# Patient Record
Sex: MALE | Race: BLACK OR AFRICAN AMERICAN | NOT HISPANIC OR LATINO | Employment: UNEMPLOYED | URBAN - METROPOLITAN AREA
[De-identification: names, ages, dates, MRNs, and addresses within clinical notes are randomized per-mention and may not be internally consistent; named-entity substitution may affect disease eponyms.]

---

## 2017-03-26 ENCOUNTER — HOSPITAL ENCOUNTER (INPATIENT)
Facility: HOSPITAL | Age: 37
LOS: 1 days | Discharge: HOME | DRG: 439 | End: 2017-03-27
Attending: EMERGENCY MEDICINE | Admitting: FAMILY MEDICINE
Payer: MEDICAID

## 2017-03-26 DIAGNOSIS — K85.90 ACUTE ON CHRONIC PANCREATITIS: Primary | ICD-10-CM

## 2017-03-26 DIAGNOSIS — K86.1 ACUTE ON CHRONIC PANCREATITIS: Primary | ICD-10-CM

## 2017-03-26 PROBLEM — K86.89 PANCREATIC MASS: Status: ACTIVE | Noted: 2017-03-26

## 2017-03-26 PROBLEM — R79.89 ELEVATED LACTIC ACID LEVEL: Status: ACTIVE | Noted: 2017-03-26

## 2017-03-26 PROBLEM — D50.9 MICROCYTIC ANEMIA: Status: ACTIVE | Noted: 2017-03-26

## 2017-03-26 PROBLEM — E87.6 HYPOKALEMIA: Status: ACTIVE | Noted: 2017-03-26

## 2017-03-26 PROBLEM — I10 BENIGN ESSENTIAL HTN: Status: ACTIVE | Noted: 2017-03-26

## 2017-03-26 LAB
ALBUMIN SERPL BCP-MCNC: 4.2 G/DL
ALP SERPL-CCNC: 78 U/L
ALT SERPL W/O P-5'-P-CCNC: 13 U/L
AMPHET+METHAMPHET UR QL: NEGATIVE
ANION GAP SERPL CALC-SCNC: 12 MMOL/L
AST SERPL-CCNC: 20 U/L
BARBITURATES UR QL SCN>200 NG/ML: NEGATIVE
BASOPHILS # BLD AUTO: 0 K/UL
BASOPHILS NFR BLD: 0 %
BENZODIAZ UR QL SCN>200 NG/ML: NORMAL
BILIRUB SERPL-MCNC: 0.9 MG/DL
BILIRUB UR QL STRIP: NEGATIVE
BUN SERPL-MCNC: 14 MG/DL
BZE UR QL SCN: NEGATIVE
CALCIUM SERPL-MCNC: 9.2 MG/DL
CANNABINOIDS UR QL SCN: NORMAL
CHLORIDE SERPL-SCNC: 100 MMOL/L
CLARITY UR: CLEAR
CO2 SERPL-SCNC: 25 MMOL/L
COLOR UR: YELLOW
CREAT SERPL-MCNC: 1.1 MG/DL
CREAT UR-MCNC: 57.4 MG/DL
DIFFERENTIAL METHOD: ABNORMAL
EOSINOPHIL # BLD AUTO: 0.2 K/UL
EOSINOPHIL NFR BLD: 2 %
ERYTHROCYTE [DISTWIDTH] IN BLOOD BY AUTOMATED COUNT: 16.1 %
EST. GFR  (AFRICAN AMERICAN): >60 ML/MIN/1.73 M^2
EST. GFR  (NON AFRICAN AMERICAN): >60 ML/MIN/1.73 M^2
GLUCOSE SERPL-MCNC: 119 MG/DL
GLUCOSE UR QL STRIP: NEGATIVE
HCT VFR BLD AUTO: 39.6 %
HGB BLD-MCNC: 12.8 G/DL
HGB UR QL STRIP: NEGATIVE
KETONES UR QL STRIP: NEGATIVE
LACTATE SERPL-SCNC: 1.3 MMOL/L
LACTATE SERPL-SCNC: 3 MMOL/L
LEUKOCYTE ESTERASE UR QL STRIP: NEGATIVE
LIPASE SERPL-CCNC: 149 U/L
LYMPHOCYTES # BLD AUTO: 0.9 K/UL
LYMPHOCYTES NFR BLD: 10.9 %
MCH RBC QN AUTO: 26.5 PG
MCHC RBC AUTO-ENTMCNC: 32.4 %
MCV RBC AUTO: 82 FL
METHADONE UR QL SCN>300 NG/ML: NEGATIVE
MONOCYTES # BLD AUTO: 0.4 K/UL
MONOCYTES NFR BLD: 5.4 %
NEUTROPHILS # BLD AUTO: 6.5 K/UL
NEUTROPHILS NFR BLD: 81.7 %
NITRITE UR QL STRIP: NEGATIVE
OPIATES UR QL SCN: NORMAL
PCP UR QL SCN>25 NG/ML: NEGATIVE
PH UR STRIP: 6 [PH] (ref 5–8)
PLATELET # BLD AUTO: 162 K/UL
PMV BLD AUTO: 8.6 FL
POTASSIUM SERPL-SCNC: 3.2 MMOL/L
PROT SERPL-MCNC: 7.5 G/DL
PROT UR QL STRIP: NEGATIVE
RBC # BLD AUTO: 4.84 M/UL
SODIUM SERPL-SCNC: 137 MMOL/L
SP GR UR STRIP: 1.01 (ref 1–1.03)
TOXICOLOGY INFORMATION: NORMAL
URN SPEC COLLECT METH UR: NORMAL
UROBILINOGEN UR STRIP-ACNC: NEGATIVE EU/DL
WBC # BLD AUTO: 7.9 K/UL

## 2017-03-26 PROCEDURE — 96374 THER/PROPH/DIAG INJ IV PUSH: CPT

## 2017-03-26 PROCEDURE — 36415 COLL VENOUS BLD VENIPUNCTURE: CPT

## 2017-03-26 PROCEDURE — 83605 ASSAY OF LACTIC ACID: CPT | Mod: 91

## 2017-03-26 PROCEDURE — 80053 COMPREHEN METABOLIC PANEL: CPT

## 2017-03-26 PROCEDURE — 63600175 PHARM REV CODE 636 W HCPCS: Performed by: FAMILY MEDICINE

## 2017-03-26 PROCEDURE — 81003 URINALYSIS AUTO W/O SCOPE: CPT

## 2017-03-26 PROCEDURE — 25000003 PHARM REV CODE 250: Performed by: NURSE PRACTITIONER

## 2017-03-26 PROCEDURE — 82570 ASSAY OF URINE CREATININE: CPT

## 2017-03-26 PROCEDURE — 83690 ASSAY OF LIPASE: CPT

## 2017-03-26 PROCEDURE — 99284 EMERGENCY DEPT VISIT MOD MDM: CPT | Mod: 25

## 2017-03-26 PROCEDURE — 83605 ASSAY OF LACTIC ACID: CPT

## 2017-03-26 PROCEDURE — 12000002 HC ACUTE/MED SURGE SEMI-PRIVATE ROOM

## 2017-03-26 PROCEDURE — 63600175 PHARM REV CODE 636 W HCPCS: Performed by: EMERGENCY MEDICINE

## 2017-03-26 PROCEDURE — 25000003 PHARM REV CODE 250: Performed by: FAMILY MEDICINE

## 2017-03-26 PROCEDURE — 25000003 PHARM REV CODE 250: Performed by: EMERGENCY MEDICINE

## 2017-03-26 PROCEDURE — 87040 BLOOD CULTURE FOR BACTERIA: CPT

## 2017-03-26 PROCEDURE — 85025 COMPLETE CBC W/AUTO DIFF WBC: CPT

## 2017-03-26 PROCEDURE — 63600175 PHARM REV CODE 636 W HCPCS: Performed by: NURSE PRACTITIONER

## 2017-03-26 PROCEDURE — 99284 EMERGENCY DEPT VISIT MOD MDM: CPT

## 2017-03-26 RX ORDER — METOCLOPRAMIDE HYDROCHLORIDE 5 MG/ML
5 INJECTION INTRAMUSCULAR; INTRAVENOUS EVERY 6 HOURS PRN
Status: DISCONTINUED | OUTPATIENT
Start: 2017-03-26 | End: 2017-03-26

## 2017-03-26 RX ORDER — MORPHINE SULFATE 4 MG/ML
4 INJECTION, SOLUTION INTRAMUSCULAR; INTRAVENOUS EVERY 4 HOURS PRN
Status: DISCONTINUED | OUTPATIENT
Start: 2017-03-26 | End: 2017-03-27 | Stop reason: HOSPADM

## 2017-03-26 RX ORDER — ACETAMINOPHEN 325 MG/1
650 TABLET ORAL EVERY 6 HOURS PRN
Status: DISCONTINUED | OUTPATIENT
Start: 2017-03-26 | End: 2017-03-27 | Stop reason: HOSPADM

## 2017-03-26 RX ORDER — MORPHINE SULFATE 4 MG/ML
8 INJECTION, SOLUTION INTRAMUSCULAR; INTRAVENOUS
Status: ACTIVE | OUTPATIENT
Start: 2017-03-26 | End: 2017-03-26

## 2017-03-26 RX ORDER — BISACODYL 10 MG
10 SUPPOSITORY, RECTAL RECTAL ONCE
Status: DISCONTINUED | OUTPATIENT
Start: 2017-03-26 | End: 2017-03-27 | Stop reason: HOSPADM

## 2017-03-26 RX ORDER — OXYCODONE AND ACETAMINOPHEN 5; 325 MG/1; MG/1
1 TABLET ORAL EVERY 4 HOURS PRN
Status: DISCONTINUED | OUTPATIENT
Start: 2017-03-26 | End: 2017-03-27 | Stop reason: HOSPADM

## 2017-03-26 RX ORDER — BISACODYL 5 MG
10 TABLET, DELAYED RELEASE (ENTERIC COATED) ORAL ONCE
Status: COMPLETED | OUTPATIENT
Start: 2017-03-26 | End: 2017-03-26

## 2017-03-26 RX ORDER — SODIUM CHLORIDE 9 MG/ML
INJECTION, SOLUTION INTRAVENOUS CONTINUOUS
Status: DISCONTINUED | OUTPATIENT
Start: 2017-03-26 | End: 2017-03-27 | Stop reason: HOSPADM

## 2017-03-26 RX ORDER — AMLODIPINE BESYLATE 5 MG/1
10 TABLET ORAL DAILY
Status: DISCONTINUED | OUTPATIENT
Start: 2017-03-26 | End: 2017-03-27 | Stop reason: HOSPADM

## 2017-03-26 RX ORDER — DOCUSATE SODIUM 100 MG/1
100 CAPSULE, LIQUID FILLED ORAL 2 TIMES DAILY
Status: DISCONTINUED | OUTPATIENT
Start: 2017-03-27 | End: 2017-03-27 | Stop reason: HOSPADM

## 2017-03-26 RX ORDER — PANTOPRAZOLE SODIUM 40 MG/1
40 TABLET, DELAYED RELEASE ORAL
Status: COMPLETED | OUTPATIENT
Start: 2017-03-26 | End: 2017-03-26

## 2017-03-26 RX ORDER — MORPHINE SULFATE 4 MG/ML
4 INJECTION, SOLUTION INTRAMUSCULAR; INTRAVENOUS EVERY 6 HOURS PRN
Status: DISCONTINUED | OUTPATIENT
Start: 2017-03-26 | End: 2017-03-26

## 2017-03-26 RX ORDER — PANTOPRAZOLE SODIUM 40 MG/1
40 TABLET, DELAYED RELEASE ORAL DAILY
Status: DISCONTINUED | OUTPATIENT
Start: 2017-03-27 | End: 2017-03-27 | Stop reason: HOSPADM

## 2017-03-26 RX ORDER — LEVOTHYROXINE SODIUM 50 UG/1
50 TABLET ORAL DAILY
Status: DISCONTINUED | OUTPATIENT
Start: 2017-03-26 | End: 2017-03-27 | Stop reason: HOSPADM

## 2017-03-26 RX ORDER — ONDANSETRON 2 MG/ML
4 INJECTION INTRAMUSCULAR; INTRAVENOUS EVERY 6 HOURS PRN
Status: DISCONTINUED | OUTPATIENT
Start: 2017-03-26 | End: 2017-03-27 | Stop reason: HOSPADM

## 2017-03-26 RX ORDER — CLONIDINE HYDROCHLORIDE 0.1 MG/1
0.1 TABLET ORAL 2 TIMES DAILY
Status: DISCONTINUED | OUTPATIENT
Start: 2017-03-26 | End: 2017-03-27 | Stop reason: HOSPADM

## 2017-03-26 RX ORDER — QUETIAPINE FUMARATE 100 MG/1
100 TABLET, FILM COATED ORAL NIGHTLY
Status: DISCONTINUED | OUTPATIENT
Start: 2017-03-26 | End: 2017-03-27 | Stop reason: HOSPADM

## 2017-03-26 RX ORDER — SUCRALFATE 1 G/10ML
1 SUSPENSION ORAL
Status: DISCONTINUED | OUTPATIENT
Start: 2017-03-26 | End: 2017-03-27 | Stop reason: HOSPADM

## 2017-03-26 RX ORDER — METOCLOPRAMIDE HYDROCHLORIDE 5 MG/ML
10 INJECTION INTRAMUSCULAR; INTRAVENOUS
Status: COMPLETED | OUTPATIENT
Start: 2017-03-26 | End: 2017-03-26

## 2017-03-26 RX ORDER — OXYCODONE HYDROCHLORIDE 5 MG/1
10 TABLET ORAL
Status: COMPLETED | OUTPATIENT
Start: 2017-03-26 | End: 2017-03-26

## 2017-03-26 RX ORDER — KETOROLAC TROMETHAMINE 30 MG/ML
15 INJECTION, SOLUTION INTRAMUSCULAR; INTRAVENOUS EVERY 6 HOURS PRN
Status: DISCONTINUED | OUTPATIENT
Start: 2017-03-26 | End: 2017-03-27 | Stop reason: HOSPADM

## 2017-03-26 RX ORDER — ASCORBIC ACID 500 MG
500 TABLET ORAL NIGHTLY
Status: DISCONTINUED | OUTPATIENT
Start: 2017-03-26 | End: 2017-03-27 | Stop reason: HOSPADM

## 2017-03-26 RX ADMIN — BISACODYL 10 MG: 5 TABLET, COATED ORAL at 09:03

## 2017-03-26 RX ADMIN — OXYCODONE HYDROCHLORIDE AND ACETAMINOPHEN 1 TABLET: 5; 325 TABLET ORAL at 10:03

## 2017-03-26 RX ADMIN — MORPHINE SULFATE 4 MG: 4 INJECTION INTRAVENOUS at 09:03

## 2017-03-26 RX ADMIN — SUCRALFATE 1 G: 1 SUSPENSION ORAL at 08:03

## 2017-03-26 RX ADMIN — SODIUM CHLORIDE: 0.9 INJECTION, SOLUTION INTRAVENOUS at 09:03

## 2017-03-26 RX ADMIN — AMLODIPINE BESYLATE 10 MG: 5 TABLET ORAL at 09:03

## 2017-03-26 RX ADMIN — QUETIAPINE FUMARATE 100 MG: 100 TABLET, FILM COATED ORAL at 10:03

## 2017-03-26 RX ADMIN — CLONIDINE HYDROCHLORIDE 0.1 MG: 0.1 TABLET ORAL at 09:03

## 2017-03-26 RX ADMIN — MORPHINE SULFATE 4 MG: 4 INJECTION INTRAVENOUS at 08:03

## 2017-03-26 RX ADMIN — THERA TABS 1 TABLET: TAB at 09:03

## 2017-03-26 RX ADMIN — OXYCODONE HYDROCHLORIDE 10 MG: 5 TABLET ORAL at 07:03

## 2017-03-26 RX ADMIN — OXYCODONE HYDROCHLORIDE AND ACETAMINOPHEN 1 TABLET: 5; 325 TABLET ORAL at 11:03

## 2017-03-26 RX ADMIN — PANTOPRAZOLE SODIUM 40 MG: 40 TABLET, DELAYED RELEASE ORAL at 08:03

## 2017-03-26 RX ADMIN — METOCLOPRAMIDE 10 MG: 5 INJECTION, SOLUTION INTRAMUSCULAR; INTRAVENOUS at 07:03

## 2017-03-26 RX ADMIN — OXYCODONE HYDROCHLORIDE AND ACETAMINOPHEN 1 TABLET: 5; 325 TABLET ORAL at 05:03

## 2017-03-26 RX ADMIN — MORPHINE SULFATE 4 MG: 4 INJECTION INTRAVENOUS at 04:03

## 2017-03-26 RX ADMIN — Medication 500 MG: at 08:03

## 2017-03-26 RX ADMIN — LEVOTHYROXINE SODIUM 50 MCG: 50 TABLET ORAL at 09:03

## 2017-03-26 RX ADMIN — LIDOCAINE HYDROCHLORIDE: 20 SOLUTION ORAL; TOPICAL at 08:03

## 2017-03-26 RX ADMIN — SODIUM CHLORIDE: 0.9 INJECTION, SOLUTION INTRAVENOUS at 08:03

## 2017-03-26 RX ADMIN — CLONIDINE HYDROCHLORIDE 0.1 MG: 0.1 TABLET ORAL at 08:03

## 2017-03-26 NOTE — IP AVS SNAPSHOT
[address] [place] [address]  Phone: [phone]           Patient Discharge Instructions     Our goal is to set you up for success. This packet includes information on your condition, medications, and your home care. It will help you to care for yourself so you don't get sicker and need to go back to the hospital.     Please ask your nurse if you have any questions.        There are many details to remember when preparing to leave the hospital. Here is what you will need to do:    1. Take your medicine. If you are prescribed medications, review your Medication List in the following pages. You may have new medications to  at the pharmacy and others that you'll need to stop taking. Review the instructions for how and when to take your medications. Talk with your doctor or nurses if you are unsure of what to do.     2. Go to your follow-up appointments. Specific follow-up information is listed in the following pages. Your may be contacted by a transition nurse or clinical provider about future appointments. Be sure we have all of the phone numbers to reach you, if needed. Please contact your provider's office if you are unable to make an appointment.     3. Watch for warning signs. Your doctor or nurse will give you detailed warning signs to watch for and when to call for assistance. These instructions may also include educational information about your condition. If you experience any of warning signs to your health, call your doctor.               [facility]  Unless otherwise directed by your provider, please contact [facility], our nurse care line that is available for 24/7 assistance.     [phone] (toll-free)    Registered nurses in the [facility] provide clinical advisement, health education, appointment booking, and other advisory services.                    ** Verify the list of medication(s) below is accurate and up to date.  Carry this with you in case of emergency. If your medications have changed, please notify your healthcare provider.             Medication List      START taking these medications        Additional Info                      acetaminophen 325 MG tablet   Commonly known as:  TYLENOL   Refills:  0   Dose:  650 mg    Instructions:  Take 2 tablets (650 mg total) by mouth every 6 (six) hours as needed for Pain or Temperature greater than (mild to moderate pain).     Begin Date    AM    Noon    PM    Bedtime       docusate sodium 100 MG capsule   Commonly known as:  COLACE   Refills:  0   Dose:  100 mg    Last time this was given:  100 mg on 3/27/2017  8:36 AM   Instructions:  Take 1 capsule (100 mg total) by mouth 2 (two) times daily.     Begin Date    AM    Noon    PM    Bedtime       pantoprazole 40 MG tablet   Commonly known as:  PROTONIX   Quantity:  14 tablet   Refills:  0   Dose:  40 mg    Last time this was given:  40 mg on 3/27/2017  8:36 AM   Instructions:  Take 1 tablet (40 mg total) by mouth once daily.     Begin Date    AM    Noon    PM    Bedtime       sucralfate 100 mg/mL suspension   Commonly known as:  CARAFATE   Quantity:  400 mL   Refills:  0   Dose:  1 g    Last time this was given:  1 g on 3/27/2017 10:30 AM   Instructions:  Take 10 mLs (1 g total) by mouth 4 (four) times daily before meals and nightly.     Begin Date    AM    Noon    PM    Bedtime         CHANGE how you take these medications        Additional Info                      oxycodone-acetaminophen 5-325 mg per tablet   Commonly known as:  PERCOCET   Refills:  0   Dose:  1 tablet   What changed:  reasons to take this    Last time this was given:  1 tablet on 3/27/2017 10:32 AM   Instructions:  Take 1 tablet by mouth every 4 (four) hours as needed.     Begin Date    AM    Noon    PM    Bedtime       quetiapine 100 MG Tab   Commonly known as:  SEROQUEL   Quantity:  30 tablet   Refills:  0   Dose:  100 mg   What changed:    - medication  strength  - how much to take    Last time this was given:  100 mg on 3/26/2017 10:15 PM   Instructions:  Take 1 tablet (100 mg total) by mouth every evening.     Begin Date    AM    Noon    PM    Bedtime         CONTINUE taking these medications        Additional Info                      amlodipine 10 MG tablet   Commonly known as:  NORVASC   Refills:  0   Dose:  10 mg    Last time this was given:  10 mg on 3/27/2017  8:36 AM   Instructions:  Take 10 mg by mouth once daily.     Begin Date    AM    Noon    PM    Bedtime       ascorbic acid (vitamin C) 500 MG tablet   Commonly known as:  VITAMIN C   Refills:  0   Dose:  500 mg    Last time this was given:  500 mg on 3/26/2017  8:20 PM   Instructions:  Take 1 tablet (500 mg total) by mouth every evening.     Begin Date    AM    Noon    PM    Bedtime       cloNIDine 0.1 MG tablet   Commonly known as:  CATAPRES   Refills:  0   Dose:  0.1 mg    Last time this was given:  0.1 mg on 3/27/2017  8:36 AM   Instructions:  Take 0.1 mg by mouth 2 (two) times daily.     Begin Date    AM    Noon    PM    Bedtime       ferrous sulfate 325 (65 FE) MG EC tablet   Refills:  0   Dose:  325 mg    Instructions:  Take 1 tablet (325 mg total) by mouth 2 (two) times daily with meals.     Begin Date    AM    Noon    PM    Bedtime       levothyroxine 50 MCG tablet   Commonly known as:  SYNTHROID   Quantity:  30 tablet   Refills:  0   Dose:  50 mcg    Last time this was given:  50 mcg on 3/27/2017  8:36 AM   Instructions:  Take 1 tablet (50 mcg total) by mouth once daily.     Begin Date    AM    Noon    PM    Bedtime       multivitamin tablet   Commonly known as:  THERAGRAN   Refills:  0   Dose:  1 tablet    Last time this was given:  1 tablet on 3/27/2017  8:36 AM   Instructions:  Take 1 tablet by mouth once daily.     Begin Date    AM    Noon    PM    Bedtime       ondansetron 4 MG tablet   Commonly known as:  ZOFRAN   Quantity:  20 tablet   Refills:  0   Dose:  4 mg    Instructions:  Take 1  tablet (4 mg total) by mouth every 8 (eight) hours as needed for Nausea.     Begin Date    AM    Noon    PM    Bedtime       promethazine 25 MG suppository   Commonly known as:  PHENERGAN   Quantity:  10 suppository   Refills:  0   Dose:  25 mg    Instructions:  Place 1 suppository (25 mg total) rectally every 6 (six) hours as needed for Nausea.     Begin Date    AM    Noon    PM    Bedtime       tramadol 50 mg tablet   Commonly known as:  ULTRAM   Quantity:  12 tablet   Refills:  0   Dose:  50 mg    Instructions:  Take 1 tablet (50 mg total) by mouth every 6 (six) hours as needed for Pain.     Begin Date    AM    Noon    PM    Bedtime            Where to Get Your Medications      You can get these medications from any pharmacy     Bring a paper prescription for each of these medications     levothyroxine 50 MCG tablet    ondansetron 4 MG tablet    pantoprazole 40 MG tablet    quetiapine 100 MG Tab    sucralfate 100 mg/mL suspension       You don't need a prescription for these medications     acetaminophen 325 MG tablet    docusate sodium 100 MG capsule    ferrous sulfate 325 (65 FE) MG EC tablet         Information about where to get these medications is not yet available     ! Ask your nurse or doctor about these medications     oxycodone-acetaminophen 5-325 mg per tablet                  Please bring to all follow up appointments:    1. A copy of your discharge instructions.  2. All medicines you are currently taking in their original bottles.  3. Identification and insurance card.    Please arrive 15 minutes ahead of scheduled appointment time.    Please call 24 hours in advance if you must reschedule your appointment and/or time.        Follow-up Information     Follow up with Kuldip Hodge MD In 2 weeks.    Specialty:  Gastroenterology    Why:  Follow up for acute on chronic pancreatitis - MD's nurse to call pt to schedule    Contact information:    [address]  [facility] [address]  [phone]           Follow up with Day Lieberman DO In 2 weeks.    Specialty:  Family Medicine    Why:  hospital follow scheduled on 5-2-17 @ 3:30 a.m. and put on waiting list for earlier appointment.  Bring meds, discharge paperwork to the appointment.     Contact information:    [place] [address]  [phone]          Discharge Instructions     Future Orders    Call MD for:  persistent nausea and vomiting or diarrhea     Call MD for:  severe uncontrolled pain     Call MD for:  temperature >100.4     Call MD for:     Comments:    Any decline in condition    Diet general     Comments:    Soft bland Gi diet    Questions:    Total calories:      Fat restriction, if any:      Protein restriction, if any:      Na restriction, if any:      Fluid restriction:      Additional restrictions:          Discharge Instructions       Thank you for choosing [facility] for your medical care. The primary doctor who is taking care of you at the time of your discharge is Arlene Nair MD.     You were admitted to the hospital with Acute on chronic pancreatitis.     Please note your discharge instructions, including diet/activity restrictions, follow-up appointments, and medication changes.  If you have any questions about your medical issues, prescriptions, or any other questions, please feel free to contact the [facility] Medicine Dept at [phone] and we will help.    Please direct all long term medication refills and follow up to your primary care provider, Day Lieberman DO. Thank you again for letting us take care of your health care needs.    Please note the following discharge instructions per your discharging physician-  Kimmie Worthy Np      Discharge References/Attachments     PANCREATITIS, CHRONIC, DISCHARGE INSTRUCTIONS FOR (ENGLISH)    PANCREATITIS, UNDERSTANDING (ENGLISH)    ALCOHOL ABUSE (ENGLISH)    ALCOHOL ADDICTION (ALCOHOLISM), SIGNS OF (ENGLISH)        Primary  "Diagnosis     Your primary diagnosis was:  Acute On Chronic Pancreatitis      Admission Information     Date & Time Provider Department CSN    3/26/2017  6:28 AM Arlene Nair MD [facility] [id number]      Care Providers     Provider Role Specialty Primary office phone    Arlene Nair MD Attending Provider Family Medicine [phone]      Your Vitals Were     BP Pulse Temp Resp Height Weight    135/87 65 98.3 °F (36.8 °C) (Oral) 18 5' 7" (1.702 m) 72.6 kg (160 lb)    SpO2 BMI             98% 25.06 kg/m2         Recent Lab Values        4/5/2016                          10:22 PM           A1C 5.4                       Pending Labs     Order Current Status    Blood culture Preliminary result    Blood culture Preliminary result      Allergies as of 3/27/2017     No Known Allergies      Advance Directives     An advance directive is a document which, in the event you are no longer able to make decisions for yourself, tells your healthcare team what kind of treatment you do or do not want to receive, or who you would like to make those decisions for you.  If you do not currently have an advance directive, [facility] encourages you to create one.  For more information call:  [phone]), [phone] ([phone]),  or log on to [email or web address].        Smoking Cessation     If you would like to quit smoking:   You may be eligible for free services if you are a [place] resident and started smoking cigarettes before September 1, 1988.  Call the Smoking Cessation Trust ([facility]) toll free at (169) 917-1886 or (951) 746-8335.   Call 1-800-QUIT-NOW if you do not meet the above criteria.            Language Assistance Services     ATTENTION: Language assistance services are available, free of charge. Please call [phone].      ATENCIÓN: Si habla español, tiene a villela disposición servicios gratuitos de asistencia lingüística. Llame al [phone].     CHÚ Ý: N?u b?n nói " Ti?ng Vi?t, có các d?ch v? h? tr? ngôn ng? mi?n phí dành cho b?n. G?i s? [phone].        [facility] Sign-Up     Activating your [facility] account is as easy as 1-2-3!     1) Visit [email or web address], select Sign Up Now, enter this activation code and your date of birth, then select Next.  [email or web address]  Expires: 5/9/2017  1:39 PM      2) Create a username and password to use when you visit [facility] in the future and select a security question in case you lose your password and select Next.    3) Enter your e-mail address and click Sign Up!    Additional Information  If you have questions, please e-mail [email or web address] or call [phone] to talk to our [facility] staff. Remember, [facility] is NOT to be used for urgent needs. For medical emergencies, dial 911.          [facility] complies with applicable Federal civil rights laws and does not discriminate on the basis of race, color, national origin, age, disability, or sex.

## 2017-03-26 NOTE — PLAN OF CARE
Problem: Patient Care Overview  Goal: Plan of Care Review  Outcome: Ongoing (interventions implemented as appropriate)  Plan of care reviewed with patient, patient verbalized understanding. Safety maintained throughout shift.   Pt remained free of fall/trauma. No nausea or vomiting.  Iv fluids infusing per orders. Still awaiting fresh urine sample from patient. Pain controlled with prn medications. Pt ambulates without assistance. Bed low, call light in reach, will continue to monitor.

## 2017-03-26 NOTE — ASSESSMENT & PLAN NOTE
Pancreatic Mass on Head/ Hx Pseudocyst/ Abdominal Pain-  NPO  IVF for hydration  Prn pain and antiemetic medication  Add PPI and carafate for possible gastritis component  Pt will need follow up with Dr. Hodge as out patient

## 2017-03-26 NOTE — H&P
[facility] Medicine  History & Physical    Patient Name: Bradley Collado  MRN: [id number]  Admission Date: 3/26/2017  Attending Physician: Arlene Nair MD   Primary Care Provider: Day Lieberman DO         Patient information was obtained from patient and ER records.     Subjective:     Principal Problem:Acute on chronic pancreatitis    Chief Complaint:   Chief Complaint   Patient presents with    Abdominal Pain     seen here yesterday and can't get perscriptions filled        HPI: This is a 37 year old male with PMHx of  depression, HTN, chronic pancreatitis with prior Dx of pancreatic pseudocyst seen by Dr. Hodge who  presents to the ED with a one week history of nonbloody vomiting with two days of epigastric and left upper abdominal pain.   He feels that this pain today is similar to prior pancreatitis pain. He has had no fever, diarrhea. He states he has had some left sided chest pain today that radiates from the LUQ of the abdomen and that he has also had increased heartburn recently. He denies SOB, diaphoresis. He has taken Zofran ODT without relief of symptoms. Pt admitted for further evaluation and treatment.    Past Medical History:   Diagnosis Date    Depression     Hypertension     Pancreatitis     Thyroid disease        History reviewed. No pertinent surgical history.    Review of patient's allergies indicates:  No Known Allergies    Current Facility-Administered Medications on File Prior to Encounter   Medication    [COMPLETED] morphine injection 6 mg    [COMPLETED] sodium chloride 0.9% bolus 1,000 mL     Current Outpatient Prescriptions on File Prior to Encounter   Medication Sig    amlodipine (NORVASC) 10 MG tablet Take 10 mg by mouth once daily.    ascorbic acid, vitamin C, (VITAMIN C) 500 MG tablet Take 1 tablet (500 mg total) by mouth every evening.    cloNIDine (CATAPRES) 0.1 MG tablet Take 0.1 mg by mouth 2 (two) times daily.     levothyroxine  (SYNTHROID) 50 MCG tablet Take 1 tablet (50 mcg total) by mouth once daily.    multivitamin (THERAGRAN) tablet Take 1 tablet by mouth once daily.    ondansetron (ZOFRAN) 4 MG tablet Take 1 tablet (4 mg total) by mouth every 8 (eight) hours as needed for Nausea.    [DISCONTINUED] quetiapine (SEROQUEL) 50 MG tablet Take 50 mg by mouth every evening.    promethazine (PHENERGAN) 25 MG suppository Place 1 suppository (25 mg total) rectally every 6 (six) hours as needed for Nausea.    tramadol (ULTRAM) 50 mg tablet Take 1 tablet (50 mg total) by mouth every 6 (six) hours as needed for Pain.    [DISCONTINUED] ferrous sulfate 325 (65 FE) MG EC tablet Take 1 tablet (325 mg total) by mouth 2 (two) times daily with meals.    [DISCONTINUED] oxycodone-acetaminophen (PERCOCET) 5-325 mg per tablet Take 1 tablet by mouth every 4 (four) hours as needed for Pain.     Family History     Problem Relation (Age of Onset)    Cancer Paternal Grandfather    Diabetes Maternal Grandmother    Hypertension Maternal Grandfather        Social History Main Topics    Smoking status: Current Some Day Smoker     Packs/day: 0.25     Years: 7.00    Smokeless tobacco: Not on file    Alcohol use Yes      Comment: occasionally    Drug use: 7.00 per week     Special: Marijuana    Sexual activity: Yes     Partners: Female     Review of Systems   Constitutional: Positive for appetite change and chills. Negative for activity change, diaphoresis, fatigue and fever.   HENT: Negative for congestion, ear pain and facial swelling.    Eyes: Negative for pain and redness.   Respiratory: Negative for apnea, cough, choking, chest tightness, shortness of breath and wheezing.    Cardiovascular: Positive for chest pain. Negative for palpitations and leg swelling.        Epigastric/upper abdominal pain radiating to left chest and back   Gastrointestinal: Positive for abdominal pain, diarrhea and nausea. Negative for abdominal distention, blood in stool,  constipation and vomiting.        Pt reports generalized upper abdominal pain radiates to back  Pt reports symptoms same as when had pancreatic flare up in past   Endocrine: Negative for polydipsia and polyphagia.   Genitourinary: Negative for dysuria, flank pain and hematuria.   Musculoskeletal: Positive for back pain. Negative for gait problem, joint swelling, neck pain and neck stiffness.   Skin: Negative for color change.   Allergic/Immunologic: Negative for food allergies.   Neurological: Negative for seizures, syncope, speech difficulty, weakness and headaches.   Hematological: Does not bruise/bleed easily.   Psychiatric/Behavioral: Negative for agitation, confusion, hallucinations and suicidal ideas.     Objective:     Vital Signs (Most Recent):  Temp: 98.2 °F (36.8 °C) (03/26/17 0900)  Pulse: 60 (03/26/17 0900)  Resp: 18 (03/26/17 0900)  BP: 134/74 (03/26/17 0900)  SpO2: (!) 94 % (03/26/17 0900) Vital Signs (24h Range):  Temp:  [97.6 °F (36.4 °C)-98.2 °F (36.8 °C)] 98.2 °F (36.8 °C)  Pulse:  [54-74] 60  Resp:  [18] 18  SpO2:  [94 %-100 %] 94 %  BP: (132-171)/(74-99) 134/74     Weight: 72.6 kg (160 lb)  Body mass index is 25.06 kg/(m^2).    Physical Exam   Constitutional: He is oriented to person, place, and time. He appears well-developed and well-nourished. No distress.   HENT:   Head: Normocephalic and atraumatic.   Eyes: Conjunctivae and EOM are normal. Pupils are equal, round, and reactive to light. Right eye exhibits no discharge. Left eye exhibits no discharge.   Neck: Normal range of motion. Neck supple. No JVD present.   Cardiovascular: Normal rate, regular rhythm, normal heart sounds and intact distal pulses.    No murmur heard.  Pulmonary/Chest: Effort normal and breath sounds normal. No stridor. No respiratory distress. He has no wheezes. He has no rales. He exhibits no tenderness.   Abdominal: Soft. Bowel sounds are normal. He exhibits no distension. There is tenderness. There is no rebound and  no guarding.   Genitourinary:   Genitourinary Comments: Not examined   Musculoskeletal: Normal range of motion. He exhibits no edema, tenderness or deformity.   Neurological: He is alert and oriented to person, place, and time. No cranial nerve deficit.   Skin: Skin is warm and dry. He is not diaphoretic.   Psychiatric: He has a normal mood and affect. His behavior is normal. Judgment and thought content normal.        Significant Labs: Reviewed    Significant Imaging: Reviewed    Assessment/Plan:     * Acute on chronic pancreatitis  Pancreatic Mass on Head/ Hx Pseudocyst/ Abdominal Pain-  NPO  IVF for hydration  Prn pain and antiemetic medication  Add PPI and carafate for possible gastritis component  Pt will need follow up with Dr. Hodge as out patient       Hypothyroidism  Continue home levothyroxine      ETOH abuse  Cessation education  Monitor for withdrawals       Marijuana use  Nicotine abuse-  Smoking cessation education  Offer nicotine patch      Anemia- microcytic  Continue MVI and vitamin C      Elevated lipase  Hx ETOH      Constipation  Add dulcolax 10mg po x 1 dose  Add bid colace tomorrow      Benign essential HTN  Continue home clonidine and norvasc      Hypokalemia  Monitor  Supplement as needed      Elevated lactic acid level  Add BC x 2, UA , and repeat level  Add NS bolus x 1 liter         VTE Risk Mitigation         Ordered     Medium Risk of VTE  Once      03/26/17 0837        AMBROCIO Perry  Department of Hospital Medicine   [facility]    Time spent seeing patient( greater than 1/2 spent in direct contact) : 71 minutes

## 2017-03-26 NOTE — ED PROVIDER NOTES
Encounter Date: 3/26/2017       History     Chief Complaint   Patient presents with    Can't get perscriptions filled     seen here yesterday and can't get perscriptions filled     Review of patient's allergies indicates:  No Known Allergies  HPI Comments: Chief complaint: Worsening abdominal pain    History of present illness:Bradley Molina Cousin is a 37 y.o. male who presents with  a 2 day history of worsening epigastric pain.  He has a history of recurrent pancreatitis with a pseudocyst.  He admits to alcohol consumption 2 days ago and was seen here for same yesterday.  Lipase was 81 which was felt to represent a mild pancreatitis.  He was discharged with oral Ultram and Zofran; however, he reports that he is unable to fill the prescription.  He states that the pain is worse.  He has no history of peptic ulcer disease and denies any melena, hematochezia or hematemesis.  He uses Goody's powders only occasionally and denies other NSAID usage.    The history is provided by the patient.     Past Medical History:   Diagnosis Date    Depression     Hypertension     Pancreatitis     Thyroid disease      History reviewed. No pertinent surgical history.  Family History   Problem Relation Age of Onset    Diabetes Maternal Grandmother     Hypertension Maternal Grandfather     Cancer Paternal Grandfather      Social History   Substance Use Topics    Smoking status: Current Some Day Smoker     Packs/day: 0.25     Years: 7.00    Smokeless tobacco: None    Alcohol use Yes      Comment: occasionally     Review of Systems   Constitutional: Negative for activity change, appetite change and fever.   HENT: Negative for voice change.    Eyes: Negative for visual disturbance.   Respiratory: Negative for apnea and shortness of breath.    Cardiovascular: Negative for chest pain.   Gastrointestinal: Positive for abdominal pain, nausea and vomiting. Negative for constipation and diarrhea.   Genitourinary: Negative for decreased  urine volume.   Musculoskeletal: Negative for back pain and neck pain.   Skin: Negative for color change.   Neurological: Negative for weakness and headaches.   Hematological: Does not bruise/bleed easily.   Psychiatric/Behavioral: Negative for confusion.       Physical Exam   Initial Vitals   BP Pulse Resp Temp SpO2   03/26/17 0623 03/26/17 0623 03/26/17 0623 03/26/17 0623 03/26/17 0623   171/99 54 18 97.6 °F (36.4 °C) 100 %     Physical Exam    Nursing note and vitals reviewed.  Constitutional: Vital signs are normal. He appears well-developed and well-nourished.   HENT:   Head: Normocephalic and atraumatic.   Eyes: Conjunctivae are normal.   Neck: Trachea normal and phonation normal.   Cardiovascular: Normal rate and regular rhythm.   Pulmonary/Chest: Breath sounds normal. No respiratory distress. He has no wheezes.   Abdominal: Soft. Normal appearance. He exhibits no distension. There is tenderness ( epigastric tenderness). There is no rebound and no guarding.   Neurological: He is alert.   Skin: Skin is warm and dry.   Psychiatric: He has a normal mood and affect. His speech is normal.         ED Course   Procedures  Labs Reviewed   LACTIC ACID, PLASMA - Abnormal; Notable for the following:        Result Value    Lactate (Lactic Acid) 3.0 (*)     All other components within normal limits   CBC W/ AUTO DIFFERENTIAL - Abnormal; Notable for the following:     Hemoglobin 12.8 (*)     Hematocrit 39.6 (*)     MCH 26.5 (*)     RDW 16.1 (*)     MPV 8.6 (*)     Lymph # 0.9 (*)     Gran% 81.7 (*)     Lymph% 10.9 (*)     All other components within normal limits   LIPASE   COMPREHENSIVE METABOLIC PANEL                               ED Course     Clinical Impression:   The encounter diagnosis was Acute on chronic pancreatitis.          Ezequiel Higgins III, MD  03/26/17 0819

## 2017-03-26 NOTE — SUBJECTIVE & OBJECTIVE
Past Medical History:   Diagnosis Date    Depression     Hypertension     Pancreatitis     Thyroid disease        History reviewed. No pertinent surgical history.    Review of patient's allergies indicates:  No Known Allergies    Current Facility-Administered Medications on File Prior to Encounter   Medication    [COMPLETED] morphine injection 6 mg    [COMPLETED] sodium chloride 0.9% bolus 1,000 mL     Current Outpatient Prescriptions on File Prior to Encounter   Medication Sig    amlodipine (NORVASC) 10 MG tablet Take 10 mg by mouth once daily.    ascorbic acid, vitamin C, (VITAMIN C) 500 MG tablet Take 1 tablet (500 mg total) by mouth every evening.    cloNIDine (CATAPRES) 0.1 MG tablet Take 0.1 mg by mouth 2 (two) times daily.     levothyroxine (SYNTHROID) 50 MCG tablet Take 1 tablet (50 mcg total) by mouth once daily.    multivitamin (THERAGRAN) tablet Take 1 tablet by mouth once daily.    ondansetron (ZOFRAN) 4 MG tablet Take 1 tablet (4 mg total) by mouth every 8 (eight) hours as needed for Nausea.    [DISCONTINUED] quetiapine (SEROQUEL) 50 MG tablet Take 50 mg by mouth every evening.    promethazine (PHENERGAN) 25 MG suppository Place 1 suppository (25 mg total) rectally every 6 (six) hours as needed for Nausea.    tramadol (ULTRAM) 50 mg tablet Take 1 tablet (50 mg total) by mouth every 6 (six) hours as needed for Pain.    [DISCONTINUED] ferrous sulfate 325 (65 FE) MG EC tablet Take 1 tablet (325 mg total) by mouth 2 (two) times daily with meals.    [DISCONTINUED] oxycodone-acetaminophen (PERCOCET) 5-325 mg per tablet Take 1 tablet by mouth every 4 (four) hours as needed for Pain.     Family History     Problem Relation (Age of Onset)    Cancer Paternal Grandfather    Diabetes Maternal Grandmother    Hypertension Maternal Grandfather        Social History Main Topics    Smoking status: Current Some Day Smoker     Packs/day: 0.25     Years: 7.00    Smokeless tobacco: Not on file    Alcohol  use Yes      Comment: occasionally    Drug use: 7.00 per week     Special: Marijuana    Sexual activity: Yes     Partners: Female     Review of Systems   Constitutional: Positive for appetite change and chills. Negative for activity change, diaphoresis, fatigue and fever.   HENT: Negative for congestion, ear pain and facial swelling.    Eyes: Negative for pain and redness.   Respiratory: Negative for apnea, cough, choking, chest tightness, shortness of breath and wheezing.    Cardiovascular: Positive for chest pain. Negative for palpitations and leg swelling.        Epigastric/upper abdominal pain radiating to left chest and back   Gastrointestinal: Positive for abdominal pain, diarrhea and nausea. Negative for abdominal distention, blood in stool, constipation and vomiting.        Pt reports generalized upper abdominal pain radiates to back  Pt reports symptoms same as when had pancreatic flare up in past   Endocrine: Negative for polydipsia and polyphagia.   Genitourinary: Negative for dysuria, flank pain and hematuria.   Musculoskeletal: Positive for back pain. Negative for gait problem, joint swelling, neck pain and neck stiffness.   Skin: Negative for color change.   Allergic/Immunologic: Negative for food allergies.   Neurological: Negative for seizures, syncope, speech difficulty, weakness and headaches.   Hematological: Does not bruise/bleed easily.   Psychiatric/Behavioral: Negative for agitation, confusion, hallucinations and suicidal ideas.     Objective:     Vital Signs (Most Recent):  Temp: 98.2 °F (36.8 °C) (03/26/17 0900)  Pulse: 60 (03/26/17 0900)  Resp: 18 (03/26/17 0900)  BP: 134/74 (03/26/17 0900)  SpO2: (!) 94 % (03/26/17 0900) Vital Signs (24h Range):  Temp:  [97.6 °F (36.4 °C)-98.2 °F (36.8 °C)] 98.2 °F (36.8 °C)  Pulse:  [54-74] 60  Resp:  [18] 18  SpO2:  [94 %-100 %] 94 %  BP: (132-171)/(74-99) 134/74     Weight: 72.6 kg (160 lb)  Body mass index is 25.06 kg/(m^2).    Physical Exam    Constitutional: He is oriented to person, place, and time. He appears well-developed and well-nourished. No distress.   HENT:   Head: Normocephalic and atraumatic.   Eyes: Conjunctivae and EOM are normal. Pupils are equal, round, and reactive to light. Right eye exhibits no discharge. Left eye exhibits no discharge.   Neck: Normal range of motion. Neck supple. No JVD present.   Cardiovascular: Normal rate, regular rhythm, normal heart sounds and intact distal pulses.    No murmur heard.  Pulmonary/Chest: Effort normal and breath sounds normal. No stridor. No respiratory distress. He has no wheezes. He has no rales. He exhibits no tenderness.   Abdominal: Soft. Bowel sounds are normal. He exhibits no distension. There is tenderness. There is no rebound and no guarding.   Genitourinary:   Genitourinary Comments: Not examined   Musculoskeletal: Normal range of motion. He exhibits no edema, tenderness or deformity.   Neurological: He is alert and oriented to person, place, and time. No cranial nerve deficit.   Skin: Skin is warm and dry. He is not diaphoretic.   Psychiatric: He has a normal mood and affect. His behavior is normal. Judgment and thought content normal.        Significant Labs: Reviewed    Significant Imaging: Reviewed

## 2017-03-27 VITALS
TEMPERATURE: 98 F | WEIGHT: 160 LBS | BODY MASS INDEX: 25.11 KG/M2 | OXYGEN SATURATION: 98 % | HEIGHT: 67 IN | RESPIRATION RATE: 20 BRPM | HEART RATE: 66 BPM | DIASTOLIC BLOOD PRESSURE: 89 MMHG | SYSTOLIC BLOOD PRESSURE: 141 MMHG

## 2017-03-27 PROBLEM — E87.6 HYPOKALEMIA: Status: RESOLVED | Noted: 2017-03-26 | Resolved: 2017-03-27

## 2017-03-27 PROBLEM — R79.89 ELEVATED LACTIC ACID LEVEL: Status: RESOLVED | Noted: 2017-03-26 | Resolved: 2017-03-27

## 2017-03-27 LAB
ALBUMIN SERPL BCP-MCNC: 3.3 G/DL
ALP SERPL-CCNC: 54 U/L
ALT SERPL W/O P-5'-P-CCNC: 9 U/L
ANION GAP SERPL CALC-SCNC: 6 MMOL/L
AST SERPL-CCNC: 14 U/L
BASOPHILS # BLD AUTO: 0 K/UL
BASOPHILS NFR BLD: 0.7 %
BILIRUB SERPL-MCNC: 0.3 MG/DL
BUN SERPL-MCNC: 6 MG/DL
CALCIUM SERPL-MCNC: 8.5 MG/DL
CHLORIDE SERPL-SCNC: 106 MMOL/L
CO2 SERPL-SCNC: 27 MMOL/L
CREAT SERPL-MCNC: 1 MG/DL
DIFFERENTIAL METHOD: ABNORMAL
EOSINOPHIL # BLD AUTO: 0.1 K/UL
EOSINOPHIL NFR BLD: 3.2 %
ERYTHROCYTE [DISTWIDTH] IN BLOOD BY AUTOMATED COUNT: 16.1 %
EST. GFR  (AFRICAN AMERICAN): >60 ML/MIN/1.73 M^2
EST. GFR  (NON AFRICAN AMERICAN): >60 ML/MIN/1.73 M^2
GLUCOSE SERPL-MCNC: 89 MG/DL
HCT VFR BLD AUTO: 31.8 %
HGB BLD-MCNC: 10.5 G/DL
LYMPHOCYTES # BLD AUTO: 0.9 K/UL
LYMPHOCYTES NFR BLD: 26.5 %
MAGNESIUM SERPL-MCNC: 2 MG/DL
MCH RBC QN AUTO: 26.6 PG
MCHC RBC AUTO-ENTMCNC: 32.9 %
MCV RBC AUTO: 81 FL
MONOCYTES # BLD AUTO: 0.3 K/UL
MONOCYTES NFR BLD: 8.4 %
NEUTROPHILS # BLD AUTO: 2.2 K/UL
NEUTROPHILS NFR BLD: 61.2 %
PLATELET # BLD AUTO: 111 K/UL
PMV BLD AUTO: 9.3 FL
POTASSIUM SERPL-SCNC: 3.5 MMOL/L
PROT SERPL-MCNC: 5.8 G/DL
RBC # BLD AUTO: 3.94 M/UL
SODIUM SERPL-SCNC: 139 MMOL/L
WBC # BLD AUTO: 3.5 K/UL

## 2017-03-27 PROCEDURE — 83735 ASSAY OF MAGNESIUM: CPT

## 2017-03-27 PROCEDURE — 25000003 PHARM REV CODE 250: Performed by: EMERGENCY MEDICINE

## 2017-03-27 PROCEDURE — 63600175 PHARM REV CODE 636 W HCPCS: Performed by: NURSE PRACTITIONER

## 2017-03-27 PROCEDURE — 25000003 PHARM REV CODE 250: Performed by: NURSE PRACTITIONER

## 2017-03-27 PROCEDURE — 80053 COMPREHEN METABOLIC PANEL: CPT

## 2017-03-27 PROCEDURE — 36415 COLL VENOUS BLD VENIPUNCTURE: CPT

## 2017-03-27 PROCEDURE — 25000003 PHARM REV CODE 250: Performed by: FAMILY MEDICINE

## 2017-03-27 PROCEDURE — 85025 COMPLETE CBC W/AUTO DIFF WBC: CPT

## 2017-03-27 RX ORDER — OXYCODONE AND ACETAMINOPHEN 5; 325 MG/1; MG/1
1 TABLET ORAL EVERY 4 HOURS PRN
Refills: 0
Start: 2017-03-27 | End: 2018-05-14

## 2017-03-27 RX ORDER — LEVOTHYROXINE SODIUM 50 UG/1
50 TABLET ORAL DAILY
Qty: 30 TABLET | Refills: 0 | Status: ON HOLD | OUTPATIENT
Start: 2017-03-27 | End: 2020-02-20 | Stop reason: HOSPADM

## 2017-03-27 RX ORDER — DOCUSATE SODIUM 100 MG/1
100 CAPSULE, LIQUID FILLED ORAL 2 TIMES DAILY
Refills: 0 | COMMUNITY
Start: 2017-03-27 | End: 2018-05-14

## 2017-03-27 RX ORDER — ACETAMINOPHEN 325 MG/1
650 TABLET ORAL EVERY 6 HOURS PRN
Refills: 0 | COMMUNITY
Start: 2017-03-27 | End: 2018-05-14

## 2017-03-27 RX ORDER — ONDANSETRON 4 MG/1
4 TABLET, FILM COATED ORAL EVERY 8 HOURS PRN
Qty: 20 TABLET | Refills: 0 | Status: SHIPPED | OUTPATIENT
Start: 2017-03-27 | End: 2018-05-14

## 2017-03-27 RX ORDER — QUETIAPINE FUMARATE 100 MG/1
100 TABLET, FILM COATED ORAL NIGHTLY
Qty: 30 TABLET | Refills: 0 | Status: SHIPPED | OUTPATIENT
Start: 2017-03-27 | End: 2018-05-14

## 2017-03-27 RX ORDER — FERROUS SULFATE 325(65) MG
325 TABLET, DELAYED RELEASE (ENTERIC COATED) ORAL 2 TIMES DAILY WITH MEALS
Refills: 0 | COMMUNITY
Start: 2017-03-27 | End: 2017-08-05

## 2017-03-27 RX ORDER — SUCRALFATE 1 G/10ML
1 SUSPENSION ORAL
Qty: 400 ML | Refills: 0 | Status: SHIPPED | OUTPATIENT
Start: 2017-03-27 | End: 2017-04-06

## 2017-03-27 RX ORDER — QUETIAPINE FUMARATE 100 MG/1
100 TABLET, FILM COATED ORAL NIGHTLY
Status: DISCONTINUED | OUTPATIENT
Start: 2017-03-27 | End: 2017-03-27

## 2017-03-27 RX ORDER — PANTOPRAZOLE SODIUM 40 MG/1
40 TABLET, DELAYED RELEASE ORAL DAILY
Qty: 14 TABLET | Refills: 0 | Status: SHIPPED | OUTPATIENT
Start: 2017-03-27 | End: 2017-08-05

## 2017-03-27 RX ORDER — OXYCODONE AND ACETAMINOPHEN 5; 325 MG/1; MG/1
1 TABLET ORAL EVERY 12 HOURS PRN
Qty: 15 TABLET | Refills: 0 | Status: SHIPPED | OUTPATIENT
Start: 2017-03-27 | End: 2017-03-27 | Stop reason: SDUPTHER

## 2017-03-27 RX ORDER — OXYCODONE AND ACETAMINOPHEN 5; 325 MG/1; MG/1
1 TABLET ORAL EVERY 12 HOURS PRN
Qty: 15 TABLET | Refills: 0 | Status: SHIPPED | OUTPATIENT
Start: 2017-03-27 | End: 2017-08-05

## 2017-03-27 RX ADMIN — SUCRALFATE 1 G: 1 SUSPENSION ORAL at 05:03

## 2017-03-27 RX ADMIN — THERA TABS 1 TABLET: TAB at 08:03

## 2017-03-27 RX ADMIN — MORPHINE SULFATE 4 MG: 4 INJECTION INTRAVENOUS at 08:03

## 2017-03-27 RX ADMIN — AMLODIPINE BESYLATE 10 MG: 5 TABLET ORAL at 08:03

## 2017-03-27 RX ADMIN — PANTOPRAZOLE SODIUM 40 MG: 40 TABLET, DELAYED RELEASE ORAL at 08:03

## 2017-03-27 RX ADMIN — CLONIDINE HYDROCHLORIDE 0.1 MG: 0.1 TABLET ORAL at 08:03

## 2017-03-27 RX ADMIN — OXYCODONE HYDROCHLORIDE AND ACETAMINOPHEN 1 TABLET: 5; 325 TABLET ORAL at 10:03

## 2017-03-27 RX ADMIN — MORPHINE SULFATE 4 MG: 4 INJECTION INTRAVENOUS at 12:03

## 2017-03-27 RX ADMIN — SUCRALFATE 1 G: 1 SUSPENSION ORAL at 10:03

## 2017-03-27 RX ADMIN — MORPHINE SULFATE 4 MG: 4 INJECTION INTRAVENOUS at 04:03

## 2017-03-27 RX ADMIN — SODIUM CHLORIDE: 0.9 INJECTION, SOLUTION INTRAVENOUS at 03:03

## 2017-03-27 RX ADMIN — OXYCODONE HYDROCHLORIDE AND ACETAMINOPHEN 1 TABLET: 5; 325 TABLET ORAL at 03:03

## 2017-03-27 RX ADMIN — LEVOTHYROXINE SODIUM 50 MCG: 50 TABLET ORAL at 08:03

## 2017-03-27 RX ADMIN — DOCUSATE SODIUM 100 MG: 100 CAPSULE, LIQUID FILLED ORAL at 08:03

## 2017-03-27 NOTE — PLAN OF CARE
03/27/17 1109   Final Note   Assessment Type Final Discharge Note   Discharge Disposition Home   Discharge planning education complete? Yes

## 2017-03-27 NOTE — DISCHARGE SUMMARY
[facility] Hospital Medicine  Discharge Summary      Patient Name: Bradley Collado  MRN: [id number]  Admission Date: 3/26/2017  Hospital Length of Stay: 1 days  Discharge Date and Time:  03/27/2017 11:06 AM  Attending Physician: Arlene Nair MD   Discharging Provider: AMBROCIO Perry  Primary Care Provider: Day Lieberman DO      HPI:   This is a 37 year old male with PMHx of  depression, HTN, chronic pancreatitis with prior Dx of pancreatic pseudocyst seen by Dr. Hodge who  presents to the ED with a one week history of nonbloody vomiting with two days of epigastric and left upper abdominal pain.   He feels that this pain today is similar to prior pancreatitis pain. He has had no fever, diarrhea. He states he has had some left sided chest pain today that radiates from the LUQ of the abdomen and that he has also had increased heartburn recently. He denies SOB, diaphoresis. He has taken Zofran ODT without relief of symptoms. The pt was felt to have acute on chronic pancreatitis and admitted for further evaluation and treatment.    * No surgery found *      Indwelling Lines/Drains at time of discharge:   Lines/Drains/Airways          No matching active lines, drains, or airways        Hospital Course:   The pt was monitored closely during his stay. He was initially placed NPO and given IVF for hydration. He was also given PPI and carafate for possible gastritis component. Pt was noted to have improvement in his condition during his stay and was placed back on po intake which he tolerated well and was discharged to home. During the pt's stay he continued to leave his room multiple times to go out and smoke. Pt was educated on importance of smoking cessation. Pt was to follow up with Dr. Hodge as out patient.      Consults:   Consults         Status Ordering Provider     Inpatient consult to Social Work/Case Management  Once     Provider:  (Not yet assigned)    Acknowledged  CLARKE BURK          Significant Diagnostic Studies: Labs:   CMP   Recent Labs  Lab 03/26/17  0742 03/27/17  0548    139   K 3.2* 3.5    106   CO2 25 27   * 89   BUN 14 6   CREATININE 1.1 1.0   CALCIUM 9.2 8.5*   PROT 7.5 5.8*   ALBUMIN 4.2 3.3*   BILITOT 0.9 0.3   ALKPHOS 78 54*   AST 20 14   ALT 13 9*   ANIONGAP 12 6*   ESTGFRAFRICA >60 >60   EGFRNONAA >60 >60    and CBC   Recent Labs  Lab 03/26/17  0742 03/27/17  0548   WBC 7.90 3.50*   HGB 12.8* 10.5*   HCT 39.6* 31.8*    111*       Pending Diagnostic Studies:     None        Final Active Diagnoses:    Diagnosis Date Noted POA    PRINCIPAL PROBLEM:  Acute on chronic pancreatitis [K85.90, K86.1] 10/24/2016 Yes    Benign essential HTN [I10] 03/26/2017 Yes    Pancreatic mass [K86.9] 03/26/2017 Yes    Pseudocyst, pancreas [K86.3] 11/01/2016 Yes    Nicotine abuse [Z72.0] 10/24/2016 Yes    Anemia- microcytic [D64.9] 09/19/2016 Yes    Elevated lipase [R74.8] 09/19/2016 Yes    Abdominal pain [R10.9] 09/19/2016 Yes    ETOH abuse [F10.10] 04/05/2016 Yes    Hypothyroidism [E03.9] 04/05/2016 Yes    Marijuana use [F12.10] 04/05/2016 Yes      Problems Resolved During this Admission:    Diagnosis Date Noted Date Resolved POA    Hypokalemia [E87.6] 03/26/2017 03/27/2017 Yes    Elevated lactic acid level [E87.2] 03/26/2017 03/27/2017 Yes    Constipation [K59.00] 10/24/2016 03/27/2017 Yes      No new Assessment & Plan notes have been filed under this hospital service since the last note was generated.  Service: Hospital Medicine      Discharged Condition: stable    Disposition: Home or Self Care    Follow Up:  Follow-up Information     Follow up with Kuldip Hodge MD In 2 weeks.    Specialty:  Gastroenterology    Why:  Follow up for acute on chronic pancreatitis    Contact information:    [address]  [place] [address]  [phone]          Follow up with Day Lieberman DO In 2 weeks.    Specialty:  Family Medicine     Contact information:    [place] [address]  [phone]          Patient Instructions:     Diet general   Order Comments: Soft bland Gi diet     Call MD for:  temperature >100.4     Call MD for:  persistent nausea and vomiting or diarrhea     Call MD for:  severe uncontrolled pain     Call MD for:   Order Comments: Any decline in condition       Medications:  Reconciled Home Medications:   Current Discharge Medication List      START taking these medications    Details   acetaminophen (TYLENOL) 325 MG tablet Take 2 tablets (650 mg total) by mouth every 6 (six) hours as needed for Pain or Temperature greater than (mild to moderate pain).  Refills: 0      docusate sodium (COLACE) 100 MG capsule Take 1 capsule (100 mg total) by mouth 2 (two) times daily.  Refills: 0      pantoprazole (PROTONIX) 40 MG tablet Take 1 tablet (40 mg total) by mouth once daily.  Qty: 14 tablet, Refills: 0      sucralfate (CARAFATE) 100 mg/mL suspension Take 10 mLs (1 g total) by mouth 4 (four) times daily before meals and nightly.  Qty: 400 mL, Refills: 0         CONTINUE these medications which have CHANGED    Details   ferrous sulfate 325 (65 FE) MG EC tablet Take 1 tablet (325 mg total) by mouth 2 (two) times daily with meals.  Refills: 0      levothyroxine (SYNTHROID) 50 MCG tablet Take 1 tablet (50 mcg total) by mouth once daily.  Qty: 30 tablet, Refills: 0      ondansetron (ZOFRAN) 4 MG tablet Take 1 tablet (4 mg total) by mouth every 8 (eight) hours as needed for Nausea.  Qty: 20 tablet, Refills: 0      oxycodone-acetaminophen (PERCOCET) 5-325 mg per tablet Take 1 tablet by mouth every 4 (four) hours as needed.  Refills: 0      quetiapine (SEROQUEL) 100 MG Tab Take 1 tablet (100 mg total) by mouth every evening.  Qty: 30 tablet, Refills: 0         CONTINUE these medications which have NOT CHANGED    Details   amlodipine (NORVASC) 10 MG tablet Take 10 mg by mouth once daily.      ascorbic acid, vitamin C, (VITAMIN C)  500 MG tablet Take 1 tablet (500 mg total) by mouth every evening.      cloNIDine (CATAPRES) 0.1 MG tablet Take 0.1 mg by mouth 2 (two) times daily.       multivitamin (THERAGRAN) tablet Take 1 tablet by mouth once daily.      promethazine (PHENERGAN) 25 MG suppository Place 1 suppository (25 mg total) rectally every 6 (six) hours as needed for Nausea.  Qty: 10 suppository, Refills: 0      tramadol (ULTRAM) 50 mg tablet Take 1 tablet (50 mg total) by mouth every 6 (six) hours as needed for Pain.  Qty: 12 tablet, Refills: 0           Time spent on the discharge of patient: 48 minutes    AMBROCIO Perry  Department of Hospital Medicine  [facility]

## 2017-03-27 NOTE — PROGRESS NOTES
Attempted to meet with pt with complete his assessment.  Pt's nurse stated pt was outside.  Will follow up with him when he returns to the room.

## 2017-03-27 NOTE — PROGRESS NOTES
Per [facility], [facility] locked [facility] in [facility]. Prescription for pain medication was sent to a different [facility]. Spoke to pharmacy employee Arlet at the incorrect [facility], prescription has now been deleted. Dr. Nair to resend prescription to correct pharmacy.

## 2017-03-27 NOTE — DISCHARGE INSTRUCTIONS
Thank you for choosing [facility] for your medical care. The primary doctor who is taking care of you at the time of your discharge is Arlene Nair MD.     You were admitted to the hospital with Acute on chronic pancreatitis.     Please note your discharge instructions, including diet/activity restrictions, follow-up appointments, and medication changes.  If you have any questions about your medical issues, prescriptions, or any other questions, please feel free to contact the [facility] Medicine Dept at [phone] and we will help.    Please direct all long term medication refills and follow up to your primary care provider, Day Lieberman DO. Thank you again for letting us take care of your health care needs.    Please note the following discharge instructions per your discharging physician-  Kimmie Worthy Np

## 2017-03-27 NOTE — PLAN OF CARE
Problem: Patient Care Overview  Goal: Plan of Care Review  Outcome: Ongoing (interventions implemented as appropriate)  Pain controlled with prn medications.  Denies needs/discomforts this morning. Playing on cell phone at present.  Remains NPO per order but pt states he is hoping his diet will advance today.  Call light within reach.

## 2017-03-27 NOTE — PLAN OF CARE
Met with pt to complete his assessment.  Educated pt on the blue discharge folder and left it in pt's room.  Pt verified his PCP as Dr. Lieberman and insurance as Medicaid.  Pt's discharge plan is home with no anticipated needs.  Pt verified that his family would provide transportation home.       03/27/17 1019   Discharge Assessment   Assessment Type Discharge Planning Assessment   Confirmed/corrected address and phone number on facesheet? (correct address is [address] [place] [address] - admissions was updated)   Assessment information obtained from? Patient   Communicated expected length of stay with patient/caregiver no   Type of Healthcare Directive Received (Denies.  Pt is single with 4 dependent children, aged 10, 7, 5, 4.  )   If Healthcare Directive is received, is it scanned into Epic? no (comment)   Prior to hospitilization cognitive status: Alert/Oriented   Prior to hospitalization functional status: Independent   Current cognitive status: Alert/Oriented   Current Functional Status: Independent   Arrived From home or self-care   Lives With grandparent(s);child(live), dependent  (grandmother and pt's four children)   Able to Return to Prior Arrangements yes   Is patient able to care for self after discharge? Yes   How many people do you have in your home that can help with your care after discharge? 1   Who are your caregiver(s) and their phone number(s)? (grandmother Jyaleen, [phone])   Patient's perception of discharge disposition home or selfcare   Readmission Within The Last 30 Days no previous admission in last 30 days   Patient currently being followed by outpatient case management? No   Patient currently receives home health services? No   Does the patient currently use HME? Yes   Equipment Currently Used at Home (BP cuff)   Do you have any problems affording any of your prescribed medications? No  (pharmacy is [facility])   Is the patient taking medications as  prescribed? yes   Do you have any financial concerns preventing you from receiving the healthcare you need? No   Does the patient have transportation to healthcare appointments? Yes   Transportation Available family or friend will provide   On Dialysis? No   Does the patient receive services at the Coumadin Clinic? No   Discharge Plan A Home with family   Patient/Family In Agreement With Plan yes

## 2017-03-27 NOTE — PROGRESS NOTES
Attempted to schedule follow up with Dr. Hodge.  Left message for MD's nurse to call pt to schedule the appointment.  Dr. Lieberman's first appointment is on  5-2-17 @ 3:30 p.m. Pt was put on the waiting list for an earlier appointment. Updated the AVS and pt's nurse,

## 2017-03-30 NOTE — PHYSICIAN QUERY
PT Name: Bradley Collado  MR #: [id number]    Physician Query Form - Cause and Effect Relationship Clarification      CDS/: Alvin Riley               Contact information: [email or web address]    This form is a permanent document in the medical record.     Query Date: March 30, 2017    By submitting this query, we are merely seeking further clarification of documentation. Please utilize your independent clinical judgment when addressing the question(s) below.    The Medical record contains the following:  Supporting Clinical Findings   Location in record    Acute on Chronic Pancreatitis/Alcohol Abuse    He admits to alcohol consumption 2 days ago and was seen here for same yesterday                                                                                                                                                                                   H & P and D/C Summary      ED Notes                                                                                                                                                                                                           Provider, please clarify if there is any correlation between Acute on Chronic Pancreatitis and Alcohol Abuse.           Are the conditions:     [ X ] Due to or associated with each other     [  ] Unrelated to each other     [  ] Other (Please Specify): _________________________     [  ] Clinically Undetermined

## 2017-03-31 ENCOUNTER — TELEPHONE (OUTPATIENT)
Dept: GASTROENTEROLOGY | Facility: CLINIC | Age: 37
End: 2017-03-31

## 2017-03-31 LAB
BACTERIA BLD CULT: NORMAL
BACTERIA BLD CULT: NORMAL

## 2017-08-05 ENCOUNTER — HOSPITAL ENCOUNTER (EMERGENCY)
Facility: HOSPITAL | Age: 37
Discharge: HOME | End: 2017-08-06
Attending: EMERGENCY MEDICINE
Payer: MEDICAID

## 2017-08-05 VITALS
DIASTOLIC BLOOD PRESSURE: 87 MMHG | RESPIRATION RATE: 16 BRPM | BODY MASS INDEX: 25.11 KG/M2 | WEIGHT: 160 LBS | OXYGEN SATURATION: 99 % | HEART RATE: 82 BPM | SYSTOLIC BLOOD PRESSURE: 130 MMHG | TEMPERATURE: 99 F | HEIGHT: 67 IN

## 2017-08-05 DIAGNOSIS — R10.13 EPIGASTRIC ABDOMINAL PAIN: Primary | ICD-10-CM

## 2017-08-05 LAB
ALBUMIN SERPL BCP-MCNC: 4.2 G/DL
ALP SERPL-CCNC: 78 U/L
ALT SERPL W/O P-5'-P-CCNC: 16 U/L
ANION GAP SERPL CALC-SCNC: 13 MMOL/L
AST SERPL-CCNC: 27 U/L
BASOPHILS # BLD AUTO: 0 K/UL
BASOPHILS NFR BLD: 0.4 %
BILIRUB SERPL-MCNC: 0.6 MG/DL
BUN SERPL-MCNC: 16 MG/DL
CALCIUM SERPL-MCNC: 9.7 MG/DL
CHLORIDE SERPL-SCNC: 102 MMOL/L
CO2 SERPL-SCNC: 24 MMOL/L
CREAT SERPL-MCNC: 1.2 MG/DL
DIFFERENTIAL METHOD: ABNORMAL
EOSINOPHIL # BLD AUTO: 0 K/UL
EOSINOPHIL NFR BLD: 0.4 %
ERYTHROCYTE [DISTWIDTH] IN BLOOD BY AUTOMATED COUNT: 16.3 %
EST. GFR  (AFRICAN AMERICAN): >60 ML/MIN/1.73 M^2
EST. GFR  (NON AFRICAN AMERICAN): >60 ML/MIN/1.73 M^2
GLUCOSE SERPL-MCNC: 111 MG/DL
HCT VFR BLD AUTO: 40.7 %
HGB BLD-MCNC: 13.5 G/DL
LIPASE SERPL-CCNC: 33 U/L
LYMPHOCYTES # BLD AUTO: 1.4 K/UL
LYMPHOCYTES NFR BLD: 18.2 %
MCH RBC QN AUTO: 26.8 PG
MCHC RBC AUTO-ENTMCNC: 33.1 G/DL
MCV RBC AUTO: 81 FL
MONOCYTES # BLD AUTO: 0.4 K/UL
MONOCYTES NFR BLD: 5.3 %
NEUTROPHILS # BLD AUTO: 5.8 K/UL
NEUTROPHILS NFR BLD: 75.7 %
PLATELET # BLD AUTO: 242 K/UL
PMV BLD AUTO: 8.5 FL
POTASSIUM SERPL-SCNC: 3.5 MMOL/L
PROT SERPL-MCNC: 7.6 G/DL
RBC # BLD AUTO: 5.02 M/UL
SODIUM SERPL-SCNC: 139 MMOL/L
WBC # BLD AUTO: 7.7 K/UL

## 2017-08-05 PROCEDURE — 80053 COMPREHEN METABOLIC PANEL: CPT

## 2017-08-05 PROCEDURE — 96361 HYDRATE IV INFUSION ADD-ON: CPT

## 2017-08-05 PROCEDURE — 96374 THER/PROPH/DIAG INJ IV PUSH: CPT

## 2017-08-05 PROCEDURE — 83690 ASSAY OF LIPASE: CPT

## 2017-08-05 PROCEDURE — 85025 COMPLETE CBC W/AUTO DIFF WBC: CPT

## 2017-08-05 PROCEDURE — 99284 EMERGENCY DEPT VISIT MOD MDM: CPT | Mod: 25

## 2017-08-05 PROCEDURE — 36415 COLL VENOUS BLD VENIPUNCTURE: CPT

## 2017-08-05 PROCEDURE — 25000003 PHARM REV CODE 250: Performed by: EMERGENCY MEDICINE

## 2017-08-05 PROCEDURE — 96375 TX/PRO/DX INJ NEW DRUG ADDON: CPT

## 2017-08-05 PROCEDURE — 96376 TX/PRO/DX INJ SAME DRUG ADON: CPT

## 2017-08-05 PROCEDURE — 63600175 PHARM REV CODE 636 W HCPCS: Performed by: EMERGENCY MEDICINE

## 2017-08-05 RX ORDER — PANTOPRAZOLE SODIUM 40 MG/1
40 TABLET, DELAYED RELEASE ORAL DAILY
Qty: 30 TABLET | Refills: 0 | Status: SHIPPED | OUTPATIENT
Start: 2017-08-05 | End: 2018-05-14

## 2017-08-05 RX ORDER — SUCRALFATE 1 G/1
1 TABLET ORAL 4 TIMES DAILY
Qty: 60 TABLET | Refills: 0 | Status: SHIPPED | OUTPATIENT
Start: 2017-08-05 | End: 2018-05-14

## 2017-08-05 RX ORDER — ONDANSETRON 2 MG/ML
4 INJECTION INTRAMUSCULAR; INTRAVENOUS
Status: COMPLETED | OUTPATIENT
Start: 2017-08-05 | End: 2017-08-05

## 2017-08-05 RX ORDER — ALLOPURINOL 100 MG/1
100 TABLET ORAL DAILY
COMMUNITY
End: 2018-05-14

## 2017-08-05 RX ORDER — MORPHINE SULFATE 2 MG/ML
6 INJECTION, SOLUTION INTRAMUSCULAR; INTRAVENOUS
Status: COMPLETED | OUTPATIENT
Start: 2017-08-05 | End: 2017-08-05

## 2017-08-05 RX ORDER — METOCLOPRAMIDE HYDROCHLORIDE 5 MG/ML
10 INJECTION INTRAMUSCULAR; INTRAVENOUS
Status: COMPLETED | OUTPATIENT
Start: 2017-08-05 | End: 2017-08-05

## 2017-08-05 RX ORDER — DICYCLOMINE HYDROCHLORIDE 20 MG/1
20 TABLET ORAL 2 TIMES DAILY
Qty: 30 TABLET | Refills: 0 | Status: SHIPPED | OUTPATIENT
Start: 2017-08-05 | End: 2017-09-04

## 2017-08-05 RX ADMIN — MORPHINE SULFATE 6 MG: 2 INJECTION, SOLUTION INTRAMUSCULAR; INTRAVENOUS at 07:08

## 2017-08-05 RX ADMIN — METOCLOPRAMIDE 10 MG: 5 INJECTION, SOLUTION INTRAMUSCULAR; INTRAVENOUS at 10:08

## 2017-08-05 RX ADMIN — SODIUM CHLORIDE 1000 ML: 0.9 INJECTION, SOLUTION INTRAVENOUS at 07:08

## 2017-08-05 RX ADMIN — ONDANSETRON 4 MG: 2 INJECTION INTRAMUSCULAR; INTRAVENOUS at 07:08

## 2017-08-05 RX ADMIN — MORPHINE SULFATE 6 MG: 2 INJECTION, SOLUTION INTRAMUSCULAR; INTRAVENOUS at 08:08

## 2017-08-05 RX ADMIN — LIDOCAINE HYDROCHLORIDE: 20 SOLUTION ORAL; TOPICAL at 10:08

## 2017-08-06 NOTE — ED PROVIDER NOTES
Encounter Date: 8/5/2017    SCRIBE #1 NOTE: I, Lauren Carroll, am scribing for, and in the presence of, Dr. Arnold.       History     Chief Complaint   Patient presents with    Abdominal Pain     Hx of pancreatitis.  Similar to typical flare ups        08/05/2017 7:22 PM     Chief Complaint: Abdominal pain      Bradley Cousin is a 37 y.o. male with HTN and pancreatitis who presents to the ED with an onset of constant, worsening epigastric abdominal pain over the last several days with associated nausea, vomiting, and diarrhea. He reports prior similar symptoms to his pancreatitis flare-ups. The patient states that he had made an appointment with the wrong doctor this month and had an upcoming appointment in the middle of this month with the correct doctor. He had an endoscopic US in November of 2016. The patient denies any other symptoms at this time. No SHx noted.       The history is provided by the patient.     Review of patient's allergies indicates:  No Known Allergies  Past Medical History:   Diagnosis Date    Depression     Hypertension     Pancreatitis     Thyroid disease      History reviewed. No pertinent surgical history.  Family History   Problem Relation Age of Onset    Diabetes Maternal Grandmother     Hypertension Maternal Grandfather     Cancer Paternal Grandfather      Social History   Substance Use Topics    Smoking status: Current Some Day Smoker     Packs/day: 0.25     Years: 7.00    Smokeless tobacco: Never Used    Alcohol use Yes      Comment: occasionally     Review of Systems   Constitutional: Negative for chills and fever.   HENT: Negative for nosebleeds.    Eyes: Negative for visual disturbance.   Respiratory: Negative for cough and shortness of breath.    Cardiovascular: Negative for chest pain and palpitations.   Gastrointestinal: Positive for abdominal pain (epigastric), diarrhea, nausea and vomiting.   Genitourinary: Negative for dysuria and hematuria.   Musculoskeletal: Negative  for back pain and neck pain.   Skin: Negative for rash.   Neurological: Negative for seizures, syncope and headaches.     Physical Exam     Initial Vitals [08/05/17 1853]   BP Pulse Resp Temp SpO2   (!) 141/106 73 16 98.6 °F (37 °C) 99 %      MAP       117.67         Physical Exam    Nursing note and vitals reviewed.  Constitutional: He appears well-developed and well-nourished. He is not diaphoretic. No distress.   HENT:   Head: Normocephalic and atraumatic.   Mouth/Throat: Oropharynx is clear and moist.   Eyes: Conjunctivae are normal. Pupils are equal, round, and reactive to light.   Neck: Normal range of motion. Neck supple.   Cardiovascular: Normal rate, regular rhythm, normal heart sounds and intact distal pulses.   Pulmonary/Chest: Breath sounds normal. No respiratory distress. He has no wheezes. He has no rhonchi. He has no rales.   Abdominal: Soft. Bowel sounds are normal. He exhibits no distension. There is tenderness in the epigastric area and left upper quadrant.   Musculoskeletal: Normal range of motion. He exhibits no edema or tenderness.   Lymphadenopathy:     He has no cervical adenopathy.   Neurological: He is alert and oriented to person, place, and time. He has normal strength.   Skin: Skin is warm and dry.   Psychiatric: He has a normal mood and affect. Thought content normal.       ED Course   Procedures  Labs Reviewed   CBC W/ AUTO DIFFERENTIAL - Abnormal; Notable for the following:        Result Value    Hemoglobin 13.5 (*)     MCV 81 (*)     MCH 26.8 (*)     RDW 16.3 (*)     MPV 8.5 (*)     Gran% 75.7 (*)     All other components within normal limits   COMPREHENSIVE METABOLIC PANEL - Abnormal; Notable for the following:     Glucose 111 (*)     All other components within normal limits   LIPASE           Medical Decision Making:   History:   Old Medical Records: I decided to obtain old medical records.  Clinical Tests:   Lab Tests: Ordered and Reviewed            Scribe Attestation:   Naveen  #1: I performed the above scribed service and the documentation accurately describes the services I performed. I attest to the accuracy of the note.    Attending Attestation:           Physician Attestation for Scribe:  Physician Attestation Statement for Scribe #1: I, Dr. Arnlod, reviewed documentation, as scribed by Lauren Carroll in my presence, and it is both accurate and complete.         Attending ED Notes:   This is an emergent evaluation of a 37 y.o.male patient with presentation of similar symptoms of recurrent pancreatitis.   Initial differentials include but are not limited to: Pancreatitis/ulcer, opoid dependence, and intraabdominal infection such as colitis considered less likely.   Plan: Basic labs, lipase, IV fluids, and nausea, vomiting, & pain control.     Labs reviewed.  Workup unremarkable.  No evidence of pancreatitis.  Patient given Reglan 10 mg IV and GI cocktail with subsequent improvement.  Patient has epigastric discomfort which may be due to gastritis versus ulcer.  Recommend follow-up with PCP and referral to GI for further evaluation, including possible endoscopy.  Rx for Protonix, Carafate, Bentyl.  Return if significant pain or any other concerns.          ED Course     Clinical Impression:     1. Epigastric abdominal pain          Disposition:   Disposition: Discharged  Condition: Stable                        Wood Arnold MD  08/05/17 5647

## 2017-08-06 NOTE — ED NOTES
Pt presents to ER with c/o abdominal pain that started about 4 days ago and has progressively gotten worse. Pt has hx of pancreatitis and states this pain feels like a flare up. Pt also c/o nausea, vomiting. Pt reports hx of alcohol abuse but does not drink much now. Last drink was wine two days ago.  Pt in bed, lowest position, side rails up, call light within reach. Will continue to monitor.

## 2018-02-28 NOTE — ED TRIAGE NOTES
Health Maintenance Summary     Topic Due On Due Status Completed On    IMMUNIZATION - IPV  Completed Sep 2, 2010    IMMUNIZATION - MMR  Completed Sep 2, 2010    IMMUNIZATION - VARICELLA  Completed Sep 2, 2010    IMMUNIZATION - HEPATITIS B  Completed Jan 12, 2007    IMMUNIZATION - HEPATITIS A  Completed Jul 29, 2009    IMMUNIZATION - MENINGITIS Jul 7, 2022 Not Due Oct 18, 2017    IMMUNIZATION - HPV  Apr 18, 2018 Not Due Oct 18, 2017    IMMUNIZATION - DTaP/Tdap/Td Oct 18, 2027 Not Due Oct 18, 2017    Immunization-Influenza  Completed Oct 18, 2017          Patient is up to date, no discussion needed .             Seen here yesterday and went home but can't get prescriptions filled; has to be certain pharmacy and won't be open till monday

## 2018-05-14 ENCOUNTER — HOSPITAL ENCOUNTER (OUTPATIENT)
Facility: HOSPITAL | Age: 38
Discharge: HOME | End: 2018-05-15
Attending: EMERGENCY MEDICINE | Admitting: INTERNAL MEDICINE
Payer: MEDICAID

## 2018-05-14 DIAGNOSIS — R11.2 INTRACTABLE VOMITING WITH NAUSEA, UNSPECIFIED VOMITING TYPE: ICD-10-CM

## 2018-05-14 DIAGNOSIS — N17.9 ACUTE KIDNEY INJURY: Primary | ICD-10-CM

## 2018-05-14 LAB
ALBUMIN SERPL BCP-MCNC: 5 G/DL
ALP SERPL-CCNC: 80 U/L
ALT SERPL W/O P-5'-P-CCNC: 26 U/L
ANION GAP SERPL CALC-SCNC: 18 MMOL/L
AST SERPL-CCNC: 33 U/L
BASOPHILS # BLD AUTO: 0 K/UL
BASOPHILS NFR BLD: 0 %
BILIRUB SERPL-MCNC: 0.8 MG/DL
BUN SERPL-MCNC: 20 MG/DL
CALCIUM SERPL-MCNC: 10.6 MG/DL
CHLORIDE SERPL-SCNC: 96 MMOL/L
CO2 SERPL-SCNC: 27 MMOL/L
CREAT SERPL-MCNC: 1.5 MG/DL
DIFFERENTIAL METHOD: ABNORMAL
EOSINOPHIL # BLD AUTO: 0.1 K/UL
EOSINOPHIL NFR BLD: 0.6 %
ERYTHROCYTE [DISTWIDTH] IN BLOOD BY AUTOMATED COUNT: 15.4 %
EST. GFR  (AFRICAN AMERICAN): >60 ML/MIN/1.73 M^2
EST. GFR  (NON AFRICAN AMERICAN): 59 ML/MIN/1.73 M^2
GLUCOSE SERPL-MCNC: 137 MG/DL
HCT VFR BLD AUTO: 48.9 %
HGB BLD-MCNC: 16.1 G/DL
LIPASE SERPL-CCNC: 31 U/L
LYMPHOCYTES # BLD AUTO: 2.2 K/UL
LYMPHOCYTES NFR BLD: 19.3 %
MCH RBC QN AUTO: 26.5 PG
MCHC RBC AUTO-ENTMCNC: 32.9 G/DL
MCV RBC AUTO: 81 FL
MONOCYTES # BLD AUTO: 0.7 K/UL
MONOCYTES NFR BLD: 6.3 %
NEUTROPHILS # BLD AUTO: 8.2 K/UL
NEUTROPHILS NFR BLD: 73.8 %
PLATELET # BLD AUTO: 291 K/UL
PMV BLD AUTO: 9.1 FL
POTASSIUM SERPL-SCNC: 4 MMOL/L
PROT SERPL-MCNC: 9.1 G/DL
RBC # BLD AUTO: 6.08 M/UL
SODIUM SERPL-SCNC: 141 MMOL/L
WBC # BLD AUTO: 11.1 K/UL

## 2018-05-14 PROCEDURE — 80053 COMPREHEN METABOLIC PANEL: CPT

## 2018-05-14 PROCEDURE — 36415 COLL VENOUS BLD VENIPUNCTURE: CPT

## 2018-05-14 PROCEDURE — 85025 COMPLETE CBC W/AUTO DIFF WBC: CPT

## 2018-05-14 PROCEDURE — 96365 THER/PROPH/DIAG IV INF INIT: CPT | Mod: 59

## 2018-05-14 PROCEDURE — 25000003 PHARM REV CODE 250: Performed by: NURSE PRACTITIONER

## 2018-05-14 PROCEDURE — 96375 TX/PRO/DX INJ NEW DRUG ADDON: CPT

## 2018-05-14 PROCEDURE — 83690 ASSAY OF LIPASE: CPT

## 2018-05-14 PROCEDURE — G0378 HOSPITAL OBSERVATION PER HR: HCPCS

## 2018-05-14 PROCEDURE — 63600175 PHARM REV CODE 636 W HCPCS: Performed by: EMERGENCY MEDICINE

## 2018-05-14 PROCEDURE — 25000003 PHARM REV CODE 250: Performed by: EMERGENCY MEDICINE

## 2018-05-14 PROCEDURE — 96361 HYDRATE IV INFUSION ADD-ON: CPT

## 2018-05-14 PROCEDURE — 99285 EMERGENCY DEPT VISIT HI MDM: CPT | Mod: 25

## 2018-05-14 RX ORDER — ACETAMINOPHEN 325 MG/1
650 TABLET ORAL EVERY 4 HOURS PRN
Status: DISCONTINUED | OUTPATIENT
Start: 2018-05-14 | End: 2018-05-15 | Stop reason: HOSPADM

## 2018-05-14 RX ORDER — RAMELTEON 8 MG/1
8 TABLET ORAL NIGHTLY PRN
Status: DISCONTINUED | OUTPATIENT
Start: 2018-05-14 | End: 2018-05-15 | Stop reason: HOSPADM

## 2018-05-14 RX ORDER — INDOMETHACIN 50 MG/1
50 CAPSULE ORAL 3 TIMES DAILY
COMMUNITY
Start: 2018-05-07 | End: 2018-05-21

## 2018-05-14 RX ORDER — FLUTICASONE PROPIONATE 50 MCG
1 SPRAY, SUSPENSION (ML) NASAL DAILY PRN
COMMUNITY
End: 2018-09-12

## 2018-05-14 RX ORDER — DIPHENHYDRAMINE HYDROCHLORIDE 50 MG/ML
12.5 INJECTION INTRAMUSCULAR; INTRAVENOUS
Status: COMPLETED | OUTPATIENT
Start: 2018-05-14 | End: 2018-05-14

## 2018-05-14 RX ORDER — ONDANSETRON 4 MG/1
8 TABLET, ORALLY DISINTEGRATING ORAL EVERY 8 HOURS PRN
Status: DISCONTINUED | OUTPATIENT
Start: 2018-05-14 | End: 2018-05-15 | Stop reason: HOSPADM

## 2018-05-14 RX ORDER — ONDANSETRON 2 MG/ML
8 INJECTION INTRAMUSCULAR; INTRAVENOUS
Status: COMPLETED | OUTPATIENT
Start: 2018-05-14 | End: 2018-05-14

## 2018-05-14 RX ORDER — QUETIAPINE FUMARATE 100 MG/1
100 TABLET, FILM COATED ORAL NIGHTLY
Status: ON HOLD | COMMUNITY
End: 2018-09-18 | Stop reason: HOSPADM

## 2018-05-14 RX ORDER — CETIRIZINE HYDROCHLORIDE 10 MG/1
10 TABLET ORAL DAILY
Status: DISCONTINUED | OUTPATIENT
Start: 2018-05-15 | End: 2018-05-15 | Stop reason: HOSPADM

## 2018-05-14 RX ORDER — CETIRIZINE HYDROCHLORIDE 10 MG/1
10 TABLET ORAL DAILY
COMMUNITY
End: 2018-09-12

## 2018-05-14 RX ORDER — MAG HYDROX/ALUMINUM HYD/SIMETH 200-200-20
15 SUSPENSION, ORAL (FINAL DOSE FORM) ORAL EVERY 6 HOURS PRN
Status: DISCONTINUED | OUTPATIENT
Start: 2018-05-14 | End: 2018-05-15 | Stop reason: HOSPADM

## 2018-05-14 RX ORDER — AMLODIPINE BESYLATE 10 MG/1
10 TABLET ORAL DAILY
Status: ON HOLD | COMMUNITY
End: 2020-02-20 | Stop reason: HOSPADM

## 2018-05-14 RX ORDER — KETOROLAC TROMETHAMINE 30 MG/ML
15 INJECTION, SOLUTION INTRAMUSCULAR; INTRAVENOUS
Status: COMPLETED | OUTPATIENT
Start: 2018-05-14 | End: 2018-05-14

## 2018-05-14 RX ORDER — BUTALBITAL, ACETAMINOPHEN AND CAFFEINE 50; 325; 40 MG/1; MG/1; MG/1
1 TABLET ORAL EVERY 8 HOURS PRN
Status: DISCONTINUED | OUTPATIENT
Start: 2018-05-15 | End: 2018-05-15 | Stop reason: HOSPADM

## 2018-05-14 RX ORDER — SODIUM CHLORIDE 9 MG/ML
INJECTION, SOLUTION INTRAVENOUS CONTINUOUS
Status: DISCONTINUED | OUTPATIENT
Start: 2018-05-14 | End: 2018-05-15 | Stop reason: HOSPADM

## 2018-05-14 RX ORDER — HYDROMORPHONE HYDROCHLORIDE 1 MG/ML
0.5 INJECTION, SOLUTION INTRAMUSCULAR; INTRAVENOUS; SUBCUTANEOUS
Status: COMPLETED | OUTPATIENT
Start: 2018-05-14 | End: 2018-05-14

## 2018-05-14 RX ORDER — MELOXICAM 7.5 MG/1
7.5 TABLET ORAL DAILY
COMMUNITY
Start: 2018-05-21 | End: 2018-09-12 | Stop reason: ALTCHOICE

## 2018-05-14 RX ORDER — HYDROCHLOROTHIAZIDE 25 MG/1
25 TABLET ORAL DAILY
Status: ON HOLD | COMMUNITY
End: 2018-05-15 | Stop reason: HOSPADM

## 2018-05-14 RX ORDER — METOCLOPRAMIDE 5 MG/1
10 TABLET ORAL EVERY 6 HOURS PRN
Status: DISCONTINUED | OUTPATIENT
Start: 2018-05-14 | End: 2018-05-14

## 2018-05-14 RX ORDER — LEVOTHYROXINE SODIUM 50 UG/1
50 TABLET ORAL
Status: DISCONTINUED | OUTPATIENT
Start: 2018-05-15 | End: 2018-05-15 | Stop reason: HOSPADM

## 2018-05-14 RX ORDER — QUETIAPINE FUMARATE 100 MG/1
100 TABLET, FILM COATED ORAL NIGHTLY
Status: DISCONTINUED | OUTPATIENT
Start: 2018-05-14 | End: 2018-05-15 | Stop reason: HOSPADM

## 2018-05-14 RX ORDER — HYDROMORPHONE HYDROCHLORIDE 2 MG/ML
0.5 INJECTION, SOLUTION INTRAMUSCULAR; INTRAVENOUS; SUBCUTANEOUS
Status: DISCONTINUED | OUTPATIENT
Start: 2018-05-14 | End: 2018-05-14 | Stop reason: SDUPTHER

## 2018-05-14 RX ORDER — AMLODIPINE BESYLATE 5 MG/1
10 TABLET ORAL DAILY
Status: DISCONTINUED | OUTPATIENT
Start: 2018-05-15 | End: 2018-05-15 | Stop reason: HOSPADM

## 2018-05-14 RX ORDER — FAMOTIDINE 20 MG/1
20 TABLET, FILM COATED ORAL 2 TIMES DAILY
Status: DISCONTINUED | OUTPATIENT
Start: 2018-05-14 | End: 2018-05-15 | Stop reason: HOSPADM

## 2018-05-14 RX ORDER — FLUTICASONE PROPIONATE 50 MCG
1 SPRAY, SUSPENSION (ML) NASAL DAILY PRN
Status: DISCONTINUED | OUTPATIENT
Start: 2018-05-14 | End: 2018-05-15 | Stop reason: HOSPADM

## 2018-05-14 RX ORDER — ASCORBIC ACID 500 MG
500 TABLET ORAL NIGHTLY
Status: DISCONTINUED | OUTPATIENT
Start: 2018-05-14 | End: 2018-05-15 | Stop reason: HOSPADM

## 2018-05-14 RX ADMIN — FAMOTIDINE 20 MG: 20 TABLET, FILM COATED ORAL at 11:05

## 2018-05-14 RX ADMIN — HYDROMORPHONE HYDROCHLORIDE 0.5 MG: 1 INJECTION, SOLUTION INTRAMUSCULAR; INTRAVENOUS; SUBCUTANEOUS at 09:05

## 2018-05-14 RX ADMIN — OXYCODONE HYDROCHLORIDE AND ACETAMINOPHEN 500 MG: 500 TABLET ORAL at 11:05

## 2018-05-14 RX ADMIN — QUETIAPINE FUMARATE 100 MG: 100 TABLET ORAL at 11:05

## 2018-05-14 RX ADMIN — ONDANSETRON 8 MG: 2 INJECTION INTRAMUSCULAR; INTRAVENOUS at 09:05

## 2018-05-14 RX ADMIN — ONDANSETRON 8 MG: 4 TABLET, ORALLY DISINTEGRATING ORAL at 11:05

## 2018-05-14 RX ADMIN — DIPHENHYDRAMINE HYDROCHLORIDE 12.5 MG: 50 INJECTION, SOLUTION INTRAMUSCULAR; INTRAVENOUS at 07:05

## 2018-05-14 RX ADMIN — BUTALBITAL, ACETAMINOPHEN, AND CAFFEINE 1 TABLET: 50; 325; 40 TABLET ORAL at 11:05

## 2018-05-14 RX ADMIN — SODIUM CHLORIDE 1000 ML: 0.9 INJECTION, SOLUTION INTRAVENOUS at 07:05

## 2018-05-14 RX ADMIN — SODIUM CHLORIDE: 0.9 INJECTION, SOLUTION INTRAVENOUS at 11:05

## 2018-05-14 RX ADMIN — KETOROLAC TROMETHAMINE 15 MG: 30 INJECTION, SOLUTION INTRAMUSCULAR at 10:05

## 2018-05-14 RX ADMIN — PROMETHAZINE HYDROCHLORIDE 12.5 MG: 25 INJECTION INTRAMUSCULAR; INTRAVENOUS at 07:05

## 2018-05-15 VITALS
BODY MASS INDEX: 25.11 KG/M2 | SYSTOLIC BLOOD PRESSURE: 147 MMHG | OXYGEN SATURATION: 100 % | HEIGHT: 67 IN | TEMPERATURE: 96 F | HEART RATE: 70 BPM | WEIGHT: 160 LBS | DIASTOLIC BLOOD PRESSURE: 96 MMHG | RESPIRATION RATE: 20 BRPM

## 2018-05-15 PROBLEM — G43.919 INTRACTABLE MIGRAINE: Status: ACTIVE | Noted: 2018-05-15

## 2018-05-15 LAB
ALBUMIN SERPL BCP-MCNC: 3.8 G/DL
ALP SERPL-CCNC: 61 U/L
ALT SERPL W/O P-5'-P-CCNC: 19 U/L
ANION GAP SERPL CALC-SCNC: 11 MMOL/L
AST SERPL-CCNC: 24 U/L
BACTERIA #/AREA URNS HPF: NORMAL /HPF
BASOPHILS # BLD AUTO: 0 K/UL
BASOPHILS NFR BLD: 0.4 %
BILIRUB SERPL-MCNC: 0.8 MG/DL
BILIRUB UR QL STRIP: NEGATIVE
BUN SERPL-MCNC: 18 MG/DL
CALCIUM SERPL-MCNC: 8.7 MG/DL
CHLORIDE SERPL-SCNC: 100 MMOL/L
CLARITY UR: CLEAR
CO2 SERPL-SCNC: 25 MMOL/L
COLOR UR: YELLOW
CREAT SERPL-MCNC: 1.1 MG/DL
DIFFERENTIAL METHOD: ABNORMAL
EOSINOPHIL # BLD AUTO: 0.1 K/UL
EOSINOPHIL NFR BLD: 1.3 %
ERYTHROCYTE [DISTWIDTH] IN BLOOD BY AUTOMATED COUNT: 15.1 %
EST. GFR  (AFRICAN AMERICAN): >60 ML/MIN/1.73 M^2
EST. GFR  (NON AFRICAN AMERICAN): >60 ML/MIN/1.73 M^2
GLUCOSE SERPL-MCNC: 78 MG/DL
GLUCOSE UR QL STRIP: NEGATIVE
HCT VFR BLD AUTO: 38.6 %
HGB BLD-MCNC: 12.7 G/DL
HGB UR QL STRIP: NEGATIVE
HYALINE CASTS #/AREA URNS LPF: 0 /LPF
KETONES UR QL STRIP: NEGATIVE
LEUKOCYTE ESTERASE UR QL STRIP: NEGATIVE
LIPASE SERPL-CCNC: 19 U/L
LYMPHOCYTES # BLD AUTO: 2 K/UL
LYMPHOCYTES NFR BLD: 29.6 %
MCH RBC QN AUTO: 26.6 PG
MCHC RBC AUTO-ENTMCNC: 32.8 G/DL
MCV RBC AUTO: 81 FL
MICROSCOPIC COMMENT: NORMAL
MONOCYTES # BLD AUTO: 0.5 K/UL
MONOCYTES NFR BLD: 8.2 %
NEUTROPHILS # BLD AUTO: 4 K/UL
NEUTROPHILS NFR BLD: 60.5 %
NITRITE UR QL STRIP: NEGATIVE
PH UR STRIP: 6 [PH] (ref 5–8)
PLATELET # BLD AUTO: 204 K/UL
PMV BLD AUTO: 9 FL
POTASSIUM SERPL-SCNC: 3.4 MMOL/L
PROT SERPL-MCNC: 6.5 G/DL
PROT UR QL STRIP: ABNORMAL
RBC # BLD AUTO: 4.78 M/UL
RBC #/AREA URNS HPF: 0 /HPF (ref 0–4)
SODIUM SERPL-SCNC: 136 MMOL/L
SP GR UR STRIP: 1.02 (ref 1–1.03)
URN SPEC COLLECT METH UR: ABNORMAL
UROBILINOGEN UR STRIP-ACNC: NEGATIVE EU/DL
WBC # BLD AUTO: 6.6 K/UL
WBC #/AREA URNS HPF: 1 /HPF (ref 0–5)

## 2018-05-15 PROCEDURE — 96368 THER/DIAG CONCURRENT INF: CPT

## 2018-05-15 PROCEDURE — 81000 URINALYSIS NONAUTO W/SCOPE: CPT

## 2018-05-15 PROCEDURE — 63600175 PHARM REV CODE 636 W HCPCS: Performed by: NURSE PRACTITIONER

## 2018-05-15 PROCEDURE — 25000003 PHARM REV CODE 250: Performed by: NURSE PRACTITIONER

## 2018-05-15 PROCEDURE — 85025 COMPLETE CBC W/AUTO DIFF WBC: CPT

## 2018-05-15 PROCEDURE — 83690 ASSAY OF LIPASE: CPT

## 2018-05-15 PROCEDURE — 25500020 PHARM REV CODE 255

## 2018-05-15 PROCEDURE — G0378 HOSPITAL OBSERVATION PER HR: HCPCS

## 2018-05-15 PROCEDURE — 80053 COMPREHEN METABOLIC PANEL: CPT

## 2018-05-15 PROCEDURE — 94761 N-INVAS EAR/PLS OXIMETRY MLT: CPT

## 2018-05-15 PROCEDURE — 96365 THER/PROPH/DIAG IV INF INIT: CPT

## 2018-05-15 PROCEDURE — 36415 COLL VENOUS BLD VENIPUNCTURE: CPT

## 2018-05-15 RX ORDER — SUCRALFATE 1 G/10ML
1 SUSPENSION ORAL
Qty: 400 ML | Refills: 0 | Status: SHIPPED | OUTPATIENT
Start: 2018-05-15 | End: 2018-05-15

## 2018-05-15 RX ORDER — ONDANSETRON 8 MG/1
8 TABLET, ORALLY DISINTEGRATING ORAL EVERY 8 HOURS PRN
Qty: 15 TABLET | Refills: 0 | Status: SHIPPED | OUTPATIENT
Start: 2018-05-15 | End: 2018-05-15

## 2018-05-15 RX ORDER — SUCRALFATE 1 G/10ML
1 SUSPENSION ORAL
Qty: 400 ML | Refills: 0 | Status: SHIPPED | OUTPATIENT
Start: 2018-05-15 | End: 2018-05-25

## 2018-05-15 RX ORDER — IBUPROFEN 200 MG
1 TABLET ORAL DAILY
Status: DISCONTINUED | OUTPATIENT
Start: 2018-05-15 | End: 2018-05-15 | Stop reason: HOSPADM

## 2018-05-15 RX ORDER — ONDANSETRON 8 MG/1
8 TABLET, ORALLY DISINTEGRATING ORAL EVERY 8 HOURS PRN
Qty: 15 TABLET | Refills: 0 | Status: SHIPPED | OUTPATIENT
Start: 2018-05-15 | End: 2019-11-14 | Stop reason: CLARIF

## 2018-05-15 RX ORDER — SUCRALFATE 1 G/10ML
1 SUSPENSION ORAL EVERY 6 HOURS
Status: DISCONTINUED | OUTPATIENT
Start: 2018-05-15 | End: 2018-05-15 | Stop reason: HOSPADM

## 2018-05-15 RX ORDER — POTASSIUM CHLORIDE 20 MEQ/1
40 TABLET, EXTENDED RELEASE ORAL ONCE
Status: COMPLETED | OUTPATIENT
Start: 2018-05-15 | End: 2018-05-15

## 2018-05-15 RX ORDER — PANTOPRAZOLE SODIUM 40 MG/1
40 TABLET, DELAYED RELEASE ORAL DAILY
Qty: 30 TABLET | Refills: 0 | Status: SHIPPED | OUTPATIENT
Start: 2018-05-15 | End: 2018-09-12

## 2018-05-15 RX ORDER — PANTOPRAZOLE SODIUM 40 MG/1
40 TABLET, DELAYED RELEASE ORAL DAILY
Qty: 30 TABLET | Refills: 0 | Status: SHIPPED | OUTPATIENT
Start: 2018-05-15 | End: 2018-05-15

## 2018-05-15 RX ORDER — SUCRALFATE 1 G/10ML
1 SUSPENSION ORAL
Qty: 1 BOTTLE | Refills: 0 | Status: SHIPPED | OUTPATIENT
Start: 2018-05-15 | End: 2018-05-15

## 2018-05-15 RX ORDER — SODIUM CHLORIDE 9 MG/ML
INJECTION, SOLUTION INTRAVENOUS
Status: DISCONTINUED
Start: 2018-05-15 | End: 2018-05-15 | Stop reason: HOSPADM

## 2018-05-15 RX ADMIN — LEVOTHYROXINE SODIUM 50 MCG: 50 TABLET ORAL at 05:05

## 2018-05-15 RX ADMIN — POTASSIUM CHLORIDE 40 MEQ: 20 TABLET, EXTENDED RELEASE ORAL at 10:05

## 2018-05-15 RX ADMIN — ONDANSETRON 8 MG: 4 TABLET, ORALLY DISINTEGRATING ORAL at 07:05

## 2018-05-15 RX ADMIN — PROMETHAZINE HYDROCHLORIDE 12.5 MG: 25 INJECTION INTRAMUSCULAR; INTRAVENOUS at 10:05

## 2018-05-15 RX ADMIN — LIDOCAINE HYDROCHLORIDE: 20 SOLUTION ORAL; TOPICAL at 12:05

## 2018-05-15 RX ADMIN — AMLODIPINE BESYLATE 10 MG: 5 TABLET ORAL at 08:05

## 2018-05-15 RX ADMIN — IOHEXOL 75 ML: 350 INJECTION, SOLUTION INTRAVENOUS at 11:05

## 2018-05-15 RX ADMIN — SUCRALFATE 1 G: 1 SUSPENSION ORAL at 12:05

## 2018-05-15 RX ADMIN — FAMOTIDINE 20 MG: 20 TABLET, FILM COATED ORAL at 08:05

## 2018-05-15 RX ADMIN — IOHEXOL 30 ML: 350 INJECTION, SOLUTION INTRAVENOUS at 11:05

## 2018-05-15 RX ADMIN — RAMELTEON 8 MG: 8 TABLET, FILM COATED ORAL at 02:05

## 2018-05-15 NOTE — ED PROVIDER NOTES
Encounter Date: 5/14/2018    SCRIBE #1 NOTE: I, Adeel Correa, am scribing for, and in the presence of, Dr. Campbell.       History     Chief Complaint   Patient presents with    Fatigue     For last 2 weeks drank about 3 drinks a night and thinks may be related to that. Stopped 3 days ago but drank again today    Emesis     for past 2 or 3 days and says 10 times today       05/14/2018 7:20 PM     Chief complaint: Fatigue, vomiting      Bradley Molina Cousin is a 38 y.o. male with a PMHx of HTN, pancreatitis, alcohol abuse and thyroid disease who presents to the ED c/o fatigue and vomiting. The patient states his symptoms began 3 days ago. He states he has been sleeping for 15-20 hours a day. He reports feeling nauseous, vomiting, and having a headache. He has not taken any medication for his symptoms. He describes his headache as throbbing. He says his nuasea and vomiting does not feel like pancreatitis. He states his urine is dark colored. He says his doctor recently took him off his hydrochlorothiazide after placing him on pain medicine for his toe. He denies having any numbness or any other neurological symptoms. PSHx includes pancreatic surgery. He has NKDA.          Review of patient's allergies indicates:  No Known Allergies  Past Medical History:   Diagnosis Date    Depression     Hypertension     Pancreatitis     Thyroid disease      History reviewed. No pertinent surgical history.  Family History   Problem Relation Age of Onset    Diabetes Maternal Grandmother     Hypertension Maternal Grandfather     Cancer Paternal Grandfather      Social History   Substance Use Topics    Smoking status: Current Some Day Smoker     Packs/day: 0.25     Years: 7.00    Smokeless tobacco: Never Used    Alcohol use Yes      Comment: occasionally     Review of Systems   Constitutional: Positive for fatigue. Negative for fever.   HENT: Negative for congestion.    Eyes: Negative for visual disturbance.   Respiratory:  Negative for wheezing.    Cardiovascular: Negative for chest pain.   Gastrointestinal: Positive for nausea and vomiting. Negative for abdominal pain.   Genitourinary: Negative for dysuria.        Dark yellow urine.    Musculoskeletal: Negative for joint swelling.   Skin: Negative for rash.   Neurological: Positive for headaches. Negative for syncope.   Hematological: Does not bruise/bleed easily.   Psychiatric/Behavioral: Negative for confusion.       Physical Exam     Initial Vitals [05/14/18 1858]   BP Pulse Resp Temp SpO2   (!) 154/107 (!) 112 20 98.7 °F (37.1 °C) 98 %      MAP       122.67         Physical Exam    Constitutional: He appears well-nourished.   HENT:   Head: Normocephalic and atraumatic.   Eyes: Conjunctivae and EOM are normal.   Neck: Normal range of motion. Neck supple. No thyroid mass present.   Cardiovascular: Normal rate, regular rhythm and normal heart sounds. Exam reveals no gallop and no friction rub.    No murmur heard.  Pulmonary/Chest: Breath sounds normal. He has no wheezes. He has no rhonchi. He has no rales.   Abdominal: Soft. Normal appearance and bowel sounds are normal. There is tenderness in the periumbilical area and suprapubic area.   Neurological: He is alert and oriented to person, place, and time. He has normal strength. No cranial nerve deficit or sensory deficit.   Skin: Skin is warm and dry. No rash noted. No erythema.   Psychiatric: He has a normal mood and affect. His speech is normal. Cognition and memory are normal.         ED Course   Procedures  Labs Reviewed   CBC W/ AUTO DIFFERENTIAL - Abnormal; Notable for the following:        Result Value    MCV 81 (*)     MCH 26.5 (*)     RDW 15.4 (*)     MPV 9.1 (*)     Gran # (ANC) 8.2 (*)     Gran% 73.8 (*)     All other components within normal limits   COMPREHENSIVE METABOLIC PANEL - Abnormal; Notable for the following:     Glucose 137 (*)     Creatinine 1.5 (*)     Calcium 10.6 (*)     Total Protein 9.1 (*)     Anion Gap  18 (*)     eGFR if non  59 (*)     All other components within normal limits   LIPASE             Medical Decision Making:   History:   Old Medical Records: I decided to obtain old medical records.  Clinical Tests:   Lab Tests: Reviewed and Ordered  Radiological Study: Ordered and Reviewed  ED Management:  This patient was interviewed and examined emergently.  Initial vital signs are stable.  The patient is reporting severe refractory nausea and vomiting with malaise.  He does endorse some increasing alcohol use over the last few weeks.  Labs are unremarkable for evidence of end-organ injury, hepatitis, lipase elevation however, the patient required multiple doses of IV antiemetic and bolus hydration.  There is mild acute kidney injury noted which I associated with decreased p.o. intake.  I do think the patient warrants observation until stabilization of his renal function and symptoms are achieved.  He is asked to abstain from alcohol abuse.  Case was discussed with Mrs. Gold who agreed to admit the patient to observation.  Will be transferred to an obs bed in guarded condition.            Scribe Attestation:   Scribe #1: I performed the above scribed service and the documentation accurately describes the services I performed. I attest to the accuracy of the note.    I, Dr. Fritz Campbell, personally performed the services described in this documentation. All medical record entries made by the scribe were at my direction and in my presence.  I have reviewed the chart and agree that the record reflects my personal performance and is accurate and complete. Fritz Campbell MD.  6:16 AM 05/15/2018             Clinical Impression:   The primary encounter diagnosis was Intractable vomiting with nausea, unspecified vomiting type. A diagnosis of Acute kidney injury was also pertinent to this visit.    Disposition:   Disposition: Placed in Observation  Condition: Fair                        Fritz Campbell  MD  05/15/18 0617

## 2018-05-15 NOTE — H&P
[facility] Hospital Medicine  History & Physical    Patient Name: Bradley Collado  MRN: [id number]  Admission Date: 5/14/2018  Attending Physician: Dwayne Leggett MD   Primary Care Provider: Day Lieberman DO         Patient information was obtained from patient and ER records.     Subjective:     Principal Problem:Acute kidney injury    Chief Complaint:   Chief Complaint   Patient presents with    Fatigue     For last 2 weeks drank about 3 drinks a night and thinks may be related to that. Stopped 3 days ago but drank again today    Emesis     for past 2 or 3 days and says 10 times today        HPI: Mr. Collado is a 39yo M with a PMH of HTN, Hypothyroidism, ETOH Abuse, Pancreatitis, and Current Smoker. He presents to the ED with c/o intractable HA. It's associated with N&V and fatigue. His symptoms started about 3 days ago. He started drinking again about 2 weeks ago and stopped 3 days ago, prior to symptom onset. He thought he was having withdrawal and did drink to day, but his it did not help. His headache is frontal and throbbing. He has been unable to eat or drink due to the N&V and his urine is dark colored. While in the ED, he had a head CT. He was found to have CHAPO and bolused with 1L NS. He reports still having a HA despite being given Toradol and dialudid in the ED. He is also still having Nausea.          Past Medical History:   Diagnosis Date    Depression     Hypertension     Pancreatitis     Thyroid disease        History reviewed. No pertinent surgical history.    Review of patient's allergies indicates:  No Known Allergies    No current facility-administered medications on file prior to encounter.      Current Outpatient Prescriptions on File Prior to Encounter   Medication Sig    ascorbic acid, vitamin C, (VITAMIN C) 500 MG tablet Take 1 tablet (500 mg total) by mouth every evening.    levothyroxine (SYNTHROID) 50 MCG tablet Take 1 tablet (50 mcg total) by mouth once  daily.    [DISCONTINUED] acetaminophen (TYLENOL) 325 MG tablet Take 2 tablets (650 mg total) by mouth every 6 (six) hours as needed for Pain or Temperature greater than (mild to moderate pain).    [DISCONTINUED] allopurinol (ZYLOPRIM) 100 MG tablet Take 100 mg by mouth once daily.    [DISCONTINUED] amlodipine (NORVASC) 10 MG tablet Take 10 mg by mouth once daily.    [DISCONTINUED] cloNIDine (CATAPRES) 0.1 MG tablet Take 0.1 mg by mouth 2 (two) times daily.     [DISCONTINUED] docusate sodium (COLACE) 100 MG capsule Take 1 capsule (100 mg total) by mouth 2 (two) times daily.    [DISCONTINUED] multivitamin (THERAGRAN) tablet Take 1 tablet by mouth once daily.    [DISCONTINUED] ondansetron (ZOFRAN) 4 MG tablet Take 1 tablet (4 mg total) by mouth every 8 (eight) hours as needed for Nausea.    [DISCONTINUED] oxycodone-acetaminophen (PERCOCET) 5-325 mg per tablet Take 1 tablet by mouth every 4 (four) hours as needed.    [DISCONTINUED] pantoprazole (PROTONIX) 40 MG tablet Take 1 tablet (40 mg total) by mouth once daily.    [DISCONTINUED] quetiapine (SEROQUEL) 100 MG Tab Take 1 tablet (100 mg total) by mouth every evening.    [DISCONTINUED] sucralfate (CARAFATE) 1 gram tablet Take 1 tablet (1 g total) by mouth 4 (four) times daily.     Family History     Problem Relation (Age of Onset)    Cancer Paternal Grandfather    Diabetes Maternal Grandmother    Hypertension Maternal Grandfather        Social History Main Topics    Smoking status: Current Some Day Smoker     Packs/day: 0.25     Years: 7.00    Smokeless tobacco: Never Used    Alcohol use Yes      Comment: occasionally    Drug use: Yes     Frequency: 7.0 times per week     Types: Marijuana    Sexual activity: Yes     Partners: Female     Review of Systems   Constitutional: Positive for fatigue. Negative for chills and fever.   HENT: Negative for congestion.    Eyes: Negative for visual disturbance.   Respiratory: Negative for shortness of breath.     Cardiovascular: Negative for chest pain and palpitations.   Gastrointestinal: Positive for nausea and vomiting. Negative for abdominal pain, constipation and diarrhea.   Genitourinary: Negative for dysuria.        Dark urine   Musculoskeletal: Negative for arthralgias.   Skin: Negative for wound.   Neurological: Positive for headaches. Negative for dizziness and syncope.   Hematological: Does not bruise/bleed easily.   Psychiatric/Behavioral: Negative for confusion and hallucinations.     Objective:     Vital Signs (Most Recent):  Temp: 98.3 °F (36.8 °C) (05/14/18 2236)  Pulse: 70 (05/14/18 2236)  Resp: 18 (05/14/18 2236)  BP: (!) 148/104 (05/14/18 2236)  SpO2: 99 % (05/14/18 2236) Vital Signs (24h Range):  Temp:  [98.3 °F (36.8 °C)-98.7 °F (37.1 °C)] 98.3 °F (36.8 °C)  Pulse:  [] 70  Resp:  [18-20] 18  SpO2:  [98 %-99 %] 99 %  BP: (148-154)/() 148/104     Weight: 72.6 kg (160 lb)  Body mass index is 25.06 kg/m².    Physical Exam   Constitutional: He is oriented to person, place, and time. No distress.   HENT:   Head: Normocephalic.   Eyes: Pupils are equal, round, and reactive to light.   Neck: Normal range of motion. Neck supple. No JVD present. No tracheal deviation present.   Cardiovascular: Normal rate, regular rhythm, normal heart sounds and intact distal pulses.    No murmur heard.  Pulmonary/Chest: Effort normal and breath sounds normal. No respiratory distress.   Abdominal: Soft. Bowel sounds are normal. He exhibits no distension. There is no tenderness.   Musculoskeletal: Normal range of motion. He exhibits no edema.   Neurological: He is alert and oriented to person, place, and time. No cranial nerve deficit.   Skin: Skin is warm, dry and intact. Capillary refill takes less than 2 seconds.   Psychiatric: He has a normal mood and affect. His behavior is normal. Judgment and thought content normal.         CRANIAL NERVES     CN III, IV, VI   Pupils are equal, round, and reactive to light.        Significant Labs:   CBC:   Recent Labs  Lab 05/14/18 1945   WBC 11.10   HGB 16.1   HCT 48.9        CMP:   Recent Labs  Lab 05/14/18 1945      K 4.0   CL 96   CO2 27   *   BUN 20   CREATININE 1.5*   CALCIUM 10.6*   PROT 9.1*   ALBUMIN 5.0   BILITOT 0.8   ALKPHOS 80   AST 33   ALT 26   ANIONGAP 18*   EGFRNONAA 59*     Lipase:   Recent Labs  Lab 05/14/18 1945   LIPASE 31       Significant Imaging:     CT Head w/o Contrast: Radiologist's report not downloaded    Assessment/Plan:     * Acute kidney injury    Mild/Due to dehydration  IVF Hydration  No NSAIDs/Nephrotoxic agents  Hold HCTZ, Indocin, and Mobic          Intractable migraine    CT Head report pending  Trial of Fioricet  No NSAIDs due to CHAPO  IV antiemetics          Alcohol-induced chronic pancreatitis    Do not suspect Acute/ Lipase 31  Alcohol cessation encouraged  Monitor labs          Essential hypertension    Chronic/controlled. Continue chronic medications, adjusting as needed.          Tobacco use    Smoking cessation encouraged  Nicotine patch          ETOH abuse    Relapse  Alcohol cessation encouraged  Monitor for withdrawals          Hypothyroidism    Chronic. Continue home levothyroxine dose            VTE Risk Mitigation         Ordered     IP VTE LOW RISK PATIENT  Once      05/14/18 2234             Chhaya Gold NP  Department of Hospital Medicine   [facility]

## 2018-05-15 NOTE — ED NOTES
Upon transfer to room 202, patient is AAOx4, no cardiac or respiratory complications at this time. Patient does not have any needs or questions before transport.

## 2018-05-15 NOTE — PLAN OF CARE
05/15/18 1501   Final Note   Assessment Type Final Discharge Note   Discharge Disposition Home

## 2018-05-15 NOTE — ED NOTES
"Presents to the ER with c/o emesis that has been persistent for the past 3 days. Patient reports that he is vomiting 10 times a day. Associated complaints are fatigue for the past 2 weeks. Patient reports that he was consuming ETOH everyday, he stopped for 3 days and drank a half pint of ETOH today. Mucous membranes are pink and moist. Skin is warm, dry and intact. Lungs are clear bilaterally, respirations are regular and unlabored. Denies cough, congestion, rhinorrhea or SOB. BS active x4, no tenderness with palpation, abd is soft and not distended. Patient has suprapubic tenderness with palpation. He reports that his "Urine has been dark."  Denies any appetite or activity change. S1S2, capillary refill is < 2 seconds. Denies dysuria, difficulty urinating, frequency, numbness, tingling or weakness. JOE VSS    "

## 2018-05-15 NOTE — SUBJECTIVE & OBJECTIVE
Past Medical History:   Diagnosis Date    Depression     Hypertension     Pancreatitis     Thyroid disease        History reviewed. No pertinent surgical history.    Review of patient's allergies indicates:  No Known Allergies    No current facility-administered medications on file prior to encounter.      Current Outpatient Prescriptions on File Prior to Encounter   Medication Sig    ascorbic acid, vitamin C, (VITAMIN C) 500 MG tablet Take 1 tablet (500 mg total) by mouth every evening.    levothyroxine (SYNTHROID) 50 MCG tablet Take 1 tablet (50 mcg total) by mouth once daily.    [DISCONTINUED] acetaminophen (TYLENOL) 325 MG tablet Take 2 tablets (650 mg total) by mouth every 6 (six) hours as needed for Pain or Temperature greater than (mild to moderate pain).    [DISCONTINUED] allopurinol (ZYLOPRIM) 100 MG tablet Take 100 mg by mouth once daily.    [DISCONTINUED] amlodipine (NORVASC) 10 MG tablet Take 10 mg by mouth once daily.    [DISCONTINUED] cloNIDine (CATAPRES) 0.1 MG tablet Take 0.1 mg by mouth 2 (two) times daily.     [DISCONTINUED] docusate sodium (COLACE) 100 MG capsule Take 1 capsule (100 mg total) by mouth 2 (two) times daily.    [DISCONTINUED] multivitamin (THERAGRAN) tablet Take 1 tablet by mouth once daily.    [DISCONTINUED] ondansetron (ZOFRAN) 4 MG tablet Take 1 tablet (4 mg total) by mouth every 8 (eight) hours as needed for Nausea.    [DISCONTINUED] oxycodone-acetaminophen (PERCOCET) 5-325 mg per tablet Take 1 tablet by mouth every 4 (four) hours as needed.    [DISCONTINUED] pantoprazole (PROTONIX) 40 MG tablet Take 1 tablet (40 mg total) by mouth once daily.    [DISCONTINUED] quetiapine (SEROQUEL) 100 MG Tab Take 1 tablet (100 mg total) by mouth every evening.    [DISCONTINUED] sucralfate (CARAFATE) 1 gram tablet Take 1 tablet (1 g total) by mouth 4 (four) times daily.     Family History     Problem Relation (Age of Onset)    Cancer Paternal Grandfather    Diabetes Maternal  Grandmother    Hypertension Maternal Grandfather        Social History Main Topics    Smoking status: Current Some Day Smoker     Packs/day: 0.25     Years: 7.00    Smokeless tobacco: Never Used    Alcohol use Yes      Comment: occasionally    Drug use: Yes     Frequency: 7.0 times per week     Types: Marijuana    Sexual activity: Yes     Partners: Female     Review of Systems   Constitutional: Positive for fatigue. Negative for chills and fever.   HENT: Negative for congestion.    Eyes: Negative for visual disturbance.   Respiratory: Negative for shortness of breath.    Cardiovascular: Negative for chest pain and palpitations.   Gastrointestinal: Positive for nausea and vomiting. Negative for abdominal pain, constipation and diarrhea.   Genitourinary: Negative for dysuria.        Dark urine   Musculoskeletal: Negative for arthralgias.   Skin: Negative for wound.   Neurological: Positive for headaches. Negative for dizziness and syncope.   Hematological: Does not bruise/bleed easily.   Psychiatric/Behavioral: Negative for confusion and hallucinations.     Objective:     Vital Signs (Most Recent):  Temp: 98.3 °F (36.8 °C) (05/14/18 2236)  Pulse: 70 (05/14/18 2236)  Resp: 18 (05/14/18 2236)  BP: (!) 148/104 (05/14/18 2236)  SpO2: 99 % (05/14/18 2236) Vital Signs (24h Range):  Temp:  [98.3 °F (36.8 °C)-98.7 °F (37.1 °C)] 98.3 °F (36.8 °C)  Pulse:  [] 70  Resp:  [18-20] 18  SpO2:  [98 %-99 %] 99 %  BP: (148-154)/() 148/104     Weight: 72.6 kg (160 lb)  Body mass index is 25.06 kg/m².    Physical Exam   Constitutional: He is oriented to person, place, and time. No distress.   HENT:   Head: Normocephalic.   Eyes: Pupils are equal, round, and reactive to light.   Neck: Normal range of motion. Neck supple. No JVD present. No tracheal deviation present.   Cardiovascular: Normal rate, regular rhythm, normal heart sounds and intact distal pulses.    No murmur heard.  Pulmonary/Chest: Effort normal and breath  sounds normal. No respiratory distress.   Abdominal: Soft. Bowel sounds are normal. He exhibits no distension. There is no tenderness.   Musculoskeletal: Normal range of motion. He exhibits no edema.   Neurological: He is alert and oriented to person, place, and time. No cranial nerve deficit.   Skin: Skin is warm, dry and intact. Capillary refill takes less than 2 seconds.   Psychiatric: He has a normal mood and affect. His behavior is normal. Judgment and thought content normal.         CRANIAL NERVES     CN III, IV, VI   Pupils are equal, round, and reactive to light.       Significant Labs:   CBC:   Recent Labs  Lab 05/14/18 1945   WBC 11.10   HGB 16.1   HCT 48.9        CMP:   Recent Labs  Lab 05/14/18 1945      K 4.0   CL 96   CO2 27   *   BUN 20   CREATININE 1.5*   CALCIUM 10.6*   PROT 9.1*   ALBUMIN 5.0   BILITOT 0.8   ALKPHOS 80   AST 33   ALT 26   ANIONGAP 18*   EGFRNONAA 59*     Lipase:   Recent Labs  Lab 05/14/18 1945   LIPASE 31       Significant Imaging:     CT Head w/o Contrast: Radiologist's report not downloaded

## 2018-05-15 NOTE — ED NOTES
Patient is unable to give a urine specimen at this time. A urinal has been placed at the bedside.

## 2018-05-15 NOTE — HPI
Mr. Cousin is a 37yo M with a PMH of HTN, Hypothyroidism, ETOH Abuse, Pancreatitis, and Current Smoker. He presents to the ED with c/o intractable HA. It's associated with N&V and fatigue. His symptoms started about 3 days ago. He started drinking again about 2 weeks ago and stopped 3 days ago, prior to symptom onset. He thought he was having withdrawal and did drink to day, but his it did not help. His headache is frontal and throbbing. He has been unable to eat or drink due to the N&V and his urine is dark colored. While in the ED, he had a head CT. He was found to have CHAPO and bolused with 1L NS. He reports still having a HA despite being given Toradol and dialudid in the ED. He is also still having Nausea.

## 2018-05-15 NOTE — ED NOTES
Brian from CT to come back and get patient for CT after phenergan is finished. Toradol held per MD request.

## 2018-05-15 NOTE — PLAN OF CARE
"PCP is "[facility] in [place]; will search in epic.  Verified insurance as [employer].  Pharmacy is [facility] in [facility].  Denies HH/DME.  Discharge plan is home; no needs.    Added Janneth Hall NP as PCP.       05/15/18 8878   Discharge Assessment   Assessment Type Discharge Planning Assessment   Confirmed/corrected address and phone number on facesheet? Yes   Assessment information obtained from? Patient   Prior to hospitilization cognitive status: Alert/Oriented   Prior to hospitalization functional status: Independent   Current cognitive status: Alert/Oriented   Current Functional Status: Independent   Lives With grandparent(s);child(live), dependent   Able to Return to Prior Arrangements yes   Is patient able to care for self after discharge? Yes   Patient's perception of discharge disposition home or selfcare   Readmission Within The Last 30 Days no previous admission in last 30 days   Patient currently being followed by outpatient case management? No   Patient currently receives any other outside agency services? No   Equipment Currently Used at Home none   Do you have any problems affording any of your prescribed medications? No   Is the patient taking medications as prescribed? yes   Does the patient have transportation home? Yes   Transportation Available family or friend will provide   Does the patient receive services at the [facility]mad[facility] Clinic? No   Discharge Plan A Home   Patient/Family In Agreement With Plan yes     "

## 2018-05-15 NOTE — ASSESSMENT & PLAN NOTE
Mild/Due to dehydration  IVF Hydration  No NSAIDs/Nephrotoxic agents  Hold HCTZ, Indocin, and Mobic

## 2018-05-16 ENCOUNTER — TELEPHONE (OUTPATIENT)
Dept: MEDSURG UNIT | Facility: HOSPITAL | Age: 38
End: 2018-05-16

## 2018-05-16 NOTE — HOSPITAL COURSE
Patient monitored during observation stay. He received IV normal saline and antiemetics. He underwent CT abdomen with contrast to evaluate previous note of cystic mass at pancreatic head which is not present on CT today. It was felt he likely had gastritis and received GI cocktail with significant improvement of symptoms. He received PPI and Carafate. He is tolerating diet and epigastric pain improved.     PE: chest CTA abd: soft NT

## 2018-09-11 ENCOUNTER — HOSPITAL ENCOUNTER (EMERGENCY)
Facility: HOSPITAL | Age: 38
Discharge: PSYCHIATRIC OR SUBSTANCE USE FACILITY | End: 2018-09-12
Attending: EMERGENCY MEDICINE
Payer: MEDICAID

## 2018-09-11 DIAGNOSIS — F10.929 ALCOHOLIC INTOXICATION WITH COMPLICATION: ICD-10-CM

## 2018-09-11 DIAGNOSIS — F32.A DEPRESSION, UNSPECIFIED DEPRESSION TYPE: Primary | ICD-10-CM

## 2018-09-11 LAB
AMPHET+METHAMPHET UR QL: NEGATIVE
BARBITURATES UR QL SCN>200 NG/ML: NEGATIVE
BASOPHILS # BLD AUTO: 0 K/UL
BASOPHILS NFR BLD: 0.5 %
BENZODIAZ UR QL SCN>200 NG/ML: NEGATIVE
BILIRUB UR QL STRIP: NEGATIVE
BZE UR QL SCN: NEGATIVE
CANNABINOIDS UR QL SCN: NORMAL
CLARITY UR: CLEAR
COLOR UR: YELLOW
CREAT UR-MCNC: 27.5 MG/DL
DIFFERENTIAL METHOD: ABNORMAL
EOSINOPHIL # BLD AUTO: 0.1 K/UL
EOSINOPHIL NFR BLD: 2.6 %
ERYTHROCYTE [DISTWIDTH] IN BLOOD BY AUTOMATED COUNT: 15.4 %
GLUCOSE UR QL STRIP: NEGATIVE
HCT VFR BLD AUTO: 33.9 %
HGB BLD-MCNC: 11 G/DL
HGB UR QL STRIP: NEGATIVE
KETONES UR QL STRIP: NEGATIVE
LEUKOCYTE ESTERASE UR QL STRIP: NEGATIVE
LYMPHOCYTES # BLD AUTO: 1.5 K/UL
LYMPHOCYTES NFR BLD: 28.1 %
MCH RBC QN AUTO: 26.6 PG
MCHC RBC AUTO-ENTMCNC: 32.6 G/DL
MCV RBC AUTO: 82 FL
METHADONE UR QL SCN>300 NG/ML: NEGATIVE
MONOCYTES # BLD AUTO: 0.5 K/UL
MONOCYTES NFR BLD: 9.5 %
NEUTROPHILS # BLD AUTO: 3.1 K/UL
NEUTROPHILS NFR BLD: 59.3 %
NITRITE UR QL STRIP: NEGATIVE
OPIATES UR QL SCN: NEGATIVE
PCP UR QL SCN>25 NG/ML: NEGATIVE
PH UR STRIP: 7 [PH] (ref 5–8)
PLATELET # BLD AUTO: 182 K/UL
PMV BLD AUTO: 8.7 FL
PROT UR QL STRIP: NEGATIVE
RBC # BLD AUTO: 4.16 M/UL
SP GR UR STRIP: <=1.005 (ref 1–1.03)
TOXICOLOGY INFORMATION: NORMAL
URN SPEC COLLECT METH UR: ABNORMAL
UROBILINOGEN UR STRIP-ACNC: NEGATIVE EU/DL
WBC # BLD AUTO: 5.3 K/UL

## 2018-09-11 PROCEDURE — 99285 EMERGENCY DEPT VISIT HI MDM: CPT

## 2018-09-11 PROCEDURE — 80053 COMPREHEN METABOLIC PANEL: CPT

## 2018-09-11 PROCEDURE — 84439 ASSAY OF FREE THYROXINE: CPT

## 2018-09-11 PROCEDURE — 36415 COLL VENOUS BLD VENIPUNCTURE: CPT

## 2018-09-11 PROCEDURE — 80320 DRUG SCREEN QUANTALCOHOLS: CPT

## 2018-09-11 PROCEDURE — 80329 ANALGESICS NON-OPIOID 1 OR 2: CPT

## 2018-09-11 PROCEDURE — 81003 URINALYSIS AUTO W/O SCOPE: CPT | Mod: 59

## 2018-09-11 PROCEDURE — 80307 DRUG TEST PRSMV CHEM ANLYZR: CPT

## 2018-09-11 PROCEDURE — 85025 COMPLETE CBC W/AUTO DIFF WBC: CPT

## 2018-09-11 PROCEDURE — 84443 ASSAY THYROID STIM HORMONE: CPT

## 2018-09-12 VITALS
BODY MASS INDEX: 23.54 KG/M2 | SYSTOLIC BLOOD PRESSURE: 106 MMHG | OXYGEN SATURATION: 99 % | RESPIRATION RATE: 16 BRPM | HEART RATE: 78 BPM | WEIGHT: 150 LBS | DIASTOLIC BLOOD PRESSURE: 70 MMHG | HEIGHT: 67 IN | TEMPERATURE: 99 F

## 2018-09-12 PROBLEM — R45.851 SUICIDAL IDEATION: Status: ACTIVE | Noted: 2018-09-12

## 2018-09-12 LAB
ALBUMIN SERPL BCP-MCNC: 3.5 G/DL
ALP SERPL-CCNC: 68 U/L
ALT SERPL W/O P-5'-P-CCNC: 19 U/L
ANION GAP SERPL CALC-SCNC: 10 MMOL/L
APAP SERPL-MCNC: <3 UG/ML
AST SERPL-CCNC: 19 U/L
BILIRUB SERPL-MCNC: 0.1 MG/DL
BUN SERPL-MCNC: 10 MG/DL
CALCIUM SERPL-MCNC: 9 MG/DL
CHLORIDE SERPL-SCNC: 104 MMOL/L
CO2 SERPL-SCNC: 28 MMOL/L
CREAT SERPL-MCNC: 1.2 MG/DL
EST. GFR  (AFRICAN AMERICAN): >60 ML/MIN/1.73 M^2
EST. GFR  (NON AFRICAN AMERICAN): >60 ML/MIN/1.73 M^2
ETHANOL SERPL-MCNC: 112 MG/DL
ETHANOL SERPL-MCNC: 171 MG/DL
ETHANOL SERPL-MCNC: 269 MG/DL
GLUCOSE SERPL-MCNC: 105 MG/DL
POTASSIUM SERPL-SCNC: 2.9 MMOL/L
PROT SERPL-MCNC: 6.1 G/DL
SODIUM SERPL-SCNC: 142 MMOL/L
T4 FREE SERPL-MCNC: 1.04 NG/DL
TSH SERPL DL<=0.005 MIU/L-ACNC: 0.34 UIU/ML

## 2018-09-12 PROCEDURE — 25000003 PHARM REV CODE 250: Performed by: EMERGENCY MEDICINE

## 2018-09-12 PROCEDURE — 80320 DRUG SCREEN QUANTALCOHOLS: CPT

## 2018-09-12 PROCEDURE — 63600175 PHARM REV CODE 636 W HCPCS: Performed by: EMERGENCY MEDICINE

## 2018-09-12 PROCEDURE — 80320 DRUG SCREEN QUANTALCOHOLS: CPT | Mod: 91

## 2018-09-12 PROCEDURE — 36415 COLL VENOUS BLD VENIPUNCTURE: CPT

## 2018-09-12 RX ORDER — POTASSIUM CHLORIDE 20 MEQ/1
40 TABLET, EXTENDED RELEASE ORAL
Status: DISCONTINUED | OUTPATIENT
Start: 2018-09-12 | End: 2018-09-12

## 2018-09-12 RX ORDER — HYDROCHLOROTHIAZIDE 25 MG/1
25 TABLET ORAL DAILY
COMMUNITY
End: 2019-11-14 | Stop reason: CLARIF

## 2018-09-12 RX ORDER — POTASSIUM CHLORIDE 7.45 MG/ML
10 INJECTION INTRAVENOUS
Status: DISCONTINUED | OUTPATIENT
Start: 2018-09-12 | End: 2018-09-12

## 2018-09-12 RX ORDER — LORAZEPAM 1 MG/1
1 TABLET ORAL
Status: COMPLETED | OUTPATIENT
Start: 2018-09-12 | End: 2018-09-12

## 2018-09-12 RX ORDER — POTASSIUM CHLORIDE 29.8 MG/ML
40 INJECTION INTRAVENOUS
Status: DISCONTINUED | OUTPATIENT
Start: 2018-09-12 | End: 2018-09-12

## 2018-09-12 RX ORDER — POTASSIUM CHLORIDE 20 MEQ/1
40 TABLET, EXTENDED RELEASE ORAL
Status: ACTIVE | OUTPATIENT
Start: 2018-09-12 | End: 2018-09-12

## 2018-09-12 RX ADMIN — LORAZEPAM 1 MG: 1 TABLET ORAL at 02:09

## 2018-09-12 RX ADMIN — POTASSIUM CHLORIDE 40 MEQ: 20 TABLET, EXTENDED RELEASE ORAL at 12:09

## 2018-09-12 NOTE — ED NOTES
Patient accepted by Fritz at [facility] ([address], [place], [place]) for the service of Stephanie Brahser NP.  Report to be called to [phone].
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
## 2018-09-12 NOTE — ED NOTES
PEC received to [facility]/ CPT with proceed with placement when labs completed and medically cleared.

## 2018-09-12 NOTE — ED NOTES
"Patient is sitting on the edge of bed stating he is leaving. Patient is aware that he is placed under a PEC. Patient states, "Who is going to stop me?" Security is at the bedside of patient.   "

## 2018-09-12 NOTE — ED NOTES
Presents to the ER via EMS for a psych evaluation. EMS reports being called for increased depression and ETOH intoxication. Patient reports that his friend is in the hospital and has been stressed because he is worried about him. Mucous membranes are pink and moist. Skin is warm, dry and intact. Lungs are clear bilaterally, respirations are regular and unlabored. Denies cough, congestion, rhinorrhea or SOB. BS active x4, no tenderness with palpation, abd is soft and not distended. Denies any appetite or activity change. S1S2, capillary refill is < 2 seconds. Denies dysuria, difficulty urinating, frequency, numbness, tingling or weakness. NAND VSS    Patient has slurred speech and smells heavily of ETOH.

## 2018-09-12 NOTE — ED NOTES
Admit packet faxed to [facility], [facility], [facility], [facility], [facility], [facility], [facility], [place],   Waiting for response.

## 2018-09-12 NOTE — ED NOTES
Admit packet faxed to [facility], [facility], [facility], and [facility].
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
## 2018-09-13 PROBLEM — K21.9 GASTROESOPHAGEAL REFLUX DISEASE WITHOUT ESOPHAGITIS: Status: ACTIVE | Noted: 2018-09-13

## 2018-10-24 ENCOUNTER — HOSPITAL ENCOUNTER (EMERGENCY)
Facility: HOSPITAL | Age: 38
Discharge: HOME | End: 2018-10-25
Attending: EMERGENCY MEDICINE
Payer: MEDICAID

## 2018-10-24 VITALS
WEIGHT: 145 LBS | OXYGEN SATURATION: 99 % | BODY MASS INDEX: 23.3 KG/M2 | SYSTOLIC BLOOD PRESSURE: 139 MMHG | HEIGHT: 66 IN | RESPIRATION RATE: 18 BRPM | DIASTOLIC BLOOD PRESSURE: 90 MMHG | HEART RATE: 100 BPM

## 2018-10-24 DIAGNOSIS — F41.9 ANXIETY: Primary | ICD-10-CM

## 2018-10-24 PROCEDURE — 99283 EMERGENCY DEPT VISIT LOW MDM: CPT

## 2018-10-24 RX ORDER — FLUOXETINE 10 MG/1
20 CAPSULE ORAL DAILY
Status: DISCONTINUED | OUTPATIENT
Start: 2018-10-25 | End: 2018-10-25 | Stop reason: HOSPADM

## 2018-10-25 PROCEDURE — 25000003 PHARM REV CODE 250: Performed by: EMERGENCY MEDICINE

## 2018-10-25 RX ORDER — FLUOXETINE 10 MG/1
CAPSULE ORAL
Status: DISCONTINUED
Start: 2018-10-25 | End: 2018-10-25 | Stop reason: HOSPADM

## 2018-10-25 RX ADMIN — FLUOXETINE 20 MG: 10 CAPSULE ORAL at 12:10

## 2018-10-25 NOTE — ED PROVIDER NOTES
Encounter Date: 10/24/2018    SCRIBE #1 NOTE: I, Charlottekishor Hsieh, am scribing for, and in the presence of, Fritz Campbell MD.       History     Chief Complaint   Patient presents with    Anxiety       Time seen by provider: 11:43 PM on 10/24/2018    Bradley Molina Cousin is a 38 y.o. male with pmhx of Depression, HTN, Pancreatitis who presents to the ED via EMS for evaluation of anxiety. The patient reports that he was at home when he suddenly began feeling anxious and SOB. Upon arrival to the ED, the pt reports that his anxiety is completely relieved and he just wants to sleep. He reports that he has been out of his Prozac for 3-4 days.  The patient denies any other symptoms at this time. No SHx noted. Current some day smoker (0.25 ppd). NKDA.      The history is provided by the patient.     Review of patient's allergies indicates:  No Known Allergies  Past Medical History:   Diagnosis Date    Depression     Hypertension     Pancreatitis     Thyroid disease      Past Surgical History:   Procedure Laterality Date    ULTRASOUND-ENDOSCOPIC-UPPER N/A 11/1/2016    [facility]     Family History   Problem Relation Age of Onset    Diabetes Maternal Grandmother     Hypertension Maternal Grandfather     Cancer Paternal Grandfather      Social History     Tobacco Use    Smoking status: Current Some Day Smoker     Packs/day: 0.25     Years: 7.00     Pack years: 1.75    Smokeless tobacco: Never Used   Substance Use Topics    Alcohol use: Yes     Comment: occasionally    Drug use: Yes     Frequency: 7.0 times per week     Types: Marijuana     Review of Systems   Constitutional: Negative for fever.   HENT: Negative for congestion.    Eyes: Negative for visual disturbance.   Respiratory: Positive for shortness of breath (now relieved). Negative for wheezing.    Cardiovascular: Negative for chest pain.   Gastrointestinal: Negative for abdominal pain.   Genitourinary:  Negative for dysuria.   Musculoskeletal: Negative for joint swelling.   Skin: Negative for rash.   Neurological: Negative for syncope.   Hematological: Does not bruise/bleed easily.   Psychiatric/Behavioral: Negative for confusion. The patient is nervous/anxious (now relieved).        Physical Exam     Initial Vitals [10/24/18 2321]   BP Pulse Resp Temp SpO2   (!) 139/90 100 18 -- 99 %      MAP       --         Physical Exam    Nursing note and vitals reviewed.  Constitutional: He appears well-nourished.   Seen sleeping. Endorses anxiety that has been relieved.    HENT:   Head: Normocephalic and atraumatic.   Eyes: Conjunctivae and EOM are normal.   Neck: Normal range of motion. Neck supple. No thyroid mass present.   Cardiovascular: Normal rate, regular rhythm and normal heart sounds. Exam reveals no gallop and no friction rub.    No murmur heard.  Pulmonary/Chest: Breath sounds normal. He has no wheezes. He has no rhonchi. He has no rales.   Abdominal: Soft. Normal appearance and bowel sounds are normal. There is no tenderness.   Neurological: He is alert and oriented to person, place, and time. He has normal strength. No cranial nerve deficit or sensory deficit.   Skin: Skin is warm and dry. No rash noted. No erythema.   Psychiatric: He has a normal mood and affect. His speech is normal. Cognition and memory are normal.         ED Course   Procedures  Labs Reviewed - No data to display       Imaging Results    None          Medical Decision Making:   History:   Old Medical Records: I decided to obtain old medical records.  ED Management:  Patient was interviewed and examined emergently.  He was seen sleeping.  Currently he has no complaints.  He was provided his dose of Prozac and I do think withdrawal for 3 days may have exacerbated some worsening anxiety tonight.  He reports he has a full prescription but has not filled yet.  He is strongly encouraged to do this as soon as possible and resume therapy.  He is  asked to return to the ER for any new, concerning, or worsening symptoms. Patient was agreeable with this plan for follow-up and was discharged in stable condition with a friend as a .            Scribe Attestation:   Scribe #1: I performed the above scribed service and the documentation accurately describes the services I performed. I attest to the accuracy of the note.    I, Dr. Fritz Campbell, personally performed the services described in this documentation. All medical record entries made by the scribe were at my direction and in my presence.  I have reviewed the chart and agree that the record reflects my personal performance and is accurate and complete. Fritz Campbell MD.  6:44 AM 10/25/2018           Clinical Impression:     1. Anxiety        Disposition:   Disposition: Discharged  Condition: Stable                        Fritz Campbell MD  10/25/18 0645

## 2019-03-19 ENCOUNTER — HOSPITAL ENCOUNTER (EMERGENCY)
Facility: HOSPITAL | Age: 39
Discharge: PSYCHIATRIC OR SUBSTANCE USE FACILITY | End: 2019-03-20
Attending: EMERGENCY MEDICINE
Payer: MEDICAID

## 2019-03-19 DIAGNOSIS — G47.00 INSOMNIA, UNSPECIFIED TYPE: ICD-10-CM

## 2019-03-19 DIAGNOSIS — F41.9 ANXIETY AND DEPRESSION: Primary | ICD-10-CM

## 2019-03-19 DIAGNOSIS — F32.A ANXIETY AND DEPRESSION: Primary | ICD-10-CM

## 2019-03-19 DIAGNOSIS — R45.851 SUICIDAL IDEATION: ICD-10-CM

## 2019-03-19 LAB
ALBUMIN SERPL BCP-MCNC: 4.5 G/DL
ALP SERPL-CCNC: 97 U/L
ALT SERPL W/O P-5'-P-CCNC: 23 U/L
AMPHET+METHAMPHET UR QL: NORMAL
ANION GAP SERPL CALC-SCNC: 15 MMOL/L
APAP SERPL-MCNC: 15 UG/ML
AST SERPL-CCNC: 28 U/L
BARBITURATES UR QL SCN>200 NG/ML: NEGATIVE
BASOPHILS # BLD AUTO: 0 K/UL
BASOPHILS NFR BLD: 0.2 %
BENZODIAZ UR QL SCN>200 NG/ML: NORMAL
BILIRUB SERPL-MCNC: 0.3 MG/DL
BILIRUB UR QL STRIP: NEGATIVE
BUN SERPL-MCNC: 13 MG/DL
BZE UR QL SCN: NEGATIVE
CALCIUM SERPL-MCNC: 10.3 MG/DL
CANNABINOIDS UR QL SCN: NORMAL
CHLORIDE SERPL-SCNC: 104 MMOL/L
CLARITY UR: CLEAR
CO2 SERPL-SCNC: 26 MMOL/L
COLOR UR: YELLOW
CREAT SERPL-MCNC: 1.5 MG/DL
CREAT UR-MCNC: 129.1 MG/DL
DIFFERENTIAL METHOD: ABNORMAL
EOSINOPHIL # BLD AUTO: 0.2 K/UL
EOSINOPHIL NFR BLD: 2.5 %
ERYTHROCYTE [DISTWIDTH] IN BLOOD BY AUTOMATED COUNT: 18.3 %
EST. GFR  (AFRICAN AMERICAN): >60 ML/MIN/1.73 M^2
EST. GFR  (NON AFRICAN AMERICAN): 59 ML/MIN/1.73 M^2
ETHANOL SERPL-MCNC: 154 MG/DL
GLUCOSE SERPL-MCNC: 85 MG/DL
GLUCOSE UR QL STRIP: NEGATIVE
HCT VFR BLD AUTO: 41.4 %
HGB BLD-MCNC: 13 G/DL
HGB UR QL STRIP: NEGATIVE
KETONES UR QL STRIP: NEGATIVE
LEUKOCYTE ESTERASE UR QL STRIP: NEGATIVE
LYMPHOCYTES # BLD AUTO: 1.9 K/UL
LYMPHOCYTES NFR BLD: 30.5 %
MCH RBC QN AUTO: 25 PG
MCHC RBC AUTO-ENTMCNC: 31.5 G/DL
MCV RBC AUTO: 80 FL
METHADONE UR QL SCN>300 NG/ML: NEGATIVE
MONOCYTES # BLD AUTO: 0.7 K/UL
MONOCYTES NFR BLD: 11.7 %
NEUTROPHILS # BLD AUTO: 3.5 K/UL
NEUTROPHILS NFR BLD: 55.1 %
NITRITE UR QL STRIP: NEGATIVE
OPIATES UR QL SCN: NEGATIVE
PCP UR QL SCN>25 NG/ML: NEGATIVE
PH UR STRIP: 6 [PH] (ref 5–8)
PLATELET # BLD AUTO: 242 K/UL
PMV BLD AUTO: 8.8 FL
POTASSIUM SERPL-SCNC: 4.1 MMOL/L
PROT SERPL-MCNC: 7.9 G/DL
PROT UR QL STRIP: NEGATIVE
RBC # BLD AUTO: 5.21 M/UL
SODIUM SERPL-SCNC: 145 MMOL/L
SP GR UR STRIP: 1.01 (ref 1–1.03)
TOXICOLOGY INFORMATION: NORMAL
TSH SERPL DL<=0.005 MIU/L-ACNC: 1.61 UIU/ML
URN SPEC COLLECT METH UR: NORMAL
UROBILINOGEN UR STRIP-ACNC: NEGATIVE EU/DL
WBC # BLD AUTO: 6.3 K/UL

## 2019-03-19 PROCEDURE — 85025 COMPLETE CBC W/AUTO DIFF WBC: CPT

## 2019-03-19 PROCEDURE — 80329 ANALGESICS NON-OPIOID 1 OR 2: CPT

## 2019-03-19 PROCEDURE — 80053 COMPREHEN METABOLIC PANEL: CPT

## 2019-03-19 PROCEDURE — 63600175 PHARM REV CODE 636 W HCPCS: Performed by: EMERGENCY MEDICINE

## 2019-03-19 PROCEDURE — 99285 EMERGENCY DEPT VISIT HI MDM: CPT | Mod: 25

## 2019-03-19 PROCEDURE — 36415 COLL VENOUS BLD VENIPUNCTURE: CPT

## 2019-03-19 PROCEDURE — 84443 ASSAY THYROID STIM HORMONE: CPT

## 2019-03-19 PROCEDURE — 99284 EMERGENCY DEPT VISIT MOD MDM: CPT | Mod: 25

## 2019-03-19 PROCEDURE — 80320 DRUG SCREEN QUANTALCOHOLS: CPT

## 2019-03-19 PROCEDURE — 81003 URINALYSIS AUTO W/O SCOPE: CPT | Mod: 59

## 2019-03-19 PROCEDURE — 96372 THER/PROPH/DIAG INJ SC/IM: CPT

## 2019-03-19 PROCEDURE — 25000003 PHARM REV CODE 250: Performed by: EMERGENCY MEDICINE

## 2019-03-19 PROCEDURE — 80307 DRUG TEST PRSMV CHEM ANLYZR: CPT

## 2019-03-19 RX ORDER — DIAZEPAM 5 MG/1
10 TABLET ORAL
Status: COMPLETED | OUTPATIENT
Start: 2019-03-19 | End: 2019-03-19

## 2019-03-19 RX ORDER — ZOLPIDEM TARTRATE 5 MG/1
TABLET ORAL
Status: DISCONTINUED
Start: 2019-03-19 | End: 2019-03-20 | Stop reason: HOSPADM

## 2019-03-19 RX ORDER — HYDROMORPHONE HYDROCHLORIDE 2 MG/ML
1 INJECTION, SOLUTION INTRAMUSCULAR; INTRAVENOUS; SUBCUTANEOUS
Status: DISCONTINUED | OUTPATIENT
Start: 2019-03-19 | End: 2019-03-19

## 2019-03-19 RX ORDER — ZOLPIDEM TARTRATE 5 MG/1
10 TABLET ORAL
Status: COMPLETED | OUTPATIENT
Start: 2019-03-19 | End: 2019-03-19

## 2019-03-19 RX ORDER — DIPHENHYDRAMINE HYDROCHLORIDE 50 MG/ML
50 INJECTION INTRAMUSCULAR; INTRAVENOUS
Status: COMPLETED | OUTPATIENT
Start: 2019-03-19 | End: 2019-03-19

## 2019-03-19 RX ADMIN — DIAZEPAM 10 MG: 5 TABLET ORAL at 10:03

## 2019-03-19 RX ADMIN — ZOLPIDEM TARTRATE 10 MG: 5 TABLET ORAL at 11:03

## 2019-03-19 RX ADMIN — DIPHENHYDRAMINE HYDROCHLORIDE 50 MG: 50 INJECTION, SOLUTION INTRAMUSCULAR; INTRAVENOUS at 10:03

## 2019-03-20 VITALS
HEIGHT: 67 IN | BODY MASS INDEX: 25.11 KG/M2 | SYSTOLIC BLOOD PRESSURE: 122 MMHG | DIASTOLIC BLOOD PRESSURE: 82 MMHG | TEMPERATURE: 98 F | OXYGEN SATURATION: 98 % | WEIGHT: 160 LBS | RESPIRATION RATE: 16 BRPM | HEART RATE: 61 BPM

## 2019-03-20 LAB — ETHANOL SERPL-MCNC: 103 MG/DL

## 2019-03-20 PROCEDURE — 36415 COLL VENOUS BLD VENIPUNCTURE: CPT

## 2019-03-20 PROCEDURE — 80320 DRUG SCREEN QUANTALCOHOLS: CPT

## 2019-03-20 NOTE — ED NOTES
Emailed packet to [facility], [facility], [place], [place].

## 2019-03-20 NOTE — ED NOTES
Faxed packet to [facility], [facility], [facility], [facility].

## 2019-03-20 NOTE — ED NOTES
Patient accepted by Teddy at [facility] ([address], [place]) for the service of Dr. Cutler.  Report to be called to [phone], ext. [phone]

## 2019-03-20 NOTE — ED PROVIDER NOTES
Encounter Date: 3/19/2019    SCRIBE #1 NOTE: I, Joey Polo, am scribing for, and in the presence of, Dr. Campbell.       History     Chief Complaint   Patient presents with    Depression     with anxiety; denies suicidal ideation     03/19/2019 9:27 PM    The pt is a 39 y.o. male with a past medical history of depression, HTN, pancreatitis, and thyroid disease presenting to the ED with a sudden onset of acute anxiety beginning 1 hr ago. Associated symptom of sleep disturbances. The pt reported current symptoms are similar to previous episodes of depression. He denied suicidal ideation. The pt denied auditory and visual hallucinations. He stated no improvement of symptoms with Seroquel. The pt noted drinking a half a pint of EtOH and smoked marijuana. He is a current cigarette smoker. The pt has no past surgical history on file.      The history is provided by the patient.     Review of patient's allergies indicates:  No Known Allergies  Past Medical History:   Diagnosis Date    Depression     Hypertension     Pancreatitis     Thyroid disease      Past Surgical History:   Procedure Laterality Date    ULTRASOUND-ENDOSCOPIC-UPPER N/A 11/1/2016    Performed by Kuldip Hodge MD at [facility] ([facility])     Family History   Problem Relation Age of Onset    Diabetes Maternal Grandmother     Hypertension Maternal Grandfather     Cancer Paternal Grandfather      Social History     Tobacco Use    Smoking status: Current Some Day Smoker     Packs/day: 0.25     Years: 7.00     Pack years: 1.75    Smokeless tobacco: Never Used   Substance Use Topics    Alcohol use: Yes     Comment: occasionally    Drug use: Yes     Frequency: 7.0 times per week     Types: Marijuana     Review of Systems   Constitutional: Negative for fever.   HENT: Negative for congestion.    Eyes: Negative for visual disturbance.   Respiratory: Negative for wheezing.    Cardiovascular: Negative for chest pain.   Gastrointestinal: Negative for  abdominal pain.   Genitourinary: Negative for dysuria.   Musculoskeletal: Negative for joint swelling.   Skin: Negative for rash.   Neurological: Negative for syncope.   Hematological: Does not bruise/bleed easily.   Psychiatric/Behavioral: Positive for sleep disturbance. Negative for confusion, hallucinations (auditory and visual) and suicidal ideas. The patient is nervous/anxious.        Physical Exam     Initial Vitals [03/19/19 2115]   BP Pulse Resp Temp SpO2   (!) 144/97 84 16 98.1 °F (36.7 °C) 97 %      MAP       --         Physical Exam    Nursing note and vitals reviewed.  Constitutional: He appears well-nourished.   HENT:   Head: Normocephalic and atraumatic.   Eyes: Conjunctivae and EOM are normal.   Neck: Normal range of motion. Neck supple. No thyroid mass present.   Cardiovascular: Normal rate, regular rhythm and normal heart sounds. Exam reveals no gallop and no friction rub.    No murmur heard.  Pulmonary/Chest: Breath sounds normal. He has no wheezes. He has no rhonchi. He has no rales.   Abdominal: Soft. Normal appearance and bowel sounds are normal. There is no tenderness.   Neurological: He is alert and oriented to person, place, and time. He has normal strength. No cranial nerve deficit or sensory deficit.   Skin: Skin is warm and dry. No rash noted. No erythema.   Psychiatric: His speech is normal. Cognition and memory are normal. He exhibits a depressed mood. He expresses no suicidal plans and no homicidal plans.   Insomnia         ED Course   Procedures  Labs Reviewed - No data to display       Imaging Results    None          Medical Decision Making:   History:   Old Medical Records: I decided to obtain old medical records.  ED Management:  This is an emergent evaluation for psychiatric evaluation.  The patient presents for evaluation of worsening depression, suicidal ideation plan, increasing anxiety and insomnia for 3 days.    I feel the patient is potential danger to himself and pt was  placed under PEC with direct observation.  Medical workup was initiated to evaluate for organic etiologies.  Patient's ETOH level was initially noted to be greater than 150.  Later redraw indicates it to be 103.  I do not believe the patient has metabolic encephalopathy, CVA, severe electrolyte derangement, drug induced temporary psychosis.    Patient is medically cleared for placement and transport to an inpatient psychiatric facility.              Scribe Attestation:   Scribe #1: I performed the above scribed service and the documentation accurately describes the services I performed. I attest to the accuracy of the note.    I, Dr. Fritz Campbell, personally performed the services described in this documentation. All medical record entries made by the scribe were at my direction and in my presence.  I have reviewed the chart and agree that the record reflects my personal performance and is accurate and complete. Fritz Campbell MD.  1:47 AM 03/20/2019             Clinical Impression:       ICD-10-CM ICD-9-CM   1. Anxiety and depression F41.9 300.00    F32.9 311   2. Suicidal ideation R45.851 V62.84   3. Insomnia, unspecified type G47.00 780.52         Disposition:   Disposition: Transferred  Condition: Stable                        Fritz Campbell MD  03/20/19 0556

## 2019-03-20 NOTE — ED NOTES
Samantha here to transfer pt. Pt aroused from sleep for transfer. Pt not wanting to go, wants his clothes from home that he had packed to go. Advised that ambulance can't sit and wait on that. Pt became aggressive and refusing to go. Security called to bedside. Ambulance agreed to wait the 10 to 15 minutes for his mom to get here with his clothes and pt became calm but also advised that it they couldn't wait then he would have no choice but to go.

## 2019-03-20 NOTE — ED NOTES
"Pt presents to ED looking for answers. Pt says he keeps " flashing out ". Not being aware of what he is doing, not remembering what he did. Not sleeping for 2 to 3 days at a time. Pt wanting to go to psychiatric facility and get help to possibly adjust meds or fix what is going on. Pt denies being suicidal or homicidal. Pt denies visual or auditory hallucinations. Past psych history with suicidal ideation.   "

## 2019-03-20 NOTE — ED NOTES
Pt awake now and complains of not being able to sleep again. Advised I could not give him anything else for sleep at this time. Updated on plan of care. No change in condition noted.

## 2019-04-22 ENCOUNTER — HOSPITAL ENCOUNTER (EMERGENCY)
Facility: HOSPITAL | Age: 39
Discharge: HOME | End: 2019-04-23
Attending: EMERGENCY MEDICINE
Payer: MEDICAID

## 2019-04-22 VITALS
DIASTOLIC BLOOD PRESSURE: 78 MMHG | TEMPERATURE: 98 F | RESPIRATION RATE: 16 BRPM | OXYGEN SATURATION: 98 % | HEIGHT: 67 IN | HEART RATE: 80 BPM | WEIGHT: 178 LBS | SYSTOLIC BLOOD PRESSURE: 122 MMHG | BODY MASS INDEX: 27.94 KG/M2

## 2019-04-22 DIAGNOSIS — R07.89 LEFT-SIDED CHEST WALL PAIN: ICD-10-CM

## 2019-04-22 PROCEDURE — 99283 EMERGENCY DEPT VISIT LOW MDM: CPT

## 2019-04-22 PROCEDURE — 25000003 PHARM REV CODE 250: Performed by: EMERGENCY MEDICINE

## 2019-04-22 RX ORDER — IBUPROFEN 400 MG/1
400 TABLET ORAL
Status: COMPLETED | OUTPATIENT
Start: 2019-04-22 | End: 2019-04-22

## 2019-04-22 RX ADMIN — IBUPROFEN 400 MG: 400 TABLET, FILM COATED ORAL at 10:04

## 2019-04-23 NOTE — ED NOTES
"Patient stated to ED tech that he "Needs something for fucking pain." Patient is aware that he received pain ibuprofen. Dr. Thompson is aware.   "

## 2019-04-23 NOTE — ED PROVIDER NOTES
Encounter Date: 4/22/2019    SCRIBE #1 NOTE: I, Joey Polo, am scribing for, and in the presence of, Dr. Thompson.       History     Chief Complaint   Patient presents with    Fall     fell Saturday - seen @ [facility] - given rx of percocet  - pt feels ribs are broke, pt admits to drinking 0.5 of ciroc vodka      04/22/2019 10:20 PM    The pt is a 39 y.o. male arriving via EMS with a past medical history of depression, HTN, pancreatitis, and thyroid disease presenting to the ED with a sudden onset of acute L sided rib pain beginning 3 days ago. The pt reported he fell off a ladder 3 days ago. He stated he was initially seen at [facility] for treatment and was given percocet. The pt noted rib pain is exacerbated with taking deep breaths. The pt endorsed EtOH use PTA (Libby ciroc vodka) He has a history of cigarette smoking and EtOH use. The pt has no past surgical history on file.    The history is provided by the patient.     Review of patient's allergies indicates:  No Known Allergies  Past Medical History:   Diagnosis Date    Depression     Hypertension     Pancreatitis     Thyroid disease      Past Surgical History:   Procedure Laterality Date    ULTRASOUND-ENDOSCOPIC-UPPER N/A 11/1/2016    Performed by Kuldip Hodge MD at [facility] ([address] [place] [address])     Family History   Problem Relation Age of Onset    Diabetes Maternal Grandmother     Hypertension Maternal Grandfather     Cancer Paternal Grandfather      Social History     Tobacco Use    Smoking status: Current Some Day Smoker     Packs/day: 0.25     Years: 7.00     Pack years: 1.75    Smokeless tobacco: Never Used   Substance Use Topics    Alcohol use: Yes     Comment: occasionally    Drug use: Yes     Frequency: 7.0 times per week     Types: Marijuana     Review of Systems   Constitutional: Negative for activity change, appetite change, chills, fatigue and fever.   Eyes: Negative for visual disturbance.   Respiratory: Negative for apnea and shortness of  breath.    Cardiovascular: Negative for palpitations.   Gastrointestinal: Negative for abdominal distention and abdominal pain.   Genitourinary: Negative for difficulty urinating.   Musculoskeletal: Negative for neck pain.        + L sided rib pain   Skin: Negative for pallor and rash.   Neurological: Negative for headaches.   Hematological: Does not bruise/bleed easily.   Psychiatric/Behavioral: Negative for agitation.       Physical Exam     Initial Vitals   BP Pulse Resp Temp SpO2   04/22/19 2203 04/22/19 2203 04/22/19 2203 04/22/19 2204 04/22/19 2203   128/83 82 16 98.3 °F (36.8 °C) 97 %      MAP       --                Physical Exam    Nursing note and vitals reviewed.  Constitutional: He appears well-developed and well-nourished. He is not diaphoretic. No distress.   Smells of EtOH   HENT:   Head: Normocephalic and atraumatic.   Mouth/Throat: Oropharynx is clear and moist.   Eyes: Conjunctivae are normal.   Neck: Neck supple.   Cardiovascular: Normal rate, regular rhythm, normal heart sounds and intact distal pulses. Exam reveals no gallop and no friction rub.    No murmur heard.  Pulmonary/Chest: Breath sounds normal. He has no wheezes. He has no rhonchi. He has no rales.   Diffuse L sided lateral chest wall pain   Abdominal: Soft. He exhibits no distension. There is no tenderness.   Musculoskeletal: Normal range of motion.   L foot immobilizer boot  No midline back tenderness   Neurological: He is alert and oriented to person, place, and time.   CN 3-12 intact  5/5 strengths and sensations   Skin: No rash noted. No erythema.         ED Course   Procedures  Labs Reviewed - No data to display       Imaging Results          X-Ray Chest 1 View (In process)                X-Ray Ribs 2 View Left (In process)                  Medical Decision Making:   History:   Old Medical Records: I decided to obtain old medical records.  Clinical Tests:   Radiological Study: Ordered and Reviewed            Scribe Attestation:    Scribe #1: I performed the above scribed service and the documentation accurately describes the services I performed. I attest to the accuracy of the note.    I, Dr. Manuel Thompson, personally performed the services described in this documentation. All medical record entries made by the scribe were at my direction and in my presence.  I have reviewed the chart and agree that the record reflects my personal performance and is accurate and complete. Manuel Thompson MD.  2:08 AM 04/23/2019    Bradley Collado is a 39 y.o. male presenting with left-sided chest wall pain status post fall.  Patient request x-rays of his ribs.  X-rays performed showing no clear fracture.  He does have chest wall tenderness.  There is no sign of other major intrathoracic injury such as hemothorax, pneumothorax, pulmonary contusion.  I do not think further CT is indicated.  I doubt other emergent injury such as aortic dissection.  He has no increased work of breathing.  He is appropriate for outpatient follow-up with NSAIDs or acetaminophen as necessary for pain. Patient notably denies receiving prescription for opioid analgesia even know EMS report he was recently given a prescription.  I do not think additional opioids are in patient's best interest at this point.  Return precautions reviewed.  Follow up with PCP.          ED Course as of Apr 23 0208   Mon Apr 22, 2019   2249 CXR:  NAD, no ptx. (my read)    [MR]   2323 XR ribs left:  No visible acute fx. (rad read)    [MR]      ED Course User Index  [MR] Manuel Thompson MD     Clinical Impression:       ICD-10-CM ICD-9-CM   1. Left-sided chest wall pain R07.89 786.52         Disposition:   Disposition: Discharged  Condition: Stable                        Manuel Thompson MD  04/23/19 0210

## 2019-04-23 NOTE — ED NOTES
"Patient is aware that he is being discharged and can wait for his ride in the waiting room. Patient states, "I am not moving." Security is aware.   "

## 2019-04-23 NOTE — ED NOTES
Presents to the ER with c/o left rib pain that is secondary to a fall that occurred 3 days ago after a fall from a ladder. Patient reports that his pain is exacerbated with taking a deep breath and movement. Patient admits to drinking a half bottle of vodka tonight. Mucous membranes are pink and moist. Skin is warm, dry and intact. Lungs are clear bilaterally, respirations are regular and unlabored. Denies cough, congestion, rhinorrhea or SOB. BS active x4, no tenderness with palpation, abd is soft and not distended. Denies any appetite or activity change. S1S2, capillary refill is < 2 seconds. Denies dysuria, difficulty urinating, frequency, numbness, tingling or weakness. JOE BARAJAS

## 2019-04-23 NOTE — PROVIDER PROGRESS NOTES - EMERGENCY DEPT.
Encounter Date: 4/22/2019    ED Physician Progress Notes         called and left message for patient regarding rib fracture    2:27 PM  05/12/2019  A certified letter was sent with radiology results.

## 2019-04-23 NOTE — ED NOTES
Patient was escorted to lobby with security. Upon discharge, patient is AAOx4, no cardiac or respiratory complications. No needs or questions at this time.

## 2019-11-14 ENCOUNTER — HOSPITAL ENCOUNTER (EMERGENCY)
Facility: HOSPITAL | Age: 39
Discharge: HOME | End: 2019-11-14
Attending: EMERGENCY MEDICINE
Payer: MEDICAID

## 2019-11-14 VITALS
RESPIRATION RATE: 14 BRPM | DIASTOLIC BLOOD PRESSURE: 78 MMHG | HEIGHT: 67 IN | BODY MASS INDEX: 27.94 KG/M2 | SYSTOLIC BLOOD PRESSURE: 138 MMHG | WEIGHT: 178 LBS | TEMPERATURE: 99 F | OXYGEN SATURATION: 100 % | HEART RATE: 86 BPM

## 2019-11-14 DIAGNOSIS — F32.A DEPRESSION, UNSPECIFIED DEPRESSION TYPE: Primary | ICD-10-CM

## 2019-11-14 LAB
ALBUMIN SERPL BCP-MCNC: 4.4 G/DL (ref 3.5–5.2)
ALP SERPL-CCNC: 78 U/L (ref 55–135)
ALT SERPL W/O P-5'-P-CCNC: 14 U/L (ref 10–44)
AMPHET+METHAMPHET UR QL: NEGATIVE
ANION GAP SERPL CALC-SCNC: 9 MMOL/L (ref 8–16)
APAP SERPL-MCNC: <3 UG/ML (ref 10–20)
AST SERPL-CCNC: 12 U/L (ref 10–40)
BARBITURATES UR QL SCN>200 NG/ML: NEGATIVE
BASOPHILS # BLD AUTO: 0.05 K/UL (ref 0–0.2)
BASOPHILS NFR BLD: 0.8 % (ref 0–1.9)
BENZODIAZ UR QL SCN>200 NG/ML: NEGATIVE
BILIRUB SERPL-MCNC: 0.1 MG/DL (ref 0.1–1)
BILIRUB UR QL STRIP: NEGATIVE
BUN SERPL-MCNC: 5 MG/DL (ref 6–20)
BZE UR QL SCN: NEGATIVE
CALCIUM SERPL-MCNC: 9.7 MG/DL (ref 8.7–10.5)
CANNABINOIDS UR QL SCN: ABNORMAL
CHLORIDE SERPL-SCNC: 106 MMOL/L (ref 95–110)
CLARITY UR: CLEAR
CO2 SERPL-SCNC: 29 MMOL/L (ref 23–29)
COLOR UR: YELLOW
CREAT SERPL-MCNC: 1.3 MG/DL (ref 0.5–1.4)
CREAT UR-MCNC: 10.6 MG/DL (ref 23–375)
DIFFERENTIAL METHOD: ABNORMAL
EOSINOPHIL # BLD AUTO: 0.1 K/UL (ref 0–0.5)
EOSINOPHIL NFR BLD: 1.3 % (ref 0–8)
ERYTHROCYTE [DISTWIDTH] IN BLOOD BY AUTOMATED COUNT: 17.4 % (ref 11.5–14.5)
EST. GFR  (AFRICAN AMERICAN): >60 ML/MIN/1.73 M^2
EST. GFR  (NON AFRICAN AMERICAN): >60 ML/MIN/1.73 M^2
ETHANOL SERPL-MCNC: 234 MG/DL
GLUCOSE SERPL-MCNC: 119 MG/DL (ref 70–110)
GLUCOSE UR QL STRIP: NEGATIVE
HCT VFR BLD AUTO: 42.5 % (ref 40–54)
HGB BLD-MCNC: 13 G/DL (ref 14–18)
HGB UR QL STRIP: NEGATIVE
IMM GRANULOCYTES # BLD AUTO: 0.03 K/UL (ref 0–0.04)
KETONES UR QL STRIP: NEGATIVE
LEUKOCYTE ESTERASE UR QL STRIP: NEGATIVE
LYMPHOCYTES # BLD AUTO: 1.4 K/UL (ref 1–4.8)
LYMPHOCYTES NFR BLD: 23 % (ref 18–48)
MCH RBC QN AUTO: 25.6 PG (ref 27–31)
MCHC RBC AUTO-ENTMCNC: 30.6 G/DL (ref 32–36)
MCV RBC AUTO: 84 FL (ref 82–98)
METHADONE UR QL SCN>300 NG/ML: NEGATIVE
MONOCYTES # BLD AUTO: 0.5 K/UL (ref 0.3–1)
MONOCYTES NFR BLD: 7.2 % (ref 4–15)
NEUTROPHILS # BLD AUTO: 4.2 K/UL (ref 1.8–7.7)
NEUTROPHILS NFR BLD: 67.2 % (ref 38–73)
NITRITE UR QL STRIP: NEGATIVE
NRBC BLD-RTO: 0 /100 WBC
OPIATES UR QL SCN: NEGATIVE
PCP UR QL SCN>25 NG/ML: NEGATIVE
PH UR STRIP: 7 [PH] (ref 5–8)
PLATELET # BLD AUTO: 273 K/UL (ref 150–350)
PMV BLD AUTO: 9.8 FL (ref 9.2–12.9)
POTASSIUM SERPL-SCNC: 3.8 MMOL/L (ref 3.5–5.1)
PROT SERPL-MCNC: 7.5 G/DL (ref 6–8.4)
PROT UR QL STRIP: NEGATIVE
RBC # BLD AUTO: 5.07 M/UL (ref 4.6–6.2)
SALICYLATES SERPL-MCNC: <5 MG/DL (ref 15–30)
SODIUM SERPL-SCNC: 144 MMOL/L (ref 136–145)
SP GR UR STRIP: <=1.005 (ref 1–1.03)
TOXICOLOGY INFORMATION: ABNORMAL
TSH SERPL DL<=0.005 MIU/L-ACNC: 1.25 UIU/ML (ref 0.4–4)
URN SPEC COLLECT METH UR: ABNORMAL
UROBILINOGEN UR STRIP-ACNC: NEGATIVE EU/DL
WBC # BLD AUTO: 6.21 K/UL (ref 3.9–12.7)

## 2019-11-14 PROCEDURE — 85025 COMPLETE CBC W/AUTO DIFF WBC: CPT

## 2019-11-14 PROCEDURE — 80329 ANALGESICS NON-OPIOID 1 OR 2: CPT

## 2019-11-14 PROCEDURE — 96372 THER/PROPH/DIAG INJ SC/IM: CPT

## 2019-11-14 PROCEDURE — 63600175 PHARM REV CODE 636 W HCPCS: Performed by: EMERGENCY MEDICINE

## 2019-11-14 PROCEDURE — 25000003 PHARM REV CODE 250: Performed by: EMERGENCY MEDICINE

## 2019-11-14 PROCEDURE — 99283 EMERGENCY DEPT VISIT LOW MDM: CPT | Mod: 95,,, | Performed by: NURSE PRACTITIONER

## 2019-11-14 PROCEDURE — 81003 URINALYSIS AUTO W/O SCOPE: CPT | Mod: 59

## 2019-11-14 PROCEDURE — 80053 COMPREHEN METABOLIC PANEL: CPT

## 2019-11-14 PROCEDURE — 80320 DRUG SCREEN QUANTALCOHOLS: CPT

## 2019-11-14 PROCEDURE — 99284 EMERGENCY DEPT VISIT MOD MDM: CPT | Mod: 25

## 2019-11-14 PROCEDURE — 84443 ASSAY THYROID STIM HORMONE: CPT

## 2019-11-14 PROCEDURE — 36415 COLL VENOUS BLD VENIPUNCTURE: CPT

## 2019-11-14 PROCEDURE — 99283 PR EMERGENCY DEPT VISIT,LEVEL III: ICD-10-PCS | Mod: 95,,, | Performed by: NURSE PRACTITIONER

## 2019-11-14 PROCEDURE — 80307 DRUG TEST PRSMV CHEM ANLYZR: CPT

## 2019-11-14 RX ORDER — LORAZEPAM 2 MG/ML
2 INJECTION INTRAMUSCULAR
Status: COMPLETED | OUTPATIENT
Start: 2019-11-14 | End: 2019-11-14

## 2019-11-14 RX ORDER — HYDROXYZINE PAMOATE 25 MG/1
25 CAPSULE ORAL
Status: COMPLETED | OUTPATIENT
Start: 2019-11-14 | End: 2019-11-14

## 2019-11-14 RX ORDER — HYDROXYZINE PAMOATE 25 MG/1
25 CAPSULE ORAL EVERY 8 HOURS PRN
Qty: 12 CAPSULE | Refills: 0 | Status: ON HOLD | OUTPATIENT
Start: 2019-11-14 | End: 2020-02-20 | Stop reason: HOSPADM

## 2019-11-14 RX ORDER — BUSPIRONE HYDROCHLORIDE 10 MG/1
10 TABLET ORAL 3 TIMES DAILY
COMMUNITY
End: 2019-11-14 | Stop reason: CLARIF

## 2019-11-14 RX ORDER — LORAZEPAM 2 MG/ML
1 INJECTION INTRAMUSCULAR
Status: DISCONTINUED | OUTPATIENT
Start: 2019-11-14 | End: 2019-11-14

## 2019-11-14 RX ORDER — LISINOPRIL 5 MG/1
5 TABLET ORAL DAILY
Status: ON HOLD | COMMUNITY
End: 2020-02-20 | Stop reason: HOSPADM

## 2019-11-14 RX ORDER — PANTOPRAZOLE SODIUM 40 MG/1
40 TABLET, DELAYED RELEASE ORAL DAILY
Status: ON HOLD | COMMUNITY
End: 2020-10-12 | Stop reason: SDUPTHER

## 2019-11-14 RX ORDER — QUETIAPINE FUMARATE 300 MG/1
300 TABLET, FILM COATED ORAL NIGHTLY
Qty: 30 TABLET | Refills: 0 | Status: ON HOLD | OUTPATIENT
Start: 2019-11-14 | End: 2020-02-20 | Stop reason: HOSPADM

## 2019-11-14 RX ADMIN — HYDROXYZINE PAMOATE 25 MG: 25 CAPSULE ORAL at 03:11

## 2019-11-14 RX ADMIN — LORAZEPAM 2 MG: 2 INJECTION, SOLUTION INTRAMUSCULAR; INTRAVENOUS at 02:11

## 2019-11-14 NOTE — ED NOTES
"Pt upset threw duffle bag on floor yelling at MD and not happy with DC medications. States none of the medications will work and he "knows what he needs"   "

## 2019-11-14 NOTE — ED NOTES
"Brought to rm 12 per EMS, states that he just "doesn't feel like himself" after not taking his psych medications for a month. States that he is taking his other medications and denies SI or HI. "I just need help I lied to the Dr at the place where I was and told them the meds were helping just so I could get out of that facility, even though I knew they were not helping. I haven't been able to sleep more than three hours at a time and I have been having some diarrhea" PTA pt was at dr office had labs drawn and tried to get appt for psych, states next appt is Jan 27th but he can not wait that long "I know better" alert calm cooperative. MD at bedside. Pt aware to notify nurse of needs or concerns.     "

## 2019-11-14 NOTE — ED PROVIDER NOTES
"Encounter Date: 11/14/2019    SCRIBE #1 NOTE: I, Lauren Carroll, am scribing for, and in the presence of, Pola Lebron MD.       History     Chief Complaint   Patient presents with    Psychiatric Evaluation     patient has not taken psych meds for the past month. "They weren't working." Denies HI or SI       Time seen by provider: 1:55 PM on 11/14/2019    Bradley Cousin is a 39 y.o. male with HTN, thyroid disease, and depression who presents to the ED via EMS for s psychiatric evaluation with an onset of worsening depression. He states he told the facility members at the [facility] his psychiatric medications (Prozac, Buspar, and Seroquel) were working but discontinued the medication on his own for the past month once he was discharged. The patient also complaining of difficulty sleeping and can sleep more than 3 hours at night. He is unable to get an appointment with his psychiatrist before 1/27. The patient denies nausea, vomiting, abdominal pain, HI, SI, or any other symptoms at this time. No PSHx.    The history is provided by the patient.     Review of patient's allergies indicates:  No Known Allergies  Past Medical History:   Diagnosis Date    Depression     Hypertension     Pancreatitis     Thyroid disease      History reviewed. No pertinent surgical history.  Family History   Problem Relation Age of Onset    Diabetes Maternal Grandmother     Hypertension Maternal Grandfather     Cancer Paternal Grandfather      Social History     Tobacco Use    Smoking status: Current Some Day Smoker     Packs/day: 0.25     Years: 7.00     Pack years: 1.75    Smokeless tobacco: Never Used   Substance Use Topics    Alcohol use: Yes     Comment: occasionally    Drug use: Yes     Frequency: 7.0 times per week     Types: Marijuana     Review of Systems   Constitutional: Negative for chills and fever.   HENT: Negative for nosebleeds.    Eyes: Negative for visual disturbance.   Respiratory: Negative for cough and " shortness of breath.    Cardiovascular: Negative for chest pain and palpitations.   Gastrointestinal: Negative for abdominal pain, diarrhea, nausea and vomiting.   Genitourinary: Negative for dysuria and hematuria.   Musculoskeletal: Negative for back pain and neck pain.   Skin: Negative for rash.   Neurological: Negative for seizures, syncope and headaches.   Psychiatric/Behavioral: Positive for dysphoric mood and sleep disturbance. Negative for suicidal ideas.        Negative for homicidal ideas.      Physical Exam     Initial Vitals   BP Pulse Resp Temp SpO2   11/14/19 1358 11/14/19 1356 11/14/19 1356 11/14/19 1358 11/14/19 1356   138/78 86 14 99 °F (37.2 °C) 100 %      MAP       --                Physical Exam    Nursing note and vitals reviewed.  Constitutional: He appears well-developed and well-nourished. He is not diaphoretic. No distress.   HENT:   Head: Normocephalic and atraumatic.   Eyes: EOM are normal. Pupils are equal, round, and reactive to light.   Neck: Normal range of motion. Neck supple.   Cardiovascular: Normal rate, regular rhythm, normal heart sounds and intact distal pulses. Exam reveals no gallop and no friction rub.    No murmur heard.  Pulmonary/Chest: Breath sounds normal. No respiratory distress. He has no wheezes. He has no rhonchi. He has no rales.   Abdominal: Soft. Bowel sounds are normal. There is no tenderness. There is no rebound and no guarding.   Musculoskeletal: Normal range of motion.   Neurological: He is alert and oriented to person, place, and time.   Skin: Skin is warm.   Psychiatric: He has a normal mood and affect. His behavior is normal. Judgment and thought content normal. He expresses no homicidal and no suicidal ideation.       ED Course   Procedures  Labs Reviewed   CBC W/ AUTO DIFFERENTIAL - Abnormal; Notable for the following components:       Result Value    Hemoglobin 13.0 (*)     Mean Corpuscular Hemoglobin 25.6 (*)     Mean Corpuscular Hemoglobin Conc 30.6 (*)      RDW 17.4 (*)     All other components within normal limits   URINALYSIS, REFLEX TO URINE CULTURE - Abnormal; Notable for the following components:    Specific Gravity, UA <=1.005 (*)     All other components within normal limits    Narrative:     Preferred Collection Type->Urine, Clean Catch   COMPREHENSIVE METABOLIC PANEL   TSH   DRUG SCREEN PANEL, URINE EMERGENCY   ALCOHOL,MEDICAL (ETHANOL)   ACETAMINOPHEN LEVEL   SALICYLATE LEVEL        Imaging Results    None          Medical Decision Making:   History:   Old Medical Records: I decided to obtain old medical records.  Initial Assessment:   39-year-old male presented for psychiatric evaluation.  Differential Diagnosis:   My differential diagnosis includes depression, intoxication, and medication noncompliance.   Clinical Tests:   Lab Tests: Ordered and Reviewed  ED Management:  The patient was emergently evaluated in the emergency department, his evaluation was significant for a young male with a normal neurologic exam.  The patient does not appear clinically intoxicated as well.  The patient was evaluated by telemedicine Psychiatry, who felt the patient does not need a physician's emergency certificate at this time, and can be treated with medication adjustment as an outpatient.  The patient's diagnosis is depression.  I have written the patient prescriptions for p.o. Seroquel and p.o. Vistaril per the recommendations of psychiatry.  The patient was treated with parental Ativan and p.o. visceral here in the emergency department.  The patient is not suicidal, homicidal, nor is he gravely disabled, and is stable for discharge to home.  The patient will be discharged home to follow up with his PCP for further care.  After discharge the patient did become upset with the medication adjustments, saying that he did not know if these medications would work.  I told him I was also unsure if they would work for him, as he has not even tried the medications, but I did  encourage him to try the medications and to follow up with his PCP for further care.            Scribe Attestation:   Scribe #1: I performed the above scribed service and the documentation accurately describes the services I performed. I attest to the accuracy of the note.    Attending Attestation:             Attending ED Notes:   3:13 PM  Spoke with Psychiatry, who requested to restart the patient's Seroquel and start Vistaril PRN for anxiety. The patient is stable for discharge and follow-up with OP Psychiatry.          I, Dr. Pola Lebron, personally performed the services described in this documentation. All medical record entries made by the scribe were at my direction and in my presence.  I have reviewed the chart and agree that the record reflects my personal performance and is accurate and complete. Pola Lebron MD.  4:47 PM 11/14/2019          Clinical Impression:       ICD-10-CM ICD-9-CM   1. Depression, unspecified depression type F32.9 311         Disposition:   Disposition: Discharged  Condition: Stable                     Pola Lebron MD  11/14/19 2400

## 2019-11-14 NOTE — CONSULTS
"[facility]  Psychiatry  Telepsychiatry Consult Note    Please see previous notes:    Patient agreeable to consultation via telepsychiatry.    Tele-Consultation from Psychiatry started: 11/14/2019 at 1430  The chief complaint leading to psychiatric consultation is: depression  This consultation was requested by Dr. Lebron, the Emergency Department attending physician.  The location of the consulting psychiatrist is [address] [place] [address].  The patient location is  [facility] EMERGENCY DEPARTMENT   The patient arrived at the ED at:   Also present with the patient at the time of the consultation:     Patient Identification:   Bradley Collado is a 39 y.o. male.    Patient information was obtained from patient.  Patient presented voluntarily to the Emergency Department by private vehicle.    Consults  Subjective:     History of Present Illness:    Bradley Collado is a 39 y.o. male with HTN, thyroid disease, and depression who presents to the ED via EMS for s psychiatric evaluation with an onset of worsening depression. He states he told the facility members at the [facility] his psychiatric medications (Prozac, Buspar, and Seroquel) were working but discontinued the medication on his own for the past month once he was discharged. The patient also complaining of difficulty sleeping and can sleep more than 3 hours at night. He is unable to get an appointment with his psychiatrist before 1/27. The patient denies nausea, vomiting, abdominal pain, HI, SI, or any other symptoms at this time. No PSHx.    Psychiatric Interview:  Pt stated, "I stopped my medication a month ago because it wasn't working".  Pt appears irritable. Endorses symptoms of anxiety, trouble sleeping, and mood irritability.  Denies suicidal ideations or homicidal ideations.  Thought processes are goal-directed.  Pt arthur elicit drug use but urine tox is positive for (benzos, amphetamine, and THC).  Pt has hx of alcohol abuse.  Pt " "denies SI/HI/AVH.  Pt is a poor historian and unable to recall names of his present and past medication trials. Pt stated that he does recall Seroquel being effective for insomnia.  Encouraged pt to restart most recent medication until his next outpatient appointment but pt is unreceptive to recommendations.  Pt became agitated during interview and pushed telecart out of room and notified ED he was done with consult.     Psychiatric History:   Previous Psychiatric Hospitalizations: Yes , reports   Previous Medication Trials: Yes Prozac, Seroquel, Naltrexone, buspar  Previous Suicide Attempts: no     Substance Abuse History:  Tobacco:No  Alcohol: yes, hx of alcohol abuse and [facility] detox but refused rehab  Illicit Substances:No  Detox/Rehab: No    Legal History: Past charges/incarcerations: No     Family Psychiatric History: denies    Social History:  Employment Status/Finances:Unemployed   Relationship Status/Sexual Orientation: not   Children: 4  Housing Status: lives with grandmother    history:  NO  Access to gun: NO    Psychiatric Mental Status Exam:  Arousal: alert  Sensorium/Orientation: oriented to grossly intact  Behavior/Cooperation: normal, cooperative   Speech: normal tone, normal rate, normal pitch, normal volume  Language: grossly intact  Mood: " not good "   Affect: irritable  Thought Process: normal and logical  Thought Content:   Auditory hallucinations: NO  Visual hallucinations: NO  Paranoia: NO  Delusions:  NO  Suicidal ideation: NO  Homicidal ideation: NO  Attention/Concentration:  intact  Memory:    Recent:  Intact   Remote: Intact   3/3 immediate, 3/3 at 5 min  Fund of Knowledge: Intact   Abstract reasoning: proverbs were abstract  Insight: intact  Judgment: behavior is adequate to circumstances      Past Medical History:   Past Medical History:   Diagnosis Date    Depression     Hypertension     Pancreatitis     Thyroid disease       Laboratory Data:   Labs Reviewed   CBC " W/ AUTO DIFFERENTIAL - Abnormal; Notable for the following components:       Result Value    Hemoglobin 13.0 (*)     Mean Corpuscular Hemoglobin 25.6 (*)     Mean Corpuscular Hemoglobin Conc 30.6 (*)     RDW 17.4 (*)     All other components within normal limits   COMPREHENSIVE METABOLIC PANEL - Abnormal; Notable for the following components:    Glucose 119 (*)     BUN, Bld 5 (*)     All other components within normal limits   URINALYSIS, REFLEX TO URINE CULTURE - Abnormal; Notable for the following components:    Specific Gravity, UA <=1.005 (*)     All other components within normal limits    Narrative:     Preferred Collection Type->Urine, Clean Catch   DRUG SCREEN PANEL, URINE EMERGENCY - Abnormal; Notable for the following components:    Creatinine, Random Ur 10.6 (*)     All other components within normal limits    Narrative:     Preferred Collection Type->Urine, Clean Catch   ALCOHOL,MEDICAL (ETHANOL) - Abnormal; Notable for the following components:    Alcohol, Medical, Serum 234 (*)     All other components within normal limits   ACETAMINOPHEN LEVEL - Abnormal; Notable for the following components:    Acetaminophen (Tylenol), Serum <3.0 (*)     All other components within normal limits   SALICYLATE LEVEL - Abnormal; Notable for the following components:    Salicylate Lvl <5.0 (*)     All other components within normal limits   TSH       Neurological History:  Seizures: No  Head trauma: No    Allergies:   Review of patient's allergies indicates:  No Known Allergies    Medications in ER:   Medications   lorazepam injection 2 mg (2 mg Intramuscular Given 11/14/19 1431)   hydrOXYzine pamoate capsule 25 mg (25 mg Oral Given 11/14/19 1519)       Medications at home: non compliant     Seroquel 300 mg po q hs  Buspar - per pt  Prozac 20  Mg po daily    No new subjective & objective note has been filed under this hospital service since the last note was generated.      Assessment - Diagnosis - Goals:      Diagnosis/Impression:   Unspecified depression  Hx of alcohol abuse      Rec: Recommend pt follow-up with outpatient provider for medication and treatment management.  Pt currently denies SI/HI/AVH and does not meet criteria for PEC.      Med Recs: Recommend restart Seroquel 300 mg po q hs 30 days supply, and Vistaril 50 mg po QID PRN anxiety    Time with patient: 30 minutes    More than 50% of the time was spent counseling/coordinating care    Consulting clinician was informed of the encounter and consult note.    Consultation ended: 11/14/2019 at 1610    Jonas Balderas III NP   Psychiatry  [facility]

## 2019-11-14 NOTE — ED NOTES
"Continues to scream obscenities and verbally abuse staff. Threw DC paperwork including prescriptions. Charge RN in to talk to pt. Escorted to exit with 2 security guards. Aware that there are resources for him available in community he can follow up with attempted to give paperwork pt refused to take it. Stating "I want a shot to help calm me down I know what is best for me ya'll can't just give me drugs and hope they work I ain't no experiment, you don't know what you are doing"    Walked out of door with security  "

## 2019-11-14 NOTE — ED NOTES
"Pt home medications explained in detail and questions answered. Asking for shot to decrease anxiety for DC aware that PO med given will be the one he takes at home. Aware to keep FU appt and get medications "fixed"   "

## 2020-02-13 ENCOUNTER — HOSPITAL ENCOUNTER (EMERGENCY)
Facility: HOSPITAL | Age: 40
Discharge: PSYCHIATRIC OR SUBSTANCE USE FACILITY | End: 2020-02-14
Attending: EMERGENCY MEDICINE
Payer: MEDICAID

## 2020-02-13 DIAGNOSIS — F10.920 ALCOHOLIC INTOXICATION WITHOUT COMPLICATION: ICD-10-CM

## 2020-02-13 DIAGNOSIS — R46.89 AGGRESSIVE BEHAVIOR: ICD-10-CM

## 2020-02-13 DIAGNOSIS — R45.850 HOMICIDAL IDEATION: Primary | ICD-10-CM

## 2020-02-13 DIAGNOSIS — E87.6 HYPOKALEMIA: ICD-10-CM

## 2020-02-13 LAB
ALBUMIN SERPL BCP-MCNC: 4.1 G/DL (ref 3.5–5.2)
ALP SERPL-CCNC: 90 U/L (ref 55–135)
ALT SERPL W/O P-5'-P-CCNC: 29 U/L (ref 10–44)
ANION GAP SERPL CALC-SCNC: 11 MMOL/L (ref 8–16)
APAP SERPL-MCNC: <10 UG/ML (ref 10–20)
AST SERPL-CCNC: 34 U/L (ref 10–40)
BASOPHILS # BLD AUTO: 0.09 K/UL (ref 0–0.2)
BASOPHILS NFR BLD: 0.8 % (ref 0–1.9)
BILIRUB SERPL-MCNC: 0.6 MG/DL (ref 0.1–1)
BUN SERPL-MCNC: 8 MG/DL (ref 6–20)
CALCIUM SERPL-MCNC: 8.9 MG/DL (ref 8.7–10.5)
CHLORIDE SERPL-SCNC: 106 MMOL/L (ref 95–110)
CO2 SERPL-SCNC: 27 MMOL/L (ref 23–29)
CREAT SERPL-MCNC: 1 MG/DL (ref 0.5–1.4)
DIFFERENTIAL METHOD: ABNORMAL
EOSINOPHIL # BLD AUTO: 0.2 K/UL (ref 0–0.5)
EOSINOPHIL NFR BLD: 1.7 % (ref 0–8)
ERYTHROCYTE [DISTWIDTH] IN BLOOD BY AUTOMATED COUNT: 18.4 % (ref 11.5–14.5)
EST. GFR  (AFRICAN AMERICAN): >60 ML/MIN/1.73 M^2
EST. GFR  (NON AFRICAN AMERICAN): >60 ML/MIN/1.73 M^2
ETHANOL SERPL-MCNC: 364 MG/DL
GLUCOSE SERPL-MCNC: 118 MG/DL (ref 70–110)
HCT VFR BLD AUTO: 45.7 % (ref 40–54)
HGB BLD-MCNC: 14 G/DL (ref 14–18)
IMM GRANULOCYTES # BLD AUTO: 0.03 K/UL (ref 0–0.04)
IMM GRANULOCYTES NFR BLD AUTO: 0.3 % (ref 0–0.5)
LYMPHOCYTES # BLD AUTO: 2.7 K/UL (ref 1–4.8)
LYMPHOCYTES NFR BLD: 24.2 % (ref 18–48)
MCH RBC QN AUTO: 25.4 PG (ref 27–31)
MCHC RBC AUTO-ENTMCNC: 30.6 G/DL (ref 32–36)
MCV RBC AUTO: 83 FL (ref 82–98)
MONOCYTES # BLD AUTO: 0.6 K/UL (ref 0.3–1)
MONOCYTES NFR BLD: 5.3 % (ref 4–15)
NEUTROPHILS # BLD AUTO: 7.4 K/UL (ref 1.8–7.7)
NEUTROPHILS NFR BLD: 67.7 % (ref 38–73)
NRBC BLD-RTO: 0 /100 WBC
PLATELET # BLD AUTO: 453 K/UL (ref 150–350)
PMV BLD AUTO: 9.7 FL (ref 9.2–12.9)
POTASSIUM SERPL-SCNC: 3.1 MMOL/L (ref 3.5–5.1)
PROT SERPL-MCNC: 7.7 G/DL (ref 6–8.4)
RBC # BLD AUTO: 5.52 M/UL (ref 4.6–6.2)
SALICYLATES SERPL-MCNC: <4 MG/DL (ref 15–30)
SODIUM SERPL-SCNC: 144 MMOL/L (ref 136–145)
TSH SERPL DL<=0.005 MIU/L-ACNC: 3.67 UIU/ML (ref 0.34–5.6)
WBC # BLD AUTO: 10.94 K/UL (ref 3.9–12.7)

## 2020-02-13 PROCEDURE — 84443 ASSAY THYROID STIM HORMONE: CPT

## 2020-02-13 PROCEDURE — 80320 DRUG SCREEN QUANTALCOHOLS: CPT

## 2020-02-13 PROCEDURE — 80307 DRUG TEST PRSMV CHEM ANLYZR: CPT

## 2020-02-13 PROCEDURE — 85025 COMPLETE CBC W/AUTO DIFF WBC: CPT

## 2020-02-13 PROCEDURE — 63600175 PHARM REV CODE 636 W HCPCS

## 2020-02-13 PROCEDURE — 96372 THER/PROPH/DIAG INJ SC/IM: CPT | Mod: 59

## 2020-02-13 PROCEDURE — 80053 COMPREHEN METABOLIC PANEL: CPT

## 2020-02-13 PROCEDURE — 63600175 PHARM REV CODE 636 W HCPCS: Performed by: EMERGENCY MEDICINE

## 2020-02-13 PROCEDURE — 36415 COLL VENOUS BLD VENIPUNCTURE: CPT

## 2020-02-13 PROCEDURE — 99285 EMERGENCY DEPT VISIT HI MDM: CPT | Mod: 25

## 2020-02-13 RX ORDER — DIPHENHYDRAMINE HYDROCHLORIDE 50 MG/ML
25 INJECTION INTRAMUSCULAR; INTRAVENOUS
Status: COMPLETED | OUTPATIENT
Start: 2020-02-13 | End: 2020-02-13

## 2020-02-13 RX ORDER — LORAZEPAM 2 MG/ML
1 INJECTION INTRAMUSCULAR
Status: COMPLETED | OUTPATIENT
Start: 2020-02-13 | End: 2020-02-13

## 2020-02-13 RX ORDER — HALOPERIDOL 5 MG/ML
5 INJECTION INTRAMUSCULAR
Status: COMPLETED | OUTPATIENT
Start: 2020-02-13 | End: 2020-02-13

## 2020-02-13 RX ADMIN — LORAZEPAM 1 MG: 2 INJECTION INTRAMUSCULAR; INTRAVENOUS at 10:02

## 2020-02-13 RX ADMIN — HALOPERIDOL 5 MG: 5 INJECTION INTRAMUSCULAR at 10:02

## 2020-02-13 RX ADMIN — DIPHENHYDRAMINE HYDROCHLORIDE 25 MG: 50 INJECTION, SOLUTION INTRAMUSCULAR; INTRAVENOUS at 10:02

## 2020-02-14 VITALS
HEIGHT: 67 IN | SYSTOLIC BLOOD PRESSURE: 148 MMHG | WEIGHT: 170 LBS | TEMPERATURE: 99 F | RESPIRATION RATE: 20 BRPM | HEART RATE: 84 BPM | BODY MASS INDEX: 26.68 KG/M2 | DIASTOLIC BLOOD PRESSURE: 78 MMHG | OXYGEN SATURATION: 99 %

## 2020-02-14 PROBLEM — R45.850 HOMICIDAL BEHAVIOR: Status: ACTIVE | Noted: 2020-02-14

## 2020-02-14 LAB
AMPHET+METHAMPHET UR QL: NEGATIVE
BACTERIA #/AREA URNS HPF: NEGATIVE /HPF
BARBITURATES UR QL SCN>200 NG/ML: NORMAL
BENZODIAZ UR QL SCN>200 NG/ML: NORMAL
BILIRUB UR QL STRIP: NEGATIVE
BZE UR QL SCN: NEGATIVE
CANNABINOIDS UR QL SCN: NORMAL
CLARITY UR: CLEAR
COLOR UR: YELLOW
CREAT UR-MCNC: 214 MG/DL (ref 23–375)
ETHANOL SERPL-MCNC: 150 MG/DL
ETHANOL SERPL-MCNC: 271 MG/DL
GLUCOSE UR QL STRIP: NEGATIVE
HGB UR QL STRIP: NEGATIVE
HYALINE CASTS #/AREA URNS LPF: 2 /LPF
KETONES UR QL STRIP: NEGATIVE
LEUKOCYTE ESTERASE UR QL STRIP: NEGATIVE
MICROSCOPIC COMMENT: ABNORMAL
NITRITE UR QL STRIP: NEGATIVE
OPIATES UR QL SCN: NEGATIVE
PCP UR QL SCN>25 NG/ML: NEGATIVE
PH UR STRIP: 6 [PH] (ref 5–8)
PROT UR QL STRIP: ABNORMAL
RBC #/AREA URNS HPF: 1 /HPF (ref 0–4)
SP GR UR STRIP: 1.02 (ref 1–1.03)
SQUAMOUS #/AREA URNS HPF: 0 /HPF
TOXICOLOGY INFORMATION: NORMAL
URN SPEC COLLECT METH UR: ABNORMAL
UROBILINOGEN UR STRIP-ACNC: NEGATIVE EU/DL
WBC #/AREA URNS HPF: 0 /HPF (ref 0–5)

## 2020-02-14 PROCEDURE — 80307 DRUG TEST PRSMV CHEM ANLYZR: CPT

## 2020-02-14 PROCEDURE — 81001 URINALYSIS AUTO W/SCOPE: CPT | Mod: 59

## 2020-02-14 PROCEDURE — 25000003 PHARM REV CODE 250: Performed by: EMERGENCY MEDICINE

## 2020-02-14 PROCEDURE — 80320 DRUG SCREEN QUANTALCOHOLS: CPT

## 2020-02-14 PROCEDURE — 80320 DRUG SCREEN QUANTALCOHOLS: CPT | Mod: 91

## 2020-02-14 PROCEDURE — 36415 COLL VENOUS BLD VENIPUNCTURE: CPT

## 2020-02-14 RX ORDER — QUETIAPINE FUMARATE 100 MG/1
300 TABLET, FILM COATED ORAL NIGHTLY
Status: DISCONTINUED | OUTPATIENT
Start: 2020-02-14 | End: 2020-02-14

## 2020-02-14 RX ORDER — QUETIAPINE FUMARATE 100 MG/1
300 TABLET, FILM COATED ORAL DAILY
Status: DISCONTINUED | OUTPATIENT
Start: 2020-02-14 | End: 2020-02-14

## 2020-02-14 RX ORDER — LORAZEPAM 1 MG/1
2 TABLET ORAL
Status: COMPLETED | OUTPATIENT
Start: 2020-02-14 | End: 2020-02-14

## 2020-02-14 RX ORDER — QUETIAPINE FUMARATE 100 MG/1
300 TABLET, FILM COATED ORAL ONCE
Status: COMPLETED | OUTPATIENT
Start: 2020-02-14 | End: 2020-02-14

## 2020-02-14 RX ORDER — POTASSIUM CHLORIDE 20 MEQ/15ML
40 SOLUTION ORAL
Status: COMPLETED | OUTPATIENT
Start: 2020-02-14 | End: 2020-02-14

## 2020-02-14 RX ORDER — QUETIAPINE FUMARATE 100 MG/1
300 TABLET, FILM COATED ORAL NIGHTLY
Status: DISCONTINUED | OUTPATIENT
Start: 2020-02-15 | End: 2020-02-14 | Stop reason: HOSPADM

## 2020-02-14 RX ADMIN — LORAZEPAM 2 MG: 1 TABLET ORAL at 10:02

## 2020-02-14 RX ADMIN — QUETIAPINE 300 MG: 100 TABLET ORAL at 03:02

## 2020-02-14 RX ADMIN — POTASSIUM CHLORIDE 40 MEQ: 20 SOLUTION ORAL at 12:02

## 2020-02-14 NOTE — ED PROVIDER NOTES
Encounter Date: 2/13/2020       History     Chief Complaint   Patient presents with    Mental Health Problem    Panic Attack     HPI  Review of patient's allergies indicates:  No Known Allergies  Past Medical History:   Diagnosis Date    Anxiety     Bipolar affective disorder     Depression     Hypertension     Pancreatitis     Schizophrenia     Thyroid disease      No past surgical history on file.  Family History   Problem Relation Age of Onset    Diabetes Maternal Grandmother     Hypertension Maternal Grandfather     Cancer Paternal Grandfather      Social History     Tobacco Use    Smoking status: Current Some Day Smoker     Packs/day: 0.25     Years: 7.00     Pack years: 1.75    Smokeless tobacco: Never Used   Substance Use Topics    Alcohol use: Yes     Comment: occasionally    Drug use: Yes     Frequency: 7.0 times per week     Types: Marijuana     Review of Systems    Physical Exam     Initial Vitals [02/13/20 2257]   BP Pulse Resp Temp SpO2   (!) 152/103 84 17 98.3 °F (36.8 °C) 96 %      MAP       --         Physical Exam    ED Course   Procedures  Labs Reviewed   CBC W/ AUTO DIFFERENTIAL - Abnormal; Notable for the following components:       Result Value    Mean Corpuscular Hemoglobin 25.4 (*)     Mean Corpuscular Hemoglobin Conc 30.6 (*)     RDW 18.4 (*)     Platelets 453 (*)     All other components within normal limits   COMPREHENSIVE METABOLIC PANEL - Abnormal; Notable for the following components:    Potassium 3.1 (*)     Glucose 118 (*)     All other components within normal limits   URINALYSIS, REFLEX TO URINE CULTURE - Abnormal; Notable for the following components:    Protein, UA 1+ (*)     All other components within normal limits    Narrative:     Preferred Collection Type->Urine, Clean Catch  Specimen Source->Urine   ALCOHOL,MEDICAL (ETHANOL) - Abnormal; Notable for the following components:    Alcohol, Medical, Serum 364 (*)     All other components within normal limits     Narrative:     alc critical result(s) repeated. Called and verbal readback obtained   from myke nolan rn er  by PHILIP 02/13/2020 23:19   SALICYLATE LEVEL - Abnormal; Notable for the following components:    Salicylate Lvl <4.0 (*)     All other components within normal limits   ALCOHOL,MEDICAL (ETHANOL) - Abnormal; Notable for the following components:    Alcohol, Medical, Serum 271 (*)     All other components within normal limits   ALCOHOL,MEDICAL (ETHANOL) - Abnormal; Notable for the following components:    Alcohol, Medical, Serum 150 (*)     All other components within normal limits   URINALYSIS MICROSCOPIC - Abnormal; Notable for the following components:    Hyaline Casts, UA 2 (*)     All other components within normal limits    Narrative:     Preferred Collection Type->Urine, Clean Catch  Specimen Source->Urine   TSH   DRUG SCREEN PANEL, URINE EMERGENCY    Narrative:     Preferred Collection Type->Urine, Clean Catch  Specimen Source->Urine   ACETAMINOPHEN LEVEL          Imaging Results    None                       Attending Attestation:             Attending ED Notes:   This 39-year-old male with a history of schizophrenia who came in acutely intoxicated and having homicidal ideation and who was described as being aggressive and noncooperative, has been monitor the emergency room for several hours during which time his alcohol level decreased.  Because of his agitation the patient was initially given AB 52 and later 2 mg of Ativan orally and finally his usual dose of Seroquel 300 mg which she had been noncompliant with with adequate control.  A consult was placed after he was medically cleared to the referral service the patient will be transferred to [facility].  The accepting physician is Ez.                        Clinical Impression:       ICD-10-CM ICD-9-CM   1. Homicidal ideation R45.850 V62.85   2. Aggressive behavior R46.89 V40.39   3. Alcoholic intoxication without  complication F10.920 305.00   4. Hypokalemia E87.6 276.8                             Eder Hurtado Jr., MD  02/14/20 5920

## 2020-02-14 NOTE — ED NOTES
Pt is currently awake and states he is feeling irritated. I listened to the pt and attempted to de-escalate his thinking. We discussed if he has any suicidal ideations and he stated no but that he is homicidal.

## 2020-02-14 NOTE — ED NOTES
Pt states feeling agitated again. I listened to him and attempted to re-direct his thought process. Durga is at the bedside with direct visual observation.

## 2020-02-14 NOTE — ED PROVIDER NOTES
Encounter Date: 2/13/2020       History     Chief Complaint   Patient presents with    Mental Health Problem    Panic Attack     39-year-old male with a past medical history of hypertension, anxiety, schizophrenia, bipolar brought in by EMS with anxiety and anger.  The patient states that he told grandma to call 911 because he was scared he was about to hurt some body.  He states that he feels extremely angry and anxious.  He has been drinking in an attempt to calm his anger down.  He does feel as though he is going to hurt someone.  Of note, EMS states that he refused to get on the stretcher.  He walked to the ambulance.  O the way here they state that he became increasingly agitated.  Upon arrival the patient became very angry and was yelling at both EMS and hospital staff.  No other information is available as the patient refuses to participate in my interview.        Review of patient's allergies indicates:  No Known Allergies  Past Medical History:   Diagnosis Date    Anxiety     Bipolar affective disorder     Depression     Hypertension     Pancreatitis     Schizophrenia     Thyroid disease      No past surgical history on file.  Family History   Problem Relation Age of Onset    Diabetes Maternal Grandmother     Hypertension Maternal Grandfather     Cancer Paternal Grandfather      Social History     Tobacco Use    Smoking status: Current Some Day Smoker     Packs/day: 0.25     Years: 7.00     Pack years: 1.75    Smokeless tobacco: Never Used   Substance Use Topics    Alcohol use: Yes     Comment: occasionally    Drug use: Yes     Frequency: 7.0 times per week     Types: Marijuana     Review of Systems   Unable to perform ROS: Psychiatric disorder   Psychiatric/Behavioral: Positive for agitation and behavioral problems. The patient is nervous/anxious.        Physical Exam     Initial Vitals [02/13/20 2257]   BP Pulse Resp Temp SpO2   (!) 152/103 84 17 98.3 °F (36.8 °C) 96 %      MAP       --          Physical Exam    Nursing note and vitals reviewed.  Constitutional: He appears well-developed and well-nourished. He appears distressed.   HENT:   Head: Normocephalic and atraumatic.   Eyes: EOM are normal. Pupils are equal, round, and reactive to light.   Injected conjunctiva bilaterally   Neck: Normal range of motion. Neck supple.   Cardiovascular: Normal rate, regular rhythm, normal heart sounds and intact distal pulses.   No murmur heard.  Pulmonary/Chest: Breath sounds normal. No respiratory distress. He has no wheezes. He has no rhonchi. He has no rales.   Abdominal: Soft. Bowel sounds are normal. He exhibits no distension. There is no tenderness.   Musculoskeletal: Normal range of motion.   Neurological: He is alert and oriented to person, place, and time. He has normal strength. No cranial nerve deficit or sensory deficit. GCS score is 15. GCS eye subscore is 4. GCS verbal subscore is 5. GCS motor subscore is 6.   Skin: Skin is warm and dry. Capillary refill takes less than 2 seconds.   Psychiatric: His mood appears anxious. His affect is labile and inappropriate. His speech is slurred. He is agitated, aggressive and combative. He expresses homicidal ideation. He is noncommunicative. He is inattentive.         ED Course   Procedures  Labs Reviewed   CBC W/ AUTO DIFFERENTIAL - Abnormal; Notable for the following components:       Result Value    Mean Corpuscular Hemoglobin 25.4 (*)     Mean Corpuscular Hemoglobin Conc 30.6 (*)     RDW 18.4 (*)     Platelets 453 (*)     All other components within normal limits   COMPREHENSIVE METABOLIC PANEL - Abnormal; Notable for the following components:    Potassium 3.1 (*)     Glucose 118 (*)     All other components within normal limits   ALCOHOL,MEDICAL (ETHANOL) - Abnormal; Notable for the following components:    Alcohol, Medical, Serum 364 (*)     All other components within normal limits    Narrative:     alc critical result(s) repeated. Called and verbal  readback obtained   from myke nolan rn er  by PHILIP 02/13/2020 23:19   SALICYLATE LEVEL - Abnormal; Notable for the following components:    Salicylate Lvl <4.0 (*)     All other components within normal limits   TSH   ACETAMINOPHEN LEVEL   URINALYSIS, REFLEX TO URINE CULTURE   DRUG SCREEN PANEL, URINE EMERGENCY   ALCOHOL,MEDICAL (ETHANOL)          Imaging Results    None          Medical Decision Making:   ED Management:  39-year-old male presents the emergency department agitated and with reported HI.  The patient became verbally aggressive with staff.  He was obviously escalating.  He was given Benadryl, Haldol and Ativan for anxiolysis.  The patient has been resting comfortably.  His potassium was replaced.  He will require repeat alcohol level.  Once that has reached an appropriate level he is medically clear for psych.    Opal Sabillon MD  Emergency Medicine  02/14/2020 5:41 AM                                   Clinical Impression:       ICD-10-CM ICD-9-CM   1. Homicidal ideation R45.850 V62.85   2. Aggressive behavior R46.89 V40.39   3. Alcoholic intoxication without complication F10.920 305.00                             Opal Sabillon MD  02/14/20 0542

## 2020-02-14 NOTE — ED NOTES
Gave patient water to promote urination. Pt still awaiting lower ethanol to meet clearance standards. Will continue to monitor.

## 2020-02-14 NOTE — ED TRIAGE NOTES
"EMS called by pt's grandmother.  Pt reports anxiety "too high" and he needed help "before I lost everything."  Pt reports drinking about a pint of alcohol and smoking approximately 2gms of marijuana tonight.   "

## 2020-02-14 NOTE — ED NOTES
Gave pt more water to promote urination. Spoke with Dr. Sabillon regarding urine. She spoke with the patient. Concluded giving water is best. PT cooperative, but adamant he can not urinate.

## 2020-02-14 NOTE — ED NOTES
Pt urinated in the urinal then flushed it down the toilet. I again instructed the pt the need for urine for urinalysis.    33yo P0 with menorrhagia and fibroid uterus s/p abdominal myomectomy, EBL 600cc, with acute anemia likely secondary to perioperative blood loss. Patient was asymptomatic throught out her hospital course.    Patient was counseled on hemoglobin levels discussion was had with the patient on multiple occasions that if worsening CBCs, she was reoffered blood transfusion vs IR intervention  vs hysterectomy.                                    6.9      8.61  )-----------( 251      ( 19 Sep 2019 16:30 )               22.0                             6.3      8.41  )-----------( 209      ( 19 Sep 2019 06:11 )               19.9                             7.2      8.93  )-----------( 234      ( 18 Sep 2019 21:00 )               22.8     Complete Blood Count + Automated Diff in AM (09.18.19 @ 06:44)      WBC Count: 8.54 K/uL      RBC Count: 2.62 M/uL      Hemoglobin: 7.0      Platelet Count - Automated: 219 K/uL        Complete Blood Count + Automated Diff (09.18.19 @ 14:17)      WBC Count: 8.09 K/uL      RBC Count: 2.89 M/uL      Hemoglobin: 7.8 g/dL      Hematocrit: 24.3 %      Platelet Count - Automated: 236 K/uL        Complete Blood Count + Automated Diff (09.17.19 @ 23:30)      WBC Count: 11.51 K/uL      RBC Count: 2.76 M/uL      Hemoglobin: 7.4 g/dL      Hematocrit: 23.0 %      Platelet Count - Automated: 231 K/uL        Complete Blood Count + Automated Diff (09.17.19 @ 20:05)      WBC Count: 11.79 K/uL      RBC Count: 2.91 M/uL      Hemoglobin: 7.7 g/dL      Hematocrit: 24.3 %      Platelet Count - Automated: 237 K/uL            Complete Blood Count + Automated Diff (09.06.19 @ 11:38)      WBC Count: 4.22 K/uL      RBC Count: 4.85 M/uL      Hemoglobin: 12.8 g/dL      Hematocrit: 40.3 %      Platelet Count - Automated: 284 K/uL 33yo P0 with menorrhagia and fibroid uterus s/p abdominal myomectomy, EBL 600cc, with acute anemia likely secondary to perioperative blood loss. Patient was asymptomatic throught out her hospital course.    Patient was counseled on hemoglobin levels discussion was had with the patient on multiple occasions that if worsening CBCs, she was reoffered blood transfusion vs IR intervention  vs hysterectomy. She declined all options, she was seen by [facility] and was given IV iron, procrit, and vit B12. She was clinically well and was discharged with close followup.                                    6.9      8.61  )-----------( 251      ( 19 Sep 2019 16:30 )               22.0                             6.3      8.41  )-----------( 209      ( 19 Sep 2019 06:11 )               19.9                             7.2      8.93  )-----------( 234      ( 18 Sep 2019 21:00 )               22.8     Complete Blood Count + Automated Diff in AM (09.18.19 @ 06:44)      WBC Count: 8.54 K/uL      RBC Count: 2.62 M/uL      Hemoglobin: 7.0      Platelet Count - Automated: 219 K/uL        Complete Blood Count + Automated Diff (09.18.19 @ 14:17)      WBC Count: 8.09 K/uL      RBC Count: 2.89 M/uL      Hemoglobin: 7.8 g/dL      Hematocrit: 24.3 %      Platelet Count - Automated: 236 K/uL        Complete Blood Count + Automated Diff (09.17.19 @ 23:30)      WBC Count: 11.51 K/uL      RBC Count: 2.76 M/uL      Hemoglobin: 7.4 g/dL      Hematocrit: 23.0 %      Platelet Count - Automated: 231 K/uL        Complete Blood Count + Automated Diff (09.17.19 @ 20:05)      WBC Count: 11.79 K/uL      RBC Count: 2.91 M/uL      Hemoglobin: 7.7 g/dL      Hematocrit: 24.3 %      Platelet Count - Automated: 237 K/uL            Complete Blood Count + Automated Diff (09.06.19 @ 11:38)      WBC Count: 4.22 K/uL      RBC Count: 4.85 M/uL      Hemoglobin: 12.8 g/dL      Hematocrit: 40.3 %      Platelet Count - Automated: 284 K/uL

## 2020-02-20 PROBLEM — R45.850 HOMICIDAL BEHAVIOR: Status: RESOLVED | Noted: 2020-02-14 | Resolved: 2020-02-20

## 2020-02-22 ENCOUNTER — HOSPITAL ENCOUNTER (OUTPATIENT)
Facility: HOSPITAL | Age: 40
Discharge: HOME | End: 2020-02-24
Attending: EMERGENCY MEDICINE | Admitting: INTERNAL MEDICINE
Payer: MEDICAID

## 2020-02-22 DIAGNOSIS — K85.20 ALCOHOL-INDUCED ACUTE PANCREATITIS, UNSPECIFIED COMPLICATION STATUS: ICD-10-CM

## 2020-02-22 DIAGNOSIS — K85.90 PANCREATITIS: ICD-10-CM

## 2020-02-22 DIAGNOSIS — R07.9 CHEST PAIN: ICD-10-CM

## 2020-02-22 DIAGNOSIS — R10.10 PAIN OF UPPER ABDOMEN: Primary | ICD-10-CM

## 2020-02-22 LAB
ANION GAP SERPL CALC-SCNC: 11 MMOL/L (ref 8–16)
BASOPHILS # BLD AUTO: 0.04 K/UL (ref 0–0.2)
BASOPHILS NFR BLD: 0.5 % (ref 0–1.9)
BUN SERPL-MCNC: 10 MG/DL (ref 6–20)
CALCIUM SERPL-MCNC: 9.1 MG/DL (ref 8.7–10.5)
CHLORIDE SERPL-SCNC: 103 MMOL/L (ref 95–110)
CO2 SERPL-SCNC: 25 MMOL/L (ref 23–29)
CREAT SERPL-MCNC: 1.2 MG/DL (ref 0.5–1.4)
DIFFERENTIAL METHOD: ABNORMAL
EOSINOPHIL # BLD AUTO: 0.2 K/UL (ref 0–0.5)
EOSINOPHIL NFR BLD: 2.2 % (ref 0–8)
ERYTHROCYTE [DISTWIDTH] IN BLOOD BY AUTOMATED COUNT: 18.2 % (ref 11.5–14.5)
EST. GFR  (AFRICAN AMERICAN): >60 ML/MIN/1.73 M^2
EST. GFR  (NON AFRICAN AMERICAN): >60 ML/MIN/1.73 M^2
GLUCOSE SERPL-MCNC: 138 MG/DL (ref 70–110)
HCT VFR BLD AUTO: 38.3 % (ref 40–54)
HGB BLD-MCNC: 11.8 G/DL (ref 14–18)
IMM GRANULOCYTES # BLD AUTO: 0.04 K/UL (ref 0–0.04)
IMM GRANULOCYTES NFR BLD AUTO: 0.5 % (ref 0–0.5)
LIPASE SERPL-CCNC: 409 U/L (ref 4–60)
LYMPHOCYTES # BLD AUTO: 1.1 K/UL (ref 1–4.8)
LYMPHOCYTES NFR BLD: 13 % (ref 18–48)
MCH RBC QN AUTO: 25.9 PG (ref 27–31)
MCHC RBC AUTO-ENTMCNC: 30.8 G/DL (ref 32–36)
MCV RBC AUTO: 84 FL (ref 82–98)
MONOCYTES # BLD AUTO: 0.8 K/UL (ref 0.3–1)
MONOCYTES NFR BLD: 9.6 % (ref 4–15)
NEUTROPHILS # BLD AUTO: 6 K/UL (ref 1.8–7.7)
NEUTROPHILS NFR BLD: 74.2 % (ref 38–73)
NRBC BLD-RTO: 0 /100 WBC
PLATELET # BLD AUTO: 247 K/UL (ref 150–350)
PMV BLD AUTO: 11.7 FL (ref 9.2–12.9)
POTASSIUM SERPL-SCNC: 4 MMOL/L (ref 3.5–5.1)
RBC # BLD AUTO: 4.55 M/UL (ref 4.6–6.2)
SODIUM SERPL-SCNC: 139 MMOL/L (ref 136–145)
WBC # BLD AUTO: 8.14 K/UL (ref 3.9–12.7)

## 2020-02-22 PROCEDURE — 96376 TX/PRO/DX INJ SAME DRUG ADON: CPT

## 2020-02-22 PROCEDURE — 83690 ASSAY OF LIPASE: CPT

## 2020-02-22 PROCEDURE — 96361 HYDRATE IV INFUSION ADD-ON: CPT

## 2020-02-22 PROCEDURE — G0378 HOSPITAL OBSERVATION PER HR: HCPCS

## 2020-02-22 PROCEDURE — 63600175 PHARM REV CODE 636 W HCPCS: Performed by: EMERGENCY MEDICINE

## 2020-02-22 PROCEDURE — 63600175 PHARM REV CODE 636 W HCPCS: Performed by: INTERNAL MEDICINE

## 2020-02-22 PROCEDURE — 96375 TX/PRO/DX INJ NEW DRUG ADDON: CPT

## 2020-02-22 PROCEDURE — 25500020 PHARM REV CODE 255: Performed by: EMERGENCY MEDICINE

## 2020-02-22 PROCEDURE — 80048 BASIC METABOLIC PNL TOTAL CA: CPT

## 2020-02-22 PROCEDURE — 96374 THER/PROPH/DIAG INJ IV PUSH: CPT | Mod: 59

## 2020-02-22 PROCEDURE — 99285 EMERGENCY DEPT VISIT HI MDM: CPT | Mod: 25

## 2020-02-22 PROCEDURE — 85025 COMPLETE CBC W/AUTO DIFF WBC: CPT

## 2020-02-22 PROCEDURE — 25000003 PHARM REV CODE 250: Performed by: INTERNAL MEDICINE

## 2020-02-22 RX ORDER — SERTRALINE HYDROCHLORIDE 50 MG/1
50 TABLET, FILM COATED ORAL DAILY
Status: DISCONTINUED | OUTPATIENT
Start: 2020-02-23 | End: 2020-02-24 | Stop reason: HOSPADM

## 2020-02-22 RX ORDER — GABAPENTIN 300 MG/1
300 CAPSULE ORAL 3 TIMES DAILY
Status: DISCONTINUED | OUTPATIENT
Start: 2020-02-23 | End: 2020-02-24 | Stop reason: HOSPADM

## 2020-02-22 RX ORDER — GLUCAGON 1 MG
1 KIT INJECTION
Status: DISCONTINUED | OUTPATIENT
Start: 2020-02-22 | End: 2020-02-24 | Stop reason: HOSPADM

## 2020-02-22 RX ORDER — ACETAMINOPHEN 325 MG/1
650 TABLET ORAL EVERY 4 HOURS PRN
Status: DISCONTINUED | OUTPATIENT
Start: 2020-02-22 | End: 2020-02-24 | Stop reason: HOSPADM

## 2020-02-22 RX ORDER — HYDROMORPHONE HYDROCHLORIDE 1 MG/ML
0.5 INJECTION, SOLUTION INTRAMUSCULAR; INTRAVENOUS; SUBCUTANEOUS
Status: COMPLETED | OUTPATIENT
Start: 2020-02-22 | End: 2020-02-22

## 2020-02-22 RX ORDER — IBUPROFEN 200 MG
16 TABLET ORAL
Status: DISCONTINUED | OUTPATIENT
Start: 2020-02-22 | End: 2020-02-24 | Stop reason: HOSPADM

## 2020-02-22 RX ORDER — ONDANSETRON 2 MG/ML
4 INJECTION INTRAMUSCULAR; INTRAVENOUS
Status: COMPLETED | OUTPATIENT
Start: 2020-02-22 | End: 2020-02-22

## 2020-02-22 RX ORDER — THIAMINE HCL 100 MG
100 TABLET ORAL DAILY
Status: DISCONTINUED | OUTPATIENT
Start: 2020-02-23 | End: 2020-02-24 | Stop reason: HOSPADM

## 2020-02-22 RX ORDER — ONDANSETRON 4 MG/1
8 TABLET, ORALLY DISINTEGRATING ORAL EVERY 8 HOURS PRN
Status: DISCONTINUED | OUTPATIENT
Start: 2020-02-22 | End: 2020-02-24 | Stop reason: HOSPADM

## 2020-02-22 RX ORDER — LISINOPRIL 20 MG/1
40 TABLET ORAL DAILY
Status: DISCONTINUED | OUTPATIENT
Start: 2020-02-23 | End: 2020-02-24 | Stop reason: HOSPADM

## 2020-02-22 RX ORDER — HYDROCODONE BITARTRATE AND ACETAMINOPHEN 5; 325 MG/1; MG/1
1 TABLET ORAL EVERY 6 HOURS PRN
Status: DISCONTINUED | OUTPATIENT
Start: 2020-02-22 | End: 2020-02-23

## 2020-02-22 RX ORDER — SODIUM CHLORIDE, SODIUM LACTATE, POTASSIUM CHLORIDE, CALCIUM CHLORIDE 600; 310; 30; 20 MG/100ML; MG/100ML; MG/100ML; MG/100ML
INJECTION, SOLUTION INTRAVENOUS CONTINUOUS
Status: DISCONTINUED | OUTPATIENT
Start: 2020-02-22 | End: 2020-02-24 | Stop reason: HOSPADM

## 2020-02-22 RX ORDER — DEXTROSE, SODIUM CHLORIDE, SODIUM LACTATE, POTASSIUM CHLORIDE, AND CALCIUM CHLORIDE 5; .6; .31; .03; .02 G/100ML; G/100ML; G/100ML; G/100ML; G/100ML
1000 INJECTION, SOLUTION INTRAVENOUS
Status: COMPLETED | OUTPATIENT
Start: 2020-02-22 | End: 2020-02-22

## 2020-02-22 RX ORDER — SODIUM CHLORIDE 0.9 % (FLUSH) 0.9 %
3 SYRINGE (ML) INJECTION EVERY 6 HOURS PRN
Status: DISCONTINUED | OUTPATIENT
Start: 2020-02-22 | End: 2020-02-24 | Stop reason: HOSPADM

## 2020-02-22 RX ORDER — TALC
8 POWDER (GRAM) TOPICAL NIGHTLY PRN
Status: DISCONTINUED | OUTPATIENT
Start: 2020-02-22 | End: 2020-02-24 | Stop reason: HOSPADM

## 2020-02-22 RX ORDER — MORPHINE SULFATE 4 MG/ML
4 INJECTION, SOLUTION INTRAMUSCULAR; INTRAVENOUS
Status: COMPLETED | OUTPATIENT
Start: 2020-02-22 | End: 2020-02-22

## 2020-02-22 RX ORDER — PANTOPRAZOLE SODIUM 40 MG/1
40 TABLET, DELAYED RELEASE ORAL DAILY
Status: DISCONTINUED | OUTPATIENT
Start: 2020-02-23 | End: 2020-02-24 | Stop reason: HOSPADM

## 2020-02-22 RX ORDER — LEVOTHYROXINE SODIUM 25 UG/1
50 TABLET ORAL
Status: DISCONTINUED | OUTPATIENT
Start: 2020-02-23 | End: 2020-02-24 | Stop reason: HOSPADM

## 2020-02-22 RX ORDER — IBUPROFEN 200 MG
24 TABLET ORAL
Status: DISCONTINUED | OUTPATIENT
Start: 2020-02-22 | End: 2020-02-24 | Stop reason: HOSPADM

## 2020-02-22 RX ORDER — QUETIAPINE FUMARATE 100 MG/1
200 TABLET, FILM COATED ORAL NIGHTLY PRN
Status: DISCONTINUED | OUTPATIENT
Start: 2020-02-22 | End: 2020-02-24 | Stop reason: HOSPADM

## 2020-02-22 RX ORDER — MIRTAZAPINE 15 MG/1
30 TABLET, FILM COATED ORAL NIGHTLY
Status: DISCONTINUED | OUTPATIENT
Start: 2020-02-22 | End: 2020-02-24 | Stop reason: HOSPADM

## 2020-02-22 RX ORDER — HYDROMORPHONE HYDROCHLORIDE 1 MG/ML
0.5 INJECTION, SOLUTION INTRAMUSCULAR; INTRAVENOUS; SUBCUTANEOUS EVERY 6 HOURS PRN
Status: DISCONTINUED | OUTPATIENT
Start: 2020-02-22 | End: 2020-02-23

## 2020-02-22 RX ADMIN — HYDROMORPHONE HYDROCHLORIDE 0.5 MG: 1 INJECTION, SOLUTION INTRAMUSCULAR; INTRAVENOUS; SUBCUTANEOUS at 10:02

## 2020-02-22 RX ADMIN — HYDROMORPHONE HYDROCHLORIDE 0.5 MG: 1 INJECTION, SOLUTION INTRAMUSCULAR; INTRAVENOUS; SUBCUTANEOUS at 07:02

## 2020-02-22 RX ADMIN — METOPROLOL SUCCINATE 12.5 MG: 25 TABLET, EXTENDED RELEASE ORAL at 11:02

## 2020-02-22 RX ADMIN — ONDANSETRON HYDROCHLORIDE 4 MG: 2 SOLUTION INTRAMUSCULAR; INTRAVENOUS at 05:02

## 2020-02-22 RX ADMIN — QUETIAPINE 200 MG: 100 TABLET ORAL at 11:02

## 2020-02-22 RX ADMIN — MIRTAZAPINE 30 MG: 15 TABLET, FILM COATED ORAL at 11:02

## 2020-02-22 RX ADMIN — DEXTROSE, SODIUM CHLORIDE, SODIUM LACTATE, POTASSIUM CHLORIDE, AND CALCIUM CHLORIDE 1000 ML: 5; .6; .31; .03; .02 INJECTION, SOLUTION INTRAVENOUS at 07:02

## 2020-02-22 RX ADMIN — IOHEXOL 100 ML: 350 INJECTION, SOLUTION INTRAVENOUS at 06:02

## 2020-02-22 RX ADMIN — MORPHINE SULFATE 4 MG: 4 INJECTION INTRAVENOUS at 05:02

## 2020-02-22 RX ADMIN — SODIUM CHLORIDE, POTASSIUM CHLORIDE, SODIUM LACTATE AND CALCIUM CHLORIDE: 600; 310; 30; 20 INJECTION, SOLUTION INTRAVENOUS at 11:02

## 2020-02-22 NOTE — ED PROVIDER NOTES
"Encounter Date: 2/22/2020       History     Chief Complaint   Patient presents with    Abdominal Pain     SORE AROUND BELLY BUTTON X 5 DAYS AGO, "ITS POKING OUT"     39-year-old male who has a prior history of hypertension, depression, pancreatitis, schizophrenia, thyroid disease and bipolar, presents emergency room with complaints of periumbilical abdominal pain for couple days.  Patient does complain of some abdominal distension.  No nausea vomiting.  Bowel habits have been normal. Trouble urinating.  No other intra-abdominal surgeries.        Review of patient's allergies indicates:  No Known Allergies  Past Medical History:   Diagnosis Date    Anxiety     Anxiety     Bipolar affective disorder     Depression     Hypertension     Pancreatitis     Schizophrenia     Thyroid disease      No past surgical history on file.  Family History   Problem Relation Age of Onset    Diabetes Maternal Grandmother     Hypertension Maternal Grandfather     Cancer Paternal Grandfather      Social History     Tobacco Use    Smoking status: Current Some Day Smoker     Packs/day: 0.25     Years: 7.00     Pack years: 1.75    Smokeless tobacco: Never Used   Substance Use Topics    Alcohol use: Yes     Comment: occasionally    Drug use: Yes     Frequency: 7.0 times per week     Types: Marijuana     Review of Systems   Constitutional: Negative for activity change, appetite change, chills, diaphoresis and fever.   HENT: Negative for congestion.    Respiratory: Negative for cough and shortness of breath.    Cardiovascular: Negative for chest pain.   Gastrointestinal: Positive for abdominal pain. Negative for constipation, diarrhea, nausea and vomiting.   Genitourinary: Negative for flank pain, frequency and hematuria.   Skin: Negative for pallor and rash.   Neurological: Negative for headaches.   All other systems reviewed and are negative.      Physical Exam     Initial Vitals [02/22/20 1618]   BP Pulse Resp Temp SpO2   (!) " 168/97 101 18 99.6 °F (37.6 °C) 100 %      MAP       --         Physical Exam    Vitals reviewed.  Constitutional: He appears well-developed and well-nourished. He is not diaphoretic. No distress.   HENT:   Head: Normocephalic and atraumatic.   Right Ear: External ear normal.   Left Ear: External ear normal.   Nose: Nose normal.   Mouth/Throat: Oropharynx is clear and moist.   Eyes: Conjunctivae and EOM are normal. Pupils are equal, round, and reactive to light. Right eye exhibits no discharge. Left eye exhibits no discharge.   Neck: Normal range of motion. Neck supple. No tracheal deviation present. No JVD present.   Cardiovascular: Normal rate, regular rhythm, normal heart sounds and intact distal pulses. Exam reveals no gallop and no friction rub.    No murmur heard.  Pulmonary/Chest: Breath sounds normal. No respiratory distress. He has no wheezes. He has no rhonchi. He has no rales. He exhibits no tenderness.   Abdominal: Soft. Bowel sounds are normal. He exhibits no distension and no mass. There is tenderness in the periumbilical area. There is guarding. There is no rebound.   Musculoskeletal: Normal range of motion. He exhibits no edema or tenderness.   Lymphadenopathy:     He has no cervical adenopathy.   Neurological: He is alert and oriented to person, place, and time. He has normal strength and normal reflexes. No cranial nerve deficit or sensory deficit. GCS score is 15. GCS eye subscore is 4. GCS verbal subscore is 5. GCS motor subscore is 6.   Skin: Skin is warm and dry. Capillary refill takes less than 2 seconds. No rash noted. No erythema. No pallor.   Psychiatric: He has a normal mood and affect. His behavior is normal. Judgment and thought content normal.         ED Course   Procedures  Labs Reviewed   CBC W/ AUTO DIFFERENTIAL   BASIC METABOLIC PANEL          Imaging Results    None                       Attending Attestation:             Attending ED Notes:   This 39-year-old male who has a prior  psychiatric history as well as a history of pancreatitis, presented with periumbilical abdominal pain. His evaluation revealed some generalized tenderness but more in the periumbilical region.  There was no changes in skin color in that region.  The workup shows an elevated blood sugar of 138 but his lipase is elevated at 4 9.  A CT scan obtained showed findings consistent with acute pancreatitis.  Of note was further discussed with the patient as to whether not he drink alcohol and does admit that he has been drinking more heavily in the past but within the last several days the a mild that he has consumed was much less.  When he was drinking more heavily was of 5th a day.  He admits to yesterday and only had 2 alcoholic beverages.  Hospital Medicine is consulted for admission.  During ED course the patient was hydrated and provided analgesics.                        Clinical Impression:       ICD-10-CM ICD-9-CM   1. Pain of upper abdomen R10.10 789.09   2. Alcohol-induced acute pancreatitis, unspecified complication status K85.20 577.0                             Eder Hurtado Jr., MD  02/22/20 1921

## 2020-02-23 LAB
ALBUMIN SERPL BCP-MCNC: 3.4 G/DL (ref 3.5–5.2)
ALP SERPL-CCNC: 68 U/L (ref 55–135)
ALT SERPL W/O P-5'-P-CCNC: 49 U/L (ref 10–44)
ANION GAP SERPL CALC-SCNC: 11 MMOL/L (ref 8–16)
AST SERPL-CCNC: 26 U/L (ref 10–40)
BASOPHILS # BLD AUTO: 0.03 K/UL (ref 0–0.2)
BASOPHILS NFR BLD: 0.6 % (ref 0–1.9)
BILIRUB SERPL-MCNC: 0.5 MG/DL (ref 0.1–1)
BUN SERPL-MCNC: 9 MG/DL (ref 6–20)
CALCIUM SERPL-MCNC: 8.7 MG/DL (ref 8.7–10.5)
CHLORIDE SERPL-SCNC: 99 MMOL/L (ref 95–110)
CHOLEST SERPL-MCNC: 90 MG/DL (ref 120–199)
CHOLEST/HDLC SERPL: 2.6 {RATIO} (ref 2–5)
CO2 SERPL-SCNC: 29 MMOL/L (ref 23–29)
CREAT SERPL-MCNC: 1.1 MG/DL (ref 0.5–1.4)
DIFFERENTIAL METHOD: ABNORMAL
EOSINOPHIL # BLD AUTO: 0.2 K/UL (ref 0–0.5)
EOSINOPHIL NFR BLD: 3.6 % (ref 0–8)
ERYTHROCYTE [DISTWIDTH] IN BLOOD BY AUTOMATED COUNT: 17.8 % (ref 11.5–14.5)
EST. GFR  (AFRICAN AMERICAN): >60 ML/MIN/1.73 M^2
EST. GFR  (NON AFRICAN AMERICAN): >60 ML/MIN/1.73 M^2
ESTIMATED AVG GLUCOSE: 126 MG/DL (ref 68–131)
GLUCOSE SERPL-MCNC: 115 MG/DL (ref 70–110)
HBA1C MFR BLD HPLC: 6 % (ref 4.5–6.2)
HCT VFR BLD AUTO: 34.5 % (ref 40–54)
HDLC SERPL-MCNC: 34 MG/DL (ref 40–75)
HDLC SERPL: 37.8 % (ref 20–50)
HGB BLD-MCNC: 10.4 G/DL (ref 14–18)
IMM GRANULOCYTES # BLD AUTO: 0.02 K/UL (ref 0–0.04)
IMM GRANULOCYTES NFR BLD AUTO: 0.4 % (ref 0–0.5)
LDLC SERPL CALC-MCNC: 48 MG/DL (ref 63–159)
LYMPHOCYTES # BLD AUTO: 1.5 K/UL (ref 1–4.8)
LYMPHOCYTES NFR BLD: 28.4 % (ref 18–48)
MAGNESIUM SERPL-MCNC: 1.7 MG/DL (ref 1.6–2.6)
MCH RBC QN AUTO: 25.8 PG (ref 27–31)
MCHC RBC AUTO-ENTMCNC: 30.1 G/DL (ref 32–36)
MCV RBC AUTO: 86 FL (ref 82–98)
MONOCYTES # BLD AUTO: 0.6 K/UL (ref 0.3–1)
MONOCYTES NFR BLD: 11.3 % (ref 4–15)
NEUTROPHILS # BLD AUTO: 3 K/UL (ref 1.8–7.7)
NEUTROPHILS NFR BLD: 55.7 % (ref 38–73)
NONHDLC SERPL-MCNC: 56 MG/DL
NRBC BLD-RTO: 0 /100 WBC
PLATELET # BLD AUTO: 206 K/UL (ref 150–350)
PMV BLD AUTO: 11.4 FL (ref 9.2–12.9)
POTASSIUM SERPL-SCNC: 3.7 MMOL/L (ref 3.5–5.1)
PROT SERPL-MCNC: 6.1 G/DL (ref 6–8.4)
RBC # BLD AUTO: 4.03 M/UL (ref 4.6–6.2)
SODIUM SERPL-SCNC: 139 MMOL/L (ref 136–145)
TRIGL SERPL-MCNC: 40 MG/DL (ref 30–150)
WBC # BLD AUTO: 5.29 K/UL (ref 3.9–12.7)

## 2020-02-23 PROCEDURE — 63600175 PHARM REV CODE 636 W HCPCS: Performed by: INTERNAL MEDICINE

## 2020-02-23 PROCEDURE — 80053 COMPREHEN METABOLIC PANEL: CPT

## 2020-02-23 PROCEDURE — 25000003 PHARM REV CODE 250: Performed by: INTERNAL MEDICINE

## 2020-02-23 PROCEDURE — 83735 ASSAY OF MAGNESIUM: CPT

## 2020-02-23 PROCEDURE — 80061 LIPID PANEL: CPT

## 2020-02-23 PROCEDURE — 96372 THER/PROPH/DIAG INJ SC/IM: CPT | Mod: 59

## 2020-02-23 PROCEDURE — 85025 COMPLETE CBC W/AUTO DIFF WBC: CPT

## 2020-02-23 PROCEDURE — 96361 HYDRATE IV INFUSION ADD-ON: CPT

## 2020-02-23 PROCEDURE — 96376 TX/PRO/DX INJ SAME DRUG ADON: CPT

## 2020-02-23 PROCEDURE — 36415 COLL VENOUS BLD VENIPUNCTURE: CPT

## 2020-02-23 PROCEDURE — 83036 HEMOGLOBIN GLYCOSYLATED A1C: CPT

## 2020-02-23 PROCEDURE — G0378 HOSPITAL OBSERVATION PER HR: HCPCS

## 2020-02-23 PROCEDURE — 96375 TX/PRO/DX INJ NEW DRUG ADDON: CPT

## 2020-02-23 RX ORDER — CLONIDINE 0.1 MG/24H
1 PATCH, EXTENDED RELEASE TRANSDERMAL
Status: DISCONTINUED | OUTPATIENT
Start: 2020-02-23 | End: 2020-02-24 | Stop reason: HOSPADM

## 2020-02-23 RX ORDER — ENOXAPARIN SODIUM 100 MG/ML
40 INJECTION SUBCUTANEOUS EVERY 24 HOURS
Status: DISCONTINUED | OUTPATIENT
Start: 2020-02-23 | End: 2020-02-24 | Stop reason: HOSPADM

## 2020-02-23 RX ORDER — HYDRALAZINE HYDROCHLORIDE 20 MG/ML
10 INJECTION INTRAMUSCULAR; INTRAVENOUS EVERY 4 HOURS PRN
Status: DISCONTINUED | OUTPATIENT
Start: 2020-02-23 | End: 2020-02-24 | Stop reason: HOSPADM

## 2020-02-23 RX ORDER — HYDROMORPHONE HYDROCHLORIDE 1 MG/ML
1 INJECTION, SOLUTION INTRAMUSCULAR; INTRAVENOUS; SUBCUTANEOUS EVERY 6 HOURS PRN
Status: DISCONTINUED | OUTPATIENT
Start: 2020-02-23 | End: 2020-02-23

## 2020-02-23 RX ORDER — ONDANSETRON 2 MG/ML
4 INJECTION INTRAMUSCULAR; INTRAVENOUS EVERY 4 HOURS PRN
Status: DISCONTINUED | OUTPATIENT
Start: 2020-02-23 | End: 2020-02-24 | Stop reason: HOSPADM

## 2020-02-23 RX ORDER — HYDROCODONE BITARTRATE AND ACETAMINOPHEN 10; 325 MG/1; MG/1
1 TABLET ORAL EVERY 4 HOURS PRN
Status: DISCONTINUED | OUTPATIENT
Start: 2020-02-23 | End: 2020-02-24 | Stop reason: HOSPADM

## 2020-02-23 RX ORDER — LORAZEPAM 2 MG/ML
2 INJECTION INTRAMUSCULAR
Status: DISCONTINUED | OUTPATIENT
Start: 2020-02-23 | End: 2020-02-24 | Stop reason: HOSPADM

## 2020-02-23 RX ORDER — HYDROMORPHONE HYDROCHLORIDE 1 MG/ML
1 INJECTION, SOLUTION INTRAMUSCULAR; INTRAVENOUS; SUBCUTANEOUS EVERY 4 HOURS PRN
Status: DISCONTINUED | OUTPATIENT
Start: 2020-02-23 | End: 2020-02-24 | Stop reason: HOSPADM

## 2020-02-23 RX ORDER — LORAZEPAM 2 MG/ML
1 INJECTION INTRAMUSCULAR EVERY 6 HOURS
Status: DISCONTINUED | OUTPATIENT
Start: 2020-02-23 | End: 2020-02-24 | Stop reason: HOSPADM

## 2020-02-23 RX ADMIN — PANTOPRAZOLE SODIUM 40 MG: 40 TABLET, DELAYED RELEASE ORAL at 08:02

## 2020-02-23 RX ADMIN — CLONIDINE 1 PATCH: 0.1 PATCH TRANSDERMAL at 01:02

## 2020-02-23 RX ADMIN — SODIUM CHLORIDE, POTASSIUM CHLORIDE, SODIUM LACTATE AND CALCIUM CHLORIDE: 600; 310; 30; 20 INJECTION, SOLUTION INTRAVENOUS at 08:02

## 2020-02-23 RX ADMIN — LORAZEPAM 1 MG: 2 INJECTION INTRAMUSCULAR; INTRAVENOUS at 11:02

## 2020-02-23 RX ADMIN — HYDROCODONE BITARTRATE AND ACETAMINOPHEN 1 TABLET: 5; 325 TABLET ORAL at 07:02

## 2020-02-23 RX ADMIN — MIRTAZAPINE 30 MG: 15 TABLET, FILM COATED ORAL at 08:02

## 2020-02-23 RX ADMIN — THIAMINE HCL TAB 100 MG 100 MG: 100 TAB at 08:02

## 2020-02-23 RX ADMIN — HYDROCODONE BITARTRATE AND ACETAMINOPHEN 1 TABLET: 10; 325 TABLET ORAL at 04:02

## 2020-02-23 RX ADMIN — LORAZEPAM 1 MG: 2 INJECTION INTRAMUSCULAR; INTRAVENOUS at 06:02

## 2020-02-23 RX ADMIN — SODIUM CHLORIDE, POTASSIUM CHLORIDE, SODIUM LACTATE AND CALCIUM CHLORIDE: 600; 310; 30; 20 INJECTION, SOLUTION INTRAVENOUS at 06:02

## 2020-02-23 RX ADMIN — LORAZEPAM 1 MG: 2 INJECTION INTRAMUSCULAR; INTRAVENOUS at 01:02

## 2020-02-23 RX ADMIN — HYDRALAZINE HYDROCHLORIDE 10 MG: 20 INJECTION INTRAMUSCULAR; INTRAVENOUS at 11:02

## 2020-02-23 RX ADMIN — HYDROCODONE BITARTRATE AND ACETAMINOPHEN 1 TABLET: 5; 325 TABLET ORAL at 01:02

## 2020-02-23 RX ADMIN — QUETIAPINE 200 MG: 100 TABLET ORAL at 11:02

## 2020-02-23 RX ADMIN — HYDROMORPHONE HYDROCHLORIDE 1 MG: 1 INJECTION, SOLUTION INTRAMUSCULAR; INTRAVENOUS; SUBCUTANEOUS at 08:02

## 2020-02-23 RX ADMIN — SODIUM CHLORIDE, POTASSIUM CHLORIDE, SODIUM LACTATE AND CALCIUM CHLORIDE: 600; 310; 30; 20 INJECTION, SOLUTION INTRAVENOUS at 01:02

## 2020-02-23 RX ADMIN — SODIUM CHLORIDE, POTASSIUM CHLORIDE, SODIUM LACTATE AND CALCIUM CHLORIDE: 600; 310; 30; 20 INJECTION, SOLUTION INTRAVENOUS at 11:02

## 2020-02-23 RX ADMIN — ENOXAPARIN SODIUM 40 MG: 100 INJECTION SUBCUTANEOUS at 04:02

## 2020-02-23 RX ADMIN — LEVOTHYROXINE SODIUM 50 MCG: 25 TABLET ORAL at 07:02

## 2020-02-23 RX ADMIN — GABAPENTIN 300 MG: 300 CAPSULE ORAL at 08:02

## 2020-02-23 RX ADMIN — LISINOPRIL 40 MG: 20 TABLET ORAL at 08:02

## 2020-02-23 RX ADMIN — GABAPENTIN 300 MG: 300 CAPSULE ORAL at 04:02

## 2020-02-23 RX ADMIN — SERTRALINE HYDROCHLORIDE 50 MG: 50 TABLET ORAL at 08:02

## 2020-02-23 RX ADMIN — METOPROLOL SUCCINATE 12.5 MG: 25 TABLET, EXTENDED RELEASE ORAL at 08:02

## 2020-02-23 RX ADMIN — HYDROMORPHONE HYDROCHLORIDE 1 MG: 1 INJECTION, SOLUTION INTRAMUSCULAR; INTRAVENOUS; SUBCUTANEOUS at 03:02

## 2020-02-23 RX ADMIN — HYDROMORPHONE HYDROCHLORIDE 1 MG: 1 INJECTION, SOLUTION INTRAMUSCULAR; INTRAVENOUS; SUBCUTANEOUS at 10:02

## 2020-02-23 NOTE — PLAN OF CARE
Problem: Fall Injury Risk  Goal: Absence of Fall and Fall-Related Injury  2/23/2020 1657 by Brisa Muñoz RN  Outcome: Ongoing, Progressing  2/23/2020 1656 by Brisa Muñoz RN  Outcome: Ongoing, Progressing  2/23/2020 1656 by Brisa Muñoz RN  Outcome: Ongoing, Progressing     Problem: Adult Inpatient Plan of Care  Goal: Plan of Care Review  2/23/2020 1657 by Brisa Muñoz RN  Outcome: Ongoing, Progressing  2/23/2020 1656 by Brisa Muñoz RN  Outcome: Ongoing, Progressing  2/23/2020 1656 by Brisa Muñoz RN  Outcome: Ongoing, Progressing  Goal: Patient-Specific Goal (Individualization)  2/23/2020 1657 by Brisa Muñoz RN  Outcome: Ongoing, Progressing  2/23/2020 1656 by Brisa Muñoz RN  Outcome: Ongoing, Progressing  2/23/2020 1656 by Brisa Muñoz RN  Outcome: Ongoing, Progressing  Goal: Absence of Hospital-Acquired Illness or Injury  2/23/2020 1657 by Brisa Muñoz RN  Outcome: Ongoing, Progressing  2/23/2020 1656 by Brisa Muñoz RN  Outcome: Ongoing, Progressing  2/23/2020 1656 by Brisa Muñoz RN  Outcome: Ongoing, Progressing  Goal: Optimal Comfort and Wellbeing  2/23/2020 1657 by Brisa Muñoz RN  Outcome: Ongoing, Progressing  2/23/2020 1656 by Brisa Muñoz RN  Outcome: Ongoing, Progressing  2/23/2020 1656 by Brisa Muñoz RN  Outcome: Ongoing, Progressing  Goal: Readiness for Transition of Care  2/23/2020 1657 by Brisa Muñoz RN  Outcome: Ongoing, Progressing  2/23/2020 1656 by Brisa Muñoz RN  Outcome: Ongoing, Progressing  2/23/2020 1656 by Brisa Muñoz RN  Outcome: Ongoing, Progressing  Goal: Rounds/Family Conference  2/23/2020 1657 by Brisa Muñoz RN  Outcome: Ongoing, Progressing  2/23/2020 1656 by Brisa Muñoz RN  Outcome: Ongoing, Progressing  2/23/2020 1656 by Brisa Muñoz RN  Outcome: Ongoing, Progressing

## 2020-02-23 NOTE — H&P
"[facility] Medicine History & Physical Examination   Patient Name: Bradley Collado  MRN: [id number]  Patient Class: OP- Observation   Admission Date: 2/22/2020  4:20 PM  Length of Stay: 0  Attending Physician: Mariana Davies MD  Primary Care Provider: Arlen Belcher NP  Face-to-Face encounter date: 02/22/2020  Code Status: Full code  Chief Complaint: Abdominal Pain (SORE AROUND BELLY BUTTON X 5 DAYS AGO, "ITS POKING OUT")        Patient information was obtained from patient, past medical records and ER records.   HISTORY OF PRESENT ILLNESS:   Bradley Collado is a 39 y.o. Black or  male who  has a past medical history of Anxiety, Anxiety, Bipolar affective disorder, Depression, Hypertension, Pancreatitis, Schizophrenia, and Thyroid disease.. The patient presented to [facility] on 2/22/2020 with a primary complaint of abdominal pain,    History was obtained from the patient  and ER physician Sign-out. Patient has history of chronic pancreatitis due to alcoholism and now comes in 5 day history of abdominal pain mostly epigastic that started after he had higher amounts of alcohol than his usual consumption amount week prior to the presenting complaint. He has history of schizophrenia and was out of medications so was drinking heavily. This week he only had two bears. He has no relieving factors. No radiation reported. Associated with nausea and vomiting. But no diarrhea.     In the emergency room, patient CBC was unremarkable. CMP showed increased creatinine. Lipase is 409.    Decision to admit was taken and patient was informed about the plan of care.   REVIEW OF SYSTEMS:   10 Point Review of System was performed and was found to be negative except for that mentioned already in the HPI above.     PAST MEDICAL HISTORY:     Past Medical History:   Diagnosis Date    Anxiety     Anxiety     Bipolar affective disorder     Depression     Hypertension     " Pancreatitis     Schizophrenia     Thyroid disease        PAST SURGICAL HISTORY:   No pertinent surgical history    ALLERGIES:   Patient has no known allergies.    FAMILY HISTORY:     Family History   Problem Relation Age of Onset    Diabetes Maternal Grandmother     Hypertension Maternal Grandfather     Cancer Paternal Grandfather        SOCIAL HISTORY:     Social History     Tobacco Use    Smoking status: Current Some Day Smoker     Packs/day: 0.25     Years: 7.00     Pack years: 1.75    Smokeless tobacco: Never Used   Substance Use Topics    Alcohol use: Yes     Comment: occasionally        Social History     Substance and Sexual Activity   Sexual Activity Yes    Partners: Female        HOME MEDICATIONS:     Prior to Admission medications    Medication Sig Start Date End Date Taking? Authorizing Provider   gabapentin (NEURONTIN) 300 MG capsule Take 1 capsule (300 mg total) by mouth 3 (three) times daily. 2/20/20 4/20/20 Yes JING Foster   levothyroxine (SYNTHROID) 50 MCG tablet Take 1 tablet (50 mcg total) by mouth before breakfast. 2/21/20 4/21/20 Yes JING Foster   lisinopril (PRINIVIL,ZESTRIL) 40 MG tablet Take 1 tablet (40 mg total) by mouth once daily. 2/21/20 8/19/20 Yes JING Foster   metoprolol succinate (TOPROL-XL) 12.5 MG 24 hr split tablet Take 12.5 mg by mouth nightly.   Yes Historical Provider, MD   mirtazapine (REMERON) 30 MG tablet Take 1 tablet (30 mg total) by mouth nightly. 2/20/20 4/20/20 Yes JING Foster   pantoprazole (PROTONIX) 40 MG tablet Take 40 mg by mouth once daily.   Yes Historical Provider, MD   QUEtiapine (SEROQUEL) 200 MG Tab Take 1 tablet (200 mg total) by mouth nightly as needed (insomnia). 2/20/20 2/19/21 Yes JING Foster   sertraline (ZOLOFT) 50 MG tablet Take 1 tablet (50 mg total) by mouth once daily. 2/21/20 4/21/20 Yes JING Foster   thiamine 100 MG tablet Take 1 tablet (100 mg total) by mouth once daily. 2/21/20  Yes JING Foster  "  ARIPiprazole (ABILIFY) 10 MG Tab Take 1 tablet (10 mg total) by mouth once daily. 2/21/20 4/21/20  JING Foster         PHYSICAL EXAM:   BP (!) 147/97   Pulse 73   Temp 99.6 °F (37.6 °C) (Oral)   Resp 18   Ht 5' 7" (1.702 m)   Wt 77.1 kg (170 lb)   SpO2 96%   BMI 26.63 kg/m²   Vitals Reviewed  General appearance: Black or  male in no apparent distress.  Skin: No Rash.   Neuro: Motor and sensory exams grossly intact. Good tone. Power in all 4 extremities 5/5.   HENT: Atraumatic head. Moist mucous membranes of oral cavity.  Eyes: Normal extraocular movements.   Neck: Supple. No evidence of lymphadenopathy. No thyroidomegaly.  Lungs: Clear to auscultation bilaterally. No wheezing present.   Heart: Regular rate and rhythm. S1 and S2 present with no murmurs/gallop/rub. No pedal edema. No JVD present.   Abdomen: Soft, non-distended, tender in epigastric region. No rebound tenderness/guarding. No masses or organomegaly. Bowel sounds are normal. Bladder is not palpable.   Extremities: No cyanosis, clubbing.  Psych/mental status: Alert and oriented. Cooperative. Responds appropriately to questions.   EMERGENCY DEPARTMENT LABS AND IMAGING:     Labs Reviewed   CBC W/ AUTO DIFFERENTIAL - Abnormal; Notable for the following components:       Result Value    RBC 4.55 (*)     Hemoglobin 11.8 (*)     Hematocrit 38.3 (*)     Mean Corpuscular Hemoglobin 25.9 (*)     Mean Corpuscular Hemoglobin Conc 30.8 (*)     RDW 18.2 (*)     Gran% 74.2 (*)     Lymph% 13.0 (*)     All other components within normal limits   BASIC METABOLIC PANEL - Abnormal; Notable for the following components:    Glucose 138 (*)     All other components within normal limits    Narrative:     Recoll. [id number] by ROSHNI at 02/22/2020 17:27, reason: Specimen   hemolyzed  Notified N[facility] ER   LIPASE - Abnormal; Notable for the following components:    Lipase 409 (*)     All other components within normal limits    Narrative:     Recoll. " [id number] by [email or web address] at 02/22/2020 17:27, reason: Specimen   hemolyzed  Notified [facility] ER       CT Abdomen Pelvis With Contrast   Final Result      1. CT findings compatible with acute pancreatitis as discussed.   2. Multiple pancreatic calcifications, increased when compared to 2018, and consistent with underlying chronic calcific pancreatitis.         Electronically signed by: Jesse Veliz MD   Date:    02/22/2020   Time:    18:43          ASSESSMENT & PLAN:   Bradley Molina Cousin is a 39 y.o. male admitted for    1. Acute on chronic pancreatitis: IV fluids. Advance diet as tolerated. Likely due to alcoholism. Mild pancreatic duct dilation. No stones seen. No CBD dilation noted. Counseled on alcoholism. If pain free and tolerating diet, he should be able to go home.      2. Polysubstance abuse: Urine drug screen positive. Needs to be discussed    DVT Prophylaxis: will be placed on SCDs for DVT prophylaxis and will be advised to be as mobile as possible and sit in a chair as tolerated.   ________________________________________________________________________________    Discharge Planning and Disposition: No mobility needs. Ambulating well.   Face-to-Face encounter date: 02/22/2020  Encounter included review of the medical records, interviewing and examining the patient face-to-face, discussion with family and other health care providers including emergency medicine physician, admission orders, interpreting lab/test results and formulating a plan of care.   Medical Decision Making during this encounter was  [_] Low Complexity  [x] Moderate Complexity  [] High Complexity  _________________________________________________________________________________    INPATIENT LIST OF MEDICATIONS     Current Facility-Administered Medications:     HYDROmorphone injection 0.5 mg, 0.5 mg, Intravenous, Q6H PRN, Mariana Davies MD    lactated ringers infusion, , Intravenous, Continuous, Mariana Davies MD    Current  Outpatient Medications:     gabapentin (NEURONTIN) 300 MG capsule, Take 1 capsule (300 mg total) by mouth 3 (three) times daily., Disp: 90 capsule, Rfl: 1    levothyroxine (SYNTHROID) 50 MCG tablet, Take 1 tablet (50 mcg total) by mouth before breakfast., Disp: 30 tablet, Rfl: 1    lisinopril (PRINIVIL,ZESTRIL) 40 MG tablet, Take 1 tablet (40 mg total) by mouth once daily., Disp: 90 tablet, Rfl: 1    metoprolol succinate (TOPROL-XL) 12.5 MG 24 hr split tablet, Take 12.5 mg by mouth nightly., Disp: , Rfl:     mirtazapine (REMERON) 30 MG tablet, Take 1 tablet (30 mg total) by mouth nightly., Disp: 30 tablet, Rfl: 1    pantoprazole (PROTONIX) 40 MG tablet, Take 40 mg by mouth once daily., Disp: , Rfl:     QUEtiapine (SEROQUEL) 200 MG Tab, Take 1 tablet (200 mg total) by mouth nightly as needed (insomnia)., Disp: 30 tablet, Rfl: 1    sertraline (ZOLOFT) 50 MG tablet, Take 1 tablet (50 mg total) by mouth once daily., Disp: 30 tablet, Rfl: 1    thiamine 100 MG tablet, Take 1 tablet (100 mg total) by mouth once daily., Disp: 30 tablet, Rfl: 1    ARIPiprazole (ABILIFY) 10 MG Tab, Take 1 tablet (10 mg total) by mouth once daily., Disp: 30 tablet, Rfl: 1      Scheduled Meds:  Continuous Infusions:   lactated ringers       PRN Meds:.HYDROmorphone      Mariana Davies  [facility] Hospitalist  02/22/2020

## 2020-02-23 NOTE — PROGRESS NOTES
"[facility] Medicine   Progress Note  Patient Name: Bradley Collado  MRN: [id number]  Patient Class: OP- Observation   Admission Date: 2/22/2020  4:20 PM  Length of Stay: 0  Attending Physician: Mariana Davies MD  Primary Care Provider: Arlen Belcher NP  Face-to-Face encounter date: 02/23/2020  Code Status: Full code  Chief Complaint: Abdominal Pain (SORE AROUND BELLY BUTTON X 5 DAYS AGO, "ITS POKING OUT")        Patient information was obtained from patient, past medical records and ER records.   HISTORY OF PRESENT ILLNESS:   Bradley Collado is a 39 y.o. Black or  male who  has a past medical history of Anxiety, Anxiety, Bipolar affective disorder, Depression, Hypertension, Pancreatitis, Schizophrenia, and Thyroid disease.. The patient presented to [facility] on 2/22/2020 with a primary complaint of abdominal pain,  History was obtained from the patient  and ER physician Sign-out. Patient has history of chronic pancreatitis due to alcoholism and now comes in 5 day history of abdominal pain mostly epigastic that started after he had higher amounts of alcohol than his usual consumption amount week prior to the presenting complaint. He has history of schizophrenia and was out of medications so was drinking heavily. This week he only had two bears. He has no relieving factors. No radiation reported. Associated with nausea and vomiting. But no diarrhea.   In the emergency room, patient CBC was unremarkable. CMP showed increased creatinine. Lipase is 409.  Decision to admit was taken and patient was informed about the plan of care.       Interval history:   Today the patient reports some for persistent nausea, occurring intermittently, mild to moderate intensity, slowly improving from admission.  Persistent abdominal pain occurring intermittently, controlled with medications.  He feels like he is slowly improving.  He wants to advance diet.  No fever or " chills.  No chest pain, bleeding, or diarrhea.  He reports last drink was Friday and before that 1 week previous        PHYSICAL EXAM:     Vitals Reviewed  General appearance: Black or  male in no apparent distress.  Skin: No Rash.   Neuro: Motor and sensory exams grossly intact. Good tone. Power in all 4 extremities 5/5.   HENT: Atraumatic head. Moist mucous membranes of oral cavity.  Eyes: Normal extraocular movements.   Neck: Supple. No evidence of lymphadenopathy. No thyroidomegaly.  Lungs: Clear to auscultation bilaterally. No wheezing present.   Heart: Regular rate and rhythm. S1 and S2 present with no murmurs/gallop/rub. No pedal edema. No JVD present.   Abdomen: Soft, non-distended, tender in epigastric region. No rebound tenderness/guarding. No masses or organomegaly. Bowel sounds are normal. Bladder is not palpable.   Extremities: No cyanosis, clubbing.  Psych/mental status: Alert and oriented. Cooperative. Responds appropriately to questions.       LABS AND IMAGING:     Hemoglobin 10  Hematocrit 34    ASSESSMENT & PLAN:   Bradley Molina Cousin is a 39 y.o. male admitted for    1. Acute on chronic pancreatitis: IV fluids. Advance diet as tolerated. Likely due to alcoholism. Mild pancreatic duct dilation. No stones seen. No CBD dilation noted. Counseled on alcoholism. If pain free and tolerating diet, he should be able to go home.      2. Polysubstance abuse: Urine drug screen positive. Needs to be discussed    Additional diagnoses:    Date    Anxiety     Anxiety     Bipolar affective disorder     Depression     Hypertension     Pancreatitis     Schizophrenia     Thyroid disease        DVT Prophylaxis: will be placed on SCDs for DVT prophylaxis and will be advised to be as mobile as possible and sit in a chair as tolerated.     Plan update today:  Continue care  Will increase frequency of p.r.n. pain medication to q.4 hours  Continue IV fluids.  Serial labs  Repeat a.m. lipase  Low-dose  scheduled Ativan to prevent alcohol withdrawal   Start clonidine patch.  IV blood pressure medicine as needed.  Antiemetics and pain control as needed  Advance diet as tolerated  Daily thiamine  VTE prophylaxis patient is ambulatory  Disposition possible discharge next 24 hr  ________________________________________________________________________________    Discharge Planning and Disposition: No mobility needs. Ambulating well.   Face-to-Face encounter date: 02/23/2020  Encounter included review of the medical records, interviewing and examining the patient face-to-face, discussion with family and other health care providers including emergency medicine physician, admission orders, interpreting lab/test results and formulating a plan of care.   Medical Decision Making during this encounter was  [_] Low Complexity  [x] Moderate Complexity  [] High Complexity  _________________________________________________________________________________      Joon BARRAZA Hospitalist  02/23/2020

## 2020-02-23 NOTE — PLAN OF CARE
Problem: Fall Injury Risk  Goal: Absence of Fall and Fall-Related Injury  2/23/2020 1656 by Brisa Muñoz RN  Outcome: Ongoing, Progressing  2/23/2020 1656 by Brisa Muñoz RN  Outcome: Ongoing, Progressing     Problem: Adult Inpatient Plan of Care  Goal: Plan of Care Review  2/23/2020 1656 by Brisa Muñoz RN  Outcome: Ongoing, Progressing  2/23/2020 1656 by Brisa Muñoz RN  Outcome: Ongoing, Progressing  Goal: Patient-Specific Goal (Individualization)  2/23/2020 1656 by Brisa Muñoz RN  Outcome: Ongoing, Progressing  2/23/2020 1656 by Brisa Muñoz RN  Outcome: Ongoing, Progressing  Goal: Absence of Hospital-Acquired Illness or Injury  2/23/2020 1656 by Brisa Muñoz RN  Outcome: Ongoing, Progressing  2/23/2020 1656 by Brisa Muñoz RN  Outcome: Ongoing, Progressing  Goal: Optimal Comfort and Wellbeing  2/23/2020 1656 by Brisa Muñoz RN  Outcome: Ongoing, Progressing  2/23/2020 1656 by Brisa Muñoz RN  Outcome: Ongoing, Progressing  Goal: Readiness for Transition of Care  2/23/2020 1656 by Brisa Muñoz RN  Outcome: Ongoing, Progressing  2/23/2020 1656 by Brisa Muñoz RN  Outcome: Ongoing, Progressing  Goal: Rounds/Family Conference  2/23/2020 1656 by Brisa Muñoz RN  Outcome: Ongoing, Progressing  2/23/2020 1656 by Brisa Muñoz RN  Outcome: Ongoing, Progressing

## 2020-02-24 VITALS
OXYGEN SATURATION: 100 % | BODY MASS INDEX: 27.2 KG/M2 | HEIGHT: 67 IN | TEMPERATURE: 98 F | DIASTOLIC BLOOD PRESSURE: 103 MMHG | HEART RATE: 93 BPM | RESPIRATION RATE: 18 BRPM | SYSTOLIC BLOOD PRESSURE: 156 MMHG | WEIGHT: 173.31 LBS

## 2020-02-24 PROBLEM — K85.90 PANCREATITIS: Status: ACTIVE | Noted: 2020-02-24

## 2020-02-24 LAB
ALBUMIN SERPL BCP-MCNC: 3.8 G/DL (ref 3.5–5.2)
ALP SERPL-CCNC: 76 U/L (ref 55–135)
ALT SERPL W/O P-5'-P-CCNC: 55 U/L (ref 10–44)
ANION GAP SERPL CALC-SCNC: 9 MMOL/L (ref 8–16)
AST SERPL-CCNC: 39 U/L (ref 10–40)
BILIRUB SERPL-MCNC: 0.5 MG/DL (ref 0.1–1)
BUN SERPL-MCNC: <5 MG/DL (ref 6–20)
CALCIUM SERPL-MCNC: 9.4 MG/DL (ref 8.7–10.5)
CHLORIDE SERPL-SCNC: 101 MMOL/L (ref 95–110)
CO2 SERPL-SCNC: 31 MMOL/L (ref 23–29)
CREAT SERPL-MCNC: 1 MG/DL (ref 0.5–1.4)
ERYTHROCYTE [DISTWIDTH] IN BLOOD BY AUTOMATED COUNT: 17.6 % (ref 11.5–14.5)
EST. GFR  (AFRICAN AMERICAN): >60 ML/MIN/1.73 M^2
EST. GFR  (NON AFRICAN AMERICAN): >60 ML/MIN/1.73 M^2
GLUCOSE SERPL-MCNC: 95 MG/DL (ref 70–110)
HCT VFR BLD AUTO: 37.5 % (ref 40–54)
HGB BLD-MCNC: 11.3 G/DL (ref 14–18)
LIPASE SERPL-CCNC: 266 U/L (ref 4–60)
MAGNESIUM SERPL-MCNC: 2 MG/DL (ref 1.6–2.6)
MCH RBC QN AUTO: 25.6 PG (ref 27–31)
MCHC RBC AUTO-ENTMCNC: 30.1 G/DL (ref 32–36)
MCV RBC AUTO: 85 FL (ref 82–98)
PLATELET # BLD AUTO: 229 K/UL (ref 150–350)
PMV BLD AUTO: 11 FL (ref 9.2–12.9)
POTASSIUM SERPL-SCNC: 3.4 MMOL/L (ref 3.5–5.1)
PROT SERPL-MCNC: 6.9 G/DL (ref 6–8.4)
RBC # BLD AUTO: 4.41 M/UL (ref 4.6–6.2)
SODIUM SERPL-SCNC: 141 MMOL/L (ref 136–145)
WBC # BLD AUTO: 4.68 K/UL (ref 3.9–12.7)

## 2020-02-24 PROCEDURE — 96372 THER/PROPH/DIAG INJ SC/IM: CPT | Mod: 59

## 2020-02-24 PROCEDURE — 63600175 PHARM REV CODE 636 W HCPCS: Performed by: INTERNAL MEDICINE

## 2020-02-24 PROCEDURE — 83735 ASSAY OF MAGNESIUM: CPT

## 2020-02-24 PROCEDURE — 85027 COMPLETE CBC AUTOMATED: CPT

## 2020-02-24 PROCEDURE — 36415 COLL VENOUS BLD VENIPUNCTURE: CPT

## 2020-02-24 PROCEDURE — 80053 COMPREHEN METABOLIC PANEL: CPT

## 2020-02-24 PROCEDURE — 12000002 HC ACUTE/MED SURGE SEMI-PRIVATE ROOM

## 2020-02-24 PROCEDURE — 25000003 PHARM REV CODE 250: Performed by: INTERNAL MEDICINE

## 2020-02-24 PROCEDURE — 96376 TX/PRO/DX INJ SAME DRUG ADON: CPT

## 2020-02-24 PROCEDURE — 96361 HYDRATE IV INFUSION ADD-ON: CPT

## 2020-02-24 PROCEDURE — G0378 HOSPITAL OBSERVATION PER HR: HCPCS

## 2020-02-24 PROCEDURE — 83690 ASSAY OF LIPASE: CPT

## 2020-02-24 RX ORDER — ONDANSETRON 4 MG/1
4 TABLET, FILM COATED ORAL EVERY 6 HOURS PRN
Status: ON HOLD
Start: 2020-02-24 | End: 2020-03-10 | Stop reason: HOSPADM

## 2020-02-24 RX ORDER — CLONIDINE 0.1 MG/24H
1 PATCH, EXTENDED RELEASE TRANSDERMAL
Qty: 4 PATCH | Refills: 11 | Status: ON HOLD | OUTPATIENT
Start: 2020-02-24 | End: 2020-05-05

## 2020-02-24 RX ORDER — METOPROLOL SUCCINATE 25 MG/1
25 TABLET, EXTENDED RELEASE ORAL NIGHTLY
Qty: 30 TABLET | Refills: 0 | Status: SHIPPED | OUTPATIENT
Start: 2020-02-24 | End: 2020-03-25

## 2020-02-24 RX ORDER — HYDROCODONE BITARTRATE AND ACETAMINOPHEN 10; 325 MG/1; MG/1
1 TABLET ORAL EVERY 6 HOURS PRN
Refills: 0 | Status: ON HOLD
Start: 2020-02-24 | End: 2020-02-29 | Stop reason: SDUPTHER

## 2020-02-24 RX ADMIN — HYDROCODONE BITARTRATE AND ACETAMINOPHEN 1 TABLET: 10; 325 TABLET ORAL at 01:02

## 2020-02-24 RX ADMIN — ENOXAPARIN SODIUM 40 MG: 100 INJECTION SUBCUTANEOUS at 04:02

## 2020-02-24 RX ADMIN — LORAZEPAM 1 MG: 2 INJECTION INTRAMUSCULAR; INTRAVENOUS at 11:02

## 2020-02-24 RX ADMIN — LORAZEPAM 1 MG: 2 INJECTION INTRAMUSCULAR; INTRAVENOUS at 05:02

## 2020-02-24 RX ADMIN — LEVOTHYROXINE SODIUM 50 MCG: 25 TABLET ORAL at 05:02

## 2020-02-24 RX ADMIN — HYDROCODONE BITARTRATE AND ACETAMINOPHEN 1 TABLET: 10; 325 TABLET ORAL at 11:02

## 2020-02-24 RX ADMIN — PANTOPRAZOLE SODIUM 40 MG: 40 TABLET, DELAYED RELEASE ORAL at 08:02

## 2020-02-24 RX ADMIN — THIAMINE HCL TAB 100 MG 100 MG: 100 TAB at 08:02

## 2020-02-24 RX ADMIN — SODIUM CHLORIDE, POTASSIUM CHLORIDE, SODIUM LACTATE AND CALCIUM CHLORIDE: 600; 310; 30; 20 INJECTION, SOLUTION INTRAVENOUS at 03:02

## 2020-02-24 RX ADMIN — GABAPENTIN 300 MG: 300 CAPSULE ORAL at 03:02

## 2020-02-24 RX ADMIN — SERTRALINE HYDROCHLORIDE 50 MG: 50 TABLET ORAL at 08:02

## 2020-02-24 RX ADMIN — SODIUM CHLORIDE, POTASSIUM CHLORIDE, SODIUM LACTATE AND CALCIUM CHLORIDE: 600; 310; 30; 20 INJECTION, SOLUTION INTRAVENOUS at 10:02

## 2020-02-24 RX ADMIN — GABAPENTIN 300 MG: 300 CAPSULE ORAL at 08:02

## 2020-02-24 RX ADMIN — HYDROMORPHONE HYDROCHLORIDE 1 MG: 1 INJECTION, SOLUTION INTRAMUSCULAR; INTRAVENOUS; SUBCUTANEOUS at 04:02

## 2020-02-24 RX ADMIN — LISINOPRIL 40 MG: 20 TABLET ORAL at 08:02

## 2020-02-24 RX ADMIN — HYDROCODONE BITARTRATE AND ACETAMINOPHEN 1 TABLET: 10; 325 TABLET ORAL at 04:02

## 2020-02-26 ENCOUNTER — HOSPITAL ENCOUNTER (INPATIENT)
Facility: HOSPITAL | Age: 40
LOS: 3 days | Discharge: HOME | DRG: 440 | End: 2020-02-29
Attending: EMERGENCY MEDICINE | Admitting: INTERNAL MEDICINE
Payer: MEDICAID

## 2020-02-26 DIAGNOSIS — R07.9 CHEST PAIN: ICD-10-CM

## 2020-02-26 DIAGNOSIS — R10.9 ABDOMINAL PAIN: ICD-10-CM

## 2020-02-26 DIAGNOSIS — K85.90 ACUTE PANCREATITIS: ICD-10-CM

## 2020-02-26 PROBLEM — K86.1 ACUTE ON CHRONIC PANCREATITIS: Status: ACTIVE | Noted: 2020-02-26

## 2020-02-26 PROBLEM — R50.9 FEVER: Status: ACTIVE | Noted: 2020-02-26

## 2020-02-26 LAB
ALBUMIN SERPL BCP-MCNC: 3.7 G/DL (ref 3.5–5.2)
ALP SERPL-CCNC: 79 U/L (ref 55–135)
ALT SERPL W/O P-5'-P-CCNC: 53 U/L (ref 10–44)
AMPHET+METHAMPHET UR QL: NEGATIVE
ANION GAP SERPL CALC-SCNC: 8 MMOL/L (ref 8–16)
APTT PPP: 33.3 SEC (ref 23.6–33.3)
AST SERPL-CCNC: 36 U/L (ref 10–40)
BACTERIA #/AREA URNS HPF: NEGATIVE /HPF
BACTERIA #/AREA URNS HPF: NEGATIVE /HPF
BARBITURATES UR QL SCN>200 NG/ML: NEGATIVE
BASOPHILS # BLD AUTO: 0.03 K/UL (ref 0–0.2)
BASOPHILS NFR BLD: 0.3 % (ref 0–1.9)
BENZODIAZ UR QL SCN>200 NG/ML: NORMAL
BILIRUB SERPL-MCNC: 0.5 MG/DL (ref 0.1–1)
BILIRUB UR QL STRIP: NEGATIVE
BILIRUB UR QL STRIP: NEGATIVE
BNP SERPL-MCNC: 28 PG/ML (ref 0–99)
BUN SERPL-MCNC: 7 MG/DL (ref 6–20)
BZE UR QL SCN: NEGATIVE
CALCIUM SERPL-MCNC: 9.2 MG/DL (ref 8.7–10.5)
CANNABINOIDS UR QL SCN: NORMAL
CHLORIDE SERPL-SCNC: 102 MMOL/L (ref 95–110)
CLARITY UR: CLEAR
CLARITY UR: CLEAR
CO2 SERPL-SCNC: 26 MMOL/L (ref 23–29)
COLOR UR: YELLOW
COLOR UR: YELLOW
CREAT SERPL-MCNC: 1.2 MG/DL (ref 0.5–1.4)
CREAT UR-MCNC: 47 MG/DL (ref 23–375)
DIFFERENTIAL METHOD: ABNORMAL
EOSINOPHIL # BLD AUTO: 0.2 K/UL (ref 0–0.5)
EOSINOPHIL NFR BLD: 1.5 % (ref 0–8)
ERYTHROCYTE [DISTWIDTH] IN BLOOD BY AUTOMATED COUNT: 17.6 % (ref 11.5–14.5)
EST. GFR  (AFRICAN AMERICAN): >60 ML/MIN/1.73 M^2
EST. GFR  (NON AFRICAN AMERICAN): >60 ML/MIN/1.73 M^2
GLUCOSE SERPL-MCNC: 268 MG/DL (ref 70–110)
GLUCOSE UR QL STRIP: ABNORMAL
GLUCOSE UR QL STRIP: ABNORMAL
HCT VFR BLD AUTO: 36.9 % (ref 40–54)
HGB BLD-MCNC: 11.3 G/DL (ref 14–18)
HGB UR QL STRIP: NEGATIVE
HGB UR QL STRIP: NEGATIVE
HYALINE CASTS #/AREA URNS LPF: 0 /LPF
HYALINE CASTS #/AREA URNS LPF: 0 /LPF
IMM GRANULOCYTES # BLD AUTO: 0.04 K/UL (ref 0–0.04)
IMM GRANULOCYTES NFR BLD AUTO: 0.4 % (ref 0–0.5)
INFLUENZA A, MOLECULAR: NEGATIVE
INFLUENZA B, MOLECULAR: NEGATIVE
INR PPP: 1.2
KETONES UR QL STRIP: NEGATIVE
KETONES UR QL STRIP: NEGATIVE
LACTATE SERPL-SCNC: 1.3 MMOL/L (ref 0.5–1.9)
LEUKOCYTE ESTERASE UR QL STRIP: NEGATIVE
LEUKOCYTE ESTERASE UR QL STRIP: NEGATIVE
LIPASE SERPL-CCNC: 117 U/L (ref 4–60)
LYMPHOCYTES # BLD AUTO: 0.9 K/UL (ref 1–4.8)
LYMPHOCYTES NFR BLD: 8.5 % (ref 18–48)
MAGNESIUM SERPL-MCNC: 2 MG/DL (ref 1.6–2.6)
MCH RBC QN AUTO: 25.7 PG (ref 27–31)
MCHC RBC AUTO-ENTMCNC: 30.6 G/DL (ref 32–36)
MCV RBC AUTO: 84 FL (ref 82–98)
MICROSCOPIC COMMENT: ABNORMAL
MICROSCOPIC COMMENT: ABNORMAL
MONOCYTES # BLD AUTO: 0.7 K/UL (ref 0.3–1)
MONOCYTES NFR BLD: 6.3 % (ref 4–15)
NEUTROPHILS # BLD AUTO: 8.9 K/UL (ref 1.8–7.7)
NEUTROPHILS NFR BLD: 83 % (ref 38–73)
NITRITE UR QL STRIP: NEGATIVE
NITRITE UR QL STRIP: NEGATIVE
NRBC BLD-RTO: 0 /100 WBC
OPIATES UR QL SCN: NORMAL
PCP UR QL SCN>25 NG/ML: NEGATIVE
PH UR STRIP: 8 [PH] (ref 5–8)
PH UR STRIP: >8 [PH] (ref 5–8)
PLATELET # BLD AUTO: 312 K/UL (ref 150–350)
PMV BLD AUTO: 10.9 FL (ref 9.2–12.9)
POTASSIUM SERPL-SCNC: 4.4 MMOL/L (ref 3.5–5.1)
PROT SERPL-MCNC: 6.8 G/DL (ref 6–8.4)
PROT UR QL STRIP: NEGATIVE
PROT UR QL STRIP: NEGATIVE
PROTHROMBIN TIME: 14.3 SEC (ref 10.6–14.8)
RBC # BLD AUTO: 4.39 M/UL (ref 4.6–6.2)
RBC #/AREA URNS HPF: 0 /HPF (ref 0–4)
RBC #/AREA URNS HPF: 0 /HPF (ref 0–4)
SODIUM SERPL-SCNC: 136 MMOL/L (ref 136–145)
SP GR UR STRIP: 1.01 (ref 1–1.03)
SP GR UR STRIP: 1.02 (ref 1–1.03)
SPECIMEN SOURCE: NORMAL
SQUAMOUS #/AREA URNS HPF: 0 /HPF
SQUAMOUS #/AREA URNS HPF: 0 /HPF
TOXICOLOGY INFORMATION: NORMAL
TROPONIN I SERPL DL<=0.01 NG/ML-MCNC: <0.03 NG/ML
TSH SERPL DL<=0.005 MIU/L-ACNC: 0.6 UIU/ML (ref 0.34–5.6)
URN SPEC COLLECT METH UR: ABNORMAL
URN SPEC COLLECT METH UR: ABNORMAL
UROBILINOGEN UR STRIP-ACNC: NEGATIVE EU/DL
UROBILINOGEN UR STRIP-ACNC: NEGATIVE EU/DL
WBC # BLD AUTO: 10.72 K/UL (ref 3.9–12.7)
WBC #/AREA URNS HPF: 0 /HPF (ref 0–5)
WBC #/AREA URNS HPF: 0 /HPF (ref 0–5)
YEAST URNS QL MICRO: ABNORMAL
YEAST URNS QL MICRO: ABNORMAL

## 2020-02-26 PROCEDURE — 85025 COMPLETE CBC W/AUTO DIFF WBC: CPT

## 2020-02-26 PROCEDURE — 80053 COMPREHEN METABOLIC PANEL: CPT

## 2020-02-26 PROCEDURE — 63600175 PHARM REV CODE 636 W HCPCS: Performed by: EMERGENCY MEDICINE

## 2020-02-26 PROCEDURE — 25000003 PHARM REV CODE 250: Performed by: EMERGENCY MEDICINE

## 2020-02-26 PROCEDURE — 81001 URINALYSIS AUTO W/SCOPE: CPT

## 2020-02-26 PROCEDURE — 87040 BLOOD CULTURE FOR BACTERIA: CPT

## 2020-02-26 PROCEDURE — 93005 ELECTROCARDIOGRAM TRACING: CPT

## 2020-02-26 PROCEDURE — 83735 ASSAY OF MAGNESIUM: CPT

## 2020-02-26 PROCEDURE — 36415 COLL VENOUS BLD VENIPUNCTURE: CPT

## 2020-02-26 PROCEDURE — 85730 THROMBOPLASTIN TIME PARTIAL: CPT

## 2020-02-26 PROCEDURE — 84443 ASSAY THYROID STIM HORMONE: CPT

## 2020-02-26 PROCEDURE — 84484 ASSAY OF TROPONIN QUANT: CPT

## 2020-02-26 PROCEDURE — 63600175 PHARM REV CODE 636 W HCPCS: Performed by: PHYSICIAN ASSISTANT

## 2020-02-26 PROCEDURE — 81001 URINALYSIS AUTO W/SCOPE: CPT | Mod: 91

## 2020-02-26 PROCEDURE — 96365 THER/PROPH/DIAG IV INF INIT: CPT

## 2020-02-26 PROCEDURE — 80307 DRUG TEST PRSMV CHEM ANLYZR: CPT

## 2020-02-26 PROCEDURE — 21400001 HC TELEMETRY ROOM

## 2020-02-26 PROCEDURE — 83690 ASSAY OF LIPASE: CPT

## 2020-02-26 PROCEDURE — 96376 TX/PRO/DX INJ SAME DRUG ADON: CPT

## 2020-02-26 PROCEDURE — 99285 EMERGENCY DEPT VISIT HI MDM: CPT | Mod: 25

## 2020-02-26 PROCEDURE — 63600175 PHARM REV CODE 636 W HCPCS: Performed by: INTERNAL MEDICINE

## 2020-02-26 PROCEDURE — 87502 INFLUENZA DNA AMP PROBE: CPT

## 2020-02-26 PROCEDURE — 83880 ASSAY OF NATRIURETIC PEPTIDE: CPT

## 2020-02-26 PROCEDURE — 85610 PROTHROMBIN TIME: CPT

## 2020-02-26 PROCEDURE — 25500020 PHARM REV CODE 255: Performed by: EMERGENCY MEDICINE

## 2020-02-26 PROCEDURE — 96375 TX/PRO/DX INJ NEW DRUG ADDON: CPT

## 2020-02-26 PROCEDURE — 83605 ASSAY OF LACTIC ACID: CPT

## 2020-02-26 PROCEDURE — 96361 HYDRATE IV INFUSION ADD-ON: CPT

## 2020-02-26 RX ORDER — METOPROLOL SUCCINATE 25 MG/1
25 TABLET, EXTENDED RELEASE ORAL NIGHTLY
Status: DISCONTINUED | OUTPATIENT
Start: 2020-02-27 | End: 2020-02-29 | Stop reason: HOSPADM

## 2020-02-26 RX ORDER — MORPHINE SULFATE 4 MG/ML
2 INJECTION, SOLUTION INTRAMUSCULAR; INTRAVENOUS
Status: COMPLETED | OUTPATIENT
Start: 2020-02-26 | End: 2020-02-26

## 2020-02-26 RX ORDER — HYDROCHLOROTHIAZIDE 25 MG/1
25 TABLET ORAL DAILY
Status: ON HOLD | COMMUNITY
End: 2020-02-29 | Stop reason: HOSPADM

## 2020-02-26 RX ORDER — HYDROXYZINE PAMOATE 25 MG/1
25 CAPSULE ORAL 4 TIMES DAILY
Status: ON HOLD | COMMUNITY
End: 2020-05-05

## 2020-02-26 RX ORDER — CLONIDINE 0.1 MG/24H
1 PATCH, EXTENDED RELEASE TRANSDERMAL
Status: DISCONTINUED | OUTPATIENT
Start: 2020-02-27 | End: 2020-02-27

## 2020-02-26 RX ORDER — ONDANSETRON 2 MG/ML
4 INJECTION INTRAMUSCULAR; INTRAVENOUS EVERY 6 HOURS PRN
Status: DISCONTINUED | OUTPATIENT
Start: 2020-02-26 | End: 2020-02-29 | Stop reason: HOSPADM

## 2020-02-26 RX ORDER — HYDRALAZINE HYDROCHLORIDE 20 MG/ML
10 INJECTION INTRAMUSCULAR; INTRAVENOUS
Status: DISCONTINUED | OUTPATIENT
Start: 2020-02-26 | End: 2020-02-29 | Stop reason: HOSPADM

## 2020-02-26 RX ORDER — SODIUM CHLORIDE 9 MG/ML
1000 INJECTION, SOLUTION INTRAVENOUS
Status: COMPLETED | OUTPATIENT
Start: 2020-02-26 | End: 2020-02-26

## 2020-02-26 RX ORDER — MORPHINE SULFATE 4 MG/ML
4 INJECTION, SOLUTION INTRAMUSCULAR; INTRAVENOUS
Status: COMPLETED | OUTPATIENT
Start: 2020-02-26 | End: 2020-02-26

## 2020-02-26 RX ORDER — ONDANSETRON 2 MG/ML
8 INJECTION INTRAMUSCULAR; INTRAVENOUS
Status: COMPLETED | OUTPATIENT
Start: 2020-02-26 | End: 2020-02-26

## 2020-02-26 RX ORDER — ACETAMINOPHEN 500 MG
1000 TABLET ORAL
Status: COMPLETED | OUTPATIENT
Start: 2020-02-26 | End: 2020-02-26

## 2020-02-26 RX ORDER — PANTOPRAZOLE SODIUM 40 MG/1
40 TABLET, DELAYED RELEASE ORAL DAILY
Status: DISCONTINUED | OUTPATIENT
Start: 2020-02-27 | End: 2020-02-29 | Stop reason: HOSPADM

## 2020-02-26 RX ORDER — SODIUM CHLORIDE 9 MG/ML
INJECTION, SOLUTION INTRAVENOUS CONTINUOUS
Status: DISCONTINUED | OUTPATIENT
Start: 2020-02-26 | End: 2020-02-29 | Stop reason: HOSPADM

## 2020-02-26 RX ORDER — HYDROMORPHONE HYDROCHLORIDE 1 MG/ML
0.5 INJECTION, SOLUTION INTRAMUSCULAR; INTRAVENOUS; SUBCUTANEOUS EVERY 6 HOURS PRN
Status: DISCONTINUED | OUTPATIENT
Start: 2020-02-26 | End: 2020-02-27

## 2020-02-26 RX ADMIN — MEROPENEM 500 MG: 500 INJECTION, POWDER, FOR SOLUTION INTRAVENOUS at 11:02

## 2020-02-26 RX ADMIN — ONDANSETRON 8 MG: 2 INJECTION INTRAMUSCULAR; INTRAVENOUS at 09:02

## 2020-02-26 RX ADMIN — SODIUM CHLORIDE 1000 ML: 0.9 INJECTION, SOLUTION INTRAVENOUS at 06:02

## 2020-02-26 RX ADMIN — HYDROMORPHONE HYDROCHLORIDE 0.5 MG: 1 INJECTION, SOLUTION INTRAMUSCULAR; INTRAVENOUS; SUBCUTANEOUS at 10:02

## 2020-02-26 RX ADMIN — IOHEXOL 100 ML: 350 INJECTION, SOLUTION INTRAVENOUS at 09:02

## 2020-02-26 RX ADMIN — PIPERACILLIN AND TAZOBACTAM 4.5 G: 4; .5 INJECTION, POWDER, LYOPHILIZED, FOR SOLUTION INTRAVENOUS; PARENTERAL at 09:02

## 2020-02-26 RX ADMIN — MORPHINE SULFATE 2 MG: 4 INJECTION INTRAVENOUS at 09:02

## 2020-02-26 RX ADMIN — ACETAMINOPHEN 1000 MG: 500 TABLET, FILM COATED ORAL at 09:02

## 2020-02-26 RX ADMIN — MORPHINE SULFATE 4 MG: 4 INJECTION INTRAVENOUS at 09:02

## 2020-02-26 RX ADMIN — SODIUM CHLORIDE 1000 ML: 9 INJECTION, SOLUTION INTRAVENOUS at 09:02

## 2020-02-27 LAB
ABO + RH BLD: NORMAL
ALBUMIN SERPL BCP-MCNC: 3.1 G/DL (ref 3.5–5.2)
ALP SERPL-CCNC: 64 U/L (ref 55–135)
ALT SERPL W/O P-5'-P-CCNC: 39 U/L (ref 10–44)
ANION GAP SERPL CALC-SCNC: 7 MMOL/L (ref 8–16)
AST SERPL-CCNC: 20 U/L (ref 10–40)
BASOPHILS # BLD AUTO: 0.03 K/UL (ref 0–0.2)
BASOPHILS NFR BLD: 0.3 % (ref 0–1.9)
BILIRUB SERPL-MCNC: 0.6 MG/DL (ref 0.1–1)
BLD GP AB SCN CELLS X3 SERPL QL: NORMAL
BUN SERPL-MCNC: 7 MG/DL (ref 6–20)
CALCIUM SERPL-MCNC: 8.6 MG/DL (ref 8.7–10.5)
CHLORIDE SERPL-SCNC: 107 MMOL/L (ref 95–110)
CO2 SERPL-SCNC: 26 MMOL/L (ref 23–29)
CREAT SERPL-MCNC: 1 MG/DL (ref 0.5–1.4)
CRP SERPL-MCNC: 11.32 MG/DL (ref 0–0.75)
DIFFERENTIAL METHOD: ABNORMAL
EOSINOPHIL # BLD AUTO: 0.2 K/UL (ref 0–0.5)
EOSINOPHIL NFR BLD: 2.7 % (ref 0–8)
ERYTHROCYTE [DISTWIDTH] IN BLOOD BY AUTOMATED COUNT: 17.8 % (ref 11.5–14.5)
EST. GFR  (AFRICAN AMERICAN): >60 ML/MIN/1.73 M^2
EST. GFR  (NON AFRICAN AMERICAN): >60 ML/MIN/1.73 M^2
GLUCOSE SERPL-MCNC: 107 MG/DL (ref 70–110)
HCT VFR BLD AUTO: 32.6 % (ref 40–54)
HGB BLD-MCNC: 9.9 G/DL (ref 14–18)
IMM GRANULOCYTES # BLD AUTO: 0.03 K/UL (ref 0–0.04)
IMM GRANULOCYTES NFR BLD AUTO: 0.3 % (ref 0–0.5)
LYMPHOCYTES # BLD AUTO: 1.5 K/UL (ref 1–4.8)
LYMPHOCYTES NFR BLD: 17.6 % (ref 18–48)
MAGNESIUM SERPL-MCNC: 2 MG/DL (ref 1.6–2.6)
MCH RBC QN AUTO: 25.6 PG (ref 27–31)
MCHC RBC AUTO-ENTMCNC: 30.4 G/DL (ref 32–36)
MCV RBC AUTO: 85 FL (ref 82–98)
MONOCYTES # BLD AUTO: 0.8 K/UL (ref 0.3–1)
MONOCYTES NFR BLD: 9 % (ref 4–15)
NEUTROPHILS # BLD AUTO: 6.1 K/UL (ref 1.8–7.7)
NEUTROPHILS NFR BLD: 70.1 % (ref 38–73)
NRBC BLD-RTO: 0 /100 WBC
PLATELET # BLD AUTO: 268 K/UL (ref 150–350)
PMV BLD AUTO: 10.7 FL (ref 9.2–12.9)
POTASSIUM SERPL-SCNC: 4.2 MMOL/L (ref 3.5–5.1)
PROT SERPL-MCNC: 6.1 G/DL (ref 6–8.4)
RBC # BLD AUTO: 3.86 M/UL (ref 4.6–6.2)
SODIUM SERPL-SCNC: 140 MMOL/L (ref 136–145)
WBC # BLD AUTO: 8.66 K/UL (ref 3.9–12.7)

## 2020-02-27 PROCEDURE — 83735 ASSAY OF MAGNESIUM: CPT

## 2020-02-27 PROCEDURE — 25000003 PHARM REV CODE 250: Performed by: INTERNAL MEDICINE

## 2020-02-27 PROCEDURE — 63600175 PHARM REV CODE 636 W HCPCS: Performed by: FAMILY MEDICINE

## 2020-02-27 PROCEDURE — 80053 COMPREHEN METABOLIC PANEL: CPT

## 2020-02-27 PROCEDURE — 63600175 PHARM REV CODE 636 W HCPCS: Performed by: INTERNAL MEDICINE

## 2020-02-27 PROCEDURE — 86901 BLOOD TYPING SEROLOGIC RH(D): CPT

## 2020-02-27 PROCEDURE — 25000003 PHARM REV CODE 250: Performed by: FAMILY MEDICINE

## 2020-02-27 PROCEDURE — 86140 C-REACTIVE PROTEIN: CPT

## 2020-02-27 PROCEDURE — 21400001 HC TELEMETRY ROOM

## 2020-02-27 PROCEDURE — 94761 N-INVAS EAR/PLS OXIMETRY MLT: CPT

## 2020-02-27 PROCEDURE — 85025 COMPLETE CBC W/AUTO DIFF WBC: CPT

## 2020-02-27 PROCEDURE — 36415 COLL VENOUS BLD VENIPUNCTURE: CPT

## 2020-02-27 RX ORDER — MIRTAZAPINE 15 MG/1
30 TABLET, FILM COATED ORAL NIGHTLY
Status: DISCONTINUED | OUTPATIENT
Start: 2020-02-27 | End: 2020-02-29 | Stop reason: HOSPADM

## 2020-02-27 RX ORDER — CLONIDINE 0.1 MG/24H
1 PATCH, EXTENDED RELEASE TRANSDERMAL
Status: DISCONTINUED | OUTPATIENT
Start: 2020-03-02 | End: 2020-02-29 | Stop reason: HOSPADM

## 2020-02-27 RX ORDER — SERTRALINE HYDROCHLORIDE 50 MG/1
50 TABLET, FILM COATED ORAL DAILY
Status: DISCONTINUED | OUTPATIENT
Start: 2020-02-28 | End: 2020-02-29 | Stop reason: HOSPADM

## 2020-02-27 RX ORDER — QUETIAPINE FUMARATE 100 MG/1
200 TABLET, FILM COATED ORAL NIGHTLY PRN
Status: DISCONTINUED | OUTPATIENT
Start: 2020-02-27 | End: 2020-02-29 | Stop reason: HOSPADM

## 2020-02-27 RX ORDER — HYDROXYZINE PAMOATE 25 MG/1
25 CAPSULE ORAL 4 TIMES DAILY
Status: DISCONTINUED | OUTPATIENT
Start: 2020-02-27 | End: 2020-02-29 | Stop reason: HOSPADM

## 2020-02-27 RX ORDER — ARIPIPRAZOLE 10 MG/1
10 TABLET ORAL DAILY
Status: DISCONTINUED | OUTPATIENT
Start: 2020-02-28 | End: 2020-02-29 | Stop reason: HOSPADM

## 2020-02-27 RX ORDER — HYDROMORPHONE HYDROCHLORIDE 1 MG/ML
1 INJECTION, SOLUTION INTRAMUSCULAR; INTRAVENOUS; SUBCUTANEOUS EVERY 6 HOURS PRN
Status: DISCONTINUED | OUTPATIENT
Start: 2020-02-27 | End: 2020-02-28

## 2020-02-27 RX ORDER — LEVOTHYROXINE SODIUM 25 UG/1
50 TABLET ORAL
Status: DISCONTINUED | OUTPATIENT
Start: 2020-02-28 | End: 2020-02-29 | Stop reason: HOSPADM

## 2020-02-27 RX ORDER — MORPHINE SULFATE 2 MG/ML
2 INJECTION, SOLUTION INTRAMUSCULAR; INTRAVENOUS EVERY 4 HOURS PRN
Status: DISCONTINUED | OUTPATIENT
Start: 2020-02-27 | End: 2020-02-29 | Stop reason: HOSPADM

## 2020-02-27 RX ORDER — GABAPENTIN 300 MG/1
300 CAPSULE ORAL 3 TIMES DAILY
Status: DISCONTINUED | OUTPATIENT
Start: 2020-02-27 | End: 2020-02-29 | Stop reason: HOSPADM

## 2020-02-27 RX ADMIN — MIRTAZAPINE 30 MG: 15 TABLET, FILM COATED ORAL at 08:02

## 2020-02-27 RX ADMIN — MEROPENEM 500 MG: 500 INJECTION, POWDER, FOR SOLUTION INTRAVENOUS at 10:02

## 2020-02-27 RX ADMIN — MORPHINE SULFATE 2 MG: 2 INJECTION, SOLUTION INTRAMUSCULAR; INTRAVENOUS at 11:02

## 2020-02-27 RX ADMIN — GABAPENTIN 300 MG: 300 CAPSULE ORAL at 08:02

## 2020-02-27 RX ADMIN — SODIUM CHLORIDE: 0.9 INJECTION, SOLUTION INTRAVENOUS at 02:02

## 2020-02-27 RX ADMIN — HYDROMORPHONE HYDROCHLORIDE 1 MG: 1 INJECTION, SOLUTION INTRAMUSCULAR; INTRAVENOUS; SUBCUTANEOUS at 06:02

## 2020-02-27 RX ADMIN — SODIUM CHLORIDE: 0.9 INJECTION, SOLUTION INTRAVENOUS at 07:02

## 2020-02-27 RX ADMIN — MORPHINE SULFATE 2 MG: 2 INJECTION, SOLUTION INTRAMUSCULAR; INTRAVENOUS at 03:02

## 2020-02-27 RX ADMIN — HYDROXYZINE PAMOATE 25 MG: 25 CAPSULE ORAL at 06:02

## 2020-02-27 RX ADMIN — MORPHINE SULFATE 2 MG: 2 INJECTION, SOLUTION INTRAMUSCULAR; INTRAVENOUS at 08:02

## 2020-02-27 RX ADMIN — MEROPENEM 500 MG: 500 INJECTION, POWDER, FOR SOLUTION INTRAVENOUS at 03:02

## 2020-02-27 RX ADMIN — QUETIAPINE 200 MG: 100 TABLET ORAL at 08:02

## 2020-02-27 RX ADMIN — HYDROXYZINE PAMOATE 25 MG: 25 CAPSULE ORAL at 08:02

## 2020-02-27 RX ADMIN — MEROPENEM 500 MG: 500 INJECTION, POWDER, FOR SOLUTION INTRAVENOUS at 08:02

## 2020-02-27 RX ADMIN — HYDROMORPHONE HYDROCHLORIDE 1 MG: 1 INJECTION, SOLUTION INTRAMUSCULAR; INTRAVENOUS; SUBCUTANEOUS at 05:02

## 2020-02-27 RX ADMIN — METOPROLOL SUCCINATE 25 MG: 25 TABLET, FILM COATED, EXTENDED RELEASE ORAL at 08:02

## 2020-02-27 RX ADMIN — HYDROMORPHONE HYDROCHLORIDE 1 MG: 1 INJECTION, SOLUTION INTRAMUSCULAR; INTRAVENOUS; SUBCUTANEOUS at 12:02

## 2020-02-27 NOTE — PROGRESS NOTES
[facility]  Progress Note    Patient Name: Bradley Collado  MRN: [id number]  Patient Class: IP- Inpatient   Admission Date: 2/26/2020  Length of Stay: 1 days  Attending Physician: Gage Cueva MD  Primary Care Provider: Arlen Belcher NP        Subjective:     Principal Problem:Acute on chronic pancreatitis      Interval History:  Patient reports continued to have epigastric pain.  Will control pain medications.  Reports that he is hungry.  Started on clear liquid diet.  Suspect fever from pancreatitis.  Will deescalate antibiotics if blood cultures unremarkable tomorrow.    Review of Systems   Constitutional: Negative for chills, fatigue, fever and unexpected weight change.   HENT: Negative for sore throat.    Eyes: Negative for visual disturbance.   Respiratory: Negative for cough, chest tightness and shortness of breath.    Cardiovascular: Negative for chest pain.   Gastrointestinal: Negative for abdominal pain, constipation, diarrhea, nausea and vomiting.   Endocrine: Negative for polyuria.   Genitourinary: Negative for dysuria and hematuria.   Neurological: Negative for headaches.     Objective:     Vital Signs (Most Recent):  Temp: 98.5 °F (36.9 °C) (02/27/20 1136)  Pulse: 76 (02/27/20 1136)  Resp: 15 (02/27/20 1136)  BP: 118/82 (02/27/20 1136)  SpO2: 100 % (02/27/20 1136) Vital Signs (24h Range):  Temp:  [98.1 °F (36.7 °C)-101.1 °F (38.4 °C)] 98.5 °F (36.9 °C)  Pulse:  [] 76  Resp:  [15-31] 15  SpO2:  [96 %-100 %] 100 %  BP: (105-153)/(58-93) 118/82     Weight: 78 kg (171 lb 15.3 oz)  Body mass index is 26.93 kg/m².    Intake/Output Summary (Last 24 hours) at 2/27/2020 1603  Last data filed at 2/27/2020 1307  Gross per 24 hour   Intake 3167.5 ml   Output 2 ml   Net 3165.5 ml      Physical Exam   Constitutional: He is oriented to person, place, and time. He appears well-developed and well-nourished. No distress.   HENT:   Head: Normocephalic and atraumatic.    Eyes: EOM are normal.   Neck: Normal range of motion and full passive range of motion without pain.   Cardiovascular: Normal rate and regular rhythm.   Pulmonary/Chest: Effort normal and breath sounds normal. No respiratory distress.   Abdominal: Soft. Bowel sounds are normal.   Epigastric tenderness   Musculoskeletal: Normal range of motion.   Neurological: He is alert and oriented to person, place, and time.   Skin: Skin is warm. He is not diaphoretic.   Psychiatric: He has a normal mood and affect. His behavior is normal. Judgment and thought content normal.   Nursing note and vitals reviewed.      Significant Labs:   CBC:   Recent Labs   Lab 02/26/20  1631 02/27/20  0320   WBC 10.72 8.66   HGB 11.3* 9.9*   HCT 36.9* 32.6*    268     CMP:   Recent Labs   Lab 02/26/20  1631 02/27/20  0320    140   K 4.4 4.2    107   CO2 26 26   * 107   BUN 7 7   CREATININE 1.2 1.0   CALCIUM 9.2 8.6*   PROT 6.8 6.1   ALBUMIN 3.7 3.1*   BILITOT 0.5 0.6   ALKPHOS 79 64   AST 36 20   ALT 53* 39   ANIONGAP 8 7*   EGFRNONAA >60.0 >60.0     Lipase:   Recent Labs   Lab 02/26/20  1631   LIPASE 117*           Assessment/Plan:      Active Hospital Problems    Diagnosis    *Acute on chronic pancreatitis    Fever    Essential hypertension    ETOH abuse    Depression       * Acute on chronic pancreatitis  Patient getting admitted with acute on chronic pancreatitis  He will be started on IV fluid.  IV Dilaudid p.r.n. basis for pain  Clears    Fever  In the emergency room patient was noted to have fever  Blood cultures will be obtained and patient will be started on IV antibiotics  CTA of the chest and CT abdomen pelvis is still pending which was ordered from emergency room  He may either have an abdominal pathology in the background of chronic pancreatitis.  Or else he must be  having acute  illness of the chest  Patient has a history of pancreatic pseudocyst in the past      Essential hypertension  The blood  pressure levels on is high normal range  Will carefully monitor      Depression  Patient suffers from severe depression/bipolar disorder  Admission to inpatient psych facility 2 weeks ago  Patient denies any suicidal or homicidal ideation      ETOH abuse  Per patient his last drink was 2 weeks ago      VTE Risk Mitigation (From admission, onward)         Ordered     IP VTE LOW RISK PATIENT  Once      02/26/20 2201     Place MARIS hose  Until discontinued      02/26/20 2201     Place sequential compression device  Until discontinued      02/26/20 2201                      Gage Cueva MD  Department of Hospital Medicine   [facility]  Date of service: 02/27/2020 4:05 PM

## 2020-02-27 NOTE — ASSESSMENT & PLAN NOTE
In the emergency room patient was noted to have fever  Blood cultures will be obtained and patient will be started on IV antibiotics  CTA of the chest and CT abdomen pelvis is still pending which was ordered from emergency room  He may either have an abdominal pathology in the background of chronic pancreatitis.  Or else he must be  having acute  illness of the chest  Patient has a history of pancreatic pseudocyst in the past

## 2020-02-27 NOTE — ASSESSMENT & PLAN NOTE
Patient suffers from severe depression/bipolar disorder  Admission to inpatient psych facility 2 weeks ago  Patient denies any suicidal or homicidal ideation

## 2020-02-27 NOTE — HPI
40-year-old patient getting admitted with acute on chronic pancreatitis  Patient was in inpatient psych facility for severe depression/bipolar disorder and was discharged from the facility 2 weeks ago  After a few days he got admitted in this hospital and was discharged 2 days ago, for acute on chronic pancreatitis  Patient was doing fine when he went home but later on he started having severe abdominal pain  Pain mostly on the epigastric area with radiation of pain to the back  Describes pain as constant/no aggravating and relieving factors  He denies any nausea vomiting or diarrhea  He was found to have high-grade fever in the emergency room and subsequently he got admitted  CTA of the chest and CT abdomen/pelvis was ordered from the ER and the results are still pending while dictating this note

## 2020-02-27 NOTE — H&P
[facility] Medicine  History & Physical    Patient Name: Bradley Collado  MRN: [id number]  Admission Date: 2/26/2020  Attending Physician: Poppy Jiang MD   Primary Care Provider: Arlen Belcher NP         Patient information was obtained from patient and ER records.     Subjective:     Principal Problem:Acute on chronic pancreatitis    Chief Complaint:   Chief Complaint   Patient presents with    Fever    Flank Pain     left         HPI: 40-year-old patient getting admitted with acute on chronic pancreatitis  Patient was in inpatient psych facility for severe depression/bipolar disorder and was discharged from the facility 2 weeks ago  After a few days he got admitted in this hospital and was discharged 2 days ago, for acute on chronic pancreatitis  Patient was doing fine when he went home but later on he started having severe abdominal pain  Pain mostly on the epigastric area with radiation of pain to the back  Describes pain as constant/no aggravating and relieving factors  He denies any nausea vomiting or diarrhea  He was found to have high-grade fever in the emergency room and subsequently he got admitted  CTA of the chest and CT abdomen/pelvis was ordered from the ER and the results are still pending while dictating this note    Past Medical History:   Diagnosis Date    Anxiety     Anxiety     Bipolar affective disorder     Depression     Hypertension     Pancreatitis     Schizophrenia     Thyroid disease        No past surgical history on file.    Review of patient's allergies indicates:  No Known Allergies    No current facility-administered medications on file prior to encounter.      Current Outpatient Medications on File Prior to Encounter   Medication Sig    ARIPiprazole (ABILIFY) 10 MG Tab Take 1 tablet (10 mg total) by mouth once daily.    gabapentin (NEURONTIN) 300 MG capsule Take 1 capsule (300 mg total) by mouth 3 (three) times daily.    hydroCHLOROthiazide  (HYDRODIURIL) 25 MG tablet Take 25 mg by mouth once daily.    HYDROcodone-acetaminophen (NORCO)  mg per tablet Take 1 tablet by mouth every 6 (six) hours as needed for Pain.    hydrOXYzine pamoate (VISTARIL) 25 MG Cap Take 25 mg by mouth 4 (four) times daily.    levothyroxine (SYNTHROID) 50 MCG tablet Take 1 tablet (50 mcg total) by mouth before breakfast.    lisinopril (PRINIVIL,ZESTRIL) 40 MG tablet Take 1 tablet (40 mg total) by mouth once daily.    metoprolol succinate (TOPROL-XL) 25 MG 24 hr tablet Take 1 tablet (25 mg total) by mouth nightly.    mirtazapine (REMERON) 30 MG tablet Take 1 tablet (30 mg total) by mouth nightly.    pantoprazole (PROTONIX) 40 MG tablet Take 40 mg by mouth once daily.    QUEtiapine (SEROQUEL) 200 MG Tab Take 1 tablet (200 mg total) by mouth nightly as needed (insomnia). (Patient taking differently: Take 300 mg by mouth nightly as needed (insomnia). )    sertraline (ZOLOFT) 50 MG tablet Take 1 tablet (50 mg total) by mouth once daily.    cloNIDine 0.1 mg/24 hr td ptwk (CATAPRES) 0.1 mg/24 hr Place 1 patch onto the skin every 7 days.    ondansetron (ZOFRAN) 4 MG tablet Take 1 tablet (4 mg total) by mouth every 6 (six) hours as needed for Nausea.    [DISCONTINUED] thiamine 100 MG tablet Take 1 tablet (100 mg total) by mouth once daily.     Family History     Problem Relation (Age of Onset)    Cancer Paternal Grandfather    Diabetes Maternal Grandmother    Hypertension Maternal Grandfather        Tobacco Use    Smoking status: Current Some Day Smoker     Packs/day: 0.25     Years: 7.00     Pack years: 1.75    Smokeless tobacco: Never Used   Substance and Sexual Activity    Alcohol use: Yes     Comment: occasionally    Drug use: Yes     Frequency: 7.0 times per week     Types: Marijuana    Sexual activity: Yes     Partners: Female     Review of Systems   Constitutional: Negative for activity change and appetite change.   HENT: Negative for congestion and dental  problem.    Eyes: Negative for discharge and itching.   Respiratory: Negative for shortness of breath.    Cardiovascular: Negative for chest pain.   Gastrointestinal: Positive for abdominal pain. Negative for abdominal distention.   Endocrine: Negative for cold intolerance.   Genitourinary: Negative for difficulty urinating and dysuria.   Musculoskeletal: Negative for arthralgias and back pain.   Skin: Negative for color change.   Neurological: Negative for dizziness and facial asymmetry.   Hematological: Negative for adenopathy.   Psychiatric/Behavioral: Negative for agitation and behavioral problems.     Objective:     Vital Signs (Most Recent):  Temp: 100.1 °F (37.8 °C) (02/26/20 1755)  Pulse: 77 (02/26/20 2130)  Resp: (!) 31 (02/26/20 1730)  BP: 137/85 (02/26/20 2130)  SpO2: 99 % (02/26/20 2131) Vital Signs (24h Range):  Temp:  [100.1 °F (37.8 °C)-101.1 °F (38.4 °C)] 100.1 °F (37.8 °C)  Pulse:  [] 77  Resp:  [20-31] 31  SpO2:  [99 %-100 %] 99 %  BP: (124-153)/(74-86) 137/85     Weight: 77.1 kg (170 lb)  Body mass index is 26.63 kg/m².    Physical Exam   Constitutional: He is oriented to person, place, and time. He appears well-developed.   HENT:   Head: Normocephalic and atraumatic.   Eyes: Pupils are equal, round, and reactive to light.   Neck: Normal range of motion and full passive range of motion without pain.   Cardiovascular: Normal rate and regular rhythm.   Pulmonary/Chest: Effort normal and breath sounds normal.   Abdominal: Soft. Bowel sounds are normal.   Epigastric tenderness   Musculoskeletal: Normal range of motion.   Neurological: He is alert and oriented to person, place, and time.   Skin: Skin is warm.   Nursing note and vitals reviewed.        CRANIAL NERVES     CN III, IV, VI   Pupils are equal, round, and reactive to light.       Significant Labs:   CBC:   Recent Labs   Lab 02/26/20  1631   WBC 10.72   HGB 11.3*   HCT 36.9*        CMP:   Recent Labs   Lab 02/26/20  1631       K 4.4      CO2 26   *   BUN 7   CREATININE 1.2   CALCIUM 9.2   PROT 6.8   ALBUMIN 3.7   BILITOT 0.5   ALKPHOS 79   AST 36   ALT 53*   ANIONGAP 8   EGFRNONAA >60.0       Significant Imaging: I have reviewed all pertinent imaging results/findings within the past 24 hours.    Assessment/Plan:     * Acute on chronic pancreatitis  Patient getting admitted with acute on chronic pancreatitis  He will be started on IV fluid.  NPO  IV Dilaudid p.r.n. basis for pain      Fever  In the emergency room patient was noted to have fever  Blood cultures will be obtained and patient will be started on IV antibiotics  CTA of the chest and CT abdomen pelvis is still pending which was ordered from emergency room  He may either have an abdominal pathology in the background of chronic pancreatitis.  Or else he must be  having acute  illness of the chest  Patient has a history of pancreatic pseudocyst in the past      Essential hypertension  The blood pressure levels on is high normal range  Will carefully monitor      Depression  Patient suffers from severe depression/bipolar disorder  Admission to inpatient psych facility 2 weeks ago  Patient denies any suicidal or homicidal ideation      ETOH abuse  Per patient his last drink was 2 weeks ago        VTE Risk Mitigation (From admission, onward)         Ordered     IP VTE LOW RISK PATIENT  Once      02/26/20 2201     Place MARIS hose  Until discontinued      02/26/20 2201     Place sequential compression device  Until discontinued      02/26/20 2201                   Miguelangel Reid MD  Department of Hospital Medicine   [facility]

## 2020-02-27 NOTE — SUBJECTIVE & OBJECTIVE
Past Medical History:   Diagnosis Date    Anxiety     Anxiety     Bipolar affective disorder     Depression     Hypertension     Pancreatitis     Schizophrenia     Thyroid disease        No past surgical history on file.    Review of patient's allergies indicates:  No Known Allergies    No current facility-administered medications on file prior to encounter.      Current Outpatient Medications on File Prior to Encounter   Medication Sig    ARIPiprazole (ABILIFY) 10 MG Tab Take 1 tablet (10 mg total) by mouth once daily.    gabapentin (NEURONTIN) 300 MG capsule Take 1 capsule (300 mg total) by mouth 3 (three) times daily.    hydroCHLOROthiazide (HYDRODIURIL) 25 MG tablet Take 25 mg by mouth once daily.    HYDROcodone-acetaminophen (NORCO)  mg per tablet Take 1 tablet by mouth every 6 (six) hours as needed for Pain.    hydrOXYzine pamoate (VISTARIL) 25 MG Cap Take 25 mg by mouth 4 (four) times daily.    levothyroxine (SYNTHROID) 50 MCG tablet Take 1 tablet (50 mcg total) by mouth before breakfast.    lisinopril (PRINIVIL,ZESTRIL) 40 MG tablet Take 1 tablet (40 mg total) by mouth once daily.    metoprolol succinate (TOPROL-XL) 25 MG 24 hr tablet Take 1 tablet (25 mg total) by mouth nightly.    mirtazapine (REMERON) 30 MG tablet Take 1 tablet (30 mg total) by mouth nightly.    pantoprazole (PROTONIX) 40 MG tablet Take 40 mg by mouth once daily.    QUEtiapine (SEROQUEL) 200 MG Tab Take 1 tablet (200 mg total) by mouth nightly as needed (insomnia). (Patient taking differently: Take 300 mg by mouth nightly as needed (insomnia). )    sertraline (ZOLOFT) 50 MG tablet Take 1 tablet (50 mg total) by mouth once daily.    cloNIDine 0.1 mg/24 hr td ptwk (CATAPRES) 0.1 mg/24 hr Place 1 patch onto the skin every 7 days.    ondansetron (ZOFRAN) 4 MG tablet Take 1 tablet (4 mg total) by mouth every 6 (six) hours as needed for Nausea.    [DISCONTINUED] thiamine 100 MG tablet Take 1 tablet (100 mg total) by  mouth once daily.     Family History     Problem Relation (Age of Onset)    Cancer Paternal Grandfather    Diabetes Maternal Grandmother    Hypertension Maternal Grandfather        Tobacco Use    Smoking status: Current Some Day Smoker     Packs/day: 0.25     Years: 7.00     Pack years: 1.75    Smokeless tobacco: Never Used   Substance and Sexual Activity    Alcohol use: Yes     Comment: occasionally    Drug use: Yes     Frequency: 7.0 times per week     Types: Marijuana    Sexual activity: Yes     Partners: Female     Review of Systems   Constitutional: Negative for activity change and appetite change.   HENT: Negative for congestion and dental problem.    Eyes: Negative for discharge and itching.   Respiratory: Negative for shortness of breath.    Cardiovascular: Negative for chest pain.   Gastrointestinal: Positive for abdominal pain. Negative for abdominal distention.   Endocrine: Negative for cold intolerance.   Genitourinary: Negative for difficulty urinating and dysuria.   Musculoskeletal: Negative for arthralgias and back pain.   Skin: Negative for color change.   Neurological: Negative for dizziness and facial asymmetry.   Hematological: Negative for adenopathy.   Psychiatric/Behavioral: Negative for agitation and behavioral problems.     Objective:     Vital Signs (Most Recent):  Temp: 100.1 °F (37.8 °C) (02/26/20 1755)  Pulse: 77 (02/26/20 2130)  Resp: (!) 31 (02/26/20 1730)  BP: 137/85 (02/26/20 2130)  SpO2: 99 % (02/26/20 2131) Vital Signs (24h Range):  Temp:  [100.1 °F (37.8 °C)-101.1 °F (38.4 °C)] 100.1 °F (37.8 °C)  Pulse:  [] 77  Resp:  [20-31] 31  SpO2:  [99 %-100 %] 99 %  BP: (124-153)/(74-86) 137/85     Weight: 77.1 kg (170 lb)  Body mass index is 26.63 kg/m².    Physical Exam   Constitutional: He is oriented to person, place, and time. He appears well-developed.   HENT:   Head: Normocephalic and atraumatic.   Eyes: Pupils are equal, round, and reactive to light.   Neck: Normal range  of motion and full passive range of motion without pain.   Cardiovascular: Normal rate and regular rhythm.   Pulmonary/Chest: Effort normal and breath sounds normal.   Abdominal: Soft. Bowel sounds are normal.   Epigastric tenderness   Musculoskeletal: Normal range of motion.   Neurological: He is alert and oriented to person, place, and time.   Skin: Skin is warm.   Nursing note and vitals reviewed.        CRANIAL NERVES     CN III, IV, VI   Pupils are equal, round, and reactive to light.       Significant Labs:   CBC:   Recent Labs   Lab 02/26/20  1631   WBC 10.72   HGB 11.3*   HCT 36.9*        CMP:   Recent Labs   Lab 02/26/20  1631      K 4.4      CO2 26   *   BUN 7   CREATININE 1.2   CALCIUM 9.2   PROT 6.8   ALBUMIN 3.7   BILITOT 0.5   ALKPHOS 79   AST 36   ALT 53*   ANIONGAP 8   EGFRNONAA >60.0       Significant Imaging: I have reviewed all pertinent imaging results/findings within the past 24 hours.

## 2020-02-27 NOTE — ASSESSMENT & PLAN NOTE
Patient getting admitted with acute on chronic pancreatitis  He will be started on IV fluid.  NPO  IV Dilaudid p.r.n. basis for pain

## 2020-02-27 NOTE — DISCHARGE SUMMARY
Formerly [facility] Medicine  Discharge Summary      Patient Name: Bradley Collado  MRN: [id number]  Admission Date: 2/22/2020  Discharge Date: 2/24/2020  Discharging Provider: Joon Batista MD  Primary Care Provider: Arlen Belcher NP    HPI:  Bradley Collado is a 39 y.o. Black or  male who  has a past medical history of Anxiety, Anxiety, Bipolar affective disorder, Depression, Hypertension, Pancreatitis, Schizophrenia, and Thyroid disease.. The patient presented to [facility] on 2/22/2020 with a primary complaint of abdominal pain, History was obtained from the patient  and ER physician Sign-out. Patient has history of chronic pancreatitis due to alcoholism and now comes in 5 day history of abdominal pain mostly epigastic that started after he had higher amounts of alcohol than his usual consumption amount week prior to the presenting complaint. He has history of schizophrenia and was out of medications so was drinking heavily. This week he only had two bears. He has no relieving factors. No radiation reported. Associated with nausea and vomiting. But no diarrhea. In the emergency room, patient CBC was unremarkable. CMP showed increased creatinine. Lipase is 409.  Decision to admit was taken and patient was informed about the plan of care.      Hospital course:  The patient was admitted to the hospital for workup and treatment including pain control and antiemetics.  The patient had severe pain and nausea requiring parenteral controlled substances.  He was started on IV fluids.  The patient improved and was able to advance diet rather quickly.  He had markedly elevated lipase which improved over 48 hr.  During hospital stay the patient was given benzodiazepines as well as supplemental vitamins for alcoholism and prevention of delirium tremens.  In addition, the patient had titration of medical regimen to help with elevated blood pressure.  He was started on  clonidine patch.  His metoprolol dose was increased.  His blood pressure is significantly improved.  Today the patient is doing much better.  He is tolerating diet.  He is ambulating independently.  His symptoms are much improved.  I did not anticipate going home so quickly, but he has improved rapidly and is now medically stable.  The patient is requesting discharge today.  He will follow up as outpatient with PCP in next 1-2 weeks.  He was advised on the importance of alcohol cessation as well as medical compliance.  The patient is in understanding and agreement discharge plan today.    Discharge physical exam:  Comfortable appearing, no apparent distress, moist mucous membranes  Alert and oriented, fluent speech, pleasant   Comfortable work of breathing, lungs are clear  2+ radial pulses, abdomen soft and nondistended    Discharge diagnoses:  Acute on chronic alcoholic pancreatitis  Uncontrolled hypertension  Polysubstance abuse   Chronic alcoholism with history of DTs  Hypothyroidism  Schizophrenia/atypical bipolar disorder  Additional discharge Diagnoses:    Diagnosis Date Noted POA    PRINCIPAL PROBLEM:  Alcohol-induced acute pancreatitis [K85.20] 04/05/2016 Yes    Pancreatitis [K85.90] 02/24/2020 Yes    Abdominal pain [R10.9] 09/19/2016 Yes      Problems Resolved During this Admission:       Discharged Condition: good    Disposition: Home or Self Care    Follow Up:  Follow-up Information     Arlen Belcher NP In 1 week.    Specialty:  Family Medicine  Contact information:  [address] [place] [address] [id number] [phone]                 Patient Instructions:      Diet Cardiac     Notify your health care provider if you experience any of the following:  severe uncontrolled pain     Notify your health care provider if you experience any of the following:  persistent nausea and vomiting or diarrhea     Notify your health care provider if you experience any of the  following:  temperature >100.4     Activity as tolerated       Pending Diagnostic Studies:     None         Medications:  Reconciled Home Medications:      Medication List      START taking these medications    cloNIDine 0.1 mg/24 hr td ptwk 0.1 mg/24 hr  Commonly known as:  CATAPRES  Place 1 patch onto the skin every 7 days.     HYDROcodone-acetaminophen  mg per tablet  Commonly known as:  NORCO  Take 1 tablet by mouth every 6 (six) hours as needed for Pain.     ondansetron 4 MG tablet  Commonly known as:  ZOFRAN  Take 1 tablet (4 mg total) by mouth every 6 (six) hours as needed for Nausea.        CHANGE how you take these medications    metoprolol succinate 25 MG 24 hr tablet  Commonly known as:  TOPROL-XL  Take 1 tablet (25 mg total) by mouth nightly.  What changed:    · medication strength  · how much to take     QUEtiapine 200 MG Tab  Commonly known as:  SEROQUEL  Take 1 tablet (200 mg total) by mouth nightly as needed (insomnia).  What changed:  how much to take        CONTINUE taking these medications    ARIPiprazole 10 MG Tab  Commonly known as:  ABILIFY  Take 1 tablet (10 mg total) by mouth once daily.     gabapentin 300 MG capsule  Commonly known as:  NEURONTIN  Take 1 capsule (300 mg total) by mouth 3 (three) times daily.     levothyroxine 50 MCG tablet  Commonly known as:  SYNTHROID  Take 1 tablet (50 mcg total) by mouth before breakfast.     lisinopril 40 MG tablet  Commonly known as:  PRINIVIL,ZESTRIL  Take 1 tablet (40 mg total) by mouth once daily.     mirtazapine 30 MG tablet  Commonly known as:  REMERON  Take 1 tablet (30 mg total) by mouth nightly.     pantoprazole 40 MG tablet  Commonly known as:  PROTONIX  Take 40 mg by mouth once daily.     sertraline 50 MG tablet  Commonly known as:  ZOLOFT  Take 1 tablet (50 mg total) by mouth once daily.            Indwelling Lines/Drains at time of discharge:   Lines/Drains/Airways     None                 Time spent on the discharge of patient: 35  minutes  Patient was seen and examined on the date of discharge and determined to be suitable for discharge.         Joon Batista MD  Department of Hospital Medicine  [facility]

## 2020-02-27 NOTE — ASSESSMENT & PLAN NOTE
Patient getting admitted with acute on chronic pancreatitis  He will be started on IV fluid.  IV Dilaudid p.r.n. basis for pain  Clears

## 2020-02-27 NOTE — SUBJECTIVE & OBJECTIVE
Interval History:  Patient reports continued to have epigastric pain.  Will control pain medications.  Reports that he is hungry.  Started on clear liquid diet.  Suspect fever from pancreatitis.  Will deescalate antibiotics if blood cultures unremarkable tomorrow.    Review of Systems   Constitutional: Negative for chills, fatigue, fever and unexpected weight change.   HENT: Negative for sore throat.    Eyes: Negative for visual disturbance.   Respiratory: Negative for cough, chest tightness and shortness of breath.    Cardiovascular: Negative for chest pain.   Gastrointestinal: Negative for abdominal pain, constipation, diarrhea, nausea and vomiting.   Endocrine: Negative for polyuria.   Genitourinary: Negative for dysuria and hematuria.   Neurological: Negative for headaches.     Objective:     Vital Signs (Most Recent):  Temp: 98.5 °F (36.9 °C) (02/27/20 1136)  Pulse: 76 (02/27/20 1136)  Resp: 15 (02/27/20 1136)  BP: 118/82 (02/27/20 1136)  SpO2: 100 % (02/27/20 1136) Vital Signs (24h Range):  Temp:  [98.1 °F (36.7 °C)-101.1 °F (38.4 °C)] 98.5 °F (36.9 °C)  Pulse:  [] 76  Resp:  [15-31] 15  SpO2:  [96 %-100 %] 100 %  BP: (105-153)/(58-93) 118/82     Weight: 78 kg (171 lb 15.3 oz)  Body mass index is 26.93 kg/m².    Intake/Output Summary (Last 24 hours) at 2/27/2020 1603  Last data filed at 2/27/2020 1307  Gross per 24 hour   Intake 3167.5 ml   Output 2 ml   Net 3165.5 ml      Physical Exam   Constitutional: He is oriented to person, place, and time. He appears well-developed and well-nourished. No distress.   HENT:   Head: Normocephalic and atraumatic.   Eyes: EOM are normal.   Neck: Normal range of motion and full passive range of motion without pain.   Cardiovascular: Normal rate and regular rhythm.   Pulmonary/Chest: Effort normal and breath sounds normal. No respiratory distress.   Abdominal: Soft. Bowel sounds are normal.   Epigastric tenderness   Musculoskeletal: Normal range of motion.    Neurological: He is alert and oriented to person, place, and time.   Skin: Skin is warm. He is not diaphoretic.   Psychiatric: He has a normal mood and affect. His behavior is normal. Judgment and thought content normal.   Nursing note and vitals reviewed.      Significant Labs:   CBC:   Recent Labs   Lab 02/26/20  1631 02/27/20  0320   WBC 10.72 8.66   HGB 11.3* 9.9*   HCT 36.9* 32.6*    268     CMP:   Recent Labs   Lab 02/26/20  1631 02/27/20  0320    140   K 4.4 4.2    107   CO2 26 26   * 107   BUN 7 7   CREATININE 1.2 1.0   CALCIUM 9.2 8.6*   PROT 6.8 6.1   ALBUMIN 3.7 3.1*   BILITOT 0.5 0.6   ALKPHOS 79 64   AST 36 20   ALT 53* 39   ANIONGAP 8 7*   EGFRNONAA >60.0 >60.0     Lipase:   Recent Labs   Lab 02/26/20  1631   LIPASE 117*

## 2020-02-27 NOTE — ED PROVIDER NOTES
Encounter Date: 2/26/2020       History     Chief Complaint   Patient presents with    Fever    Flank Pain     left      This is a 40 year old male with a history of chronic recurrent pancreatitis who presents complaining of recurrence of abdominal pain with nausea and fever.  Patient is also felt mildly short of breath and had some pleuritic epigastric discomfort.  He was discharged 2 days ago after a 3 day admission for acute pancreatitis.  He states he was feeling well upon discharge. Symptoms have reoccurred.  There progressively worsen.  Symptoms have been moderate to severe in intensity.  Symptoms are worse if he eats but there are no alleviating factors.  He has had fever up to 101.  He denies any headache neck pain or stiffness.  He denies any gastrointestinal bleeding.  He denies any urinary problems changes or abnormalities.  He reports generalized fatigue and malaise.  He denies any other problems or complaints.        Review of patient's allergies indicates:  No Known Allergies  Past Medical History:   Diagnosis Date    Anxiety     Anxiety     Bipolar affective disorder     Depression     Hypertension     Pancreatitis     Schizophrenia     Thyroid disease      No past surgical history on file.  Family History   Problem Relation Age of Onset    Diabetes Maternal Grandmother     Hypertension Maternal Grandfather     Cancer Paternal Grandfather      Social History     Tobacco Use    Smoking status: Current Some Day Smoker     Packs/day: 0.25     Years: 7.00     Pack years: 1.75    Smokeless tobacco: Never Used   Substance Use Topics    Alcohol use: Yes     Comment: occasionally    Drug use: Yes     Frequency: 7.0 times per week     Types: Marijuana     Review of Systems   Constitutional: Positive for appetite change, chills, fatigue and fever. Negative for activity change, diaphoresis and unexpected weight change.   HENT: Negative.  Negative for congestion, dental problem, ear pain,  nosebleeds, postnasal drip, rhinorrhea, sinus pressure, sinus pain, sore throat, tinnitus, trouble swallowing and voice change.    Eyes: Negative.  Negative for pain and visual disturbance.   Respiratory: Positive for shortness of breath. Negative for cough, chest tightness and wheezing.    Cardiovascular: Negative.  Negative for chest pain, palpitations and leg swelling.   Gastrointestinal: Positive for abdominal pain and nausea. Negative for abdominal distention, anal bleeding, blood in stool, constipation, diarrhea, rectal pain and vomiting.   Endocrine: Negative.    Genitourinary: Negative.  Negative for decreased urine volume, difficulty urinating, discharge, dysuria, flank pain, frequency, genital sores, hematuria, penile pain, penile swelling, scrotal swelling, testicular pain and urgency.   Musculoskeletal: Negative.  Negative for arthralgias, back pain, gait problem, joint swelling, myalgias, neck pain and neck stiffness.   Skin: Negative.  Negative for color change, pallor and rash.   Neurological: Negative.  Negative for dizziness, seizures, syncope, facial asymmetry, speech difficulty, weakness, light-headedness, numbness and headaches.   Hematological: Negative.  Does not bruise/bleed easily.   Psychiatric/Behavioral: Negative.  Negative for confusion.   All other systems reviewed and are negative.      Physical Exam     Initial Vitals [02/26/20 1619]   BP Pulse Resp Temp SpO2   (!) 143/80 (!) 113 20 (!) 101.1 °F (38.4 °C) 99 %      MAP       --         Physical Exam    Nursing note and vitals reviewed.  Constitutional: He appears well-developed and well-nourished. He is active.  Non-toxic appearance. He does not have a sickly appearance. He does not appear ill. No distress.   HENT:   Head: Normocephalic and atraumatic.   Nose: Nose normal.   Mouth/Throat: Oropharynx is clear and moist.   Eyes: Conjunctivae, EOM and lids are normal. Pupils are equal, round, and reactive to light.   Neck: Full passive  range of motion without pain. Neck supple. No thyromegaly present. No spinous process tenderness and no muscular tenderness present. No tracheal deviation, no edema, no erythema and normal range of motion present. No neck rigidity. No JVD present.   Cardiovascular: Normal rate, regular rhythm, normal heart sounds, intact distal pulses and normal pulses. Exam reveals no gallop and no friction rub.    No murmur heard.  Pulmonary/Chest: Effort normal and breath sounds normal. No stridor. No respiratory distress. He has no wheezes. He has no rhonchi. He has no rales.   Abdominal: Soft. Normal appearance and bowel sounds are normal. He exhibits no distension and no mass. There is tenderness in the right upper quadrant, epigastric area and left upper quadrant. There is no rigidity, no rebound, no guarding, no CVA tenderness, no tenderness at McBurney's point and negative Kirby's sign. No hernia.   Musculoskeletal: He exhibits no tenderness.        Cervical back: Normal. He exhibits normal range of motion, no tenderness, no bony tenderness and no swelling.        Thoracic back: He exhibits normal range of motion, no tenderness, no bony tenderness and no swelling.        Lumbar back: Normal. He exhibits normal range of motion, no tenderness, no bony tenderness, no swelling and no edema.   Pulses are 2+ throughout, cap refill is less than 2 sec throughout, no edema noted, extremities are nontender throughout with full range of motion   Neurological: He is alert and oriented to person, place, and time. He has normal strength. No cranial nerve deficit or sensory deficit. Coordination normal.   Skin: Skin is warm and dry. Capillary refill takes less than 2 seconds. No ecchymosis, no petechiae and no rash noted. No cyanosis or erythema. No pallor.   Psychiatric: He has a normal mood and affect. His speech is normal and behavior is normal. Judgment and thought content normal. Cognition and memory are normal.         ED Course    Procedures  Labs Reviewed   CBC W/ AUTO DIFFERENTIAL - Abnormal; Notable for the following components:       Result Value    RBC 4.39 (*)     Hemoglobin 11.3 (*)     Hematocrit 36.9 (*)     Mean Corpuscular Hemoglobin 25.7 (*)     Mean Corpuscular Hemoglobin Conc 30.6 (*)     RDW 17.6 (*)     Gran # (ANC) 8.9 (*)     Lymph # 0.9 (*)     Gran% 83.0 (*)     Lymph% 8.5 (*)     All other components within normal limits   COMPREHENSIVE METABOLIC PANEL - Abnormal; Notable for the following components:    Glucose 268 (*)     ALT 53 (*)     All other components within normal limits   LIPASE - Abnormal; Notable for the following components:    Lipase 117 (*)     All other components within normal limits   URINALYSIS, REFLEX TO URINE CULTURE - Abnormal; Notable for the following components:    Glucose, UA 4+ (*)     All other components within normal limits    Narrative:     Preferred Collection Type->Urine, Clean Catch  Specimen Source->Urine   URINALYSIS MICROSCOPIC - Abnormal; Notable for the following components:    Hyaline Casts, UA 0.00 (*)     All other components within normal limits    Narrative:     Preferred Collection Type->Urine, Clean Catch  Specimen Source->Urine   URINALYSIS - Abnormal; Notable for the following components:    pH, UA >8.0 (*)     Glucose, UA 3+ (*)     All other components within normal limits    Narrative:     Specimen Source->Urine   URINALYSIS MICROSCOPIC - Abnormal; Notable for the following components:    Hyaline Casts, UA 0.00 (*)     All other components within normal limits    Narrative:     Specimen Source->Urine   CULTURE, BLOOD   CULTURE, BLOOD   B-TYPE NATRIURETIC PEPTIDE   DRUG SCREEN PANEL, URINE EMERGENCY    Narrative:     Specimen Source->Urine   MAGNESIUM   TROPONIN I   TSH   INFLUENZA A AND B ANTIGEN    Narrative:     Specimen Source->Nasopharyngeal Swab   LACTIC ACID, PLASMA   MAGNESIUM   B-TYPE NATRIURETIC PEPTIDE   TROPONIN I   TSH   APTT   PROTIME-INR   PROTIME-INR    APTT   TYPE & SCREEN          Imaging Results          CT Abdomen Pelvis With Contrast (In process)                CTA Chest Non-Coronary (PE Study) (Final result)  Result time 02/26/20 21:27:25    Final result by Carrillo Pelletier MD (02/26/20 21:27:25)                 Narrative:    CMS MANDATED QUALITY DATA - CT RADIATION  436    All CT scans at this facility utilize dose modulation, iterative  reconstruction, and/or weight based dosing when appropriate to reduce  radiation dose to as low as reasonably achievable.    Coronal reformatted MIP images were created on an independent  workstation, with images stored in the patient's permanent electronic  medical record.    CTA CHEST NON CORONARY    CLINICAL HISTORY:  40 years Male pleuritic pain    COMPARISON: None    FINDINGS: No central pulmonary embolism. Minor respiratory motion  degrades exam sensitivity regarding peripheral pulmonary embolism  detection. Within these limitations, no convincing segmental or  subsegmental pulmonary arterial filling defect is seen.    Aorta is normal in caliber. Central airways are patent. Esophagus  appears thick-walled. No pathologically enlarged mediastinal lymph  nodes. Chronic loss and linear airspace opacities involving both lower  lobes likely reflect atelectasis. Small left pleural effusion.  Bilateral gynecomastia. Bone window images demonstrate no acute or  aggressive osseous abnormality. Abdominal findings dictated  separately.    IMPRESSION:    Negative for pulmonary embolism.    Small left pleural effusion.    Bibasilar atelectasis, gynecomastia, and other incidental findings as  above.    Electronically Signed by Carrillo LEONE on 2/26/2020 10:38 PM                             X-Ray Chest AP Portable (In process)    Procedure changed from X-Ray Chest 1 View                  Medical Decision Making:   Clinical Tests:   Lab Tests: Reviewed  Radiological Study: Reviewed  Medical Tests: Reviewed  ED  Management:  Patient has been hemodynamically stable.  He has been hydrated.  Pain medication has been titrated.  Lipase is elevated.  CT is consistent with acute pancreatitis.  CTA of the chest was performed to rule out pulmonary embolism                   ED Course as of Feb 26 2249   Wed Feb 26, 2020   1600 39 yo male presenting with complaint of epigastric LUQ abdominal pain and elevated -120 at home.  States was seen here this weekend for pancreatitis and no improvement still in pain and BP uncontrolled and has taken his medication.      [ES]      ED Course User Index  [ES] Opal Dinh PA-C                Clinical Impression:       ICD-10-CM ICD-9-CM   1. Abdominal pain R10.9 789.00   2. Chest pain R07.9 786.50   3. Acute pancreatitis K85.90 577.0             ED Disposition Condition    Admit                           Poppy Jiang MD  02/27/20 9854

## 2020-02-28 PROBLEM — R50.9 FEVER: Status: RESOLVED | Noted: 2020-02-26 | Resolved: 2020-02-28

## 2020-02-28 LAB
ALBUMIN SERPL BCP-MCNC: 3.2 G/DL (ref 3.5–5.2)
ALP SERPL-CCNC: 68 U/L (ref 55–135)
ALT SERPL W/O P-5'-P-CCNC: 34 U/L (ref 10–44)
ANION GAP SERPL CALC-SCNC: 4 MMOL/L (ref 8–16)
AST SERPL-CCNC: 20 U/L (ref 10–40)
BASOPHILS # BLD AUTO: 0.05 K/UL (ref 0–0.2)
BASOPHILS NFR BLD: 0.8 % (ref 0–1.9)
BILIRUB SERPL-MCNC: 0.4 MG/DL (ref 0.1–1)
BUN SERPL-MCNC: <5 MG/DL (ref 6–20)
CALCIUM SERPL-MCNC: 8.8 MG/DL (ref 8.7–10.5)
CHLORIDE SERPL-SCNC: 109 MMOL/L (ref 95–110)
CO2 SERPL-SCNC: 26 MMOL/L (ref 23–29)
CREAT SERPL-MCNC: 1 MG/DL (ref 0.5–1.4)
CRP SERPL-MCNC: 11.99 MG/DL (ref 0–0.75)
DIFFERENTIAL METHOD: ABNORMAL
EOSINOPHIL # BLD AUTO: 0.3 K/UL (ref 0–0.5)
EOSINOPHIL NFR BLD: 5.1 % (ref 0–8)
ERYTHROCYTE [DISTWIDTH] IN BLOOD BY AUTOMATED COUNT: 17.8 % (ref 11.5–14.5)
EST. GFR  (AFRICAN AMERICAN): >60 ML/MIN/1.73 M^2
EST. GFR  (NON AFRICAN AMERICAN): >60 ML/MIN/1.73 M^2
GLUCOSE SERPL-MCNC: 114 MG/DL (ref 70–110)
HCT VFR BLD AUTO: 33.8 % (ref 40–54)
HGB BLD-MCNC: 10.4 G/DL (ref 14–18)
IMM GRANULOCYTES # BLD AUTO: 0.02 K/UL (ref 0–0.04)
IMM GRANULOCYTES NFR BLD AUTO: 0.3 % (ref 0–0.5)
LIPASE SERPL-CCNC: 43 U/L (ref 4–60)
LYMPHOCYTES # BLD AUTO: 1.5 K/UL (ref 1–4.8)
LYMPHOCYTES NFR BLD: 24.6 % (ref 18–48)
MAGNESIUM SERPL-MCNC: 2.2 MG/DL (ref 1.6–2.6)
MCH RBC QN AUTO: 26.1 PG (ref 27–31)
MCHC RBC AUTO-ENTMCNC: 30.8 G/DL (ref 32–36)
MCV RBC AUTO: 85 FL (ref 82–98)
MONOCYTES # BLD AUTO: 0.6 K/UL (ref 0.3–1)
MONOCYTES NFR BLD: 9.6 % (ref 4–15)
NEUTROPHILS # BLD AUTO: 3.5 K/UL (ref 1.8–7.7)
NEUTROPHILS NFR BLD: 59.6 % (ref 38–73)
NRBC BLD-RTO: 0 /100 WBC
PLATELET # BLD AUTO: 274 K/UL (ref 150–350)
PMV BLD AUTO: 10.6 FL (ref 9.2–12.9)
POTASSIUM SERPL-SCNC: 4.3 MMOL/L (ref 3.5–5.1)
PROT SERPL-MCNC: 6.3 G/DL (ref 6–8.4)
RBC # BLD AUTO: 3.99 M/UL (ref 4.6–6.2)
SODIUM SERPL-SCNC: 139 MMOL/L (ref 136–145)
WBC # BLD AUTO: 5.94 K/UL (ref 3.9–12.7)

## 2020-02-28 PROCEDURE — 63600175 PHARM REV CODE 636 W HCPCS: Performed by: INTERNAL MEDICINE

## 2020-02-28 PROCEDURE — 25000003 PHARM REV CODE 250: Performed by: INTERNAL MEDICINE

## 2020-02-28 PROCEDURE — 83735 ASSAY OF MAGNESIUM: CPT

## 2020-02-28 PROCEDURE — 83690 ASSAY OF LIPASE: CPT

## 2020-02-28 PROCEDURE — 25000003 PHARM REV CODE 250: Performed by: FAMILY MEDICINE

## 2020-02-28 PROCEDURE — 85025 COMPLETE CBC W/AUTO DIFF WBC: CPT

## 2020-02-28 PROCEDURE — 36415 COLL VENOUS BLD VENIPUNCTURE: CPT

## 2020-02-28 PROCEDURE — 63600175 PHARM REV CODE 636 W HCPCS: Performed by: FAMILY MEDICINE

## 2020-02-28 PROCEDURE — 21400001 HC TELEMETRY ROOM

## 2020-02-28 PROCEDURE — 80053 COMPREHEN METABOLIC PANEL: CPT

## 2020-02-28 PROCEDURE — 86140 C-REACTIVE PROTEIN: CPT

## 2020-02-28 RX ORDER — HYDROCODONE BITARTRATE AND ACETAMINOPHEN 10; 325 MG/1; MG/1
1 TABLET ORAL EVERY 6 HOURS PRN
Status: DISCONTINUED | OUTPATIENT
Start: 2020-02-28 | End: 2020-02-29 | Stop reason: HOSPADM

## 2020-02-28 RX ADMIN — HYDROXYZINE PAMOATE 25 MG: 25 CAPSULE ORAL at 01:02

## 2020-02-28 RX ADMIN — GABAPENTIN 300 MG: 300 CAPSULE ORAL at 08:02

## 2020-02-28 RX ADMIN — SODIUM CHLORIDE: 0.9 INJECTION, SOLUTION INTRAVENOUS at 06:02

## 2020-02-28 RX ADMIN — SODIUM CHLORIDE: 0.9 INJECTION, SOLUTION INTRAVENOUS at 03:02

## 2020-02-28 RX ADMIN — HYDROCODONE BITARTRATE AND ACETAMINOPHEN 1 TABLET: 10; 325 TABLET ORAL at 01:02

## 2020-02-28 RX ADMIN — METOPROLOL SUCCINATE 25 MG: 25 TABLET, FILM COATED, EXTENDED RELEASE ORAL at 08:02

## 2020-02-28 RX ADMIN — SERTRALINE HYDROCHLORIDE 50 MG: 50 TABLET ORAL at 10:02

## 2020-02-28 RX ADMIN — PANTOPRAZOLE SODIUM 40 MG: 40 TABLET, DELAYED RELEASE ORAL at 06:02

## 2020-02-28 RX ADMIN — QUETIAPINE 200 MG: 100 TABLET ORAL at 08:02

## 2020-02-28 RX ADMIN — MORPHINE SULFATE 2 MG: 2 INJECTION, SOLUTION INTRAMUSCULAR; INTRAVENOUS at 02:02

## 2020-02-28 RX ADMIN — ARIPIPRAZOLE 10 MG: 10 TABLET ORAL at 10:02

## 2020-02-28 RX ADMIN — LEVOTHYROXINE SODIUM 50 MCG: 25 TABLET ORAL at 06:02

## 2020-02-28 RX ADMIN — MIRTAZAPINE 30 MG: 15 TABLET, FILM COATED ORAL at 08:02

## 2020-02-28 RX ADMIN — GABAPENTIN 300 MG: 300 CAPSULE ORAL at 02:02

## 2020-02-28 RX ADMIN — MORPHINE SULFATE 2 MG: 2 INJECTION, SOLUTION INTRAMUSCULAR; INTRAVENOUS at 03:02

## 2020-02-28 RX ADMIN — HYDROMORPHONE HYDROCHLORIDE 1 MG: 1 INJECTION, SOLUTION INTRAMUSCULAR; INTRAVENOUS; SUBCUTANEOUS at 06:02

## 2020-02-28 RX ADMIN — GABAPENTIN 300 MG: 300 CAPSULE ORAL at 10:02

## 2020-02-28 RX ADMIN — HYDROMORPHONE HYDROCHLORIDE 1 MG: 1 INJECTION, SOLUTION INTRAMUSCULAR; INTRAVENOUS; SUBCUTANEOUS at 12:02

## 2020-02-28 RX ADMIN — MORPHINE SULFATE 2 MG: 2 INJECTION, SOLUTION INTRAMUSCULAR; INTRAVENOUS at 08:02

## 2020-02-28 RX ADMIN — HYDROXYZINE PAMOATE 25 MG: 25 CAPSULE ORAL at 08:02

## 2020-02-28 RX ADMIN — MORPHINE SULFATE 2 MG: 2 INJECTION, SOLUTION INTRAMUSCULAR; INTRAVENOUS at 10:02

## 2020-02-28 RX ADMIN — MEROPENEM 500 MG: 500 INJECTION, POWDER, FOR SOLUTION INTRAVENOUS at 06:02

## 2020-02-28 RX ADMIN — HYDROXYZINE PAMOATE 25 MG: 25 CAPSULE ORAL at 10:02

## 2020-02-28 RX ADMIN — SODIUM CHLORIDE: 0.9 INJECTION, SOLUTION INTRAVENOUS at 11:02

## 2020-02-28 RX ADMIN — HYDROXYZINE PAMOATE 25 MG: 25 CAPSULE ORAL at 04:02

## 2020-02-28 NOTE — PLAN OF CARE
Problem: Adult Inpatient Plan of Care  Goal: Plan of Care Review  Outcome: Ongoing, Progressing     Problem: Fall Injury Risk  Goal: Absence of Fall and Fall-Related Injury  Outcome: Ongoing, Progressing  Intervention: Promote Injury-Free Environment  Flowsheets (Taken 2/28/2020 6249)  Safety Promotion/Fall Prevention: assistive device/personal item within reach; bed alarm refused; commode/urinal/bedpan at bedside; Fall Risk reviewed with patient/family; medications reviewed; side rails raised x 2

## 2020-02-28 NOTE — PROGRESS NOTES
[facility] Medicine  Progress Note    Patient Name: Bradley Collado  MRN: [id number]  Patient Class: IP- Inpatient   Admission Date: 2/26/2020  Length of Stay: 2 days  Attending Physician: Gage Cueva MD  Primary Care Provider: Arlen Belcher NP        Subjective:     Principal Problem:Acute on chronic pancreatitis      Interval History:  Patient reports feeling okay.  Abdominal pain was well controlled.  Tolerating clear liquids.  Advance diet to bland diet.  Decrease IV pain regimen.  Started p.o. pain medications.  Blood cultures negative.  Patient afebrile the past 48 hr.  Discontinue antibiotics.  Patient seen ambulating around the halls comfortably.    Review of Systems   Constitutional: Negative for chills, fatigue, fever and unexpected weight change.   HENT: Negative for sore throat.    Eyes: Negative for visual disturbance.   Respiratory: Negative for cough, chest tightness and shortness of breath.    Cardiovascular: Negative for chest pain.   Gastrointestinal: Negative for abdominal pain, constipation, diarrhea, nausea and vomiting.   Endocrine: Negative for polyuria.   Genitourinary: Negative for dysuria and hematuria.   Neurological: Negative for headaches.     Objective:     Vital Signs (Most Recent):  Temp: 99 °F (37.2 °C) (02/28/20 1100)  Pulse: 63 (02/28/20 1100)  Resp: 17 (02/28/20 1100)  BP: 130/85 (02/28/20 1100)  SpO2: 100 % (02/28/20 1100) Vital Signs (24h Range):  Temp:  [98.2 °F (36.8 °C)-99 °F (37.2 °C)] 99 °F (37.2 °C)  Pulse:  [63-77] 63  Resp:  [12-17] 17  SpO2:  [97 %-100 %] 100 %  BP: (112-161)/(75-95) 130/85     Weight: 76.8 kg (169 lb 5 oz)  Body mass index is 26.52 kg/m².    Intake/Output Summary (Last 24 hours) at 2/28/2020 1259  Last data filed at 2/28/2020 0800  Gross per 24 hour   Intake 5860 ml   Output --   Net 5860 ml      Physical Exam   Constitutional: He is oriented to person, place, and time. He appears well-developed and well-nourished. No  distress.   HENT:   Head: Normocephalic and atraumatic.   Eyes: EOM are normal.   Neck: Normal range of motion and full passive range of motion without pain.   Cardiovascular: Normal rate and regular rhythm.   Pulmonary/Chest: Effort normal and breath sounds normal. No respiratory distress.   Abdominal: Soft. Bowel sounds are normal.   Epigastric tenderness   Musculoskeletal: Normal range of motion.   Neurological: He is alert and oriented to person, place, and time.   Skin: Skin is warm. He is not diaphoretic.   Psychiatric: He has a normal mood and affect. His behavior is normal. Judgment and thought content normal.   Nursing note and vitals reviewed.      Significant Labs:   CBC:   Recent Labs   Lab 02/26/20  1631 02/27/20  0320 02/28/20  0449   WBC 10.72 8.66 5.94   HGB 11.3* 9.9* 10.4*   HCT 36.9* 32.6* 33.8*    268 274     CMP:   Recent Labs   Lab 02/26/20  1631 02/27/20  0320 02/28/20  0449    140 139   K 4.4 4.2 4.3    107 109   CO2 26 26 26   * 107 114*   BUN 7 7 <5*   CREATININE 1.2 1.0 1.0   CALCIUM 9.2 8.6* 8.8   PROT 6.8 6.1 6.3   ALBUMIN 3.7 3.1* 3.2*   BILITOT 0.5 0.6 0.4   ALKPHOS 79 64 68   AST 36 20 20   ALT 53* 39 34   ANIONGAP 8 7* 4*   EGFRNONAA >60.0 >60.0 >60.0     Lipase:   Recent Labs   Lab 02/26/20  1631 02/28/20  0449   LIPASE 117* 43           Assessment/Plan:      32-year-old male history of chronic pancreatitis with history of pseudocysts, hypertension, depression/bipolar, schizophrenia, hypothyroidism admitted for acute on chronic pancreatitis.    Active Hospital Problems    Diagnosis    *Acute on chronic pancreatitis    Essential hypertension    ETOH abuse    Depression    Pancreatic pseudocyst    Hypothyroidism    Hypertension       * Acute on chronic pancreatitis  IVF  IV Dilaudid p.r.n. Pain  PO pain meds  [place]  Likely discharge tomorrow    Essential hypertension  The blood pressure levels on is high normal range  Will carefully  monitor      Depression  Patient suffers from severe depression/bipolar disorder  Admission to inpatient psych facility 2 weeks ago  Patient denies any suicidal or homicidal ideation      ETOH abuse  Per patient his last drink was 2 weeks ago      VTE Risk Mitigation (From admission, onward)         Ordered     IP VTE LOW RISK PATIENT  Once      02/26/20 2201     Place MARIS hose  Until discontinued      02/26/20 2201     Place sequential compression device  Until discontinued      02/26/20 2201                      Gage Cueva MD  Department of Hospital Medicine   [facility]  Date of service: 02/28/2020

## 2020-02-29 VITALS
RESPIRATION RATE: 18 BRPM | OXYGEN SATURATION: 99 % | BODY MASS INDEX: 26.3 KG/M2 | SYSTOLIC BLOOD PRESSURE: 169 MMHG | HEIGHT: 67 IN | HEART RATE: 60 BPM | WEIGHT: 167.56 LBS | DIASTOLIC BLOOD PRESSURE: 87 MMHG | TEMPERATURE: 98 F

## 2020-02-29 PROBLEM — K86.1 CHRONIC PANCREATITIS: Chronic | Status: ACTIVE | Noted: 2020-02-26

## 2020-02-29 LAB
ALBUMIN SERPL BCP-MCNC: 3 G/DL (ref 3.5–5.2)
ALP SERPL-CCNC: 63 U/L (ref 55–135)
ALT SERPL W/O P-5'-P-CCNC: 28 U/L (ref 10–44)
ANION GAP SERPL CALC-SCNC: 4 MMOL/L (ref 8–16)
AST SERPL-CCNC: 17 U/L (ref 10–40)
BASOPHILS # BLD AUTO: 0.04 K/UL (ref 0–0.2)
BASOPHILS NFR BLD: 0.7 % (ref 0–1.9)
BILIRUB SERPL-MCNC: 0.3 MG/DL (ref 0.1–1)
BUN SERPL-MCNC: <5 MG/DL (ref 6–20)
CALCIUM SERPL-MCNC: 8.8 MG/DL (ref 8.7–10.5)
CHLORIDE SERPL-SCNC: 111 MMOL/L (ref 95–110)
CO2 SERPL-SCNC: 26 MMOL/L (ref 23–29)
CREAT SERPL-MCNC: 1 MG/DL (ref 0.5–1.4)
CRP SERPL-MCNC: 5.17 MG/DL (ref 0–0.75)
DIFFERENTIAL METHOD: ABNORMAL
EOSINOPHIL # BLD AUTO: 0.3 K/UL (ref 0–0.5)
EOSINOPHIL NFR BLD: 4.6 % (ref 0–8)
ERYTHROCYTE [DISTWIDTH] IN BLOOD BY AUTOMATED COUNT: 17.6 % (ref 11.5–14.5)
EST. GFR  (AFRICAN AMERICAN): >60 ML/MIN/1.73 M^2
EST. GFR  (NON AFRICAN AMERICAN): >60 ML/MIN/1.73 M^2
GLUCOSE SERPL-MCNC: 121 MG/DL (ref 70–110)
HCT VFR BLD AUTO: 34.1 % (ref 40–54)
HGB BLD-MCNC: 10.6 G/DL (ref 14–18)
IMM GRANULOCYTES # BLD AUTO: 0.02 K/UL (ref 0–0.04)
IMM GRANULOCYTES NFR BLD AUTO: 0.4 % (ref 0–0.5)
LIPASE SERPL-CCNC: 37 U/L (ref 4–60)
LYMPHOCYTES # BLD AUTO: 1.4 K/UL (ref 1–4.8)
LYMPHOCYTES NFR BLD: 24.5 % (ref 18–48)
MAGNESIUM SERPL-MCNC: 2.2 MG/DL (ref 1.6–2.6)
MCH RBC QN AUTO: 26.2 PG (ref 27–31)
MCHC RBC AUTO-ENTMCNC: 31.1 G/DL (ref 32–36)
MCV RBC AUTO: 84 FL (ref 82–98)
MONOCYTES # BLD AUTO: 0.5 K/UL (ref 0.3–1)
MONOCYTES NFR BLD: 8.8 % (ref 4–15)
NEUTROPHILS # BLD AUTO: 3.5 K/UL (ref 1.8–7.7)
NEUTROPHILS NFR BLD: 61 % (ref 38–73)
NRBC BLD-RTO: 0 /100 WBC
PLATELET # BLD AUTO: 321 K/UL (ref 150–350)
PMV BLD AUTO: 10.4 FL (ref 9.2–12.9)
POTASSIUM SERPL-SCNC: 4.2 MMOL/L (ref 3.5–5.1)
PROT SERPL-MCNC: 6.1 G/DL (ref 6–8.4)
RBC # BLD AUTO: 4.05 M/UL (ref 4.6–6.2)
SODIUM SERPL-SCNC: 141 MMOL/L (ref 136–145)
WBC # BLD AUTO: 5.67 K/UL (ref 3.9–12.7)

## 2020-02-29 PROCEDURE — 85025 COMPLETE CBC W/AUTO DIFF WBC: CPT

## 2020-02-29 PROCEDURE — 83690 ASSAY OF LIPASE: CPT

## 2020-02-29 PROCEDURE — 25000003 PHARM REV CODE 250: Performed by: INTERNAL MEDICINE

## 2020-02-29 PROCEDURE — 36415 COLL VENOUS BLD VENIPUNCTURE: CPT

## 2020-02-29 PROCEDURE — 83735 ASSAY OF MAGNESIUM: CPT

## 2020-02-29 PROCEDURE — 25000003 PHARM REV CODE 250: Performed by: FAMILY MEDICINE

## 2020-02-29 PROCEDURE — 86140 C-REACTIVE PROTEIN: CPT

## 2020-02-29 PROCEDURE — 80053 COMPREHEN METABOLIC PANEL: CPT

## 2020-02-29 PROCEDURE — 63600175 PHARM REV CODE 636 W HCPCS: Performed by: INTERNAL MEDICINE

## 2020-02-29 RX ORDER — HYDROCODONE BITARTRATE AND ACETAMINOPHEN 10; 325 MG/1; MG/1
1 TABLET ORAL EVERY 6 HOURS PRN
Qty: 30 TABLET | Refills: 0 | Status: ON HOLD | OUTPATIENT
Start: 2020-02-29 | End: 2020-03-10 | Stop reason: HOSPADM

## 2020-02-29 RX ORDER — HYDROCODONE BITARTRATE AND ACETAMINOPHEN 10; 325 MG/1; MG/1
1 TABLET ORAL EVERY 6 HOURS PRN
Qty: 30 TABLET | Refills: 0 | Status: ON HOLD
Start: 2020-02-29 | End: 2020-03-10 | Stop reason: SDUPTHER

## 2020-02-29 RX ADMIN — SODIUM CHLORIDE: 0.9 INJECTION, SOLUTION INTRAVENOUS at 05:02

## 2020-02-29 RX ADMIN — LEVOTHYROXINE SODIUM 50 MCG: 25 TABLET ORAL at 05:02

## 2020-02-29 RX ADMIN — HYDROCODONE BITARTRATE AND ACETAMINOPHEN 1 TABLET: 10; 325 TABLET ORAL at 11:02

## 2020-02-29 RX ADMIN — SERTRALINE HYDROCHLORIDE 50 MG: 50 TABLET ORAL at 08:02

## 2020-02-29 RX ADMIN — HYDROXYZINE PAMOATE 25 MG: 25 CAPSULE ORAL at 08:02

## 2020-02-29 RX ADMIN — PANTOPRAZOLE SODIUM 40 MG: 40 TABLET, DELAYED RELEASE ORAL at 05:02

## 2020-02-29 RX ADMIN — HYDROCODONE BITARTRATE AND ACETAMINOPHEN 1 TABLET: 10; 325 TABLET ORAL at 05:02

## 2020-02-29 RX ADMIN — GABAPENTIN 300 MG: 300 CAPSULE ORAL at 08:02

## 2020-02-29 RX ADMIN — ARIPIPRAZOLE 10 MG: 10 TABLET ORAL at 08:02

## 2020-02-29 NOTE — HOSPITAL COURSE
Patient admitted for acute on chronic pancreatitis as seen on CT abdomen and elevated lipase.  Patient was recently discharged home however developed worsening epigastric pain and came back to the ED.  Patient was placed on NPO, IV fluids, pain control.  Patient's symptoms improved during hospitalization.  Patient tolerated diet and required no IV pain medications.  Patient reports that he was ready to go home at that point.  Lipase normalized.  Patient was stable at discharge to home with instructions to follow up with PCP. Discontinued HCTZ due to risk of pancreatitis.    General: Patient resting comfortably in no acute distress. Appears as stated age. Calm  Eyes: EOM intact. No conjunctivae injection. No scleral icterus.  ENT: Hearing grossly intact. No discharge from ears. No nasal discharge.   CVS: RRR. No LE edema BL.  Lungs: CTA BL, no wheezing or crackles. Good breath sounds. No accessory muscle use. No acute respiratory distress  Neuro: AOx3. GCS 15. Cranial nerves grossly intact. Moves all extremities equally. Follows commands. Responds appropriately

## 2020-02-29 NOTE — DISCHARGE SUMMARY
[facility] Medicine  Discharge Summary      Patient Name: Bradley Collado  MRN: [id number]  Admission Date: 2/26/2020  Hospital Length of Stay: 3 days  Discharge Date and Time:  02/29/2020 4:06 PM  Attending Physician: No att. providers found   Discharging Provider: Gage Cueva MD  Primary Care Provider: Arlen Belcher NP      HPI:   40-year-old patient getting admitted with acute on chronic pancreatitis  Patient was in inpatient psych facility for severe depression/bipolar disorder and was discharged from the facility 2 weeks ago  After a few days he got admitted in this hospital and was discharged 2 days ago, for acute on chronic pancreatitis  Patient was doing fine when he went home but later on he started having severe abdominal pain  Pain mostly on the epigastric area with radiation of pain to the back  Describes pain as constant/no aggravating and relieving factors  He denies any nausea vomiting or diarrhea  He was found to have high-grade fever in the emergency room and subsequently he got admitted  CTA of the chest and CT abdomen/pelvis was ordered from the ER and the results are still pending while dictating this note    * No surgery found *      Hospital Course:   Patient admitted for acute on chronic pancreatitis as seen on CT abdomen and elevated lipase.  Patient was recently discharged home however developed worsening epigastric pain and came back to the ED.  Patient was placed on NPO, IV fluids, pain control.  Patient's symptoms improved during hospitalization.  Patient tolerated diet and required no IV pain medications.  Patient reports that he was ready to go home at that point.  Lipase normalized.  Patient was stable at discharge to home with instructions to follow up with PCP. Discontinued HCTZ due to risk of pancreatitis.    General: Patient resting comfortably in no acute distress. Appears as stated age. Calm  Eyes: EOM intact. No conjunctivae injection. No  scleral icterus.  ENT: Hearing grossly intact. No discharge from ears. No nasal discharge.   CVS: RRR. No LE edema BL.  Lungs: CTA BL, no wheezing or crackles. Good breath sounds. No accessory muscle use. No acute respiratory distress  Neuro: AOx3. GCS 15. Cranial nerves grossly intact. Moves all extremities equally. Follows commands. Responds appropriately      Consults:     * Chronic pancreatitis  IVF  IV Dilaudid p.r.n. Pain  PO pain meds  [place]  Likely discharge tomorrow    Final Active Diagnoses:    Diagnosis Date Noted POA    PRINCIPAL PROBLEM:  Chronic pancreatitis [K86.1] 02/26/2020 Yes     Chronic    Essential hypertension [I10] 04/07/2016 Yes     Chronic    ETOH abuse [F10.10] 04/05/2016 Yes    Depression [F32.9] 04/05/2016 Yes    Pancreatic pseudocyst [K86.3] 04/05/2016 Yes     Chronic    Hypothyroidism [E03.9] 04/05/2016 Yes     Chronic    Hypertension [I10] 04/05/2016 Yes     Chronic      Problems Resolved During this Admission:    Diagnosis Date Noted Date Resolved POA    Fever [R50.9] 02/26/2020 02/28/2020 Yes       Discharged Condition: stable    Disposition: Home or Self Care    Follow Up:  Follow-up Information     Arlen Belcher NP In 1 week.    Specialty:  Family Medicine  Why:  For check up s/p hospital discharge  Contact information:  [address] [place] [address]-[facility] [address]  [phone]                 Patient Instructions:      Diet Cardiac     Notify your health care provider if you experience any of the following:  temperature >100.4     Notify your health care provider if you experience any of the following:  persistent nausea and vomiting or diarrhea     Notify your health care provider if you experience any of the following:  severe uncontrolled pain     Notify your health care provider if you experience any of the following:  redness, tenderness, or signs of infection (pain, swelling, redness, odor or green/yellow discharge around incision  site)     Notify your health care provider if you experience any of the following:  difficulty breathing or increased cough     Notify your health care provider if you experience any of the following:  severe persistent headache     Notify your health care provider if you experience any of the following:  worsening rash     Notify your health care provider if you experience any of the following:  persistent dizziness, light-headedness, or visual disturbances     Notify your health care provider if you experience any of the following:  increased confusion or weakness     Activity as tolerated       Significant Diagnostic Studies: Labs:   CMP   Recent Labs   Lab 02/28/20  0449 02/29/20  0502    141   K 4.3 4.2    111*   CO2 26 26   * 121*   BUN <5* <5*   CREATININE 1.0 1.0   CALCIUM 8.8 8.8   PROT 6.3 6.1   ALBUMIN 3.2* 3.0*   BILITOT 0.4 0.3   ALKPHOS 68 63   AST 20 17   ALT 34 28   ANIONGAP 4* 4*   ESTGFRAFRICA >60.0 >60.0   EGFRNONAA >60.0 >60.0    and CBC   Recent Labs   Lab 02/28/20  0449 02/29/20  0502   WBC 5.94 5.67   HGB 10.4* 10.6*   HCT 33.8* 34.1*    321       Pending Diagnostic Studies:     None         Medications:  Reconciled Home Medications:      Medication List      CHANGE how you take these medications    * HYDROcodone-acetaminophen  mg per tablet  Commonly known as:  NORCO  Take 1 tablet by mouth every 6 (six) hours as needed for Pain.  What changed:  Another medication with the same name was added. Make sure you understand how and when to take each.     * HYDROcodone-acetaminophen  mg per tablet  Commonly known as:  NORCO  Take 1 tablet by mouth every 6 (six) hours as needed.  What changed:  You were already taking a medication with the same name, and this prescription was added. Make sure you understand how and when to take each.     QUEtiapine 200 MG Tab  Commonly known as:  SEROQUEL  Take 1 tablet (200 mg total) by mouth nightly as needed (insomnia).  What  changed:  how much to take         * This list has 2 medication(s) that are the same as other medications prescribed for you. Read the directions carefully, and ask your doctor or other care provider to review them with you.            CONTINUE taking these medications    ARIPiprazole 10 MG Tab  Commonly known as:  ABILIFY  Take 1 tablet (10 mg total) by mouth once daily.     cloNIDine 0.1 mg/24 hr td ptwk 0.1 mg/24 hr  Commonly known as:  CATAPRES  Place 1 patch onto the skin every 7 days.     gabapentin 300 MG capsule  Commonly known as:  NEURONTIN  Take 1 capsule (300 mg total) by mouth 3 (three) times daily.     hydrOXYzine pamoate 25 MG Cap  Commonly known as:  VISTARIL  Take 25 mg by mouth 4 (four) times daily.     levothyroxine 50 MCG tablet  Commonly known as:  SYNTHROID  Take 1 tablet (50 mcg total) by mouth before breakfast.     lisinopril 40 MG tablet  Commonly known as:  PRINIVIL,ZESTRIL  Take 1 tablet (40 mg total) by mouth once daily.     metoprolol succinate 25 MG 24 hr tablet  Commonly known as:  TOPROL-XL  Take 1 tablet (25 mg total) by mouth nightly.     mirtazapine 30 MG tablet  Commonly known as:  REMERON  Take 1 tablet (30 mg total) by mouth nightly.     ondansetron 4 MG tablet  Commonly known as:  ZOFRAN  Take 1 tablet (4 mg total) by mouth every 6 (six) hours as needed for Nausea.     pantoprazole 40 MG tablet  Commonly known as:  PROTONIX  Take 40 mg by mouth once daily.     sertraline 50 MG tablet  Commonly known as:  ZOLOFT  Take 1 tablet (50 mg total) by mouth once daily.        STOP taking these medications    hydroCHLOROthiazide 25 MG tablet  Commonly known as:  HYDRODIURIL            Indwelling Lines/Drains at time of discharge:   Lines/Drains/Airways     None                 Time spent on the discharge of patient: 35 minutes  Patient was seen and examined on the date of discharge and determined to be suitable for discharge.         Gage Cueva MD  Department of Hospital  Medicine  [facility]  Date of service: 02/29/2020

## 2020-02-29 NOTE — NURSING
Received report from REGINA Dennis. When entering room pt lying on left side sleeping. Respirations even and unlabored, no distress noted. NS infusing, call light in reach, brakes locked, side rails up x2. Will continue to monitor.

## 2020-02-29 NOTE — PLAN OF CARE
Problem: Adult Inpatient Plan of Care  Goal: Plan of Care Review  Outcome: Ongoing, Progressing  Goal: Optimal Comfort and Wellbeing  Outcome: Ongoing, Progressing  Intervention: Provide Person-Centered Care  Flowsheets (Taken 2/29/2020 0405)  Trust Relationship/Rapport: care explained; choices provided; questions answered; questions encouraged     Problem: Fall Injury Risk  Goal: Absence of Fall and Fall-Related Injury  Outcome: Ongoing, Progressing  Intervention: Promote Injury-Free Environment  Flowsheets (Taken 2/29/2020 0405)  Safety Promotion/Fall Prevention: assistive device/personal item within reach; bed alarm refused; commode/urinal/bedpan at bedside; Fall Risk reviewed with patient/family; medications reviewed; side rails raised x 2

## 2020-02-29 NOTE — NURSING
Pt d/c to home. Medication and d/c instructions given to pt at bedside. Pain medication escribed to pharmacy [facility]. Pt verbalized understanding. Left unit via ambulation to ride waiting outside hospital.

## 2020-03-02 LAB
BACTERIA BLD CULT: NORMAL
BACTERIA BLD CULT: NORMAL

## 2020-03-07 ENCOUNTER — HOSPITAL ENCOUNTER (INPATIENT)
Facility: HOSPITAL | Age: 40
LOS: 3 days | Discharge: HOME | DRG: 419 | End: 2020-03-10
Attending: EMERGENCY MEDICINE | Admitting: INTERNAL MEDICINE
Payer: MEDICAID

## 2020-03-07 DIAGNOSIS — R10.13 EPIGASTRIC PAIN: ICD-10-CM

## 2020-03-07 DIAGNOSIS — K85.90 ACUTE ON CHRONIC PANCREATITIS: Primary | ICD-10-CM

## 2020-03-07 DIAGNOSIS — K86.1 ACUTE ON CHRONIC PANCREATITIS: Primary | ICD-10-CM

## 2020-03-07 PROBLEM — E87.5 HYPERKALEMIA: Status: ACTIVE | Noted: 2020-03-07

## 2020-03-07 LAB
ALBUMIN SERPL BCP-MCNC: 3.8 G/DL (ref 3.5–5.2)
ALP SERPL-CCNC: 94 U/L (ref 55–135)
ALT SERPL W/O P-5'-P-CCNC: 42 U/L (ref 10–44)
ANION GAP SERPL CALC-SCNC: 11 MMOL/L (ref 8–16)
AST SERPL-CCNC: 44 U/L (ref 10–40)
BASOPHILS # BLD AUTO: 0.05 K/UL (ref 0–0.2)
BASOPHILS NFR BLD: 0.5 % (ref 0–1.9)
BILIRUB SERPL-MCNC: 0.2 MG/DL (ref 0.1–1)
BUN SERPL-MCNC: 9 MG/DL (ref 6–20)
CALCIUM SERPL-MCNC: 8.9 MG/DL (ref 8.7–10.5)
CHLORIDE SERPL-SCNC: 98 MMOL/L (ref 95–110)
CO2 SERPL-SCNC: 24 MMOL/L (ref 23–29)
CREAT SERPL-MCNC: 1.2 MG/DL (ref 0.5–1.4)
DIFFERENTIAL METHOD: ABNORMAL
EOSINOPHIL # BLD AUTO: 0.3 K/UL (ref 0–0.5)
EOSINOPHIL NFR BLD: 2.8 % (ref 0–8)
ERYTHROCYTE [DISTWIDTH] IN BLOOD BY AUTOMATED COUNT: 17.8 % (ref 11.5–14.5)
EST. GFR  (AFRICAN AMERICAN): >60 ML/MIN/1.73 M^2
EST. GFR  (NON AFRICAN AMERICAN): >60 ML/MIN/1.73 M^2
ETHANOL SERPL-MCNC: <10 MG/DL
GLUCOSE SERPL-MCNC: 158 MG/DL (ref 70–110)
HCT VFR BLD AUTO: 35 % (ref 40–54)
HGB BLD-MCNC: 10.4 G/DL (ref 14–18)
IMM GRANULOCYTES # BLD AUTO: 0.07 K/UL (ref 0–0.04)
LIPASE SERPL-CCNC: 364 U/L (ref 4–60)
LYMPHOCYTES # BLD AUTO: 1.3 K/UL (ref 1–4.8)
LYMPHOCYTES NFR BLD: 12.8 % (ref 18–48)
MCH RBC QN AUTO: 25.5 PG (ref 27–31)
MCHC RBC AUTO-ENTMCNC: 29.7 G/DL (ref 32–36)
MCV RBC AUTO: 86 FL (ref 82–98)
MONOCYTES # BLD AUTO: 0.7 K/UL (ref 0.3–1)
MONOCYTES NFR BLD: 6.9 % (ref 4–15)
NEUTROPHILS # BLD AUTO: 8 K/UL (ref 1.8–7.7)
NEUTROPHILS NFR BLD: 76.3 % (ref 38–73)
NRBC BLD-RTO: 0 /100 WBC
PLATELET # BLD AUTO: 352 K/UL (ref 150–350)
PMV BLD AUTO: 10.8 FL (ref 9.2–12.9)
POTASSIUM SERPL-SCNC: 5.6 MMOL/L (ref 3.5–5.1)
PROT SERPL-MCNC: 7.7 G/DL (ref 6–8.4)
RBC # BLD AUTO: 4.08 M/UL (ref 4.6–6.2)
SODIUM SERPL-SCNC: 133 MMOL/L (ref 136–145)
WBC # BLD AUTO: 10.44 K/UL (ref 3.9–12.7)

## 2020-03-07 PROCEDURE — 85025 COMPLETE CBC W/AUTO DIFF WBC: CPT

## 2020-03-07 PROCEDURE — 96375 TX/PRO/DX INJ NEW DRUG ADDON: CPT

## 2020-03-07 PROCEDURE — 12000002 HC ACUTE/MED SURGE SEMI-PRIVATE ROOM

## 2020-03-07 PROCEDURE — 99285 EMERGENCY DEPT VISIT HI MDM: CPT | Mod: 25

## 2020-03-07 PROCEDURE — 83690 ASSAY OF LIPASE: CPT

## 2020-03-07 PROCEDURE — 63600175 PHARM REV CODE 636 W HCPCS: Performed by: INTERNAL MEDICINE

## 2020-03-07 PROCEDURE — 36415 COLL VENOUS BLD VENIPUNCTURE: CPT

## 2020-03-07 PROCEDURE — 63600175 PHARM REV CODE 636 W HCPCS: Performed by: PHYSICIAN ASSISTANT

## 2020-03-07 PROCEDURE — 80320 DRUG SCREEN QUANTALCOHOLS: CPT

## 2020-03-07 PROCEDURE — 96374 THER/PROPH/DIAG INJ IV PUSH: CPT

## 2020-03-07 PROCEDURE — 25000003 PHARM REV CODE 250: Performed by: INTERNAL MEDICINE

## 2020-03-07 PROCEDURE — 80053 COMPREHEN METABOLIC PANEL: CPT

## 2020-03-07 RX ORDER — ARIPIPRAZOLE 5 MG/1
10 TABLET ORAL DAILY
Status: DISCONTINUED | OUTPATIENT
Start: 2020-03-08 | End: 2020-03-10 | Stop reason: HOSPADM

## 2020-03-07 RX ORDER — CLONIDINE 0.1 MG/24H
1 PATCH, EXTENDED RELEASE TRANSDERMAL
Status: DISCONTINUED | OUTPATIENT
Start: 2020-03-08 | End: 2020-03-10 | Stop reason: HOSPADM

## 2020-03-07 RX ORDER — POTASSIUM CHLORIDE 20 MEQ/15ML
40 SOLUTION ORAL
Status: DISCONTINUED | OUTPATIENT
Start: 2020-03-07 | End: 2020-03-10 | Stop reason: HOSPADM

## 2020-03-07 RX ORDER — MORPHINE SULFATE 2 MG/ML
6 INJECTION, SOLUTION INTRAMUSCULAR; INTRAVENOUS EVERY 4 HOURS PRN
Status: DISCONTINUED | OUTPATIENT
Start: 2020-03-07 | End: 2020-03-08

## 2020-03-07 RX ORDER — MORPHINE SULFATE 4 MG/ML
4 INJECTION, SOLUTION INTRAMUSCULAR; INTRAVENOUS EVERY 4 HOURS PRN
Status: DISCONTINUED | OUTPATIENT
Start: 2020-03-07 | End: 2020-03-08

## 2020-03-07 RX ORDER — IBUPROFEN 200 MG
24 TABLET ORAL
Status: DISCONTINUED | OUTPATIENT
Start: 2020-03-07 | End: 2020-03-10 | Stop reason: HOSPADM

## 2020-03-07 RX ORDER — METOPROLOL SUCCINATE 25 MG/1
25 TABLET, EXTENDED RELEASE ORAL NIGHTLY
Status: DISCONTINUED | OUTPATIENT
Start: 2020-03-07 | End: 2020-03-10 | Stop reason: HOSPADM

## 2020-03-07 RX ORDER — HYDROMORPHONE HYDROCHLORIDE 2 MG/ML
0.5 INJECTION, SOLUTION INTRAMUSCULAR; INTRAVENOUS; SUBCUTANEOUS
Status: COMPLETED | OUTPATIENT
Start: 2020-03-07 | End: 2020-03-07

## 2020-03-07 RX ORDER — LANOLIN ALCOHOL/MO/W.PET/CERES
800 CREAM (GRAM) TOPICAL
Status: DISCONTINUED | OUTPATIENT
Start: 2020-03-07 | End: 2020-03-10 | Stop reason: HOSPADM

## 2020-03-07 RX ORDER — GLUCAGON 1 MG
1 KIT INJECTION
Status: DISCONTINUED | OUTPATIENT
Start: 2020-03-07 | End: 2020-03-10 | Stop reason: HOSPADM

## 2020-03-07 RX ORDER — SODIUM CHLORIDE 0.9 % (FLUSH) 0.9 %
10 SYRINGE (ML) INJECTION
Status: DISCONTINUED | OUTPATIENT
Start: 2020-03-07 | End: 2020-03-10 | Stop reason: HOSPADM

## 2020-03-07 RX ORDER — ONDANSETRON 2 MG/ML
4 INJECTION INTRAMUSCULAR; INTRAVENOUS EVERY 8 HOURS PRN
Status: DISCONTINUED | OUTPATIENT
Start: 2020-03-07 | End: 2020-03-10 | Stop reason: HOSPADM

## 2020-03-07 RX ORDER — LEVOTHYROXINE SODIUM 50 UG/1
50 TABLET ORAL
Status: DISCONTINUED | OUTPATIENT
Start: 2020-03-08 | End: 2020-03-10 | Stop reason: HOSPADM

## 2020-03-07 RX ORDER — SODIUM CHLORIDE, SODIUM LACTATE, POTASSIUM CHLORIDE, CALCIUM CHLORIDE 600; 310; 30; 20 MG/100ML; MG/100ML; MG/100ML; MG/100ML
INJECTION, SOLUTION INTRAVENOUS CONTINUOUS
Status: DISCONTINUED | OUTPATIENT
Start: 2020-03-07 | End: 2020-03-10 | Stop reason: HOSPADM

## 2020-03-07 RX ORDER — ACETAMINOPHEN 325 MG/1
650 TABLET ORAL EVERY 8 HOURS PRN
Status: DISCONTINUED | OUTPATIENT
Start: 2020-03-07 | End: 2020-03-10 | Stop reason: HOSPADM

## 2020-03-07 RX ORDER — MORPHINE SULFATE 8 MG/ML
8 INJECTION INTRAMUSCULAR; INTRAVENOUS; SUBCUTANEOUS
Status: COMPLETED | OUTPATIENT
Start: 2020-03-07 | End: 2020-03-07

## 2020-03-07 RX ORDER — GABAPENTIN 300 MG/1
300 CAPSULE ORAL 3 TIMES DAILY
Status: DISCONTINUED | OUTPATIENT
Start: 2020-03-07 | End: 2020-03-10 | Stop reason: HOSPADM

## 2020-03-07 RX ORDER — IBUPROFEN 200 MG
16 TABLET ORAL
Status: DISCONTINUED | OUTPATIENT
Start: 2020-03-07 | End: 2020-03-10 | Stop reason: HOSPADM

## 2020-03-07 RX ORDER — PANTOPRAZOLE SODIUM 40 MG/1
40 TABLET, DELAYED RELEASE ORAL DAILY
Status: DISCONTINUED | OUTPATIENT
Start: 2020-03-08 | End: 2020-03-10 | Stop reason: HOSPADM

## 2020-03-07 RX ORDER — HYDROXYZINE PAMOATE 25 MG/1
25 CAPSULE ORAL 4 TIMES DAILY
Status: DISCONTINUED | OUTPATIENT
Start: 2020-03-07 | End: 2020-03-10 | Stop reason: HOSPADM

## 2020-03-07 RX ORDER — LISINOPRIL 40 MG/1
40 TABLET ORAL DAILY
Status: DISCONTINUED | OUTPATIENT
Start: 2020-03-08 | End: 2020-03-10 | Stop reason: HOSPADM

## 2020-03-07 RX ORDER — SERTRALINE HYDROCHLORIDE 50 MG/1
50 TABLET, FILM COATED ORAL DAILY
Status: DISCONTINUED | OUTPATIENT
Start: 2020-03-08 | End: 2020-03-10 | Stop reason: HOSPADM

## 2020-03-07 RX ORDER — QUETIAPINE FUMARATE 100 MG/1
200 TABLET, FILM COATED ORAL NIGHTLY PRN
Status: DISCONTINUED | OUTPATIENT
Start: 2020-03-07 | End: 2020-03-10 | Stop reason: HOSPADM

## 2020-03-07 RX ORDER — MIRTAZAPINE 7.5 MG/1
30 TABLET, FILM COATED ORAL NIGHTLY
Status: DISCONTINUED | OUTPATIENT
Start: 2020-03-07 | End: 2020-03-10 | Stop reason: HOSPADM

## 2020-03-07 RX ADMIN — SODIUM CHLORIDE, SODIUM LACTATE, POTASSIUM CHLORIDE, AND CALCIUM CHLORIDE: .6; .31; .03; .02 INJECTION, SOLUTION INTRAVENOUS at 06:03

## 2020-03-07 RX ADMIN — HYDROXYZINE PAMOATE 25 MG: 25 CAPSULE ORAL at 08:03

## 2020-03-07 RX ADMIN — SODIUM CHLORIDE, SODIUM LACTATE, POTASSIUM CHLORIDE, AND CALCIUM CHLORIDE: .6; .31; .03; .02 INJECTION, SOLUTION INTRAVENOUS at 11:03

## 2020-03-07 RX ADMIN — METOPROLOL SUCCINATE 25 MG: 25 TABLET, EXTENDED RELEASE ORAL at 08:03

## 2020-03-07 RX ADMIN — GABAPENTIN 300 MG: 300 CAPSULE ORAL at 08:03

## 2020-03-07 RX ADMIN — MIRTAZAPINE 30 MG: 7.5 TABLET ORAL at 08:03

## 2020-03-07 RX ADMIN — MORPHINE SULFATE 8 MG: 8 INJECTION, SOLUTION INTRAMUSCULAR; INTRAVENOUS at 02:03

## 2020-03-07 RX ADMIN — MORPHINE SULFATE 4 MG: 4 INJECTION, SOLUTION INTRAMUSCULAR; INTRAVENOUS at 11:03

## 2020-03-07 RX ADMIN — MORPHINE SULFATE 4 MG: 4 INJECTION, SOLUTION INTRAMUSCULAR; INTRAVENOUS at 06:03

## 2020-03-07 RX ADMIN — HYDROMORPHONE HYDROCHLORIDE 0.5 MG: 2 INJECTION, SOLUTION INTRAMUSCULAR; INTRAVENOUS; SUBCUTANEOUS at 04:03

## 2020-03-07 RX ADMIN — QUETIAPINE 200 MG: 100 TABLET ORAL at 08:03

## 2020-03-07 RX ADMIN — SODIUM CHLORIDE 1000 ML: 0.9 INJECTION, SOLUTION INTRAVENOUS at 02:03

## 2020-03-07 NOTE — ASSESSMENT & PLAN NOTE
Which seems to have failed outpatient management  Will admit to inpatient status  IV fluids  Antiemetics  Analgesics  Clear liquid diet  Normal triglycerides about a week ago  Patient has never had evidence of gallstones however will repeat ultrasound  Patient will need follow-up with GI as an outpatient

## 2020-03-07 NOTE — H&P
[facility] Medicine  History & Physical    Patient Name: Bradley Collado  MRN: [id number]  Admission Date: 3/7/2020  Attending Physician: Fritz Campbell MD   Primary Care Provider: Arlen Belcher NP         Patient information was obtained from patient and ER records.     Subjective:     Principal Problem:Acute on chronic pancreatitis    Chief Complaint:   Chief Complaint   Patient presents with    Abdominal Pain     hx of pancreatitis. States was seen 2x at [facility] last week for same, still in pain. Denies N/V.         HPI: 40-year-old male with past medical history significant for hypertension, depression, thyroid disease, and pancreatitis who presents the emergency department complaining of intermittent abdominal pain.  Pain has been going on for the past few weeks is characterized as waxing/waning pain that begins in the left upper quadrant and radiates to the back.  Patient reportedly has had to recent admissions to [facility] for similar complaints, both of which time being diagnosed with pancreatitis.  He apparently has been tried treated with IV fluids/antiemetics and ultimately discharged with pain medication.  Patient reports pain is never completely controlled however is tolerable on pain medication.  He apparently has been taking medication as prescribed and plays run out, at which time he reports pain is worsened prompting repeat presentation.  He denies any alcohol use.  He currently denies any nausea and apparently has modified his diet to avoid fatty foods in effort to avoid worsening pain.    Past Medical History:   Diagnosis Date    Anxiety     Anxiety     Bipolar affective disorder     Depression     Hypertension     Pancreatitis     Schizophrenia     Thyroid disease        No past surgical history on file.    Review of patient's allergies indicates:  No Known Allergies    No current facility-administered medications on file prior to encounter.       Current Outpatient Medications on File Prior to Encounter   Medication Sig    ARIPiprazole (ABILIFY) 10 MG Tab Take 1 tablet (10 mg total) by mouth once daily.    cloNIDine 0.1 mg/24 hr td ptwk (CATAPRES) 0.1 mg/24 hr Place 1 patch onto the skin every 7 days.    gabapentin (NEURONTIN) 300 MG capsule Take 1 capsule (300 mg total) by mouth 3 (three) times daily.    HYDROcodone-acetaminophen (NORCO)  mg per tablet Take 1 tablet by mouth every 6 (six) hours as needed for Pain.    HYDROcodone-acetaminophen (NORCO)  mg per tablet Take 1 tablet by mouth every 6 (six) hours as needed.    hydrOXYzine pamoate (VISTARIL) 25 MG Cap Take 25 mg by mouth 4 (four) times daily.    levothyroxine (SYNTHROID) 50 MCG tablet Take 1 tablet (50 mcg total) by mouth before breakfast.    lisinopril (PRINIVIL,ZESTRIL) 40 MG tablet Take 1 tablet (40 mg total) by mouth once daily.    metoprolol succinate (TOPROL-XL) 25 MG 24 hr tablet Take 1 tablet (25 mg total) by mouth nightly.    mirtazapine (REMERON) 30 MG tablet Take 1 tablet (30 mg total) by mouth nightly.    ondansetron (ZOFRAN) 4 MG tablet Take 1 tablet (4 mg total) by mouth every 6 (six) hours as needed for Nausea.    pantoprazole (PROTONIX) 40 MG tablet Take 40 mg by mouth once daily.    QUEtiapine (SEROQUEL) 200 MG Tab Take 1 tablet (200 mg total) by mouth nightly as needed (insomnia). (Patient taking differently: Take 300 mg by mouth nightly as needed (insomnia). )    sertraline (ZOLOFT) 50 MG tablet Take 1 tablet (50 mg total) by mouth once daily.     Family History     Problem Relation (Age of Onset)    Cancer Paternal Grandfather    Diabetes Maternal Grandmother    Hypertension Maternal Grandfather        Tobacco Use    Smoking status: Current Some Day Smoker     Packs/day: 0.25     Years: 7.00     Pack years: 1.75    Smokeless tobacco: Never Used   Substance and Sexual Activity    Alcohol use: Yes     Comment: occasionally    Drug use: Yes      Frequency: 7.0 times per week     Types: Marijuana    Sexual activity: Yes     Partners: Female     Review of Systems   Constitutional: Negative for chills, fever and unexpected weight change.   HENT: Negative for congestion and trouble swallowing.    Eyes: Negative for photophobia and visual disturbance.   Respiratory: Negative for cough and shortness of breath.    Cardiovascular: Negative for chest pain and leg swelling.   Gastrointestinal: Positive for abdominal pain. Negative for abdominal distention, blood in stool, constipation, diarrhea, nausea and vomiting.   Genitourinary: Negative for dysuria and hematuria.   Musculoskeletal: Positive for back pain. Negative for arthralgias and myalgias.   Skin: Negative for color change and rash.   Neurological: Negative for dizziness, light-headedness and numbness.   Psychiatric/Behavioral: Negative for agitation and confusion.     Objective:     Vital Signs (Most Recent):  Pulse: 80 (03/07/20 1701)  Resp: 16 (03/07/20 1411)  BP: (!) 141/93 (03/07/20 1701)  SpO2: 100 % (03/07/20 1701) Vital Signs (24h Range):  Pulse:  [] 80  Resp:  [16] 16  SpO2:  [98 %-100 %] 100 %  BP: (141-159)/() 141/93     Weight: 83 kg (183 lb)  Body mass index is 28.66 kg/m².    Physical Exam   Constitutional: He is oriented to person, place, and time. He appears well-developed and well-nourished.   HENT:   Head: Normocephalic and atraumatic.   Eyes: Pupils are equal, round, and reactive to light. EOM are normal. No scleral icterus.   Neck: Normal range of motion. Neck supple.   Cardiovascular: Normal rate, regular rhythm, normal heart sounds and intact distal pulses.   Pulmonary/Chest: Effort normal and breath sounds normal. No respiratory distress.   Abdominal: Soft. Bowel sounds are normal. He exhibits no distension. There is tenderness (Epigastric ). There is guarding (voluntary).   Musculoskeletal: Normal range of motion. He exhibits no edema.   Neurological: He is alert and  oriented to person, place, and time.   No asterixis   Skin: Skin is warm and dry. No rash noted. No erythema.   Psychiatric: He has a normal mood and affect. His behavior is normal.   Nursing note and vitals reviewed.        CRANIAL NERVES     CN III, IV, VI   Pupils are equal, round, and reactive to light.  Extraocular motions are normal.        Significant Labs:   Recent Lab Results       03/07/20  1438        Albumin 3.8     Alcohol, Medical, Serum <10     Alkaline Phosphatase 94     ALT 42     Anion Gap 11     AST 44     Baso # 0.05     Basophil% 0.5     BILIRUBIN TOTAL 0.2  Comment:  For infants and newborns, interpretation of results should be based  on gestational age, weight and in agreement with clinical  observations.  Premature Infant recommended reference ranges:  Up to 24 hours.............<8.0 mg/dL  Up to 48 hours............<12.0 mg/dL  3-5 days..................<15.0 mg/dL  6-29 days.................<15.0 mg/dL       BUN, Bld 9     Calcium 8.9     Chloride 98     CO2 24     Creatinine 1.2     Differential Method Automated     eGFR if  >60     eGFR if non  >60  Comment:  Calculation used to obtain the estimated glomerular filtration  rate (eGFR) is the CKD-EPI equation.        Eos # 0.3     Eosinophil% 2.8     Glucose 158     Gran # (ANC) 8.0     Gran% 76.3     Hematocrit 35.0     Hemoglobin 10.4     Immature Grans (Abs) 0.07  Comment:  Mild elevation in immature granulocytes is non specific and   can be seen in a variety of conditions including stress response,   acute inflammation, trauma and pregnancy. Correlation with other   laboratory and clinical findings is essential.       Lipase 364     Lymph # 1.3     Lymph% 12.8     MCH 25.5     MCHC 29.7     MCV 86     Mono # 0.7     Mono% 6.9     MPV 10.8     nRBC 0     Platelets 352     Potassium 5.6     PROTEIN TOTAL 7.7     RBC 4.08     RDW 17.8     Sodium 133     WBC 10.44           Significant Imaging: I have  reviewed and interpreted all pertinent imaging results/findings within the past 24 hours.    Assessment/Plan:     * Acute on chronic pancreatitis  Which seems to have failed outpatient management  Will admit to inpatient status  IV fluids  Antiemetics  Analgesics  Clear liquid diet  Normal triglycerides about a week ago  Patient has never had evidence of gallstones however will repeat ultrasound  Patient will need follow-up with GI as an outpatient      Hyperkalemia  Etiology uncertain  No EKG changes noted  Place telemetry monitoring  IV fluids  Follow chemistry    Gastroesophageal reflux disease without esophagitis  Chronic issue  Continue home dose PPI      Hypothyroidism  Chronic issue  Continue home med      Normocytic anemia  Chronic issue.  No evidence of active bleeding.  Hemoglobin stable  Will check iron studies      Hypertension  Chronic issue  Continue home med        VTE Risk Mitigation (From admission, onward)    None             Scott Mitchell MD  Department of Hospital Medicine   [facility]

## 2020-03-07 NOTE — HPI
40-year-old male with past medical history significant for hypertension, depression, thyroid disease, and pancreatitis who presents the emergency department complaining of intermittent abdominal pain.  Pain has been going on for the past few weeks is characterized as waxing/waning pain that begins in the left upper quadrant and radiates to the back.  Patient reportedly has had to recent admissions to [facility] for similar complaints, both of which time being diagnosed with pancreatitis.  He apparently has been tried treated with IV fluids/antiemetics and ultimately discharged with pain medication.  Patient reports pain is never completely controlled however is tolerable on pain medication.  He apparently has been taking medication as prescribed and plays run out, at which time he reports pain is worsened prompting repeat presentation.  He denies any alcohol use.  He currently denies any nausea and apparently has modified his diet to avoid fatty foods in effort to avoid worsening pain.

## 2020-03-07 NOTE — SUBJECTIVE & OBJECTIVE
Past Medical History:   Diagnosis Date    Anxiety     Anxiety     Bipolar affective disorder     Depression     Hypertension     Pancreatitis     Schizophrenia     Thyroid disease        No past surgical history on file.    Review of patient's allergies indicates:  No Known Allergies    No current facility-administered medications on file prior to encounter.      Current Outpatient Medications on File Prior to Encounter   Medication Sig    ARIPiprazole (ABILIFY) 10 MG Tab Take 1 tablet (10 mg total) by mouth once daily.    cloNIDine 0.1 mg/24 hr td ptwk (CATAPRES) 0.1 mg/24 hr Place 1 patch onto the skin every 7 days.    gabapentin (NEURONTIN) 300 MG capsule Take 1 capsule (300 mg total) by mouth 3 (three) times daily.    HYDROcodone-acetaminophen (NORCO)  mg per tablet Take 1 tablet by mouth every 6 (six) hours as needed for Pain.    HYDROcodone-acetaminophen (NORCO)  mg per tablet Take 1 tablet by mouth every 6 (six) hours as needed.    hydrOXYzine pamoate (VISTARIL) 25 MG Cap Take 25 mg by mouth 4 (four) times daily.    levothyroxine (SYNTHROID) 50 MCG tablet Take 1 tablet (50 mcg total) by mouth before breakfast.    lisinopril (PRINIVIL,ZESTRIL) 40 MG tablet Take 1 tablet (40 mg total) by mouth once daily.    metoprolol succinate (TOPROL-XL) 25 MG 24 hr tablet Take 1 tablet (25 mg total) by mouth nightly.    mirtazapine (REMERON) 30 MG tablet Take 1 tablet (30 mg total) by mouth nightly.    ondansetron (ZOFRAN) 4 MG tablet Take 1 tablet (4 mg total) by mouth every 6 (six) hours as needed for Nausea.    pantoprazole (PROTONIX) 40 MG tablet Take 40 mg by mouth once daily.    QUEtiapine (SEROQUEL) 200 MG Tab Take 1 tablet (200 mg total) by mouth nightly as needed (insomnia). (Patient taking differently: Take 300 mg by mouth nightly as needed (insomnia). )    sertraline (ZOLOFT) 50 MG tablet Take 1 tablet (50 mg total) by mouth once daily.     Family History     Problem Relation  (Age of Onset)    Cancer Paternal Grandfather    Diabetes Maternal Grandmother    Hypertension Maternal Grandfather        Tobacco Use    Smoking status: Current Some Day Smoker     Packs/day: 0.25     Years: 7.00     Pack years: 1.75    Smokeless tobacco: Never Used   Substance and Sexual Activity    Alcohol use: Yes     Comment: occasionally    Drug use: Yes     Frequency: 7.0 times per week     Types: Marijuana    Sexual activity: Yes     Partners: Female     Review of Systems   Constitutional: Negative for chills, fever and unexpected weight change.   HENT: Negative for congestion and trouble swallowing.    Eyes: Negative for photophobia and visual disturbance.   Respiratory: Negative for cough and shortness of breath.    Cardiovascular: Negative for chest pain and leg swelling.   Gastrointestinal: Positive for abdominal pain. Negative for abdominal distention, blood in stool, constipation, diarrhea, nausea and vomiting.   Genitourinary: Negative for dysuria and hematuria.   Musculoskeletal: Positive for back pain. Negative for arthralgias and myalgias.   Skin: Negative for color change and rash.   Neurological: Negative for dizziness, light-headedness and numbness.   Psychiatric/Behavioral: Negative for agitation and confusion.     Objective:     Vital Signs (Most Recent):  Pulse: 80 (03/07/20 1701)  Resp: 16 (03/07/20 1411)  BP: (!) 141/93 (03/07/20 1701)  SpO2: 100 % (03/07/20 1701) Vital Signs (24h Range):  Pulse:  [] 80  Resp:  [16] 16  SpO2:  [98 %-100 %] 100 %  BP: (141-159)/() 141/93     Weight: 83 kg (183 lb)  Body mass index is 28.66 kg/m².    Physical Exam   Constitutional: He is oriented to person, place, and time. He appears well-developed and well-nourished.   HENT:   Head: Normocephalic and atraumatic.   Eyes: Pupils are equal, round, and reactive to light. EOM are normal. No scleral icterus.   Neck: Normal range of motion. Neck supple.   Cardiovascular: Normal rate, regular  rhythm, normal heart sounds and intact distal pulses.   Pulmonary/Chest: Effort normal and breath sounds normal. No respiratory distress.   Abdominal: Soft. Bowel sounds are normal. He exhibits no distension. There is tenderness (Epigastric ). There is guarding (voluntary).   Musculoskeletal: Normal range of motion. He exhibits no edema.   Neurological: He is alert and oriented to person, place, and time.   No asterixis   Skin: Skin is warm and dry. No rash noted. No erythema.   Psychiatric: He has a normal mood and affect. His behavior is normal.   Nursing note and vitals reviewed.        CRANIAL NERVES     CN III, IV, VI   Pupils are equal, round, and reactive to light.  Extraocular motions are normal.        Significant Labs:   Recent Lab Results       03/07/20  1438        Albumin 3.8     Alcohol, Medical, Serum <10     Alkaline Phosphatase 94     ALT 42     Anion Gap 11     AST 44     Baso # 0.05     Basophil% 0.5     BILIRUBIN TOTAL 0.2  Comment:  For infants and newborns, interpretation of results should be based  on gestational age, weight and in agreement with clinical  observations.  Premature Infant recommended reference ranges:  Up to 24 hours.............<8.0 mg/dL  Up to 48 hours............<12.0 mg/dL  3-5 days..................<15.0 mg/dL  6-29 days.................<15.0 mg/dL       BUN, Bld 9     Calcium 8.9     Chloride 98     CO2 24     Creatinine 1.2     Differential Method Automated     eGFR if  >60     eGFR if non  >60  Comment:  Calculation used to obtain the estimated glomerular filtration  rate (eGFR) is the CKD-EPI equation.        Eos # 0.3     Eosinophil% 2.8     Glucose 158     Gran # (ANC) 8.0     Gran% 76.3     Hematocrit 35.0     Hemoglobin 10.4     Immature Grans (Abs) 0.07  Comment:  Mild elevation in immature granulocytes is non specific and   can be seen in a variety of conditions including stress response,   acute inflammation, trauma and  pregnancy. Correlation with other   laboratory and clinical findings is essential.       Lipase 364     Lymph # 1.3     Lymph% 12.8     MCH 25.5     MCHC 29.7     MCV 86     Mono # 0.7     Mono% 6.9     MPV 10.8     nRBC 0     Platelets 352     Potassium 5.6     PROTEIN TOTAL 7.7     RBC 4.08     RDW 17.8     Sodium 133     WBC 10.44           Significant Imaging: I have reviewed and interpreted all pertinent imaging results/findings within the past 24 hours.

## 2020-03-07 NOTE — ED PROVIDER NOTES
Encounter Date: 3/7/2020    SCRIBE #1 NOTE: I, Nacho Hughes, am scribing for, and in the presence of, Radha Presley PA-C.       History     Chief Complaint   Patient presents with    Abdominal Pain     hx of pancreatitis. States was seen 2x at [facility] last week for same, still in pain. Denies N/V.        Time seen by provider: 2:17 PM on 03/07/2020    Bradley Molina Cousin is a 40 y.o. male with PMHx of HTN, depression, pancreatitis, thyroid disease, anxiety, and schizophrenia who presents to the ED with an onset of waxing and waning constant left-sided abdominal pain worsening over this past x1 week. An associated symptom includes some abdominal swelling. The patient had x2 recent admissions to [facility] for similar symptoms. The patient spoke to his PCP, which recommended for him to come to the ED. The patient notes that his pain lightens up with his pain medications, but it never resolves.  The patient denies nausea, vomiting, diarrhea, blood in stool, or any other symptoms at this time. No PSHx.      The history is provided by the patient.     Review of patient's allergies indicates:  No Known Allergies  Past Medical History:   Diagnosis Date    Anxiety     Anxiety     Bipolar affective disorder     Depression     Hypertension     Pancreatitis     Schizophrenia     Thyroid disease      No past surgical history on file.  Family History   Problem Relation Age of Onset    Diabetes Maternal Grandmother     Hypertension Maternal Grandfather     Cancer Paternal Grandfather      Social History     Tobacco Use    Smoking status: Current Some Day Smoker     Packs/day: 0.25     Years: 7.00     Pack years: 1.75    Smokeless tobacco: Never Used   Substance Use Topics    Alcohol use: Yes     Comment: occasionally    Drug use: Yes     Frequency: 7.0 times per week     Types: Marijuana     Review of Systems   Constitutional: Negative for chills and fever.   HENT: Negative for facial swelling and trouble swallowing.     Eyes: Negative for discharge.   Respiratory: Negative for cough, chest tightness, shortness of breath and wheezing.    Cardiovascular: Negative for chest pain and palpitations.   Gastrointestinal: Positive for abdominal distention and abdominal pain (left). Negative for blood in stool, diarrhea, nausea and vomiting.   Genitourinary: Negative for dysuria and hematuria.   Musculoskeletal: Negative for arthralgias, back pain, joint swelling, myalgias, neck pain and neck stiffness.   Skin: Negative for color change, pallor, rash and wound.   Neurological: Negative for dizziness, syncope, weakness, light-headedness, numbness and headaches.   Hematological: Does not bruise/bleed easily.   Psychiatric/Behavioral: The patient is not nervous/anxious.        Physical Exam     Initial Vitals   BP Pulse Resp Temp SpO2   03/07/20 1411 03/07/20 1411 03/07/20 1411 03/07/20 1835 03/07/20 1411   (!) 159/98 109 16 99.8 °F (37.7 °C) 99 %      MAP       --                Physical Exam    Nursing note and vitals reviewed.  Constitutional: He appears well-developed and well-nourished. He is not diaphoretic. No distress.   HENT:   Head: Normocephalic and atraumatic.   Mouth/Throat: Oropharynx is clear and moist.   Cardiovascular: Normal rate, regular rhythm, normal heart sounds and intact distal pulses. Exam reveals no gallop and no friction rub.    No murmur heard.  Pulmonary/Chest: Breath sounds normal. No respiratory distress. He has no wheezes. He has no rhonchi. He has no rales. He exhibits no tenderness.   Abdominal: Soft. He exhibits no distension and no mass. There is tenderness.   TTP noted to epigastric and LUQ.     Musculoskeletal: Normal range of motion. He exhibits no edema or tenderness.   Neurological: He is alert and oriented to person, place, and time. He has normal strength. No sensory deficit.   Skin: Skin is warm and dry. No rash and no abscess noted. No erythema.   Psychiatric: He has a normal mood and affect.          ED Course   Procedures  Labs Reviewed   CBC W/ AUTO DIFFERENTIAL - Abnormal; Notable for the following components:       Result Value    RBC 4.08 (*)     Hemoglobin 10.4 (*)     Hematocrit 35.0 (*)     Mean Corpuscular Hemoglobin 25.5 (*)     Mean Corpuscular Hemoglobin Conc 29.7 (*)     RDW 17.8 (*)     Platelets 352 (*)     Gran # (ANC) 8.0 (*)     Immature Grans (Abs) 0.07 (*)     Gran% 76.3 (*)     Lymph% 12.8 (*)     All other components within normal limits   COMPREHENSIVE METABOLIC PANEL - Abnormal; Notable for the following components:    Sodium 133 (*)     Potassium 5.6 (*)     Glucose 158 (*)     AST 44 (*)     All other components within normal limits   LIPASE - Abnormal; Notable for the following components:    Lipase 364 (*)     All other components within normal limits   ALCOHOL,MEDICAL (ETHANOL)          Imaging Results          US Abdomen Limited (No Result on File)    Procedure changed from US Abdomen Complete                  Medical Decision Making:   History:   Old Medical Records: I decided to obtain old medical records.  Old Records Summarized: records from clinic visits and records from previous admission(s).  Differential Diagnosis:   Pancreatitis  Gastritis  SBO  Renal Stone   Clinical Tests:   Lab Tests: Ordered and Reviewed       APC / Resident Notes:   Symptoms seem consistent with acute on chronic pancreatitis.  Lipase is elevated today.  He has had 2 different admissions to [facility] within the last couple of weeks.  He states that they think his pancreatitis is caused from alcohol use, but states he has not had any alcohol in the last couple of months.  Ethanol level here in the emergency department is negative.  We feel he would benefit from admission to the hospital.  He is currently getting his 2nd dose of pain medication and IV fluids.  Hospital medicine agrees to admission.  Patient is agreeable to the plan.       Scribe Attestation:   Scribe #1: I  performed the above scribed service and the documentation accurately describes the services I performed. I attest to the accuracy of the note.    I, Radha Presley PA-C, personally performed the services described in this documentation. All medical record entries made by the scribe were at my direction and in my presence.  I have reviewed the chart and agree that the record reflects my personal performance and is accurate and complete. Radha Presley PA-C.  8:37 PM 03/07/2020                        Clinical Impression:       ICD-10-CM ICD-9-CM   1. Acute on chronic pancreatitis K85.90 577.0    K86.1 577.1         Disposition:   Disposition: Admitted     ED Disposition Condition    Admit                           Radha Presley PA-C  03/07/20 2037

## 2020-03-08 LAB
ANION GAP SERPL CALC-SCNC: 8 MMOL/L (ref 8–16)
BUN SERPL-MCNC: 6 MG/DL (ref 6–20)
CALCIUM SERPL-MCNC: 9 MG/DL (ref 8.7–10.5)
CHLORIDE SERPL-SCNC: 103 MMOL/L (ref 95–110)
CO2 SERPL-SCNC: 27 MMOL/L (ref 23–29)
CREAT SERPL-MCNC: 1.1 MG/DL (ref 0.5–1.4)
ERYTHROCYTE [DISTWIDTH] IN BLOOD BY AUTOMATED COUNT: 17.4 % (ref 11.5–14.5)
EST. GFR  (AFRICAN AMERICAN): >60 ML/MIN/1.73 M^2
EST. GFR  (NON AFRICAN AMERICAN): >60 ML/MIN/1.73 M^2
FERRITIN SERPL-MCNC: 75 NG/ML (ref 20–300)
GLUCOSE SERPL-MCNC: 146 MG/DL (ref 70–110)
HCT VFR BLD AUTO: 33.5 % (ref 40–54)
HGB BLD-MCNC: 10.2 G/DL (ref 14–18)
IRON SERPL-MCNC: 13 UG/DL (ref 45–160)
MAGNESIUM SERPL-MCNC: 1.7 MG/DL (ref 1.6–2.6)
MCH RBC QN AUTO: 25.3 PG (ref 27–31)
MCHC RBC AUTO-ENTMCNC: 30.4 G/DL (ref 32–36)
MCV RBC AUTO: 83 FL (ref 82–98)
PLATELET # BLD AUTO: 391 K/UL (ref 150–350)
PMV BLD AUTO: 10.3 FL (ref 9.2–12.9)
POTASSIUM SERPL-SCNC: 3.9 MMOL/L (ref 3.5–5.1)
RBC # BLD AUTO: 4.03 M/UL (ref 4.6–6.2)
SATURATED IRON: 4 % (ref 20–50)
SODIUM SERPL-SCNC: 138 MMOL/L (ref 136–145)
TOTAL IRON BINDING CAPACITY: 327 UG/DL (ref 250–450)
TRANSFERRIN SERPL-MCNC: 221 MG/DL (ref 200–375)
WBC # BLD AUTO: 9.42 K/UL (ref 3.9–12.7)

## 2020-03-08 PROCEDURE — 82728 ASSAY OF FERRITIN: CPT

## 2020-03-08 PROCEDURE — 83735 ASSAY OF MAGNESIUM: CPT

## 2020-03-08 PROCEDURE — 12000002 HC ACUTE/MED SURGE SEMI-PRIVATE ROOM

## 2020-03-08 PROCEDURE — S4991 NICOTINE PATCH NONLEGEND: HCPCS | Performed by: INTERNAL MEDICINE

## 2020-03-08 PROCEDURE — 25000003 PHARM REV CODE 250: Performed by: INTERNAL MEDICINE

## 2020-03-08 PROCEDURE — 83540 ASSAY OF IRON: CPT

## 2020-03-08 PROCEDURE — 99232 PR SUBSEQUENT HOSPITAL CARE,LEVL II: ICD-10-PCS | Mod: 57,,, | Performed by: SURGERY

## 2020-03-08 PROCEDURE — 99232 SBSQ HOSP IP/OBS MODERATE 35: CPT | Mod: 57,,, | Performed by: SURGERY

## 2020-03-08 PROCEDURE — 63600175 PHARM REV CODE 636 W HCPCS: Performed by: INTERNAL MEDICINE

## 2020-03-08 PROCEDURE — 85027 COMPLETE CBC AUTOMATED: CPT

## 2020-03-08 PROCEDURE — 80048 BASIC METABOLIC PNL TOTAL CA: CPT

## 2020-03-08 PROCEDURE — 63600175 PHARM REV CODE 636 W HCPCS: Performed by: NURSE PRACTITIONER

## 2020-03-08 RX ORDER — HYDROMORPHONE HYDROCHLORIDE 2 MG/ML
2 INJECTION, SOLUTION INTRAMUSCULAR; INTRAVENOUS; SUBCUTANEOUS EVERY 6 HOURS PRN
Status: DISCONTINUED | OUTPATIENT
Start: 2020-03-08 | End: 2020-03-09

## 2020-03-08 RX ORDER — HYDROMORPHONE HYDROCHLORIDE 2 MG/ML
1 INJECTION, SOLUTION INTRAMUSCULAR; INTRAVENOUS; SUBCUTANEOUS EVERY 6 HOURS PRN
Status: DISCONTINUED | OUTPATIENT
Start: 2020-03-08 | End: 2020-03-09

## 2020-03-08 RX ORDER — IBUPROFEN 200 MG
1 TABLET ORAL DAILY
Status: DISCONTINUED | OUTPATIENT
Start: 2020-03-08 | End: 2020-03-10 | Stop reason: HOSPADM

## 2020-03-08 RX ADMIN — HYDROMORPHONE HYDROCHLORIDE 1 MG: 2 INJECTION, SOLUTION INTRAMUSCULAR; INTRAVENOUS; SUBCUTANEOUS at 01:03

## 2020-03-08 RX ADMIN — MORPHINE SULFATE 4 MG: 4 INJECTION, SOLUTION INTRAMUSCULAR; INTRAVENOUS at 04:03

## 2020-03-08 RX ADMIN — HYDROXYZINE PAMOATE 25 MG: 25 CAPSULE ORAL at 09:03

## 2020-03-08 RX ADMIN — ACETAMINOPHEN 650 MG: 325 TABLET ORAL at 12:03

## 2020-03-08 RX ADMIN — MORPHINE SULFATE 4 MG: 4 INJECTION, SOLUTION INTRAMUSCULAR; INTRAVENOUS at 10:03

## 2020-03-08 RX ADMIN — SERTRALINE HYDROCHLORIDE 50 MG: 50 TABLET ORAL at 09:03

## 2020-03-08 RX ADMIN — HYDROMORPHONE HYDROCHLORIDE 2 MG: 2 INJECTION, SOLUTION INTRAMUSCULAR; INTRAVENOUS; SUBCUTANEOUS at 07:03

## 2020-03-08 RX ADMIN — HYDROMORPHONE HYDROCHLORIDE 2 MG: 2 INJECTION, SOLUTION INTRAMUSCULAR; INTRAVENOUS; SUBCUTANEOUS at 01:03

## 2020-03-08 RX ADMIN — HYDROXYZINE PAMOATE 25 MG: 25 CAPSULE ORAL at 12:03

## 2020-03-08 RX ADMIN — SODIUM CHLORIDE, SODIUM LACTATE, POTASSIUM CHLORIDE, AND CALCIUM CHLORIDE: .6; .31; .03; .02 INJECTION, SOLUTION INTRAVENOUS at 11:03

## 2020-03-08 RX ADMIN — ACETAMINOPHEN 650 MG: 325 TABLET ORAL at 03:03

## 2020-03-08 RX ADMIN — LISINOPRIL 40 MG: 40 TABLET ORAL at 09:03

## 2020-03-08 RX ADMIN — LEVOTHYROXINE SODIUM 50 MCG: 100 TABLET ORAL at 05:03

## 2020-03-08 RX ADMIN — HYDROMORPHONE HYDROCHLORIDE 1 MG: 2 INJECTION, SOLUTION INTRAMUSCULAR; INTRAVENOUS; SUBCUTANEOUS at 07:03

## 2020-03-08 RX ADMIN — HYDROXYZINE PAMOATE 25 MG: 25 CAPSULE ORAL at 04:03

## 2020-03-08 RX ADMIN — Medication 1 PATCH: at 12:03

## 2020-03-08 RX ADMIN — SODIUM CHLORIDE, SODIUM LACTATE, POTASSIUM CHLORIDE, AND CALCIUM CHLORIDE: .6; .31; .03; .02 INJECTION, SOLUTION INTRAVENOUS at 05:03

## 2020-03-08 RX ADMIN — GABAPENTIN 300 MG: 300 CAPSULE ORAL at 03:03

## 2020-03-08 RX ADMIN — GABAPENTIN 300 MG: 300 CAPSULE ORAL at 09:03

## 2020-03-08 RX ADMIN — PANTOPRAZOLE SODIUM 40 MG: 40 TABLET, DELAYED RELEASE ORAL at 09:03

## 2020-03-08 RX ADMIN — MIRTAZAPINE 30 MG: 7.5 TABLET ORAL at 09:03

## 2020-03-08 RX ADMIN — ARIPIPRAZOLE 10 MG: 5 TABLET ORAL at 09:03

## 2020-03-08 RX ADMIN — METOPROLOL SUCCINATE 25 MG: 25 TABLET, EXTENDED RELEASE ORAL at 09:03

## 2020-03-08 RX ADMIN — CLONIDINE 1 PATCH: 0.1 PATCH TRANSDERMAL at 09:03

## 2020-03-08 RX ADMIN — QUETIAPINE 200 MG: 100 TABLET ORAL at 09:03

## 2020-03-08 NOTE — PROGRESS NOTES
[facility] Hospital Medicine  Progress Note    Patient Name: Bradley Collado  MRN: [id number]  Patient Class: IP- Inpatient   Admission Date: 3/7/2020  Length of Stay: 1 days  Attending Physician: Scott Mitchell,*  Primary Care Provider: Arlen Belcher NP        Subjective:     Principal Problem:Acute on chronic pancreatitis        HPI:  40-year-old male with past medical history significant for hypertension, depression, thyroid disease, and pancreatitis who presents the emergency department complaining of intermittent abdominal pain.  Pain has been going on for the past few weeks is characterized as waxing/waning pain that begins in the left upper quadrant and radiates to the back.  Patient reportedly has had to recent admissions to [facility] for similar complaints, both of which time being diagnosed with pancreatitis.  He apparently has been tried treated with IV fluids/antiemetics and ultimately discharged with pain medication.  Patient reports pain is never completely controlled however is tolerable on pain medication.  He apparently has been taking medication as prescribed and plays run out, at which time he reports pain is worsened prompting repeat presentation.  He denies any alcohol use.  He currently denies any nausea and apparently has modified his diet to avoid fatty foods in effort to avoid worsening pain.    Overview/Hospital Course:  No notes on file    Interval History:  Overnight events noted.  This morning patient reports pain is not well controlled.  He denies significant nausea.  Patient does smoke.  Ultrasound findings noted.    Review of Systems   Constitutional: Negative for chills, fever and unexpected weight change.   HENT: Negative for congestion and trouble swallowing.    Eyes: Negative for photophobia and visual disturbance.   Respiratory: Negative for cough and shortness of breath.    Cardiovascular: Negative for chest pain and leg swelling.    Gastrointestinal: Positive for abdominal pain. Negative for abdominal distention, blood in stool, constipation, diarrhea, nausea and vomiting.   Genitourinary: Negative for dysuria and hematuria.   Musculoskeletal: Positive for back pain. Negative for arthralgias and myalgias.   Skin: Negative for color change and rash.   Neurological: Negative for dizziness, light-headedness and numbness.   Psychiatric/Behavioral: Negative for agitation and confusion.     Objective:     Vital Signs (Most Recent):  Temp: 99 °F (37.2 °C) (03/08/20 1138)  Pulse: 74 (03/08/20 1138)  Resp: 18 (03/08/20 1138)  BP: (!) 142/89 (03/08/20 1138)  SpO2: 100 % (03/08/20 1138) Vital Signs (24h Range):  Temp:  [98.2 °F (36.8 °C)-101 °F (38.3 °C)] 99 °F (37.2 °C)  Pulse:  [] 74  Resp:  [16-20] 18  SpO2:  [95 %-100 %] 100 %  BP: (124-159)/() 142/89     Weight: 83.1 kg (183 lb 3.2 oz)  Body mass index is 28.69 kg/m².    Intake/Output Summary (Last 24 hours) at 3/8/2020 1157  Last data filed at 3/8/2020 0700  Gross per 24 hour   Intake 0 ml   Output --   Net 0 ml      Physical Exam   Constitutional: He is oriented to person, place, and time. He appears well-developed and well-nourished.   HENT:   Head: Normocephalic and atraumatic.   Eyes: Pupils are equal, round, and reactive to light. EOM are normal. No scleral icterus.   Neck: Normal range of motion. Neck supple.   Cardiovascular: Normal rate, regular rhythm, normal heart sounds and intact distal pulses.   Pulmonary/Chest: Effort normal and breath sounds normal. No respiratory distress.   Abdominal: Soft. Bowel sounds are normal. He exhibits no distension. There is tenderness (Epigastric ). There is guarding (voluntary).   Musculoskeletal: Normal range of motion. He exhibits no edema.   Neurological: He is alert and oriented to person, place, and time.   No asterixis   Skin: Skin is warm and dry. No rash noted. No erythema.   Psychiatric: He has a normal mood and affect. His behavior  is normal.   Nursing note and vitals reviewed.      Significant Labs:   Recent Lab Results       03/08/20  0638   03/07/20  1438        Albumin   3.8     Alcohol, Medical, Serum   <10     Alkaline Phosphatase   94     ALT   42     Anion Gap 8 11     AST   44     Baso #   0.05     Basophil%   0.5     BILIRUBIN TOTAL   0.2  Comment:  For infants and newborns, interpretation of results should be based  on gestational age, weight and in agreement with clinical  observations.  Premature Infant recommended reference ranges:  Up to 24 hours.............<8.0 mg/dL  Up to 48 hours............<12.0 mg/dL  3-5 days..................<15.0 mg/dL  6-29 days.................<15.0 mg/dL       BUN, Bld 6 9     Calcium 9.0 8.9     Chloride 103 98     CO2 27 24     Creatinine 1.1 1.2     Differential Method   Automated     eGFR if  >60 >60     eGFR if non  >60  Comment:  Calculation used to obtain the estimated glomerular filtration  rate (eGFR) is the CKD-EPI equation.    >60  Comment:  Calculation used to obtain the estimated glomerular filtration  rate (eGFR) is the CKD-EPI equation.        Eos #   0.3     Eosinophil%   2.8     Ferritin 75       Glucose 146 158     Gran # (ANC)   8.0     Gran%   76.3     Hematocrit 33.5 35.0     Hemoglobin 10.2 10.4     Immature Grans (Abs)   0.07  Comment:  Mild elevation in immature granulocytes is non specific and   can be seen in a variety of conditions including stress response,   acute inflammation, trauma and pregnancy. Correlation with other   laboratory and clinical findings is essential.       Iron 13       Lipase   364     Lymph #   1.3     Lymph%   12.8     Magnesium 1.7       MCH 25.3 25.5     MCHC 30.4 29.7     MCV 83 86     Mono #   0.7     Mono%   6.9     MPV 10.3 10.8     nRBC   0     Platelets 391 352     Potassium 3.9 5.6     PROTEIN TOTAL   7.7     RBC 4.03 4.08     RDW 17.4 17.8     Saturated Iron 4       Sodium 138 133     TIBC 327        Transferrin 221       WBC 9.42 10.44           Significant Imaging: I have reviewed and interpreted all pertinent imaging results/findings within the past 24 hours.      Assessment/Plan:      * Acute on chronic pancreatitis  Ultrasound reviewed.  Patient does have gallbladder sludge  Will consult General surgery  Continue IV fluids, Antiemetics, Analgesics  Clear liquid diet  Normal triglycerides about a week ago    Hyperkalemia  Etiology uncertain  No EKG changes noted  Place telemetry monitoring  IV fluids  Follow chemistry    Gastroesophageal reflux disease without esophagitis  Chronic issue  Continue home dose PPI      Hypothyroidism  Chronic issue  Continue home med      Normocytic anemia  Chronic issue.  No evidence of active bleeding.  Hemoglobin stable  Will check iron studies      Hypertension  Chronic issue  Continue home med        VTE Risk Mitigation (From admission, onward)         Ordered     IP VTE LOW RISK PATIENT  Once      03/07/20 1809     Place sequential compression device  Until discontinued      03/07/20 1809                      Scott Mitchell MD  Department of Hospital Medicine   [facility]

## 2020-03-08 NOTE — PLAN OF CARE
Plan of care reviewed with pt,verbalized understanding.IV CDI. NPO due to Ultrasound. According to Pt pain medication is not effective.Call light kept within reach, bed in locked and lowest position, side rails up x2.

## 2020-03-08 NOTE — PLAN OF CARE
Pt verified address.  Pt lives w/ sig other and dependent children, reports he has family support system in place to assist as needed following discharge.  Pt denies use of DME, home health.  Pt does not anticipate any d/c needs at present time.       03/08/20 8981   Discharge Assessment   Assessment Type Discharge Planning Assessment   Confirmed/corrected address and phone number on facesheet? Yes   Assessment information obtained from? Patient   Prior to hospitilization cognitive status: Alert/Oriented   Prior to hospitalization functional status: Independent   Current cognitive status: Alert/Oriented   Current Functional Status: Independent   Facility Arrived From: home   Lives With child(live), dependent;significant other   Able to Return to Prior Arrangements yes   Is patient able to care for self after discharge? Yes   Who are your caregiver(s) and their phone number(s)? father:  Zander Cousin [phone]   Patient's perception of discharge disposition home or selfcare   Readmission Within the Last 30 Days unable to assess   Patient currently being followed by outpatient case management? No   Patient currently receives any other outside agency services? No   Equipment Currently Used at Home none   Part D Coverage N/A   Do you have any problems affording any of your prescribed medications? No   Does the patient have transportation home? Yes   Transportation Anticipated family or friend will provide  (father)   Dialysis Name and Scheduled days N/A   Does the patient receive services at the [facility]mad[facility]? No   Discharge Plan A Home with family   Discharge Plan B Home with family   DME Needed Upon Discharge  none   Patient/Family in Agreement with Plan yes

## 2020-03-08 NOTE — NURSING
Pt transferred to 3rd floor from Ed. Received report from Kem. Alert and oriented X4. VSS. Oriented to room.Bed in lowest position and locked. Call light within reach.

## 2020-03-08 NOTE — SUBJECTIVE & OBJECTIVE
Interval History:  Overnight events noted.  This morning patient reports pain is not well controlled.  He denies significant nausea.  Patient does smoke.  Ultrasound findings noted.    Review of Systems   Constitutional: Negative for chills, fever and unexpected weight change.   HENT: Negative for congestion and trouble swallowing.    Eyes: Negative for photophobia and visual disturbance.   Respiratory: Negative for cough and shortness of breath.    Cardiovascular: Negative for chest pain and leg swelling.   Gastrointestinal: Positive for abdominal pain. Negative for abdominal distention, blood in stool, constipation, diarrhea, nausea and vomiting.   Genitourinary: Negative for dysuria and hematuria.   Musculoskeletal: Positive for back pain. Negative for arthralgias and myalgias.   Skin: Negative for color change and rash.   Neurological: Negative for dizziness, light-headedness and numbness.   Psychiatric/Behavioral: Negative for agitation and confusion.     Objective:     Vital Signs (Most Recent):  Temp: 99 °F (37.2 °C) (03/08/20 1138)  Pulse: 74 (03/08/20 1138)  Resp: 18 (03/08/20 1138)  BP: (!) 142/89 (03/08/20 1138)  SpO2: 100 % (03/08/20 1138) Vital Signs (24h Range):  Temp:  [98.2 °F (36.8 °C)-101 °F (38.3 °C)] 99 °F (37.2 °C)  Pulse:  [] 74  Resp:  [16-20] 18  SpO2:  [95 %-100 %] 100 %  BP: (124-159)/() 142/89     Weight: 83.1 kg (183 lb 3.2 oz)  Body mass index is 28.69 kg/m².    Intake/Output Summary (Last 24 hours) at 3/8/2020 1157  Last data filed at 3/8/2020 0700  Gross per 24 hour   Intake 0 ml   Output --   Net 0 ml      Physical Exam   Constitutional: He is oriented to person, place, and time. He appears well-developed and well-nourished.   HENT:   Head: Normocephalic and atraumatic.   Eyes: Pupils are equal, round, and reactive to light. EOM are normal. No scleral icterus.   Neck: Normal range of motion. Neck supple.   Cardiovascular: Normal rate, regular rhythm, normal heart sounds  and intact distal pulses.   Pulmonary/Chest: Effort normal and breath sounds normal. No respiratory distress.   Abdominal: Soft. Bowel sounds are normal. He exhibits no distension. There is tenderness (Epigastric ). There is guarding (voluntary).   Musculoskeletal: Normal range of motion. He exhibits no edema.   Neurological: He is alert and oriented to person, place, and time.   No asterixis   Skin: Skin is warm and dry. No rash noted. No erythema.   Psychiatric: He has a normal mood and affect. His behavior is normal.   Nursing note and vitals reviewed.      Significant Labs:   Recent Lab Results       03/08/20  0638   03/07/20  1438        Albumin   3.8     Alcohol, Medical, Serum   <10     Alkaline Phosphatase   94     ALT   42     Anion Gap 8 11     AST   44     Baso #   0.05     Basophil%   0.5     BILIRUBIN TOTAL   0.2  Comment:  For infants and newborns, interpretation of results should be based  on gestational age, weight and in agreement with clinical  observations.  Premature Infant recommended reference ranges:  Up to 24 hours.............<8.0 mg/dL  Up to 48 hours............<12.0 mg/dL  3-5 days..................<15.0 mg/dL  6-29 days.................<15.0 mg/dL       BUN, Bld 6 9     Calcium 9.0 8.9     Chloride 103 98     CO2 27 24     Creatinine 1.1 1.2     Differential Method   Automated     eGFR if  >60 >60     eGFR if non  >60  Comment:  Calculation used to obtain the estimated glomerular filtration  rate (eGFR) is the CKD-EPI equation.    >60  Comment:  Calculation used to obtain the estimated glomerular filtration  rate (eGFR) is the CKD-EPI equation.        Eos #   0.3     Eosinophil%   2.8     Ferritin 75       Glucose 146 158     Gran # (ANC)   8.0     Gran%   76.3     Hematocrit 33.5 35.0     Hemoglobin 10.2 10.4     Immature Grans (Abs)   0.07  Comment:  Mild elevation in immature granulocytes is non specific and   can be seen in a variety of conditions  including stress response,   acute inflammation, trauma and pregnancy. Correlation with other   laboratory and clinical findings is essential.       Iron 13       Lipase   364     Lymph #   1.3     Lymph%   12.8     Magnesium 1.7       MCH 25.3 25.5     MCHC 30.4 29.7     MCV 83 86     Mono #   0.7     Mono%   6.9     MPV 10.3 10.8     nRBC   0     Platelets 391 352     Potassium 3.9 5.6     PROTEIN TOTAL   7.7     RBC 4.03 4.08     RDW 17.4 17.8     Saturated Iron 4       Sodium 138 133     TIBC 327       Transferrin 221       WBC 9.42 10.44           Significant Imaging: I have reviewed and interpreted all pertinent imaging results/findings within the past 24 hours.

## 2020-03-08 NOTE — NURSING
Nurse notified Dr Mitchell that pt has 100.1 B/P165/105. Pt c/o left upper quadrant abdominal and epigastric pain.Pt is requesting and increase in frequency of dilaudid.

## 2020-03-08 NOTE — PLAN OF CARE
Plan of care reviewed with pt,verbalized understanding.IV CDI.Ultrasound done this am. Tolerating clear liquid diet. Pt c/o pain medication ineffective. MD ordered dilaudid.  PIV intact and infusing fluids. Hourly/Q2 rounding completed for safety. Call light kept within reach, bed in locked and lowest position, side rails up x2.

## 2020-03-08 NOTE — CONSULTS
[facility]  General Surgery  Consult Note    Patient Name: Bradley Collado  MRN: [id number]  Code Status: Full Code  Admission Date: 3/7/2020  Hospital Length of Stay: 1 days  Attending Physician: Scott Mitchell,*  Primary Care Provider: Arlen Belcher NP    Patient information was obtained from patient and ER records.     Inpatient consult to General Surgery  Consult performed by: Trenton Deshpande MD  Consult ordered by: Scott Mitchell MD  Assessment/Recommendations: Biliary sludge        Subjective:     Principal Problem: Acute on chronic pancreatitis    History of Present Illness: Pleasant 40-year-old male admitted to the hospital with epigastric pain and pancreatitis.  Patient notes he has history of chronic pancreatitis.  He was diagnosed with pancreatitis approximately 4 years ago.  He notes in the past he was a heavy drinker of alcohol. He states that more recently he has had very limited alcohol intake.  Patient is now admitted for the 3rd time in the last 2 weeks with epigastric abdominal pain and mid abdominal pain. He notes nausea but no emesis.  He has had no fevers or chills.  Patient had an ultrasound today demonstrating biliary sludge and I have been asked to evaluate for possible cholecystectomy    No current facility-administered medications on file prior to encounter.      Current Outpatient Medications on File Prior to Encounter   Medication Sig    ARIPiprazole (ABILIFY) 10 MG Tab Take 1 tablet (10 mg total) by mouth once daily.    cloNIDine 0.1 mg/24 hr td ptwk (CATAPRES) 0.1 mg/24 hr Place 1 patch onto the skin every 7 days.    gabapentin (NEURONTIN) 300 MG capsule Take 1 capsule (300 mg total) by mouth 3 (three) times daily.    HYDROcodone-acetaminophen (NORCO)  mg per tablet Take 1 tablet by mouth every 6 (six) hours as needed for Pain.    HYDROcodone-acetaminophen (NORCO)  mg per tablet Take 1 tablet by mouth every 6 (six) hours as  needed.    hydrOXYzine pamoate (VISTARIL) 25 MG Cap Take 25 mg by mouth 4 (four) times daily.    levothyroxine (SYNTHROID) 50 MCG tablet Take 1 tablet (50 mcg total) by mouth before breakfast.    lisinopril (PRINIVIL,ZESTRIL) 40 MG tablet Take 1 tablet (40 mg total) by mouth once daily.    metoprolol succinate (TOPROL-XL) 25 MG 24 hr tablet Take 1 tablet (25 mg total) by mouth nightly.    mirtazapine (REMERON) 30 MG tablet Take 1 tablet (30 mg total) by mouth nightly.    ondansetron (ZOFRAN) 4 MG tablet Take 1 tablet (4 mg total) by mouth every 6 (six) hours as needed for Nausea.    pantoprazole (PROTONIX) 40 MG tablet Take 40 mg by mouth once daily.    QUEtiapine (SEROQUEL) 200 MG Tab Take 1 tablet (200 mg total) by mouth nightly as needed (insomnia). (Patient taking differently: Take 300 mg by mouth nightly as needed (insomnia). )    sertraline (ZOLOFT) 50 MG tablet Take 1 tablet (50 mg total) by mouth once daily.       Review of patient's allergies indicates:  No Known Allergies    Past Medical History:   Diagnosis Date    Anxiety     Anxiety     Bipolar affective disorder     Depression     Hypertension     Pancreatitis     Schizophrenia     Thyroid disease      No past surgical history on file.  Family History     Problem Relation (Age of Onset)    Cancer Paternal Grandfather    Diabetes Maternal Grandmother    Hypertension Maternal Grandfather        Tobacco Use    Smoking status: Current Some Day Smoker     Packs/day: 0.25     Years: 7.00     Pack years: 1.75    Smokeless tobacco: Never Used   Substance and Sexual Activity    Alcohol use: Yes     Comment: occasionally    Drug use: Yes     Frequency: 7.0 times per week     Types: Marijuana    Sexual activity: Yes     Partners: Female     Review of Systems   Constitutional: Negative for activity change, appetite change, diaphoresis, fever and unexpected weight change.   Respiratory: Negative for cough, chest tightness and wheezing.     Cardiovascular: Negative for chest pain and palpitations.   Gastrointestinal: Positive for abdominal pain. Negative for abdominal distention, anal bleeding, blood in stool, constipation, diarrhea, nausea, rectal pain and vomiting.   Genitourinary: Negative for difficulty urinating, dysuria and hematuria.   Musculoskeletal: Negative for back pain and gait problem.   Skin: Negative for color change and wound.   Neurological: Negative for tremors and seizures.   Psychiatric/Behavioral: Negative for agitation and decreased concentration.     Objective:     Vital Signs (Most Recent):  Temp: 100.1 °F (37.8 °C) (03/08/20 1516)  Pulse: 79 (03/08/20 1516)  Resp: 20 (03/08/20 1516)  BP: (!) 165/105 (03/08/20 1516)  SpO2: 100 % (03/08/20 1516) Vital Signs (24h Range):  Temp:  [98.2 °F (36.8 °C)-101 °F (38.3 °C)] 100.1 °F (37.8 °C)  Pulse:  [69-94] 79  Resp:  [17-20] 20  SpO2:  [95 %-100 %] 100 %  BP: (124-165)/() 165/105     Weight: 83.1 kg (183 lb 3.2 oz)  Body mass index is 28.69 kg/m².    Physical Exam   Constitutional: He is oriented to person, place, and time. He appears well-developed and well-nourished.   HENT:   Head: Normocephalic and atraumatic.   Eyes: Pupils are equal, round, and reactive to light.   Neck: Normal range of motion. No tracheal deviation present. No thyromegaly present.   Cardiovascular: Normal rate, regular rhythm and normal heart sounds. Exam reveals no gallop.   No murmur heard.  Pulmonary/Chest: Effort normal and breath sounds normal. He has no wheezes. He exhibits no tenderness.   Abdominal: He exhibits no distension and no mass. There is tenderness. There is no rebound and no guarding. No hernia.       Musculoskeletal: Normal range of motion.   Neurological: He is alert and oriented to person, place, and time. Coordination normal.   Skin: Skin is warm.   Psychiatric: He has a normal mood and affect.   Vitals reviewed.      Significant Labs:  CBC:   Recent Labs   Lab 03/08/20  0638   WBC  9.42   RBC 4.03*   HGB 10.2*   HCT 33.5*   *   MCV 83   MCH 25.3*   MCHC 30.4*     CMP:   Recent Labs   Lab 03/07/20  1438 03/08/20  0638   * 146*   CALCIUM 8.9 9.0   ALBUMIN 3.8  --    PROT 7.7  --    * 138   K 5.6* 3.9   CO2 24 27   CL 98 103   BUN 9 6   CREATININE 1.2 1.1   ALKPHOS 94  --    ALT 42  --    AST 44*  --    BILITOT 0.2  --        Significant Diagnostics:  US reviewed from today demonstrating biliary sludge    CT from 2/26 demonstrating acute on chronic pancreatitis    Assessment/Plan:     * Acute on chronic pancreatitis  Discussion with the patient. Who discussed with him that he does in fact have biliary sludge.  This could be contributing to his epigastric pain and also aggravating his chronic pancreatitis.  Did stress to him that I cannot guarantee this but that this is a potential source of his pain.  I have offered laparoscopic cholecystectomy as early as tomorrow.  I have reviewed risks and benefits of the surgery with him as well.  Patient desires to proceed with surgical cholecystectomy.  Will proceed with surgery tomorrow      VTE Risk Mitigation (From admission, onward)         Ordered     IP VTE LOW RISK PATIENT  Once      03/07/20 1809     Place sequential compression device  Until discontinued      03/07/20 1809                Thank you for your consult. I will follow-up with patient. Please contact us if you have any additional questions.    Trenton Deshpande MD  General Surgery  [facility]

## 2020-03-08 NOTE — HPI
Pleasant 40-year-old male admitted to the hospital with epigastric pain and pancreatitis.  Patient notes he has history of chronic pancreatitis.  He was diagnosed with pancreatitis approximately 4 years ago.  He notes in the past he was a heavy drinker of alcohol. He states that more recently he has had very limited alcohol intake.  Patient is now admitted for the 3rd time in the last 2 weeks with epigastric abdominal pain and mid abdominal pain. He notes nausea but no emesis.  He has had no fevers or chills.  Patient had an ultrasound today demonstrating biliary sludge and I have been asked to evaluate for possible cholecystectomy

## 2020-03-08 NOTE — NURSING
"Patient found at elevator, when questioned about leaving within the 1 hour period after receiving pain medication, states " I haven't got anything for pain for many hours" when questioned again, states " I have been asking for pain medicine and she hasn't given me anything". Explained to patient that he did not have off the floor privileges ordered. Patient grumbling ambulated back to room and slammed door.Informed patient that he had indeed received pain medication @ 5 and that the communication board reflected that. Patient tore off telemetry box, angry " I can go home and smoke weed and take care of my pain".  Encouraged patient to speak to Dr. Mitchell about his pain medication or he could leave AMA if he chose. Patient continued to sit on the side of bed, shaking his head that he would speak to Dr. Mitchell.   "

## 2020-03-08 NOTE — ASSESSMENT & PLAN NOTE
Discussion with the patient. Who discussed with him that he does in fact have biliary sludge.  This could be contributing to his epigastric pain and also aggravating his chronic pancreatitis.  Did stress to him that I cannot guarantee this but that this is a potential source of his pain.  I have offered laparoscopic cholecystectomy as early as tomorrow.  I have reviewed risks and benefits of the surgery with him as well.  Patient desires to proceed with surgical cholecystectomy.  Will proceed with surgery tomorrow

## 2020-03-08 NOTE — ASSESSMENT & PLAN NOTE
Ultrasound reviewed.  Patient does have gallbladder sludge  Will consult General surgery  Continue IV fluids, Antiemetics, Analgesics  Clear liquid diet  Normal triglycerides about a week ago

## 2020-03-08 NOTE — SUBJECTIVE & OBJECTIVE
No current facility-administered medications on file prior to encounter.      Current Outpatient Medications on File Prior to Encounter   Medication Sig    ARIPiprazole (ABILIFY) 10 MG Tab Take 1 tablet (10 mg total) by mouth once daily.    cloNIDine 0.1 mg/24 hr td ptwk (CATAPRES) 0.1 mg/24 hr Place 1 patch onto the skin every 7 days.    gabapentin (NEURONTIN) 300 MG capsule Take 1 capsule (300 mg total) by mouth 3 (three) times daily.    HYDROcodone-acetaminophen (NORCO)  mg per tablet Take 1 tablet by mouth every 6 (six) hours as needed for Pain.    HYDROcodone-acetaminophen (NORCO)  mg per tablet Take 1 tablet by mouth every 6 (six) hours as needed.    hydrOXYzine pamoate (VISTARIL) 25 MG Cap Take 25 mg by mouth 4 (four) times daily.    levothyroxine (SYNTHROID) 50 MCG tablet Take 1 tablet (50 mcg total) by mouth before breakfast.    lisinopril (PRINIVIL,ZESTRIL) 40 MG tablet Take 1 tablet (40 mg total) by mouth once daily.    metoprolol succinate (TOPROL-XL) 25 MG 24 hr tablet Take 1 tablet (25 mg total) by mouth nightly.    mirtazapine (REMERON) 30 MG tablet Take 1 tablet (30 mg total) by mouth nightly.    ondansetron (ZOFRAN) 4 MG tablet Take 1 tablet (4 mg total) by mouth every 6 (six) hours as needed for Nausea.    pantoprazole (PROTONIX) 40 MG tablet Take 40 mg by mouth once daily.    QUEtiapine (SEROQUEL) 200 MG Tab Take 1 tablet (200 mg total) by mouth nightly as needed (insomnia). (Patient taking differently: Take 300 mg by mouth nightly as needed (insomnia). )    sertraline (ZOLOFT) 50 MG tablet Take 1 tablet (50 mg total) by mouth once daily.       Review of patient's allergies indicates:  No Known Allergies    Past Medical History:   Diagnosis Date    Anxiety     Anxiety     Bipolar affective disorder     Depression     Hypertension     Pancreatitis     Schizophrenia     Thyroid disease      No past surgical history on file.  Family History     Problem Relation (Age  of Onset)    Cancer Paternal Grandfather    Diabetes Maternal Grandmother    Hypertension Maternal Grandfather        Tobacco Use    Smoking status: Current Some Day Smoker     Packs/day: 0.25     Years: 7.00     Pack years: 1.75    Smokeless tobacco: Never Used   Substance and Sexual Activity    Alcohol use: Yes     Comment: occasionally    Drug use: Yes     Frequency: 7.0 times per week     Types: Marijuana    Sexual activity: Yes     Partners: Female     Review of Systems   Constitutional: Negative for activity change, appetite change, diaphoresis, fever and unexpected weight change.   Respiratory: Negative for cough, chest tightness and wheezing.    Cardiovascular: Negative for chest pain and palpitations.   Gastrointestinal: Positive for abdominal pain. Negative for abdominal distention, anal bleeding, blood in stool, constipation, diarrhea, nausea, rectal pain and vomiting.   Genitourinary: Negative for difficulty urinating, dysuria and hematuria.   Musculoskeletal: Negative for back pain and gait problem.   Skin: Negative for color change and wound.   Neurological: Negative for tremors and seizures.   Psychiatric/Behavioral: Negative for agitation and decreased concentration.     Objective:     Vital Signs (Most Recent):  Temp: 100.1 °F (37.8 °C) (03/08/20 1516)  Pulse: 79 (03/08/20 1516)  Resp: 20 (03/08/20 1516)  BP: (!) 165/105 (03/08/20 1516)  SpO2: 100 % (03/08/20 1516) Vital Signs (24h Range):  Temp:  [98.2 °F (36.8 °C)-101 °F (38.3 °C)] 100.1 °F (37.8 °C)  Pulse:  [69-94] 79  Resp:  [17-20] 20  SpO2:  [95 %-100 %] 100 %  BP: (124-165)/() 165/105     Weight: 83.1 kg (183 lb 3.2 oz)  Body mass index is 28.69 kg/m².    Physical Exam   Constitutional: He is oriented to person, place, and time. He appears well-developed and well-nourished.   HENT:   Head: Normocephalic and atraumatic.   Eyes: Pupils are equal, round, and reactive to light.   Neck: Normal range of motion. No tracheal deviation  present. No thyromegaly present.   Cardiovascular: Normal rate, regular rhythm and normal heart sounds. Exam reveals no gallop.   No murmur heard.  Pulmonary/Chest: Effort normal and breath sounds normal. He has no wheezes. He exhibits no tenderness.   Abdominal: He exhibits no distension and no mass. There is tenderness. There is no rebound and no guarding. No hernia.       Musculoskeletal: Normal range of motion.   Neurological: He is alert and oriented to person, place, and time. Coordination normal.   Skin: Skin is warm.   Psychiatric: He has a normal mood and affect.   Vitals reviewed.      Significant Labs:  CBC:   Recent Labs   Lab 03/08/20  0638   WBC 9.42   RBC 4.03*   HGB 10.2*   HCT 33.5*   *   MCV 83   MCH 25.3*   MCHC 30.4*     CMP:   Recent Labs   Lab 03/07/20  1438 03/08/20  0638   * 146*   CALCIUM 8.9 9.0   ALBUMIN 3.8  --    PROT 7.7  --    * 138   K 5.6* 3.9   CO2 24 27   CL 98 103   BUN 9 6   CREATININE 1.2 1.1   ALKPHOS 94  --    ALT 42  --    AST 44*  --    BILITOT 0.2  --        Significant Diagnostics:  US reviewed from today demonstrating biliary sludge    CT from 2/26 demonstrating acute on chronic pancreatitis

## 2020-03-09 ENCOUNTER — ANESTHESIA EVENT (OUTPATIENT)
Dept: SURGERY | Facility: HOSPITAL | Age: 40
DRG: 419 | End: 2020-03-09
Payer: MEDICAID

## 2020-03-09 ENCOUNTER — ANESTHESIA (OUTPATIENT)
Dept: SURGERY | Facility: HOSPITAL | Age: 40
DRG: 419 | End: 2020-03-09
Payer: MEDICAID

## 2020-03-09 LAB
ANION GAP SERPL CALC-SCNC: 8 MMOL/L (ref 8–16)
BUN SERPL-MCNC: 4 MG/DL (ref 6–20)
CALCIUM SERPL-MCNC: 9.6 MG/DL (ref 8.7–10.5)
CHLORIDE SERPL-SCNC: 103 MMOL/L (ref 95–110)
CO2 SERPL-SCNC: 29 MMOL/L (ref 23–29)
CREAT SERPL-MCNC: 0.9 MG/DL (ref 0.5–1.4)
EST. GFR  (AFRICAN AMERICAN): >60 ML/MIN/1.73 M^2
EST. GFR  (NON AFRICAN AMERICAN): >60 ML/MIN/1.73 M^2
GLUCOSE SERPL-MCNC: 106 MG/DL (ref 70–110)
MAGNESIUM SERPL-MCNC: 2.1 MG/DL (ref 1.6–2.6)
POTASSIUM SERPL-SCNC: 4.2 MMOL/L (ref 3.5–5.1)
SODIUM SERPL-SCNC: 140 MMOL/L (ref 136–145)

## 2020-03-09 PROCEDURE — D9220A PRA ANESTHESIA: Mod: CRNA,,, | Performed by: NURSE ANESTHETIST, CERTIFIED REGISTERED

## 2020-03-09 PROCEDURE — 36000708 HC OR TIME LEV III 1ST 15 MIN: Performed by: SURGERY

## 2020-03-09 PROCEDURE — 25000003 PHARM REV CODE 250: Performed by: SURGERY

## 2020-03-09 PROCEDURE — 25000003 PHARM REV CODE 250: Performed by: INTERNAL MEDICINE

## 2020-03-09 PROCEDURE — 88304 TISSUE EXAM BY PATHOLOGIST: CPT | Performed by: PATHOLOGY

## 2020-03-09 PROCEDURE — 88304 TISSUE EXAM BY PATHOLOGIST: CPT | Mod: 26,,, | Performed by: PATHOLOGY

## 2020-03-09 PROCEDURE — 63600175 PHARM REV CODE 636 W HCPCS: Performed by: SURGERY

## 2020-03-09 PROCEDURE — 83735 ASSAY OF MAGNESIUM: CPT

## 2020-03-09 PROCEDURE — D9220A PRA ANESTHESIA: ICD-10-PCS | Mod: ANES,,, | Performed by: ANESTHESIOLOGY

## 2020-03-09 PROCEDURE — 71000039 HC RECOVERY, EACH ADD'L HOUR: Performed by: SURGERY

## 2020-03-09 PROCEDURE — 63600175 PHARM REV CODE 636 W HCPCS: Performed by: INTERNAL MEDICINE

## 2020-03-09 PROCEDURE — 25000003 PHARM REV CODE 250: Performed by: ANESTHESIOLOGY

## 2020-03-09 PROCEDURE — 47562 LAPAROSCOPIC CHOLECYSTECTOMY: CPT | Mod: ,,, | Performed by: SURGERY

## 2020-03-09 PROCEDURE — 80048 BASIC METABOLIC PNL TOTAL CA: CPT

## 2020-03-09 PROCEDURE — 99900103 DSU ONLY-NO CHARGE-INITIAL HR (STAT): Performed by: SURGERY

## 2020-03-09 PROCEDURE — 25000003 PHARM REV CODE 250: Performed by: NURSE ANESTHETIST, CERTIFIED REGISTERED

## 2020-03-09 PROCEDURE — 12000002 HC ACUTE/MED SURGE SEMI-PRIVATE ROOM

## 2020-03-09 PROCEDURE — S0030 INJECTION, METRONIDAZOLE: HCPCS | Performed by: SURGERY

## 2020-03-09 PROCEDURE — 47562 PR LAP,CHOLECYSTECTOMY: ICD-10-PCS | Mod: ,,, | Performed by: SURGERY

## 2020-03-09 PROCEDURE — 36415 COLL VENOUS BLD VENIPUNCTURE: CPT

## 2020-03-09 PROCEDURE — 88304 PR  SURG PATH,LEVEL III: ICD-10-PCS | Mod: 26,,, | Performed by: PATHOLOGY

## 2020-03-09 PROCEDURE — 71000033 HC RECOVERY, INTIAL HOUR: Performed by: SURGERY

## 2020-03-09 PROCEDURE — D9220A PRA ANESTHESIA: ICD-10-PCS | Mod: CRNA,,, | Performed by: NURSE ANESTHETIST, CERTIFIED REGISTERED

## 2020-03-09 PROCEDURE — 63600175 PHARM REV CODE 636 W HCPCS: Performed by: ANESTHESIOLOGY

## 2020-03-09 PROCEDURE — 36000709 HC OR TIME LEV III EA ADD 15 MIN: Performed by: SURGERY

## 2020-03-09 PROCEDURE — 63600175 PHARM REV CODE 636 W HCPCS: Performed by: NURSE ANESTHETIST, CERTIFIED REGISTERED

## 2020-03-09 PROCEDURE — D9220A PRA ANESTHESIA: Mod: ANES,,, | Performed by: ANESTHESIOLOGY

## 2020-03-09 PROCEDURE — 37000009 HC ANESTHESIA EA ADD 15 MINS: Performed by: SURGERY

## 2020-03-09 PROCEDURE — 37000008 HC ANESTHESIA 1ST 15 MINUTES: Performed by: SURGERY

## 2020-03-09 PROCEDURE — 27201423 OPTIME MED/SURG SUP & DEVICES STERILE SUPPLY: Performed by: SURGERY

## 2020-03-09 RX ORDER — MORPHINE SULFATE 15 MG/1
15 TABLET, FILM COATED, EXTENDED RELEASE ORAL EVERY 12 HOURS
Status: DISCONTINUED | OUTPATIENT
Start: 2020-03-09 | End: 2020-03-10 | Stop reason: HOSPADM

## 2020-03-09 RX ORDER — SODIUM CHLORIDE, SODIUM LACTATE, POTASSIUM CHLORIDE, CALCIUM CHLORIDE 600; 310; 30; 20 MG/100ML; MG/100ML; MG/100ML; MG/100ML
INJECTION, SOLUTION INTRAVENOUS CONTINUOUS
Status: DISCONTINUED | OUTPATIENT
Start: 2020-03-09 | End: 2020-03-09

## 2020-03-09 RX ORDER — NEOSTIGMINE METHYLSULFATE 1 MG/ML
INJECTION, SOLUTION INTRAVENOUS
Status: DISCONTINUED | OUTPATIENT
Start: 2020-03-09 | End: 2020-03-09

## 2020-03-09 RX ORDER — GLYCOPYRROLATE 0.2 MG/ML
INJECTION INTRAMUSCULAR; INTRAVENOUS
Status: DISCONTINUED | OUTPATIENT
Start: 2020-03-09 | End: 2020-03-09

## 2020-03-09 RX ORDER — ROCURONIUM BROMIDE 10 MG/ML
INJECTION, SOLUTION INTRAVENOUS
Status: DISCONTINUED | OUTPATIENT
Start: 2020-03-09 | End: 2020-03-09

## 2020-03-09 RX ORDER — MIDAZOLAM HYDROCHLORIDE 1 MG/ML
INJECTION, SOLUTION INTRAMUSCULAR; INTRAVENOUS
Status: DISCONTINUED | OUTPATIENT
Start: 2020-03-09 | End: 2020-03-09

## 2020-03-09 RX ORDER — FENTANYL CITRATE 50 UG/ML
25 INJECTION, SOLUTION INTRAMUSCULAR; INTRAVENOUS EVERY 5 MIN PRN
Status: COMPLETED | OUTPATIENT
Start: 2020-03-09 | End: 2020-03-09

## 2020-03-09 RX ORDER — ONDANSETRON HYDROCHLORIDE 2 MG/ML
INJECTION, SOLUTION INTRAMUSCULAR; INTRAVENOUS
Status: DISCONTINUED | OUTPATIENT
Start: 2020-03-09 | End: 2020-03-09

## 2020-03-09 RX ORDER — FENTANYL CITRATE 50 UG/ML
INJECTION, SOLUTION INTRAMUSCULAR; INTRAVENOUS
Status: DISCONTINUED | OUTPATIENT
Start: 2020-03-09 | End: 2020-03-09

## 2020-03-09 RX ORDER — LIDOCAINE HCL/PF 100 MG/5ML
SYRINGE (ML) INTRAVENOUS
Status: DISCONTINUED | OUTPATIENT
Start: 2020-03-09 | End: 2020-03-09

## 2020-03-09 RX ORDER — METRONIDAZOLE 500 MG/100ML
500 INJECTION, SOLUTION INTRAVENOUS ONCE
Status: COMPLETED | OUTPATIENT
Start: 2020-03-09 | End: 2020-03-09

## 2020-03-09 RX ORDER — PROPOFOL 10 MG/ML
VIAL (ML) INTRAVENOUS
Status: DISCONTINUED | OUTPATIENT
Start: 2020-03-09 | End: 2020-03-09

## 2020-03-09 RX ORDER — KETOROLAC TROMETHAMINE 30 MG/ML
INJECTION, SOLUTION INTRAMUSCULAR; INTRAVENOUS
Status: DISCONTINUED | OUTPATIENT
Start: 2020-03-09 | End: 2020-03-09

## 2020-03-09 RX ORDER — DEXAMETHASONE SODIUM PHOSPHATE 4 MG/ML
INJECTION, SOLUTION INTRA-ARTICULAR; INTRALESIONAL; INTRAMUSCULAR; INTRAVENOUS; SOFT TISSUE
Status: DISCONTINUED | OUTPATIENT
Start: 2020-03-09 | End: 2020-03-09

## 2020-03-09 RX ORDER — KETOROLAC TROMETHAMINE 30 MG/ML
15 INJECTION, SOLUTION INTRAMUSCULAR; INTRAVENOUS EVERY 6 HOURS PRN
Status: DISCONTINUED | OUTPATIENT
Start: 2020-03-09 | End: 2020-03-10 | Stop reason: HOSPADM

## 2020-03-09 RX ORDER — ACETAMINOPHEN 10 MG/ML
INJECTION, SOLUTION INTRAVENOUS
Status: DISCONTINUED | OUTPATIENT
Start: 2020-03-09 | End: 2020-03-09

## 2020-03-09 RX ORDER — SODIUM CHLORIDE 0.9 % (FLUSH) 0.9 %
3 SYRINGE (ML) INJECTION EVERY 8 HOURS
Status: DISCONTINUED | OUTPATIENT
Start: 2020-03-09 | End: 2020-03-09 | Stop reason: HOSPADM

## 2020-03-09 RX ORDER — SODIUM CHLORIDE 0.9 % (FLUSH) 0.9 %
3 SYRINGE (ML) INJECTION
Status: DISCONTINUED | OUTPATIENT
Start: 2020-03-09 | End: 2020-03-09 | Stop reason: HOSPADM

## 2020-03-09 RX ORDER — DIPHENHYDRAMINE HYDROCHLORIDE 50 MG/ML
25 INJECTION INTRAMUSCULAR; INTRAVENOUS EVERY 6 HOURS PRN
Status: DISCONTINUED | OUTPATIENT
Start: 2020-03-09 | End: 2020-03-09 | Stop reason: HOSPADM

## 2020-03-09 RX ORDER — LIDOCAINE HYDROCHLORIDE 10 MG/ML
0.5 INJECTION, SOLUTION EPIDURAL; INFILTRATION; INTRACAUDAL; PERINEURAL ONCE
Status: DISCONTINUED | OUTPATIENT
Start: 2020-03-09 | End: 2020-03-09 | Stop reason: HOSPADM

## 2020-03-09 RX ORDER — BUPIVACAINE HYDROCHLORIDE AND EPINEPHRINE 2.5; 5 MG/ML; UG/ML
INJECTION, SOLUTION EPIDURAL; INFILTRATION; INTRACAUDAL; PERINEURAL
Status: DISCONTINUED | OUTPATIENT
Start: 2020-03-09 | End: 2020-03-09 | Stop reason: HOSPADM

## 2020-03-09 RX ORDER — SUCCINYLCHOLINE CHLORIDE 20 MG/ML
INJECTION INTRAMUSCULAR; INTRAVENOUS
Status: DISCONTINUED | OUTPATIENT
Start: 2020-03-09 | End: 2020-03-09

## 2020-03-09 RX ORDER — OXYCODONE HYDROCHLORIDE 5 MG/1
5 TABLET ORAL
Status: DISCONTINUED | OUTPATIENT
Start: 2020-03-09 | End: 2020-03-09 | Stop reason: HOSPADM

## 2020-03-09 RX ORDER — ONDANSETRON 2 MG/ML
4 INJECTION INTRAMUSCULAR; INTRAVENOUS DAILY PRN
Status: DISCONTINUED | OUTPATIENT
Start: 2020-03-09 | End: 2020-03-09 | Stop reason: HOSPADM

## 2020-03-09 RX ORDER — CIPROFLOXACIN 2 MG/ML
400 INJECTION, SOLUTION INTRAVENOUS ONCE
Status: COMPLETED | OUTPATIENT
Start: 2020-03-09 | End: 2020-03-09

## 2020-03-09 RX ORDER — HYDROMORPHONE HYDROCHLORIDE 2 MG/ML
0.2 INJECTION, SOLUTION INTRAMUSCULAR; INTRAVENOUS; SUBCUTANEOUS EVERY 5 MIN PRN
Status: DISCONTINUED | OUTPATIENT
Start: 2020-03-09 | End: 2020-03-09 | Stop reason: HOSPADM

## 2020-03-09 RX ADMIN — HYDROMORPHONE HYDROCHLORIDE 0.2 MG: 2 INJECTION, SOLUTION INTRAMUSCULAR; INTRAVENOUS; SUBCUTANEOUS at 09:03

## 2020-03-09 RX ADMIN — FENTANYL CITRATE 25 MCG: 50 INJECTION INTRAMUSCULAR; INTRAVENOUS at 09:03

## 2020-03-09 RX ADMIN — SODIUM CHLORIDE, SODIUM LACTATE, POTASSIUM CHLORIDE, AND CALCIUM CHLORIDE: .6; .31; .03; .02 INJECTION, SOLUTION INTRAVENOUS at 11:03

## 2020-03-09 RX ADMIN — NEOSTIGMINE METHYLSULFATE 3 MG: 1 INJECTION INTRAVENOUS at 08:03

## 2020-03-09 RX ADMIN — GABAPENTIN 300 MG: 300 CAPSULE ORAL at 10:03

## 2020-03-09 RX ADMIN — HYDROMORPHONE HYDROCHLORIDE 2 MG: 2 INJECTION, SOLUTION INTRAMUSCULAR; INTRAVENOUS; SUBCUTANEOUS at 11:03

## 2020-03-09 RX ADMIN — ARIPIPRAZOLE 10 MG: 5 TABLET ORAL at 10:03

## 2020-03-09 RX ADMIN — ACETAMINOPHEN 1000 MG: 10 INJECTION, SOLUTION INTRAVENOUS at 08:03

## 2020-03-09 RX ADMIN — SODIUM CHLORIDE, SODIUM GLUCONATE, SODIUM ACETATE, POTASSIUM CHLORIDE, MAGNESIUM CHLORIDE, SODIUM PHOSPHATE, DIBASIC, AND POTASSIUM PHOSPHATE: .53; .5; .37; .037; .03; .012; .00082 INJECTION, SOLUTION INTRAVENOUS at 07:03

## 2020-03-09 RX ADMIN — HYDROMORPHONE HYDROCHLORIDE 0.2 MG: 2 INJECTION, SOLUTION INTRAMUSCULAR; INTRAVENOUS; SUBCUTANEOUS at 10:03

## 2020-03-09 RX ADMIN — OXYCODONE HYDROCHLORIDE 5 MG: 5 TABLET ORAL at 09:03

## 2020-03-09 RX ADMIN — DEXAMETHASONE SODIUM PHOSPHATE 4 MG: 4 INJECTION, SOLUTION INTRAMUSCULAR; INTRAVENOUS at 07:03

## 2020-03-09 RX ADMIN — HYDROMORPHONE HYDROCHLORIDE 2 MG: 2 INJECTION, SOLUTION INTRAMUSCULAR; INTRAVENOUS; SUBCUTANEOUS at 02:03

## 2020-03-09 RX ADMIN — METOPROLOL SUCCINATE 25 MG: 25 TABLET, EXTENDED RELEASE ORAL at 10:03

## 2020-03-09 RX ADMIN — GLYCOPYRROLATE 0.4 MG: 0.2 INJECTION, SOLUTION INTRAMUSCULAR; INTRAVENOUS at 08:03

## 2020-03-09 RX ADMIN — HYDROMORPHONE HYDROCHLORIDE 2 MG: 2 INJECTION, SOLUTION INTRAMUSCULAR; INTRAVENOUS; SUBCUTANEOUS at 05:03

## 2020-03-09 RX ADMIN — PANTOPRAZOLE SODIUM 40 MG: 40 TABLET, DELAYED RELEASE ORAL at 10:03

## 2020-03-09 RX ADMIN — FENTANYL CITRATE 100 MCG: 50 INJECTION, SOLUTION INTRAMUSCULAR; INTRAVENOUS at 07:03

## 2020-03-09 RX ADMIN — SERTRALINE HYDROCHLORIDE 50 MG: 50 TABLET ORAL at 10:03

## 2020-03-09 RX ADMIN — ONDANSETRON 4 MG: 2 INJECTION, SOLUTION INTRAMUSCULAR; INTRAVENOUS at 07:03

## 2020-03-09 RX ADMIN — ROCURONIUM BROMIDE 5 MG: 10 INJECTION, SOLUTION INTRAVENOUS at 07:03

## 2020-03-09 RX ADMIN — PROPOFOL 150 MG: 10 INJECTION, EMULSION INTRAVENOUS at 07:03

## 2020-03-09 RX ADMIN — MIDAZOLAM 2 MG: 1 INJECTION INTRAMUSCULAR; INTRAVENOUS at 07:03

## 2020-03-09 RX ADMIN — CIPROFLOXACIN 400 MG: 2 INJECTION INTRAVENOUS at 08:03

## 2020-03-09 RX ADMIN — KETOROLAC TROMETHAMINE 15 MG: 30 INJECTION, SOLUTION INTRAMUSCULAR at 07:03

## 2020-03-09 RX ADMIN — LEVOTHYROXINE SODIUM 50 MCG: 100 TABLET ORAL at 05:03

## 2020-03-09 RX ADMIN — HYDROXYZINE PAMOATE 25 MG: 25 CAPSULE ORAL at 10:03

## 2020-03-09 RX ADMIN — MIRTAZAPINE 30 MG: 7.5 TABLET ORAL at 10:03

## 2020-03-09 RX ADMIN — METRONIDAZOLE 500 MG: 500 INJECTION, SOLUTION INTRAVENOUS at 08:03

## 2020-03-09 RX ADMIN — LISINOPRIL 40 MG: 40 TABLET ORAL at 10:03

## 2020-03-09 RX ADMIN — FENTANYL CITRATE 50 MCG: 50 INJECTION, SOLUTION INTRAMUSCULAR; INTRAVENOUS at 08:03

## 2020-03-09 RX ADMIN — LIDOCAINE HYDROCHLORIDE 100 MG: 20 INJECTION, SOLUTION INTRAVENOUS at 07:03

## 2020-03-09 RX ADMIN — SODIUM CHLORIDE, SODIUM LACTATE, POTASSIUM CHLORIDE, AND CALCIUM CHLORIDE: .6; .31; .03; .02 INJECTION, SOLUTION INTRAVENOUS at 07:03

## 2020-03-09 RX ADMIN — HYDROXYZINE PAMOATE 25 MG: 25 CAPSULE ORAL at 01:03

## 2020-03-09 RX ADMIN — MORPHINE SULFATE 15 MG: 15 TABLET, FILM COATED, EXTENDED RELEASE ORAL at 06:03

## 2020-03-09 RX ADMIN — QUETIAPINE 200 MG: 100 TABLET ORAL at 10:03

## 2020-03-09 RX ADMIN — GABAPENTIN 300 MG: 300 CAPSULE ORAL at 04:03

## 2020-03-09 RX ADMIN — SUCCINYLCHOLINE CHLORIDE 120 MG: 20 INJECTION, SOLUTION INTRAMUSCULAR; INTRAVENOUS; PARENTERAL at 07:03

## 2020-03-09 RX ADMIN — KETOROLAC TROMETHAMINE 30 MG: 30 INJECTION, SOLUTION INTRAMUSCULAR; INTRAVENOUS at 08:03

## 2020-03-09 NOTE — PLAN OF CARE
0700  Pt has nicotine patch to right upper arm and okay to leave on per Dr Strong    BP patch to left upper arm Clonidine 0.1mg    Pt denies need to urinate at this time    Set his father Mr Cousin on texted messaging and also spoke to him regarding texting and his son's surgery, requested by pt to call him.  Mr Cousin, pt father, is on his way to the hospital.

## 2020-03-09 NOTE — OP NOTE
Surgeon : Dr Joshua Deshpande    Preop Diagnosis:  Biliary colic    POstop Diagnosis:  Same    Procedure: Laparoscopic cholecystectomy    Anesthesia: GETA    Indication for procedure:  39 yo M with history of chronic pancreatitis presented to the hospital with epigastric pain and tenderness.  This was third hospitalization in 2 weeks.  Pt had US demonstrating biliary sludge and d/w pt regarding potential that this may be biliary colic.  PT desires to proceed with cholecystectomy    Description of procedure:  Following signing of informed consent patient stated operating room placed in supine position general endotracheal anesthesia was administered.  The abdomen was prepped and draped in standard fashion and appropriate time-out procedure was then performed I next make a small transverse incision above the umbilicus. Good the dermal tissue down to the fascia.  I entered abdominal cavity with an Optiview trocar.  Pneumoperitoneum to 15 mm of mercury was established.  I evaluate for injury from the insertion. No such injuries appreciated.  Patient is placed in reverse Trendelenburg tilted towards left side.  Placed a 5 mm trocar in the epigastric region and 2 in the right subcostal margin.  Trocars were placed under direct visualization after injection with 0.25% Marcaine with epi.  I evaluate the gallbladder gallbladder does appear distended there is no significant thickening of the gallbladder wall.  I grasped the fundus of the gallbladder holding up over the liver. This exposed the infundibulum which was grasped all towards patient's feet.  Dissect in the triangle of Calot identifying cystic duct and cystic artery.  I placed 2 clips distally and duct were clipped proximally 2 clips were placed on cystic artery.  I cut between clips used the cautery to remove the gallbladder from the hepatic fossa.  I removed the gallbladder from the abdominal cavity with the assistance of an Endo-Catch bag.  Then rebound evaluate the  hepatic fossa ensured adequate hemostasis.  All trocars are removed under direct visualization. I closed the umbilical fascia with 0 Vicryl suture.  Skin incisions closed with 4-0 Monocryl

## 2020-03-09 NOTE — PLAN OF CARE
Received pt from third floor   Report received from Maday kahn  Pt calm, pain level 8/10 really sore and tight feeling per pt description

## 2020-03-09 NOTE — BRIEF OP NOTE
[facility]  Brief Operative Note    SUMMARY     Surgery Date: 3/9/2020     Surgeon(s) and Role:     * Trenton Deshpande MD - Primary    Assisting Surgeon: None    Pre-op Diagnosis:  Epigastric pain [R10.13]    Post-op Diagnosis:  Post-Op Diagnosis Codes:     * Epigastric pain [R10.13]    Procedure(s) (LRB):  CHOLECYSTECTOMY, LAPAROSCOPIC (N/A)    Anesthesia: General    Description of Procedure: Normal biliary anatomy.  Lap maxine performed without event.      Description of the findings of the procedure: see above.     Estimated Blood Loss: 10 mL         Specimens:   Specimen (12h ago, onward)    None

## 2020-03-09 NOTE — ANESTHESIA PREPROCEDURE EVALUATION
03/09/2020  Bradley Collado is a 40 y.o., male.    Anesthesia Evaluation    I have reviewed the Patient Summary Reports.    I have reviewed the Nursing Notes.   I have reviewed the Medications.     Review of Systems  Anesthesia Hx:  No problems with previous Anesthesia    Social:  Smoker    Cardiovascular:   Hypertension, well controlled    Pulmonary:  Pulmonary Normal    Renal/:   Chronic Renal Disease    Hepatic/GI:   GERD    Musculoskeletal:   Arthritis     Neurological:   Headaches    Endocrine:   Hypothyroidism Chronic pancreatitis   Psych:   Psychiatric History (schizophrenia, BiPolar affective) anxiety depression          Physical Exam  General:  Well nourished    Airway/Jaw/Neck:  Airway Findings: Mouth Opening: Normal Tongue: Normal  General Airway Assessment: Adult  Oropharynx Findings:  Mallampati: II  Jaw/Neck Findings:  Neck ROM: Normal ROM     Eyes/Ears/Nose:  Eyes/Ears/Nose Findings:    Dental:  Dental Findings:   Chest/Lungs:  Chest/Lungs Findings: Normal Respiratory Rate     Heart/Vascular:  Heart Findings: Rate: Normal  Rhythm: Regular Rhythm        Mental Status:  Mental Status Findings:  Cooperative, Alert and Oriented         Anesthesia Plan  Type of Anesthesia, risks & benefits discussed:  Anesthesia Type:  general  Patient's Preference:   Intra-op Monitoring Plan: standard ASA monitors  Intra-op Monitoring Plan Comments:   Post Op Pain Control Plan: multimodal analgesia  Post Op Pain Control Plan Comments:   Induction:   IV  Beta Blocker:  Patient is on a Beta-Blocker and has received one dose within the past 24 hours (No further documentation required).       Informed Consent: Patient understands risks and agrees with Anesthesia plan.  Questions answered. Anesthesia consent signed with patient.  ASA Score: 3     Day of Surgery Review of History & Physical:  There are no  significant changes.   H&P completed by Anesthesiologist.       Ready For Surgery From Anesthesia Perspective.

## 2020-03-09 NOTE — NURSING
Tylenol 650 mg given for pain and body aches. VS retaken Temp 98.8 oral  Pulse 76 B/P 152/94. Pt calm c/o pain 5/10 at present time.

## 2020-03-09 NOTE — NURSING
/111 Pt is asymptomatic, NP was notified she ordered to decrease the Ringer Lactate to 100 ml/hr and monitor him .

## 2020-03-09 NOTE — PLAN OF CARE
Plan of care reviewed with pt,verbalized understanding. IV DIC. NPO due to tomorrow's procedure. Pain was managed by PRN pain medication. Call light kept within reach, bed in locked and lowest position, side rails up x2.

## 2020-03-09 NOTE — PLAN OF CARE
Plan of care reviewed with pt,verbalized understanding.IV CDI.Lap /cholecystectomy done this am. 4 Bandaid sites to abdomen.  No drainage noted. Tolerating regular diet.  C/O pain controlled with scheduled pain medication. Ambulates about unit X 2. PIV intact and infusing fluids. Hourly/Q2 rounding completed for safety. Call light kept within reach, bed in locked and lowest position. Will continue to monitor.

## 2020-03-09 NOTE — ANESTHESIA POSTPROCEDURE EVALUATION
Anesthesia Post Evaluation    Patient: Bradley Collado    Procedure(s) Performed: Procedure(s) (LRB):  CHOLECYSTECTOMY, LAPAROSCOPIC (N/A)    Final Anesthesia Type: general    Patient location during evaluation: PACU  Patient participation: Yes- Able to Participate  Level of consciousness: awake and alert and oriented  Post-procedure vital signs: reviewed and stable  Pain management: adequate  Airway patency: patent    PONV status at discharge: No PONV  Anesthetic complications: no      Cardiovascular status: blood pressure returned to baseline and stable  Respiratory status: unassisted and spontaneous ventilation  Hydration status: euvolemic  Follow-up not needed.          Vitals Value Taken Time   /76 3/9/2020  9:48 AM   Temp 36.7 °C (98.1 °F) 3/9/2020  9:05 AM   Pulse 82 3/9/2020  9:48 AM   Resp 34 3/9/2020  9:48 AM   SpO2 93 % 3/9/2020  9:48 AM   Vitals shown include unvalidated device data.      No case tracking events are documented in the log.      Pain/Raz Score: Pain Rating Prior to Med Admin: 8 (3/9/2020  9:45 AM)  Pain Rating Post Med Admin: 6 (3/9/2020  2:30 AM)  Raz Score: 9 (3/9/2020  9:30 AM)

## 2020-03-09 NOTE — ANESTHESIA PROCEDURE NOTES
Intubation  Performed by: Jordan Alexander CRNA  Authorized by: Damon Strong MD     Intubation:     Induction:  Intravenous    Intubated:  Postinduction    Mask Ventilation:  Easy mask    Attempts:  1    Attempted By:  Staff anesthesiologist and student    Method of Intubation:  Video laryngoscopy    Blade:  Betsey 3    Laryngeal View Grade: Grade I - full view of chords      Difficult Airway Encountered?: No      Complications:  None    Airway Device:  Oral endotracheal tube    Airway Device Size:  7.5    Style/Cuff Inflation:  Cuffed (inflated to minimal occlusive pressure)    Tube secured:  23    Secured at:  The teeth    Placement Verified By:  Capnometry    Complicating Factors:  None    Findings Post-Intubation:  BS equal bilateral

## 2020-03-09 NOTE — TRANSFER OF CARE
"Anesthesia Transfer of Care Note    Patient: Bradley Collado    Procedure(s) Performed: Procedure(s) (LRB):  CHOLECYSTECTOMY, LAPAROSCOPIC (N/A)    Patient location: PACU    Anesthesia Type: general    Transport from OR: Transported from OR on 2-3 L/min O2 by NC with adequate spontaneous ventilation    Post pain: adequate analgesia    Post assessment: no apparent anesthetic complications and tolerated procedure well    Post vital signs: stable    Level of consciousness: awake, alert and oriented    Nausea/Vomiting: no nausea/vomiting    Complications: none    Transfer of care protocol was followed      Last vitals:   Visit Vitals  BP (!) 151/69   Pulse 82   Temp 36.7 °C (98.1 °F) (Tympanic)   Resp 16   Ht 5' 7" (1.702 m)   Wt 83 kg (183 lb)   SpO2 96%   BMI 28.66 kg/m²     "

## 2020-03-09 NOTE — NURSING
Upon rounding with Dr Xie, pt requested to be discharged today. Call  Placed to Dr Deshpande. Nurse left message on Dr Deshpande voicemail inquiring if pt is cleared for discharged. Awaiting return call.

## 2020-03-09 NOTE — BRIEF OP NOTE
[facility]  Brief Operative Note    SUMMARY     Surgery Date: 3/9/2020     Surgeon(s) and Role:     * Trenton Deshpande MD - Primary    Assisting Surgeon: None    Pre-op Diagnosis:  Epigastric pain [R10.13]    Post-op Diagnosis:  Post-Op Diagnosis Codes:     * Epigastric pain [R10.13]    Procedure(s) (LRB):  CHOLECYSTECTOMY, LAPAROSCOPIC (N/A)    Anesthesia: General    Description of Procedure: Normal biliary anatomy.  Laparoscopic cholecystectomy performed without event.     Description of the findings of the procedure: see above.     Estimated Blood Loss: 10 mL         Specimens:   Specimen (12h ago, onward)    None

## 2020-03-10 VITALS
HEART RATE: 59 BPM | OXYGEN SATURATION: 100 % | WEIGHT: 183 LBS | RESPIRATION RATE: 17 BRPM | HEIGHT: 67 IN | SYSTOLIC BLOOD PRESSURE: 156 MMHG | DIASTOLIC BLOOD PRESSURE: 99 MMHG | BODY MASS INDEX: 28.72 KG/M2 | TEMPERATURE: 97 F

## 2020-03-10 LAB
ALBUMIN SERPL BCP-MCNC: 3 G/DL (ref 3.5–5.2)
ALP SERPL-CCNC: 92 U/L (ref 55–135)
ALT SERPL W/O P-5'-P-CCNC: 34 U/L (ref 10–44)
ANION GAP SERPL CALC-SCNC: 9 MMOL/L (ref 8–16)
AST SERPL-CCNC: 23 U/L (ref 10–40)
BASOPHILS # BLD AUTO: 0.01 K/UL (ref 0–0.2)
BASOPHILS NFR BLD: 0.1 % (ref 0–1.9)
BILIRUB SERPL-MCNC: 0.4 MG/DL (ref 0.1–1)
BUN SERPL-MCNC: 7 MG/DL (ref 6–20)
CALCIUM SERPL-MCNC: 9.5 MG/DL (ref 8.7–10.5)
CHLORIDE SERPL-SCNC: 103 MMOL/L (ref 95–110)
CO2 SERPL-SCNC: 26 MMOL/L (ref 23–29)
CREAT SERPL-MCNC: 1 MG/DL (ref 0.5–1.4)
DIFFERENTIAL METHOD: ABNORMAL
EOSINOPHIL # BLD AUTO: 0.1 K/UL (ref 0–0.5)
EOSINOPHIL NFR BLD: 0.5 % (ref 0–8)
ERYTHROCYTE [DISTWIDTH] IN BLOOD BY AUTOMATED COUNT: 17.1 % (ref 11.5–14.5)
EST. GFR  (AFRICAN AMERICAN): >60 ML/MIN/1.73 M^2
EST. GFR  (NON AFRICAN AMERICAN): >60 ML/MIN/1.73 M^2
GLUCOSE SERPL-MCNC: 148 MG/DL (ref 70–110)
HCT VFR BLD AUTO: 33.2 % (ref 40–54)
HGB BLD-MCNC: 10.1 G/DL (ref 14–18)
IMM GRANULOCYTES # BLD AUTO: 0.06 K/UL (ref 0–0.04)
LYMPHOCYTES # BLD AUTO: 0.9 K/UL (ref 1–4.8)
LYMPHOCYTES NFR BLD: 8.2 % (ref 18–48)
MAGNESIUM SERPL-MCNC: 2.2 MG/DL (ref 1.6–2.6)
MCH RBC QN AUTO: 24.7 PG (ref 27–31)
MCHC RBC AUTO-ENTMCNC: 30.4 G/DL (ref 32–36)
MCV RBC AUTO: 81 FL (ref 82–98)
MONOCYTES # BLD AUTO: 0.7 K/UL (ref 0.3–1)
MONOCYTES NFR BLD: 6.4 % (ref 4–15)
NEUTROPHILS # BLD AUTO: 9.2 K/UL (ref 1.8–7.7)
NEUTROPHILS NFR BLD: 84.3 % (ref 38–73)
NRBC BLD-RTO: 0 /100 WBC
PLATELET # BLD AUTO: 364 K/UL (ref 150–350)
PMV BLD AUTO: 10.3 FL (ref 9.2–12.9)
POTASSIUM SERPL-SCNC: 4.6 MMOL/L (ref 3.5–5.1)
PROT SERPL-MCNC: 6.5 G/DL (ref 6–8.4)
RBC # BLD AUTO: 4.09 M/UL (ref 4.6–6.2)
SODIUM SERPL-SCNC: 138 MMOL/L (ref 136–145)
WBC # BLD AUTO: 10.91 K/UL (ref 3.9–12.7)

## 2020-03-10 PROCEDURE — 63600175 PHARM REV CODE 636 W HCPCS: Performed by: INTERNAL MEDICINE

## 2020-03-10 PROCEDURE — 80053 COMPREHEN METABOLIC PANEL: CPT

## 2020-03-10 PROCEDURE — S4991 NICOTINE PATCH NONLEGEND: HCPCS | Performed by: SURGERY

## 2020-03-10 PROCEDURE — 25000003 PHARM REV CODE 250: Performed by: INTERNAL MEDICINE

## 2020-03-10 PROCEDURE — 36415 COLL VENOUS BLD VENIPUNCTURE: CPT

## 2020-03-10 PROCEDURE — 83735 ASSAY OF MAGNESIUM: CPT

## 2020-03-10 PROCEDURE — 85025 COMPLETE CBC W/AUTO DIFF WBC: CPT

## 2020-03-10 PROCEDURE — 97802 MEDICAL NUTRITION INDIV IN: CPT

## 2020-03-10 PROCEDURE — 25000003 PHARM REV CODE 250: Performed by: SURGERY

## 2020-03-10 RX ORDER — ONDANSETRON HYDROCHLORIDE 8 MG/1
8 TABLET, FILM COATED ORAL EVERY 8 HOURS PRN
Qty: 12 TABLET | Refills: 0 | Status: ON HOLD | OUTPATIENT
Start: 2020-03-10 | End: 2020-10-12 | Stop reason: HOSPADM

## 2020-03-10 RX ORDER — FERROUS SULFATE 325(65) MG
325 TABLET, DELAYED RELEASE (ENTERIC COATED) ORAL DAILY
Status: DISCONTINUED | OUTPATIENT
Start: 2020-03-10 | End: 2020-03-10 | Stop reason: HOSPADM

## 2020-03-10 RX ORDER — HYDROCODONE BITARTRATE AND ACETAMINOPHEN 10; 325 MG/1; MG/1
1 TABLET ORAL EVERY 6 HOURS PRN
Qty: 20 TABLET | Refills: 0 | Status: ON HOLD | OUTPATIENT
Start: 2020-03-10 | End: 2020-04-06 | Stop reason: HOSPADM

## 2020-03-10 RX ADMIN — LEVOTHYROXINE SODIUM 50 MCG: 100 TABLET ORAL at 06:03

## 2020-03-10 RX ADMIN — HYDROXYZINE PAMOATE 25 MG: 25 CAPSULE ORAL at 09:03

## 2020-03-10 RX ADMIN — PANTOPRAZOLE SODIUM 40 MG: 40 TABLET, DELAYED RELEASE ORAL at 09:03

## 2020-03-10 RX ADMIN — SERTRALINE HYDROCHLORIDE 50 MG: 50 TABLET ORAL at 09:03

## 2020-03-10 RX ADMIN — FERROUS SULFATE TAB EC 325 MG (65 MG FE EQUIVALENT) 325 MG: 325 (65 FE) TABLET DELAYED RESPONSE at 09:03

## 2020-03-10 RX ADMIN — LISINOPRIL 40 MG: 40 TABLET ORAL at 09:03

## 2020-03-10 RX ADMIN — KETOROLAC TROMETHAMINE 15 MG: 30 INJECTION, SOLUTION INTRAMUSCULAR at 10:03

## 2020-03-10 RX ADMIN — KETOROLAC TROMETHAMINE 15 MG: 30 INJECTION, SOLUTION INTRAMUSCULAR at 03:03

## 2020-03-10 RX ADMIN — MORPHINE SULFATE 15 MG: 15 TABLET, FILM COATED, EXTENDED RELEASE ORAL at 07:03

## 2020-03-10 RX ADMIN — ARIPIPRAZOLE 10 MG: 5 TABLET ORAL at 09:03

## 2020-03-10 RX ADMIN — Medication 1 PATCH: at 09:03

## 2020-03-10 RX ADMIN — GABAPENTIN 300 MG: 300 CAPSULE ORAL at 09:03

## 2020-03-10 NOTE — SUBJECTIVE & OBJECTIVE
Interval History: Patient continues to complain of abdominal pain post op. No nausea no vomiting    Review of Systems   Constitutional: Negative for activity change, appetite change, diaphoresis, fever and unexpected weight change.   Respiratory: Negative for cough, chest tightness and wheezing.    Cardiovascular: Negative for chest pain and palpitations.   Gastrointestinal: Positive for abdominal pain. Negative for abdominal distention, anal bleeding, blood in stool, constipation, diarrhea, nausea, rectal pain and vomiting.   Genitourinary: Negative for difficulty urinating, dysuria and hematuria.   Musculoskeletal: Negative for back pain and gait problem.   Skin: Negative for color change and wound.   Neurological: Negative for tremors and seizures.   Psychiatric/Behavioral: Negative for agitation and decreased concentration.     Objective:     Vital Signs (Most Recent):  Temp: 98.2 °F (36.8 °C) (03/10/20 0414)  Pulse: 63 (03/10/20 0414)  Resp: 18 (03/10/20 0414)  BP: (!) 135/90 (03/10/20 0414)  SpO2: 99 % (03/10/20 0414) Vital Signs (24h Range):  Temp:  [97.8 °F (36.6 °C)-98.2 °F (36.8 °C)] 98.2 °F (36.8 °C)  Pulse:  [63-99] 63  Resp:  [10-23] 18  SpO2:  [94 %-100 %] 99 %  BP: (111-176)/() 135/90     Weight: 83 kg (183 lb)  Body mass index is 28.66 kg/m².    Intake/Output Summary (Last 24 hours) at 3/10/2020 0539  Last data filed at 3/9/2020 1800  Gross per 24 hour   Intake 1040 ml   Output 10 ml   Net 1030 ml      Physical Exam   Constitutional: He is oriented to person, place, and time. He appears well-developed and well-nourished.   HENT:   Head: Normocephalic and atraumatic.   Eyes: Pupils are equal, round, and reactive to light.   Neck: Normal range of motion. No tracheal deviation present. No thyromegaly present.   Cardiovascular: Normal rate, regular rhythm and normal heart sounds. Exam reveals no gallop.   No murmur heard.  Pulmonary/Chest: Effort normal and breath sounds normal. He has no wheezes.  He exhibits no tenderness.   Abdominal: He exhibits no distension and no mass. There is tenderness. There is no rebound and no guarding. No hernia.       Musculoskeletal: Normal range of motion.   Neurological: He is alert and oriented to person, place, and time. Coordination normal.   Skin: Skin is warm.   Psychiatric: He has a normal mood and affect.   Vitals reviewed.      Significant Labs:   BMP:   Recent Labs   Lab 03/09/20  0518         K 4.2      CO2 29   BUN 4*   CREATININE 0.9   CALCIUM 9.6   MG 2.1     CBC:   Recent Labs   Lab 03/08/20  0638   WBC 9.42   HGB 10.2*   HCT 33.5*   *       Significant Imaging: I have reviewed all pertinent imaging results/findings within the past 24 hours.   no

## 2020-03-10 NOTE — DISCHARGE INSTRUCTIONS
Thank you for choosing [facility] for your medical care. The primary doctor who is taking care of you at the time of your discharge is Lindsay Colindres MD.     You were admitted to the hospital with Acute on chronic pancreatitis.     Please note your discharge instructions, including diet/activity restrictions, follow-up appointments, and medication changes.  If you have any questions about your medical issues, prescriptions, or any other questions, please feel free to contact the [facility] Medicine Dept at [phone] and we will help.    If you are previously with Home health, outpatient PT/OT or under a therapy program, you are cleared to return to those programs.    Please direct all long term medication refills and follow up to your primary care provider, Arlen Belcher NP. Thank you again for letting us take care of your health care needs.    Please note the following discharge instructions per your discharging physician-  F/u with Dr. Deshpande in 2 weeks  No heavy lifting  Pain and nausea medications have been sent to your pharmacy  OK to resume your regular diet

## 2020-03-10 NOTE — PROGRESS NOTES
POD 1 s/p lap maxine.   Pt resting comfortably.  No pain feels well    AF  Wt Readings from Last 3 Encounters:   03/09/20 83 kg (183 lb)   02/29/20 76 kg (167 lb 8.8 oz)   02/22/20 78.6 kg (173 lb 4.5 oz)     Temp Readings from Last 3 Encounters:   03/10/20 96.8 °F (36 °C) (Oral)   02/29/20 98.1 °F (36.7 °C) (Oral)   02/24/20 98.2 °F (36.8 °C) (Oral)     BP Readings from Last 3 Encounters:   03/10/20 (!) 156/99   02/29/20 (!) 169/87   02/24/20 (!) 156/103     Pulse Readings from Last 3 Encounters:   03/10/20 (!) 59   02/29/20 60   02/24/20 93     AAOx3  Soft/nt/nd    Lab Results   Component Value Date    WBC 10.91 03/10/2020    HGB 10.1 (L) 03/10/2020    HCT 33.2 (L) 03/10/2020    MCV 81 (L) 03/10/2020     (H) 03/10/2020       CMP  Sodium   Date Value Ref Range Status   03/10/2020 138 136 - 145 mmol/L Final     Potassium   Date Value Ref Range Status   03/10/2020 4.6 3.5 - 5.1 mmol/L Final     Chloride   Date Value Ref Range Status   03/10/2020 103 95 - 110 mmol/L Final     CO2   Date Value Ref Range Status   03/10/2020 26 23 - 29 mmol/L Final     Glucose   Date Value Ref Range Status   03/10/2020 148 (H) 70 - 110 mg/dL Final     BUN, Bld   Date Value Ref Range Status   03/10/2020 7 6 - 20 mg/dL Final     Creatinine   Date Value Ref Range Status   03/10/2020 1.0 0.5 - 1.4 mg/dL Final   12/09/2012 1.4 0.5 - 1.4 mg/dL Final     Calcium   Date Value Ref Range Status   03/10/2020 9.5 8.7 - 10.5 mg/dL Final   12/09/2012 9.3 8.7 - 10.5 mg/dL Final     Total Protein   Date Value Ref Range Status   03/10/2020 6.5 6.0 - 8.4 g/dL Final     Albumin   Date Value Ref Range Status   03/10/2020 3.0 (L) 3.5 - 5.2 g/dL Final     Total Bilirubin   Date Value Ref Range Status   03/10/2020 0.4 0.1 - 1.0 mg/dL Final     Comment:     For infants and newborns, interpretation of results should be based  on gestational age, weight and in agreement with clinical  observations.  Premature Infant recommended reference ranges:  Up to 24  hours.............<8.0 mg/dL  Up to 48 hours............<12.0 mg/dL  3-5 days..................<15.0 mg/dL  6-29 days.................<15.0 mg/dL       Alkaline Phosphatase   Date Value Ref Range Status   03/10/2020 92 55 - 135 U/L Final   12/09/2012 59 55 - 135 U/L Final     AST   Date Value Ref Range Status   03/10/2020 23 10 - 40 U/L Final   12/09/2012 29 10 - 40 U/L Final     ALT   Date Value Ref Range Status   03/10/2020 34 10 - 44 U/L Final     Anion Gap   Date Value Ref Range Status   03/10/2020 9 8 - 16 mmol/L Final   12/09/2012 18 (H) 5 - 15 meq/L Final     eGFR if    Date Value Ref Range Status   03/10/2020 >60 >60 mL/min/1.73 m^2 Final     eGFR if non    Date Value Ref Range Status   03/10/2020 >60 >60 mL/min/1.73 m^2 Final     Comment:     Calculation used to obtain the estimated glomerular filtration  rate (eGFR) is the CKD-EPI equation.        A?p: s/p jl Talbert for d/c from surgical perspective

## 2020-03-10 NOTE — ASSESSMENT & PLAN NOTE
Etiology uncertain  resolved  No EKG changes noted  Place telemetry monitoring  IV fluids  Follow chemistry

## 2020-03-10 NOTE — NURSING
Peripheral venous access discontinued at this time all dc instructions given and explained verbalizes understanding of all instructions

## 2020-03-10 NOTE — PLAN OF CARE
POC discussed with patient, verbalized understanding. Patient with uneventful night, slept well between care. VS stable. Pain managed well with IV Toradol prn and scheduled MS Contin. Ambulating to bathroom well, +BS, passing flatus, no bm. Tolerating diet well. Receiving IV fluids. Surgical site dressings CDI. Call light at bedside. No complaints.

## 2020-03-10 NOTE — ASSESSMENT & PLAN NOTE
S/p cholecystectomy  , Antiemetics, Analgesics  Clear liquid diet  Normal triglycerides about a week ago

## 2020-03-10 NOTE — PLAN OF CARE
AAOx3 Vss denies nausea no emesis noted. Lap bandaid dressings clean dry and intact x 5 tolerating regular diet well. Ambulating in hallway and room independantly. Verbalizes understanding POC at this time Dr Deshpande rounds and verbalizes OK from his standpoin for dc to home this am.

## 2020-03-10 NOTE — PROGRESS NOTES
[facility] Medicine  Progress Note    Patient Name: Bradley Collado  MRN: [id number]  Patient Class: IP- Inpatient   Admission Date: 3/7/2020  Length of Stay: 3 days  Attending Physician: Alfredo Xie MD  Primary Care Provider: Arlen Belcher NP        Subjective:     Principal Problem:Acute on chronic pancreatitis        HPI:  40-year-old male with past medical history significant for hypertension, depression, thyroid disease, and pancreatitis who presents the emergency department complaining of intermittent abdominal pain.  Pain has been going on for the past few weeks is characterized as waxing/waning pain that begins in the left upper quadrant and radiates to the back.  Patient reportedly has had to recent admissions to [facility] for similar complaints, both of which time being diagnosed with pancreatitis.  He apparently has been tried treated with IV fluids/antiemetics and ultimately discharged with pain medication.  Patient reports pain is never completely controlled however is tolerable on pain medication.  He apparently has been taking medication as prescribed and plays run out, at which time he reports pain is worsened prompting repeat presentation.  He denies any alcohol use.  He currently denies any nausea and apparently has modified his diet to avoid fatty foods in effort to avoid worsening pain.    Overview/Hospital Course:  No notes on file    Interval History: Patient continues to complain of abdominal pain post op. No nausea no vomiting    Review of Systems   Constitutional: Negative for activity change, appetite change, diaphoresis, fever and unexpected weight change.   Respiratory: Negative for cough, chest tightness and wheezing.    Cardiovascular: Negative for chest pain and palpitations.   Gastrointestinal: Positive for abdominal pain. Negative for abdominal distention, anal bleeding, blood in stool, constipation, diarrhea, nausea, rectal  pain and vomiting.   Genitourinary: Negative for difficulty urinating, dysuria and hematuria.   Musculoskeletal: Negative for back pain and gait problem.   Skin: Negative for color change and wound.   Neurological: Negative for tremors and seizures.   Psychiatric/Behavioral: Negative for agitation and decreased concentration.     Objective:     Vital Signs (Most Recent):  Temp: 98.2 °F (36.8 °C) (03/10/20 0414)  Pulse: 63 (03/10/20 0414)  Resp: 18 (03/10/20 0414)  BP: (!) 135/90 (03/10/20 0414)  SpO2: 99 % (03/10/20 0414) Vital Signs (24h Range):  Temp:  [97.8 °F (36.6 °C)-98.2 °F (36.8 °C)] 98.2 °F (36.8 °C)  Pulse:  [63-99] 63  Resp:  [10-23] 18  SpO2:  [94 %-100 %] 99 %  BP: (111-176)/() 135/90     Weight: 83 kg (183 lb)  Body mass index is 28.66 kg/m².    Intake/Output Summary (Last 24 hours) at 3/10/2020 0539  Last data filed at 3/9/2020 1800  Gross per 24 hour   Intake 1040 ml   Output 10 ml   Net 1030 ml      Physical Exam   Constitutional: He is oriented to person, place, and time. He appears well-developed and well-nourished.   HENT:   Head: Normocephalic and atraumatic.   Eyes: Pupils are equal, round, and reactive to light.   Neck: Normal range of motion. No tracheal deviation present. No thyromegaly present.   Cardiovascular: Normal rate, regular rhythm and normal heart sounds. Exam reveals no gallop.   No murmur heard.  Pulmonary/Chest: Effort normal and breath sounds normal. He has no wheezes. He exhibits no tenderness.   Abdominal: He exhibits no distension and no mass. There is tenderness. There is no rebound and no guarding. No hernia.       Musculoskeletal: Normal range of motion.   Neurological: He is alert and oriented to person, place, and time. Coordination normal.   Skin: Skin is warm.   Psychiatric: He has a normal mood and affect.   Vitals reviewed.      Significant Labs:   BMP:   Recent Labs   Lab 03/09/20  0518         K 4.2      CO2 29   BUN 4*   CREATININE 0.9    CALCIUM 9.6   MG 2.1     CBC:   Recent Labs   Lab 03/08/20  0638   WBC 9.42   HGB 10.2*   HCT 33.5*   *       Significant Imaging: I have reviewed all pertinent imaging results/findings within the past 24 hours.      Assessment/Plan:      * Acute on chronic pancreatitis  S/p cholecystectomy  , Antiemetics, Analgesics  Clear liquid diet  Normal triglycerides about a week ago    Hyperkalemia  Etiology uncertain  resolved  No EKG changes noted  Place telemetry monitoring  IV fluids  Follow chemistry    Gastroesophageal reflux disease without esophagitis  Chronic issue  Continue home dose PPI      Hypothyroidism  Chronic issue  Continue home med      Normocytic anemia  Chronic issue.  No evidence of active bleeding.  Hemoglobin stable  Will check iron studies      Hypertension  Chronic issue  Continue home med        VTE Risk Mitigation (From admission, onward)         Ordered     IP VTE LOW RISK PATIENT  Once      03/07/20 1809     Place sequential compression device  Until discontinued      03/07/20 1809                      Alfredo Xie MD  Department of Hospital Medicine   [facility]

## 2020-03-10 NOTE — PROGRESS NOTES
"[facility]  Adult Nutrition  Progress Note    SUMMARY    Intervention: general healthful diet and nutrition education    Recommendation:   1) Change diet to low fat   2) weigh pt weekly or as needed   3) Nutrition education and handout provided    Goals: 1) PO intakes > 75% of meals at f/u continues  Nutrition Goal Status: new  Communication of RD Recs: (POC, sticky note, second sign)    Reason for Assessment    Reason For Assessment: identified at risk by screening criteria(MST)  Diagnosis: (acute on chronic pancreatitis)  Relevant Medical History: pancreatitis, ETOH abuse, HTN, depression, GERD, thyroid disease  Interdisciplinary Rounds: attended    General Information Comments: 39 y/o male admitted with pancreatitis. Feeling better, tolerating diet. PTA pt following a low fat diet and eating well, wt gain noted. Says appetite increased recently ( pt on appetite stimulant medication). NFPE done 3/10/20 no wasting seen. Reviewed low fat diet with pt briefly, "sometimes cheats," on certain foods he says. Has not had a drink in > 1 month. Gave sample meal plan.    Nutrition Discharge Planning: D/c today- Low fat diet    Nutrition Risk Screen    Nutrition Risk Screen: no indicators present    Nutrition/Diet History    Patient Reported Diet/Restrictions/Preferences: (low fat)  Spiritual, Cultural Beliefs, Bahai Practices, Values that Affect Care: no  Food Allergies: NKFA  Factors Affecting Nutritional Intake: None identified at this time    Anthropometrics    Temp: 96.8 °F (36 °C)  Height Method: Measured(19)  Height: 5' 7"  Height (inches): 67 in  Weight Method: Bed Scale  Weight: 83 kg (182 lb 15.7 oz)  Weight (lb): 182.98 lb  Ideal Body Weight (IBW), Male: 148 lb  % Ideal Body Weight, Male (lb): 123.64 %  BMI (Calculated): 28.7  BMI Grade: 25 - 29.9 - overweight  Weight Loss: unintentional  Usual Body Weight (UBW), k.6 kg(18)  Weight Change Amount: (80.7 kg 19)  % Usual " Body Weight: 114.56  % Weight Change From Usual Weight: 14.32 %       Lab/Procedures/Meds    Pertinent Labs Reviewed: reviewed  BMP  Lab Results   Component Value Date     03/10/2020    K 4.6 03/10/2020     03/10/2020    CO2 26 03/10/2020    BUN 7 03/10/2020    CREATININE 1.0 03/10/2020    CALCIUM 9.5 03/10/2020    ANIONGAP 9 03/10/2020    ESTGFRAFRICA >60 03/10/2020    EGFRNONAA >60 03/10/2020     Pertinent Medications Reviewed: reviewed  Pertinent Medications Comments: zofran, KCl, iron, mirtazapine, lactated ringers @ 100 ml/hr    Estimated/Assessed Needs    Weight Used For Calorie Calculations: 83 kg (182 lb 15.7 oz)  Energy Calorie Requirements (kcal): [place] Jeor ( x 1.2) = 2050 kcal  Energy Need Method: [place]-St Jeor  Protein Requirements: 0.8-1.0 g protein/kg ( 66-83 g protein)  Weight Used For Protein Calculations: 83 kg (182 lb 15.7 oz)  Fluid Requirements (mL): 2050 ml  Estimated Fluid Requirement Method: RDA Method  CHO Requirement: N/A      Nutrition Prescription Ordered    Current Diet Order: regular    Evaluation of Received Nutrient/Fluid Intake    Energy Calories Required: meeting needs  Protein Required: meeting needs  Fluid Required: meeting needs  Tolerance: tolerating  % Intake of Estimated Energy Needs: %  % Meal Intake: 75%    Nutrition Risk    Level of Risk/Frequency of Follow-up: low - moderate 1 x weekly    Assessment and Plan    Inadequate energy intake  R/t pancreatitis  As evidenced by PO intakes < 755 EEN x 2 days  resolved       Monitor and Evaluation    Food and Nutrient Intake: energy intake, food and beverage intake  Food and Nutrient Adminstration: diet order  Anthropometric Measurements: weight  Biochemical Data, Medical Tests and Procedures: electrolyte and renal panel, gastrointestinal profile  Nutrition-Focused Physical Findings: overall appearance     Malnutrition Assessment     Skin (Micronutrient): (Shay = 19)  Teeth (Micronutrient): (missing some)    Micronutrient Evaluation: suspected deficiency  Micronutrient Evaluation Comments: iron, check B12 and folate ( if drinking thiamine, foalte, MVI longterm)               Edema (Fluid Accumulation): 0-->no edema present   Subcutaneous Fat Loss (Final Summary): well nourished  Muscle Loss Evaluation (Final Summary): well nourished         Nutrition Follow-Up    RD Follow-up?: Yes

## 2020-03-10 NOTE — PLAN OF CARE
Intervention: general healthful diet and nutrition education    Recommendation:   1) Change diet to low fat   2) weigh pt weekly or as needed   3) Nutrition education and handout provided    Goals: 1) PO intakes > 75% of meals at f/u continues  Nutrition Goal Status: new  Communication of RD Recs: (POC, sticky note, second sign)

## 2020-03-11 ENCOUNTER — TELEPHONE (OUTPATIENT)
Dept: MEDSURG UNIT | Facility: HOSPITAL | Age: 40
End: 2020-03-11

## 2020-03-11 NOTE — PLAN OF CARE
03/11/20 0811   Final Note   Assessment Type Final Discharge Note   Anticipated Discharge Disposition Home   What phone number can be called within the next 1-3 days to see how you are doing after discharge? [phone]   Hospital Follow Up  Appt(s) scheduled? Yes   Discharge plans and expectations educations in teach back method with documentation complete? Yes   Right Care Referral Info   Post Acute Recommendation No Care

## 2020-03-11 NOTE — HOSPITAL COURSE
Patient admitted with acute on chronic pancreatitis.  Gallbladder ultrasound with sludge.  General surgery was consulted and performed lap cholecystectomy.  Surgery was done by Dr. parmar.  Pain much improved after surgery.  Seen by general surgeon on day of discharge and was cleared for discharge to home.  Short course of p.o. narcotics as well as antiemetics sent to pharmacy at time of discharge.  Patient agreeable to discharge plan and felt ready for discharge.    Discharge exam  Awake, alert, no apparent distress  Regular rate and rhythm no murmur  Lungs clear to auscultation no wheeze  Abdomen soft mildly tender, mild distention (secondary to recent laparoscopic surgery), incision sites clean/dry/intact

## 2020-03-11 NOTE — DISCHARGE SUMMARY
[facility] Hospital Medicine  Discharge Summary      Patient Name: Bradley Collado  MRN: [id number]  Admission Date: 3/7/2020  Hospital Length of Stay: 3 days  Discharge Date and Time: 3/10/2020 11:58 AM  Attending Physician: No att. providers found   Discharging Provider: Lindsay Colindres MD  Primary Care Provider: Arlen Belcher NP      HPI:   40-year-old male with past medical history significant for hypertension, depression, thyroid disease, and pancreatitis who presents the emergency department complaining of intermittent abdominal pain.  Pain has been going on for the past few weeks is characterized as waxing/waning pain that begins in the left upper quadrant and radiates to the back.  Patient reportedly has had to recent admissions to [facility] for similar complaints, both of which time being diagnosed with pancreatitis.  He apparently has been tried treated with IV fluids/antiemetics and ultimately discharged with pain medication.  Patient reports pain is never completely controlled however is tolerable on pain medication.  He apparently has been taking medication as prescribed and plays run out, at which time he reports pain is worsened prompting repeat presentation.  He denies any alcohol use.  He currently denies any nausea and apparently has modified his diet to avoid fatty foods in effort to avoid worsening pain.    Procedure(s) (LRB):  CHOLECYSTECTOMY, LAPAROSCOPIC (N/A)      Hospital Course:   Patient admitted with acute on chronic pancreatitis.  Gallbladder ultrasound with sludge.  General surgery was consulted and performed lap cholecystectomy.  Surgery was done by Dr. parmar.  Pain much improved after surgery.  Seen by general surgeon on day of discharge and was cleared for discharge to home.  Short course of p.o. narcotics as well as antiemetics sent to pharmacy at time of discharge.  Patient agreeable to discharge plan and felt ready for discharge.    Discharge  exam  Awake, alert, no apparent distress  Regular rate and rhythm no murmur  Lungs clear to auscultation no wheeze  Abdomen soft mildly tender, mild distention (secondary to recent laparoscopic surgery), incision sites clean/dry/intact       Consults:   Consults (From admission, onward)        Status Ordering Provider     Inpatient consult to General Surgery  Once     Provider:  Trenton Deshpande MD    Completed JAY DIANA          No new Assessment & Plan notes have been filed under this hospital service since the last note was generated.  Service: Hospital Medicine    Final Active Diagnoses:    Diagnosis Date Noted POA    PRINCIPAL PROBLEM:  Acute on chronic pancreatitis [K85.90, K86.1] 03/07/2020 Yes    Hyperkalemia [E87.5] 03/07/2020 Yes    Gastroesophageal reflux disease without esophagitis [K21.9] 09/13/2018 Yes    Hypertension [I10] 04/05/2016 Yes     Chronic    Normocytic anemia [D64.9] 04/05/2016 Yes    Hypothyroidism [E03.9] 04/05/2016 Yes     Chronic    Alcohol-induced acute pancreatitis [K85.20] 04/05/2016 Yes      Problems Resolved During this Admission:       Discharged Condition: good    Disposition: Home or Self Care    Follow Up:  Follow-up Information     Arlen Belcher NP In 1 week.    Specialty:  Family Medicine  Contact information:  [address] [facility] [id number] [phone]             Trenton Deshpande MD In 2 weeks.    Specialties:  General Surgery, Colon and Rectal Surgery, Surgery  Contact information:  [address]  [address]  [facility] [address] [phone]             Schedule an appointment as soon as possible for a visit to follow up.               Patient Instructions:      Lifting restrictions     Notify your health care provider if you experience any of the following:  temperature >100.4     Notify your health care provider if you experience any of the following:  persistent nausea and vomiting or diarrhea      Notify your health care provider if you experience any of the following:  severe uncontrolled pain     Notify your health care provider if you experience any of the following:  redness, tenderness, or signs of infection (pain, swelling, redness, odor or green/yellow discharge around incision site)     Activity as tolerated       Significant Diagnostic Studies: Labs:   BMP:   Recent Labs   Lab 03/09/20 0518 03/10/20  0537    148*    138   K 4.2 4.6    103   CO2 29 26   BUN 4* 7   CREATININE 0.9 1.0   CALCIUM 9.6 9.5   MG 2.1 2.2   , CMP   Recent Labs   Lab 03/09/20 0518 03/10/20  0537    138   K 4.2 4.6    103   CO2 29 26    148*   BUN 4* 7   CREATININE 0.9 1.0   CALCIUM 9.6 9.5   PROT  --  6.5   ALBUMIN  --  3.0*   BILITOT  --  0.4   ALKPHOS  --  92   AST  --  23   ALT  --  34   ANIONGAP 8 9   ESTGFRAFRICA >60 >60   EGFRNONAA >60 >60    and CBC   Recent Labs   Lab 03/10/20  0537   WBC 10.91   HGB 10.1*   HCT 33.2*   *   Radiology Results (last 7 days)     Procedure Component Value Units Date/Time   US Abdomen Limited [[id number]] Resulted: 03/08/20 0918   Order Status: Completed Updated: 03/08/20 0921   Narrative:     EXAMINATION:  US ABDOMEN LIMITED    CLINICAL HISTORY:  pancreatitis;    TECHNIQUE:  Limited ultrasound of the right upper quadrant of the abdomen (including pancreas, liver, gallbladder, common bile duct, and spleen) was performed.    COMPARISON:  05/02/2016 and 02/26/2020    FINDINGS:  Liver: Normal in size, measuring 15.2 cm. Homogeneous echotexture. No focal hepatic lesions.    Gallbladder: Mild dependent echogenic material without posterior shadowing.  No calculi, wall thickening, or pericholecystic fluid.  No sonographic Kirby's sign.    Biliary system: The common duct is not dilated, measuring 4 mm.  No intrahepatic ductal dilatation.    Spleen: Normal in size and echotexture, measuring 10.8 cm.    Miscellaneous: Pancreas is heterogeneous where  visualized along the neck and proximal body with adjacent simple fluid.   Impression:       1. Sequela of acute pancreatitis with adjacent free fluid, better characterized on prior CT.  2. Gallbladder sludge without other secondary signs of acute cholecystitis.      Electronically signed by: Manuel Olson  Date: 03/08/2020  Time: 09:18         Pending Diagnostic Studies:     Procedure Component Value Units Date/Time    Specimen to Pathology, Surgery General Surgery [[id number]] Collected:  03/09/20 0903    Order Status:  Sent Lab Status:  In process Updated:  03/09/20 0952         Medications:  Reconciled Home Medications:      Medication List      CHANGE how you take these medications    HYDROcodone-acetaminophen  mg per tablet  Commonly known as:  NORCO  Take 1 tablet by mouth every 6 (six) hours as needed for Pain.  What changed:  Another medication with the same name was removed. Continue taking this medication, and follow the directions you see here.     ondansetron 8 MG tablet  Commonly known as:  ZOFRAN  Take 1 tablet (8 mg total) by mouth every 8 (eight) hours as needed for Nausea.  What changed:    · medication strength  · how much to take  · when to take this     QUEtiapine 200 MG Tab  Commonly known as:  SEROQUEL  Take 1 tablet (200 mg total) by mouth nightly as needed (insomnia).  What changed:  how much to take        CONTINUE taking these medications    ARIPiprazole 10 MG Tab  Commonly known as:  ABILIFY  Take 1 tablet (10 mg total) by mouth once daily.     cloNIDine 0.1 mg/24 hr td ptwk 0.1 mg/24 hr  Commonly known as:  CATAPRES  Place 1 patch onto the skin every 7 days.     gabapentin 300 MG capsule  Commonly known as:  NEURONTIN  Take 1 capsule (300 mg total) by mouth 3 (three) times daily.     hydrOXYzine pamoate 25 MG Cap  Commonly known as:  VISTARIL  Take 25 mg by mouth 4 (four) times daily.     levothyroxine 50 MCG tablet  Commonly known as:  SYNTHROID  Take 1 tablet (50 mcg total) by  mouth before breakfast.     lisinopriL 40 MG tablet  Commonly known as:  PRINIVIL,ZESTRIL  Take 1 tablet (40 mg total) by mouth once daily.     metoprolol succinate 25 MG 24 hr tablet  Commonly known as:  TOPROL-XL  Take 1 tablet (25 mg total) by mouth nightly.     mirtazapine 30 MG tablet  Commonly known as:  REMERON  Take 1 tablet (30 mg total) by mouth nightly.     pantoprazole 40 MG tablet  Commonly known as:  PROTONIX  Take 40 mg by mouth once daily.     sertraline 50 MG tablet  Commonly known as:  ZOLOFT  Take 1 tablet (50 mg total) by mouth once daily.            Indwelling Lines/Drains at time of discharge:   Lines/Drains/Airways     None                 Time spent on the discharge of patient: 32 minutes  Patient was seen and examined on the date of discharge and determined to be suitable for discharge.  Patient was cleared for discharge by general surgeon Dr. Deshpande on day of discharge       Lindsay Colindres MD  Department of Hospital Medicine  [facility]

## 2020-03-11 NOTE — PHYSICIAN QUERY
"PT Name: Bradley Collado  MR #: [id number]     Physician Query Form - Etiology of Condition Clarification      CDS Brenda Meeks RN, BSN        Contact Information:    [email or web address]         This form is a permanent document in the medical record.     Query Date: March 11, 2020    By submitting this query, we are merely seeking further clarification of documentation.  Please utilize your independent clinical judgment when addressing the question(s) below.     The medical record contains the following:    Findings Supporting Clinical Information Location in Medical Record   Principal Problem:Acute on chronic pancreatitis hx of pancreatitis. States was seen 2x at [facility] last week for same, still in pain. Denies N/V. 40-year-old male with past medical history significant for hypertension, depression, thyroid disease, and pancreatitis who presents the emergency department complaining of intermittent abdominal pain.He denies any alcohol use       He notes in the past he was a heavy drinker of alcohol. He states that more recently he has had very limited alcohol intake.  Patient had an ultrasound today demonstrating biliary sludge      3/7/2020 14:38   Alcohol, Medical, Serum <10     Preop Diagnosis:  Biliary colic  Procedure: Laparoscopic cholecystectomy    Alcohol-induced acute pancreatitis             3/8 General surgery consult     Labs 3/7         3/9 OP note       3/10 Discharge summary "Final Active Diagnosis      Please document your best medical opinion regarding the etiology of    Acute on chronic pancreatitis    for which treatment is/was directed.       _____   Alcohol induced acute on chronic pancreatitis without necrosis or infection    __x___   Biliary acute on chronic pancreatitis without necrosis or infection     _____   Other (please specify): ____________________________________       [  ] Clinically Undetermined     Please document in your progress notes daily for the duration of treatment, until " resolved, and include in your discharge summary.

## 2020-03-18 LAB
FINAL PATHOLOGIC DIAGNOSIS: NORMAL
GROSS: NORMAL

## 2020-04-04 ENCOUNTER — HOSPITAL ENCOUNTER (INPATIENT)
Facility: HOSPITAL | Age: 40
LOS: 2 days | Discharge: HOME | DRG: 439 | End: 2020-04-06
Attending: EMERGENCY MEDICINE | Admitting: INTERNAL MEDICINE
Payer: MEDICAID

## 2020-04-04 DIAGNOSIS — K86.3 PANCREATIC PSEUDOCYST: Chronic | ICD-10-CM

## 2020-04-04 DIAGNOSIS — K85.20 ALCOHOL-INDUCED ACUTE PANCREATITIS, UNSPECIFIED COMPLICATION STATUS: ICD-10-CM

## 2020-04-04 DIAGNOSIS — K52.9 COLITIS: ICD-10-CM

## 2020-04-04 DIAGNOSIS — Z20.822 SUSPECTED COVID-19 VIRUS INFECTION: ICD-10-CM

## 2020-04-04 DIAGNOSIS — K85.90 ACUTE PANCREATITIS, UNSPECIFIED COMPLICATION STATUS, UNSPECIFIED PANCREATITIS TYPE: Primary | ICD-10-CM

## 2020-04-04 PROBLEM — F20.9 SCHIZOPHRENIA: Status: ACTIVE | Noted: 2020-04-04

## 2020-04-04 PROBLEM — K57.92 DIVERTICULITIS: Status: ACTIVE | Noted: 2020-04-04

## 2020-04-04 PROBLEM — F17.210 DEPENDENCE ON NICOTINE FROM CIGARETTES: Status: ACTIVE | Noted: 2020-04-04

## 2020-04-04 LAB
ALBUMIN SERPL BCP-MCNC: 3.7 G/DL (ref 3.5–5.2)
ALP SERPL-CCNC: 94 U/L (ref 55–135)
ALT SERPL W/O P-5'-P-CCNC: 13 U/L (ref 10–44)
ANION GAP SERPL CALC-SCNC: 10 MMOL/L (ref 8–16)
AST SERPL-CCNC: 14 U/L (ref 10–40)
BASOPHILS # BLD AUTO: 0.02 K/UL (ref 0–0.2)
BASOPHILS NFR BLD: 0.2 % (ref 0–1.9)
BILIRUB SERPL-MCNC: 0.4 MG/DL (ref 0.1–1)
BUN SERPL-MCNC: 11 MG/DL (ref 6–20)
CALCIUM SERPL-MCNC: 9.4 MG/DL (ref 8.7–10.5)
CHLORIDE SERPL-SCNC: 103 MMOL/L (ref 95–110)
CK SERPL-CCNC: 118 U/L (ref 20–200)
CO2 SERPL-SCNC: 22 MMOL/L (ref 23–29)
CREAT SERPL-MCNC: 1 MG/DL (ref 0.5–1.4)
CRP SERPL-MCNC: 179.5 MG/L (ref 0–8.2)
DIFFERENTIAL METHOD: ABNORMAL
EOSINOPHIL # BLD AUTO: 0.3 K/UL (ref 0–0.5)
EOSINOPHIL NFR BLD: 2.1 % (ref 0–8)
ERYTHROCYTE [DISTWIDTH] IN BLOOD BY AUTOMATED COUNT: 17.5 % (ref 11.5–14.5)
EST. GFR  (AFRICAN AMERICAN): >60 ML/MIN/1.73 M^2
EST. GFR  (NON AFRICAN AMERICAN): >60 ML/MIN/1.73 M^2
FERRITIN SERPL-MCNC: 95 NG/ML (ref 20–300)
GLUCOSE SERPL-MCNC: 125 MG/DL (ref 70–110)
HCT VFR BLD AUTO: 34 % (ref 40–54)
HGB BLD-MCNC: 10.7 G/DL (ref 14–18)
IMM GRANULOCYTES # BLD AUTO: 0.04 K/UL (ref 0–0.04)
IMM GRANULOCYTES NFR BLD AUTO: 0.3 % (ref 0–0.5)
LACTATE SERPL-SCNC: 0.8 MMOL/L (ref 0.5–2.2)
LDH SERPL L TO P-CCNC: 112 U/L (ref 110–260)
LIPASE SERPL-CCNC: 500 U/L (ref 4–60)
LYMPHOCYTES # BLD AUTO: 1 K/UL (ref 1–4.8)
LYMPHOCYTES NFR BLD: 8.1 % (ref 18–48)
MCH RBC QN AUTO: 25.3 PG (ref 27–31)
MCHC RBC AUTO-ENTMCNC: 31.5 G/DL (ref 32–36)
MCV RBC AUTO: 80 FL (ref 82–98)
MONOCYTES # BLD AUTO: 0.7 K/UL (ref 0.3–1)
MONOCYTES NFR BLD: 5.9 % (ref 4–15)
NEUTROPHILS # BLD AUTO: 10.1 K/UL (ref 1.8–7.7)
NEUTROPHILS NFR BLD: 83.4 % (ref 38–73)
NRBC BLD-RTO: 0 /100 WBC
PLATELET # BLD AUTO: 305 K/UL (ref 150–350)
PMV BLD AUTO: 11.1 FL (ref 9.2–12.9)
POTASSIUM SERPL-SCNC: 3.9 MMOL/L (ref 3.5–5.1)
PROT SERPL-MCNC: 7.4 G/DL (ref 6–8.4)
RBC # BLD AUTO: 4.23 M/UL (ref 4.6–6.2)
SARS-COV-2 RNA AMPLIFICATION, QUAL: NEGATIVE
SODIUM SERPL-SCNC: 135 MMOL/L (ref 136–145)
TROPONIN I SERPL DL<=0.01 NG/ML-MCNC: 0.01 NG/ML (ref 0–0.03)
WBC # BLD AUTO: 12.13 K/UL (ref 3.9–12.7)

## 2020-04-04 PROCEDURE — 84484 ASSAY OF TROPONIN QUANT: CPT

## 2020-04-04 PROCEDURE — 80053 COMPREHEN METABOLIC PANEL: CPT

## 2020-04-04 PROCEDURE — 99285 EMERGENCY DEPT VISIT HI MDM: CPT | Mod: 25

## 2020-04-04 PROCEDURE — 93005 ELECTROCARDIOGRAM TRACING: CPT

## 2020-04-04 PROCEDURE — 82550 ASSAY OF CK (CPK): CPT

## 2020-04-04 PROCEDURE — 83615 LACTATE (LD) (LDH) ENZYME: CPT

## 2020-04-04 PROCEDURE — 25500020 PHARM REV CODE 255: Performed by: EMERGENCY MEDICINE

## 2020-04-04 PROCEDURE — 87040 BLOOD CULTURE FOR BACTERIA: CPT

## 2020-04-04 PROCEDURE — 25000003 PHARM REV CODE 250: Performed by: NURSE PRACTITIONER

## 2020-04-04 PROCEDURE — 83690 ASSAY OF LIPASE: CPT

## 2020-04-04 PROCEDURE — 83605 ASSAY OF LACTIC ACID: CPT

## 2020-04-04 PROCEDURE — 96374 THER/PROPH/DIAG INJ IV PUSH: CPT

## 2020-04-04 PROCEDURE — 96361 HYDRATE IV INFUSION ADD-ON: CPT

## 2020-04-04 PROCEDURE — 85025 COMPLETE CBC W/AUTO DIFF WBC: CPT

## 2020-04-04 PROCEDURE — C9113 INJ PANTOPRAZOLE SODIUM, VIA: HCPCS | Performed by: NURSE PRACTITIONER

## 2020-04-04 PROCEDURE — 12000002 HC ACUTE/MED SURGE SEMI-PRIVATE ROOM

## 2020-04-04 PROCEDURE — 63600175 PHARM REV CODE 636 W HCPCS: Performed by: EMERGENCY MEDICINE

## 2020-04-04 PROCEDURE — 36415 COLL VENOUS BLD VENIPUNCTURE: CPT

## 2020-04-04 PROCEDURE — 82728 ASSAY OF FERRITIN: CPT

## 2020-04-04 PROCEDURE — 86140 C-REACTIVE PROTEIN: CPT

## 2020-04-04 PROCEDURE — 25000003 PHARM REV CODE 250: Performed by: EMERGENCY MEDICINE

## 2020-04-04 PROCEDURE — 63600175 PHARM REV CODE 636 W HCPCS: Performed by: NURSE PRACTITIONER

## 2020-04-04 PROCEDURE — 96376 TX/PRO/DX INJ SAME DRUG ADON: CPT

## 2020-04-04 PROCEDURE — U0002 COVID-19 LAB TEST NON-CDC: HCPCS

## 2020-04-04 RX ORDER — ARIPIPRAZOLE 5 MG/1
10 TABLET ORAL DAILY
Status: DISCONTINUED | OUTPATIENT
Start: 2020-04-05 | End: 2020-04-06 | Stop reason: HOSPADM

## 2020-04-04 RX ORDER — CLONIDINE 0.1 MG/24H
1 PATCH, EXTENDED RELEASE TRANSDERMAL
Status: DISCONTINUED | OUTPATIENT
Start: 2020-04-05 | End: 2020-04-06

## 2020-04-04 RX ORDER — TALC
9 POWDER (GRAM) TOPICAL NIGHTLY PRN
Status: DISCONTINUED | OUTPATIENT
Start: 2020-04-04 | End: 2020-04-06 | Stop reason: HOSPADM

## 2020-04-04 RX ORDER — MORPHINE SULFATE 8 MG/ML
8 INJECTION INTRAMUSCULAR; INTRAVENOUS; SUBCUTANEOUS
Status: COMPLETED | OUTPATIENT
Start: 2020-04-04 | End: 2020-04-04

## 2020-04-04 RX ORDER — LISINOPRIL 40 MG/1
40 TABLET ORAL DAILY
Status: DISCONTINUED | OUTPATIENT
Start: 2020-04-05 | End: 2020-04-06 | Stop reason: HOSPADM

## 2020-04-04 RX ORDER — HYDROMORPHONE HYDROCHLORIDE 2 MG/ML
1 INJECTION, SOLUTION INTRAMUSCULAR; INTRAVENOUS; SUBCUTANEOUS ONCE
Status: COMPLETED | OUTPATIENT
Start: 2020-04-04 | End: 2020-04-04

## 2020-04-04 RX ORDER — LANOLIN ALCOHOL/MO/W.PET/CERES
800 CREAM (GRAM) TOPICAL
Status: DISCONTINUED | OUTPATIENT
Start: 2020-04-04 | End: 2020-04-06 | Stop reason: HOSPADM

## 2020-04-04 RX ORDER — AMOXICILLIN 250 MG
1 CAPSULE ORAL 2 TIMES DAILY
Status: DISCONTINUED | OUTPATIENT
Start: 2020-04-04 | End: 2020-04-06 | Stop reason: HOSPADM

## 2020-04-04 RX ORDER — HYDROMORPHONE HYDROCHLORIDE 2 MG/ML
1 INJECTION, SOLUTION INTRAMUSCULAR; INTRAVENOUS; SUBCUTANEOUS EVERY 6 HOURS PRN
Status: DISCONTINUED | OUTPATIENT
Start: 2020-04-04 | End: 2020-04-05

## 2020-04-04 RX ORDER — MORPHINE SULFATE 2 MG/ML
6 INJECTION, SOLUTION INTRAMUSCULAR; INTRAVENOUS
Status: COMPLETED | OUTPATIENT
Start: 2020-04-04 | End: 2020-04-04

## 2020-04-04 RX ORDER — ONDANSETRON 2 MG/ML
8 INJECTION INTRAMUSCULAR; INTRAVENOUS EVERY 8 HOURS PRN
Status: DISCONTINUED | OUTPATIENT
Start: 2020-04-04 | End: 2020-04-06 | Stop reason: HOSPADM

## 2020-04-04 RX ORDER — ACETAMINOPHEN 325 MG/1
650 TABLET ORAL EVERY 6 HOURS PRN
Status: DISCONTINUED | OUTPATIENT
Start: 2020-04-04 | End: 2020-04-06 | Stop reason: HOSPADM

## 2020-04-04 RX ORDER — SERTRALINE HYDROCHLORIDE 50 MG/1
50 TABLET, FILM COATED ORAL DAILY
Status: DISCONTINUED | OUTPATIENT
Start: 2020-04-05 | End: 2020-04-06 | Stop reason: HOSPADM

## 2020-04-04 RX ORDER — LEVOTHYROXINE SODIUM 50 UG/1
50 TABLET ORAL
Status: DISCONTINUED | OUTPATIENT
Start: 2020-04-05 | End: 2020-04-06 | Stop reason: HOSPADM

## 2020-04-04 RX ORDER — GABAPENTIN 300 MG/1
300 CAPSULE ORAL 3 TIMES DAILY
Status: DISCONTINUED | OUTPATIENT
Start: 2020-04-04 | End: 2020-04-06 | Stop reason: HOSPADM

## 2020-04-04 RX ORDER — ACETAMINOPHEN 325 MG/1
650 TABLET ORAL
Status: COMPLETED | OUTPATIENT
Start: 2020-04-04 | End: 2020-04-04

## 2020-04-04 RX ORDER — GLUCAGON 1 MG
1 KIT INJECTION
Status: DISCONTINUED | OUTPATIENT
Start: 2020-04-04 | End: 2020-04-06 | Stop reason: HOSPADM

## 2020-04-04 RX ORDER — IBUPROFEN 200 MG
16 TABLET ORAL
Status: DISCONTINUED | OUTPATIENT
Start: 2020-04-04 | End: 2020-04-06 | Stop reason: HOSPADM

## 2020-04-04 RX ORDER — POTASSIUM CHLORIDE 20 MEQ/15ML
40 SOLUTION ORAL
Status: DISCONTINUED | OUTPATIENT
Start: 2020-04-04 | End: 2020-04-06 | Stop reason: HOSPADM

## 2020-04-04 RX ORDER — PANTOPRAZOLE SODIUM 40 MG/10ML
40 INJECTION, POWDER, LYOPHILIZED, FOR SOLUTION INTRAVENOUS DAILY
Status: DISCONTINUED | OUTPATIENT
Start: 2020-04-04 | End: 2020-04-06 | Stop reason: HOSPADM

## 2020-04-04 RX ORDER — SODIUM CHLORIDE 9 MG/ML
INJECTION, SOLUTION INTRAVENOUS CONTINUOUS
Status: DISCONTINUED | OUTPATIENT
Start: 2020-04-04 | End: 2020-04-06

## 2020-04-04 RX ORDER — HYDROMORPHONE HYDROCHLORIDE 2 MG/ML
1 INJECTION, SOLUTION INTRAMUSCULAR; INTRAVENOUS; SUBCUTANEOUS
Status: COMPLETED | OUTPATIENT
Start: 2020-04-04 | End: 2020-04-04

## 2020-04-04 RX ORDER — HYDROXYZINE PAMOATE 25 MG/1
25 CAPSULE ORAL 4 TIMES DAILY
Status: DISCONTINUED | OUTPATIENT
Start: 2020-04-04 | End: 2020-04-06 | Stop reason: HOSPADM

## 2020-04-04 RX ORDER — SODIUM CHLORIDE 0.9 % (FLUSH) 0.9 %
10 SYRINGE (ML) INJECTION
Status: DISCONTINUED | OUTPATIENT
Start: 2020-04-04 | End: 2020-04-06 | Stop reason: HOSPADM

## 2020-04-04 RX ORDER — QUETIAPINE FUMARATE 100 MG/1
200 TABLET, FILM COATED ORAL NIGHTLY PRN
Status: DISCONTINUED | OUTPATIENT
Start: 2020-04-04 | End: 2020-04-06 | Stop reason: HOSPADM

## 2020-04-04 RX ORDER — IBUPROFEN 200 MG
24 TABLET ORAL
Status: DISCONTINUED | OUTPATIENT
Start: 2020-04-04 | End: 2020-04-06 | Stop reason: HOSPADM

## 2020-04-04 RX ORDER — MELATONIN 1 MG/ML
8 LIQUID (ML) ORAL NIGHTLY PRN
Status: DISCONTINUED | OUTPATIENT
Start: 2020-04-04 | End: 2020-04-04

## 2020-04-04 RX ORDER — MIRTAZAPINE 7.5 MG/1
30 TABLET, FILM COATED ORAL NIGHTLY
Status: DISCONTINUED | OUTPATIENT
Start: 2020-04-04 | End: 2020-04-06 | Stop reason: HOSPADM

## 2020-04-04 RX ADMIN — HYDROMORPHONE HYDROCHLORIDE 1 MG: 2 INJECTION, SOLUTION INTRAMUSCULAR; INTRAVENOUS; SUBCUTANEOUS at 08:04

## 2020-04-04 RX ADMIN — IOHEXOL 75 ML: 350 INJECTION, SOLUTION INTRAVENOUS at 05:04

## 2020-04-04 RX ADMIN — MORPHINE SULFATE 6 MG: 2 INJECTION, SOLUTION INTRAMUSCULAR; INTRAVENOUS at 04:04

## 2020-04-04 RX ADMIN — PANTOPRAZOLE SODIUM 40 MG: 40 INJECTION, POWDER, LYOPHILIZED, FOR SOLUTION INTRAVENOUS at 08:04

## 2020-04-04 RX ADMIN — GABAPENTIN 300 MG: 300 CAPSULE ORAL at 08:04

## 2020-04-04 RX ADMIN — SODIUM CHLORIDE: 0.9 INJECTION, SOLUTION INTRAVENOUS at 08:04

## 2020-04-04 RX ADMIN — SENNOSIDES AND DOCUSATE SODIUM 1 TABLET: 8.6; 5 TABLET ORAL at 08:04

## 2020-04-04 RX ADMIN — HYDROXYZINE PAMOATE 25 MG: 25 CAPSULE ORAL at 08:04

## 2020-04-04 RX ADMIN — MORPHINE SULFATE 8 MG: 8 INJECTION, SOLUTION INTRAMUSCULAR; INTRAVENOUS at 05:04

## 2020-04-04 RX ADMIN — ACETAMINOPHEN 650 MG: 325 TABLET ORAL at 04:04

## 2020-04-04 RX ADMIN — SODIUM CHLORIDE 1000 ML: 0.9 INJECTION, SOLUTION INTRAVENOUS at 04:04

## 2020-04-04 RX ADMIN — MIRTAZAPINE 30 MG: 7.5 TABLET ORAL at 08:04

## 2020-04-04 RX ADMIN — HYDROMORPHONE HYDROCHLORIDE 1 MG: 2 INJECTION, SOLUTION INTRAMUSCULAR; INTRAVENOUS; SUBCUTANEOUS at 07:04

## 2020-04-04 RX ADMIN — PIPERACILLIN SODIUM AND TAZOBACTAM SODIUM 4.5 G: 4; .5 INJECTION, POWDER, LYOPHILIZED, FOR SOLUTION INTRAVENOUS at 07:04

## 2020-04-04 NOTE — ED NOTES
Pt presents to the ED with complaints of LUQ abdominal pain, consistent with prior episodes of pancreatitis. He denies nausea, vomiting or diarrhea at this time. Bed rails are up and call light is within patient reach. Will continue to monitor.

## 2020-04-04 NOTE — ED NOTES
Pt sitting in bed playing on iphone, reports he is still experiencing 10/10 pain. Pt in no distress. MD Hugo lauren.

## 2020-04-04 NOTE — ED PROVIDER NOTES
Encounter Date: 4/4/2020    SCRIBE #1 NOTE: I, Sommer De Leon, am scribing for, and in the presence of, Dr. Arias.       History     Chief Complaint   Patient presents with    Abdominal Pain     LUQ x 3 days, no N/V/D, hx pancreatitis       Time seen by provider: 4:10 PM on 04/04/2020    Bradley Molina Cousin is a 40 y.o. male a hx of pancreatitis who presents to the ED with c/o LUQ abdominal pain x3 days. Patient is s/p laparoscopic cholecystectomy 1 month ago. He believes his pain is related to another episode of pancreatitis.He denies any diarrhea, nausea, vomiting, CP or SOB. He reports fever and a cough for a few days. No known sick contact. Patient is not immunocompromised. He has no other complaints. NKDA.           The history is provided by the patient.     Review of patient's allergies indicates:  No Known Allergies  Past Medical History:   Diagnosis Date    Anxiety     Anxiety     Bipolar affective disorder     Depression     Hypertension     Pancreatitis     Schizophrenia     Thyroid disease      Past Surgical History:   Procedure Laterality Date    LAPAROSCOPIC CHOLECYSTECTOMY N/A 3/9/2020    Procedure: CHOLECYSTECTOMY, LAPAROSCOPIC;  Surgeon: Trenton Deshpande MD;  Location: [facility];  Service: General;  Laterality: N/A;     Family History   Problem Relation Age of Onset    Diabetes Maternal Grandmother     Hypertension Maternal Grandfather     Cancer Paternal Grandfather      Social History     Tobacco Use    Smoking status: Current Some Day Smoker     Packs/day: 0.25     Years: 7.00     Pack years: 1.75    Smokeless tobacco: Never Used   Substance Use Topics    Alcohol use: Yes     Comment: occasionally    Drug use: Yes     Frequency: 7.0 times per week     Types: Marijuana     Review of Systems   Constitutional: Positive for fever.   HENT: Negative for congestion.    Eyes: Negative for visual disturbance.   Respiratory: Positive for cough. Negative for wheezing.    Cardiovascular:  Negative for chest pain.   Gastrointestinal: Positive for abdominal pain (LUQ). Negative for diarrhea, nausea and vomiting.   Genitourinary: Negative for dysuria.   Musculoskeletal: Negative for joint swelling.   Skin: Negative for rash.   Neurological: Negative for syncope.   Hematological: Does not bruise/bleed easily.   Psychiatric/Behavioral: Negative for confusion.       Physical Exam     Initial Vitals [04/04/20 1530]   BP Pulse Resp Temp SpO2   139/84 104 20 (!) 100.5 °F (38.1 °C) 99 %      MAP       --         Physical Exam    Nursing note and vitals reviewed.  Constitutional: He appears well-developed and well-nourished.  Non-toxic appearance. No distress.   HENT:   Head: Normocephalic and atraumatic.   Eyes: EOM are normal. Pupils are equal, round, and reactive to light.   Neck: Normal range of motion. Neck supple. No neck rigidity. No JVD present.   Cardiovascular: Normal rate, regular rhythm, normal heart sounds and intact distal pulses. Exam reveals no gallop and no friction rub.    No murmur heard.  Pulmonary/Chest: Breath sounds normal. He has no wheezes. He has no rhonchi. He has no rales.   Abdominal: Soft. Bowel sounds are normal. He exhibits no distension. There is generalized tenderness. There is rebound. There is no guarding.   Percussion tenderness but abdomen is soft.    Musculoskeletal: Normal range of motion.   Neurological: He is alert and oriented to person, place, and time. He has normal strength and normal reflexes. No cranial nerve deficit or sensory deficit. He exhibits normal muscle tone. Coordination normal. GCS eye subscore is 4. GCS verbal subscore is 5. GCS motor subscore is 6.   Skin: Skin is warm and dry. Nails show clubbing.   Psychiatric: He has a normal mood and affect. His speech is normal and behavior is normal. He is not actively hallucinating.         ED Course   Procedures  Labs Reviewed   CBC W/ AUTO DIFFERENTIAL - Abnormal; Notable for the following components:        Result Value    RBC 4.23 (*)     Hemoglobin 10.7 (*)     Hematocrit 34.0 (*)     Mean Corpuscular Volume 80 (*)     Mean Corpuscular Hemoglobin 25.3 (*)     Mean Corpuscular Hemoglobin Conc 31.5 (*)     RDW 17.5 (*)     Gran # (ANC) 10.1 (*)     Gran% 83.4 (*)     Lymph% 8.1 (*)     All other components within normal limits   COMPREHENSIVE METABOLIC PANEL - Abnormal; Notable for the following components:    Sodium 135 (*)     CO2 22 (*)     Glucose 125 (*)     All other components within normal limits   LIPASE - Abnormal; Notable for the following components:    Lipase 500 (*)     All other components within normal limits   C-REACTIVE PROTEIN - Abnormal; Notable for the following components:    .5 (*)     All other components within normal limits   CULTURE, BLOOD   CULTURE, BLOOD   FERRITIN   LACTATE DEHYDROGENASE   CK   LACTIC ACID, PLASMA   TROPONIN I   SARS-COV-2 RNA AMPLIFICATION, QUAL   URINALYSIS, REFLEX TO URINE CULTURE          Imaging Results          CT Abdomen Pelvis With Contrast (In process)                X-Ray Chest AP Portable (Final result)  Result time 04/04/20 16:07:06    Final result by Chadd Sunshine MD (04/04/20 16:07:06)                 Impression:      No acute radiographic abnormality in the chest.      Electronically signed by: Chadd Sunshine  Date:    04/04/2020  Time:    16:07             Narrative:    EXAMINATION:  XR CHEST AP PORTABLE    CLINICAL HISTORY:  Suspected Covid-19 Virus Infection;.    TECHNIQUE:  Single frontal portable view of the chest was performed.    COMPARISON:  02/26/2020    FINDINGS:  Cardiomediastinal silhouette is within normal limits.Lungs are clear.  No evidence of focal consolidation, pleural effusion, or pneumothorax.  Left posterolateral healing rib deformities again noted.  Remaining osseous structures are intact.  Right upper quadrant cholecystectomy clips.                                 Medical Decision Making:   History:   Old Medical Records: I decided  to obtain old medical records.  Initial Assessment:   40-year-old man with a history of pancreatitis presents emergency department for recurrent severe abdominal pain.  Denies any vomiting.  He is found to be febrile with the low-grade temperature of a 100.5° in the ED. Endorses occasional cough.  Lipase is elevated at 500 and CT of the abdomen pelvis concerning for pancreatic pseudocysts with inflammatory changes of the pancreas concerning for acute on chronic pancreatitis.  Patient also with finding suspicious for colitis versus diverticulitis.  No evidence of perforation.  Patient's pain is difficult to controlled with multiple rounds of pain medication in the ED.  He is started on IV fluids.  Discussed with Hospital Medicine who agrees to admit the patient for further treatment.  Independently Interpreted Test(s):   I have ordered and independently interpreted EKG Reading(s) - see prior notes  Clinical Tests:   Lab Tests: Ordered and Reviewed  Radiological Study: Ordered and Reviewed  Medical Tests: Ordered and Reviewed            Scribe Attestation:   Scribe #1: I performed the above scribed service and the documentation accurately describes the services I performed. I attest to the accuracy of the note.            ED Course as of Apr 04 1955   Sat Apr 04, 2020   1703 EKG interpreted by myself:  Normal sinus rhythm, 77 beats per minute, nonspecific T-wave abnormality, normal axis, normal ST segments.    [AS]   1824 CT abdomen and pelvis shows 1.3 cm cyst in the distal pancreas.  Also cystic fluid collection abutting the gastric body measuring 9 x 3.1 cm in the greatest axial dimensions probably representing a pseudocyst.  There is minimal peripancreatic stranding suggesting mild acute on chronic pancreatitis.  Findings also include colonic diverticula with diffuse wall thickening and pericolonic stranding about the colon at the level of the splenic fracture that may indicate diverticulitis and/or colitis.     [AS]   1827 Creatinine: 1.0 [AS]      ED Course User Index  [AS] Dave Arias MD         I, Hugo Munoz, personally performed the services described in this documentation. All medical record entries made by the scribe were at my direction and in my presence.  I have reviewed the chart and agree that the record reflects my personal performance and is accurate and complete. Dave Arias MD.        Clinical Impression:       ICD-10-CM ICD-9-CM   1. Acute pancreatitis, unspecified complication status, unspecified pancreatitis type K85.90 577.0   2. Suspected Covid-19 Virus Infection R68.89    3. Colitis K52.9 558.9         Disposition:   Disposition: Admitted     ED Disposition Condition    Admit                           Dave Arias MD  04/04/20 1955

## 2020-04-05 LAB
ALBUMIN SERPL BCP-MCNC: 3.1 G/DL (ref 3.5–5.2)
ALP SERPL-CCNC: 126 U/L (ref 55–135)
ALT SERPL W/O P-5'-P-CCNC: 83 U/L (ref 10–44)
ANION GAP SERPL CALC-SCNC: 7 MMOL/L (ref 8–16)
AST SERPL-CCNC: 89 U/L (ref 10–40)
BACTERIA #/AREA URNS HPF: NORMAL /HPF
BASOPHILS # BLD AUTO: 0.04 K/UL (ref 0–0.2)
BASOPHILS NFR BLD: 0.4 % (ref 0–1.9)
BILIRUB SERPL-MCNC: 0.9 MG/DL (ref 0.1–1)
BILIRUB UR QL STRIP: NEGATIVE
BUN SERPL-MCNC: 9 MG/DL (ref 6–20)
CALCIUM SERPL-MCNC: 8.4 MG/DL (ref 8.7–10.5)
CHLORIDE SERPL-SCNC: 103 MMOL/L (ref 95–110)
CLARITY UR: CLEAR
CO2 SERPL-SCNC: 24 MMOL/L (ref 23–29)
COLOR UR: YELLOW
CREAT SERPL-MCNC: 1 MG/DL (ref 0.5–1.4)
DIFFERENTIAL METHOD: ABNORMAL
EOSINOPHIL # BLD AUTO: 0.4 K/UL (ref 0–0.5)
EOSINOPHIL NFR BLD: 4.7 % (ref 0–8)
ERYTHROCYTE [DISTWIDTH] IN BLOOD BY AUTOMATED COUNT: 17.4 % (ref 11.5–14.5)
EST. GFR  (AFRICAN AMERICAN): >60 ML/MIN/1.73 M^2
EST. GFR  (NON AFRICAN AMERICAN): >60 ML/MIN/1.73 M^2
GLUCOSE SERPL-MCNC: 111 MG/DL (ref 70–110)
GLUCOSE UR QL STRIP: NEGATIVE
HCT VFR BLD AUTO: 31.8 % (ref 40–54)
HGB BLD-MCNC: 9.6 G/DL (ref 14–18)
HGB UR QL STRIP: NEGATIVE
HYALINE CASTS #/AREA URNS LPF: 0 /LPF
IMM GRANULOCYTES # BLD AUTO: 0.02 K/UL (ref 0–0.04)
IMM GRANULOCYTES NFR BLD AUTO: 0.2 % (ref 0–0.5)
KETONES UR QL STRIP: NEGATIVE
LEUKOCYTE ESTERASE UR QL STRIP: NEGATIVE
LYMPHOCYTES # BLD AUTO: 0.8 K/UL (ref 1–4.8)
LYMPHOCYTES NFR BLD: 8.1 % (ref 18–48)
MAGNESIUM SERPL-MCNC: 1.9 MG/DL (ref 1.6–2.6)
MCH RBC QN AUTO: 24.1 PG (ref 27–31)
MCHC RBC AUTO-ENTMCNC: 30.2 G/DL (ref 32–36)
MCV RBC AUTO: 80 FL (ref 82–98)
MICROSCOPIC COMMENT: NORMAL
MONOCYTES # BLD AUTO: 0.6 K/UL (ref 0.3–1)
MONOCYTES NFR BLD: 6.7 % (ref 4–15)
NEUTROPHILS # BLD AUTO: 7.4 K/UL (ref 1.8–7.7)
NEUTROPHILS NFR BLD: 79.9 % (ref 38–73)
NITRITE UR QL STRIP: NEGATIVE
NRBC BLD-RTO: 0 /100 WBC
PH UR STRIP: 7 [PH] (ref 5–8)
PHOSPHATE SERPL-MCNC: 2.5 MG/DL (ref 2.7–4.5)
PLATELET # BLD AUTO: 252 K/UL (ref 150–350)
PMV BLD AUTO: 10 FL (ref 9.2–12.9)
POCT GLUCOSE: 115 MG/DL (ref 70–110)
POTASSIUM SERPL-SCNC: 3.8 MMOL/L (ref 3.5–5.1)
PROT SERPL-MCNC: 6.4 G/DL (ref 6–8.4)
PROT UR QL STRIP: ABNORMAL
RBC # BLD AUTO: 3.98 M/UL (ref 4.6–6.2)
RBC #/AREA URNS HPF: 0 /HPF (ref 0–4)
SODIUM SERPL-SCNC: 134 MMOL/L (ref 136–145)
SP GR UR STRIP: 1.01 (ref 1–1.03)
SQUAMOUS #/AREA URNS HPF: 1 /HPF
URN SPEC COLLECT METH UR: ABNORMAL
UROBILINOGEN UR STRIP-ACNC: NEGATIVE EU/DL
WBC # BLD AUTO: 9.28 K/UL (ref 3.9–12.7)
WBC #/AREA URNS HPF: 0 /HPF (ref 0–5)

## 2020-04-05 PROCEDURE — 12000002 HC ACUTE/MED SURGE SEMI-PRIVATE ROOM

## 2020-04-05 PROCEDURE — C9113 INJ PANTOPRAZOLE SODIUM, VIA: HCPCS | Performed by: NURSE PRACTITIONER

## 2020-04-05 PROCEDURE — 84100 ASSAY OF PHOSPHORUS: CPT

## 2020-04-05 PROCEDURE — 63600175 PHARM REV CODE 636 W HCPCS: Performed by: INTERNAL MEDICINE

## 2020-04-05 PROCEDURE — 99233 PR SUBSEQUENT HOSPITAL CARE,LEVL III: ICD-10-PCS | Mod: ,,, | Performed by: INTERNAL MEDICINE

## 2020-04-05 PROCEDURE — 99233 SBSQ HOSP IP/OBS HIGH 50: CPT | Mod: ,,, | Performed by: INTERNAL MEDICINE

## 2020-04-05 PROCEDURE — 85025 COMPLETE CBC W/AUTO DIFF WBC: CPT

## 2020-04-05 PROCEDURE — 25000003 PHARM REV CODE 250: Performed by: NURSE PRACTITIONER

## 2020-04-05 PROCEDURE — 63600175 PHARM REV CODE 636 W HCPCS: Performed by: NURSE PRACTITIONER

## 2020-04-05 PROCEDURE — 81000 URINALYSIS NONAUTO W/SCOPE: CPT

## 2020-04-05 PROCEDURE — 36415 COLL VENOUS BLD VENIPUNCTURE: CPT

## 2020-04-05 PROCEDURE — 83735 ASSAY OF MAGNESIUM: CPT

## 2020-04-05 PROCEDURE — 80053 COMPREHEN METABOLIC PANEL: CPT

## 2020-04-05 PROCEDURE — 25000003 PHARM REV CODE 250: Performed by: INTERNAL MEDICINE

## 2020-04-05 RX ORDER — HYDROMORPHONE HYDROCHLORIDE 2 MG/ML
2 INJECTION, SOLUTION INTRAMUSCULAR; INTRAVENOUS; SUBCUTANEOUS EVERY 6 HOURS PRN
Status: DISCONTINUED | OUTPATIENT
Start: 2020-04-05 | End: 2020-04-06

## 2020-04-05 RX ORDER — OXYCODONE AND ACETAMINOPHEN 10; 325 MG/1; MG/1
1 TABLET ORAL EVERY 4 HOURS PRN
Status: DISCONTINUED | OUTPATIENT
Start: 2020-04-05 | End: 2020-04-06 | Stop reason: HOSPADM

## 2020-04-05 RX ADMIN — HYDROXYZINE PAMOATE 25 MG: 25 CAPSULE ORAL at 09:04

## 2020-04-05 RX ADMIN — HYDROMORPHONE HYDROCHLORIDE 2 MG: 2 INJECTION INTRAMUSCULAR; INTRAVENOUS; SUBCUTANEOUS at 09:04

## 2020-04-05 RX ADMIN — QUETIAPINE 200 MG: 100 TABLET ORAL at 09:04

## 2020-04-05 RX ADMIN — HYDROXYZINE PAMOATE 25 MG: 25 CAPSULE ORAL at 02:04

## 2020-04-05 RX ADMIN — SENNOSIDES AND DOCUSATE SODIUM 1 TABLET: 8.6; 5 TABLET ORAL at 09:04

## 2020-04-05 RX ADMIN — HYDROMORPHONE HYDROCHLORIDE 1 MG: 2 INJECTION, SOLUTION INTRAMUSCULAR; INTRAVENOUS; SUBCUTANEOUS at 03:04

## 2020-04-05 RX ADMIN — GABAPENTIN 300 MG: 300 CAPSULE ORAL at 02:04

## 2020-04-05 RX ADMIN — MIRTAZAPINE 30 MG: 7.5 TABLET ORAL at 09:04

## 2020-04-05 RX ADMIN — OXYCODONE HYDROCHLORIDE AND ACETAMINOPHEN 1 TABLET: 10; 325 TABLET ORAL at 10:04

## 2020-04-05 RX ADMIN — HYDROXYZINE PAMOATE 25 MG: 25 CAPSULE ORAL at 05:04

## 2020-04-05 RX ADMIN — LEVOTHYROXINE SODIUM 50 MCG: 50 TABLET ORAL at 05:04

## 2020-04-05 RX ADMIN — PANTOPRAZOLE SODIUM 40 MG: 40 INJECTION, POWDER, LYOPHILIZED, FOR SOLUTION INTRAVENOUS at 09:04

## 2020-04-05 RX ADMIN — GABAPENTIN 300 MG: 300 CAPSULE ORAL at 09:04

## 2020-04-05 RX ADMIN — PIPERACILLIN SODIUM AND TAZOBACTAM SODIUM 4.5 G: 4; .5 INJECTION, POWDER, LYOPHILIZED, FOR SOLUTION INTRAVENOUS at 10:04

## 2020-04-05 RX ADMIN — LISINOPRIL 40 MG: 40 TABLET ORAL at 09:04

## 2020-04-05 RX ADMIN — PIPERACILLIN SODIUM AND TAZOBACTAM SODIUM 4.5 G: 4; .5 INJECTION, POWDER, LYOPHILIZED, FOR SOLUTION INTRAVENOUS at 06:04

## 2020-04-05 RX ADMIN — SERTRALINE HYDROCHLORIDE 50 MG: 50 TABLET ORAL at 09:04

## 2020-04-05 RX ADMIN — HYDROMORPHONE HYDROCHLORIDE 2 MG: 2 INJECTION INTRAMUSCULAR; INTRAVENOUS; SUBCUTANEOUS at 03:04

## 2020-04-05 RX ADMIN — ARIPIPRAZOLE 10 MG: 5 TABLET ORAL at 09:04

## 2020-04-05 RX ADMIN — CLONIDINE 1 PATCH: 0.1 PATCH TRANSDERMAL at 09:04

## 2020-04-05 RX ADMIN — PIPERACILLIN SODIUM AND TAZOBACTAM SODIUM 4.5 G: 4; .5 INJECTION, POWDER, LYOPHILIZED, FOR SOLUTION INTRAVENOUS at 03:04

## 2020-04-05 RX ADMIN — OXYCODONE HYDROCHLORIDE AND ACETAMINOPHEN 1 TABLET: 10; 325 TABLET ORAL at 05:04

## 2020-04-05 NOTE — ASSESSMENT & PLAN NOTE
Acute on chronic problem  NPO  IV fluids  Hydromorphone and Zofran p.r.n.  Zosyn 4.5 g Q 8  Consult GI  Serial abdominal exams  CT scan: There is a 1.3 cm cyst in the distal pancreas. There is a cystic fluid collection abutting the gastric body measuring 9 x 3.1 cm in greatest axial dimensions. Findings probably represent a pseudocysts. There is minimal peripancreatic stranding suggesting mild acute on chronic pancreatitis. 2. Colonic diverticula with diffuse wall thickening and pericolonic stranding about the colon at the level of the splenic flexure. Findings may be due to diverticulitis and/or colitis

## 2020-04-05 NOTE — SUBJECTIVE & OBJECTIVE
Interval History:  Patient reports pain is poorly controlled.  Does report occasional nausea however denies vomiting.  Patient was febrile on presentation however defervesced quickly.  Currently on antibiotics    Review of Systems   Constitutional: Positive for activity change. Negative for chills, diaphoresis and fever.   HENT: Negative for congestion, nosebleeds and tinnitus.    Eyes: Negative for photophobia and visual disturbance.   Respiratory: Negative for cough, chest tightness, shortness of breath and wheezing.    Cardiovascular: Negative for chest pain, palpitations and leg swelling.   Gastrointestinal: Positive for abdominal pain and nausea. Negative for abdominal distention, constipation, diarrhea and vomiting.   Endocrine: Negative for cold intolerance and heat intolerance.   Genitourinary: Negative for difficulty urinating, dysuria, frequency, hematuria and urgency.   Musculoskeletal: Negative for arthralgias, back pain and myalgias.   Skin: Negative for pallor, rash and wound.   Allergic/Immunologic: Negative for immunocompromised state.   Neurological: Negative for dizziness, tremors, facial asymmetry, speech difficulty and weakness.   Hematological: Negative for adenopathy. Does not bruise/bleed easily.   Psychiatric/Behavioral: Negative for confusion and sleep disturbance. The patient is not nervous/anxious.      Objective:     Vital Signs (Most Recent):  Temp: 98.3 °F (36.8 °C) (04/05/20 0800)  Pulse: 70 (04/05/20 0800)  Resp: 14 (04/05/20 0800)  BP: 121/74 (04/05/20 0800)  SpO2: 97 % (04/05/20 0800) Vital Signs (24h Range):  Temp:  [98.1 °F (36.7 °C)-100.5 °F (38.1 °C)] 98.3 °F (36.8 °C)  Pulse:  [] 70  Resp:  [14-20] 14  SpO2:  [95 %-99 %] 97 %  BP: (121-139)/(73-89) 121/74     Weight: 78 kg (171 lb 15.3 oz)  Body mass index is 26.93 kg/m².    Intake/Output Summary (Last 24 hours) at 4/5/2020 1029  Last data filed at 4/5/2020 0400  Gross per 24 hour   Intake 2057.92 ml   Output --   Net  2057.92 ml      Physical Exam   Constitutional: He is oriented to person, place, and time. He appears well-developed and well-nourished. He is cooperative. He appears ill. No distress.   HENT:   Head: Normocephalic.   Right Ear: External ear normal.   Left Ear: External ear normal.   Mouth/Throat: Oropharynx is clear and moist. No oropharyngeal exudate.   Eyes: Pupils are equal, round, and reactive to light. Conjunctivae are normal. No scleral icterus.   Neck: Normal range of motion. Neck supple. No JVD present.   Cardiovascular: Normal rate, regular rhythm, normal heart sounds and intact distal pulses. Exam reveals no gallop and no friction rub.   No murmur heard.  Pulmonary/Chest: Effort normal and breath sounds normal. No respiratory distress. He has no wheezes. He has no rales.   Abdominal: Bowel sounds are normal. He exhibits distension. There is tenderness. There is no rebound and no guarding.   Musculoskeletal: Normal range of motion. He exhibits no edema or tenderness.   Lymphadenopathy:     He has no cervical adenopathy.   Neurological: He is alert and oriented to person, place, and time. He displays normal reflexes. No cranial nerve deficit or sensory deficit. Coordination normal.   Skin: Skin is warm and dry. Capillary refill takes less than 2 seconds. No rash noted. He is not diaphoretic. No erythema. No pallor.   Psychiatric: He has a normal mood and affect. His behavior is normal. Judgment and thought content normal.   Nursing note and vitals reviewed.      Significant Labs:   Recent Lab Results       04/05/20  0547   04/05/20  0545   04/05/20  0524   04/04/20  1821   04/04/20  1630        Albumin     3.1   3.7     Alkaline Phosphatase     126   94     ALT     83   13     Anion Gap     7   10     Appearance, UA Clear             AST     89   14     Bacteria, UA None             Baso #     0.04   0.02     Basophil%     0.4   0.2     Bilirubin (UA) Negative             BILIRUBIN TOTAL      0.9  Comment:  For infants and newborns, interpretation of results should be based  on gestational age, weight and in agreement with clinical  observations.  Premature Infant recommended reference ranges:  Up to 24 hours.............<8.0 mg/dL  Up to 48 hours............<12.0 mg/dL  3-5 days..................<15.0 mg/dL  6-29 days.................<15.0 mg/dL     0.4  Comment:  For infants and newborns, interpretation of results should be based  on gestational age, weight and in agreement with clinical  observations.  Premature Infant recommended reference ranges:  Up to 24 hours.............<8.0 mg/dL  Up to 48 hours............<12.0 mg/dL  3-5 days..................<15.0 mg/dL  6-29 days.................<15.0 mg/dL       Blood Culture, Routine       No Growth to date[P]              No Growth to date[P]       BUN, Bld     9   11     Calcium     8.4   9.4     Chloride     103   103     CO2     24   22     Color, UA Yellow             CPK         118     Creatinine     1.0   1.0     CRP         179.5     Differential Method     Automated   Automated     eGFR if      >60   >60     eGFR if non      >60  Comment:  Calculation used to obtain the estimated glomerular filtration  rate (eGFR) is the CKD-EPI equation.      >60  Comment:  Calculation used to obtain the estimated glomerular filtration  rate (eGFR) is the CKD-EPI equation.        Eos #     0.4   0.3     Eosinophil%     4.7   2.1     Ferritin         95     Glucose     111   125     Glucose, UA Negative             Gran # (ANC)     7.4   10.1     Gran%     79.9   83.4     Hematocrit     31.8   34.0     Hemoglobin     9.6   10.7     Hyaline Casts, UA 0             Immature Grans (Abs)     0.02  Comment:  Mild elevation in immature granulocytes is non specific and   can be seen in a variety of conditions including stress response,   acute inflammation, trauma and pregnancy. Correlation with other   laboratory and clinical findings  is essential.     0.04  Comment:  Mild elevation in immature granulocytes is non specific and   can be seen in a variety of conditions including stress response,   acute inflammation, trauma and pregnancy. Correlation with other   laboratory and clinical findings is essential.       Immature Granulocytes     0.2   0.3     Ketones, UA Negative             Lactate, Carlos         0.8  Comment:  Falsely low lactic acid results can be found in samples   containing >=13.0 mg/dL total bilirubin and/or >=3.5 mg/dL   direct bilirubin.       LD         112  Comment:  Results are increased in hemolyzed samples.     Leukocytes, UA Negative             Lipase         500     Lymph #     0.8   1.0     Lymph%     8.1   8.1     Magnesium     1.9         MCH     24.1   25.3     MCHC     30.2   31.5     MCV     80   80     Microscopic Comment SEE COMMENT  Comment:  Other formed elements not mentioned in the report are not   present in the microscopic examination.                Mono #     0.6   0.7     Mono%     6.7   5.9     MPV     10.0   11.1     NITRITE UA Negative             nRBC     0   0     Occult Blood UA Negative             pH, UA 7.0             Phosphorus     2.5         Platelets     252   305     POCT Glucose   115           Potassium     3.8   3.9     PROTEIN TOTAL     6.4   7.4     Protein, UA 1+  Comment:  Recommend a 24 hour urine protein or a urine   protein/creatinine ratio if globulin induced proteinuria is  clinically suspected.               RBC     3.98   4.23     RBC, UA 0             RDW     17.4   17.5     SARS-CoV-2 RNA, Amplification, Qual               Sodium     134   135     Specific [place], UA 1.010             Specimen UA Urine, Clean Catch             Squam Epithel, UA 1             Troponin I         0.010  Comment:  The reference interval for Troponin I represents the 99th percentile   cutoff   for our facility and is consistent with 3rd generation assay   performance.       UROBILINOGEN UA  Negative             WBC, UA 0             WBC     9.28   12.13                      04/04/20  1552        Albumin       Alkaline Phosphatase       ALT       Anion Gap       Appearance, UA       AST       Bacteria, UA       Baso #       Basophil%       Bilirubin (UA)       BILIRUBIN TOTAL       Blood Culture, Routine       BUN, Bld       Calcium       Chloride       CO2       Color, UA       CPK       Creatinine       CRP       Differential Method       eGFR if        eGFR if non        Eos #       Eosinophil%       Ferritin       Glucose       Glucose, UA       Gran # (ANC)       Gran%       Hematocrit       Hemoglobin       Hyaline Casts, UA       Immature Grans (Abs)       Immature Granulocytes       Ketones, UA       Lactate, Carlos       LD       Leukocytes, UA       Lipase       Lymph #       Lymph%       Magnesium       MCH       MCHC       MCV       Microscopic Comment       Mono #       Mono%       MPV       NITRITE UA       nRBC       Occult Blood UA       pH, UA       Phosphorus       Platelets       POCT Glucose       Potassium       PROTEIN TOTAL       Protein, UA       RBC       RBC, UA       RDW       SARS-CoV-2 RNA, Amplification, Qual Negative  Comment:  This test utilizes isothermal nucleic acid amplification   technology to detect the SARS-CoV-2 RdRp nucleic acid segment.   The analytical sensitivity (limit of detection) is 125 genome   equivalents/mL.   A POSITIVE result implies infection with the SARS-CoV-2 virus;  the patient is presumed to be contagious.    A NEGATIVE result means that SARS-CoV-2 nucleic acids are not  present above the limit of detection. It does not rule out the   possibility of COVID-19 and should not be the sole basis for   treatment decisions. If COVID-19 is strongly suspected based on  clinical and exposure history, re-testing should be considered.   This test is only for use under the Food and Drug   Administration s Emergency Use  Authorization (EUA).   Commercial kits are provided by [employer].   Performance characteristics of the EUA have been independently  verified by [facility] Department of  Pathology and Laboratory Medicine.   _________________________________________________________________  The [employer] NOW COVID-19 Letter of Authorization, along with the   authorized Fact Sheet for Healthcare Providers, the authorized Fact  Sheet for Patients, and authorized labeling are available on the FDA   website:  www.fda.gov/MedicalDevices/Safety/EmergencySituations/fym078207.htm       Sodium       Specific Gravity, UA       Specimen UA       Squam Epithel, UA       Troponin I       UROBILINOGEN UA       WBC, UA       WBC             Significant Imaging: I have reviewed and interpreted all pertinent imaging results/findings within the past 24 hours.

## 2020-04-05 NOTE — H&P
[facility] Medicine  History & Physical    Patient Name: Bradley Collado  MRN: [id number]  Admission Date: 4/4/2020  Attending Physician: Scott Mitchell MD   Primary Care Provider: Arlen Belcher NP         Patient information was obtained from patient, past medical records and ER records.     Subjective:     Principal Problem:Acute on chronic pancreatitis    Chief Complaint:   Chief Complaint   Patient presents with    Abdominal Pain     LUQ x 3 days, no N/V/D, hx pancreatitis        HPI: Bradley Collado is a 40-year-old  male who presents to the emergency room complaining of abdominal pain.  The abdominal pain onset several days ago.  Abdominal pain has progressively worsened over the past 48 hr.  He endorses nausea but no vomiting.  He denies fever or chills.  No recent sick contacts or travel.  He describes the pain at worse at 10/10 and describes as a severe intense pain.  Previous medical history includes hypothyroidism, hypertension, schizophrenia, anxiety, chronic pancreatitis, active smoker, and alcohol abuse.  He reports last alcohol use was approximately 3 months ago.  Previous surgical history includes laparoscopic cholecystectomy on 03/09/2020 by Dr. Deshpande.  Workup in the emergency room:  CBC with mild anemia.  CMP unremarkable.  Urinalysis is pending.  COVID test was negative.  Lipase is 500.  CT of the abdomen and pelvis demonstrates likely pancreatic pseudocyst and peripancreatic stranding consistent with acute pancreatitis.  CT also demonstrated pericolonic stranding consistent with diverticulitis versus colitis.  He was given IV fluids, morphine, hydromorphone, Zofran ER with some degree of improvement.  Patient will be admitted for observation and management.  Will continue IV fluids, NPO, and pain management.  Will also consult GI services.    Past Medical History:   Diagnosis Date    Anxiety     Anxiety     Bipolar affective disorder      Depression     Hypertension     Pancreatitis     Schizophrenia     Thyroid disease        Past Surgical History:   Procedure Laterality Date    LAPAROSCOPIC CHOLECYSTECTOMY N/A 3/9/2020    Procedure: CHOLECYSTECTOMY, LAPAROSCOPIC;  Surgeon: Trenton Deshpande MD;  Location: [facility];  Service: General;  Laterality: N/A;       Review of patient's allergies indicates:  No Known Allergies    No current facility-administered medications on file prior to encounter.      Current Outpatient Medications on File Prior to Encounter   Medication Sig    ARIPiprazole (ABILIFY) 10 MG Tab Take 1 tablet (10 mg total) by mouth once daily.    cloNIDine 0.1 mg/24 hr td ptwk (CATAPRES) 0.1 mg/24 hr Place 1 patch onto the skin every 7 days.    gabapentin (NEURONTIN) 300 MG capsule Take 1 capsule (300 mg total) by mouth 3 (three) times daily.    HYDROcodone-acetaminophen (NORCO)  mg per tablet Take 1 tablet by mouth every 6 (six) hours as needed for Pain.    hydrOXYzine pamoate (VISTARIL) 25 MG Cap Take 25 mg by mouth 4 (four) times daily.    levothyroxine (SYNTHROID) 50 MCG tablet Take 1 tablet (50 mcg total) by mouth before breakfast.    lisinopril (PRINIVIL,ZESTRIL) 40 MG tablet Take 1 tablet (40 mg total) by mouth once daily.    mirtazapine (REMERON) 30 MG tablet Take 1 tablet (30 mg total) by mouth nightly.    ondansetron (ZOFRAN) 8 MG tablet Take 1 tablet (8 mg total) by mouth every 8 (eight) hours as needed for Nausea.    pantoprazole (PROTONIX) 40 MG tablet Take 40 mg by mouth once daily.    QUEtiapine (SEROQUEL) 200 MG Tab Take 1 tablet (200 mg total) by mouth nightly as needed (insomnia). (Patient taking differently: Take 300 mg by mouth nightly as needed (insomnia). )    sertraline (ZOLOFT) 50 MG tablet Take 1 tablet (50 mg total) by mouth once daily.     Family History     Problem Relation (Age of Onset)    Cancer Paternal Grandfather    Diabetes Maternal Grandmother    Hypertension Maternal Grandfather         Tobacco Use    Smoking status: Current Some Day Smoker     Packs/day: 0.25     Years: 7.00     Pack years: 1.75    Smokeless tobacco: Never Used   Substance and Sexual Activity    Alcohol use: Yes     Comment: occasionally    Drug use: Yes     Frequency: 7.0 times per week     Types: Marijuana    Sexual activity: Yes     Partners: Female     Review of Systems   Constitutional: Positive for activity change. Negative for chills, diaphoresis and fever.   HENT: Negative for congestion, nosebleeds and tinnitus.    Eyes: Negative for photophobia and visual disturbance.   Respiratory: Negative for cough, chest tightness, shortness of breath and wheezing.    Cardiovascular: Negative for chest pain, palpitations and leg swelling.   Gastrointestinal: Positive for abdominal pain and nausea. Negative for abdominal distention, constipation, diarrhea and vomiting.   Endocrine: Negative for cold intolerance and heat intolerance.   Genitourinary: Negative for difficulty urinating, dysuria, frequency, hematuria and urgency.   Musculoskeletal: Negative for arthralgias, back pain and myalgias.   Skin: Negative for pallor, rash and wound.   Allergic/Immunologic: Negative for immunocompromised state.   Neurological: Negative for dizziness, tremors, facial asymmetry, speech difficulty and weakness.   Hematological: Negative for adenopathy. Does not bruise/bleed easily.   Psychiatric/Behavioral: Negative for confusion and sleep disturbance. The patient is not nervous/anxious.      Objective:     Vital Signs (Most Recent):  Temp: 98.8 °F (37.1 °C) (04/04/20 1742)  Pulse: 76 (04/04/20 1932)  Resp: 20 (04/04/20 1530)  BP: 121/73 (04/04/20 1932)  SpO2: 97 % (04/04/20 1932) Vital Signs (24h Range):  Temp:  [98.8 °F (37.1 °C)-100.5 °F (38.1 °C)] 98.8 °F (37.1 °C)  Pulse:  [] 76  Resp:  [20] 20  SpO2:  [97 %-99 %] 97 %  BP: (121-139)/(73-89) 121/73        There is no height or weight on file to calculate BMI.    Physical Exam    Constitutional: He is oriented to person, place, and time. He appears well-developed and well-nourished. He is cooperative. He appears ill. No distress.   HENT:   Head: Normocephalic.   Right Ear: External ear normal.   Left Ear: External ear normal.   Mouth/Throat: Oropharynx is clear and moist. No oropharyngeal exudate.   Eyes: Pupils are equal, round, and reactive to light. Conjunctivae are normal. No scleral icterus.   Neck: Normal range of motion. Neck supple. No JVD present.   Cardiovascular: Normal rate, regular rhythm, normal heart sounds and intact distal pulses. Exam reveals no gallop and no friction rub.   No murmur heard.  Pulmonary/Chest: Effort normal and breath sounds normal. No respiratory distress. He has no wheezes. He has no rales.   Abdominal: Bowel sounds are normal. He exhibits distension. There is tenderness. There is no rebound and no guarding.   Musculoskeletal: Normal range of motion. He exhibits no edema or tenderness.   Lymphadenopathy:     He has no cervical adenopathy.   Neurological: He is alert and oriented to person, place, and time. He displays normal reflexes. No cranial nerve deficit or sensory deficit. Coordination normal.   Skin: Skin is warm and dry. Capillary refill takes less than 2 seconds. No rash noted. He is not diaphoretic. No erythema. No pallor.   Psychiatric: He has a normal mood and affect. His behavior is normal. Judgment and thought content normal.   Nursing note and vitals reviewed.        CRANIAL NERVES     CN III, IV, VI   Pupils are equal, round, and reactive to light.       Significant Labs:   CBC:   Recent Labs   Lab 04/04/20  1630   WBC 12.13   HGB 10.7*   HCT 34.0*        CMP:   Recent Labs   Lab 04/04/20  1630   *   K 3.9      CO2 22*   *   BUN 11   CREATININE 1.0   CALCIUM 9.4   PROT 7.4   ALBUMIN 3.7   BILITOT 0.4   ALKPHOS 94   AST 14   ALT 13   ANIONGAP 10   EGFRNONAA >60     Lipase:   Recent Labs   Lab 04/04/20  1630    LIPASE 500*       Significant Imaging: I have reviewed all pertinent imaging results/findings within the past 24 hours.  There is a 1.3 cm cyst in the distal pancreas. There is a cystic fluid collection abutting the gastric body measuring 9 x 3.1 cm in greatest axial dimensions. Findings probably represent a pseudocysts. There is minimal peripancreatic stranding suggesting mild acute on chronic pancreatitis. 2. Colonic diverticula with diffuse wall thickening and pericolonic stranding about the colon at the level of the splenic flexure. Findings may be due to diverticulitis and/or colitis.     Assessment/Plan:     * Acute on chronic pancreatitis  Acute on chronic problem  NPO  IV fluids  Hydromorphone and Zofran p.r.n.  Zosyn 4.5 g Q 8  Consult GI  Serial abdominal exams  CT scan: There is a 1.3 cm cyst in the distal pancreas. There is a cystic fluid collection abutting the gastric body measuring 9 x 3.1 cm in greatest axial dimensions. Findings probably represent a pseudocysts. There is minimal peripancreatic stranding suggesting mild acute on chronic pancreatitis. 2. Colonic diverticula with diffuse wall thickening and pericolonic stranding about the colon at the level of the splenic flexure. Findings may be due to diverticulitis and/or colitis        Diverticulitis  Acute problem  CT scan:There is a 1.3 cm cyst in the distal pancreas. There is a cystic fluid collection abutting the gastric body measuring 9 x 3.1 cm in greatest axial dimensions. Findings probably represent a pseudocysts. There is minimal peripancreatic stranding suggesting mild acute on chronic pancreatitis. 2. Colonic diverticula with diffuse wall thickening and pericolonic stranding about the colon at the level of the splenic flexure. Findings may be due to diverticulitis and/or colitis.   GI consult  IV fluids  Zosyn 4.5 g Q 8  NPO      Schizophrenia  Chronic problem  Stable  Continue medications        Dependence on nicotine from  cigarettes  Chronic problem  Dangers of cigarette smoking were reviewed with patient in detail for 10 minutes and patient was encouraged to quit. Nicotine replacement options were discussed.      Suspected Covid-19 Virus Infection  Acute problem  COVID testing negative      Alcohol-induced chronic pancreatitis  Chronic problem  History of chronic pancreatitis  States he quit drinking alcohol 3 months ago  Does not appear to be intoxicated      Essential hypertension  Chronic problem  Stable  Chronic, controlled.  Latest blood pressure and vitals reviewed-   Temp:  [98.8 °F (37.1 °C)-100.5 °F (38.1 °C)]   Pulse:  []   Resp:  [20]   BP: (121-139)/(73-89)   SpO2:  [97 %-99 %] .   Home meds for hypertension were reviewed and noted below. Hospital anti-hypertensive changes were made as shown below.  Hypertension Medications             cloNIDine 0.1 mg/24 hr td ptwk (CATAPRES) 0.1 mg/24 hr Place 1 patch onto the skin every 7 days.    lisinopril (PRINIVIL,ZESTRIL) 40 MG tablet Take 1 tablet (40 mg total) by mouth once daily.        Will utilize p.r.n. blood pressure medication only if patient's blood pressure greater than  180/110 and he develops symptoms such as worsening chest pain or shortness of breath.      Marijuana use  Chronic problem  Discussed the importance of marijuana cessation with patient      Hypothyroidism  Chronic problem  Stable  Continue levothyroxine        VTE Risk Mitigation (From admission, onward)    SHANI/MARIS Soto NP  Department of Hospital Medicine   [facility]

## 2020-04-05 NOTE — CONSULTS
"CONSULT  Gastroenterology      SUBJECTIVE:       Chief Complaint/Indication for Consult:   Reason for Consult? pancreatitis/colitis       History of Present Illness:  This is my first encounter with this 41 y/o M, who presents several day hx (he says several weeks) of abdominal pain.  This similar to the pain he's had before with his pancreatitis, but this is the worst it's ever been.  It is mainly in the LUQ.  He feels a little nauseated, but hasn't vomited.  Denies melena.  Has felt a low grade fever.  He says he's been to the ER at [facility] a few times, "but they don't do nothing."    And he was recently admitted here, and underwent a cholecystectomy.  See d/c summary of 3/10/2020:  HPI:   40-year-old male with past medical history significant for hypertension, depression, thyroid disease, and pancreatitis who presents the emergency department complaining of intermittent abdominal pain.  Pain has been going on for the past few weeks is characterized as waxing/waning pain that begins in the left upper quadrant and radiates to the back.  Patient reportedly has had to recent admissions to [facility] for similar complaints, both of which time being diagnosed with pancreatitis.  He apparently has been tried treated with IV fluids/antiemetics and ultimately discharged with pain medication.  Patient reports pain is never completely controlled however is tolerable on pain medication.  He apparently has been taking medication as prescribed and plays run out, at which time he reports pain is worsened prompting repeat presentation.  He denies any alcohol use.  He currently denies any nausea and apparently has modified his diet to avoid fatty foods in effort to avoid worsening pain.  Hospital Course:   Patient admitted with acute on chronic pancreatitis.  Gallbladder ultrasound with sludge.  General surgery was consulted and performed lap cholecystectomy.  Surgery was done by Dr. parmar.  Pain much improved after " surgery.  Seen by general surgeon on day of discharge and was cleared for discharge to home.  Short course of p.o. narcotics as well as antiemetics sent to pharmacy at time of discharge.  Patient agreeable to discharge plan and felt ready for discharge.      LABS:  Results for IMELDA MARLOW (MRN [id number]) as of 4/5/2020 14:15   Ref. Range 3/8/2020 06:38 3/10/2020 05:37 4/4/2020 16:30 4/5/2020 05:24   WBC Latest Ref Range: 3.90 - 12.70 K/uL 9.42 10.91 12.13 9.28   RBC Latest Ref Range: 4.60 - 6.20 M/uL 4.03 (L) 4.09 (L) 4.23 (L) 3.98 (L)   Hemoglobin Latest Ref Range: 14.0 - 18.0 g/dL 10.2 (L) 10.1 (L) 10.7 (L) 9.6 (L)   Hematocrit Latest Ref Range: 40.0 - 54.0 % 33.5 (L) 33.2 (L) 34.0 (L) 31.8 (L)   MCV Latest Ref Range: 82 - 98 fL 83 81 (L) 80 (L) 80 (L)       Results for IMELDA MARLOW (MRN [id number]) as of 4/5/2020 14:15   Ref. Range 3/10/2020 05:37 4/4/2020 16:30 4/5/2020 05:24   Alkaline Phosphatase Latest Ref Range: 55 - 135 U/L 92 94 126   PROTEIN TOTAL Latest Ref Range: 6.0 - 8.4 g/dL 6.5 7.4 6.4   Albumin Latest Ref Range: 3.5 - 5.2 g/dL 3.0 (L) 3.7 3.1 (L)   BILIRUBIN TOTAL Latest Ref Range: 0.1 - 1.0 mg/dL 0.4 0.4 0.9   AST Latest Ref Range: 10 - 40 U/L 23 14 89 (H)   ALT Latest Ref Range: 10 - 44 U/L 34 13 83 (H)   Lipase Latest Ref Range: 4 - 60 U/L  500 (H)    CRP Latest Ref Range: 0.0 - 8.2 mg/L  179.5 (H)        CT Scan 4/4/2020 (personally reviewed by me):  - Stomach: Small hiatal hernia.  Interval development of a loculated fluid collection along the gastric body measuring approximately 3.1 x 9.0 cm in AP by TV dimensions.  Otherwise, the stomach demonstrates a normal appearance    - Pancreas: Multiple pancreatic calcifications with associated pancreatic atrophy in keeping with chronic pancreatitis.  There is moderate peripancreatic fat stranding and edema concerning for acute on chronic pancreatitis.  There is a 1.4 x 1.8 cm cystic lesion within the superior aspect of the distal pancreatic  body suggestive of a pancreatic pseudocyst versus cyst or cystic neoplasm less likely.    Bowel/Mesentery:  Few scattered colonic diverticula.  Circumferential bowel wall thickening and significant pericolonic fat stranding and edema involving the colon at the splenic flexure concerning for colitis or diverticulitis.  Scattered nondilated fluid-filled loops of small bowel with air-fluid levels may reflect reactive enteritis.  No pneumoperitoneum.  The appendix is normal.        Impression       1. Peripancreatic fat stranding suggestive of acute on chronic pancreatitis.  1.8 cm cystic lesion along the superior aspect of the distal body of the pancreas, favored to reflect a pancreatic pseudocyst.  Pancreatic cyst or cystic neoplasm less likely.  Additional larger cystic collection adjacent to the gastric body, measuring 3.1 x 9.0 cm, likely an additional pseudocyst.  2. Scattered colonic diverticula with circumferential wall thickening and prominent pericolonic fat stranding and edema about the colon at the splenic flexure.  This may represent diverticulitis or colitis.  This report was flagged in Epic as abnormal.  The preliminary and final reports are concordant.    Electronically signed by: Chadd Sunshine  Date: 04/05/2020         Patient had a significant episode back in 2016 with development of small pseudocyst, and was referred for EUS at [facility].  EUS 11/1/2016:  Pancreatic parenchymal abnormalities were noted in the genu of the        pancreas, in the pancreatic body and in the pancreatic tail. These        consisted of atrophy, hyperechoic strands, hyperechoic foci and        lobularity. The pancreatic duct had hyperechoic walls in the genu of        the pancreas, in the body of the pancreas and in the tail of the        pancreas. The pancreatic duct measured up to 2 mm in diameter.       The pancreas was then surveyed from the duodenal stations.       An anechoic lesion suggestive of a pseudocyst was  identified in the        pancreatic head. It is not in obvious communication with the        pancreatic duct. The lesion measured 26.8 mm by 23.9 mm in maximal        cross-sectional diameter. There was a single compartment without        septae. The outer wall of the lesion was thick. There was no        associated mass. There was internal debris within the fluid-filled        cavity. Diagnostic needle aspiration for fluid was performed. Color        Doppler imaging was utilized prior to needle puncture to confirm a        lack of significant vascular structures within the needle path. One        pass was made with the 19 gauge needle using a transduodenal        approach. A stylet was used. The amount of fluid collected was 10        mL. The fluid was turbid, brown and watery. Sample(s) were sent for        amylase concentration and CEA.       An anechoic lesion suggestive of a pseudocyst was identified in the        pancreatic head. It is not in obvious communication with the        pancreatic duct. The lesion measured 38.1 mm by 32.4 mm in maximal        cross-sectional diameter. There was a single compartment without        septae. The outer wall of the lesion was thick. There was no        associated mass. There was no internal debris within the        fluid-filled cavity. Diagnostic needle aspiration for fluid was        performed. Color Doppler imaging was utilized prior to needle        puncture to confirm a lack of significant vascular structures within        the needle path. One pass was made with the 19 gauge needle using a        transduodenal approach. A stylet was used. The amount of fluid        collected was 30 mL. The fluid was turbid, brown, blood-tinged and        watery. Sample(s) were sent for amylase concentration and CEA.       The common bile duct was identified and measured 4.9 mm. The CBD        appeared normal on its course without evidence of defects,        thickening or  irregularity.       The portal vein, confluence, superior mesenteric vein and superior        mesenteric artery appeared endosonographically normal. There were no        abnormal lymph nodes in the region.       There was no sign of significant endosonographic abnormality in the        visualized portion of the liver. Homogeneous parenchyma was        identified.       No lymphadenopathy seen.  Impression:           - Pancreatic parenchymal abnormalities consisting                         of atrophy, hyperechoic strands, hyperechoic foci                         and lobularity were noted in the genu of the                         pancreas, in the pancreatic body and in the                         pancreatic tail.                        - The pancreatic duct had hyperechoic walls in the                         genu of the pancreas, in the body of the pancreas                         and in the tail of the pancreas. The pancreatic                         duct measured up to 2 mm in diameter.                        - A 38.1 mm by 32.4 mm pseudocyst was seen in the                         pancreatic head. Tissue was obtained from this                         exam, and results are pending. However, the                         endosonographic appearance is highly suspicious for                         a pancreatic pseudocyst. Fine needle aspiration for                         fluid performed.                        - A 26.8 mm by 23.9 mm pseudocyst was seen in the                         pancreatic head. Tissue was obtained from this                         exam, and results are pending. However, the                         endosonographic appearance is a pancreatic                         pseudocyst. Fine needle aspiration for fluid                         performed.                        - There was no evidence of significant pathology in                         the visualized portion of the liver.  Recommendation:        - Discharge patient to home (ambulatory).                        - Observe patient's clinical course.                        - Cipro (ciprofloxacin) 500 mg PO BID for 5 days.                        - The findings and recommendations were discussed                         with the patient.                        - Return to endoscopist in 4 weeks.                        - Follow up fluid analysis.  Attending Participation:       I personally performed the entire procedure.  Kuldip Hodge MD  11/1/2016    Results for COUSIN, IMELDA SHERMAN (MRN [id number]) as of 4/5/2020 14:15   Ref. Range 11/1/2016 11:54 11/1/2016 11:59   Amylase, Pancreatic Fluid Latest Units: U/L [id number]         PTA Medications   Medication Sig    ARIPiprazole (ABILIFY) 10 MG Tab Take 1 tablet (10 mg total) by mouth once daily.    cloNIDine 0.1 mg/24 hr td ptwk (CATAPRES) 0.1 mg/24 hr Place 1 patch onto the skin every 7 days.    gabapentin (NEURONTIN) 300 MG capsule Take 1 capsule (300 mg total) by mouth 3 (three) times daily.    HYDROcodone-acetaminophen (NORCO)  mg per tablet Take 1 tablet by mouth every 6 (six) hours as needed for Pain.    hydrOXYzine pamoate (VISTARIL) 25 MG Cap Take 25 mg by mouth 4 (four) times daily.    levothyroxine (SYNTHROID) 50 MCG tablet Take 1 tablet (50 mcg total) by mouth before breakfast.    lisinopril (PRINIVIL,ZESTRIL) 40 MG tablet Take 1 tablet (40 mg total) by mouth once daily.    mirtazapine (REMERON) 30 MG tablet Take 1 tablet (30 mg total) by mouth nightly.    ondansetron (ZOFRAN) 8 MG tablet Take 1 tablet (8 mg total) by mouth every 8 (eight) hours as needed for Nausea.    pantoprazole (PROTONIX) 40 MG tablet Take 40 mg by mouth once daily.    QUEtiapine (SEROQUEL) 200 MG Tab Take 1 tablet (200 mg total) by mouth nightly as needed (insomnia). (Patient taking differently: Take 300 mg by mouth nightly as needed (insomnia). )    sertraline (ZOLOFT) 50 MG tablet Take 1 tablet (50 mg total) by  mouth once daily.       Review of patient's allergies indicates:  No Known Allergies     Past Medical History:   Diagnosis Date    Anxiety     Anxiety     Bipolar affective disorder     Depression     Hypertension     Pancreatitis     Schizophrenia     Thyroid disease      Past Surgical History:   Procedure Laterality Date    LAPAROSCOPIC CHOLECYSTECTOMY N/A 3/9/2020    Procedure: CHOLECYSTECTOMY, LAPAROSCOPIC;  Surgeon: Trenton Deshpande MD;  Location: [facility];  Service: General;  Laterality: N/A;     Family History   Problem Relation Age of Onset    Diabetes Maternal Grandmother     Hypertension Maternal Grandfather     Cancer Paternal Grandfather      Social History     Tobacco Use    Smoking status: Current Some Day Smoker     Packs/day: 0.25     Years: 7.00     Pack years: 1.75    Smokeless tobacco: Never Used   Substance Use Topics    Alcohol use: Yes     Comment: occasionally    Drug use: Yes     Frequency: 7.0 times per week     Types: Marijuana         OBJECTIVE:     Vital Signs (Most Recent)  Temp: 98.3 °F (36.8 °C) (04/05/20 0800)  Pulse: 70 (04/05/20 0800)  Resp: 14 (04/05/20 0800)  BP: 121/74 (04/05/20 0800)  SpO2: 97 % (04/05/20 0800)    Physical Exam:  :                                                        GENERAL:  Looks healthy, but somewhat fidgity.  Is in mild distress.                                 HEENT EXAM:  Nonicteric.  No adenopathy.  Oropharynx is clear.               NECK:  Supple.                                                               LUNGS:  Clear.                                                               CARDIAC:  Regular rate and rhythm.  S1, S2.  No murmur.                      ABDOMEN:  Soft, positive bowel sounds.  Tender epig area, and shelly the LUQ.  No hepatosplenomegaly or masses.  No rebound or guarding.                                             EXTREMITIES:  No edema.     MENTAL STATUS:  Alert and oriented.    ASSESSMENT/PLAN:     Assessment:   -  Acute pancreatitis on top of chronic calcific pancreatitis.  - Pseudocysts in formation.  - Diverticulitis (less likely colitis).  - Chronic anemia.  - minimal transaminitis.    Plan:   - Bowel rest.  NPO.  - Plenty of IV fluids.  - Agree with abx.  - May need an MRCP, to better see the ducts.  - After maturation of the pseudocyst(s), may again need referral for EUS for drainage.  - Will notify the OGI team for f/u.

## 2020-04-05 NOTE — ASSESSMENT & PLAN NOTE
Acute on chronic problem  NPO  IV fluids  Pain control  Zosyn 4.5 g Q 8  Consult GI  Serial abdominal exams  CT scan: There is a 1.3 cm cyst in the distal pancreas. There is a cystic fluid collection abutting the gastric body measuring 9 x 3.1 cm in greatest axial dimensions. Findings probably represent a pseudocysts. There is minimal peripancreatic stranding suggesting mild acute on chronic pancreatitis. 2. Colonic diverticula with diffuse wall thickening and pericolonic stranding about the colon at the level of the splenic flexure. Findings may be due to diverticulitis and/or colitis

## 2020-04-05 NOTE — ASSESSMENT & PLAN NOTE
Chronic problem  Stable  Chronic, controlled.  Latest blood pressure and vitals reviewed-   Temp:  [98.8 °F (37.1 °C)-100.5 °F (38.1 °C)]   Pulse:  []   Resp:  [20]   BP: (121-139)/(73-89)   SpO2:  [97 %-99 %] .   Home meds for hypertension were reviewed and noted below. Hospital anti-hypertensive changes were made as shown below.  Hypertension Medications             cloNIDine 0.1 mg/24 hr td ptwk (CATAPRES) 0.1 mg/24 hr Place 1 patch onto the skin every 7 days.    lisinopril (PRINIVIL,ZESTRIL) 40 MG tablet Take 1 tablet (40 mg total) by mouth once daily.        Will utilize p.r.n. blood pressure medication only if patient's blood pressure greater than  180/110 and he develops symptoms such as worsening chest pain or shortness of breath.

## 2020-04-05 NOTE — ASSESSMENT & PLAN NOTE
Acute problem  CT scan:There is a 1.3 cm cyst in the distal pancreas. There is a cystic fluid collection abutting the gastric body measuring 9 x 3.1 cm in greatest axial dimensions. Findings probably represent a pseudocysts. There is minimal peripancreatic stranding suggesting mild acute on chronic pancreatitis. 2. Colonic diverticula with diffuse wall thickening and pericolonic stranding about the colon at the level of the splenic flexure. Findings may be due to diverticulitis and/or colitis.   GI consult  IV fluids  Zosyn 4.5 g Q 8  NPO

## 2020-04-05 NOTE — PLAN OF CARE
Awake, alert, and oriented. Mccoy catheter patent, draining clear, yellow urine. IVF infusing per order. VS stable. Telemetry monitored; (rhythm). Remains afebrile throughout shift. Blood sugars monitored; SSI given. Comfort level established. Moderate pain control w/ PRN pain medication. Bed in low position, brakes locked, side rails up x 2, call light w/in reach. Verbalized understanding of POC. Open communication facilitated. Will continue to monitor.

## 2020-04-05 NOTE — PLAN OF CARE
I completed the assessment with the pts grandmother, Jayleen Cousin [phone]. The pt lives with her at the address on the face sheet. The pts PCP is Matthew Belcher NP and insurance is Medicaid. The pt is independent in ADL's and does not have HH or DME. The pt uses [facility] pharmacy in [place] on [facility] [facility]. At the time of discharge the pts father of someone will be able to pick him up. The pt does not have any discharge needs at this time. CM following. Sofia Reeves LCSW      04/05/20 0949   Discharge Assessment   Assessment Type Discharge Planning Assessment   Confirmed/corrected address and phone number on facesheet? Yes   Assessment information obtained from? Caregiver   Communicated expected length of stay with patient/caregiver no   Prior to hospitilization cognitive status: Alert/Oriented   Prior to hospitalization functional status: Independent   Current cognitive status: Alert/Oriented   Current Functional Status: Independent   Lives With grandparent(s)   Able to Return to Prior Arrangements yes   Is patient able to care for self after discharge? Yes   Who are your caregiver(s) and their phone number(s)? Jayleen Cousin [phone]   Readmission Within the Last 30 Days current reason for admission unrelated to previous admission;no previous admission in last 30 days   If yes, most recent facility name: [facility] 3/7/20-3/10/20   Patient currently being followed by outpatient case management? No   Patient currently receives any other outside agency services? No   Equipment Currently Used at Home none   Do you have any problems affording any of your prescribed medications? No   Is the patient taking medications as prescribed? yes   Does the patient have transportation home? Yes   Transportation Anticipated family or friend will provide   Does the patient receive services at the [facility]? No   Discharge Plan A Home   Discharge Plan B Home with family   DME Needed Upon Discharge  none   Patient/Family in  Agreement with Plan yes   Readmission Questionnaire   At the time of your discharge, did someone talk to you about what your health problems were? Yes   At the time of discharge, did someone talk to you about what to watch out for regarding worsening of your health problem? Yes   At the time of discharge, did someone talk to you about what to do if you experienced worsening of your health problem? Yes   At the time of discharge, did someone talk to you about which medication to take when you left the hospital and which ones to stop taking? Yes   At the time of discharge, did someone talk to you about when and where to follow up with a doctor after you left the hospital? Yes   How often do you need to have someone help you when you read instructions, pamphlets, or other written material from your doctor or pharmacy? Never   Do you have problems taking your medications as prescribed? No   Do you have any problems affording any of  your prescribed medications? No   Do you have problems obtaining/receiving your medications? No   Does the patient have transportation to healthcare appointments? Yes   Living Arrangements house   Does the patient have family/friends to help with healtcare needs after discharge? yes   Does your caregiver provide all the help you need? Yes   Are you currently feeling confused? No   Are you currently having problems thinking? No   Are you currently having memory problems? No   In the last 7 days, my sleep quality was: fair

## 2020-04-05 NOTE — ASSESSMENT & PLAN NOTE
Chronic problem  History of chronic pancreatitis  States he quit drinking alcohol 3 months ago  Does not appear to be intoxicated

## 2020-04-05 NOTE — HPI
Bradley Cousin is a 40-year-old  male who presents to the emergency room complaining of abdominal pain.  The abdominal pain onset several days ago.  Abdominal pain has progressively worsened over the past 48 hr.  He endorses nausea but no vomiting.  He denies fever or chills.  No recent sick contacts or travel.  He describes the pain at worse at 10/10 and describes as a severe intense pain.  Previous medical history includes hypothyroidism, hypertension, schizophrenia, anxiety, chronic pancreatitis, active smoker, and alcohol abuse.  He reports last alcohol use was approximately 3 months ago.  Previous surgical history includes laparoscopic cholecystectomy on 03/09/2020 by Dr. Deshpande.  Workup in the emergency room:  CBC with mild anemia.  CMP unremarkable.  Urinalysis is pending.  COVID test was negative.  Lipase is 500.  CT of the abdomen and pelvis demonstrates likely pancreatic pseudocyst and peripancreatic stranding consistent with acute pancreatitis.  CT also demonstrated pericolonic stranding consistent with diverticulitis versus colitis.  He was given IV fluids, morphine, hydromorphone, Zofran ER with some degree of improvement.  Patient will be admitted for observation and management.  Will continue IV fluids, NPO, and pain management.  Will also consult GI services.

## 2020-04-05 NOTE — PROGRESS NOTES
[facility] Medicine  Progress Note    Patient Name: Bradley Collado  MRN: [id number]  Patient Class: IP- Inpatient   Admission Date: 4/4/2020  Length of Stay: 1 days  Attending Physician: Gumaro Pimentel MD  Primary Care Provider: Arlen Belcher NP        Subjective:     Principal Problem:Acute on chronic pancreatitis        HPI:  Bradley Collado is a 40-year-old  male who presents to the emergency room complaining of abdominal pain.  The abdominal pain onset several days ago.  Abdominal pain has progressively worsened over the past 48 hr.  He endorses nausea but no vomiting.  He denies fever or chills.  No recent sick contacts or travel.  He describes the pain at worse at 10/10 and describes as a severe intense pain.  Previous medical history includes hypothyroidism, hypertension, schizophrenia, anxiety, chronic pancreatitis, active smoker, and alcohol abuse.  He reports last alcohol use was approximately 3 months ago.  Previous surgical history includes laparoscopic cholecystectomy on 03/09/2020 by Dr. Deshpande.  Workup in the emergency room:  CBC with mild anemia.  CMP unremarkable.  Urinalysis is pending.  COVID test was negative.  Lipase is 500.  CT of the abdomen and pelvis demonstrates likely pancreatic pseudocyst and peripancreatic stranding consistent with acute pancreatitis.  CT also demonstrated pericolonic stranding consistent with diverticulitis versus colitis.  He was given IV fluids, morphine, hydromorphone, Zofran ER with some degree of improvement.  Patient will be admitted for observation and management.  Will continue IV fluids, NPO, and pain management.  Will also consult GI services.    Overview/Hospital Course:  No notes on file    Interval History:  Patient reports pain is poorly controlled.  Does report occasional nausea however denies vomiting.  Patient was febrile on presentation however defervesced quickly.  Currently on antibiotics    Review of  Systems   Constitutional: Positive for activity change. Negative for chills, diaphoresis and fever.   HENT: Negative for congestion, nosebleeds and tinnitus.    Eyes: Negative for photophobia and visual disturbance.   Respiratory: Negative for cough, chest tightness, shortness of breath and wheezing.    Cardiovascular: Negative for chest pain, palpitations and leg swelling.   Gastrointestinal: Positive for abdominal pain and nausea. Negative for abdominal distention, constipation, diarrhea and vomiting.   Endocrine: Negative for cold intolerance and heat intolerance.   Genitourinary: Negative for difficulty urinating, dysuria, frequency, hematuria and urgency.   Musculoskeletal: Negative for arthralgias, back pain and myalgias.   Skin: Negative for pallor, rash and wound.   Allergic/Immunologic: Negative for immunocompromised state.   Neurological: Negative for dizziness, tremors, facial asymmetry, speech difficulty and weakness.   Hematological: Negative for adenopathy. Does not bruise/bleed easily.   Psychiatric/Behavioral: Negative for confusion and sleep disturbance. The patient is not nervous/anxious.      Objective:     Vital Signs (Most Recent):  Temp: 98.3 °F (36.8 °C) (04/05/20 0800)  Pulse: 70 (04/05/20 0800)  Resp: 14 (04/05/20 0800)  BP: 121/74 (04/05/20 0800)  SpO2: 97 % (04/05/20 0800) Vital Signs (24h Range):  Temp:  [98.1 °F (36.7 °C)-100.5 °F (38.1 °C)] 98.3 °F (36.8 °C)  Pulse:  [] 70  Resp:  [14-20] 14  SpO2:  [95 %-99 %] 97 %  BP: (121-139)/(73-89) 121/74     Weight: 78 kg (171 lb 15.3 oz)  Body mass index is 26.93 kg/m².    Intake/Output Summary (Last 24 hours) at 4/5/2020 1029  Last data filed at 4/5/2020 0400  Gross per 24 hour   Intake 2057.92 ml   Output --   Net 2057.92 ml      Physical Exam   Constitutional: He is oriented to person, place, and time. He appears well-developed and well-nourished. He is cooperative. He appears ill. No distress.   HENT:   Head: Normocephalic.   Right  Ear: External ear normal.   Left Ear: External ear normal.   Mouth/Throat: Oropharynx is clear and moist. No oropharyngeal exudate.   Eyes: Pupils are equal, round, and reactive to light. Conjunctivae are normal. No scleral icterus.   Neck: Normal range of motion. Neck supple. No JVD present.   Cardiovascular: Normal rate, regular rhythm, normal heart sounds and intact distal pulses. Exam reveals no gallop and no friction rub.   No murmur heard.  Pulmonary/Chest: Effort normal and breath sounds normal. No respiratory distress. He has no wheezes. He has no rales.   Abdominal: Bowel sounds are normal. He exhibits distension. There is tenderness. There is no rebound and no guarding.   Musculoskeletal: Normal range of motion. He exhibits no edema or tenderness.   Lymphadenopathy:     He has no cervical adenopathy.   Neurological: He is alert and oriented to person, place, and time. He displays normal reflexes. No cranial nerve deficit or sensory deficit. Coordination normal.   Skin: Skin is warm and dry. Capillary refill takes less than 2 seconds. No rash noted. He is not diaphoretic. No erythema. No pallor.   Psychiatric: He has a normal mood and affect. His behavior is normal. Judgment and thought content normal.   Nursing note and vitals reviewed.      Significant Labs:   Recent Lab Results       04/05/20  0547   04/05/20  0545   04/05/20  0524   04/04/20  1821   04/04/20  1630        Albumin     3.1   3.7     Alkaline Phosphatase     126   94     ALT     83   13     Anion Gap     7   10     Appearance, UA Clear             AST     89   14     Bacteria, UA None             Baso #     0.04   0.02     Basophil%     0.4   0.2     Bilirubin (UA) Negative             BILIRUBIN TOTAL     0.9  Comment:  For infants and newborns, interpretation of results should be based  on gestational age, weight and in agreement with clinical  observations.  Premature Infant recommended reference ranges:  Up to 24 hours.............<8.0  mg/dL  Up to 48 hours............<12.0 mg/dL  3-5 days..................<15.0 mg/dL  6-29 days.................<15.0 mg/dL     0.4  Comment:  For infants and newborns, interpretation of results should be based  on gestational age, weight and in agreement with clinical  observations.  Premature Infant recommended reference ranges:  Up to 24 hours.............<8.0 mg/dL  Up to 48 hours............<12.0 mg/dL  3-5 days..................<15.0 mg/dL  6-29 days.................<15.0 mg/dL       Blood Culture, Routine       No Growth to date[P]              No Growth to date[P]       BUN, Bld     9   11     Calcium     8.4   9.4     Chloride     103   103     CO2     24   22     Color, UA Yellow             CPK         118     Creatinine     1.0   1.0     CRP         179.5     Differential Method     Automated   Automated     eGFR if      >60   >60     eGFR if non      >60  Comment:  Calculation used to obtain the estimated glomerular filtration  rate (eGFR) is the CKD-EPI equation.      >60  Comment:  Calculation used to obtain the estimated glomerular filtration  rate (eGFR) is the CKD-EPI equation.        Eos #     0.4   0.3     Eosinophil%     4.7   2.1     Ferritin         95     Glucose     111   125     Glucose, UA Negative             Gran # (ANC)     7.4   10.1     Gran%     79.9   83.4     Hematocrit     31.8   34.0     Hemoglobin     9.6   10.7     Hyaline Casts, UA 0             Immature Grans (Abs)     0.02  Comment:  Mild elevation in immature granulocytes is non specific and   can be seen in a variety of conditions including stress response,   acute inflammation, trauma and pregnancy. Correlation with other   laboratory and clinical findings is essential.     0.04  Comment:  Mild elevation in immature granulocytes is non specific and   can be seen in a variety of conditions including stress response,   acute inflammation, trauma and pregnancy. Correlation with other    laboratory and clinical findings is essential.       Immature Granulocytes     0.2   0.3     Ketones, UA Negative             Lactate, Carlos         0.8  Comment:  Falsely low lactic acid results can be found in samples   containing >=13.0 mg/dL total bilirubin and/or >=3.5 mg/dL   direct bilirubin.       LD         112  Comment:  Results are increased in hemolyzed samples.     Leukocytes, UA Negative             Lipase         500     Lymph #     0.8   1.0     Lymph%     8.1   8.1     Magnesium     1.9         MCH     24.1   25.3     MCHC     30.2   31.5     MCV     80   80     Microscopic Comment SEE COMMENT  Comment:  Other formed elements not mentioned in the report are not   present in the microscopic examination.                Mono #     0.6   0.7     Mono%     6.7   5.9     MPV     10.0   11.1     NITRITE UA Negative             nRBC     0   0     Occult Blood UA Negative             pH, UA 7.0             Phosphorus     2.5         Platelets     252   305     POCT Glucose   115           Potassium     3.8   3.9     PROTEIN TOTAL     6.4   7.4     Protein, UA 1+  Comment:  Recommend a 24 hour urine protein or a urine   protein/creatinine ratio if globulin induced proteinuria is  clinically suspected.               RBC     3.98   4.23     RBC, UA 0             RDW     17.4   17.5     SARS-CoV-2 RNA, Amplification, Qual               Sodium     134   135     Specific [place], UA 1.010             Specimen UA Urine, Clean Catch             Squam Epithel, UA 1             Troponin I         0.010  Comment:  The reference interval for Troponin I represents the 99th percentile   cutoff   for our facility and is consistent with 3rd generation assay   performance.       UROBILINOGEN UA Negative             WBC, UA 0             WBC     9.28   12.13                      04/04/20  1552        Albumin       Alkaline Phosphatase       ALT       Anion Gap       Appearance, UA       AST       Bacteria, UA       Baso #        Basophil%       Bilirubin (UA)       BILIRUBIN TOTAL       Blood Culture, Routine       BUN, Bld       Calcium       Chloride       CO2       Color, UA       CPK       Creatinine       CRP       Differential Method       eGFR if        eGFR if non        Eos #       Eosinophil%       Ferritin       Glucose       Glucose, UA       Gran # (ANC)       Gran%       Hematocrit       Hemoglobin       Hyaline Casts, UA       Immature Grans (Abs)       Immature Granulocytes       Ketones, UA       Lactate, Carlos       LD       Leukocytes, UA       Lipase       Lymph #       Lymph%       Magnesium       MCH       MCHC       MCV       Microscopic Comment       Mono #       Mono%       MPV       NITRITE UA       nRBC       Occult Blood UA       pH, UA       Phosphorus       Platelets       POCT Glucose       Potassium       PROTEIN TOTAL       Protein, UA       RBC       RBC, UA       RDW       SARS-CoV-2 RNA, Amplification, Qual Negative  Comment:  This test utilizes isothermal nucleic acid amplification   technology to detect the SARS-CoV-2 RdRp nucleic acid segment.   The analytical sensitivity (limit of detection) is 125 genome   equivalents/mL.   A POSITIVE result implies infection with the SARS-CoV-2 virus;  the patient is presumed to be contagious.    A NEGATIVE result means that SARS-CoV-2 nucleic acids are not  present above the limit of detection. It does not rule out the   possibility of COVID-19 and should not be the sole basis for   treatment decisions. If COVID-19 is strongly suspected based on  clinical and exposure history, re-testing should be considered.   This test is only for use under the Food and Drug   Administration s Emergency Use Authorization (EUA).   Commercial kits are provided by [employer].   Performance characteristics of the EUA have been independently  verified by [facility] Department of  Pathology and Laboratory Medicine.    _________________________________________________________________  The ID NOW COVID-19 Letter of Authorization, along with the   authorized Fact Sheet for Healthcare Providers, the authorized Fact  Sheet for Patients, and authorized labeling are available on the FDA   website:  www.fda.gov/MedicalDevices/Safety/EmergencySituations/oix691608.htm       Sodium       Specific Gravity, UA       Specimen UA       Squam Epithel, UA       Troponin I       UROBILINOGEN UA       WBC, UA       WBC             Significant Imaging: I have reviewed and interpreted all pertinent imaging results/findings within the past 24 hours.      Assessment/Plan:      * Acute on chronic pancreatitis  Acute on chronic problem  NPO  IV fluids  Pain control  Zosyn 4.5 g Q 8  Consult GI  Serial abdominal exams  CT scan: There is a 1.3 cm cyst in the distal pancreas. There is a cystic fluid collection abutting the gastric body measuring 9 x 3.1 cm in greatest axial dimensions. Findings probably represent a pseudocysts. There is minimal peripancreatic stranding suggesting mild acute on chronic pancreatitis. 2. Colonic diverticula with diffuse wall thickening and pericolonic stranding about the colon at the level of the splenic flexure. Findings may be due to diverticulitis and/or colitis        Diverticulitis  Acute problem  CT scan:There is a 1.3 cm cyst in the distal pancreas. There is a cystic fluid collection abutting the gastric body measuring 9 x 3.1 cm in greatest axial dimensions. Findings probably represent a pseudocysts. There is minimal peripancreatic stranding suggesting mild acute on chronic pancreatitis. 2. Colonic diverticula with diffuse wall thickening and pericolonic stranding about the colon at the level of the splenic flexure. Findings may be due to diverticulitis and/or colitis.   GI consult  IV fluids  Zosyn 4.5 g Q 8  NPO      Schizophrenia  Chronic problem  Stable  Continue medications        Dependence on nicotine from  cigarettes  Chronic problem  Dangers of cigarette smoking were reviewed with patient in detail for 10 minutes and patient was encouraged to quit. Nicotine replacement options were discussed.      Suspected Covid-19 Virus Infection  Acute problem  COVID testing negative      Alcohol-induced chronic pancreatitis  Chronic problem  History of chronic pancreatitis  States he quit drinking alcohol 3 months ago  Does not appear to be intoxicated      Essential hypertension  Chronic problem  Stable  Chronic, controlled.  Latest blood pressure and vitals reviewed-   Temp:  [98.8 °F (37.1 °C)-100.5 °F (38.1 °C)]   Pulse:  []   Resp:  [20]   BP: (121-139)/(73-89)   SpO2:  [97 %-99 %] .   Home meds for hypertension were reviewed and noted below. Hospital anti-hypertensive changes were made as shown below.  Hypertension Medications             cloNIDine 0.1 mg/24 hr td ptwk (CATAPRES) 0.1 mg/24 hr Place 1 patch onto the skin every 7 days.    lisinopril (PRINIVIL,ZESTRIL) 40 MG tablet Take 1 tablet (40 mg total) by mouth once daily.        Will utilize p.r.n. blood pressure medication only if patient's blood pressure greater than  180/110 and he develops symptoms such as worsening chest pain or shortness of breath.      Marijuana use  Chronic problem  Discussed the importance of marijuana cessation with patient      Hypothyroidism  Chronic problem  Stable  Continue levothyroxine        VTE Risk Mitigation (From admission, onward)         Ordered     IP VTE HIGH RISK PATIENT  Once      04/04/20 2009     Place MARIS hose  Until discontinued      04/04/20 2009     Place sequential compression device  Until discontinued      04/04/20 2009                      Scott Mitchell MD  Department of Hospital Medicine   [facility]

## 2020-04-05 NOTE — SUBJECTIVE & OBJECTIVE
Past Medical History:   Diagnosis Date    Anxiety     Anxiety     Bipolar affective disorder     Depression     Hypertension     Pancreatitis     Schizophrenia     Thyroid disease        Past Surgical History:   Procedure Laterality Date    LAPAROSCOPIC CHOLECYSTECTOMY N/A 3/9/2020    Procedure: CHOLECYSTECTOMY, LAPAROSCOPIC;  Surgeon: Trenton Deshpande MD;  Location: [facility];  Service: General;  Laterality: N/A;       Review of patient's allergies indicates:  No Known Allergies    No current facility-administered medications on file prior to encounter.      Current Outpatient Medications on File Prior to Encounter   Medication Sig    ARIPiprazole (ABILIFY) 10 MG Tab Take 1 tablet (10 mg total) by mouth once daily.    cloNIDine 0.1 mg/24 hr td ptwk (CATAPRES) 0.1 mg/24 hr Place 1 patch onto the skin every 7 days.    gabapentin (NEURONTIN) 300 MG capsule Take 1 capsule (300 mg total) by mouth 3 (three) times daily.    HYDROcodone-acetaminophen (NORCO)  mg per tablet Take 1 tablet by mouth every 6 (six) hours as needed for Pain.    hydrOXYzine pamoate (VISTARIL) 25 MG Cap Take 25 mg by mouth 4 (four) times daily.    levothyroxine (SYNTHROID) 50 MCG tablet Take 1 tablet (50 mcg total) by mouth before breakfast.    lisinopril (PRINIVIL,ZESTRIL) 40 MG tablet Take 1 tablet (40 mg total) by mouth once daily.    mirtazapine (REMERON) 30 MG tablet Take 1 tablet (30 mg total) by mouth nightly.    ondansetron (ZOFRAN) 8 MG tablet Take 1 tablet (8 mg total) by mouth every 8 (eight) hours as needed for Nausea.    pantoprazole (PROTONIX) 40 MG tablet Take 40 mg by mouth once daily.    QUEtiapine (SEROQUEL) 200 MG Tab Take 1 tablet (200 mg total) by mouth nightly as needed (insomnia). (Patient taking differently: Take 300 mg by mouth nightly as needed (insomnia). )    sertraline (ZOLOFT) 50 MG tablet Take 1 tablet (50 mg total) by mouth once daily.     Family History     Problem Relation (Age of Onset)     Cancer Paternal Grandfather    Diabetes Maternal Grandmother    Hypertension Maternal Grandfather        Tobacco Use    Smoking status: Current Some Day Smoker     Packs/day: 0.25     Years: 7.00     Pack years: 1.75    Smokeless tobacco: Never Used   Substance and Sexual Activity    Alcohol use: Yes     Comment: occasionally    Drug use: Yes     Frequency: 7.0 times per week     Types: Marijuana    Sexual activity: Yes     Partners: Female     Review of Systems   Constitutional: Positive for activity change. Negative for chills, diaphoresis and fever.   HENT: Negative for congestion, nosebleeds and tinnitus.    Eyes: Negative for photophobia and visual disturbance.   Respiratory: Negative for cough, chest tightness, shortness of breath and wheezing.    Cardiovascular: Negative for chest pain, palpitations and leg swelling.   Gastrointestinal: Positive for abdominal pain and nausea. Negative for abdominal distention, constipation, diarrhea and vomiting.   Endocrine: Negative for cold intolerance and heat intolerance.   Genitourinary: Negative for difficulty urinating, dysuria, frequency, hematuria and urgency.   Musculoskeletal: Negative for arthralgias, back pain and myalgias.   Skin: Negative for pallor, rash and wound.   Allergic/Immunologic: Negative for immunocompromised state.   Neurological: Negative for dizziness, tremors, facial asymmetry, speech difficulty and weakness.   Hematological: Negative for adenopathy. Does not bruise/bleed easily.   Psychiatric/Behavioral: Negative for confusion and sleep disturbance. The patient is not nervous/anxious.      Objective:     Vital Signs (Most Recent):  Temp: 98.8 °F (37.1 °C) (04/04/20 1742)  Pulse: 76 (04/04/20 1932)  Resp: 20 (04/04/20 1530)  BP: 121/73 (04/04/20 1932)  SpO2: 97 % (04/04/20 1932) Vital Signs (24h Range):  Temp:  [98.8 °F (37.1 °C)-100.5 °F (38.1 °C)] 98.8 °F (37.1 °C)  Pulse:  [] 76  Resp:  [20] 20  SpO2:  [97 %-99 %] 97 %  BP:  (121-139)/(73-89) 121/73        There is no height or weight on file to calculate BMI.    Physical Exam   Constitutional: He is oriented to person, place, and time. He appears well-developed and well-nourished. He is cooperative. He appears ill. No distress.   HENT:   Head: Normocephalic.   Right Ear: External ear normal.   Left Ear: External ear normal.   Mouth/Throat: Oropharynx is clear and moist. No oropharyngeal exudate.   Eyes: Pupils are equal, round, and reactive to light. Conjunctivae are normal. No scleral icterus.   Neck: Normal range of motion. Neck supple. No JVD present.   Cardiovascular: Normal rate, regular rhythm, normal heart sounds and intact distal pulses. Exam reveals no gallop and no friction rub.   No murmur heard.  Pulmonary/Chest: Effort normal and breath sounds normal. No respiratory distress. He has no wheezes. He has no rales.   Abdominal: Bowel sounds are normal. He exhibits distension. There is tenderness. There is no rebound and no guarding.   Musculoskeletal: Normal range of motion. He exhibits no edema or tenderness.   Lymphadenopathy:     He has no cervical adenopathy.   Neurological: He is alert and oriented to person, place, and time. He displays normal reflexes. No cranial nerve deficit or sensory deficit. Coordination normal.   Skin: Skin is warm and dry. Capillary refill takes less than 2 seconds. No rash noted. He is not diaphoretic. No erythema. No pallor.   Psychiatric: He has a normal mood and affect. His behavior is normal. Judgment and thought content normal.   Nursing note and vitals reviewed.        CRANIAL NERVES     CN III, IV, VI   Pupils are equal, round, and reactive to light.       Significant Labs:   CBC:   Recent Labs   Lab 04/04/20  1630   WBC 12.13   HGB 10.7*   HCT 34.0*        CMP:   Recent Labs   Lab 04/04/20  1630   *   K 3.9      CO2 22*   *   BUN 11   CREATININE 1.0   CALCIUM 9.4   PROT 7.4   ALBUMIN 3.7   BILITOT 0.4   ALKPHOS  94   AST 14   ALT 13   ANIONGAP 10   EGFRNONAA >60     Lipase:   Recent Labs   Lab 04/04/20  1630   LIPASE 500*       Significant Imaging: I have reviewed all pertinent imaging results/findings within the past 24 hours.  There is a 1.3 cm cyst in the distal pancreas. There is a cystic fluid collection abutting the gastric body measuring 9 x 3.1 cm in greatest axial dimensions. Findings probably represent a pseudocysts. There is minimal peripancreatic stranding suggesting mild acute on chronic pancreatitis. 2. Colonic diverticula with diffuse wall thickening and pericolonic stranding about the colon at the level of the splenic flexure. Findings may be due to diverticulitis and/or colitis.

## 2020-04-06 VITALS
HEART RATE: 75 BPM | SYSTOLIC BLOOD PRESSURE: 107 MMHG | WEIGHT: 177.25 LBS | RESPIRATION RATE: 20 BRPM | BODY MASS INDEX: 27.82 KG/M2 | HEIGHT: 67 IN | OXYGEN SATURATION: 96 % | TEMPERATURE: 99 F | DIASTOLIC BLOOD PRESSURE: 65 MMHG

## 2020-04-06 LAB
ALBUMIN SERPL BCP-MCNC: 3 G/DL (ref 3.5–5.2)
ALP SERPL-CCNC: 155 U/L (ref 55–135)
ALT SERPL W/O P-5'-P-CCNC: 55 U/L (ref 10–44)
ANION GAP SERPL CALC-SCNC: 7 MMOL/L (ref 8–16)
AST SERPL-CCNC: 38 U/L (ref 10–40)
BASOPHILS # BLD AUTO: 0.03 K/UL (ref 0–0.2)
BASOPHILS NFR BLD: 0.4 % (ref 0–1.9)
BILIRUB SERPL-MCNC: 0.6 MG/DL (ref 0.1–1)
BUN SERPL-MCNC: 4 MG/DL (ref 6–20)
CALCIUM SERPL-MCNC: 8.8 MG/DL (ref 8.7–10.5)
CHLORIDE SERPL-SCNC: 105 MMOL/L (ref 95–110)
CO2 SERPL-SCNC: 26 MMOL/L (ref 23–29)
CREAT SERPL-MCNC: 1.1 MG/DL (ref 0.5–1.4)
DIFFERENTIAL METHOD: ABNORMAL
EOSINOPHIL # BLD AUTO: 0.5 K/UL (ref 0–0.5)
EOSINOPHIL NFR BLD: 6.3 % (ref 0–8)
ERYTHROCYTE [DISTWIDTH] IN BLOOD BY AUTOMATED COUNT: 17.6 % (ref 11.5–14.5)
EST. GFR  (AFRICAN AMERICAN): >60 ML/MIN/1.73 M^2
EST. GFR  (NON AFRICAN AMERICAN): >60 ML/MIN/1.73 M^2
GLUCOSE SERPL-MCNC: 104 MG/DL (ref 70–110)
HCT VFR BLD AUTO: 32.7 % (ref 40–54)
HGB BLD-MCNC: 9.8 G/DL (ref 14–18)
IMM GRANULOCYTES # BLD AUTO: 0.02 K/UL (ref 0–0.04)
IMM GRANULOCYTES NFR BLD AUTO: 0.2 % (ref 0–0.5)
LIPASE SERPL-CCNC: 120 U/L (ref 4–60)
LYMPHOCYTES # BLD AUTO: 0.9 K/UL (ref 1–4.8)
LYMPHOCYTES NFR BLD: 10.9 % (ref 18–48)
MAGNESIUM SERPL-MCNC: 2.2 MG/DL (ref 1.6–2.6)
MCH RBC QN AUTO: 24.8 PG (ref 27–31)
MCHC RBC AUTO-ENTMCNC: 30 G/DL (ref 32–36)
MCV RBC AUTO: 83 FL (ref 82–98)
MONOCYTES # BLD AUTO: 0.7 K/UL (ref 0.3–1)
MONOCYTES NFR BLD: 8.4 % (ref 4–15)
NEUTROPHILS # BLD AUTO: 6.1 K/UL (ref 1.8–7.7)
NEUTROPHILS NFR BLD: 73.8 % (ref 38–73)
NRBC BLD-RTO: 0 /100 WBC
PHOSPHATE SERPL-MCNC: 2.9 MG/DL (ref 2.7–4.5)
PLATELET # BLD AUTO: 258 K/UL (ref 150–350)
PMV BLD AUTO: 10.7 FL (ref 9.2–12.9)
POTASSIUM SERPL-SCNC: 3.9 MMOL/L (ref 3.5–5.1)
PROT SERPL-MCNC: 6.7 G/DL (ref 6–8.4)
RBC # BLD AUTO: 3.95 M/UL (ref 4.6–6.2)
SODIUM SERPL-SCNC: 138 MMOL/L (ref 136–145)
WBC # BLD AUTO: 8.23 K/UL (ref 3.9–12.7)

## 2020-04-06 PROCEDURE — 99232 PR SUBSEQUENT HOSPITAL CARE,LEVL II: ICD-10-PCS | Mod: ,,, | Performed by: INTERNAL MEDICINE

## 2020-04-06 PROCEDURE — 84100 ASSAY OF PHOSPHORUS: CPT

## 2020-04-06 PROCEDURE — 36415 COLL VENOUS BLD VENIPUNCTURE: CPT

## 2020-04-06 PROCEDURE — 25000003 PHARM REV CODE 250: Performed by: INTERNAL MEDICINE

## 2020-04-06 PROCEDURE — 99232 SBSQ HOSP IP/OBS MODERATE 35: CPT | Mod: ,,, | Performed by: INTERNAL MEDICINE

## 2020-04-06 PROCEDURE — 80053 COMPREHEN METABOLIC PANEL: CPT

## 2020-04-06 PROCEDURE — 63600175 PHARM REV CODE 636 W HCPCS: Performed by: NURSE PRACTITIONER

## 2020-04-06 PROCEDURE — C9113 INJ PANTOPRAZOLE SODIUM, VIA: HCPCS | Performed by: NURSE PRACTITIONER

## 2020-04-06 PROCEDURE — 85025 COMPLETE CBC W/AUTO DIFF WBC: CPT

## 2020-04-06 PROCEDURE — 63600175 PHARM REV CODE 636 W HCPCS: Performed by: INTERNAL MEDICINE

## 2020-04-06 PROCEDURE — 25000003 PHARM REV CODE 250: Performed by: NURSE PRACTITIONER

## 2020-04-06 PROCEDURE — 83690 ASSAY OF LIPASE: CPT

## 2020-04-06 PROCEDURE — 83735 ASSAY OF MAGNESIUM: CPT

## 2020-04-06 PROCEDURE — 63600175 PHARM REV CODE 636 W HCPCS: Performed by: HOSPITALIST

## 2020-04-06 RX ORDER — OXYCODONE AND ACETAMINOPHEN 10; 325 MG/1; MG/1
1 TABLET ORAL EVERY 6 HOURS PRN
Qty: 20 TABLET | Refills: 0 | Status: ON HOLD | OUTPATIENT
Start: 2020-04-06 | End: 2020-05-05

## 2020-04-06 RX ORDER — HYDROMORPHONE HYDROCHLORIDE 2 MG/ML
2 INJECTION, SOLUTION INTRAMUSCULAR; INTRAVENOUS; SUBCUTANEOUS ONCE
Status: COMPLETED | OUTPATIENT
Start: 2020-04-06 | End: 2020-04-06

## 2020-04-06 RX ORDER — OXYCODONE AND ACETAMINOPHEN 10; 325 MG/1; MG/1
1 TABLET ORAL EVERY 6 HOURS PRN
Qty: 20 TABLET | Refills: 0 | Status: SHIPPED | OUTPATIENT
Start: 2020-04-06 | End: 2020-04-06 | Stop reason: SDUPTHER

## 2020-04-06 RX ADMIN — SENNOSIDES AND DOCUSATE SODIUM 1 TABLET: 8.6; 5 TABLET ORAL at 10:04

## 2020-04-06 RX ADMIN — HYDROMORPHONE HYDROCHLORIDE 2 MG: 2 INJECTION INTRAMUSCULAR; INTRAVENOUS; SUBCUTANEOUS at 11:04

## 2020-04-06 RX ADMIN — LISINOPRIL 40 MG: 40 TABLET ORAL at 10:04

## 2020-04-06 RX ADMIN — ARIPIPRAZOLE 10 MG: 5 TABLET ORAL at 10:04

## 2020-04-06 RX ADMIN — HYDROXYZINE PAMOATE 25 MG: 25 CAPSULE ORAL at 10:04

## 2020-04-06 RX ADMIN — ACETAMINOPHEN 650 MG: 325 TABLET ORAL at 07:04

## 2020-04-06 RX ADMIN — PIPERACILLIN SODIUM AND TAZOBACTAM SODIUM 4.5 G: 4; .5 INJECTION, POWDER, LYOPHILIZED, FOR SOLUTION INTRAVENOUS at 10:04

## 2020-04-06 RX ADMIN — PIPERACILLIN SODIUM AND TAZOBACTAM SODIUM 4.5 G: 4; .5 INJECTION, POWDER, LYOPHILIZED, FOR SOLUTION INTRAVENOUS at 02:04

## 2020-04-06 RX ADMIN — LEVOTHYROXINE SODIUM 50 MCG: 50 TABLET ORAL at 05:04

## 2020-04-06 RX ADMIN — OXYCODONE HYDROCHLORIDE AND ACETAMINOPHEN 1 TABLET: 10; 325 TABLET ORAL at 07:04

## 2020-04-06 RX ADMIN — GABAPENTIN 300 MG: 300 CAPSULE ORAL at 10:04

## 2020-04-06 RX ADMIN — PANTOPRAZOLE SODIUM 40 MG: 40 INJECTION, POWDER, LYOPHILIZED, FOR SOLUTION INTRAVENOUS at 10:04

## 2020-04-06 RX ADMIN — HYDROMORPHONE HYDROCHLORIDE 2 MG: 2 INJECTION INTRAMUSCULAR; INTRAVENOUS; SUBCUTANEOUS at 04:04

## 2020-04-06 RX ADMIN — OXYCODONE HYDROCHLORIDE AND ACETAMINOPHEN 1 TABLET: 10; 325 TABLET ORAL at 02:04

## 2020-04-06 RX ADMIN — SERTRALINE HYDROCHLORIDE 50 MG: 50 TABLET ORAL at 10:04

## 2020-04-06 NOTE — NURSING
Discharge instructions reviewed with patient. Patient verbalized understanding. Peripheral IV removed intact. Belongings packed per patient. Patient escorted from unit via wheelchair by Brisa TAPIA. Relinquish care at this time

## 2020-04-06 NOTE — PLAN OF CARE
Patients pain controlled with currently ordered pain medicines.  Patient tolerating clear liquid diet without any increase in pain or nausea.

## 2020-04-06 NOTE — PROGRESS NOTES
"CC: pancreatitis    HPI 40 y.o. male who presents for recurrent acute on chronic recurrent pancreatitis that has been attributed to alcohol and associated with abdominal pain and nausea in the past. He was recently admitted 3/10/20 for pancreatitis and had CCY last hospitalization. Please see Dr. Stinson's consult note for extensive history. Medical records reviewed. Additional history supplemented by nursing.     Interval history:  Patient continues to have mild abdominal pain that is improved. Tolerating liquid diet.     Past Medical History:   Diagnosis Date    Anxiety     Anxiety     Bipolar affective disorder     Depression     Hypertension     Pancreatitis     Schizophrenia     Thyroid disease      Review of Systems  General ROS: negative for chills, fever or weight loss  Cardiovascular ROS: no chest pain or dyspnea on exertion  Gastrointestinal ROS: mild abdominal pain, no change in bowel habits, or black/ bloody stools    Physical Examination  /65   Pulse 75   Temp 98.8 °F (37.1 °C)   Resp 20   Ht 5' 7" (1.702 m)   Wt 80.4 kg (177 lb 4 oz)   SpO2 96%   BMI 27.76 kg/m²   General appearance: sleepy, cooperative, no distress  HENT: Normocephalic, atraumatic, neck symmetrical, no nasal discharge   Lungs: clear to auscultation bilaterally, symmetric chest wall expansion bilaterally  Heart: regular rate and rhythm without rub; no displacement of the PMI   Abdomen: soft, tender diffusely, no rebound or guarding; bowel sounds normoactive; no organomegaly  Extremities: extremities symmetric; no clubbing, cyanosis, or edema  Neurologic: Alert and oriented X 3, normal strength, normal coordination and gait    Labs:    Imaging:  CT Scan 4/4/2020  independently reviewed       - Stomach: Small hiatal hernia.  Interval development of a loculated fluid collection along the gastric body measuring approximately 3.1 x 9.0 cm in AP by TV dimensions.  Otherwise, the stomach demonstrates a normal " appearance    - Pancreas: Multiple pancreatic calcifications with associated pancreatic atrophy in keeping with chronic pancreatitis.  There is moderate peripancreatic fat stranding and edema concerning for acute on chronic pancreatitis.  There is a 1.4 x 1.8 cm cystic lesion within the superior aspect of the distal pancreatic body suggestive of a pancreatic pseudocyst versus cyst or cystic neoplasm less likely.    Bowel/Mesentery:  Few scattered colonic diverticula.  Circumferential bowel wall thickening and significant pericolonic fat stranding and edema involving the colon at the splenic flexure concerning for colitis or diverticulitis.  Scattered nondilated fluid-filled loops of small bowel with air-fluid levels may reflect reactive enteritis.  No pneumoperitoneum.  The appendix is normal.          Impression         1. Peripancreatic fat stranding suggestive of acute on chronic pancreatitis.  1.8 cm cystic lesion along the superior aspect of the distal body of the pancreas, favored to reflect a pancreatic pseudocyst.  Pancreatic cyst or cystic neoplasm less likely.  Additional larger cystic collection adjacent to the gastric body, measuring 3.1 x 9.0 cm, likely an additional pseudocyst.  2. Scattered colonic diverticula with circumferential wall thickening and prominent pericolonic fat stranding and edema about the colon at the splenic flexure.  This may represent diverticulitis or colitis.  This report was flagged in Epic as abnormal.  The preliminary and final reports are concordant.    Electronically signed by: Chadd Sunshine  Date: 04/05/2020     EUS 11/2016:        - Pancreatic parenchymal abnormalities consisting of atrophy, hyperechoic strands, hyperechoic foci and lobularity were noted in the genu of the pancreas, in the pancreatic body and in the pancreatic tail.  - The pancreatic duct had hyperechoic walls in the genu of the pancreas, in the body of the pancreas and in the tail of the pancreas. The  pancreatic duct measured up to 2 mm in diameter.  - A 38.1 mm by 32.4 mm pseudocyst was seen in the pancreatic head. Tissue was obtained from this exam, and results are pending. However, the endosonographic appearance is highly suspicious for a pancreatic pseudocyst. Fine needle aspiration for fluid performed.   - A 26.8 mm by 23.9 mm pseudocyst was seen in the pancreatic head. Tissue was obtained from this exam, and results are pending. However, the endosonographic appearance is a pancreatic pseudocyst. Fine needle aspiration for fluid performed.   - There was no evidence of significant pathology in the visualized portion of the liver.    Independently reviewed    Assessment:   1. Recurrent acute on chronic pancreatitis  2. Pancreatic pseudocysts  3. Diverticulitis    Plan:  -Diet as tolerates  -Recommend MRCP once episode of pancreatitis resolves; needs imaging at least 6-8 weeks after this episode to evaluate pseudocysts and if they remain large and patient is symptomatic at that time would consider referral for EUS drainage   -Continue antibiotics for diverticulitis for 7 days  -Will need endoscopic evaluation as outpatient for chronic anemia  -Encourage alcohol cessation, smoking cessation  -Needs outpatient follow up with GI       Rashad Weaver MD  [facility]  [address]  [place] [address]  Office: [phone]  Fax: [phone]

## 2020-04-06 NOTE — PLAN OF CARE
Pt cleared for discharge, per Case Manger.       04/06/20 1214   Final Note   Assessment Type Final Discharge Note   Anticipated Discharge Disposition Home   Hospital Follow Up  Appt(s) scheduled? No  (clinic will notify pt of appointment)

## 2020-04-06 NOTE — DISCHARGE INSTRUCTIONS
Thank you for choosing [facility] for your medical care. The primary doctor who is taking care of you at the time of your discharge is Jnoah Clark MD.     You were admitted to the hospital with Acute on chronic pancreatitis.     Please note your discharge instructions, including diet/activity restrictions, follow-up appointments, and medication changes.  If you have any questions about your medical issues, prescriptions, or any other questions, please feel free to contact the [facility] Medicine Dept at [phone] and we will help.    If you are previously with Home health, outpatient PT/OT or under a therapy program, you are cleared to return to those programs.    Please direct all long term medication refills and follow up to your primary care provider, Arlen Belcher NP. Thank you again for letting us take care of your health care needs.

## 2020-04-08 ENCOUNTER — PATIENT OUTREACH (OUTPATIENT)
Dept: ADMINISTRATIVE | Facility: CLINIC | Age: 40
End: 2020-04-08

## 2020-04-08 NOTE — PATIENT INSTRUCTIONS
Discharge Instructions for Acute Pancreatitis  You have been diagnosed with acute pancreatitis. Your pancreas is inflamed or swollen. The pancreas is an organ that makes digestive juices and hormones. Gallstones are a common cause of pancreatitis. These hard stones form in the gallbladder. The gallbladder shares a tube with the pancreas into the small intestine. If gallstones block this tube, fluid cant leave the pancreas. The fluid backs up and causes redness and swelling (inflammation). There are other causes of pancreatitis. Make sure you understand the cause of your pancreatitis. Then you can try to stop it from happening again.  Immediate home care  · Find someone to drive you to appointments. Acute pancreatitis is a serious condition, and you should never drive if you are experiencing symptoms.  · Stop drinking if your illness was caused by alcohol.  ¨ Ask your healthcare provider about alcohol abuse programs and support groups such as Alcoholics Anonymous.  ¨ Ask your provider about prescription medicines that can help you stop drinking.  ¨ Tell your provider about the alcohol withdrawal symptoms you have when you stop drinking. This is very important. You may need close medical supervision and special medicines when you stop drinking. This will depend on your alcohol withdrawal history.   · Take your medicines exactly as directed. Dont skip doses.  · Eat a low-fat diet. Ask your provider for menus and other diet information.  · Learn to take your own pulse. Keep a record of your results. Ask your provider which readings mean that you need medical attention.  Ongoing care  · Tell your provider about any medicines you are taking. Some medicines can cause this condition.  · Before starting any new medicine, ask your provider if it will harm your pancreas. This includes any new over-the-counter medicines, vitamins, or herbal supplements.    · Tell your provider if you lose weight without dieting.  · Be aware  of symptoms that may mean your pancreatitis has come back. These symptoms include belly pain, nausea and vomiting, and fever.  · Keep all follow-up appointments with your provider. Problems can often show up later.  Follow-up  Follow up with your healthcare provider, or as advised.  When to call your provider  Call your healthcare provider right away if you have any of the following:  · Fever of 100.4°F (38.0°C) or higher, or as advised by your provider  · Severe pain from your upper belly to your back  · Nausea and vomiting  · Feely dizzy or lightheaded  · Yellowing of your skin or eyes (jaundice)  · Bruises on your belly or back  · Belly swelling and tenderness  · Rapid pulse  · Shallow, fast breathing   Date Last Reviewed: 8/1/2016  © 3173-0594 [employer]. [address] [place] [address], [place] [address]. All rights reserved. This information is not intended as a substitute for professional medical care. Always follow your healthcare professional's instructions.

## 2020-04-08 NOTE — PROGRESS NOTES
Please forward this important TCC information to your provider in order to maximize the post discharge care delivery of this patient.    C3 nurse spoke with Bradley Collado  for a TCC post hospital discharge follow up call. The patient has a scheduled HOSFU appointment with Arlen Belcher NP on 04/09/2020 @ 1100.    Respectfully,    Arabella Carroll LPN  [facility]    [email or web address]       Please do not reply to this message, as this inbox is not routinely monitored.

## 2020-04-08 NOTE — DISCHARGE SUMMARY
[facility] Medicine  Discharge Summary      Patient Name: Bradley Collado  MRN: [id number]  Admission Date: 4/4/2020  Hospital Length of Stay: 2 days  Discharge Date and Time: 4/6/2020  1:54 PM  Attending Physician: No att. providers found   Discharging Provider: Jonah Clark MD  Primary Care Provider: Arlen Belcher NP      HPI:   Bradley Collado is a 40-year-old  male who presents to the emergency room complaining of abdominal pain.  The abdominal pain onset several days ago.  Abdominal pain has progressively worsened over the past 48 hr.  He endorses nausea but no vomiting.  He denies fever or chills.  No recent sick contacts or travel.  He describes the pain at worse at 10/10 and describes as a severe intense pain.  Previous medical history includes hypothyroidism, hypertension, schizophrenia, anxiety, chronic pancreatitis, active smoker, and alcohol abuse.  He reports last alcohol use was approximately 3 months ago.  Previous surgical history includes laparoscopic cholecystectomy on 03/09/2020 by Dr. Deshpande.  Workup in the emergency room:  CBC with mild anemia.  CMP unremarkable.  Urinalysis is pending.  COVID test was negative.  Lipase is 500.  CT of the abdomen and pelvis demonstrates likely pancreatic pseudocyst and peripancreatic stranding consistent with acute pancreatitis.  CT also demonstrated pericolonic stranding consistent with diverticulitis versus colitis.  He was given IV fluids, morphine, hydromorphone, Zofran ER with some degree of improvement.  Patient will be admitted for observation and management.  Will continue IV fluids, NPO, and pain management.  Will also consult GI services.    * No surgery found *      Hospital Course:   Patient is a 40-year-old  male with a history of multiple episodes of acute on chronic pancreatitis with likely pseudocyst formation.  He was hospitalized with the same.  Patient was started on IV narcotics as  well as bowel last and improved over the course of the next 2-3 days.  His diet was slowly advanced to a clear liquid diet which he tolerated without difficulty.  Gastroenterology was consulted and found the patient had likely evidence of pancreatic duct dysfunction.  Also potentially the development of a pseudocyst.  Patient was referred to [facility] and pancreatobiliary gastroenterology for further evaluation and management.  He was prescribed oral narcotics at time of discharge.     Consults:   Consults (From admission, onward)        Status Ordering Provider     Inpatient consult to Gastroenterology  Once     Provider:  Isael Stinson Jr., MD    Completed BEN HENNESSY          No new Assessment & Plan notes have been filed under this hospital service since the last note was generated.  Service: Hospital Medicine    Final Active Diagnoses:    Diagnosis Date Noted POA    PRINCIPAL PROBLEM:  Acute on chronic pancreatitis [K85.90, K86.1] 03/07/2020 Yes    Suspected Covid-19 Virus Infection [R68.89] 04/04/2020 Yes    Dependence on nicotine from cigarettes [F17.210] 04/04/2020 Yes    Schizophrenia [F20.9] 04/04/2020 Yes    Diverticulitis [K57.92] 04/04/2020 Yes    Alcohol-induced chronic pancreatitis [K86.0] 10/24/2016 Yes    Essential hypertension [I10] 04/07/2016 Yes     Chronic    Hypothyroidism [E03.9] 04/05/2016 Yes     Chronic    Marijuana use [F12.90] 04/05/2016 Yes      Problems Resolved During this Admission:       Discharged Condition: stable    Disposition: Home or Self Care    Follow Up:  Follow-up Information     Arlen Belcher NP On 4/9/2020.    Specialty:  Family Medicine  Why:  [facility]  f/u appointment, on Thursday, 2/9/2020 @ 11:30am  Contact information:  [facility] [id number]  [phone]                 Patient Instructions:      Ambulatory referral/consult to Gastroenterology   Standing Status: Future    Referral Priority: Routine Referral Type: Consultation   Referral Reason: Specialty Services Required   Requested Specialty: Gastroenterology   Number of Visits Requested: 1     Ambulatory referral/consult to Pain Clinic   Standing Status: Future   Referral Priority: Routine Referral Type: Consultation   Referral Reason: Specialty Services Required   Referred to Provider: IRMA BLANTON Requested Specialty: Pain Medicine   Number of Visits Requested: 1     Diet clear liquid   Order Comments: Advance slowly as tolerated     Notify your health care provider if you experience any of the following:  temperature >100.4     Notify your health care provider if you experience any of the following:  persistent nausea and vomiting or diarrhea     Notify your health care provider if you experience any of the following:  severe uncontrolled pain     Activity as tolerated       Significant Diagnostic Studies:     Pending Diagnostic Studies:     None         Medications:  Reconciled Home Medications:      Medication List      START taking these medications    oxyCODONE-acetaminophen  mg per tablet  Commonly known as:  PERCOCET  Take 1 tablet by mouth every 6 (six) hours as needed for Pain.        CHANGE how you take these medications    QUEtiapine 200 MG Tab  Commonly known as:  SEROQUEL  Take 1 tablet (200 mg total) by mouth nightly as needed (insomnia).  What changed:  how much to take        CONTINUE taking these medications    ARIPiprazole 10 MG Tab  Commonly known as:  ABILIFY  Take 1 tablet (10 mg total) by mouth once daily.     cloNIDine 0.1 mg/24 hr td ptwk 0.1 mg/24 hr  Commonly known as:  CATAPRES  Place 1 patch onto the skin every 7 days.     gabapentin 300 MG capsule  Commonly known as:  NEURONTIN  Take 1 capsule (300 mg total) by mouth 3 (three) times daily.     hydrOXYzine pamoate 25 MG Cap  Commonly known as:  VISTARIL  Take 25 mg by mouth 4 (four) times daily.     levothyroxine 50 MCG tablet  Commonly known  as:  SYNTHROID  Take 1 tablet (50 mcg total) by mouth before breakfast.     lisinopriL 40 MG tablet  Commonly known as:  PRINIVIL,ZESTRIL  Take 1 tablet (40 mg total) by mouth once daily.     mirtazapine 30 MG tablet  Commonly known as:  REMERON  Take 1 tablet (30 mg total) by mouth nightly.     ondansetron 8 MG tablet  Commonly known as:  ZOFRAN  Take 1 tablet (8 mg total) by mouth every 8 (eight) hours as needed for Nausea.     pantoprazole 40 MG tablet  Commonly known as:  PROTONIX  Take 40 mg by mouth once daily.     sertraline 50 MG tablet  Commonly known as:  ZOLOFT  Take 1 tablet (50 mg total) by mouth once daily.        STOP taking these medications    HYDROcodone-acetaminophen  mg per tablet  Commonly known as:  NORCO            Indwelling Lines/Drains at time of discharge:   Lines/Drains/Airways     None                 Time spent on the discharge of patient: 37 minutes  Patient was seen and examined on the date of discharge and determined to be suitable for discharge.         Jonah Clark MD  Department of Hospital Medicine  [facility]

## 2020-04-08 NOTE — HOSPITAL COURSE
Patient is a 40-year-old  male with a history of multiple episodes of acute on chronic pancreatitis with likely pseudocyst formation.  He was hospitalized with the same.  Patient was started on IV narcotics as well as bowel last and improved over the course of the next 2-3 days.  His diet was slowly advanced to a clear liquid diet which he tolerated without difficulty.  Gastroenterology was consulted and found the patient had likely evidence of pancreatic duct dysfunction.  Also potentially the development of a pseudocyst.  Patient was referred to [facility] and pancreatobiliary gastroenterology for further evaluation and management.  He was prescribed oral narcotics at time of discharge.

## 2020-04-09 LAB
BACTERIA BLD CULT: NORMAL
BACTERIA BLD CULT: NORMAL

## 2020-05-04 ENCOUNTER — HOSPITAL ENCOUNTER (EMERGENCY)
Facility: HOSPITAL | Age: 40
Discharge: ANOTHER HOSPITAL | End: 2020-05-04
Attending: EMERGENCY MEDICINE
Payer: MEDICAID

## 2020-05-04 ENCOUNTER — HOSPITAL ENCOUNTER (INPATIENT)
Facility: HOSPITAL | Age: 40
LOS: 3 days | Discharge: HOME | DRG: 439 | End: 2020-05-07
Attending: INTERNAL MEDICINE | Admitting: INTERNAL MEDICINE
Payer: MEDICAID

## 2020-05-04 VITALS
SYSTOLIC BLOOD PRESSURE: 175 MMHG | OXYGEN SATURATION: 97 % | WEIGHT: 170 LBS | TEMPERATURE: 99 F | HEART RATE: 93 BPM | HEIGHT: 67 IN | BODY MASS INDEX: 26.68 KG/M2 | RESPIRATION RATE: 20 BRPM | DIASTOLIC BLOOD PRESSURE: 98 MMHG

## 2020-05-04 DIAGNOSIS — K85.20 ALCOHOL-INDUCED ACUTE PANCREATITIS, UNSPECIFIED COMPLICATION STATUS: Primary | ICD-10-CM

## 2020-05-04 DIAGNOSIS — K86.2 PANCREATIC PSEUDOCYST/CYST: ICD-10-CM

## 2020-05-04 DIAGNOSIS — R07.9 CHEST PAIN: ICD-10-CM

## 2020-05-04 DIAGNOSIS — K86.3 PANCREATIC PSEUDOCYST/CYST: ICD-10-CM

## 2020-05-04 DIAGNOSIS — K86.3 PSEUDOCYST OF PANCREAS: ICD-10-CM

## 2020-05-04 DIAGNOSIS — K85.90 ACUTE PANCREATITIS, UNSPECIFIED COMPLICATION STATUS, UNSPECIFIED PANCREATITIS TYPE: Primary | ICD-10-CM

## 2020-05-04 PROBLEM — F10.90 ALCOHOL USE: Status: ACTIVE | Noted: 2020-05-04

## 2020-05-04 PROBLEM — Z78.9 ALCOHOL USE: Status: ACTIVE | Noted: 2020-05-04

## 2020-05-04 LAB
ALBUMIN SERPL BCP-MCNC: 4.5 G/DL (ref 3.5–5.2)
ALP SERPL-CCNC: 136 U/L (ref 55–135)
ALT SERPL W/O P-5'-P-CCNC: 24 U/L (ref 10–44)
ANION GAP SERPL CALC-SCNC: 20 MMOL/L (ref 8–16)
AST SERPL-CCNC: 27 U/L (ref 10–40)
BASOPHILS # BLD AUTO: 0.06 K/UL (ref 0–0.2)
BASOPHILS NFR BLD: 0.5 % (ref 0–1.9)
BILIRUB SERPL-MCNC: 1.1 MG/DL (ref 0.1–1)
BUN SERPL-MCNC: 14 MG/DL (ref 6–20)
CALCIUM SERPL-MCNC: 9.3 MG/DL (ref 8.7–10.5)
CHLORIDE SERPL-SCNC: 95 MMOL/L (ref 95–110)
CO2 SERPL-SCNC: 21 MMOL/L (ref 23–29)
CREAT SERPL-MCNC: 1.3 MG/DL (ref 0.5–1.4)
DIFFERENTIAL METHOD: ABNORMAL
EOSINOPHIL # BLD AUTO: 0 K/UL (ref 0–0.5)
EOSINOPHIL NFR BLD: 0.2 % (ref 0–8)
ERYTHROCYTE [DISTWIDTH] IN BLOOD BY AUTOMATED COUNT: 19.7 % (ref 11.5–14.5)
EST. GFR  (AFRICAN AMERICAN): >60 ML/MIN/1.73 M^2
EST. GFR  (NON AFRICAN AMERICAN): >60 ML/MIN/1.73 M^2
GLUCOSE SERPL-MCNC: 163 MG/DL (ref 70–110)
HCT VFR BLD AUTO: 42.2 % (ref 40–54)
HGB BLD-MCNC: 13.5 G/DL (ref 14–18)
IMM GRANULOCYTES # BLD AUTO: 0.06 K/UL (ref 0–0.04)
IMM GRANULOCYTES NFR BLD AUTO: 0.5 % (ref 0–0.5)
LACTATE SERPL-SCNC: 2.3 MMOL/L (ref 0.5–2.2)
LACTATE SERPL-SCNC: 3.5 MMOL/L (ref 0.5–1.9)
LIPASE SERPL-CCNC: 121 U/L (ref 4–60)
LYMPHOCYTES # BLD AUTO: 1.4 K/UL (ref 1–4.8)
LYMPHOCYTES NFR BLD: 12.8 % (ref 18–48)
MAGNESIUM SERPL-MCNC: 2.1 MG/DL (ref 1.6–2.6)
MCH RBC QN AUTO: 24.1 PG (ref 27–31)
MCHC RBC AUTO-ENTMCNC: 32 G/DL (ref 32–36)
MCV RBC AUTO: 75 FL (ref 82–98)
MONOCYTES # BLD AUTO: 0.9 K/UL (ref 0.3–1)
MONOCYTES NFR BLD: 7.7 % (ref 4–15)
NEUTROPHILS # BLD AUTO: 8.8 K/UL (ref 1.8–7.7)
NEUTROPHILS NFR BLD: 78.3 % (ref 38–73)
NRBC BLD-RTO: 0 /100 WBC
PHOSPHATE SERPL-MCNC: 3 MG/DL (ref 2.7–4.5)
PLATELET # BLD AUTO: 328 K/UL (ref 150–350)
PMV BLD AUTO: 10.8 FL (ref 9.2–12.9)
POCT GLUCOSE: 141 MG/DL (ref 70–110)
POTASSIUM SERPL-SCNC: 3.2 MMOL/L (ref 3.5–5.1)
PROT SERPL-MCNC: 8.6 G/DL (ref 6–8.4)
RBC # BLD AUTO: 5.61 M/UL (ref 4.6–6.2)
SARS-COV-2 RDRP RESP QL NAA+PROBE: NEGATIVE
SODIUM SERPL-SCNC: 136 MMOL/L (ref 136–145)
T4 FREE SERPL-MCNC: 0.74 NG/DL (ref 0.71–1.51)
TSH SERPL DL<=0.005 MIU/L-ACNC: 1.17 UIU/ML (ref 0.34–5.6)
WBC # BLD AUTO: 11.21 K/UL (ref 3.9–12.7)

## 2020-05-04 PROCEDURE — U0002 COVID-19 LAB TEST NON-CDC: HCPCS

## 2020-05-04 PROCEDURE — 25000003 PHARM REV CODE 250: Performed by: EMERGENCY MEDICINE

## 2020-05-04 PROCEDURE — 83735 ASSAY OF MAGNESIUM: CPT

## 2020-05-04 PROCEDURE — 36415 COLL VENOUS BLD VENIPUNCTURE: CPT

## 2020-05-04 PROCEDURE — 83605 ASSAY OF LACTIC ACID: CPT

## 2020-05-04 PROCEDURE — 84100 ASSAY OF PHOSPHORUS: CPT

## 2020-05-04 PROCEDURE — 83690 ASSAY OF LIPASE: CPT

## 2020-05-04 PROCEDURE — 25000003 PHARM REV CODE 250: Performed by: STUDENT IN AN ORGANIZED HEALTH CARE EDUCATION/TRAINING PROGRAM

## 2020-05-04 PROCEDURE — 80053 COMPREHEN METABOLIC PANEL: CPT

## 2020-05-04 PROCEDURE — 83605 ASSAY OF LACTIC ACID: CPT | Mod: 91

## 2020-05-04 PROCEDURE — 85025 COMPLETE CBC W/AUTO DIFF WBC: CPT

## 2020-05-04 PROCEDURE — 84443 ASSAY THYROID STIM HORMONE: CPT

## 2020-05-04 PROCEDURE — 99223 PR INITIAL HOSPITAL CARE,LEVL III: ICD-10-PCS | Mod: ,,, | Performed by: HOSPITALIST

## 2020-05-04 PROCEDURE — 63600175 PHARM REV CODE 636 W HCPCS: Performed by: EMERGENCY MEDICINE

## 2020-05-04 PROCEDURE — 99285 EMERGENCY DEPT VISIT HI MDM: CPT | Mod: 25

## 2020-05-04 PROCEDURE — 25500020 PHARM REV CODE 255: Performed by: EMERGENCY MEDICINE

## 2020-05-04 PROCEDURE — 11000001 HC ACUTE MED/SURG PRIVATE ROOM

## 2020-05-04 PROCEDURE — 96365 THER/PROPH/DIAG IV INF INIT: CPT

## 2020-05-04 PROCEDURE — 99223 1ST HOSP IP/OBS HIGH 75: CPT | Mod: ,,, | Performed by: HOSPITALIST

## 2020-05-04 PROCEDURE — 63600175 PHARM REV CODE 636 W HCPCS: Performed by: STUDENT IN AN ORGANIZED HEALTH CARE EDUCATION/TRAINING PROGRAM

## 2020-05-04 PROCEDURE — 84439 ASSAY OF FREE THYROXINE: CPT

## 2020-05-04 PROCEDURE — 96375 TX/PRO/DX INJ NEW DRUG ADDON: CPT

## 2020-05-04 PROCEDURE — 96361 HYDRATE IV INFUSION ADD-ON: CPT

## 2020-05-04 PROCEDURE — 87040 BLOOD CULTURE FOR BACTERIA: CPT | Mod: 59

## 2020-05-04 PROCEDURE — 96376 TX/PRO/DX INJ SAME DRUG ADON: CPT

## 2020-05-04 RX ORDER — PROMETHAZINE HYDROCHLORIDE 12.5 MG/1
12.5 TABLET ORAL EVERY 6 HOURS PRN
Status: DISCONTINUED | OUTPATIENT
Start: 2020-05-04 | End: 2020-05-07 | Stop reason: HOSPADM

## 2020-05-04 RX ORDER — FOLIC ACID 1 MG/1
1 TABLET ORAL DAILY
Status: DISCONTINUED | OUTPATIENT
Start: 2020-05-04 | End: 2020-05-07 | Stop reason: HOSPADM

## 2020-05-04 RX ORDER — SODIUM CHLORIDE 0.9 % (FLUSH) 0.9 %
10 SYRINGE (ML) INJECTION
Status: DISCONTINUED | OUTPATIENT
Start: 2020-05-04 | End: 2020-05-07 | Stop reason: HOSPADM

## 2020-05-04 RX ORDER — ONDANSETRON 2 MG/ML
4 INJECTION INTRAMUSCULAR; INTRAVENOUS EVERY 8 HOURS PRN
Status: DISCONTINUED | OUTPATIENT
Start: 2020-05-04 | End: 2020-05-07 | Stop reason: HOSPADM

## 2020-05-04 RX ORDER — CLONIDINE 0.1 MG/24H
1 PATCH, EXTENDED RELEASE TRANSDERMAL
Status: DISCONTINUED | OUTPATIENT
Start: 2020-05-05 | End: 2020-05-04

## 2020-05-04 RX ORDER — GABAPENTIN 300 MG/1
300 CAPSULE ORAL 3 TIMES DAILY
Status: DISCONTINUED | OUTPATIENT
Start: 2020-05-04 | End: 2020-05-07 | Stop reason: HOSPADM

## 2020-05-04 RX ORDER — SODIUM CHLORIDE, SODIUM LACTATE, POTASSIUM CHLORIDE, CALCIUM CHLORIDE 600; 310; 30; 20 MG/100ML; MG/100ML; MG/100ML; MG/100ML
INJECTION, SOLUTION INTRAVENOUS CONTINUOUS
Status: DISCONTINUED | OUTPATIENT
Start: 2020-05-04 | End: 2020-05-05

## 2020-05-04 RX ORDER — POTASSIUM CHLORIDE 20 MEQ/15ML
20 SOLUTION ORAL
Status: DISCONTINUED | OUTPATIENT
Start: 2020-05-04 | End: 2020-05-04 | Stop reason: HOSPADM

## 2020-05-04 RX ORDER — LEVOTHYROXINE SODIUM 50 UG/1
50 TABLET ORAL
Status: DISCONTINUED | OUTPATIENT
Start: 2020-05-05 | End: 2020-05-07 | Stop reason: HOSPADM

## 2020-05-04 RX ORDER — HEPARIN SODIUM 5000 [USP'U]/ML
5000 INJECTION, SOLUTION INTRAVENOUS; SUBCUTANEOUS EVERY 8 HOURS
Status: DISCONTINUED | OUTPATIENT
Start: 2020-05-04 | End: 2020-05-07 | Stop reason: HOSPADM

## 2020-05-04 RX ORDER — IBUPROFEN 200 MG
16 TABLET ORAL
Status: DISCONTINUED | OUTPATIENT
Start: 2020-05-04 | End: 2020-05-07 | Stop reason: HOSPADM

## 2020-05-04 RX ORDER — AMLODIPINE BESYLATE 10 MG/1
10 TABLET ORAL DAILY
Status: DISCONTINUED | OUTPATIENT
Start: 2020-05-04 | End: 2020-05-07 | Stop reason: HOSPADM

## 2020-05-04 RX ORDER — CALCIUM CARBONATE 200(500)MG
500 TABLET,CHEWABLE ORAL DAILY PRN
Status: DISCONTINUED | OUTPATIENT
Start: 2020-05-04 | End: 2020-05-07 | Stop reason: HOSPADM

## 2020-05-04 RX ORDER — LISINOPRIL 20 MG/1
40 TABLET ORAL DAILY
Status: DISCONTINUED | OUTPATIENT
Start: 2020-05-04 | End: 2020-05-04

## 2020-05-04 RX ORDER — IBUPROFEN 200 MG
24 TABLET ORAL
Status: DISCONTINUED | OUTPATIENT
Start: 2020-05-04 | End: 2020-05-07 | Stop reason: HOSPADM

## 2020-05-04 RX ORDER — HYDROMORPHONE HYDROCHLORIDE 1 MG/ML
0.5 INJECTION, SOLUTION INTRAMUSCULAR; INTRAVENOUS; SUBCUTANEOUS EVERY 6 HOURS PRN
Status: DISCONTINUED | OUTPATIENT
Start: 2020-05-04 | End: 2020-05-07 | Stop reason: HOSPADM

## 2020-05-04 RX ORDER — HYDROMORPHONE HYDROCHLORIDE 1 MG/ML
1 INJECTION, SOLUTION INTRAMUSCULAR; INTRAVENOUS; SUBCUTANEOUS
Status: COMPLETED | OUTPATIENT
Start: 2020-05-04 | End: 2020-05-04

## 2020-05-04 RX ORDER — PROMETHAZINE HYDROCHLORIDE 25 MG/1
25 TABLET ORAL EVERY 6 HOURS PRN
Status: DISCONTINUED | OUTPATIENT
Start: 2020-05-04 | End: 2020-05-04

## 2020-05-04 RX ORDER — QUETIAPINE FUMARATE 100 MG/1
200 TABLET, FILM COATED ORAL NIGHTLY PRN
Status: DISCONTINUED | OUTPATIENT
Start: 2020-05-04 | End: 2020-05-07 | Stop reason: HOSPADM

## 2020-05-04 RX ORDER — PANTOPRAZOLE SODIUM 40 MG/1
40 TABLET, DELAYED RELEASE ORAL DAILY
Status: DISCONTINUED | OUTPATIENT
Start: 2020-05-04 | End: 2020-05-07 | Stop reason: HOSPADM

## 2020-05-04 RX ORDER — HYDRALAZINE HYDROCHLORIDE 20 MG/ML
5 INJECTION INTRAMUSCULAR; INTRAVENOUS
Status: COMPLETED | OUTPATIENT
Start: 2020-05-04 | End: 2020-05-04

## 2020-05-04 RX ORDER — SERTRALINE HYDROCHLORIDE 50 MG/1
50 TABLET, FILM COATED ORAL DAILY
Status: DISCONTINUED | OUTPATIENT
Start: 2020-05-04 | End: 2020-05-07 | Stop reason: HOSPADM

## 2020-05-04 RX ORDER — POTASSIUM CHLORIDE 20 MEQ/1
40 TABLET, EXTENDED RELEASE ORAL ONCE
Status: COMPLETED | OUTPATIENT
Start: 2020-05-04 | End: 2020-05-04

## 2020-05-04 RX ORDER — HYDROXYZINE PAMOATE 25 MG/1
25 CAPSULE ORAL 4 TIMES DAILY
Status: DISCONTINUED | OUTPATIENT
Start: 2020-05-04 | End: 2020-05-06

## 2020-05-04 RX ORDER — GLUCAGON 1 MG
1 KIT INJECTION
Status: DISCONTINUED | OUTPATIENT
Start: 2020-05-04 | End: 2020-05-07 | Stop reason: HOSPADM

## 2020-05-04 RX ORDER — SODIUM CHLORIDE, SODIUM LACTATE, POTASSIUM CHLORIDE, CALCIUM CHLORIDE 600; 310; 30; 20 MG/100ML; MG/100ML; MG/100ML; MG/100ML
INJECTION, SOLUTION INTRAVENOUS CONTINUOUS
Status: DISCONTINUED | OUTPATIENT
Start: 2020-05-04 | End: 2020-05-04

## 2020-05-04 RX ORDER — CLONIDINE HYDROCHLORIDE 0.1 MG/1
0.1 TABLET ORAL 3 TIMES DAILY
Status: DISCONTINUED | OUTPATIENT
Start: 2020-05-04 | End: 2020-05-04

## 2020-05-04 RX ORDER — OXYCODONE AND ACETAMINOPHEN 10; 325 MG/1; MG/1
1 TABLET ORAL EVERY 6 HOURS PRN
Status: DISCONTINUED | OUTPATIENT
Start: 2020-05-04 | End: 2020-05-07 | Stop reason: HOSPADM

## 2020-05-04 RX ORDER — SODIUM CHLORIDE 9 MG/ML
1000 INJECTION, SOLUTION INTRAVENOUS CONTINUOUS
Status: DISCONTINUED | OUTPATIENT
Start: 2020-05-04 | End: 2020-05-04

## 2020-05-04 RX ORDER — MIRTAZAPINE 7.5 MG/1
30 TABLET, FILM COATED ORAL NIGHTLY
Status: DISCONTINUED | OUTPATIENT
Start: 2020-05-04 | End: 2020-05-07 | Stop reason: HOSPADM

## 2020-05-04 RX ORDER — THIAMINE HCL 100 MG
100 TABLET ORAL DAILY
Status: DISCONTINUED | OUTPATIENT
Start: 2020-05-04 | End: 2020-05-07 | Stop reason: HOSPADM

## 2020-05-04 RX ORDER — CALCIUM CARBONATE 200(500)MG
500 TABLET,CHEWABLE ORAL ONCE
Status: COMPLETED | OUTPATIENT
Start: 2020-05-04 | End: 2020-05-04

## 2020-05-04 RX ORDER — ONDANSETRON 2 MG/ML
4 INJECTION INTRAMUSCULAR; INTRAVENOUS
Status: COMPLETED | OUTPATIENT
Start: 2020-05-04 | End: 2020-05-04

## 2020-05-04 RX ORDER — SODIUM CHLORIDE 9 MG/ML
500 INJECTION, SOLUTION INTRAVENOUS
Status: COMPLETED | OUTPATIENT
Start: 2020-05-04 | End: 2020-05-04

## 2020-05-04 RX ORDER — OXYCODONE AND ACETAMINOPHEN 10; 325 MG/1; MG/1
1 TABLET ORAL EVERY 4 HOURS PRN
Status: DISCONTINUED | OUTPATIENT
Start: 2020-05-04 | End: 2020-05-04

## 2020-05-04 RX ORDER — ARIPIPRAZOLE 5 MG/1
10 TABLET ORAL DAILY
Status: DISCONTINUED | OUTPATIENT
Start: 2020-05-04 | End: 2020-05-04

## 2020-05-04 RX ADMIN — SODIUM CHLORIDE 1000 ML: 0.9 INJECTION, SOLUTION INTRAVENOUS at 04:05

## 2020-05-04 RX ADMIN — FOLIC ACID 1 MG: 1 TABLET ORAL at 03:05

## 2020-05-04 RX ADMIN — SODIUM CHLORIDE, SODIUM LACTATE, POTASSIUM CHLORIDE, AND CALCIUM CHLORIDE: .6; .31; .03; .02 INJECTION, SOLUTION INTRAVENOUS at 02:05

## 2020-05-04 RX ADMIN — HYDROMORPHONE HYDROCHLORIDE 1 MG: 1 INJECTION, SOLUTION INTRAMUSCULAR; INTRAVENOUS; SUBCUTANEOUS at 05:05

## 2020-05-04 RX ADMIN — THERA TABS 1 TABLET: TAB at 03:05

## 2020-05-04 RX ADMIN — POTASSIUM CHLORIDE 40 MEQ: 1500 TABLET, EXTENDED RELEASE ORAL at 02:05

## 2020-05-04 RX ADMIN — OXYCODONE HYDROCHLORIDE AND ACETAMINOPHEN 1 TABLET: 10; 325 TABLET ORAL at 11:05

## 2020-05-04 RX ADMIN — SODIUM CHLORIDE 1000 ML: 0.9 INJECTION, SOLUTION INTRAVENOUS at 08:05

## 2020-05-04 RX ADMIN — HYDROXYZINE PAMOATE 25 MG: 25 CAPSULE ORAL at 05:05

## 2020-05-04 RX ADMIN — GABAPENTIN 300 MG: 300 CAPSULE ORAL at 09:05

## 2020-05-04 RX ADMIN — CALCIUM CARBONATE (ANTACID) CHEW TAB 500 MG 500 MG: 500 CHEW TAB at 05:05

## 2020-05-04 RX ADMIN — SERTRALINE HYDROCHLORIDE 50 MG: 50 TABLET ORAL at 03:05

## 2020-05-04 RX ADMIN — HYDROMORPHONE HYDROCHLORIDE 0.5 MG: 1 INJECTION, SOLUTION INTRAMUSCULAR; INTRAVENOUS; SUBCUTANEOUS at 02:05

## 2020-05-04 RX ADMIN — ONDANSETRON 4 MG: 2 INJECTION, SOLUTION INTRAMUSCULAR; INTRAVENOUS at 01:05

## 2020-05-04 RX ADMIN — ONDANSETRON 4 MG: 2 INJECTION INTRAMUSCULAR; INTRAVENOUS at 04:05

## 2020-05-04 RX ADMIN — AMLODIPINE BESYLATE 10 MG: 10 TABLET ORAL at 05:05

## 2020-05-04 RX ADMIN — HYDROXYZINE PAMOATE 25 MG: 25 CAPSULE ORAL at 09:05

## 2020-05-04 RX ADMIN — PIPERACILLIN SODIUM AND TAZOBACTAM SODIUM 4.5 G: 4; .5 INJECTION, POWDER, LYOPHILIZED, FOR SOLUTION INTRAVENOUS at 08:05

## 2020-05-04 RX ADMIN — PROMETHAZINE HYDROCHLORIDE 12.5 MG: 25 INJECTION INTRAMUSCULAR; INTRAVENOUS at 07:05

## 2020-05-04 RX ADMIN — IOHEXOL 100 ML: 350 INJECTION, SOLUTION INTRAVENOUS at 06:05

## 2020-05-04 RX ADMIN — Medication 100 MG: at 03:05

## 2020-05-04 RX ADMIN — PANTOPRAZOLE SODIUM 40 MG: 40 TABLET, DELAYED RELEASE ORAL at 03:05

## 2020-05-04 RX ADMIN — HYDRALAZINE HYDROCHLORIDE 5 MG: 20 INJECTION INTRAMUSCULAR; INTRAVENOUS at 07:05

## 2020-05-04 RX ADMIN — HEPARIN SODIUM 5000 UNITS: 5000 INJECTION, SOLUTION INTRAVENOUS; SUBCUTANEOUS at 09:05

## 2020-05-04 RX ADMIN — OXYCODONE HYDROCHLORIDE AND ACETAMINOPHEN 1 TABLET: 10; 325 TABLET ORAL at 03:05

## 2020-05-04 RX ADMIN — SODIUM CHLORIDE 500 ML: 0.9 INJECTION, SOLUTION INTRAVENOUS at 08:05

## 2020-05-04 RX ADMIN — GABAPENTIN 300 MG: 300 CAPSULE ORAL at 03:05

## 2020-05-04 RX ADMIN — SODIUM CHLORIDE, SODIUM LACTATE, POTASSIUM CHLORIDE, AND CALCIUM CHLORIDE: .6; .31; .03; .02 INJECTION, SOLUTION INTRAVENOUS at 09:05

## 2020-05-04 RX ADMIN — HYDROMORPHONE HYDROCHLORIDE 1 MG: 1 INJECTION, SOLUTION INTRAMUSCULAR; INTRAVENOUS; SUBCUTANEOUS at 07:05

## 2020-05-04 RX ADMIN — MIRTAZAPINE 30 MG: 7.5 TABLET ORAL at 09:05

## 2020-05-04 RX ADMIN — QUETIAPINE FUMARATE 200 MG: 100 TABLET ORAL at 09:05

## 2020-05-04 RX ADMIN — HYDROMORPHONE HYDROCHLORIDE 0.5 MG: 1 INJECTION, SOLUTION INTRAMUSCULAR; INTRAVENOUS; SUBCUTANEOUS at 09:05

## 2020-05-04 RX ADMIN — PROMETHAZINE HYDROCHLORIDE 12.5 MG: 25 INJECTION INTRAMUSCULAR; INTRAVENOUS at 06:05

## 2020-05-04 RX ADMIN — PROMETHAZINE HYDROCHLORIDE 12.5 MG: 12.5 TABLET ORAL at 02:05

## 2020-05-04 NOTE — SUBJECTIVE & OBJECTIVE
Past Medical History:   Diagnosis Date    Anxiety     Anxiety     Bipolar affective disorder     Depression     Hypertension     Pancreatitis     Schizophrenia     Thyroid disease        Past Surgical History:   Procedure Laterality Date    LAPAROSCOPIC CHOLECYSTECTOMY N/A 3/9/2020    Procedure: CHOLECYSTECTOMY, LAPAROSCOPIC;  Surgeon: Trenton Deshpande MD;  Location: [facility];  Service: General;  Laterality: N/A;       Review of patient's allergies indicates:  No Known Allergies  Family History     Problem Relation (Age of Onset)    Cancer Paternal Grandfather    Diabetes Maternal Grandmother    Hypertension Maternal Grandfather        Tobacco Use    Smoking status: Current Some Day Smoker     Packs/day: 0.25     Years: 7.00     Pack years: 1.75    Smokeless tobacco: Never Used   Substance and Sexual Activity    Alcohol use: Yes     Comment: occasionally    Drug use: Yes     Frequency: 7.0 times per week     Types: Marijuana    Sexual activity: Yes     Partners: Female     Review of Systems   Constitutional: Positive for activity change and appetite change. Negative for chills and fever.   HENT: Negative for sore throat and trouble swallowing.    Respiratory: Negative for cough and shortness of breath.    Cardiovascular: Negative for chest pain and leg swelling.   Gastrointestinal: Positive for abdominal pain, nausea and vomiting.   Genitourinary: Negative for decreased urine volume and difficulty urinating.   Musculoskeletal: Positive for back pain. Negative for arthralgias.   Skin: Negative for color change and pallor.   Neurological: Negative for dizziness and weakness.   Psychiatric/Behavioral: Negative for agitation and confusion.     Objective:     Vital Signs (Most Recent):  Temp: 97.9 °F (36.6 °C) (05/04/20 1028)  Pulse: 93 (05/04/20 1028)  Resp: 16 (05/04/20 1028)  BP: (!) 180/92 (05/04/20 1028)  SpO2: 99 % (05/04/20 1028) Vital Signs (24h Range):  Temp:  [97.9 °F (36.6 °C)-99.2 °F (37.3 °C)]  97.9 °F (36.6 °C)  Pulse:  [] 93  Resp:  [15-24] 16  SpO2:  [95 %-99 %] 99 %  BP: (169-180)/() 180/92        There is no height or weight on file to calculate BMI.    No intake or output data in the 24 hours ending 05/04/20 1502    Lines/Drains/Airways     Peripheral Intravenous Line                 Peripheral IV - Single Lumen 05/04/20 0442 20 G Right Antecubital less than 1 day                Physical Exam   Constitutional: He is oriented to person, place, and time. No distress.   HENT:   Mouth/Throat: Oropharynx is clear and moist.   Eyes: No scleral icterus.   Cardiovascular: Normal rate and regular rhythm.   Pulmonary/Chest: Effort normal and breath sounds normal.   Abdominal: Soft. There is no rebound and no guarding.   Diffusely moderately tender to touch   Musculoskeletal: He exhibits no edema or deformity.   Neurological: He is alert and oriented to person, place, and time.   Skin: Skin is warm and dry.   Psychiatric: He has a normal mood and affect.   Vitals reviewed.      Significant Labs:  CBC:   Recent Labs   Lab 05/04/20  0452   WBC 11.21   HGB 13.5*   HCT 42.2        CMP:   Recent Labs   Lab 05/04/20  0452   *   CALCIUM 9.3   ALBUMIN 4.5   PROT 8.6*      K 3.2*   CO2 21*   CL 95   BUN 14   CREATININE 1.3   ALKPHOS 136*   ALT 24   AST 27   BILITOT 1.1*     Coagulation: No results for input(s): PT, INR, APTT in the last 48 hours.

## 2020-05-04 NOTE — ED NOTES
Presents to ER via EMS with c/o nausea and vomiting for 3 days. Advised has hx of pancreatitis. States has had 4 pints of alcohol today.

## 2020-05-04 NOTE — ASSESSMENT & PLAN NOTE
41 yo M with hx of hypothyroidism, HTN, alcohol abuse, tobacco use, and chronic pancreatitis who was transferred from [facility] ED for AES evaluation. Patient has had numerous episodes of acute pancreatitis in the past, most recently at the beginning of April. At this point, it is unclear if his symptoms are from acute pancreatitis vs symptoms from pseudocyst itself. However, given that the patient felt better after his last episode and symptoms came on acutely, along with the reported use of alcohol and tobacco, and CT findings showing fat stranding suggestive of pancreatitis, we will recommend managing him now for acute pancreatitis. If he does not improve over the next few days, then we can consider EUS drainage of pseudocyst.    Recommendations:  - supportive care with IV fluids, anti-emetics, analgesia  - NPO except for ice chips, sips of water  - advance as tolerated  - strict alcohol and tobacco cessation  - continue to monitor; if no improvement over the next few days, will consider EUS drainage of pseudocyst

## 2020-05-04 NOTE — PLAN OF CARE
PCP JOVANNA BRIGHT  [facility] [address] [facility] FATHER CASE COUSIN [phone]    05/04/20 1150   Discharge Assessment   Assessment Type Discharge Planning Assessment   Confirmed/corrected address and phone number on facesheet? Yes   Assessment information obtained from? Patient   Expected Length of Stay (days) 3   Communicated expected length of stay with patient/caregiver no   Prior to hospitilization cognitive status: No Deficits   Prior to hospitalization functional status: Independent   Current cognitive status: No Deficits   Current Functional Status: Independent   Facility Arrived From: [facility] With grandparent(s)   Able to Return to Prior Arrangements yes   Is patient able to care for self after discharge? Yes   Patient's perception of discharge disposition home or selfcare   Readmission Within the Last 30 Days no previous admission in last 30 days   Patient currently being followed by outpatient case management? No   Equipment Currently Used at Home none   Do you have any problems affording any of your prescribed medications? No   Is the patient taking medications as prescribed? yes   Does the patient have transportation home? Yes   Transportation Anticipated family or friend will provide   Does the patient receive services at the Coumadin Clinic? No   Discharge Plan A Home with family;Home   Discharge Plan B Home;Home with family   DME Needed Upon Discharge  none   Patient/Family in Agreement with Plan unable to assess   1231

## 2020-05-04 NOTE — CONSULTS
[facility]  Gastroenterology  Consult Note    Patient Name: Bradley Collado  MRN: [id number]  Admission Date: 5/4/2020  Hospital Length of Stay: 0 days  Code Status: Full Code   Attending Provider: Yamilet Kong MD   Consulting Provider: Yovany Warren MD  Primary Care Physician: Arlen Belcher NP  Principal Problem:<principal problem not specified>    Inpatient consult to [facility] ([facility])  Consult performed by: Yovany Warren MD  Consult ordered by: Trinidad Andrade MD        Subjective:     HPI:  This is a 39 yo M with hx of hypothyroidism, HTN, alcohol abuse, tobacco use, and chronic pancreatitis who was transferred from [facility] ED for AES evaluation. Patient has had numerous episodes of acute pancreatitis in the past. He had his gallbladder removed in March 2020 of this year but has since had two episodes of acute pancreatitis. He was most recently discharged from [facility] on 4/6 after being admitted for two days for acute pancreatitis. CT obtained at that time showed formation of pseudocysts. Patient did well with supportive care and was discharged. He reports feeling well after discharge up until about three days ago. He states he first began to feel nauseated and then had vomiting and abdominal pain. He also reports he began drinking heavily about 2-3 days ago. He reports smoking cigarettes as well, a few per week. He has not been able to tolerate much food since. His pain is now diffuse and reports that it feels like his usual episodes of pancreatitis. CT AP was obtained this morning which was notable for dominant pseudocyst between pancreas and greater curvature as well as a smaller pseudocyst between pancreas and lesser curvature, along with mild fat stranding adjacent to pancreatic tail.    Past Medical History:   Diagnosis Date    Anxiety     Anxiety     Bipolar affective disorder     Depression     Hypertension     Pancreatitis     Schizophrenia      Thyroid disease        Past Surgical History:   Procedure Laterality Date    LAPAROSCOPIC CHOLECYSTECTOMY N/A 3/9/2020    Procedure: CHOLECYSTECTOMY, LAPAROSCOPIC;  Surgeon: Trenton Deshpande MD;  Location: [facility];  Service: General;  Laterality: N/A;       Review of patient's allergies indicates:  No Known Allergies  Family History     Problem Relation (Age of Onset)    Cancer Paternal Grandfather    Diabetes Maternal Grandmother    Hypertension Maternal Grandfather        Tobacco Use    Smoking status: Current Some Day Smoker     Packs/day: 0.25     Years: 7.00     Pack years: 1.75    Smokeless tobacco: Never Used   Substance and Sexual Activity    Alcohol use: Yes     Comment: occasionally    Drug use: Yes     Frequency: 7.0 times per week     Types: Marijuana    Sexual activity: Yes     Partners: Female     Review of Systems   Constitutional: Positive for activity change and appetite change. Negative for chills and fever.   HENT: Negative for sore throat and trouble swallowing.    Respiratory: Negative for cough and shortness of breath.    Cardiovascular: Negative for chest pain and leg swelling.   Gastrointestinal: Positive for abdominal pain, nausea and vomiting.   Genitourinary: Negative for decreased urine volume and difficulty urinating.   Musculoskeletal: Positive for back pain. Negative for arthralgias.   Skin: Negative for color change and pallor.   Neurological: Negative for dizziness and weakness.   Psychiatric/Behavioral: Negative for agitation and confusion.     Objective:     Vital Signs (Most Recent):  Temp: 97.9 °F (36.6 °C) (05/04/20 1028)  Pulse: 93 (05/04/20 1028)  Resp: 16 (05/04/20 1028)  BP: (!) 180/92 (05/04/20 1028)  SpO2: 99 % (05/04/20 1028) Vital Signs (24h Range):  Temp:  [97.9 °F (36.6 °C)-99.2 °F (37.3 °C)] 97.9 °F (36.6 °C)  Pulse:  [] 93  Resp:  [15-24] 16  SpO2:  [95 %-99 %] 99 %  BP: (169-180)/() 180/92        There is no height or weight on file to calculate  BMI.    No intake or output data in the 24 hours ending 05/04/20 1502    Lines/Drains/Airways     Peripheral Intravenous Line                 Peripheral IV - Single Lumen 05/04/20 0442 20 G Right Antecubital less than 1 day                Physical Exam   Constitutional: He is oriented to person, place, and time. No distress.   HENT:   Mouth/Throat: Oropharynx is clear and moist.   Eyes: No scleral icterus.   Cardiovascular: Normal rate and regular rhythm.   Pulmonary/Chest: Effort normal and breath sounds normal.   Abdominal: Soft. There is no rebound and no guarding.   Diffusely moderately tender to touch   Musculoskeletal: He exhibits no edema or deformity.   Neurological: He is alert and oriented to person, place, and time.   Skin: Skin is warm and dry.   Psychiatric: He has a normal mood and affect.   Vitals reviewed.      Significant Labs:  CBC:   Recent Labs   Lab 05/04/20  0452   WBC 11.21   HGB 13.5*   HCT 42.2        CMP:   Recent Labs   Lab 05/04/20 0452   *   CALCIUM 9.3   ALBUMIN 4.5   PROT 8.6*      K 3.2*   CO2 21*   CL 95   BUN 14   CREATININE 1.3   ALKPHOS 136*   ALT 24   AST 27   BILITOT 1.1*     Coagulation: No results for input(s): PT, INR, APTT in the last 48 hours.        Assessment/Plan:     Alcohol-induced acute pancreatitis  41 yo M with hx of hypothyroidism, HTN, alcohol abuse, tobacco use, and chronic pancreatitis who was transferred from [facility] ED for AES evaluation. Patient has had numerous episodes of acute pancreatitis in the past, most recently at the beginning of April. At this point, it is unclear if his symptoms are from acute pancreatitis vs symptoms from pseudocyst itself. However, given that the patient felt better after his last episode and symptoms came on acutely, along with the reported use of alcohol and tobacco, and CT findings showing fat stranding suggestive of pancreatitis, we will recommend managing him now for acute pancreatitis. If he does not  improve over the next few days, then we can consider EUS drainage of pseudocyst.    Recommendations:  - supportive care with IV fluids, anti-emetics, analgesia  - NPO except for ice chips, sips of water  - advance as tolerated  - strict alcohol and tobacco cessation  - continue to monitor; if no improvement over the next few days, will consider EUS drainage of pseudocyst        Thank you for your consult. I will follow-up with patient. Please contact us if you have any additional questions.    Yovany Warren MD PGY-VI  Gastroenterology Fellow  [facility]

## 2020-05-04 NOTE — PLAN OF CARE
05/04/20 1138   Post-Acute Status   Post-Acute Authorization Other   Other Status   (d/c needs TBD)   Discharge Delays None known at this time   Discharge Plan   Discharge Plan A Other  (TBD)

## 2020-05-04 NOTE — NURSING
Patient arrived to unit 1040am, Hospitalist assignment office notified per [facility] instructions .  Waiting on orders.  Patient states he's thirsty but not presently nauseas.

## 2020-05-04 NOTE — ED PROVIDER NOTES
Encounter Date: 5/4/2020       History     Chief Complaint   Patient presents with    Vomiting    Nausea    Abdominal Pain     Chief complaint nausea vomiting epigastric pain for 3 days.  The patient has a history of pancreatitis in the past had surgery taking that the pancreatitis was caused by gallbladder disease.  He however has been drinking very heavily the last 2 days up to 4 half pt of hard liquor a day.  He now feels weak with epigastric pain as well as right lower quadrant pain.  Does have history of surgery for pancreatic cyst as well.  He occasionally smokes in definite drinks he is also medicine for thyroid and takes lisinopril for his blood pressure.  He denies chest pain.  He denies any history of  arrhythmias.        Review of patient's allergies indicates:  No Known Allergies  Past Medical History:   Diagnosis Date    Anxiety     Anxiety     Bipolar affective disorder     Depression     Hypertension     Pancreatitis     Schizophrenia     Thyroid disease      Past Surgical History:   Procedure Laterality Date    LAPAROSCOPIC CHOLECYSTECTOMY N/A 3/9/2020    Procedure: CHOLECYSTECTOMY, LAPAROSCOPIC;  Surgeon: Trenton Deshpande MD;  Location: [facility];  Service: General;  Laterality: N/A;     Family History   Problem Relation Age of Onset    Diabetes Maternal Grandmother     Hypertension Maternal Grandfather     Cancer Paternal Grandfather      Social History     Tobacco Use    Smoking status: Current Some Day Smoker     Packs/day: 0.25     Years: 7.00     Pack years: 1.75    Smokeless tobacco: Never Used   Substance Use Topics    Alcohol use: Yes     Comment: occasionally    Drug use: Yes     Frequency: 7.0 times per week     Types: Marijuana     Review of Systems   Constitutional: Negative for chills and fever.   HENT: Negative for ear pain, rhinorrhea and sore throat.    Eyes: Negative for pain and visual disturbance.   Respiratory: Negative for cough and shortness of breath.     Cardiovascular: Negative for chest pain and palpitations.   Gastrointestinal: Positive for abdominal pain, nausea and vomiting. Negative for constipation and diarrhea.   Genitourinary: Negative for dysuria, frequency, hematuria and urgency.   Musculoskeletal: Negative for back pain, joint swelling and myalgias.   Skin: Negative for rash.   Neurological: Negative for dizziness, seizures, weakness and headaches.   Psychiatric/Behavioral: Negative for dysphoric mood. The patient is not nervous/anxious.        Physical Exam     Initial Vitals [05/04/20 0437]   BP Pulse Resp Temp SpO2   (!) 175/110 97 16 99.2 °F (37.3 °C) 98 %      MAP       --         Physical Exam    Nursing note and vitals reviewed.  Constitutional: He appears well-developed and well-nourished.   HENT:   Head: Normocephalic and atraumatic.   Eyes: Conjunctivae, EOM and lids are normal. Pupils are equal, round, and reactive to light.   Neck: Trachea normal. Neck supple. No thyroid mass present.   Cardiovascular: Normal rate and regular rhythm.   Patient has a small gallop   Pulmonary/Chest: Breath sounds normal. No respiratory distress.   Abdominal: Soft. Bowel sounds are normal. There is no tenderness.   Patient tender epigastric area as well as right lower quadrant no rebound good bowel sounds.   Musculoskeletal: Normal range of motion.   Neurological: He is alert and oriented to person, place, and time. He has normal strength and normal reflexes. No cranial nerve deficit or sensory deficit.   Skin: Skin is warm and dry.   Psychiatric: He has a normal mood and affect. His speech is normal and behavior is normal. Judgment and thought content normal.         ED Course   Procedures  Labs Reviewed   CBC W/ AUTO DIFFERENTIAL - Abnormal; Notable for the following components:       Result Value    Hemoglobin 13.5 (*)     Mean Corpuscular Volume 75 (*)     Mean Corpuscular Hemoglobin 24.1 (*)     RDW 19.7 (*)     Gran # (ANC) 8.8 (*)     Immature Grans  (Abs) 0.06 (*)     Gran% 78.3 (*)     Lymph% 12.8 (*)     All other components within normal limits   COMPREHENSIVE METABOLIC PANEL - Abnormal; Notable for the following components:    Potassium 3.2 (*)     CO2 21 (*)     Glucose 163 (*)     Total Protein 8.6 (*)     Total Bilirubin 1.1 (*)     Alkaline Phosphatase 136 (*)     Anion Gap 20 (*)     All other components within normal limits   LIPASE - Abnormal; Notable for the following components:    Lipase 121 (*)     All other components within normal limits   CULTURE, BLOOD   CULTURE, BLOOD   TSH   T4, FREE   LACTIC ACID, PLASMA   SARS-COV-2 RNA AMPLIFICATION, QUAL          Imaging Results          CT Abdomen Pelvis With Contrast (Final result)  Result time 05/04/20 06:05:08    Final result by Elana Hartmann MD (05/04/20 06:05:08)                 Narrative:    CMS MANDATED QUALITY DATA - CT RADIATION 436. CT scans at this  facility utilize dose modulation, iterative reconstruction, and/or  weight based dosing when appropriate to reduce radiation dose to as  low as reasonably achievable.    PROCEDURE:    CT ABDOMEN PELVIS WITH CONTRAST  dated  5/4/2020 4:49 AM    CLINICAL HISTORY:   Male 40 years of age.   abdominal pain.previous.  Chronic pancreatitis.    TECHNIQUE:  CT of the Abdomen and Pelvis was performed after the  intravenous administration of contrast.    COMPARISON:  April 4, 2020 and February 26, 2020    FINDINGS:    There is no pleural effusion or pulmonary infiltrate within the  included lower chest.    Encapsulated fluid collection coursing between the pancreatic body and  the gastric greater curvature is compatible with a pseudocyst. This is  larger to a mild degree than it was on the study April 4, 2020.  Currently, it measures 9.5 cm in length by 5.2 cm cephalocaudal by 4.1  cm AP (image 23 coronal series 5, image 39 sagittal series 6).  Previously it measured 9.2 cm x 3.5 cm x 3.1 cm (measured using  similar technique). There is mass effect on  the stomach. The wall of  the adjacent gastric body/antrum is not thickened.    There is a second pancreatic pseudocyst which has also increased  mildly in size. This is U shaped (image 36, coronal series 5) and  abuts the junction of the pancreatic tail/body junction and the  gastric lesser curvature and gastric cardia. Thickening of the wall of  the gastric cardia, GE junction and included distal esophagus has  developed since the prior study. The pseudocyst measures 3 cm in  maximal width by 2 cm cephalocaudal (image 36 coronal series 5) by 2  cm AP (image 41 sagittal series 6). On the prior study it was around 2  cm in maximal diameter.    There are innumerable calcifications throughout the pancreatic  parenchyma. The largest are within the pancreatic head. The duct  within the body of the pancreas is mildly dilated, reaching 4 mm in  maximal diameter. There is no apparent calcification within the main  pancreatic duct. There is mild stranding of fat adjacent to the  pancreatic tail and tracking along the medial aspect of the spleen.  Previously there was fairly prominent abnormality of the fat  surrounding the splenic flexure. The stranding and edema has resolved.  The residual nodular soft tissue surrounding a portion of the splenic  flexure is evolving fat necrosis.    The common bile duct is not dilated. The liver is normal. There are  cholecystectomy clips in the right upper quadrant. The spleen, adrenal  glands and kidneys are normal.    Aorta is normal. Splenic artery arises separately from the celiac  axis. There is no pseudoaneurysm. Portal veins are patent.    The appendix is normal. Bowel is not dilated. The previous thickening  of the wall of the splenic flexure at the time of acute pancreatitis  on the prior study as resolved.Urinary bladder is nearly empty.  Prostate gland seminal vesicles are normal.    There is no lymphadenopathy.    Bones and soft tissues are unremarkable.    IMPRESSION: There  is chronic pancreatitis. Dominant pseudocyst between  the pancreas and the gastric greater curvature and a smaller  pseudocyst between the pancreas and the lesser curvature/gastric  cardia have increased in size. There is been development mural  thickening of the adjacent gastric cardia, GE junction and contiguous  with thickened included distal esophagus. Question distal esophagitis.  Mild stranding of fat adjacent to the pancreatic tail and a small  amount of perisplenic fluid may reflect an acute component of  pancreatitis.  Fat necrosis adjacent to the splenic flexure has involved. There is  residual soft tissue attenuation. There are innumerable pancreatic  calcifications, none of which appear to be intraductal.    Electronically Signed by Elana Hartmann on 5/4/2020 6:35 AM                               Medical Decision Making:   Clinical Tests:   Sepsis Perfusion Assessment: I attest, a sepsis perfusion exam was performed within 6 hours of Septic Shock presentation, following fluid resuscitation.  ED Management:  Patient CT shows evolving pseudocyst, enlarging, evolving fat necrosis, perisplenic fluid.  Review of patient's chart reveals in the past referral to specialist for advance endoscopy and pancreatic biliary intervention.  Patient has not had this follow-up.  Will discuss this with the transfer center.  Patient remains clinically stable.  He is awake and alert.  His abdomen is diffusely tender.                                 Clinical Impression:       ICD-10-CM ICD-9-CM   1. Acute pancreatitis, unspecified complication status, unspecified pancreatitis type K85.90 577.0   2. Pseudocyst of pancreas K86.3 577.2             ED Disposition Condition    Transfer to Another Facility Stable                          James Silverman MD  05/04/20 8514       James Silverman MD  05/04/20 1596

## 2020-05-04 NOTE — PLAN OF CARE
[facility] [place] admissions ONLY: Please call extension [phone] upon patient arrival to floor for Hospital Medicine admit team assignment and for additional admit orders for the patient. Do not page the attending, staff physician or Advanced Practice Provider with the patient on arrival (may not be in-house at the time of arrival). Call back or wait to leave beeper number when prompted.     Outside Transfer Acceptance Note        Patients name/MRN:      Bradley Collado, MRN: [id number]     Referring Physician or Mid-Level provider giving report:    Dr. James Silverman MD     Referral Facility: Critical access hospital ED      Date/Time of Acceptance:     5/4/20 at 7:30am     Accepting Physician for admission to hospital: MIGUE Borrego MD ()     Accepting facility:     [facility].     Consulting Physicians from [facility] involved in case:    GINA Bruce at [facility].    Reason for transfer request:       COVID NEGATIVE    Mr. Collado is a 39yo man with a past medical history of hypothyroidism, hypertension, schizophrenia, anxiety, chronic pancreatitis, active smoker, and alcohol abuse.  Previous surgical history includes laparoscopic cholecystectomy on 03/09/2020 by Dr. Deshpande.  He was most recently admitted at [facility] on 4/4 to 4/6 with acute pancreatitis with a lipase of 500.  CT of the abdomen and pelvis demonstrated likely pancreatic pseudocyst and peripancreatic stranding consistent with acute pancreatitis.  CT also demonstrated pericolonic stranding consistent with diverticulitis versus colitis.  GI was consulted at that time and recommended, MRCP once episode of pancreatitis resolves; needs imaging at least 6-8 weeks after this episode to evaluate pseudocysts and if they remain large and patient is symptomatic at that time would consider referral for EUS drainage.  Continue antibiotics for diverticulitis for 7 days.    He now presents to the ED at [facility] with vomiting and  "epigastric pain x 3 days.  He has also been drinking very heavily in the past tow days, about 2 pints of liquor qday.  On exam he has epigastric tenderness, but no rebound.  By labs he has some mild CHAPO (Cr 1.3, up from baseline of 1.0).  His lipase is only 121.  K is low at 3.2 in ED.      Repeat CT abdomen today shows there is chronic pancreatitis and pseudocysts. The dominant pseudocyst is between the pancreas and the gastric greater curvature and a smaller pseudocyst is between the pancreas and the lesser curvature/gastric cardia have increased in size. There has been development of mural  thickening of the adjacent gastric cardia, GE junction and contiguous with thickened included distal esophagus, questionable distal esophagitis.  There is mild stranding of fat adjacent to the pancreatic tail and a small amount of perisplenic fluid which may reflect an acute component of pancreatitis.  Fat necrosis adjacent to the splenic flexure has involved. There is residual soft tissue attenuation. There are innumerable pancreatic calcifications, none of which appear to be intraductal.    In the ED he was given Zosyn 4.5g, Dilaudid 1mg, Phenergan and Zofran, and hydralazine 5mg iv x 1 for elevated BP.  He was given 1L NS bolus and then 125 cc/hr.    To Do List upon arrival:    NPO, AES consult  Addiction psych  MVI, thiamine, folate, IVF's  Pain control    Vital signs:  BP (!) 175/104   Pulse 95   Temp 99.2 °F (37.3 °C) (Oral)   Resp 16   Ht 5' 7" (1.702 m)   Wt 77.1 kg (170 lb)   SpO2 95%   BMI 26.63 kg/m²    Temp:  [99.2 °F (37.3 °C)] 99.2 °F (37.3 °C)  Pulse:  [] 95  Resp:  [15-24] 16  SpO2:  [95 %-99 %] 95 %  BP: (169-178)/() 175/104     Past Medical History:   Diagnosis Date    Anxiety     Anxiety     Bipolar affective disorder     Depression     Hypertension     Pancreatitis     Schizophrenia     Thyroid disease      Current Outpatient Medications:     ARIPiprazole (ABILIFY) 10 MG Tab, " Take 1 tablet (10 mg total) by mouth once daily. (Patient not taking: Reported on 4/8/2020),      cloNIDine 0.1 mg/24 hr td ptwk (CATAPRES) 0.1 mg/24 hr, Place 1 patch onto the skin every 7 days., Disp: 4 patch, Rfl: 11    gabapentin (NEURONTIN) 300 MG capsule, Take 1 capsule (300 mg total) by mouth 3 (three) times daily., Disp: 90 capsule, Rfl: 1    hydrOXYzine pamoate (VISTARIL) 25 MG Cap, Take 25 mg by mouth 4 (four) times daily., Disp: , Rfl:     levothyroxine (SYNTHROID) 50 MCG tablet, Take 1 tablet (50 mcg total) by mouth before breakfast., Disp: 30 tablet, Rfl: 1    lisinopril (PRINIVIL,ZESTRIL) 40 MG tablet, Take 1 tablet (40 mg total) by mouth once daily., Disp: 90 tablet, Rfl: 1    mirtazapine (REMERON) 30 MG tablet, Take 1 tablet (30 mg total) by mouth nightly., Disp: 30 tablet, Rfl: 1    ondansetron (ZOFRAN) 8 MG tablet, Take 1 tablet (8 mg total) by mouth every 8 (eight) hours as needed for Nausea   oxyCODONE-acetaminophen (PERCOCET)  mg per tablet, Take 1 tablet by mouth every 6 (six) hours as needed for Pain   pantoprazole (PROTONIX) 40 MG tablet, Take 40 mg by mouth once daily., Disp: , Rfl:     QUEtiapine (SEROQUEL) 200 MG Tab, Take 1 tablet (200 mg total) by mouth nightly as needed (insomnia). (Patient taking differently: Take 300 mg by mouth nightly as needed (insomnia). ), Disp: 30 tablet, Rfl: 1    sertraline (ZOLOFT) 50 MG tablet, Take 1 tablet (50 mg total) by mouth once daily., Disp: 30 tablet, Rfl: 1    Facility-Administered Medications Ordered in Other Encounters:     0.9%  NaCl infusion, 1,000 mL, Intravenous, Continuous, James Silverman MD    piperacillin-tazobactam 4.5 g in dextrose 5 % 100 mL IVPB (ready to mix system), 4.5 g, Intravenous, ED 1 Time     LABS:  Recent Results (from the past 24 hour(s))   CBC auto differential    Collection Time: 05/04/20  4:52 AM   Result Value Ref Range    WBC 11.21 3.90 - 12.70 K/uL    RBC 5.61 4.60 - 6.20 M/uL    Hemoglobin 13.5 (L)  14.0 - 18.0 g/dL    Hematocrit 42.2 40.0 - 54.0 %    Mean Corpuscular Volume 75 (L) 82 - 98 fL    Mean Corpuscular Hemoglobin 24.1 (L) 27.0 - 31.0 pg    Mean Corpuscular Hemoglobin Conc 32.0 32.0 - 36.0 g/dL    RDW 19.7 (H) 11.5 - 14.5 %    Platelets 328 150 - 350 K/uL    MPV 10.8 9.2 - 12.9 fL    Immature Granulocytes 0.5 0.0 - 0.5 %    Gran # (ANC) 8.8 (H) 1.8 - 7.7 K/uL    Immature Grans (Abs) 0.06 (H) 0.00 - 0.04 K/uL    Lymph # 1.4 1.0 - 4.8 K/uL    Mono # 0.9 0.3 - 1.0 K/uL    Eos # 0.0 0.0 - 0.5 K/uL    Baso # 0.06 0.00 - 0.20 K/uL    nRBC 0 0 /100 WBC    Gran% 78.3 (H) 38.0 - 73.0 %    Lymph% 12.8 (L) 18.0 - 48.0 %    Mono% 7.7 4.0 - 15.0 %    Eosinophil% 0.2 0.0 - 8.0 %    Basophil% 0.5 0.0 - 1.9 %    Differential Method Automated    Comprehensive metabolic panel    Collection Time: 05/04/20  4:52 AM   Result Value Ref Range    Sodium 136 136 - 145 mmol/L    Potassium 3.2 (L) 3.5 - 5.1 mmol/L    Chloride 95 95 - 110 mmol/L    CO2 21 (L) 23 - 29 mmol/L    Glucose 163 (H) 70 - 110 mg/dL    BUN, Bld 14 6 - 20 mg/dL    Creatinine 1.3 0.5 - 1.4 mg/dL    Calcium 9.3 8.7 - 10.5 mg/dL    Total Protein 8.6 (H) 6.0 - 8.4 g/dL    Albumin 4.5 3.5 - 5.2 g/dL    Total Bilirubin 1.1 (H) 0.1 - 1.0 mg/dL    Alkaline Phosphatase 136 (H) 55 - 135 U/L    AST 27 10 - 40 U/L    ALT 24 10 - 44 U/L    Anion Gap 20 (H) 8 - 16 mmol/L    eGFR if African American >60.0 >60 mL/min/1.73 m^2    eGFR if non African American >60.0 >60 mL/min/1.73 m^2   TSH    Collection Time: 05/04/20  4:52 AM   Result Value Ref Range    TSH 1.170 0.340 - 5.600 uIU/mL   T4, free    Collection Time: 05/04/20  4:52 AM   Result Value Ref Range    Free T4 0.74 0.71 - 1.51 ng/dL   Lipase    Collection Time: 05/04/20  4:52 AM   Result Value Ref Range    Lipase 121 (H) 4 - 60 U/L   Lactic Acid, Plasma    Collection Time: 05/04/20  7:15 AM   Result Value Ref Range    Lactate (Lactic Acid) 3.5 (HH) 0.5 - 1.9 mmol/L   COVID-19 Routine Screening    Collection Time:  05/04/20  7:20 AM   Result Value Ref Range    SARS-CoV-2 RNA, Amplification, Qual Negative Negative      PROCEDURE:    CT ABDOMEN PELVIS WITH CONTRAST  dated  5/4/2020 4:49 AM    CLINICAL HISTORY:   Male 40 years of age.   abdominal pain.previous.  Chronic pancreatitis.    TECHNIQUE:  CT of the Abdomen and Pelvis was performed after the  intravenous administration of contrast.    COMPARISON:  April 4, 2020 and February 26, 2020    FINDINGS:    There is no pleural effusion or pulmonary infiltrate within the  included lower chest.    Encapsulated fluid collection coursing between the pancreatic body and  the gastric greater curvature is compatible with a pseudocyst. This is  larger to a mild degree than it was on the study April 4, 2020.  Currently, it measures 9.5 cm in length by 5.2 cm cephalocaudal by 4.1  cm AP (image 23 coronal series 5, image 39 sagittal series 6).  Previously it measured 9.2 cm x 3.5 cm x 3.1 cm (measured using  similar technique). There is mass effect on the stomach. The wall of  the adjacent gastric body/antrum is not thickened.    There is a second pancreatic pseudocyst which has also increased  mildly in size. This is U shaped (image 36, coronal series 5) and  abuts the junction of the pancreatic tail/body junction and the  gastric lesser curvature and gastric cardia. Thickening of the wall of  the gastric cardia, GE junction and included distal esophagus has  developed since the prior study. The pseudocyst measures 3 cm in  maximal width by 2 cm cephalocaudal (image 36 coronal series 5) by 2  cm AP (image 41 sagittal series 6). On the prior study it was around 2  cm in maximal diameter.    There are innumerable calcifications throughout the pancreatic  parenchyma. The largest are within the pancreatic head. The duct  within the body of the pancreas is mildly dilated, reaching 4 mm in  maximal diameter. There is no apparent calcification within the main  pancreatic duct. There is mild  stranding of fat adjacent to the  pancreatic tail and tracking along the medial aspect of the spleen.  Previously there was fairly prominent abnormality of the fat  surrounding the splenic flexure. The stranding and edema has resolved.  The residual nodular soft tissue surrounding a portion of the splenic  flexure is evolving fat necrosis.    The common bile duct is not dilated. The liver is normal. There are  cholecystectomy clips in the right upper quadrant. The spleen, adrenal  glands and kidneys are normal.    Aorta is normal. Splenic artery arises separately from the celiac  axis. There is no pseudoaneurysm. Portal veins are patent.    The appendix is normal. Bowel is not dilated. The previous thickening  of the wall of the splenic flexure at the time of acute pancreatitis  on the prior study as resolved.Urinary bladder is nearly empty.  Prostate gland seminal vesicles are normal.    There is no lymphadenopathy.    Bones and soft tissues are unremarkable.    IMPRESSION: There is chronic pancreatitis. Dominant pseudocyst between  the pancreas and the gastric greater curvature and a smaller  pseudocyst between the pancreas and the lesser curvature/gastric  cardia have increased in size. There is been development mural  thickening of the adjacent gastric cardia, GE junction and contiguous  with thickened included distal esophagus. Question distal esophagitis.  Mild stranding of fat adjacent to the pancreatic tail and a small  amount of perisplenic fluid may reflect an acute component of  pancreatitis.  Fat necrosis adjacent to the splenic flexure has involved. There is  residual soft tissue attenuation. There are innumerable pancreatic  calcifications, none of which appear to be intraductal.    Electronically Signed by Elana Hartmann on 5/4/2020 6:35 AM          MIGUE Borrego MD  Department of Hospital Medicine  Patient Flow Center/   [phone]

## 2020-05-04 NOTE — HPI
This is a 39 yo M with hx of hypothyroidism, HTN, alcohol abuse, tobacco use, and chronic pancreatitis who was transferred from [facility] ED for AES evaluation. Patient has had numerous episodes of acute pancreatitis in the past. He had his gallbladder removed in March 2020 of this year but has since had two episodes of acute pancreatitis. He was most recently discharged from [facility] on 4/6 after being admitted for two days for acute pancreatitis. CT obtained at that time showed formation of pseudocysts. Patient did well with supportive care and was discharged. He reports feeling well after discharge up until about three days ago. He states he first began to feel nauseated and then had vomiting and abdominal pain. He also reports he began drinking heavily about 2-3 days ago. He reports smoking cigarettes as well, a few per week. He has not been able to tolerate much food since. His pain is now diffuse and reports that it feels like his usual episodes of pancreatitis. CT AP was obtained this morning which was notable for dominant pseudocyst between pancreas and greater curvature as well as a smaller pseudocyst between pancreas and lesser curvature, along with mild fat stranding adjacent to pancreatic tail.

## 2020-05-05 LAB
ALBUMIN SERPL BCP-MCNC: 3.8 G/DL (ref 3.5–5.2)
ALP SERPL-CCNC: 145 U/L (ref 55–135)
ALT SERPL W/O P-5'-P-CCNC: 20 U/L (ref 10–44)
ANION GAP SERPL CALC-SCNC: 11 MMOL/L (ref 8–16)
AST SERPL-CCNC: 22 U/L (ref 10–40)
BASOPHILS # BLD AUTO: 0.04 K/UL (ref 0–0.2)
BASOPHILS NFR BLD: 0.6 % (ref 0–1.9)
BILIRUB SERPL-MCNC: 1 MG/DL (ref 0.1–1)
BUN SERPL-MCNC: 8 MG/DL (ref 6–20)
CALCIUM SERPL-MCNC: 9.4 MG/DL (ref 8.7–10.5)
CHLORIDE SERPL-SCNC: 98 MMOL/L (ref 95–110)
CO2 SERPL-SCNC: 29 MMOL/L (ref 23–29)
CREAT SERPL-MCNC: 1.2 MG/DL (ref 0.5–1.4)
DIFFERENTIAL METHOD: ABNORMAL
EOSINOPHIL # BLD AUTO: 0.1 K/UL (ref 0–0.5)
EOSINOPHIL NFR BLD: 1.9 % (ref 0–8)
ERYTHROCYTE [DISTWIDTH] IN BLOOD BY AUTOMATED COUNT: 19.6 % (ref 11.5–14.5)
EST. GFR  (AFRICAN AMERICAN): >60 ML/MIN/1.73 M^2
EST. GFR  (NON AFRICAN AMERICAN): >60 ML/MIN/1.73 M^2
GLUCOSE SERPL-MCNC: 94 MG/DL (ref 70–110)
HCT VFR BLD AUTO: 38.8 % (ref 40–54)
HGB BLD-MCNC: 11.6 G/DL (ref 14–18)
IMM GRANULOCYTES # BLD AUTO: 0.02 K/UL (ref 0–0.04)
IMM GRANULOCYTES NFR BLD AUTO: 0.3 % (ref 0–0.5)
LYMPHOCYTES # BLD AUTO: 1.8 K/UL (ref 1–4.8)
LYMPHOCYTES NFR BLD: 27.5 % (ref 18–48)
MAGNESIUM SERPL-MCNC: 1.9 MG/DL (ref 1.6–2.6)
MCH RBC QN AUTO: 24.2 PG (ref 27–31)
MCHC RBC AUTO-ENTMCNC: 29.9 G/DL (ref 32–36)
MCV RBC AUTO: 81 FL (ref 82–98)
MONOCYTES # BLD AUTO: 0.4 K/UL (ref 0.3–1)
MONOCYTES NFR BLD: 6.5 % (ref 4–15)
NEUTROPHILS # BLD AUTO: 4.1 K/UL (ref 1.8–7.7)
NEUTROPHILS NFR BLD: 63.2 % (ref 38–73)
NRBC BLD-RTO: 0 /100 WBC
PHOSPHATE SERPL-MCNC: 2.6 MG/DL (ref 2.7–4.5)
PLATELET # BLD AUTO: 195 K/UL (ref 150–350)
PMV BLD AUTO: 11.1 FL (ref 9.2–12.9)
POTASSIUM SERPL-SCNC: 3.2 MMOL/L (ref 3.5–5.1)
PROT SERPL-MCNC: 7.5 G/DL (ref 6–8.4)
RBC # BLD AUTO: 4.79 M/UL (ref 4.6–6.2)
SODIUM SERPL-SCNC: 138 MMOL/L (ref 136–145)
WBC # BLD AUTO: 6.47 K/UL (ref 3.9–12.7)

## 2020-05-05 PROCEDURE — 25000003 PHARM REV CODE 250: Performed by: STUDENT IN AN ORGANIZED HEALTH CARE EDUCATION/TRAINING PROGRAM

## 2020-05-05 PROCEDURE — 99233 PR SUBSEQUENT HOSPITAL CARE,LEVL III: ICD-10-PCS | Mod: ,,, | Performed by: HOSPITALIST

## 2020-05-05 PROCEDURE — 11000001 HC ACUTE MED/SURG PRIVATE ROOM

## 2020-05-05 PROCEDURE — 36415 COLL VENOUS BLD VENIPUNCTURE: CPT

## 2020-05-05 PROCEDURE — 83735 ASSAY OF MAGNESIUM: CPT

## 2020-05-05 PROCEDURE — 63600175 PHARM REV CODE 636 W HCPCS: Performed by: STUDENT IN AN ORGANIZED HEALTH CARE EDUCATION/TRAINING PROGRAM

## 2020-05-05 PROCEDURE — 85025 COMPLETE CBC W/AUTO DIFF WBC: CPT

## 2020-05-05 PROCEDURE — 99233 SBSQ HOSP IP/OBS HIGH 50: CPT | Mod: ,,, | Performed by: HOSPITALIST

## 2020-05-05 PROCEDURE — 84100 ASSAY OF PHOSPHORUS: CPT

## 2020-05-05 PROCEDURE — 80053 COMPREHEN METABOLIC PANEL: CPT

## 2020-05-05 RX ORDER — SODIUM,POTASSIUM PHOSPHATES 280-250MG
2 POWDER IN PACKET (EA) ORAL ONCE
Status: COMPLETED | OUTPATIENT
Start: 2020-05-05 | End: 2020-05-05

## 2020-05-05 RX ORDER — POTASSIUM CHLORIDE 20 MEQ/1
40 TABLET, EXTENDED RELEASE ORAL
Status: COMPLETED | OUTPATIENT
Start: 2020-05-05 | End: 2020-05-05

## 2020-05-05 RX ORDER — CLONIDINE HYDROCHLORIDE 0.1 MG/1
0.1 TABLET ORAL 3 TIMES DAILY
COMMUNITY
End: 2020-05-07 | Stop reason: HOSPADM

## 2020-05-05 RX ORDER — LISINOPRIL 20 MG/1
40 TABLET ORAL DAILY
Status: DISCONTINUED | OUTPATIENT
Start: 2020-05-05 | End: 2020-05-07 | Stop reason: HOSPADM

## 2020-05-05 RX ORDER — SERTRALINE HYDROCHLORIDE 100 MG/1
100 TABLET, FILM COATED ORAL DAILY
Status: ON HOLD | COMMUNITY
End: 2020-10-12 | Stop reason: HOSPADM

## 2020-05-05 RX ORDER — MIRTAZAPINE 45 MG/1
45 TABLET, FILM COATED ORAL NIGHTLY
Status: ON HOLD | COMMUNITY
End: 2020-10-12 | Stop reason: HOSPADM

## 2020-05-05 RX ORDER — QUETIAPINE FUMARATE 300 MG/1
300 TABLET, FILM COATED ORAL NIGHTLY
Status: ON HOLD | COMMUNITY
End: 2020-10-12 | Stop reason: SDUPTHER

## 2020-05-05 RX ORDER — HYDROMORPHONE HYDROCHLORIDE 1 MG/ML
0.5 INJECTION, SOLUTION INTRAMUSCULAR; INTRAVENOUS; SUBCUTANEOUS ONCE
Status: COMPLETED | OUTPATIENT
Start: 2020-05-05 | End: 2020-05-05

## 2020-05-05 RX ORDER — METOPROLOL SUCCINATE 25 MG/1
12.5 TABLET, EXTENDED RELEASE ORAL NIGHTLY
Status: ON HOLD | COMMUNITY
End: 2020-07-03 | Stop reason: HOSPADM

## 2020-05-05 RX ADMIN — SERTRALINE HYDROCHLORIDE 50 MG: 50 TABLET ORAL at 08:05

## 2020-05-05 RX ADMIN — POTASSIUM & SODIUM PHOSPHATES POWDER PACK 280-160-250 MG 2 PACKET: 280-160-250 PACK at 08:05

## 2020-05-05 RX ADMIN — QUETIAPINE FUMARATE 200 MG: 100 TABLET ORAL at 11:05

## 2020-05-05 RX ADMIN — SODIUM CHLORIDE, SODIUM LACTATE, POTASSIUM CHLORIDE, AND CALCIUM CHLORIDE: .6; .31; .03; .02 INJECTION, SOLUTION INTRAVENOUS at 06:05

## 2020-05-05 RX ADMIN — HEPARIN SODIUM 5000 UNITS: 5000 INJECTION, SOLUTION INTRAVENOUS; SUBCUTANEOUS at 04:05

## 2020-05-05 RX ADMIN — THERA TABS 1 TABLET: TAB at 08:05

## 2020-05-05 RX ADMIN — OXYCODONE HYDROCHLORIDE AND ACETAMINOPHEN 1 TABLET: 10; 325 TABLET ORAL at 12:05

## 2020-05-05 RX ADMIN — HYDROMORPHONE HYDROCHLORIDE 0.5 MG: 1 INJECTION, SOLUTION INTRAMUSCULAR; INTRAVENOUS; SUBCUTANEOUS at 03:05

## 2020-05-05 RX ADMIN — OXYCODONE HYDROCHLORIDE AND ACETAMINOPHEN 1 TABLET: 10; 325 TABLET ORAL at 09:05

## 2020-05-05 RX ADMIN — GABAPENTIN 300 MG: 300 CAPSULE ORAL at 08:05

## 2020-05-05 RX ADMIN — HYDROXYZINE PAMOATE 25 MG: 25 CAPSULE ORAL at 08:05

## 2020-05-05 RX ADMIN — AMLODIPINE BESYLATE 10 MG: 10 TABLET ORAL at 08:05

## 2020-05-05 RX ADMIN — PANTOPRAZOLE SODIUM 40 MG: 40 TABLET, DELAYED RELEASE ORAL at 08:05

## 2020-05-05 RX ADMIN — GABAPENTIN 300 MG: 300 CAPSULE ORAL at 11:05

## 2020-05-05 RX ADMIN — HYDROXYZINE PAMOATE 25 MG: 25 CAPSULE ORAL at 11:05

## 2020-05-05 RX ADMIN — HYDROMORPHONE HYDROCHLORIDE 0.5 MG: 1 INJECTION, SOLUTION INTRAMUSCULAR; INTRAVENOUS; SUBCUTANEOUS at 11:05

## 2020-05-05 RX ADMIN — LEVOTHYROXINE SODIUM 50 MCG: 50 TABLET ORAL at 05:05

## 2020-05-05 RX ADMIN — Medication 100 MG: at 08:05

## 2020-05-05 RX ADMIN — LISINOPRIL 40 MG: 20 TABLET ORAL at 12:05

## 2020-05-05 RX ADMIN — FOLIC ACID 1 MG: 1 TABLET ORAL at 08:05

## 2020-05-05 RX ADMIN — SODIUM CHLORIDE, SODIUM LACTATE, POTASSIUM CHLORIDE, AND CALCIUM CHLORIDE: .6; .31; .03; .02 INJECTION, SOLUTION INTRAVENOUS at 01:05

## 2020-05-05 RX ADMIN — HEPARIN SODIUM 5000 UNITS: 5000 INJECTION, SOLUTION INTRAVENOUS; SUBCUTANEOUS at 05:05

## 2020-05-05 RX ADMIN — HYDROXYZINE PAMOATE 25 MG: 25 CAPSULE ORAL at 12:05

## 2020-05-05 RX ADMIN — HYDROMORPHONE HYDROCHLORIDE 0.5 MG: 1 INJECTION, SOLUTION INTRAMUSCULAR; INTRAVENOUS; SUBCUTANEOUS at 04:05

## 2020-05-05 RX ADMIN — OXYCODONE HYDROCHLORIDE AND ACETAMINOPHEN 1 TABLET: 10; 325 TABLET ORAL at 05:05

## 2020-05-05 RX ADMIN — POTASSIUM CHLORIDE 40 MEQ: 1500 TABLET, EXTENDED RELEASE ORAL at 08:05

## 2020-05-05 RX ADMIN — POTASSIUM CHLORIDE 40 MEQ: 1500 TABLET, EXTENDED RELEASE ORAL at 10:05

## 2020-05-05 RX ADMIN — GABAPENTIN 300 MG: 300 CAPSULE ORAL at 04:05

## 2020-05-05 RX ADMIN — MIRTAZAPINE 30 MG: 7.5 TABLET ORAL at 11:05

## 2020-05-05 RX ADMIN — HYDROXYZINE PAMOATE 25 MG: 25 CAPSULE ORAL at 04:05

## 2020-05-05 NOTE — ASSESSMENT & PLAN NOTE
Patient is on home lisinopril 40mg qd and clonidine 0.1mg TID.   BP appears uncontrolled for which he received 1 dose of hydralazine 5mg IV. SBP elevated at 180 upon admission.    Plan  -Continue amlodipine 10mg which is preferred first line therapy for this patient's population  -Resume lisinopril 40mg since CHAPO has resolved. Will continue to monitor renal function  -Continue to hold clonidine. Will discontinue permanently upon discharge.

## 2020-05-05 NOTE — ASSESSMENT & PLAN NOTE
Patient with chronic pancreatitis presenting with acute flare related to alcohol use. Lipase 121 on admission. CT abdomen  shows findings indicative of acute on chronic pancreatitis along with pseudocysts. Vitals appear stable and patient shows no signs of peritonitis on examination.     Plan  -Patient is markedly improved and tolerating regular diet. Will plan for discharge tomorrow   -Pain appears to be controlled, advised to utilize PO pain meds when needed  -AES following, no further interventions as patient is improving

## 2020-05-05 NOTE — PROGRESS NOTES
AES Treatment Plan    Patient feeling significantly better and able to tolerate diet. Primary team anticipates discharge home tomorrow.     No endoscopic intervention necessary at this time.     Yovany Warren  Gastroenterology Fellow PGY-VI  [facility]

## 2020-05-05 NOTE — ASSESSMENT & PLAN NOTE
Patient with significant alcohol abuse failed rehab treatments in the past, with relapse a few days ago resulting to acute pancreatitis flare. Patient doesn't appear to be in alcohol withdrawal, CIWA of 0 currently.     Plan  -Thiamine, multivitamins and folic acid supplementation  -Will offer alcohol cessation resource material

## 2020-05-05 NOTE — NURSING
PT'S BP AT 1957 /102 HR 79.  AT 2149 PT'S BP MANUALLY TAKEN /100.  AT 2312 PT'S /100 HR 75.  ON CALL PHYSICIAN (MS) NOTIFIED BY THIS NURSE (RM) OF ALL ELEVATED BP'S DURING THIS SHIFT.  PER ON CALL PHYSICIAN (MS) NOTIFIED MEDICAL TEAM IF PT'S  OR GREATER OR  OR GREATER.  WILL CONTINUE TO MONITOR.

## 2020-05-05 NOTE — ASSESSMENT & PLAN NOTE
CT abdomen findings positive for 2 pseudocysts measuring at 9cm and 3cm respectively    Plan  -AES consulted. Will re-image if symptoms unimproved

## 2020-05-05 NOTE — PLAN OF CARE
05/05/20 1611   Post-Acute Status   Post-Acute Authorization Other     SW provided substance abuse resources to patient. SW provided emotional support.     Carolin Uriostegui LMSW  [facility]  [phone]

## 2020-05-05 NOTE — H&P
[facility] Hospital Medicine  History & Physical    Patient Name: Bradley Collado  MRN: [id number]  Admission Date: 5/4/2020  Attending Physician: Yamilet Kong MD   Primary Care Provider: Arlen Belcher NP    Hospital Medicine Team: [facility] [place] [facility]P MED 5 Trinidad Andrade MD     Patient information was obtained from patient and past medical records.     Subjective:     Principal Problem:Alcohol-induced acute pancreatitis    Chief Complaint: Abdominal pain       HPI: Bradley Collado is a 40 year old M with chronic pancreatitis s/p cholecystectomy, HTN, hypothyroidism, alcohol use disorder (failed mutiple rehab rx), tobacco use presenting as a transfer from [facility] for [facility] evaluation. Patient had presented there for a 3 day history of epigastric abdominal pain, nausea and vomiting. Patient states this started 3 days ago during the same time he started to drink heavily (previously sober for 3 months and drank about a fifth of liquor (750ml) daily. Patient states pain is diffuse in his abdomen and radiates to his back. He denies any aggravating factors but states percocet tends to improve pain, although not currently on percocet at home. Patient denies fever or chills. He reports loss of appetite and has not had any PO intake for the past two days.     [facility].  He was most recently admitted at [facility] on 4/4 to 4/6 with acute pancreatitis with a lipase of 500.  CT of the abdomen and pelvis demonstrated likely pancreatic pseudocyst and peripancreatic stranding consistent with acute pancreatitis.  CT also demonstrated pericolonic stranding consistent with diverticulitis versus colitis.  GI was consulted at that time and recommended, MRCP once episode of pancreatitis resolves; needs imaging at least 6-8 weeks after this episode to evaluate pseudocysts and if they remain large and patient is symptomatic at that time would  consider referral for EUS drainage.  Continue antibiotics for diverticulitis for 7 days.    In the ED he was given Zosyn 4.5g, Dilaudid 1mg, Phenergan and Zofran, and hydralazine 5mg IV x 1 for elevated BP.  He was given 1L NS bolus and then 125 cc/hr.  CT abdomen today shows there is chronic pancreatitis and pseudocysts. The dominant pseudocyst is between the pancreas and the gastric greater curvature and a smaller pseudocyst is between the pancreas and the lesser curvature/gastric cardia have increased in size.    Past Medical History:   Diagnosis Date    Anxiety     Anxiety     Bipolar affective disorder     Depression     Hypertension     Pancreatitis     Schizophrenia     Thyroid disease        Past Surgical History:   Procedure Laterality Date    LAPAROSCOPIC CHOLECYSTECTOMY N/A 3/9/2020    Procedure: CHOLECYSTECTOMY, LAPAROSCOPIC;  Surgeon: Trenton Deshpande MD;  Location: [facility];  Service: General;  Laterality: N/A;       Review of patient's allergies indicates:  No Known Allergies    Current Facility-Administered Medications on File Prior to Encounter   Medication    [COMPLETED] 0.9%  NaCl infusion    [COMPLETED] hydrALAZINE injection 5 mg    [COMPLETED] HYDROmorphone injection 1 mg    [COMPLETED] HYDROmorphone injection 1 mg    [COMPLETED] HYDROmorphone injection 1 mg    [COMPLETED] iohexoL (OMNIPAQUE 350) injection 100 mL    [COMPLETED] ondansetron injection 4 mg    [COMPLETED] piperacillin-tazobactam 4.5 g in dextrose 5 % 100 mL IVPB (ready to mix system)    [COMPLETED] promethazine (PHENERGAN) 12.5 mg in dextrose 5 % 50 mL IVPB    [COMPLETED] promethazine (PHENERGAN) 12.5 mg in dextrose 5 % 50 mL IVPB    [COMPLETED] sodium chloride 0.9% bolus 1,000 mL    [DISCONTINUED] 0.9%  NaCl infusion    [DISCONTINUED] potassium chloride 10% oral solution 20 mEq    [DISCONTINUED] sodium chloride 0.9% 1,000 mL with mvi, adult no.4 with vit K 3,300 unit- 150 mcg/10 mL 10 mL, thiamine 100 mg,  folic acid 1 mg infusion     Current Outpatient Medications on File Prior to Encounter   Medication Sig    ARIPiprazole (ABILIFY) 10 MG Tab Take 1 tablet (10 mg total) by mouth once daily. (Patient not taking: Reported on 4/8/2020)    cloNIDine 0.1 mg/24 hr td ptwk (CATAPRES) 0.1 mg/24 hr Place 1 patch onto the skin every 7 days.    gabapentin (NEURONTIN) 300 MG capsule Take 1 capsule (300 mg total) by mouth 3 (three) times daily.    hydrOXYzine pamoate (VISTARIL) 25 MG Cap Take 25 mg by mouth 4 (four) times daily.    levothyroxine (SYNTHROID) 50 MCG tablet Take 1 tablet (50 mcg total) by mouth before breakfast.    lisinopril (PRINIVIL,ZESTRIL) 40 MG tablet Take 1 tablet (40 mg total) by mouth once daily.    mirtazapine (REMERON) 30 MG tablet Take 1 tablet (30 mg total) by mouth nightly.    ondansetron (ZOFRAN) 8 MG tablet Take 1 tablet (8 mg total) by mouth every 8 (eight) hours as needed for Nausea.    oxyCODONE-acetaminophen (PERCOCET)  mg per tablet Take 1 tablet by mouth every 6 (six) hours as needed for Pain.    pantoprazole (PROTONIX) 40 MG tablet Take 40 mg by mouth once daily.    QUEtiapine (SEROQUEL) 200 MG Tab Take 1 tablet (200 mg total) by mouth nightly as needed (insomnia). (Patient taking differently: Take 300 mg by mouth nightly as needed (insomnia). )    sertraline (ZOLOFT) 50 MG tablet Take 1 tablet (50 mg total) by mouth once daily.     Family History     Problem Relation (Age of Onset)    Cancer Paternal Grandfather    Diabetes Maternal Grandmother    Hypertension Maternal Grandfather        Tobacco Use    Smoking status: Current Some Day Smoker     Packs/day: 0.25     Years: 7.00     Pack years: 1.75    Smokeless tobacco: Never Used   Substance and Sexual Activity    Alcohol use: Yes     Comment: occasionally    Drug use: Yes     Frequency: 7.0 times per week     Types: Marijuana    Sexual activity: Yes     Partners: Female     Review of Systems   Constitutional: Negative  for chills and fever.   Respiratory: Negative for chest tightness, shortness of breath and wheezing.    Cardiovascular: Negative for chest pain, palpitations and leg swelling.   Gastrointestinal: Positive for abdominal pain (Diffuse), nausea and vomiting. Negative for abdominal distention, constipation and diarrhea.   Genitourinary: Negative for difficulty urinating and dysuria.   Skin: Negative for color change and rash.   Neurological: Negative for syncope, weakness, light-headedness and numbness.   Psychiatric/Behavioral: Negative for agitation, behavioral problems and confusion.     Objective:     Vital Signs (Most Recent):  Temp: 99.4 °F (37.4 °C) (05/04/20 1957)  Pulse: 79 (05/04/20 1957)  Resp: 20 (05/04/20 1957)  BP: (!) 168/102 (05/04/20 1957)  SpO2: 100 % (05/04/20 1957) Vital Signs (24h Range):  Temp:  [97 °F (36.1 °C)-99.4 °F (37.4 °C)] 99.4 °F (37.4 °C)  Pulse:  [] 79  Resp:  [15-24] 20  SpO2:  [95 %-100 %] 100 %  BP: (168-180)/() 168/102        There is no height or weight on file to calculate BMI.    Physical Exam   Constitutional: He is oriented to person, place, and time. He appears well-developed and well-nourished. No distress.   HENT:   Head: Normocephalic and atraumatic.   Eyes: EOM are normal. No scleral icterus.   Neck: Normal range of motion.   Cardiovascular: Normal rate, regular rhythm, normal heart sounds and intact distal pulses.   No murmur heard.  Pulmonary/Chest: Effort normal and breath sounds normal. No respiratory distress. He has no wheezes. He has no rales.   Abdominal: Soft. Bowel sounds are normal. He exhibits no distension. There is tenderness (Diffuse tenderness, worse in epigastric region). There is no rebound and no guarding.   Musculoskeletal: Normal range of motion. He exhibits no edema or tenderness.   Neurological: He is alert and oriented to person, place, and time.   Skin: Skin is warm and dry. No rash noted. He is not diaphoretic. No pallor.   Psychiatric:  He has a normal mood and affect. His behavior is normal. Thought content normal.         CRANIAL NERVES     CN III, IV, VI   Extraocular motions are normal.        Significant Labs:   Blood Culture:   Recent Labs   Lab 05/04/20  0722 05/04/20  0832   LABBLOO No Growth to date No Growth to date     BMP:   Recent Labs   Lab 05/04/20  0452   *      K 3.2*   CL 95   CO2 21*   BUN 14   CREATININE 1.3   CALCIUM 9.3   MG 2.1     CBC:   Recent Labs   Lab 05/04/20  0452   WBC 11.21   HGB 13.5*   HCT 42.2        CMP:   Recent Labs   Lab 05/04/20  0452      K 3.2*   CL 95   CO2 21*   *   BUN 14   CREATININE 1.3   CALCIUM 9.3   PROT 8.6*   ALBUMIN 4.5   BILITOT 1.1*   ALKPHOS 136*   AST 27   ALT 24   ANIONGAP 20*   EGFRNONAA >60.0     Lipase:   Recent Labs   Lab 05/04/20 0452   LIPASE 121*       Significant Imaging: I have reviewed all pertinent imaging results/findings within the past 24 hours.    Assessment/Plan:     * Alcohol-induced acute pancreatitis  Patient with chronic pancreatitis presenting with acute flare related to alcohol use. Lipase 121 on admission. CT abdomen  shows findings indicative of acute on chronic pancreatitis along with pseudocysts. Vitals appear stable and patient shows no signs of peritonitis on examination.     Plan  -NPO for now. Will advance as tolerated as early feeding is beneficial in pancreatitis  -Fluid resuscitation with LR @200cc/hr   -Consulted AES, per their reccs if no improvement over the next few days, will consider EUS drainage of pseudocyst  -Pain control with percocet 10mg q6hrs PRN, dilaudid 1mg for severe pain, and antiemetics ordered    Acute kidney injury  sCr noted to be 1.0. sCR on admission is 1.3.   Likely 2/2 to prerenal Tj in the setting of dehydration/ third spacing 2/2 to inflammation from acute pancreatitis    Plan  -IV fluid resuscitation  -Trend sCr  -Holding ACE-I for now       Hypertension  Patient is on home lisinopril 40mg qd and  clonidine 0.1mg TID.   BP appears uncontrolled for which he received 1 dose of hydralazine 5mg IV. SBP elevated at 180 upon admission.      Plan  -Will start on amlodipine which is preferred first line therapy for this patient's population  -Will hold off on clonidine for now, as well as lisinopril given CHAPO       Alcohol use  Patient with significant alcohol abuse failed rehab treatments in the past, with relapse a few days ago resulting to acute pancreatitis flare. Patient doesn't appear to be in alcohol withdrawal, although last drink was yesterday evening. CIWA of 0 currently.     Plan  -Thiamine, multivitamins and folic acid supplementation  -Will offer alcohol cessation resource material         Pancreatic pseudocyst/cyst  CT abdomen findings positive for 2 pseudocysts measuring at 9cm and 3cm respectively    Plan  -AES consulted. Will re-image if symptoms unimproved      Gastroesophageal reflux disease without esophagitis  Resume home daily protonix 40mg and PRN tums for indigestion      Hypokalemia  -Repleting PRN      Hypothyroidism  Continue home synthroid       VTE Risk Mitigation (From admission, onward)         Ordered     heparin (porcine) injection 5,000 Units  Every 8 hours      05/04/20 1514     Place sequential compression device  Until discontinued      05/04/20 1252     IP VTE LOW RISK PATIENT  Once      05/04/20 1252                   Trinidad Andrade MD  Department of Hospital Medicine   [facility]

## 2020-05-05 NOTE — ASSESSMENT & PLAN NOTE
sCr noted to be 1.0. sCR on admission is 1.3.   Likely 2/2 to prerenal Tj in the setting of dehydration/ third spacing 2/2 to inflammation from acute pancreatitis  -Resolved. sCr 1.2 which appears to be very close to baseline.

## 2020-05-05 NOTE — ASSESSMENT & PLAN NOTE
Patient with chronic pancreatitis presenting with acute flare related to alcohol use. Lipase 121 on admission. CT abdomen  shows findings indicative of acute on chronic pancreatitis along with pseudocysts.     Plan  -NPO for now. Will advance as tolerated as early feeding is beneficial in pancreatitis  -Fluid resuscitation with LR @200cc/hr   -Consulted AES, per their reccs if no improvement over the next few days, will consider EUS drainage of pseudocyst  -Pain control with percocet 10mg q6hrs PRN, dilaudid 1mg for severe pain, and antiemetics ordered

## 2020-05-05 NOTE — HPI
Bradley Cousin is a 40 year old M with chronic pancreatitis s/p cholecystectomy, HTN, hypothyroidism, alcohol use disorder (failed mutiple rehab rx), tobacco use presenting as a transfer from ED [facility] for [facility] evaluation. Patient had presented there for a 3 day history of epigastric abdominal pain, nausea and vomiting. Patient states this started 3 days ago during the same time he started to drink heavily (previously sober for 3 months and drank about a fifth of liquor (750ml) daily. Patient states pain is diffuse in his abdomen and radiates to his back. He denies any aggravating factors but states percocet tends to improve pain, although not currently on percocet at home. Patient denies fever or chills. He reports loss of appetite and has not had any PO intake for the past two days.     [facility].  He was most recently admitted at [facility] on 4/4 to 4/6 with acute pancreatitis with a lipase of 500.  CT of the abdomen and pelvis demonstrated likely pancreatic pseudocyst and peripancreatic stranding consistent with acute pancreatitis.  CT also demonstrated pericolonic stranding consistent with diverticulitis versus colitis.  GI was consulted at that time and recommended, MRCP once episode of pancreatitis resolves; needs imaging at least 6-8 weeks after this episode to evaluate pseudocysts and if they remain large and patient is symptomatic at that time would consider referral for EUS drainage.  Continue antibiotics for diverticulitis for 7 days.    In the ED he was given Zosyn 4.5g, Dilaudid 1mg, Phenergan and Zofran, and hydralazine 5mg IV x 1 for elevated BP.  He was given 1L NS bolus and then 125 cc/hr.  CT abdomen today shows there is chronic pancreatitis and pseudocysts. The dominant pseudocyst is between the pancreas and the gastric greater curvature and a smaller pseudocyst is between the pancreas and the lesser curvature/gastric  cardia have increased in size.

## 2020-05-05 NOTE — PROGRESS NOTES
[facility] Hospital Medicine  Progress Note    Patient Name: Bradley Collado  MRN: [id number]  Patient Class: IP- Inpatient   Admission Date: 5/4/2020  Length of Stay: 1 days  Attending Physician: Yamilet Kong MD  Primary Care Provider: Arlen Belcher NP    Hospital Medicine Team: [facility] [place] [facility]OSP MED 5 Trinidad Andrade MD    Subjective:     Principal Problem:Alcohol-induced acute pancreatitis        HPI:  Bradley Collado is a 40 year old M with chronic pancreatitis s/p cholecystectomy, HTN, hypothyroidism, alcohol use disorder (failed mutiple rehab rx), tobacco use presenting as a transfer from ED [facility] for [facility] evaluation. Patient had presented there for a 3 day history of epigastric abdominal pain, nausea and vomiting. Patient states this started 3 days ago during the same time he started to drink heavily (previously sober for 3 months and drank about a fifth of liquor (750ml) daily. Patient states pain is diffuse in his abdomen and radiates to his back. He denies any aggravating factors but states percocet tends to improve pain, although not currently on percocet at home. Patient denies fever or chills. He reports loss of appetite and has not had any PO intake for the past two days.     [facility].  He was most recently admitted at [facility] on 4/4 to 4/6 with acute pancreatitis with a lipase of 500.  CT of the abdomen and pelvis demonstrated likely pancreatic pseudocyst and peripancreatic stranding consistent with acute pancreatitis.  CT also demonstrated pericolonic stranding consistent with diverticulitis versus colitis.  GI was consulted at that time and recommended, MRCP once episode of pancreatitis resolves; needs imaging at least 6-8 weeks after this episode to evaluate pseudocysts and if they remain large and patient is symptomatic at that time would consider referral for EUS drainage.  Continue antibiotics for  diverticulitis for 7 days.    In the ED he was given Zosyn 4.5g, Dilaudid 1mg, Phenergan and Zofran, and hydralazine 5mg IV x 1 for elevated BP.  He was given 1L NS bolus and then 125 cc/hr.  CT abdomen today shows there is chronic pancreatitis and pseudocysts. The dominant pseudocyst is between the pancreas and the gastric greater curvature and a smaller pseudocyst is between the pancreas and the lesser curvature/gastric cardia have increased in size.    Overview/Hospital Course:  No notes on file    Interval History: NAEON. Patient reports marked improvement in abdominal pain. Denies emesis. Tolerated regular diet.    Review of Systems   Constitutional: Negative for chills and fever.   Respiratory: Negative for chest tightness, shortness of breath and wheezing.    Cardiovascular: Negative for chest pain, palpitations and leg swelling.   Gastrointestinal: Positive for abdominal pain (Improving). Negative for abdominal distention, constipation, diarrhea, nausea and vomiting.   Genitourinary: Negative for difficulty urinating and dysuria.   Skin: Negative for color change and rash.   Neurological: Negative for syncope, weakness, light-headedness and numbness.   Psychiatric/Behavioral: Negative for agitation, behavioral problems and confusion.     Objective:     Vital Signs (Most Recent):  Temp: 98.3 °F (36.8 °C) (05/05/20 1537)  Pulse: 100 (05/05/20 1537)  Resp: 18 (05/05/20 1537)  BP: (!) 147/95 (05/05/20 1537)  SpO2: 97 % (05/05/20 1537) Vital Signs (24h Range):  Temp:  [96 °F (35.6 °C)-99.4 °F (37.4 °C)] 98.3 °F (36.8 °C)  Pulse:  [] 100  Resp:  [16-20] 18  SpO2:  [97 %-100 %] 97 %  BP: (135-176)/() 147/95        There is no height or weight on file to calculate BMI.    Intake/Output Summary (Last 24 hours) at 5/5/2020 1652  Last data filed at 5/5/2020 1330  Gross per 24 hour   Intake 2098 ml   Output --   Net 2098 ml      Physical Exam   Constitutional: He is oriented to person, place, and time. He  appears well-developed and well-nourished. No distress.   HENT:   Head: Normocephalic and atraumatic.   Eyes: EOM are normal. No scleral icterus.   Neck: Normal range of motion.   Cardiovascular: Normal rate, regular rhythm, normal heart sounds and intact distal pulses.   No murmur heard.  Pulmonary/Chest: Effort normal and breath sounds normal. No respiratory distress. He has no wheezes. He has no rales.   Abdominal: Soft. Bowel sounds are normal. He exhibits no distension. There is tenderness (Mild tenderness in epigastric area). There is no rebound and no guarding.   Musculoskeletal: Normal range of motion. He exhibits no edema or tenderness.   Neurological: He is alert and oriented to person, place, and time.   Skin: Skin is warm and dry. No rash noted. He is not diaphoretic. No pallor.   Psychiatric: He has a normal mood and affect. His behavior is normal. Thought content normal.       Significant Labs:   BMP:   Recent Labs   Lab 05/05/20 0447   GLU 94      K 3.2*   CL 98   CO2 29   BUN 8   CREATININE 1.2   CALCIUM 9.4   MG 1.9     CBC:   Recent Labs   Lab 05/04/20 0452 05/05/20 0447   WBC 11.21 6.47   HGB 13.5* 11.6*   HCT 42.2 38.8*    195     CMP:   Recent Labs   Lab 05/04/20 0452 05/05/20 0447    138   K 3.2* 3.2*   CL 95 98   CO2 21* 29   * 94   BUN 14 8   CREATININE 1.3 1.2   CALCIUM 9.3 9.4   PROT 8.6* 7.5   ALBUMIN 4.5 3.8   BILITOT 1.1* 1.0   ALKPHOS 136* 145*   AST 27 22   ALT 24 20   ANIONGAP 20* 11   EGFRNONAA >60.0 >60.0       Significant Imaging: I have reviewed all pertinent imaging results/findings within the past 24 hours.      Assessment/Plan:      * Alcohol-induced acute pancreatitis  Patient with chronic pancreatitis presenting with acute flare related to alcohol use. Lipase 121 on admission. CT abdomen  shows findings indicative of acute on chronic pancreatitis along with pseudocysts. Vitals appear stable and patient shows no signs of peritonitis on  examination.     Plan  -Patient is markedly improved and tolerating regular diet. Will plan for discharge tomorrow   -Pain appears to be controlled, advised to utilize PO pain meds when needed  -AES following, no further interventions as patient is improving    Acute kidney injury  sCr noted to be 1.0. sCR on admission is 1.3.   Likely 2/2 to prerenal Tj in the setting of dehydration/ third spacing 2/2 to inflammation from acute pancreatitis  -Resolved. sCr 1.2 which appears to be very close to baseline.         Hypertension  Patient is on home lisinopril 40mg qd and clonidine 0.1mg TID.   BP appears uncontrolled for which he received 1 dose of hydralazine 5mg IV. SBP elevated at 180 upon admission.    Plan  -Continue amlodipine 10mg which is preferred first line therapy for this patient's population  -Resume lisinopril 40mg since TJ has resolved. Will continue to monitor renal function  -Continue to hold clonidine. Will discontinue permanently upon discharge.       Alcohol use  Patient with significant alcohol abuse failed rehab treatments in the past, with relapse a few days ago resulting to acute pancreatitis flare. Patient doesn't appear to be in alcohol withdrawal, although last drink was yesterday evening. CIWA of 0 currently.     Plan  -Thiamine, multivitamins and folic acid supplementation  -Will offer alcohol cessation resource material         Pancreatic pseudocyst/cyst  CT abdomen findings positive for 2 pseudocysts measuring at 9cm and 3cm respectively    Plan  -AES consulted. Will re-image if symptoms unimproved      Gastroesophageal reflux disease without esophagitis  Resume home daily protonix 40mg and PRN tums for indigestion      Hypokalemia  -Repleting PRN      Acquired hypothyroidism  Continue home synthroid       VTE Risk Mitigation (From admission, onward)         Ordered     heparin (porcine) injection 5,000 Units  Every 8 hours      05/04/20 1514     Place sequential compression device   Until discontinued      05/04/20 1252     IP VTE LOW RISK PATIENT  Once      05/04/20 1252                      Trinidad Andrade MD  Department of Hospital Medicine   [facility]

## 2020-05-05 NOTE — SUBJECTIVE & OBJECTIVE
Past Medical History:   Diagnosis Date    Anxiety     Anxiety     Bipolar affective disorder     Depression     Hypertension     Pancreatitis     Schizophrenia     Thyroid disease        Past Surgical History:   Procedure Laterality Date    LAPAROSCOPIC CHOLECYSTECTOMY N/A 3/9/2020    Procedure: CHOLECYSTECTOMY, LAPAROSCOPIC;  Surgeon: Trenton Deshpande MD;  Location: [facility];  Service: General;  Laterality: N/A;       Review of patient's allergies indicates:  No Known Allergies    Current Facility-Administered Medications on File Prior to Encounter   Medication    [COMPLETED] 0.9%  NaCl infusion    [COMPLETED] hydrALAZINE injection 5 mg    [COMPLETED] HYDROmorphone injection 1 mg    [COMPLETED] HYDROmorphone injection 1 mg    [COMPLETED] HYDROmorphone injection 1 mg    [COMPLETED] iohexoL (OMNIPAQUE 350) injection 100 mL    [COMPLETED] ondansetron injection 4 mg    [COMPLETED] piperacillin-tazobactam 4.5 g in dextrose 5 % 100 mL IVPB (ready to mix system)    [COMPLETED] promethazine (PHENERGAN) 12.5 mg in dextrose 5 % 50 mL IVPB    [COMPLETED] promethazine (PHENERGAN) 12.5 mg in dextrose 5 % 50 mL IVPB    [COMPLETED] sodium chloride 0.9% bolus 1,000 mL    [DISCONTINUED] 0.9%  NaCl infusion    [DISCONTINUED] potassium chloride 10% oral solution 20 mEq    [DISCONTINUED] sodium chloride 0.9% 1,000 mL with mvi, adult no.4 with vit K 3,300 unit- 150 mcg/10 mL 10 mL, thiamine 100 mg, folic acid 1 mg infusion     Current Outpatient Medications on File Prior to Encounter   Medication Sig    ARIPiprazole (ABILIFY) 10 MG Tab Take 1 tablet (10 mg total) by mouth once daily. (Patient not taking: Reported on 4/8/2020)    cloNIDine 0.1 mg/24 hr td ptwk (CATAPRES) 0.1 mg/24 hr Place 1 patch onto the skin every 7 days.    gabapentin (NEURONTIN) 300 MG capsule Take 1 capsule (300 mg total) by mouth 3 (three) times daily.    hydrOXYzine pamoate (VISTARIL) 25 MG Cap Take 25 mg by mouth 4 (four) times daily.     levothyroxine (SYNTHROID) 50 MCG tablet Take 1 tablet (50 mcg total) by mouth before breakfast.    lisinopril (PRINIVIL,ZESTRIL) 40 MG tablet Take 1 tablet (40 mg total) by mouth once daily.    mirtazapine (REMERON) 30 MG tablet Take 1 tablet (30 mg total) by mouth nightly.    ondansetron (ZOFRAN) 8 MG tablet Take 1 tablet (8 mg total) by mouth every 8 (eight) hours as needed for Nausea.    oxyCODONE-acetaminophen (PERCOCET)  mg per tablet Take 1 tablet by mouth every 6 (six) hours as needed for Pain.    pantoprazole (PROTONIX) 40 MG tablet Take 40 mg by mouth once daily.    QUEtiapine (SEROQUEL) 200 MG Tab Take 1 tablet (200 mg total) by mouth nightly as needed (insomnia). (Patient taking differently: Take 300 mg by mouth nightly as needed (insomnia). )    sertraline (ZOLOFT) 50 MG tablet Take 1 tablet (50 mg total) by mouth once daily.     Family History     Problem Relation (Age of Onset)    Cancer Paternal Grandfather    Diabetes Maternal Grandmother    Hypertension Maternal Grandfather        Tobacco Use    Smoking status: Current Some Day Smoker     Packs/day: 0.25     Years: 7.00     Pack years: 1.75    Smokeless tobacco: Never Used   Substance and Sexual Activity    Alcohol use: Yes     Comment: occasionally    Drug use: Yes     Frequency: 7.0 times per week     Types: Marijuana    Sexual activity: Yes     Partners: Female     Review of Systems   Constitutional: Negative for chills and fever.   Respiratory: Negative for chest tightness, shortness of breath and wheezing.    Cardiovascular: Negative for chest pain, palpitations and leg swelling.   Gastrointestinal: Positive for abdominal pain (Diffuse), nausea and vomiting. Negative for abdominal distention, constipation and diarrhea.   Genitourinary: Negative for difficulty urinating and dysuria.   Skin: Negative for color change and rash.   Neurological: Negative for syncope, weakness, light-headedness and numbness.    Psychiatric/Behavioral: Negative for agitation, behavioral problems and confusion.     Objective:     Vital Signs (Most Recent):  Temp: 99.4 °F (37.4 °C) (05/04/20 1957)  Pulse: 79 (05/04/20 1957)  Resp: 20 (05/04/20 1957)  BP: (!) 168/102 (05/04/20 1957)  SpO2: 100 % (05/04/20 1957) Vital Signs (24h Range):  Temp:  [97 °F (36.1 °C)-99.4 °F (37.4 °C)] 99.4 °F (37.4 °C)  Pulse:  [] 79  Resp:  [15-24] 20  SpO2:  [95 %-100 %] 100 %  BP: (168-180)/() 168/102        There is no height or weight on file to calculate BMI.    Physical Exam   Constitutional: He is oriented to person, place, and time. He appears well-developed and well-nourished. No distress.   HENT:   Head: Normocephalic and atraumatic.   Eyes: EOM are normal. No scleral icterus.   Neck: Normal range of motion.   Cardiovascular: Normal rate, regular rhythm, normal heart sounds and intact distal pulses.   No murmur heard.  Pulmonary/Chest: Effort normal and breath sounds normal. No respiratory distress. He has no wheezes. He has no rales.   Abdominal: Soft. Bowel sounds are normal. He exhibits no distension. There is tenderness (Diffuse tenderness, worse in epigastric region). There is no rebound and no guarding.   Musculoskeletal: Normal range of motion. He exhibits no edema or tenderness.   Neurological: He is alert and oriented to person, place, and time.   Skin: Skin is warm and dry. No rash noted. He is not diaphoretic. No pallor.   Psychiatric: He has a normal mood and affect. His behavior is normal. Thought content normal.         CRANIAL NERVES     CN III, IV, VI   Extraocular motions are normal.        Significant Labs:   Blood Culture:   Recent Labs   Lab 05/04/20  0722 05/04/20  0832   LABBLOO No Growth to date No Growth to date     BMP:   Recent Labs   Lab 05/04/20  0452   *      K 3.2*   CL 95   CO2 21*   BUN 14   CREATININE 1.3   CALCIUM 9.3   MG 2.1     CBC:   Recent Labs   Lab 05/04/20  0452   WBC 11.21   HGB 13.5*    HCT 42.2        CMP:   Recent Labs   Lab 05/04/20  0452      K 3.2*   CL 95   CO2 21*   *   BUN 14   CREATININE 1.3   CALCIUM 9.3   PROT 8.6*   ALBUMIN 4.5   BILITOT 1.1*   ALKPHOS 136*   AST 27   ALT 24   ANIONGAP 20*   EGFRNONAA >60.0     Lipase:   Recent Labs   Lab 05/04/20  0452   LIPASE 121*       Significant Imaging: I have reviewed all pertinent imaging results/findings within the past 24 hours.

## 2020-05-05 NOTE — ASSESSMENT & PLAN NOTE
sCr noted to be 1.0. sCR on admission is 1.3.   Likely 2/2 to prerenal Tj in the setting of dehydration/ third spacing 2/2 to inflammation from acute pancreatitis    Plan  -IV fluid resuscitation  -Trend sCr  -Holding ACE-I for now

## 2020-05-05 NOTE — SUBJECTIVE & OBJECTIVE
Interval History: NAEON. Patient reports marked improvement in abdominal pain. Denies emesis. Tolerated regular diet.    Review of Systems   Constitutional: Negative for chills and fever.   Respiratory: Negative for chest tightness, shortness of breath and wheezing.    Cardiovascular: Negative for chest pain, palpitations and leg swelling.   Gastrointestinal: Positive for abdominal pain (Improving). Negative for abdominal distention, constipation, diarrhea, nausea and vomiting.   Genitourinary: Negative for difficulty urinating and dysuria.   Skin: Negative for color change and rash.   Neurological: Negative for syncope, weakness, light-headedness and numbness.   Psychiatric/Behavioral: Negative for agitation, behavioral problems and confusion.     Objective:     Vital Signs (Most Recent):  Temp: 98.3 °F (36.8 °C) (05/05/20 1537)  Pulse: 100 (05/05/20 1537)  Resp: 18 (05/05/20 1537)  BP: (!) 147/95 (05/05/20 1537)  SpO2: 97 % (05/05/20 1537) Vital Signs (24h Range):  Temp:  [96 °F (35.6 °C)-99.4 °F (37.4 °C)] 98.3 °F (36.8 °C)  Pulse:  [] 100  Resp:  [16-20] 18  SpO2:  [97 %-100 %] 97 %  BP: (135-176)/() 147/95        There is no height or weight on file to calculate BMI.    Intake/Output Summary (Last 24 hours) at 5/5/2020 1652  Last data filed at 5/5/2020 1330  Gross per 24 hour   Intake 2098 ml   Output --   Net 2098 ml      Physical Exam   Constitutional: He is oriented to person, place, and time. He appears well-developed and well-nourished. No distress.   HENT:   Head: Normocephalic and atraumatic.   Eyes: EOM are normal. No scleral icterus.   Neck: Normal range of motion.   Cardiovascular: Normal rate, regular rhythm, normal heart sounds and intact distal pulses.   No murmur heard.  Pulmonary/Chest: Effort normal and breath sounds normal. No respiratory distress. He has no wheezes. He has no rales.   Abdominal: Soft. Bowel sounds are normal. He exhibits no distension. There is tenderness (Mild  tenderness in epigastric area). There is no rebound and no guarding.   Musculoskeletal: Normal range of motion. He exhibits no edema or tenderness.   Neurological: He is alert and oriented to person, place, and time.   Skin: Skin is warm and dry. No rash noted. He is not diaphoretic. No pallor.   Psychiatric: He has a normal mood and affect. His behavior is normal. Thought content normal.       Significant Labs:   BMP:   Recent Labs   Lab 05/05/20  0447   GLU 94      K 3.2*   CL 98   CO2 29   BUN 8   CREATININE 1.2   CALCIUM 9.4   MG 1.9     CBC:   Recent Labs   Lab 05/04/20  0452 05/05/20 0447   WBC 11.21 6.47   HGB 13.5* 11.6*   HCT 42.2 38.8*    195     CMP:   Recent Labs   Lab 05/04/20  0452 05/05/20 0447    138   K 3.2* 3.2*   CL 95 98   CO2 21* 29   * 94   BUN 14 8   CREATININE 1.3 1.2   CALCIUM 9.3 9.4   PROT 8.6* 7.5   ALBUMIN 4.5 3.8   BILITOT 1.1* 1.0   ALKPHOS 136* 145*   AST 27 22   ALT 24 20   ANIONGAP 20* 11   EGFRNONAA >60.0 >60.0       Significant Imaging: I have reviewed all pertinent imaging results/findings within the past 24 hours.

## 2020-05-05 NOTE — ASSESSMENT & PLAN NOTE
Patient is on home lisinopril 40mg qd and clonidine 0.1mg TID.   BP appears uncontrolled for which he received 1 dose of hydralazine 5mg IV. SBP elevated at 180 upon admission.      Plan  -Will start on amlodipine which is preferred first line therapy for this patient's population  -Will hold off on clonidine for now, as well as lisinopril given CHAPO

## 2020-05-05 NOTE — PLAN OF CARE
PLAN OF CARE REVIEWED WITH PT.  PT AA+OX4.  ABLE TO MAKE NEEDS KNOWN.  DOES NOT APPEAR TO BE IN ANY DISTRESS.  C/O PAIN.  PAIN MEDICATION GIVEN AS ORDERED.  INDEPENDENT WITH ADLS.  CONT. OF B/B.  IV FLUIDS INFUSING AS ORDERED.  PT REMAINS FREE FROM FALLS, INJURY, AND TRAUMA.  FALL PRECAUTIONS IN PLACE.  BED IN LOWEST POSITION WITH WHEELS LOCKED.  CALL LIGHT WITHIN REACH.  WILL CONTINUE TO MONITOR.

## 2020-05-05 NOTE — ASSESSMENT & PLAN NOTE
Patient with significant alcohol abuse failed rehab treatments in the past, with relapse a few days ago resulting to acute pancreatitis flare. Patient doesn't appear to be in alcohol withdrawal, although last drink was yesterday evening. CIWA of 0 currently.     Plan  -Thiamine, multivitamins and folic acid supplementation  -Will offer alcohol cessation resource material

## 2020-05-05 NOTE — NURSING
Patient pain managed with medication/timing.  No falls or signs of infection.  Patient A&O, VSS.  Nausea under control, no diarrhea reported since admit.

## 2020-05-06 PROBLEM — N17.9 ACUTE KIDNEY INJURY: Status: RESOLVED | Noted: 2018-05-14 | Resolved: 2020-05-06

## 2020-05-06 LAB
ALBUMIN SERPL BCP-MCNC: 3.1 G/DL (ref 3.5–5.2)
ALP SERPL-CCNC: 108 U/L (ref 55–135)
ALT SERPL W/O P-5'-P-CCNC: 13 U/L (ref 10–44)
ANION GAP SERPL CALC-SCNC: 11 MMOL/L (ref 8–16)
AST SERPL-CCNC: 15 U/L (ref 10–40)
BASOPHILS # BLD AUTO: 0.03 K/UL (ref 0–0.2)
BASOPHILS NFR BLD: 0.5 % (ref 0–1.9)
BILIRUB SERPL-MCNC: 0.3 MG/DL (ref 0.1–1)
BUN SERPL-MCNC: 4 MG/DL (ref 6–20)
CALCIUM SERPL-MCNC: 8.9 MG/DL (ref 8.7–10.5)
CHLORIDE SERPL-SCNC: 102 MMOL/L (ref 95–110)
CO2 SERPL-SCNC: 25 MMOL/L (ref 23–29)
CREAT SERPL-MCNC: 1.1 MG/DL (ref 0.5–1.4)
DIFFERENTIAL METHOD: ABNORMAL
EOSINOPHIL # BLD AUTO: 0.2 K/UL (ref 0–0.5)
EOSINOPHIL NFR BLD: 3.4 % (ref 0–8)
ERYTHROCYTE [DISTWIDTH] IN BLOOD BY AUTOMATED COUNT: 19.5 % (ref 11.5–14.5)
EST. GFR  (AFRICAN AMERICAN): >60 ML/MIN/1.73 M^2
EST. GFR  (NON AFRICAN AMERICAN): >60 ML/MIN/1.73 M^2
FERRITIN SERPL-MCNC: 173 NG/ML (ref 20–300)
GLUCOSE SERPL-MCNC: 136 MG/DL (ref 70–110)
HCT VFR BLD AUTO: 35.4 % (ref 40–54)
HGB BLD-MCNC: 10.4 G/DL (ref 14–18)
IMM GRANULOCYTES # BLD AUTO: 0.02 K/UL (ref 0–0.04)
IMM GRANULOCYTES NFR BLD AUTO: 0.3 % (ref 0–0.5)
IRON SERPL-MCNC: 27 UG/DL (ref 45–160)
LYMPHOCYTES # BLD AUTO: 1.4 K/UL (ref 1–4.8)
LYMPHOCYTES NFR BLD: 23.4 % (ref 18–48)
MAGNESIUM SERPL-MCNC: 1.5 MG/DL (ref 1.6–2.6)
MCH RBC QN AUTO: 24.2 PG (ref 27–31)
MCHC RBC AUTO-ENTMCNC: 29.4 G/DL (ref 32–36)
MCV RBC AUTO: 83 FL (ref 82–98)
MONOCYTES # BLD AUTO: 0.5 K/UL (ref 0.3–1)
MONOCYTES NFR BLD: 8.6 % (ref 4–15)
NEUTROPHILS # BLD AUTO: 3.9 K/UL (ref 1.8–7.7)
NEUTROPHILS NFR BLD: 63.8 % (ref 38–73)
NRBC BLD-RTO: 0 /100 WBC
PHOSPHATE SERPL-MCNC: 2.3 MG/DL (ref 2.7–4.5)
PLATELET # BLD AUTO: 191 K/UL (ref 150–350)
PMV BLD AUTO: 11.4 FL (ref 9.2–12.9)
POTASSIUM SERPL-SCNC: 3.5 MMOL/L (ref 3.5–5.1)
PROT SERPL-MCNC: 6.1 G/DL (ref 6–8.4)
RBC # BLD AUTO: 4.29 M/UL (ref 4.6–6.2)
SATURATED IRON: 7 % (ref 20–50)
SODIUM SERPL-SCNC: 138 MMOL/L (ref 136–145)
TOTAL IRON BINDING CAPACITY: 377 UG/DL (ref 250–450)
TRANSFERRIN SERPL-MCNC: 255 MG/DL (ref 200–375)
WBC # BLD AUTO: 6.15 K/UL (ref 3.9–12.7)

## 2020-05-06 PROCEDURE — 25000003 PHARM REV CODE 250: Performed by: STUDENT IN AN ORGANIZED HEALTH CARE EDUCATION/TRAINING PROGRAM

## 2020-05-06 PROCEDURE — 85025 COMPLETE CBC W/AUTO DIFF WBC: CPT

## 2020-05-06 PROCEDURE — 82728 ASSAY OF FERRITIN: CPT

## 2020-05-06 PROCEDURE — 63600175 PHARM REV CODE 636 W HCPCS: Performed by: STUDENT IN AN ORGANIZED HEALTH CARE EDUCATION/TRAINING PROGRAM

## 2020-05-06 PROCEDURE — 99233 SBSQ HOSP IP/OBS HIGH 50: CPT | Mod: ,,, | Performed by: HOSPITALIST

## 2020-05-06 PROCEDURE — 80053 COMPREHEN METABOLIC PANEL: CPT

## 2020-05-06 PROCEDURE — 36415 COLL VENOUS BLD VENIPUNCTURE: CPT

## 2020-05-06 PROCEDURE — 63600175 PHARM REV CODE 636 W HCPCS: Performed by: HOSPITALIST

## 2020-05-06 PROCEDURE — 83540 ASSAY OF IRON: CPT

## 2020-05-06 PROCEDURE — 11000001 HC ACUTE MED/SURG PRIVATE ROOM

## 2020-05-06 PROCEDURE — 84100 ASSAY OF PHOSPHORUS: CPT

## 2020-05-06 PROCEDURE — 99233 PR SUBSEQUENT HOSPITAL CARE,LEVL III: ICD-10-PCS | Mod: ,,, | Performed by: HOSPITALIST

## 2020-05-06 PROCEDURE — 83735 ASSAY OF MAGNESIUM: CPT

## 2020-05-06 RX ORDER — HYDROXYZINE PAMOATE 25 MG/1
25 CAPSULE ORAL EVERY 6 HOURS PRN
Status: DISCONTINUED | OUTPATIENT
Start: 2020-05-06 | End: 2020-05-07 | Stop reason: HOSPADM

## 2020-05-06 RX ORDER — SODIUM CHLORIDE, SODIUM LACTATE, POTASSIUM CHLORIDE, CALCIUM CHLORIDE 600; 310; 30; 20 MG/100ML; MG/100ML; MG/100ML; MG/100ML
INJECTION, SOLUTION INTRAVENOUS CONTINUOUS
Status: DISCONTINUED | OUTPATIENT
Start: 2020-05-06 | End: 2020-05-07 | Stop reason: HOSPADM

## 2020-05-06 RX ORDER — LANOLIN ALCOHOL/MO/W.PET/CERES
400 CREAM (GRAM) TOPICAL ONCE
Status: COMPLETED | OUTPATIENT
Start: 2020-05-06 | End: 2020-05-06

## 2020-05-06 RX ORDER — HYDROMORPHONE HYDROCHLORIDE 1 MG/ML
1 INJECTION, SOLUTION INTRAMUSCULAR; INTRAVENOUS; SUBCUTANEOUS ONCE
Status: COMPLETED | OUTPATIENT
Start: 2020-05-06 | End: 2020-05-06

## 2020-05-06 RX ADMIN — HYDROMORPHONE HYDROCHLORIDE 0.5 MG: 1 INJECTION, SOLUTION INTRAMUSCULAR; INTRAVENOUS; SUBCUTANEOUS at 06:05

## 2020-05-06 RX ADMIN — HEPARIN SODIUM 5000 UNITS: 5000 INJECTION, SOLUTION INTRAVENOUS; SUBCUTANEOUS at 10:05

## 2020-05-06 RX ADMIN — AMLODIPINE BESYLATE 10 MG: 10 TABLET ORAL at 08:05

## 2020-05-06 RX ADMIN — SERTRALINE HYDROCHLORIDE 50 MG: 50 TABLET ORAL at 08:05

## 2020-05-06 RX ADMIN — HEPARIN SODIUM 5000 UNITS: 5000 INJECTION, SOLUTION INTRAVENOUS; SUBCUTANEOUS at 06:05

## 2020-05-06 RX ADMIN — MIRTAZAPINE 30 MG: 7.5 TABLET ORAL at 09:05

## 2020-05-06 RX ADMIN — HYDROXYZINE PAMOATE 25 MG: 25 CAPSULE ORAL at 06:05

## 2020-05-06 RX ADMIN — OXYCODONE HYDROCHLORIDE AND ACETAMINOPHEN 1 TABLET: 10; 325 TABLET ORAL at 06:05

## 2020-05-06 RX ADMIN — HYDROMORPHONE HYDROCHLORIDE 1 MG: 1 INJECTION, SOLUTION INTRAMUSCULAR; INTRAVENOUS; SUBCUTANEOUS at 11:05

## 2020-05-06 RX ADMIN — LISINOPRIL 40 MG: 20 TABLET ORAL at 08:05

## 2020-05-06 RX ADMIN — OXYCODONE HYDROCHLORIDE AND ACETAMINOPHEN 1 TABLET: 10; 325 TABLET ORAL at 03:05

## 2020-05-06 RX ADMIN — FOLIC ACID 1 MG: 1 TABLET ORAL at 08:05

## 2020-05-06 RX ADMIN — GABAPENTIN 300 MG: 300 CAPSULE ORAL at 09:05

## 2020-05-06 RX ADMIN — OXYCODONE HYDROCHLORIDE AND ACETAMINOPHEN 1 TABLET: 10; 325 TABLET ORAL at 09:05

## 2020-05-06 RX ADMIN — SODIUM CHLORIDE, SODIUM LACTATE, POTASSIUM CHLORIDE, AND CALCIUM CHLORIDE: .6; .31; .03; .02 INJECTION, SOLUTION INTRAVENOUS at 12:05

## 2020-05-06 RX ADMIN — HYDROMORPHONE HYDROCHLORIDE 0.5 MG: 1 INJECTION, SOLUTION INTRAMUSCULAR; INTRAVENOUS; SUBCUTANEOUS at 08:05

## 2020-05-06 RX ADMIN — HYDROXYZINE PAMOATE 25 MG: 25 CAPSULE ORAL at 12:05

## 2020-05-06 RX ADMIN — LEVOTHYROXINE SODIUM 50 MCG: 50 TABLET ORAL at 06:05

## 2020-05-06 RX ADMIN — Medication 400 MG: at 08:05

## 2020-05-06 RX ADMIN — HEPARIN SODIUM 5000 UNITS: 5000 INJECTION, SOLUTION INTRAVENOUS; SUBCUTANEOUS at 03:05

## 2020-05-06 RX ADMIN — QUETIAPINE FUMARATE 200 MG: 100 TABLET ORAL at 09:05

## 2020-05-06 RX ADMIN — THERA TABS 1 TABLET: TAB at 08:05

## 2020-05-06 RX ADMIN — PANTOPRAZOLE SODIUM 40 MG: 40 TABLET, DELAYED RELEASE ORAL at 08:05

## 2020-05-06 RX ADMIN — GABAPENTIN 300 MG: 300 CAPSULE ORAL at 03:05

## 2020-05-06 RX ADMIN — Medication 100 MG: at 08:05

## 2020-05-06 RX ADMIN — GABAPENTIN 300 MG: 300 CAPSULE ORAL at 08:05

## 2020-05-06 NOTE — PROGRESS NOTES
[facility] Hospital Medicine  Progress Note    Patient Name: Bradley Collado  MRN: [id number]  Patient Class: IP- Inpatient   Admission Date: 5/4/2020  Length of Stay: 2 days  Attending Physician: Yamilet Kong MD  Primary Care Provider: Arlen Belcher NP    Hospital Medicine Team: [facility] [place] [facility]OSP MED 5 Trinidad Andrade MD    Subjective:     Principal Problem:Alcohol-induced acute pancreatitis        HPI:  Bradley Collado is a 40 year old M with chronic pancreatitis s/p cholecystectomy, HTN, hypothyroidism, alcohol use disorder (failed mutiple rehab rx), tobacco use presenting as a transfer from [facility] for AES evaluation. Patient had presented there for a 3 day history of epigastric abdominal pain, nausea and vomiting. Patient states this started 3 days ago during the same time he started to drink heavily (previously sober for 3 months and drank about a fifth of liquor (750ml) daily. Patient states pain is diffuse in his abdomen and radiates to his back. He denies any aggravating factors but states percocet tends to improve pain, although not currently on percocet at home. Patient denies fever or chills. He reports loss of appetite and has not had any PO intake for the past two days.     Per [facility] review[facility].  He was most recently admitted at [facility] on 4/4 to 4/6 with acute pancreatitis with a lipase of 500.  CT of the abdomen and pelvis demonstrated likely pancreatic pseudocyst and peripancreatic stranding consistent with acute pancreatitis.  CT also demonstrated pericolonic stranding consistent with diverticulitis versus colitis.  GI was consulted at that time and recommended, MRCP once episode of pancreatitis resolves; needs imaging at least 6-8 weeks after this episode to evaluate pseudocysts and if they remain large and patient is symptomatic at that time would consider referral for EUS drainage.  Continue antibiotics for  diverticulitis for 7 days.    In the ED he was given Zosyn 4.5g, Dilaudid 1mg, Phenergan and Zofran, and hydralazine 5mg IV x 1 for elevated BP.  He was given 1L NS bolus and then 125 cc/hr.  CT abdomen today shows there is chronic pancreatitis and pseudocysts. The dominant pseudocyst is between the pancreas and the gastric greater curvature and a smaller pseudocyst is between the pancreas and the lesser curvature/gastric cardia have increased in size.    Overview/Hospital Course:  Bradley Cousin was admitted for acute on chronic pancreatitis w/ pseudocysts as evidenced by CT Abd managed with IV fluid resuscitation, pain control. AES consulted and recommended no further imaging or interventions. Patient initially improved, was advanced to regular diet but developed worsening abdominal pain. Started patient on IV fluid resuscitation and clear liquid diet with continued pain control.    Interval History: NAEON. Patient reports worsening abdominal pain compared to yesterday. Denies nausea, vomiting or fever.     Review of Systems   Constitutional: Negative for chills and fever.   Respiratory: Negative for chest tightness, shortness of breath and wheezing.    Cardiovascular: Negative for chest pain, palpitations and leg swelling.   Gastrointestinal: Positive for abdominal pain (Worsening when compared to yesterday). Negative for abdominal distention, constipation, diarrhea, nausea and vomiting.   Genitourinary: Negative for difficulty urinating and dysuria.   Skin: Negative for color change and rash.   Neurological: Negative for syncope, weakness, light-headedness and numbness.   Psychiatric/Behavioral: Negative for agitation, behavioral problems and confusion.     Objective:     Vital Signs (Most Recent):  Temp: 98.8 °F (37.1 °C) (05/06/20 1456)  Pulse: 83 (05/06/20 1456)  Resp: 18 (05/06/20 1456)  BP: 137/77 (05/06/20 1456)  SpO2: 96 % (05/06/20 1456) Vital Signs (24h Range):  Temp:  [96.3 °F (35.7 °C)-99 °F (37.2 °C)]  98.8 °F (37.1 °C)  Pulse:  [] 83  Resp:  [18-20] 18  SpO2:  [96 %-99 %] 96 %  BP: (122-160)/() 137/77        There is no height or weight on file to calculate BMI.    Intake/Output Summary (Last 24 hours) at 5/6/2020 1500  Last data filed at 5/6/2020 0729  Gross per 24 hour   Intake 480 ml   Output --   Net 480 ml      Physical Exam   Constitutional: He is oriented to person, place, and time. He appears well-developed and well-nourished. No distress.   HENT:   Head: Normocephalic and atraumatic.   Eyes: EOM are normal. No scleral icterus.   Neck: Normal range of motion.   Cardiovascular: Normal rate, regular rhythm, normal heart sounds and intact distal pulses.   No murmur heard.  Pulmonary/Chest: Effort normal and breath sounds normal. No respiratory distress. He has no wheezes. He has no rales.   Abdominal: Soft. Bowel sounds are normal. He exhibits no distension. There is tenderness (Marked tenderness in epigastric area and RUQ). There is no rebound and no guarding.   Musculoskeletal: Normal range of motion. He exhibits no edema or tenderness.   Neurological: He is alert and oriented to person, place, and time.   Skin: Skin is warm and dry. No rash noted. He is not diaphoretic. No pallor.   Psychiatric: He has a normal mood and affect. His behavior is normal. Thought content normal.       Significant Labs:   BMP:   Recent Labs   Lab 05/06/20 0317   *      K 3.5      CO2 25   BUN 4*   CREATININE 1.1   CALCIUM 8.9   MG 1.5*     CBC:   Recent Labs   Lab 05/05/20 0447 05/06/20 0317   WBC 6.47 6.15   HGB 11.6* 10.4*   HCT 38.8* 35.4*    191     CMP:   Recent Labs   Lab 05/05/20 0447 05/06/20 0317    138   K 3.2* 3.5   CL 98 102   CO2 29 25   GLU 94 136*   BUN 8 4*   CREATININE 1.2 1.1   CALCIUM 9.4 8.9   PROT 7.5 6.1   ALBUMIN 3.8 3.1*   BILITOT 1.0 0.3   ALKPHOS 145* 108   AST 22 15   ALT 20 13   ANIONGAP 11 11   EGFRNONAA >60.0 >60.0       Significant Imaging: I have  reviewed all pertinent imaging results/findings within the past 24 hours.      Assessment/Plan:      * Alcohol-induced acute pancreatitis  Patient with chronic pancreatitis presenting with acute flare related to alcohol use. Lipase 121 on admission. CT abdomen  shows findings indicative of acute on chronic pancreatitis along with pseudocysts. Vitals appear stable and patient shows no signs of peritonitis on examination.   Pain initially improved yesterday, with subsequent advancement of diet to regular foods, however patient reports worsening abdominal pain with associated worsening abdominal tenderness (without signs of peritonitis)    Plan  -Clear liquid diet and IV fluid resuscitation   -Continue with pain control  -AES following, no further interventions indicated    Hypertension  Patient is on home lisinopril 40mg qd and clonidine 0.1mg TID.   BP appears uncontrolled for which he received 1 dose of hydralazine 5mg IV. SBP elevated at 180 upon admission.    Plan  -Continue amlodipine 10mg which is preferred first line therapy for this patient's population  -Resume lisinopril 40mg since CHAPO has resolved. Will continue to monitor renal function  -Continue to hold clonidine. Will discontinue permanently upon discharge.       Alcohol use  Patient with significant alcohol abuse failed rehab treatments in the past, with relapse a few days ago resulting to acute pancreatitis flare. Patient doesn't appear to be in alcohol withdrawal, although last drink was yesterday evening. CIWA of 0 currently.     Plan  -Thiamine, multivitamins and folic acid supplementation  -Will offer alcohol cessation resource material         Pancreatic pseudocyst/cyst  CT abdomen findings positive for 2 pseudocysts measuring at 9cm and 3cm respectively    Plan  -AES consulted. Will re-image if symptoms unimproved      Gastroesophageal reflux disease without esophagitis  Resume home daily protonix 40mg and PRN tums for  indigestion      Hypokalemia  -Repleting PRN      Essential hypertension  Patient is on home lisinopril 40mg qd and clonidine 0.1mg TID. BP uncontrolled on admission for which he received 1 dose of hydralazine 5mg IV. SBP elevated at 180 upon admission.  Better control with current regimen- amlodipine 10mg and lisinopril 40mg     Plan  -Continue amlodipine 10mg which is preferred first line therapy for this patient's population  -Continue lisinopril 40mg since CHAPO has resolved. Will continue to monitor renal function  -Continue to hold clonidine and discontinue permanently upon discharge.       Acquired hypothyroidism  Continue home synthroid         VTE Risk Mitigation (From admission, onward)         Ordered     heparin (porcine) injection 5,000 Units  Every 8 hours      05/04/20 1514     Place sequential compression device  Until discontinued      05/04/20 1252     IP VTE LOW RISK PATIENT  Once      05/04/20 1252                      Trinidad Andrade MD  Department of Hospital Medicine   [facility]

## 2020-05-06 NOTE — ASSESSMENT & PLAN NOTE
Patient with chronic pancreatitis presenting with acute flare related to alcohol use. Lipase 121 on admission. CT abdomen  shows findings indicative of acute on chronic pancreatitis along with pseudocysts. Vitals appear stable and patient shows no signs of peritonitis on examination.   Pain initially improved yesterday, with subsequent advancement of diet to regular foods, however patient reports worsening abdominal pain with associated worsening abdominal tenderness (without signs of peritonitis)    Plan  -Clear liquid diet and IV fluid resuscitation   -Continue with pain control  -AES following, no further interventions indicated

## 2020-05-06 NOTE — ASSESSMENT & PLAN NOTE
Patient is on home lisinopril 40mg qd and clonidine 0.1mg TID. BP uncontrolled on admission for which he received 1 dose of hydralazine 5mg IV. SBP elevated at 180 upon admission.  Better control with current regimen- amlodipine 10mg and lisinopril 40mg     Plan  -Continue amlodipine 10mg which is preferred first line therapy for this patient's population  -Continue lisinopril 40mg since CHAPO has resolved. Will continue to monitor renal function  -Continue to hold clonidine and discontinue permanently upon discharge.

## 2020-05-06 NOTE — SUBJECTIVE & OBJECTIVE
Interval History: NAEON. Patient reports worsening abdominal pain compared to yesterday. Denies nausea, vomiting or fever.     Review of Systems   Constitutional: Negative for chills and fever.   Respiratory: Negative for chest tightness, shortness of breath and wheezing.    Cardiovascular: Negative for chest pain, palpitations and leg swelling.   Gastrointestinal: Positive for abdominal pain (Worsening when compared to yesterday). Negative for abdominal distention, constipation, diarrhea, nausea and vomiting.   Genitourinary: Negative for difficulty urinating and dysuria.   Skin: Negative for color change and rash.   Neurological: Negative for syncope, weakness, light-headedness and numbness.   Psychiatric/Behavioral: Negative for agitation, behavioral problems and confusion.     Objective:     Vital Signs (Most Recent):  Temp: 98.8 °F (37.1 °C) (05/06/20 1456)  Pulse: 83 (05/06/20 1456)  Resp: 18 (05/06/20 1456)  BP: 137/77 (05/06/20 1456)  SpO2: 96 % (05/06/20 1456) Vital Signs (24h Range):  Temp:  [96.3 °F (35.7 °C)-99 °F (37.2 °C)] 98.8 °F (37.1 °C)  Pulse:  [] 83  Resp:  [18-20] 18  SpO2:  [96 %-99 %] 96 %  BP: (122-160)/() 137/77        There is no height or weight on file to calculate BMI.    Intake/Output Summary (Last 24 hours) at 5/6/2020 1500  Last data filed at 5/6/2020 0729  Gross per 24 hour   Intake 480 ml   Output --   Net 480 ml      Physical Exam   Constitutional: He is oriented to person, place, and time. He appears well-developed and well-nourished. No distress.   HENT:   Head: Normocephalic and atraumatic.   Eyes: EOM are normal. No scleral icterus.   Neck: Normal range of motion.   Cardiovascular: Normal rate, regular rhythm, normal heart sounds and intact distal pulses.   No murmur heard.  Pulmonary/Chest: Effort normal and breath sounds normal. No respiratory distress. He has no wheezes. He has no rales.   Abdominal: Soft. Bowel sounds are normal. He exhibits no distension. There  is tenderness (Marked tenderness in epigastric area and RUQ). There is no rebound and no guarding.   Musculoskeletal: Normal range of motion. He exhibits no edema or tenderness.   Neurological: He is alert and oriented to person, place, and time.   Skin: Skin is warm and dry. No rash noted. He is not diaphoretic. No pallor.   Psychiatric: He has a normal mood and affect. His behavior is normal. Thought content normal.       Significant Labs:   BMP:   Recent Labs   Lab 05/06/20 0317   *      K 3.5      CO2 25   BUN 4*   CREATININE 1.1   CALCIUM 8.9   MG 1.5*     CBC:   Recent Labs   Lab 05/05/20 0447 05/06/20 0317   WBC 6.47 6.15   HGB 11.6* 10.4*   HCT 38.8* 35.4*    191     CMP:   Recent Labs   Lab 05/05/20 0447 05/06/20 0317    138   K 3.2* 3.5   CL 98 102   CO2 29 25   GLU 94 136*   BUN 8 4*   CREATININE 1.2 1.1   CALCIUM 9.4 8.9   PROT 7.5 6.1   ALBUMIN 3.8 3.1*   BILITOT 1.0 0.3   ALKPHOS 145* 108   AST 22 15   ALT 20 13   ANIONGAP 11 11   EGFRNONAA >60.0 >60.0       Significant Imaging: I have reviewed all pertinent imaging results/findings within the past 24 hours.

## 2020-05-06 NOTE — PLAN OF CARE
POC reviewed with pt, verbalized an understanding; NADN; VSS; pt denies complaint at this time. Pain controlled. No acute event/fall this shift. Call light in reach, bed in lowest position. Safety precautions maintained.

## 2020-05-06 NOTE — PLAN OF CARE
Pt AAOx4. Denies SOB or chest tightness/pressure and does not appear to be in any acute distress. Pt complained of abdominal pain this shift (see MAR for interventions). Tolerated reg diet without issue.Fall precautions reviewed with pt. and safety maintained.

## 2020-05-06 NOTE — PHARMACY MED REC
"Admission Medication Reconciliation - Pharmacy Consult Note    The home medication history was taken by Madeline Doshi Pharmacy Tech.     Based on information gathered and subsequent review by the clinical pharmacist, the items below may need attention.     You may go to "Admission" then "Reconcile Home Medications" tabs to review and/or act upon these items.     Potentially problematic discrepancies with current MAR  o Patient IS taking the following which was not ordered upon admit  o Clonidine 0.1 mg PO TID  o Aripiprazole 10 mg PO daily  o Metoprolol succinate 25 mg PO daily  o Patient is taking a drug DIFFERENTLY than how ordered upon admit  o Mirtazapine 45 mg PO nightly  o Quetiapine 300 mg PO nightly  o Sertraline 100 mg PO daily    Please address this information as you see fit.  Feel free to contact us if you have any questions or require assistance.    Walker Dangelo, Pharm.D., BCPS  [id number]                .    .            "

## 2020-05-06 NOTE — ASSESSMENT & PLAN NOTE
sCr noted to be 1.0. sCR on admission is 1.3.   Likely 2/2 to prerenal Tj in the setting of dehydration/ third spacing 2/2 to inflammation from acute pancreatitis  -Resolved. sCr 1.1 which appears to be at baseline.

## 2020-05-06 NOTE — HOSPITAL COURSE
Bradley Cousin was admitted for acute on chronic pancreatitis w/ pseudocysts as evidenced by CT Abd managed with IV fluid resuscitation, pain control. AES consulted and recommended no further imaging or interventions. Patient initially improved, was advanced to regular diet but developed worsening abdominal pain. Started patient on IV fluid resuscitation and clear liquid diet with continued pain control.

## 2020-05-07 VITALS
HEART RATE: 77 BPM | OXYGEN SATURATION: 100 % | TEMPERATURE: 98 F | SYSTOLIC BLOOD PRESSURE: 135 MMHG | RESPIRATION RATE: 14 BRPM | DIASTOLIC BLOOD PRESSURE: 95 MMHG

## 2020-05-07 LAB
ALBUMIN SERPL BCP-MCNC: 2.9 G/DL (ref 3.5–5.2)
ALP SERPL-CCNC: 96 U/L (ref 55–135)
ALT SERPL W/O P-5'-P-CCNC: 14 U/L (ref 10–44)
ANION GAP SERPL CALC-SCNC: 10 MMOL/L (ref 8–16)
AST SERPL-CCNC: 12 U/L (ref 10–40)
BASOPHILS # BLD AUTO: 0.02 K/UL (ref 0–0.2)
BASOPHILS NFR BLD: 0.4 % (ref 0–1.9)
BILIRUB SERPL-MCNC: 0.2 MG/DL (ref 0.1–1)
BUN SERPL-MCNC: 5 MG/DL (ref 6–20)
CALCIUM SERPL-MCNC: 8.9 MG/DL (ref 8.7–10.5)
CHLORIDE SERPL-SCNC: 102 MMOL/L (ref 95–110)
CO2 SERPL-SCNC: 27 MMOL/L (ref 23–29)
CREAT SERPL-MCNC: 1.1 MG/DL (ref 0.5–1.4)
DIFFERENTIAL METHOD: ABNORMAL
EOSINOPHIL # BLD AUTO: 0.3 K/UL (ref 0–0.5)
EOSINOPHIL NFR BLD: 4.6 % (ref 0–8)
ERYTHROCYTE [DISTWIDTH] IN BLOOD BY AUTOMATED COUNT: 19.6 % (ref 11.5–14.5)
EST. GFR  (AFRICAN AMERICAN): >60 ML/MIN/1.73 M^2
EST. GFR  (NON AFRICAN AMERICAN): >60 ML/MIN/1.73 M^2
GLUCOSE SERPL-MCNC: 118 MG/DL (ref 70–110)
HCT VFR BLD AUTO: 31.1 % (ref 40–54)
HGB BLD-MCNC: 9.3 G/DL (ref 14–18)
IMM GRANULOCYTES # BLD AUTO: 0.02 K/UL (ref 0–0.04)
IMM GRANULOCYTES NFR BLD AUTO: 0.4 % (ref 0–0.5)
LYMPHOCYTES # BLD AUTO: 1.4 K/UL (ref 1–4.8)
LYMPHOCYTES NFR BLD: 25.2 % (ref 18–48)
MAGNESIUM SERPL-MCNC: 1.6 MG/DL (ref 1.6–2.6)
MCH RBC QN AUTO: 24.3 PG (ref 27–31)
MCHC RBC AUTO-ENTMCNC: 29.9 G/DL (ref 32–36)
MCV RBC AUTO: 81 FL (ref 82–98)
MONOCYTES # BLD AUTO: 0.5 K/UL (ref 0.3–1)
MONOCYTES NFR BLD: 8.5 % (ref 4–15)
NEUTROPHILS # BLD AUTO: 3.5 K/UL (ref 1.8–7.7)
NEUTROPHILS NFR BLD: 60.9 % (ref 38–73)
NRBC BLD-RTO: 0 /100 WBC
PHOSPHATE SERPL-MCNC: 3.6 MG/DL (ref 2.7–4.5)
PLATELET # BLD AUTO: 166 K/UL (ref 150–350)
PMV BLD AUTO: 11.4 FL (ref 9.2–12.9)
POTASSIUM SERPL-SCNC: 3.4 MMOL/L (ref 3.5–5.1)
PROT SERPL-MCNC: 6.2 G/DL (ref 6–8.4)
RBC # BLD AUTO: 3.82 M/UL (ref 4.6–6.2)
SODIUM SERPL-SCNC: 139 MMOL/L (ref 136–145)
WBC # BLD AUTO: 5.67 K/UL (ref 3.9–12.7)

## 2020-05-07 PROCEDURE — 99238 PR HOSPITAL DISCHARGE DAY,<30 MIN: ICD-10-PCS | Mod: ,,, | Performed by: INTERNAL MEDICINE

## 2020-05-07 PROCEDURE — 84100 ASSAY OF PHOSPHORUS: CPT

## 2020-05-07 PROCEDURE — 25000003 PHARM REV CODE 250: Performed by: STUDENT IN AN ORGANIZED HEALTH CARE EDUCATION/TRAINING PROGRAM

## 2020-05-07 PROCEDURE — 85025 COMPLETE CBC W/AUTO DIFF WBC: CPT

## 2020-05-07 PROCEDURE — 63600175 PHARM REV CODE 636 W HCPCS: Performed by: STUDENT IN AN ORGANIZED HEALTH CARE EDUCATION/TRAINING PROGRAM

## 2020-05-07 PROCEDURE — 36415 COLL VENOUS BLD VENIPUNCTURE: CPT

## 2020-05-07 PROCEDURE — 99238 HOSP IP/OBS DSCHRG MGMT 30/<: CPT | Mod: ,,, | Performed by: INTERNAL MEDICINE

## 2020-05-07 PROCEDURE — 83735 ASSAY OF MAGNESIUM: CPT

## 2020-05-07 PROCEDURE — 80053 COMPREHEN METABOLIC PANEL: CPT

## 2020-05-07 RX ORDER — LEVOTHYROXINE SODIUM 50 UG/1
50 TABLET ORAL
Qty: 30 TABLET | Refills: 3 | Status: ON HOLD | OUTPATIENT
Start: 2020-05-07 | End: 2020-10-12 | Stop reason: HOSPADM

## 2020-05-07 RX ORDER — FOLIC ACID 1 MG/1
1 TABLET ORAL DAILY
Qty: 30 TABLET | Refills: 3 | Status: ON HOLD | OUTPATIENT
Start: 2020-05-08 | End: 2020-10-12 | Stop reason: HOSPADM

## 2020-05-07 RX ORDER — LANOLIN ALCOHOL/MO/W.PET/CERES
100 CREAM (GRAM) TOPICAL DAILY
Qty: 30 TABLET | Refills: 2 | Status: ON HOLD | OUTPATIENT
Start: 2020-05-08 | End: 2020-07-01

## 2020-05-07 RX ORDER — HYDROCODONE BITARTRATE AND ACETAMINOPHEN 10; 325 MG/1; MG/1
1 TABLET ORAL EVERY 6 HOURS PRN
Qty: 5 TABLET | Refills: 0 | Status: ON HOLD | OUTPATIENT
Start: 2020-05-07 | End: 2020-07-01

## 2020-05-07 RX ORDER — AMLODIPINE BESYLATE 10 MG/1
10 TABLET ORAL DAILY
Qty: 30 TABLET | Refills: 4 | Status: ON HOLD | OUTPATIENT
Start: 2020-05-08 | End: 2020-07-03 | Stop reason: HOSPADM

## 2020-05-07 RX ORDER — POTASSIUM CHLORIDE 20 MEQ/1
40 TABLET, EXTENDED RELEASE ORAL ONCE
Status: COMPLETED | OUTPATIENT
Start: 2020-05-07 | End: 2020-05-07

## 2020-05-07 RX ADMIN — HEPARIN SODIUM 5000 UNITS: 5000 INJECTION, SOLUTION INTRAVENOUS; SUBCUTANEOUS at 06:05

## 2020-05-07 RX ADMIN — Medication 100 MG: at 08:05

## 2020-05-07 RX ADMIN — GABAPENTIN 300 MG: 300 CAPSULE ORAL at 08:05

## 2020-05-07 RX ADMIN — AMLODIPINE BESYLATE 10 MG: 10 TABLET ORAL at 08:05

## 2020-05-07 RX ADMIN — HYDROMORPHONE HYDROCHLORIDE 0.5 MG: 1 INJECTION, SOLUTION INTRAMUSCULAR; INTRAVENOUS; SUBCUTANEOUS at 12:05

## 2020-05-07 RX ADMIN — LEVOTHYROXINE SODIUM 50 MCG: 50 TABLET ORAL at 06:05

## 2020-05-07 RX ADMIN — POTASSIUM CHLORIDE 40 MEQ: 1500 TABLET, EXTENDED RELEASE ORAL at 01:05

## 2020-05-07 RX ADMIN — HYDROXYZINE PAMOATE 25 MG: 25 CAPSULE ORAL at 12:05

## 2020-05-07 RX ADMIN — HYDROMORPHONE HYDROCHLORIDE 0.5 MG: 1 INJECTION, SOLUTION INTRAMUSCULAR; INTRAVENOUS; SUBCUTANEOUS at 04:05

## 2020-05-07 RX ADMIN — OXYCODONE HYDROCHLORIDE AND ACETAMINOPHEN 1 TABLET: 10; 325 TABLET ORAL at 08:05

## 2020-05-07 RX ADMIN — LISINOPRIL 40 MG: 20 TABLET ORAL at 08:05

## 2020-05-07 RX ADMIN — HYDROXYZINE PAMOATE 25 MG: 25 CAPSULE ORAL at 04:05

## 2020-05-07 RX ADMIN — THERA TABS 1 TABLET: TAB at 08:05

## 2020-05-07 RX ADMIN — SERTRALINE HYDROCHLORIDE 50 MG: 50 TABLET ORAL at 08:05

## 2020-05-07 RX ADMIN — PANTOPRAZOLE SODIUM 40 MG: 40 TABLET, DELAYED RELEASE ORAL at 08:05

## 2020-05-07 RX ADMIN — FOLIC ACID 1 MG: 1 TABLET ORAL at 08:05

## 2020-05-07 NOTE — DISCHARGE SUMMARY
[facility] Hospital Medicine  Discharge Summary      Patient Name: Bradley Collado  MRN: [id number]  Admission Date: 5/4/2020  Hospital Length of Stay: 3 days  Discharge Date and Time:  05/07/2020 3:47 PM  Attending Physician: No att. providers found   Discharging Provider: Vasu Garzon MD  Primary Care Provider: Arlen Belcher NP  Hospital Medicine Team: [facility] HOSP MED 5 Vasu Garzon MD    HPI:   Bradley Collado is a 40 year old M with chronic pancreatitis s/p cholecystectomy, HTN, hypothyroidism, alcohol use disorder (failed mutiple rehab rx), tobacco use presenting as a transfer from [facility] for AES evaluation. Patient had presented there for a 3 day history of epigastric abdominal pain, nausea and vomiting. Patient states this started 3 days ago during the same time he started to drink heavily (previously sober for 3 months and drank about a fifth of liquor (750ml) daily. Patient states pain is diffuse in his abdomen and radiates to his back. He denies any aggravating factors but states percocet tends to improve pain, although not currently on percocet at home. Patient denies fever or chills. He reports loss of appetite and has not had any PO intake for the past two days.     Per chart review, he has required [facility] hospitalizations for acute on chronic pancreatitis.  He was most recently admitted at [facility] on 4/4 to 4/6 with acute pancreatitis with a lipase of 500.  CT of the abdomen and pelvis demonstrated likely pancreatic pseudocyst and peripancreatic stranding consistent with acute pancreatitis.  CT also demonstrated pericolonic stranding consistent with diverticulitis versus colitis.  GI was consulted at that time and recommended, MRCP once episode of pancreatitis resolves; needs imaging at least 6-8 weeks after this episode to evaluate pseudocysts and if they remain large and patient is symptomatic at that time would consider referral for EUS drainage.  Continue antibiotics for  diverticulitis for 7 days.    In the ED he was given Zosyn 4.5g, Dilaudid 1mg, Phenergan and Zofran, and hydralazine 5mg IV x 1 for elevated BP.  He was given 1L NS bolus and then 125 cc/hr.  CT abdomen today shows there is chronic pancreatitis and pseudocysts. The dominant pseudocyst is between the pancreas and the gastric greater curvature and a smaller pseudocyst is between the pancreas and the lesser curvature/gastric cardia have increased in size.    * No surgery found *      Hospital Course:   Bradley Cousin was admitted for acute on chronic pancreatitis w/ pseudocysts as evidenced by CT Abd managed with IV fluid resuscitation, pain control. AES consulted and recommended no further imaging or interventions. Patient initially improved, was advanced to regular diet but developed worsening abdominal pain. Started patient on IV fluid resuscitation and clear liquid diet with continued pain control.     Review of Systems   Constitutional: Negative for chills and fever.   Respiratory: Negative for chest tightness, shortness of breath and wheezing.    Cardiovascular: Negative for chest pain, palpitations and leg swelling.   Gastrointestinal: Positive for abdominal pain (Worsening when compared to yesterday). Negative for abdominal distention, constipation, diarrhea, nausea and vomiting.   Genitourinary: Negative for difficulty urinating and dysuria.   Skin: Negative for color change and rash.   Neurological: Negative for syncope, weakness, light-headedness and numbness.   Psychiatric/Behavioral: Negative for agitation, behavioral problems and confusion.     Objective:     Vital Signs (Most Recent):  Temp: 97.7 °F (36.5 °C) (05/07/20 1145)  Pulse: 77 (05/07/20 1145)  Resp: 14 (05/07/20 1145)  BP: (!) 135/95 (05/07/20 1145)  SpO2: 100 % (05/07/20 1145) Vital Signs (24h Range):  Temp:  [97.7 °F (36.5 °C)-99.5 °F (37.5 °C)] 97.7 °F (36.5 °C)  Pulse:  [] 77  Resp:  [14-22] 14  SpO2:  [96 %-100 %] 100 %  BP:  (121-156)/(74-95) 135/95        There is no height or weight on file to calculate BMI.  No intake or output data in the 24 hours ending 05/07/20 9080   Physical Exam   Constitutional: He is oriented to person, place, and time. He appears well-developed and well-nourished. No distress.   HENT:   Head: Normocephalic and atraumatic.   Eyes: EOM are normal. No scleral icterus.   Neck: Normal range of motion.   Cardiovascular: Normal rate, regular rhythm, normal heart sounds and intact distal pulses.   No murmur heard.  Pulmonary/Chest: Effort normal and breath sounds normal. No respiratory distress. He has no wheezes. He has no rales.   Abdominal: Soft. Bowel sounds are normal. He exhibits no distension. There is tenderness (Marked tenderness in epigastric area and RUQ). There is no rebound and no guarding.   Musculoskeletal: Normal range of motion. He exhibits no edema or tenderness.   Neurological: He is alert and oriented to person, place, and time.   Skin: Skin is warm and dry. No rash noted. He is not diaphoretic. No pallor.   Psychiatric: He has a normal mood and affect. His behavior is normal. Thought content normal.       Consults:   Consults (From admission, onward)        Status Ordering Provider     Inpatient consult to [facility] ([facility]  Once     Provider:  (Not yet assigned)    Completed ESTHELA ECHAVARRIA          * Alcohol-induced acute pancreatitis  Patient with chronic pancreatitis presenting with acute flare related to alcohol use. Lipase 121 on admission. CT abdomen  shows findings indicative of acute on chronic pancreatitis along with pseudocysts. Vitals appear stable and patient shows no signs of peritonitis on examination.   Pain initially improved yesterday, with subsequent advancement of diet to regular foods, however patient reports worsening abdominal pain with associated worsening abdominal tenderness (without signs of peritonitis)    Plan  -Progress diet as tolerated   -Continue with  pain control  -AES following, no further interventions indicated    Alcohol use  Patient with significant alcohol abuse failed rehab treatments in the past, with relapse a few days ago resulting to acute pancreatitis flare. Patient doesn't appear to be in alcohol withdrawal, although last drink was yesterday evening. CIWA of 0 currently.     Plan  -Thiamine, multivitamins and folic acid supplementation  -Will offer alcohol cessation resource material and patient states he will pursue treatment upon discharge        Pancreatic pseudocyst/cyst  CT abdomen findings positive for 2 pseudocysts measuring at 9cm and 3cm respectively    Plan  -AES consulted.      Gastroesophageal reflux disease without esophagitis  Resume home daily protonix 40mg and PRN tums for indigestion      Essential hypertension  Patient is on home lisinopril 40mg qd and clonidine 0.1mg TID. BP uncontrolled on admission for which he received 1 dose of hydralazine 5mg IV. SBP elevated at 180 upon admission.  Better control with current regimen- amlodipine 10mg and lisinopril 40mg     Plan  -Continue amlodipine 10mg which is preferred first line therapy for this patient's population  -Continue lisinopril 40mg since CHAPO has resolved. Will continue to monitor renal function  -Continue to hold clonidine and discontinue permanently upon discharge.       Acquired hypothyroidism  Continue home synthroid         Final Active Diagnoses:    Diagnosis Date Noted POA    PRINCIPAL PROBLEM:  Alcohol-induced acute pancreatitis [K85.20] 04/05/2016 Yes    Pancreatic pseudocyst/cyst [K86.2, K86.3] 05/04/2020 Yes    Alcohol use [Z72.89] 05/04/2020 Yes    Gastroesophageal reflux disease without esophagitis [K21.9] 09/13/2018 Yes    Hypokalemia [E87.6] 03/26/2017 Yes    Essential hypertension [I10] 04/07/2016 Yes     Chronic    Acquired hypothyroidism [E03.9] 04/05/2016 Yes      Problems Resolved During this Admission:    Diagnosis Date Noted Date Resolved POA     Acute kidney injury [N17.9] 05/14/2018 05/06/2020 Yes       Discharged Condition: stable    Disposition: Home or Self Care    Follow Up:    Patient Instructions:      Diet renal     Notify your health care provider if you experience any of the following:  temperature >100.4     Notify your health care provider if you experience any of the following:  persistent nausea and vomiting or diarrhea     Notify your health care provider if you experience any of the following:  severe uncontrolled pain     Notify your health care provider if you experience any of the following:  redness, tenderness, or signs of infection (pain, swelling, redness, odor or green/yellow discharge around incision site)     Notify your health care provider if you experience any of the following:  difficulty breathing or increased cough     Notify your health care provider if you experience any of the following:  severe persistent headache     Notify your health care provider if you experience any of the following:  worsening rash     Notify your health care provider if you experience any of the following:  persistent dizziness, light-headedness, or visual disturbances     Notify your health care provider if you experience any of the following:  increased confusion or weakness     Activity as tolerated       Significant Diagnostic Studies: Labs: All labs within the past 24 hours have been reviewed    Pending Diagnostic Studies:     None         Medications:  Reconciled Home Medications:      Medication List      START taking these medications    amLODIPine 10 MG tablet  Commonly known as:  NORVASC  Take 1 tablet (10 mg total) by mouth once daily.  Start taking on:  May 8, 2020     folic acid 1 MG tablet  Commonly known as:  FOLVITE  Take 1 tablet (1 mg total) by mouth once daily.  Start taking on:  May 8, 2020     HYDROcodone-acetaminophen  mg per tablet  Commonly known as:  NORCO  Take 1 tablet by mouth every 6 (six) hours as needed for  Pain.     thiamine 100 MG tablet  Take 1 tablet (100 mg total) by mouth once daily.  Start taking on:  May 8, 2020        CONTINUE taking these medications    ARIPiprazole 10 MG Tab  Commonly known as:  ABILIFY  Take 1 tablet (10 mg total) by mouth once daily.     gabapentin 300 MG capsule  Commonly known as:  NEURONTIN  Take 1 capsule (300 mg total) by mouth 3 (three) times daily.     levothyroxine 50 MCG tablet  Commonly known as:  SYNTHROID  Take 1 tablet (50 mcg total) by mouth before breakfast.     lisinopriL 40 MG tablet  Commonly known as:  PRINIVIL,ZESTRIL  Take 1 tablet (40 mg total) by mouth once daily.     metoprolol succinate 25 MG 24 hr tablet  Commonly known as:  TOPROL-XL  Take 25 mg by mouth once daily.     mirtazapine 45 MG tablet  Commonly known as:  REMERON  Take 45 mg by mouth every evening.     ondansetron 8 MG tablet  Commonly known as:  ZOFRAN  Take 1 tablet (8 mg total) by mouth every 8 (eight) hours as needed for Nausea.     pantoprazole 40 MG tablet  Commonly known as:  PROTONIX  Take 40 mg by mouth once daily.     QUEtiapine 300 MG Tab  Commonly known as:  SEROQUEL  Take 300 mg by mouth nightly.     sertraline 100 MG tablet  Commonly known as:  ZOLOFT  Take 100 mg by mouth once daily.        STOP taking these medications    cloNIDine 0.1 MG tablet  Commonly known as:  CATAPRES            Indwelling Lines/Drains at time of discharge:   Lines/Drains/Airways     None                 Time spent on the discharge of patient: 35 minutes  Patient was seen and examined on the date of discharge and determined to be suitable for discharge.         Vasu Garzon MD  Department of Hospital Medicine  [facility]

## 2020-05-07 NOTE — ASSESSMENT & PLAN NOTE
CT abdomen findings positive for 2 pseudocysts measuring at 9cm and 3cm respectively    Plan  -AES consulted.

## 2020-05-07 NOTE — NURSING
Order to d/c home today. Saline lock removed. VSS. Discharge instructions given to pt. Pt verbalized understanding. Pt discharged from OBS unit ambulating. All personal belongings taken home by pt.

## 2020-05-07 NOTE — CARE UPDATE
Rapid Response Nurse Chart Check     Chart check completed, abnormal VS noted. Bedside RNMarleen contacted, no concerns verbalized at this time, instructed to call [phone] for further concerns or assistance.

## 2020-05-07 NOTE — ASSESSMENT & PLAN NOTE
Patient with chronic pancreatitis presenting with acute flare related to alcohol use. Lipase 121 on admission. CT abdomen  shows findings indicative of acute on chronic pancreatitis along with pseudocysts. Vitals appear stable and patient shows no signs of peritonitis on examination.   Pain initially improved yesterday, with subsequent advancement of diet to regular foods, however patient reports worsening abdominal pain with associated worsening abdominal tenderness (without signs of peritonitis)    Plan  -Progress diet as tolerated   -Continue with pain control  -AES following, no further interventions indicated

## 2020-05-07 NOTE — PLAN OF CARE
Patient's VSS once pain managed, refer to MAR. No N/V/D and pain managed with med schedule and breakthrough med order. LR infusing 100ml/Hr, tolerating well. Regular diet tolerated. Continuing to monitor for changes and pain management.

## 2020-05-07 NOTE — ASSESSMENT & PLAN NOTE
Patient with significant alcohol abuse failed rehab treatments in the past, with relapse a few days ago resulting to acute pancreatitis flare. Patient doesn't appear to be in alcohol withdrawal, although last drink was yesterday evening. CIWA of 0 currently.     Plan  -Thiamine, multivitamins and folic acid supplementation  -Will offer alcohol cessation resource material and patient states he will pursue treatment upon discharge

## 2020-05-07 NOTE — PLAN OF CARE
Pt with no falls or injuries this shift. Ambulating in room independently. Pt reports pain level 5/10 post pain med.

## 2020-05-07 NOTE — PLAN OF CARE
05/07/20 1403   Final Note   Assessment Type Final Discharge Note   Anticipated Discharge Disposition Home     Patient is being discharged home.     SW arranged ambulatory transportation. Requested  time is 2:30 PM.  Requested  time does not guarantee arrival time.      Nurse has been informed of above.    Carolin Uriostegui, KELSIE  [facility]  [phone]

## 2020-05-07 NOTE — PLAN OF CARE
05/07/20 1254   Final Note   Assessment Type Final Discharge Note   Anticipated Discharge Disposition Home   What phone number can be called within the next 1-3 days to see how you are doing after discharge? [phone]   [facility] Referral Info   Post Acute Recommendation No Care

## 2020-05-09 LAB
BACTERIA BLD CULT: NORMAL
BACTERIA BLD CULT: NORMAL

## 2020-06-17 ENCOUNTER — HOSPITAL ENCOUNTER (EMERGENCY)
Facility: HOSPITAL | Age: 40
Discharge: HOME | End: 2020-06-18
Attending: EMERGENCY MEDICINE
Payer: MEDICAID

## 2020-06-17 DIAGNOSIS — F10.920 ALCOHOLIC INTOXICATION WITHOUT COMPLICATION: ICD-10-CM

## 2020-06-17 DIAGNOSIS — T50.901A OVERDOSE: Primary | ICD-10-CM

## 2020-06-17 LAB
ALBUMIN SERPL BCP-MCNC: 4.5 G/DL (ref 3.5–5.2)
ALP SERPL-CCNC: 92 U/L (ref 55–135)
ALT SERPL W/O P-5'-P-CCNC: 33 U/L (ref 10–44)
ANION GAP SERPL CALC-SCNC: 10 MMOL/L (ref 8–16)
APAP SERPL-MCNC: <10 UG/ML (ref 10–20)
AST SERPL-CCNC: 25 U/L (ref 10–40)
BASOPHILS # BLD AUTO: 0.08 K/UL (ref 0–0.2)
BASOPHILS NFR BLD: 1 % (ref 0–1.9)
BILIRUB SERPL-MCNC: 0.6 MG/DL (ref 0.1–1)
BUN SERPL-MCNC: 9 MG/DL (ref 6–20)
CALCIUM SERPL-MCNC: 8.9 MG/DL (ref 8.7–10.5)
CHLORIDE SERPL-SCNC: 106 MMOL/L (ref 95–110)
CO2 SERPL-SCNC: 23 MMOL/L (ref 23–29)
CREAT SERPL-MCNC: 1.4 MG/DL (ref 0.5–1.4)
DIFFERENTIAL METHOD: ABNORMAL
EOSINOPHIL # BLD AUTO: 0.2 K/UL (ref 0–0.5)
EOSINOPHIL NFR BLD: 2.9 % (ref 0–8)
ERYTHROCYTE [DISTWIDTH] IN BLOOD BY AUTOMATED COUNT: 21.2 % (ref 11.5–14.5)
EST. GFR  (AFRICAN AMERICAN): >60 ML/MIN/1.73 M^2
EST. GFR  (NON AFRICAN AMERICAN): >60 ML/MIN/1.73 M^2
ETHANOL SERPL-MCNC: 262 MG/DL
GLUCOSE SERPL-MCNC: 166 MG/DL (ref 70–110)
HCT VFR BLD AUTO: 34.4 % (ref 40–54)
HGB BLD-MCNC: 10.6 G/DL (ref 14–18)
IMM GRANULOCYTES # BLD AUTO: 0.02 K/UL (ref 0–0.04)
IMM GRANULOCYTES NFR BLD AUTO: 0.2 % (ref 0–0.5)
LYMPHOCYTES # BLD AUTO: 2.8 K/UL (ref 1–4.8)
LYMPHOCYTES NFR BLD: 34.1 % (ref 18–48)
MCH RBC QN AUTO: 24.5 PG (ref 27–31)
MCHC RBC AUTO-ENTMCNC: 30.8 G/DL (ref 32–36)
MCV RBC AUTO: 80 FL (ref 82–98)
MONOCYTES # BLD AUTO: 0.7 K/UL (ref 0.3–1)
MONOCYTES NFR BLD: 8.3 % (ref 4–15)
NEUTROPHILS # BLD AUTO: 4.4 K/UL (ref 1.8–7.7)
NEUTROPHILS NFR BLD: 53.5 % (ref 38–73)
NRBC BLD-RTO: 0 /100 WBC
PLATELET # BLD AUTO: 306 K/UL (ref 150–350)
PMV BLD AUTO: 10.3 FL (ref 9.2–12.9)
POTASSIUM SERPL-SCNC: 3 MMOL/L (ref 3.5–5.1)
PROT SERPL-MCNC: 7.7 G/DL (ref 6–8.4)
RBC # BLD AUTO: 4.32 M/UL (ref 4.6–6.2)
SALICYLATES SERPL-MCNC: <4 MG/DL (ref 15–30)
SODIUM SERPL-SCNC: 139 MMOL/L (ref 136–145)
WBC # BLD AUTO: 8.29 K/UL (ref 3.9–12.7)

## 2020-06-17 PROCEDURE — 99285 EMERGENCY DEPT VISIT HI MDM: CPT | Mod: 25

## 2020-06-17 PROCEDURE — 85025 COMPLETE CBC W/AUTO DIFF WBC: CPT

## 2020-06-17 PROCEDURE — 80307 DRUG TEST PRSMV CHEM ANLYZR: CPT

## 2020-06-17 PROCEDURE — 80320 DRUG SCREEN QUANTALCOHOLS: CPT

## 2020-06-17 PROCEDURE — 80307 DRUG TEST PRSMV CHEM ANLYZR: CPT | Mod: 59

## 2020-06-17 PROCEDURE — 36415 COLL VENOUS BLD VENIPUNCTURE: CPT

## 2020-06-17 PROCEDURE — 80053 COMPREHEN METABOLIC PANEL: CPT

## 2020-06-17 PROCEDURE — 84484 ASSAY OF TROPONIN QUANT: CPT

## 2020-06-17 RX ORDER — TERAZOSIN 1 MG/1
2 CAPSULE ORAL NIGHTLY
Status: ON HOLD | COMMUNITY
End: 2020-07-03 | Stop reason: HOSPADM

## 2020-06-17 RX ORDER — POTASSIUM CHLORIDE 20 MEQ/15ML
40 SOLUTION ORAL
Status: DISCONTINUED | OUTPATIENT
Start: 2020-06-18 | End: 2020-06-18

## 2020-06-18 VITALS
DIASTOLIC BLOOD PRESSURE: 76 MMHG | HEIGHT: 68 IN | BODY MASS INDEX: 30.31 KG/M2 | RESPIRATION RATE: 15 BRPM | HEART RATE: 72 BPM | WEIGHT: 200 LBS | OXYGEN SATURATION: 96 % | TEMPERATURE: 99 F | SYSTOLIC BLOOD PRESSURE: 122 MMHG

## 2020-06-18 LAB
TROPONIN I SERPL DL<=0.01 NG/ML-MCNC: <0.03 NG/ML
TROPONIN I SERPL DL<=0.01 NG/ML-MCNC: <0.03 NG/ML

## 2020-06-18 PROCEDURE — 25000003 PHARM REV CODE 250: Performed by: EMERGENCY MEDICINE

## 2020-06-18 PROCEDURE — 84484 ASSAY OF TROPONIN QUANT: CPT

## 2020-06-18 PROCEDURE — 96361 HYDRATE IV INFUSION ADD-ON: CPT

## 2020-06-18 PROCEDURE — 63600175 PHARM REV CODE 636 W HCPCS: Performed by: EMERGENCY MEDICINE

## 2020-06-18 PROCEDURE — 96360 HYDRATION IV INFUSION INIT: CPT

## 2020-06-18 PROCEDURE — 93005 ELECTROCARDIOGRAM TRACING: CPT | Performed by: INTERNAL MEDICINE

## 2020-06-18 RX ORDER — NALOXONE HYDROCHLORIDE 4 MG/.1ML
SPRAY NASAL
Qty: 1 EACH | Refills: 11 | Status: ON HOLD | OUTPATIENT
Start: 2020-06-18 | End: 2020-07-01

## 2020-06-18 RX ORDER — SODIUM CHLORIDE 9 MG/ML
1000 INJECTION, SOLUTION INTRAVENOUS
Status: COMPLETED | OUTPATIENT
Start: 2020-06-18 | End: 2020-06-18

## 2020-06-18 RX ORDER — POTASSIUM CHLORIDE 20 MEQ/1
40 TABLET, EXTENDED RELEASE ORAL ONCE
Status: COMPLETED | OUTPATIENT
Start: 2020-06-18 | End: 2020-06-18

## 2020-06-18 RX ADMIN — POTASSIUM CHLORIDE 40 MEQ: 1500 TABLET, EXTENDED RELEASE ORAL at 12:06

## 2020-06-18 RX ADMIN — SODIUM CHLORIDE 1000 ML: 0.9 INJECTION, SOLUTION INTRAVENOUS at 12:06

## 2020-06-18 RX ADMIN — SODIUM CHLORIDE, SODIUM LACTATE, POTASSIUM CHLORIDE, AND CALCIUM CHLORIDE 1000 ML: .6; .31; .03; .02 INJECTION, SOLUTION INTRAVENOUS at 01:06

## 2020-06-18 NOTE — ED NOTES
Pt refused catheter for urine. Reported to Dr. Sabillon. Will give more fluids and continue to monitor.

## 2020-06-18 NOTE — ED NOTES
Counseled pt on need for urine and importance for his disposition. Pt affirmed understanding. Will continue to monitor.
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
## 2020-06-22 ENCOUNTER — HOSPITAL ENCOUNTER (EMERGENCY)
Facility: HOSPITAL | Age: 40
Discharge: HOME | End: 2020-06-22
Attending: EMERGENCY MEDICINE
Payer: MEDICAID

## 2020-06-22 VITALS
BODY MASS INDEX: 28.25 KG/M2 | TEMPERATURE: 98 F | SYSTOLIC BLOOD PRESSURE: 145 MMHG | HEART RATE: 80 BPM | HEIGHT: 67 IN | RESPIRATION RATE: 18 BRPM | DIASTOLIC BLOOD PRESSURE: 97 MMHG | WEIGHT: 180 LBS | OXYGEN SATURATION: 100 %

## 2020-06-22 DIAGNOSIS — R11.2 NAUSEA AND VOMITING, INTRACTABILITY OF VOMITING NOT SPECIFIED, UNSPECIFIED VOMITING TYPE: Primary | ICD-10-CM

## 2020-06-22 LAB
ALBUMIN SERPL BCP-MCNC: 4.7 G/DL (ref 3.5–5.2)
ALP SERPL-CCNC: 115 U/L (ref 55–135)
ALT SERPL W/O P-5'-P-CCNC: 37 U/L (ref 10–44)
ANION GAP SERPL CALC-SCNC: 21 MMOL/L (ref 8–16)
AST SERPL-CCNC: 24 U/L (ref 10–40)
BASOPHILS # BLD AUTO: 0.02 K/UL (ref 0–0.2)
BASOPHILS NFR BLD: 0.2 % (ref 0–1.9)
BILIRUB SERPL-MCNC: 0.3 MG/DL (ref 0.1–1)
BUN SERPL-MCNC: 17 MG/DL (ref 6–20)
CALCIUM SERPL-MCNC: 9.8 MG/DL (ref 8.7–10.5)
CHLORIDE SERPL-SCNC: 98 MMOL/L (ref 95–110)
CO2 SERPL-SCNC: 21 MMOL/L (ref 23–29)
CREAT SERPL-MCNC: 1.4 MG/DL (ref 0.5–1.4)
DIFFERENTIAL METHOD: ABNORMAL
EOSINOPHIL # BLD AUTO: 0.1 K/UL (ref 0–0.5)
EOSINOPHIL NFR BLD: 0.7 % (ref 0–8)
ERYTHROCYTE [DISTWIDTH] IN BLOOD BY AUTOMATED COUNT: 21.8 % (ref 11.5–14.5)
EST. GFR  (AFRICAN AMERICAN): >60 ML/MIN/1.73 M^2
EST. GFR  (NON AFRICAN AMERICAN): >60 ML/MIN/1.73 M^2
ETHANOL SERPL-MCNC: 85 MG/DL
GLUCOSE SERPL-MCNC: 139 MG/DL (ref 70–110)
HCT VFR BLD AUTO: 39.4 % (ref 40–54)
HGB BLD-MCNC: 12.4 G/DL (ref 14–18)
IMM GRANULOCYTES # BLD AUTO: 0.03 K/UL (ref 0–0.04)
IMM GRANULOCYTES NFR BLD AUTO: 0.3 % (ref 0–0.5)
LIPASE SERPL-CCNC: 13 U/L (ref 4–60)
LYMPHOCYTES # BLD AUTO: 0.9 K/UL (ref 1–4.8)
LYMPHOCYTES NFR BLD: 9.4 % (ref 18–48)
MCH RBC QN AUTO: 25 PG (ref 27–31)
MCHC RBC AUTO-ENTMCNC: 31.5 G/DL (ref 32–36)
MCV RBC AUTO: 79 FL (ref 82–98)
MONOCYTES # BLD AUTO: 0.4 K/UL (ref 0.3–1)
MONOCYTES NFR BLD: 4.6 % (ref 4–15)
NEUTROPHILS # BLD AUTO: 7.7 K/UL (ref 1.8–7.7)
NEUTROPHILS NFR BLD: 84.8 % (ref 38–73)
NRBC BLD-RTO: 0 /100 WBC
PLATELET # BLD AUTO: 369 K/UL (ref 150–350)
PMV BLD AUTO: 10.1 FL (ref 9.2–12.9)
POTASSIUM SERPL-SCNC: 3.7 MMOL/L (ref 3.5–5.1)
PROT SERPL-MCNC: 8.6 G/DL (ref 6–8.4)
RBC # BLD AUTO: 4.96 M/UL (ref 4.6–6.2)
SODIUM SERPL-SCNC: 140 MMOL/L (ref 136–145)
WBC # BLD AUTO: 9.13 K/UL (ref 3.9–12.7)

## 2020-06-22 PROCEDURE — 96375 TX/PRO/DX INJ NEW DRUG ADDON: CPT

## 2020-06-22 PROCEDURE — 25000003 PHARM REV CODE 250: Performed by: EMERGENCY MEDICINE

## 2020-06-22 PROCEDURE — 85025 COMPLETE CBC W/AUTO DIFF WBC: CPT

## 2020-06-22 PROCEDURE — 96361 HYDRATE IV INFUSION ADD-ON: CPT

## 2020-06-22 PROCEDURE — 80053 COMPREHEN METABOLIC PANEL: CPT

## 2020-06-22 PROCEDURE — 83690 ASSAY OF LIPASE: CPT

## 2020-06-22 PROCEDURE — 80320 DRUG SCREEN QUANTALCOHOLS: CPT

## 2020-06-22 PROCEDURE — 99284 EMERGENCY DEPT VISIT MOD MDM: CPT | Mod: 25

## 2020-06-22 PROCEDURE — 36415 COLL VENOUS BLD VENIPUNCTURE: CPT

## 2020-06-22 PROCEDURE — 96374 THER/PROPH/DIAG INJ IV PUSH: CPT

## 2020-06-22 PROCEDURE — 63600175 PHARM REV CODE 636 W HCPCS: Performed by: EMERGENCY MEDICINE

## 2020-06-22 RX ORDER — HALOPERIDOL 5 MG/ML
5 INJECTION INTRAMUSCULAR
Status: COMPLETED | OUTPATIENT
Start: 2020-06-22 | End: 2020-06-22

## 2020-06-22 RX ORDER — LIDOCAINE HYDROCHLORIDE 20 MG/ML
10 SOLUTION OROPHARYNGEAL
Status: COMPLETED | OUTPATIENT
Start: 2020-06-22 | End: 2020-06-22

## 2020-06-22 RX ORDER — DIPHENHYDRAMINE HYDROCHLORIDE 50 MG/ML
25 INJECTION INTRAMUSCULAR; INTRAVENOUS
Status: COMPLETED | OUTPATIENT
Start: 2020-06-22 | End: 2020-06-22

## 2020-06-22 RX ORDER — PROMETHAZINE HYDROCHLORIDE 25 MG/1
25 TABLET ORAL EVERY 6 HOURS PRN
Qty: 15 TABLET | Refills: 0 | Status: ON HOLD | OUTPATIENT
Start: 2020-06-22 | End: 2020-10-12 | Stop reason: HOSPADM

## 2020-06-22 RX ORDER — ALUMINUM HYDROXIDE, MAGNESIUM HYDROXIDE, AND SIMETHICONE 2400; 240; 2400 MG/30ML; MG/30ML; MG/30ML
5 SUSPENSION ORAL EVERY 6 HOURS PRN
Status: DISCONTINUED | OUTPATIENT
Start: 2020-06-22 | End: 2020-06-22 | Stop reason: HOSPADM

## 2020-06-22 RX ORDER — ONDANSETRON 2 MG/ML
4 INJECTION INTRAMUSCULAR; INTRAVENOUS
Status: COMPLETED | OUTPATIENT
Start: 2020-06-22 | End: 2020-06-22

## 2020-06-22 RX ORDER — ONDANSETRON 4 MG/1
4 TABLET, ORALLY DISINTEGRATING ORAL EVERY 8 HOURS PRN
Qty: 12 TABLET | Refills: 0 | Status: ON HOLD | OUTPATIENT
Start: 2020-06-22 | End: 2020-10-12 | Stop reason: HOSPADM

## 2020-06-22 RX ADMIN — ONDANSETRON 4 MG: 2 INJECTION INTRAMUSCULAR; INTRAVENOUS at 12:06

## 2020-06-22 RX ADMIN — LIDOCAINE HYDROCHLORIDE: 20 SOLUTION ORAL; TOPICAL at 03:06

## 2020-06-22 RX ADMIN — DEXTROSE MONOHYDRATE 25 MG: 50 INJECTION, SOLUTION INTRAVENOUS at 01:06

## 2020-06-22 RX ADMIN — DIPHENHYDRAMINE HYDROCHLORIDE 25 MG: 50 INJECTION INTRAMUSCULAR; INTRAVENOUS at 01:06

## 2020-06-22 RX ADMIN — SODIUM CHLORIDE 1000 ML: 0.9 INJECTION, SOLUTION INTRAVENOUS at 12:06

## 2020-06-22 RX ADMIN — HALOPERIDOL LACTATE 5 MG: 5 INJECTION, SOLUTION INTRAMUSCULAR at 02:06

## 2020-06-22 RX ADMIN — LIDOCAINE HYDROCHLORIDE 10 ML: 20 SOLUTION ORAL; TOPICAL at 03:06

## 2020-06-22 NOTE — ED TRIAGE NOTES
"Bradley Molina Cousin is here with nausea and vomiting for 2 days.  "My pancreatitis is acting up"  "

## 2020-06-22 NOTE — ED PROVIDER NOTES
"Encounter Date: 6/22/2020    SCRIBE #1 NOTE: I, Hailey Hamm, am scribing for, and in the presence of, Reddy Garay MD.       History     Chief Complaint   Patient presents with    Nausea     and vomiting for 2 days       Time seen by provider: 12:43 AM on 06/22/2020    Bradley Molina Cousin is a 40 y.o. male with PMHx of pancreatitis, HTN, and tobacco use who presents to the ED via EMS with an onset of N/V. Patient states symptoms began 2 days ago and feels his "pancreatitis is acting back up again." He also c/o mild abdominal pain. He denies any other symptoms at this time. He has taken pepto and Zofran with little to no relief. The patient reports drinking 2 glasses of wine 2 days ago. PSHx of cholecystectomy.    The history is provided by the patient.     Review of patient's allergies indicates:  No Known Allergies  Past Medical History:   Diagnosis Date    Anxiety     Anxiety     Bipolar affective disorder     Depression     Hypertension     Pancreatitis     Schizophrenia     Thyroid disease      Past Surgical History:   Procedure Laterality Date    LAPAROSCOPIC CHOLECYSTECTOMY N/A 3/9/2020    Procedure: CHOLECYSTECTOMY, LAPAROSCOPIC;  Surgeon: Trenton Deshpande MD;  Location: [facility];  Service: General;  Laterality: N/A;     Family History   Problem Relation Age of Onset    Diabetes Maternal Grandmother     Hypertension Maternal Grandfather     Cancer Paternal Grandfather      Social History     Tobacco Use    Smoking status: Current Some Day Smoker     Packs/day: 0.25     Years: 7.00     Pack years: 1.75    Smokeless tobacco: Never Used   Substance Use Topics    Alcohol use: Yes     Alcohol/week: 2.0 standard drinks     Types: 2 Glasses of wine per week     Comment: occasionally    Drug use: Yes     Frequency: 7.0 times per week     Types: Marijuana     Review of Systems   Constitutional: Negative for fever.   HENT: Negative for sore throat.    Respiratory: Negative for shortness of breath.  "   Cardiovascular: Negative for chest pain.   Gastrointestinal: Positive for abdominal pain, nausea and vomiting.   Genitourinary: Negative for dysuria.   Musculoskeletal: Negative for back pain.   Skin: Negative for rash.   Neurological: Negative for weakness.       Physical Exam     Initial Vitals [06/22/20 0042]   BP Pulse Resp Temp SpO2   129/83 84 18 98 °F (36.7 °C) 98 %      MAP       --         Physical Exam    Nursing note and vitals reviewed.  Constitutional: He appears well-developed and well-nourished. He is not diaphoretic.  Non-toxic appearance. He does not have a sickly appearance. He does not appear ill. No distress.   Appears nauseated   HENT:   Head: Normocephalic and atraumatic.   Eyes: EOM are normal.   Neck: Normal range of motion. Neck supple. Normal range of motion present. No neck rigidity.   Cardiovascular: Normal rate, regular rhythm and normal heart sounds.   Pulmonary/Chest: No respiratory distress.   Abdominal: He exhibits no distension. There is no abdominal tenderness. There is no rebound and no guarding.   No abd ttp   Musculoskeletal: Normal range of motion.   Neurological: He is alert and oriented to person, place, and time.   Skin: Skin is warm and dry. No rash noted.   Psychiatric: He has a normal mood and affect. His behavior is normal. Judgment and thought content normal.         ED Course   Procedures  Labs Reviewed   CBC W/ AUTO DIFFERENTIAL   COMPREHENSIVE METABOLIC PANEL   LIPASE   ALCOHOL,MEDICAL (ETHANOL)          Imaging Results    None          Medical Decision Making:   History:   Old Medical Records: I decided to obtain old medical records.  Clinical Tests:   Lab Tests: Ordered and Reviewed            Scribe Attestation:   Scribe #1: I performed the above scribed service and the documentation accurately describes the services I performed. I attest to the accuracy of the note.    I, Dr. Garay, personally performed the services described in this documentation. All  medical record entries made by the scribe were at my direction and in my presence.  I have reviewed the chart and agree that the record reflects my personal performance and is accurate and complete.12:27 PM 06/23/2020            ED Course as of Jun 23 1226   Mon Jun 22, 2020   0209 Vomiting persists despite Phenergan Zofran.  EKG reviewed from 3 days ago.  Normal QT.  Haldol ordered.    [EF]      ED Course User Index  [EF] Reddy Garay MD                Clinical Impression:       ICD-10-CM ICD-9-CM   1. Nausea and vomiting, intractability of vomiting not specified, unspecified vomiting type  R11.2 787.01                     Patient with hx of etoh abuser, chronic pancreatitis here with cc of vomiting. Minimal abd pain, much less than he usually presents with. Lipase normal, exam not peritoneal, vomited in ER several times but eventually able to tolerate po.  REquesting DC home. High chance he returns as he has done many times in past. States no etoh in several days but etoh 85 in  ER           Reddy Garay MD  06/23/20 0628

## 2020-07-01 ENCOUNTER — HOSPITAL ENCOUNTER (INPATIENT)
Facility: HOSPITAL | Age: 40
LOS: 2 days | Discharge: HOME | DRG: 683 | End: 2020-07-03
Attending: EMERGENCY MEDICINE | Admitting: HOSPITALIST
Payer: MEDICAID

## 2020-07-01 DIAGNOSIS — N17.9 AKI (ACUTE KIDNEY INJURY): ICD-10-CM

## 2020-07-01 DIAGNOSIS — N17.9 ACUTE RENAL FAILURE: ICD-10-CM

## 2020-07-01 DIAGNOSIS — R51.9 HEADACHE: ICD-10-CM

## 2020-07-01 DIAGNOSIS — E03.9 ACQUIRED HYPOTHYROIDISM: Primary | ICD-10-CM

## 2020-07-01 DIAGNOSIS — H61.21 IMPACTED CERUMEN, RIGHT EAR: ICD-10-CM

## 2020-07-01 PROBLEM — D50.9 IRON DEFICIENCY ANEMIA: Status: ACTIVE | Noted: 2020-07-01

## 2020-07-01 PROBLEM — R73.9 HYPERGLYCEMIA: Status: ACTIVE | Noted: 2020-07-01

## 2020-07-01 PROBLEM — F19.90 RECREATIONAL DRUG USE: Status: ACTIVE | Noted: 2020-07-01

## 2020-07-01 PROBLEM — E87.1 HYPONATREMIA: Status: ACTIVE | Noted: 2020-07-01

## 2020-07-01 LAB
ALBUMIN SERPL BCP-MCNC: 4.8 G/DL (ref 3.5–5.2)
ALP SERPL-CCNC: 105 U/L (ref 55–135)
ALT SERPL W/O P-5'-P-CCNC: 15 U/L (ref 10–44)
AMPHET+METHAMPHET UR QL: NORMAL
ANION GAP SERPL CALC-SCNC: 21 MMOL/L (ref 8–16)
AST SERPL-CCNC: 17 U/L (ref 10–40)
BARBITURATES UR QL SCN>200 NG/ML: NEGATIVE
BASOPHILS # BLD AUTO: 0.03 K/UL (ref 0–0.2)
BASOPHILS NFR BLD: 0.3 % (ref 0–1.9)
BENZODIAZ UR QL SCN>200 NG/ML: NEGATIVE
BILIRUB SERPL-MCNC: 0.3 MG/DL (ref 0.1–1)
BILIRUB UR QL STRIP: NEGATIVE
BUN SERPL-MCNC: 49 MG/DL (ref 6–20)
BZE UR QL SCN: NEGATIVE
CALCIUM SERPL-MCNC: 9.9 MG/DL (ref 8.7–10.5)
CANNABINOIDS UR QL SCN: NORMAL
CHLORIDE SERPL-SCNC: 80 MMOL/L (ref 95–110)
CK SERPL-CCNC: 169 U/L (ref 20–200)
CLARITY UR: CLEAR
CO2 SERPL-SCNC: 28 MMOL/L (ref 23–29)
COLOR UR: YELLOW
CREAT SERPL-MCNC: 9.7 MG/DL (ref 0.5–1.4)
CREAT UR-MCNC: 164.3 MG/DL (ref 23–375)
CREAT UR-MCNC: 164.3 MG/DL (ref 23–375)
DIFFERENTIAL METHOD: ABNORMAL
EOSINOPHIL # BLD AUTO: 0.1 K/UL (ref 0–0.5)
EOSINOPHIL NFR BLD: 0.6 % (ref 0–8)
ERYTHROCYTE [DISTWIDTH] IN BLOOD BY AUTOMATED COUNT: 20.2 % (ref 11.5–14.5)
EST. GFR  (AFRICAN AMERICAN): 7 ML/MIN/1.73 M^2
EST. GFR  (NON AFRICAN AMERICAN): 6 ML/MIN/1.73 M^2
ETHANOL SERPL-MCNC: <10 MG/DL
FERRITIN SERPL-MCNC: 104 NG/ML (ref 20–300)
GLUCOSE SERPL-MCNC: 162 MG/DL (ref 70–110)
GLUCOSE UR QL STRIP: ABNORMAL
HCT VFR BLD AUTO: 39.9 % (ref 40–54)
HGB BLD-MCNC: 12.4 G/DL (ref 14–18)
HGB UR QL STRIP: ABNORMAL
IMM GRANULOCYTES # BLD AUTO: 0.06 K/UL (ref 0–0.04)
IMM GRANULOCYTES NFR BLD AUTO: 0.6 % (ref 0–0.5)
IRON SERPL-MCNC: 42 UG/DL (ref 45–160)
KETONES UR QL STRIP: NEGATIVE
LEUKOCYTE ESTERASE UR QL STRIP: NEGATIVE
LYMPHOCYTES # BLD AUTO: 0.8 K/UL (ref 1–4.8)
LYMPHOCYTES NFR BLD: 7.6 % (ref 18–48)
MCH RBC QN AUTO: 24.4 PG (ref 27–31)
MCHC RBC AUTO-ENTMCNC: 31.1 G/DL (ref 32–36)
MCV RBC AUTO: 79 FL (ref 82–98)
METHADONE UR QL SCN>300 NG/ML: NEGATIVE
MONOCYTES # BLD AUTO: 0.8 K/UL (ref 0.3–1)
MONOCYTES NFR BLD: 7.6 % (ref 4–15)
NEUTROPHILS # BLD AUTO: 8.9 K/UL (ref 1.8–7.7)
NEUTROPHILS NFR BLD: 83.3 % (ref 38–73)
NITRITE UR QL STRIP: NEGATIVE
NRBC BLD-RTO: 0 /100 WBC
OPIATES UR QL SCN: NEGATIVE
PCP UR QL SCN>25 NG/ML: NEGATIVE
PH UR STRIP: 6 [PH] (ref 5–8)
PLATELET # BLD AUTO: 342 K/UL (ref 150–350)
PMV BLD AUTO: 10.3 FL (ref 9.2–12.9)
POTASSIUM SERPL-SCNC: 4.8 MMOL/L (ref 3.5–5.1)
PROT SERPL-MCNC: 9 G/DL (ref 6–8.4)
PROT UR QL STRIP: ABNORMAL
RBC # BLD AUTO: 5.08 M/UL (ref 4.6–6.2)
SARS-COV-2 RDRP RESP QL NAA+PROBE: NEGATIVE
SATURATED IRON: 8 % (ref 20–50)
SODIUM SERPL-SCNC: 129 MMOL/L (ref 136–145)
SODIUM UR-SCNC: 21 MMOL/L (ref 20–250)
SP GR UR STRIP: 1.02 (ref 1–1.03)
TOTAL IRON BINDING CAPACITY: 521 UG/DL (ref 250–450)
TOXICOLOGY INFORMATION: NORMAL
TRANSFERRIN SERPL-MCNC: 352 MG/DL (ref 200–375)
URN SPEC COLLECT METH UR: ABNORMAL
UROBILINOGEN UR STRIP-ACNC: NEGATIVE EU/DL
WBC # BLD AUTO: 10.67 K/UL (ref 3.9–12.7)

## 2020-07-01 PROCEDURE — 36415 COLL VENOUS BLD VENIPUNCTURE: CPT

## 2020-07-01 PROCEDURE — U0002 COVID-19 LAB TEST NON-CDC: HCPCS

## 2020-07-01 PROCEDURE — 25000003 PHARM REV CODE 250: Performed by: HOSPITALIST

## 2020-07-01 PROCEDURE — 80320 DRUG SCREEN QUANTALCOHOLS: CPT

## 2020-07-01 PROCEDURE — 80307 DRUG TEST PRSMV CHEM ANLYZR: CPT

## 2020-07-01 PROCEDURE — 82728 ASSAY OF FERRITIN: CPT

## 2020-07-01 PROCEDURE — 12000002 HC ACUTE/MED SURGE SEMI-PRIVATE ROOM

## 2020-07-01 PROCEDURE — 63600175 PHARM REV CODE 636 W HCPCS: Performed by: INTERNAL MEDICINE

## 2020-07-01 PROCEDURE — 99292 CRITICAL CARE ADDL 30 MIN: CPT | Mod: 25

## 2020-07-01 PROCEDURE — 85025 COMPLETE CBC W/AUTO DIFF WBC: CPT

## 2020-07-01 PROCEDURE — 80053 COMPREHEN METABOLIC PANEL: CPT

## 2020-07-01 PROCEDURE — 12032 INTMD RPR S/A/T/EXT 2.6-7.5: CPT

## 2020-07-01 PROCEDURE — 81003 URINALYSIS AUTO W/O SCOPE: CPT | Mod: 59

## 2020-07-01 PROCEDURE — 83540 ASSAY OF IRON: CPT

## 2020-07-01 PROCEDURE — 84300 ASSAY OF URINE SODIUM: CPT

## 2020-07-01 PROCEDURE — 25000003 PHARM REV CODE 250: Performed by: EMERGENCY MEDICINE

## 2020-07-01 PROCEDURE — 96360 HYDRATION IV INFUSION INIT: CPT | Mod: 59

## 2020-07-01 PROCEDURE — 82550 ASSAY OF CK (CPK): CPT

## 2020-07-01 PROCEDURE — 99291 CRITICAL CARE FIRST HOUR: CPT | Mod: 25

## 2020-07-01 PROCEDURE — 93005 ELECTROCARDIOGRAM TRACING: CPT

## 2020-07-01 RX ORDER — SODIUM CHLORIDE 9 MG/ML
1000 INJECTION, SOLUTION INTRAVENOUS
Status: COMPLETED | OUTPATIENT
Start: 2020-07-01 | End: 2020-07-01

## 2020-07-01 RX ORDER — SODIUM CHLORIDE, SODIUM LACTATE, POTASSIUM CHLORIDE, CALCIUM CHLORIDE 600; 310; 30; 20 MG/100ML; MG/100ML; MG/100ML; MG/100ML
INJECTION, SOLUTION INTRAVENOUS CONTINUOUS
Status: DISCONTINUED | OUTPATIENT
Start: 2020-07-01 | End: 2020-07-02

## 2020-07-01 RX ORDER — LIDOCAINE HCL/EPINEPHRINE/PF 2%-1:200K
10 VIAL (ML) INJECTION ONCE
Status: COMPLETED | OUTPATIENT
Start: 2020-07-01 | End: 2020-07-01

## 2020-07-01 RX ORDER — HYDROCODONE BITARTRATE AND ACETAMINOPHEN 10; 325 MG/1; MG/1
1 TABLET ORAL EVERY 6 HOURS PRN
Status: DISCONTINUED | OUTPATIENT
Start: 2020-07-01 | End: 2020-07-03 | Stop reason: HOSPADM

## 2020-07-01 RX ORDER — FOLIC ACID 1 MG/1
1 TABLET ORAL DAILY
Status: DISCONTINUED | OUTPATIENT
Start: 2020-07-02 | End: 2020-07-03 | Stop reason: HOSPADM

## 2020-07-01 RX ORDER — PANTOPRAZOLE SODIUM 40 MG/1
40 TABLET, DELAYED RELEASE ORAL DAILY
Status: DISCONTINUED | OUTPATIENT
Start: 2020-07-02 | End: 2020-07-03 | Stop reason: HOSPADM

## 2020-07-01 RX ORDER — CLONIDINE HYDROCHLORIDE 0.1 MG/1
0.1 TABLET ORAL 3 TIMES DAILY
Status: ON HOLD | COMMUNITY
End: 2020-07-03 | Stop reason: SDUPTHER

## 2020-07-01 RX ORDER — LEVOTHYROXINE SODIUM 50 UG/1
50 TABLET ORAL
Status: DISCONTINUED | OUTPATIENT
Start: 2020-07-02 | End: 2020-07-03 | Stop reason: HOSPADM

## 2020-07-01 RX ORDER — ONDANSETRON HYDROCHLORIDE 8 MG/1
8 TABLET, FILM COATED ORAL EVERY 8 HOURS PRN
Status: DISCONTINUED | OUTPATIENT
Start: 2020-07-01 | End: 2020-07-01 | Stop reason: CLARIF

## 2020-07-01 RX ORDER — SODIUM CHLORIDE 9 MG/ML
INJECTION, SOLUTION INTRAVENOUS CONTINUOUS
Status: ACTIVE | OUTPATIENT
Start: 2020-07-01 | End: 2020-07-02

## 2020-07-01 RX ORDER — THIAMINE HCL 100 MG
100 TABLET ORAL DAILY
Status: DISCONTINUED | OUTPATIENT
Start: 2020-07-02 | End: 2020-07-03 | Stop reason: HOSPADM

## 2020-07-01 RX ORDER — MIRTAZAPINE 15 MG/1
45 TABLET, FILM COATED ORAL NIGHTLY
Status: DISCONTINUED | OUTPATIENT
Start: 2020-07-01 | End: 2020-07-03 | Stop reason: HOSPADM

## 2020-07-01 RX ORDER — ONDANSETRON 8 MG/1
8 TABLET, ORALLY DISINTEGRATING ORAL EVERY 8 HOURS PRN
Status: DISCONTINUED | OUTPATIENT
Start: 2020-07-01 | End: 2020-07-03 | Stop reason: HOSPADM

## 2020-07-01 RX ORDER — PROMETHAZINE HYDROCHLORIDE 25 MG/1
25 TABLET ORAL EVERY 6 HOURS PRN
Status: DISCONTINUED | OUTPATIENT
Start: 2020-07-01 | End: 2020-07-03 | Stop reason: HOSPADM

## 2020-07-01 RX ORDER — QUETIAPINE FUMARATE 100 MG/1
300 TABLET, FILM COATED ORAL NIGHTLY
Status: DISCONTINUED | OUTPATIENT
Start: 2020-07-01 | End: 2020-07-03 | Stop reason: HOSPADM

## 2020-07-01 RX ORDER — SERTRALINE HYDROCHLORIDE 50 MG/1
100 TABLET, FILM COATED ORAL DAILY
Status: DISCONTINUED | OUTPATIENT
Start: 2020-07-02 | End: 2020-07-03 | Stop reason: HOSPADM

## 2020-07-01 RX ORDER — CHLORDIAZEPOXIDE HYDROCHLORIDE 25 MG/1
25 CAPSULE, GELATIN COATED ORAL 4 TIMES DAILY PRN
Status: DISCONTINUED | OUTPATIENT
Start: 2020-07-01 | End: 2020-07-03 | Stop reason: HOSPADM

## 2020-07-01 RX ADMIN — QUETIAPINE 300 MG: 100 TABLET ORAL at 09:07

## 2020-07-01 RX ADMIN — SODIUM CHLORIDE 1000 ML: 0.9 INJECTION, SOLUTION INTRAVENOUS at 10:07

## 2020-07-01 RX ADMIN — MIRTAZAPINE 45 MG: 15 TABLET, FILM COATED ORAL at 09:07

## 2020-07-01 RX ADMIN — LIDOCAINE HYDROCHLORIDE,EPINEPHRINE BITARTRATE 10 ML: 20; .005 INJECTION, SOLUTION EPIDURAL; INFILTRATION; INTRACAUDAL; PERINEURAL at 11:07

## 2020-07-01 RX ADMIN — SODIUM CHLORIDE, SODIUM LACTATE, POTASSIUM CHLORIDE, AND CALCIUM CHLORIDE: .6; .31; .03; .02 INJECTION, SOLUTION INTRAVENOUS at 06:07

## 2020-07-01 NOTE — ED NOTES
Pt. Attempted several times to use urinal, unable to provide urine specimen at this time, pt. Refuses catheter, MD made aware.

## 2020-07-01 NOTE — NURSING
Pt. Arrived to floor. AAOX4. VS obtained. Oriented to room and use of call light. Dietary tray provided. Bed low and locked. Alarm on and fuctioning. Instructed pt not to get out of bed without assistance from nurse, pt verbalized understanding.

## 2020-07-01 NOTE — ED NOTES
S/P CT - tolerated well. IVFs infusing via gravity without difficulty. BP improving. Will continue to monitor

## 2020-07-01 NOTE — ED PROVIDER NOTES
Encounter Date: 7/1/2020    SCRIBE #1 NOTE: IAkila am scribing for, and in the presence of, Ezequiel Higgins MD.       History     Chief Complaint   Patient presents with    Weakness     weakness for approx 1 week, possible syncope this morning, intial BP per ems 80/40,      Time seen by provider: 9:53 AM on 07/01/2020    Bradley Menasin is a 40 y.o. male who presents to the ED via EMS with an onset of dizziness, weakness, and syncope x1.5 weeks. Per EMS, patient was hypotension upon their arrival (84/38). The patient reports multiple episodes of dizziness, weakness, and syncope over the last 1.5 weeks. The patient endures a laceration to the back of his head. Patient complains of headaches and neck pain secondary to hitting his head. The patient states he had hearing loss in his right ear x3 weeks ago, that was resolved. The patient denies any other symptoms at this time. PMHx of alcohol use, HTN, acute pancreatitis, thyroid disease, schizophrenia, and bipolar affective disorder. PSHx of cholecystectomy.      The history is provided by the patient and the EMS personnel.     Review of patient's allergies indicates:  No Known Allergies  Past Medical History:   Diagnosis Date    Anxiety     Anxiety     Bipolar affective disorder     Depression     Hypertension     Pancreatitis     Schizophrenia     Thyroid disease      Past Surgical History:   Procedure Laterality Date    LAPAROSCOPIC CHOLECYSTECTOMY N/A 3/9/2020    Procedure: CHOLECYSTECTOMY, LAPAROSCOPIC;  Surgeon: Trenton Deshpande MD;  Location: [facility];  Service: General;  Laterality: N/A;     Family History   Problem Relation Age of Onset    Diabetes Maternal Grandmother     Hypertension Maternal Grandfather     Cancer Paternal Grandfather      Social History     Tobacco Use    Smoking status: Current Some Day Smoker     Packs/day: 0.25     Years: 7.00     Pack years: 1.75    Smokeless tobacco: Never Used   Substance Use Topics     Alcohol use: Yes     Alcohol/week: 2.0 standard drinks     Types: 2 Glasses of wine per week     Comment: occasionally    Drug use: Yes     Frequency: 7.0 times per week     Types: Marijuana     Review of Systems   Constitutional: Negative for activity change, appetite change, chills, fatigue and fever.   HENT: Positive for hearing loss (Resolved.).    Eyes: Negative for visual disturbance.   Respiratory: Negative for apnea and shortness of breath.    Cardiovascular: Negative for chest pain and palpitations.   Gastrointestinal: Negative for abdominal distention and abdominal pain.   Genitourinary: Negative for difficulty urinating.   Musculoskeletal: Positive for neck pain.   Skin: Negative for pallor and rash.   Neurological: Positive for dizziness, syncope, weakness and headaches.   Hematological: Does not bruise/bleed easily.   Psychiatric/Behavioral: Negative for agitation.       Physical Exam     Initial Vitals [07/01/20 0950]   BP Pulse Resp Temp SpO2   (!) 83/51 90 16 98.7 °F (37.1 °C) 100 %      MAP       --         Physical Exam    Nursing note and vitals reviewed.  Constitutional: He appears well-developed and well-nourished.   HENT:   Head: Normocephalic and atraumatic.   Eyes: Conjunctivae are normal. Pupils are equal, round, and reactive to light.   Neck: Normal range of motion. Neck supple.   Cardiovascular: Normal rate, regular rhythm and normal heart sounds. Exam reveals no gallop and no friction rub.    No murmur heard.  Pulmonary/Chest: Breath sounds normal. No respiratory distress. He has no wheezes. He has no rhonchi. He has no rales.   Abdominal: Soft. He exhibits no distension. There is no abdominal tenderness.   Musculoskeletal: Normal range of motion.      Cervical back: He exhibits no tenderness.      Comments: No midline cervical tenderness.   Neurological: He is alert and oriented to person, place, and time.   Skin: Skin is warm and dry. Laceration noted.   3cm d-schaped laceration of  "occipital scalp.   Psychiatric: He has a normal mood and affect.         ED Course   Lac Repair    Date/Time: 7/1/2020 1:59 PM  Performed by: Akila Juan  Authorized by: Ezequiel Higgins III, MD   Consent Done: Yes  Consent: Verbal consent obtained.  Risks and benefits: risks, benefits and alternatives were discussed  Consent given by: patient  Patient understanding: patient states understanding of the procedure being performed  Patient consent: the patient's understanding of the procedure matches consent given  Procedure consent: procedure consent matches procedure scheduled  Relevant documents: relevant documents present and verified  Test results: test results available and properly labeled  Site marked: the operative site was marked  Imaging studies: imaging studies available  Required items: required blood products, implants, devices, and special equipment available  Patient identity confirmed: verbally with patient  Time out: Immediately prior to procedure a "time out" was called to verify the correct patient, procedure, equipment, support staff and site/side marked as required.  Body area: head/neck  Location details: scalp  Laceration length: 3 cm  Foreign bodies: no foreign bodies  Tendon involvement: none  Nerve involvement: none  Vascular damage: no  Anesthesia: local infiltration    Anesthesia:  Local Anesthetic: lidocaine 2% with epinephrine  Anesthetic total: 2 mL  Patient sedated: no  Preparation: Patient was prepped and draped in the usual sterile fashion.  Irrigation solution: saline  Irrigation method: jet lavage  Amount of cleaning: standard  Debridement: none  Degree of undermining: none  Subcutaneous closure: 5-0 Vicryl  Number of sutures: 4  Technique: simple  Approximation: close  Approximation difficulty: simple  Dressing: 4x4 sterile gauze, abdominal pad and non-adhesive packing strip  Patient tolerance: Patient tolerated the procedure well with no immediate complications    Critical " Care    Date/Time: 7/1/2020 4:56 PM  Performed by: Ezequiel Higgins III, MD  Authorized by: Ezequiel Higgins III, MD   Direct patient critical care time: 75 minutes  Total critical care time (exclusive of procedural time) : 75 minutes  Critical care was necessary to treat or prevent imminent or life-threatening deterioration of the following conditions: renal failure.  Critical care was time spent personally by me on the following activities: review of old charts, pulse oximetry, ordering and review of laboratory studies, obtaining history from patient or surrogate, evaluation of patient's response to treatment, examination of patient, ordering and performing treatments and interventions and re-evaluation of patient's condition.  Subsequent provider of critical care: I assumed direction of critical care for this patient from another provider of my specialty.        Labs Reviewed   COMPREHENSIVE METABOLIC PANEL - Abnormal; Notable for the following components:       Result Value    Sodium 129 (*)     Chloride 80 (*)     Glucose 162 (*)     BUN, Bld 49 (*)     Creatinine 9.7 (*)     Total Protein 9.0 (*)     Anion Gap 21 (*)     eGFR if  7 (*)     eGFR if non  6 (*)     All other components within normal limits   CBC W/ AUTO DIFFERENTIAL - Abnormal; Notable for the following components:    Hemoglobin 12.4 (*)     Hematocrit 39.9 (*)     Mean Corpuscular Volume 79 (*)     Mean Corpuscular Hemoglobin 24.4 (*)     Mean Corpuscular Hemoglobin Conc 31.1 (*)     RDW 20.2 (*)     Immature Granulocytes 0.6 (*)     Gran # (ANC) 8.9 (*)     Immature Grans (Abs) 0.06 (*)     Lymph # 0.8 (*)     Gran% 83.3 (*)     Lymph% 7.6 (*)     All other components within normal limits   ALCOHOL,MEDICAL (ETHANOL)   FERRITIN   SARS-COV-2 RNA AMPLIFICATION, QUAL   DRUG SCREEN PANEL, URINE EMERGENCY   IRON AND TIBC   SODIUM, URINE, RANDOM   CREATININE, URINE, RANDOM   URINALYSIS, REFLEX TO URINE CULTURE     EKG  Readings: (Independently Interpreted)   Rhythm: Normal Sinus Rhythm. Heart Rate: 67 bpm. Clinical Impression: Pericarditis   Possible acute pericarditis. Prolonged QT.       Imaging Results          CT Head Without Contrast (Final result)  Result time 07/01/20 10:34:15    Final result by Briana Fitch MD (07/01/20 10:34:15)                 Impression:      No acute intracranial findings.      Electronically signed by: Briana Fitch MD  Date:    07/01/2020  Time:    10:34             Narrative:    EXAMINATION:  CT HEAD WITHOUT CONTRAST    CLINICAL HISTORY:  Headache, acute, normal neuro exam; Headache    TECHNIQUE:  Low dose axial images were obtained through the head.  Coronal and sagittal reformations were also performed. Contrast was not administered.    COMPARISON:  04/20/2019    FINDINGS:  Ventricles, sulci, fissures, white matter unremarkable in appearance for the patient's age.  There is no acute intracranial hemorrhage. There is no intracranial mass effect. There is no acute major vascular territory infarct. Note is made that MRI is typically more sensitive than CT particularly for detection of early or small nonhemorrhagic infarcts.  The calvarium appears intact, mastoids well pneumatized, visualized paranasal sinuses essentially clear                                 Medical Decision Making:   History:   Old Medical Records: I decided to obtain old medical records.  Clinical Tests:   Lab Tests: Ordered and Reviewed  Radiological Study: Ordered and Reviewed  ED Management:  40-year-old male presents with generalized weakness.  He is found in acute kidney injury with a creatinine of 9.7.  He will be admitted for hydration, Nephrology consultation and workup of renal failure.  He had a syncopal episode with hypotension suggesting dehydration.  Laceration of the scalp was repaired with no evidence of intracranial injury.            Scribe Attestation:   Scribe #1: I performed the above scribed service and  the documentation accurately describes the services I performed. I attest to the accuracy of the note.                          Clinical Impression:       ICD-10-CM ICD-9-CM   1. Headache  R51 784.0   2. CHAPO (acute kidney injury)  N17.9 584.9   3. Acute renal failure  N17.9 584.9         Disposition:   Disposition: Admitted     ED Disposition Condition    Admit                           Ezequiel Higgins III, MD  07/01/20 1231

## 2020-07-01 NOTE — CONSULTS
INPATIENT NEPHROLOGY CONSULT   [facility] NEPHROLOGY    Bradley Collado  07/01/2020    Reason for consultation:    Acute kidney injury    Chief Complaint:   Chief Complaint   Patient presents with    Weakness     weakness for approx 1 week, possible syncope this morning, intial BP per ems 80/40,           History of Present Illness:    Bradley Collado is a 40 y.o. male who presents to the ED via EMS with an onset of dizziness, weakness, and syncope x1.5 weeks. Per EMS, patient was hypotension upon their arrival (84/38). The patient reports multiple episodes of dizziness, weakness, and syncope over the last 1.5 weeks. The patient endures a laceration to the back of his head. Patient complains of headaches and neck pain secondary to hitting his head. The patient states he had hearing loss in his right ear x3 weeks ago, that was resolved. The patient denies any other symptoms at this time. PMHx of alcohol use, HTN, acute pancreatitis, thyroid disease, schizophrenia, and bipolar affective disorder. PSHx of cholecystectomy.      7/1 seen in the ER.  Pt still weak but nausea better.  Head ache.  No nausea, chest pain, sob, fever, urinary or bowel complaint, new neurologic symptoms, new joint pain,      Plan of Care:       Assessment:      Acute kidney injury likely due to intravascular volume depletion  --check ua with micro  --cpk  --urine sodium  --no nsaids, coxibs  --volume resuscitation  --hold lisinpril   --renal dose medication for crcl 10-50    Hyponatremia--likely hypovolemic  --volume resuscitation  --no hypotonic iv fluids  --trend sodium.   --on zoloft which can exacerbate hyponatremia.  Check urine osm      Volume depletion  --iv fluids    Mild anemia  --trend for now         Thank you for allowing us to participate in this patient's care. We will continue to follow.    Vital Signs:  Temp Readings from Last 3 Encounters:   07/01/20 98.7 °F (37.1 °C) (Oral)   06/22/20 98 °F (36.7 °C) (Oral)   06/18/20  98.5 °F (36.9 °C) (Oral)       Pulse Readings from Last 3 Encounters:   07/01/20 68   06/22/20 80   06/18/20 72       BP Readings from Last 3 Encounters:   07/01/20 (!) 123/58   06/22/20 (!) 145/97   06/18/20 122/76       Weight:  Wt Readings from Last 3 Encounters:   07/01/20 81.6 kg (180 lb)   06/22/20 81.6 kg (180 lb)   06/17/20 90.7 kg (200 lb)       Past Medical & Surgical History:  Past Medical History:   Diagnosis Date    Anxiety     Anxiety     Bipolar affective disorder     Depression     Hypertension     Pancreatitis     Schizophrenia     Thyroid disease        Past Surgical History:   Procedure Laterality Date    LAPAROSCOPIC CHOLECYSTECTOMY N/A 3/9/2020    Procedure: CHOLECYSTECTOMY, LAPAROSCOPIC;  Surgeon: Trenton Deshpande MD;  Location: [facility];  Service: General;  Laterality: N/A;       Past Social History:  Social History     Socioeconomic History    Marital status: Single     Spouse name: Not on file    Number of children: Not on file    Years of education: Not on file    Highest education level: Not on file   Occupational History    Not on file   Social Needs    Financial resource strain: Not on file    Food insecurity     Worry: Not on file     Inability: Not on file    Transportation needs     Medical: Not on file     Non-medical: Not on file   Tobacco Use    Smoking status: Current Some Day Smoker     Packs/day: 0.25     Years: 7.00     Pack years: 1.75    Smokeless tobacco: Never Used   Substance and Sexual Activity    Alcohol use: Yes     Alcohol/week: 2.0 standard drinks     Types: 2 Glasses of wine per week     Comment: occasionally    Drug use: Yes     Frequency: 7.0 times per week     Types: Marijuana    Sexual activity: Yes     Partners: Female   Lifestyle    Physical activity     Days per week: Not on file     Minutes per session: Not on file    Stress: Not on file   Relationships    Social connections     Talks on phone: Not on file     Gets together: Not on  file     Attends Taoist service: Not on file     Active member of club or organization: Not on file     Attends meetings of clubs or organizations: Not on file     Relationship status: Not on file   Other Topics Concern    Not on file   Social History Narrative    Not on file       Medications:  No current facility-administered medications on file prior to encounter.      Current Outpatient Medications on File Prior to Encounter   Medication Sig Dispense Refill    amLODIPine (NORVASC) 10 MG tablet Take 1 tablet (10 mg total) by mouth once daily. 30 tablet 4    ARIPiprazole (ABILIFY) 10 MG Tab Take 1 tablet (10 mg total) by mouth once daily. 30 tablet 1    folic acid (FOLVITE) 1 MG tablet Take 1 tablet (1 mg total) by mouth once daily. 30 tablet 3    gabapentin (NEURONTIN) 300 MG capsule Take 1 capsule (300 mg total) by mouth 3 (three) times daily. 90 capsule 1    HYDROcodone-acetaminophen (NORCO)  mg per tablet Take 1 tablet by mouth every 6 (six) hours as needed for Pain. 5 tablet 0    levothyroxine (SYNTHROID) 50 MCG tablet Take 1 tablet (50 mcg total) by mouth before breakfast. 30 tablet 3    lisinopril (PRINIVIL,ZESTRIL) 40 MG tablet Take 1 tablet (40 mg total) by mouth once daily. 90 tablet 1    metoprolol succinate (TOPROL-XL) 25 MG 24 hr tablet Take 12.5 mg by mouth nightly.       mirtazapine (REMERON) 45 MG tablet Take 45 mg by mouth every evening.      naloxone (NARCAN) 4 mg/actuation Spry 4mg by nasal route as needed for opioid overdose; may repeat every 2-3 minutes in alternating nostrils until medical help arrives. Call 911 1 each 11    ondansetron (ZOFRAN) 8 MG tablet Take 1 tablet (8 mg total) by mouth every 8 (eight) hours as needed for Nausea. 12 tablet 0    ondansetron (ZOFRAN-ODT) 4 MG TbDL Take 1 tablet (4 mg total) by mouth every 8 (eight) hours as needed. 12 tablet 0    pantoprazole (PROTONIX) 40 MG tablet Take 40 mg by mouth once daily.      promethazine (PHENERGAN) 25  MG tablet Take 1 tablet (25 mg total) by mouth every 6 (six) hours as needed for Nausea. 15 tablet 0    QUEtiapine (SEROQUEL) 300 MG Tab Take 300 mg by mouth nightly.      sertraline (ZOLOFT) 100 MG tablet Take 100 mg by mouth once daily.      terazosin (HYTRIN) 1 MG capsule Take 2 mg by mouth every evening.      thiamine 100 MG tablet Take 1 tablet (100 mg total) by mouth once daily. 30 tablet 2     Scheduled Meds:  Continuous Infusions:  PRN Meds:.    Allergies:  Patient has no known allergies.    Past Family History:  Reviewed; refer to Hospitalist Admission Note    Review of Systems:  Review of Systems - All 14 systems reviewed and negative, except as noted in HPI    Physical Exam:    BP (!) 123/58   Pulse 68   Temp 98.7 °F (37.1 °C) (Oral)   Resp 16   Wt 81.6 kg (180 lb)   SpO2 98%   BMI 28.19 kg/m²     General Appearance:    Alert, cooperative, no distress, appears stated age   Head:    Normocephalic, without obvious abnormality, atraumatic   Eyes:    PER, conjunctiva/corneas clear, EOM's intact in both eyes        Throat:   Lips, mucosa, and tongue normal; teeth and gums normal   Back:     Symmetric, no curvature, ROM normal, no CVA tenderness   Lungs:     Clear to auscultation bilaterally, respirations unlabored   Chest wall:    No tenderness or deformity   Heart:    Regular rate and rhythm, S1 and S2 normal, no murmur, rub   or gallop   Abdomen:     Soft, non-tender, bowel sounds active all four quadrants,     no masses, no organomegaly   Extremities:   Extremities normal, atraumatic, no cyanosis or edema   Pulses:   2+ and symmetric all extremities   MSK:   No joint or muscle swelling, tenderness or deformity   Skin:   Skin color, texture, turgor normal, no rashes or lesions   Neurologic:   CNII-XII intact, normal strength and sensation       Throughout.  No flap     Results:  Lab Results   Component Value Date     (L) 07/01/2020    K 4.8 07/01/2020    CL 80 (L) 07/01/2020    CO2 28  07/01/2020    BUN 49 (H) 07/01/2020    CREATININE 9.7 (H) 07/01/2020    CALCIUM 9.9 07/01/2020    ANIONGAP 21 (H) 07/01/2020    ESTGFRAFRICA 7 (A) 07/01/2020    EGFRNONAA 6 (A) 07/01/2020       Lab Results   Component Value Date    CALCIUM 9.9 07/01/2020    PHOS 3.6 05/07/2020       Recent Labs   Lab 07/01/20  1015   WBC 10.67   RBC 5.08   HGB 12.4*   HCT 39.9*      MCV 79*   MCH 24.4*   MCHC 31.1*       I have personally reviewed pertinent radiological imaging and reports.

## 2020-07-01 NOTE — MEDICAL/APP STUDENT
"[facility]    History & Physical      Patient Name: Bradley Collado  MRN: [id number]  Admission Date: 7/1/2020  Attending Physician: Ezequiel Higgins III, MD   Primary Care Provider: Arlen Belcher NP         Patient information was obtained from patient and ER records.     Subjective:     Principal Problem:<principal problem not specified>    Chief Complaint:   Chief Complaint   Patient presents with    Weakness     weakness for approx 1 week, possible syncope this morning, intial BP per ems 80/40,         HPI:  Bradley Collado is a 40 y.o. male who presented to the ED via EMS with dizziness and syncope for 1 week. He reports multiple episodes of dizziness and syncope primarily when getting up from a seated position. He reports experiencing multiple falls, causing lacerations in the back of his head, elbow, and back. He reported one syncopal episode which resulted in him waking in a pool of blood yesterday. He reported one episode of vomiting after a syncopal episode which also occurred. The patient reports an episode vision changes which resulted in "everything going black" which had resolved. He states that he had hearing loss in his right ear 3 weeks ago which has since resolved. He reports a decrease in appetite in this last week, and estimates a 10 lb weight loss during that time. The patient no loss of sensation or weakness. He denies any abdominal pain, diarrhea, or fever. PMHx includes acute pancreatitis, HTN, thyroid disease, alcohol use disorder, schizophrenia, and bipolar disorder.     Past Medical History:   Diagnosis Date    Anxiety     Anxiety     Bipolar affective disorder     Depression     Hypertension     Pancreatitis     Schizophrenia     Thyroid disease        Past Surgical History:   Procedure Laterality Date    LAPAROSCOPIC CHOLECYSTECTOMY N/A 3/9/2020    Procedure: CHOLECYSTECTOMY, LAPAROSCOPIC;  Surgeon: Trenton Deshpande MD;  Location: [facility];  Service: " General;  Laterality: N/A;       Review of patient's allergies indicates:  No Known Allergies    No current facility-administered medications on file prior to encounter.      Current Outpatient Medications on File Prior to Encounter   Medication Sig    amLODIPine (NORVASC) 10 MG tablet Take 1 tablet (10 mg total) by mouth once daily.    ARIPiprazole (ABILIFY) 10 MG Tab Take 1 tablet (10 mg total) by mouth once daily.    folic acid (FOLVITE) 1 MG tablet Take 1 tablet (1 mg total) by mouth once daily.    gabapentin (NEURONTIN) 300 MG capsule Take 1 capsule (300 mg total) by mouth 3 (three) times daily.    HYDROcodone-acetaminophen (NORCO)  mg per tablet Take 1 tablet by mouth every 6 (six) hours as needed for Pain.    levothyroxine (SYNTHROID) 50 MCG tablet Take 1 tablet (50 mcg total) by mouth before breakfast.    lisinopril (PRINIVIL,ZESTRIL) 40 MG tablet Take 1 tablet (40 mg total) by mouth once daily.    metoprolol succinate (TOPROL-XL) 25 MG 24 hr tablet Take 12.5 mg by mouth nightly.     mirtazapine (REMERON) 45 MG tablet Take 45 mg by mouth every evening.    naloxone (NARCAN) 4 mg/actuation Spry 4mg by nasal route as needed for opioid overdose; may repeat every 2-3 minutes in alternating nostrils until medical help arrives. Call 911    ondansetron (ZOFRAN) 8 MG tablet Take 1 tablet (8 mg total) by mouth every 8 (eight) hours as needed for Nausea.    ondansetron (ZOFRAN-ODT) 4 MG TbDL Take 1 tablet (4 mg total) by mouth every 8 (eight) hours as needed.    pantoprazole (PROTONIX) 40 MG tablet Take 40 mg by mouth once daily.    promethazine (PHENERGAN) 25 MG tablet Take 1 tablet (25 mg total) by mouth every 6 (six) hours as needed for Nausea.    QUEtiapine (SEROQUEL) 300 MG Tab Take 300 mg by mouth nightly.    sertraline (ZOLOFT) 100 MG tablet Take 100 mg by mouth once daily.    terazosin (HYTRIN) 1 MG capsule Take 2 mg by mouth every evening.    thiamine 100 MG tablet Take 1 tablet (100 mg  total) by mouth once daily.     Family History     Problem Relation (Age of Onset)    Cancer Paternal Grandfather    Diabetes Maternal Grandmother    Hypertension Maternal Grandfather        Tobacco Use    Smoking status: Current Some Day Smoker     Packs/day: 0.25     Years: 7.00     Pack years: 1.75    Smokeless tobacco: Never Used   Substance and Sexual Activity    Alcohol use: Yes     Alcohol/week: 2.0 standard drinks     Types: 2 Glasses of wine per week     Comment: occasionally    Drug use: Yes     Frequency: 7.0 times per week     Types: Marijuana    Sexual activity: Yes     Partners: Female     Review of Systems   Constitutional: Positive for appetite change and fatigue.   HENT: Positive for hearing loss. Negative for ear discharge and ear pain.    Eyes: Positive for visual disturbance. Negative for pain, discharge and redness.   Respiratory: Negative for apnea, cough and shortness of breath.    Gastrointestinal: Positive for nausea. Negative for abdominal distention and abdominal pain.   Endocrine: Negative for cold intolerance, heat intolerance, polydipsia, polyphagia and polyuria.   Genitourinary: Negative for difficulty urinating, dysuria, frequency and urgency.   Musculoskeletal: Positive for neck pain. Negative for arthralgias.   Skin: Positive for wound.   Allergic/Immunologic: Negative.    Neurological: Positive for dizziness, syncope and headaches.   Hematological: Negative for adenopathy.   Psychiatric/Behavioral: Negative.      Objective:     Vital Signs (Most Recent):  Temp: 98.7 °F (37.1 °C) (07/01/20 0950)  Pulse: 71 (07/01/20 1101)  Resp: 16 (07/01/20 0950)  BP: (!) 109/56 (07/01/20 1101)  SpO2: 100 % (07/01/20 1101) Vital Signs (24h Range):  Temp:  [98.7 °F (37.1 °C)] 98.7 °F (37.1 °C)  Pulse:  [71-90] 71  Resp:  [16] 16  SpO2:  [99 %-100 %] 100 %  BP: ()/(51-56) 109/56     Weight: 81.6 kg (180 lb)  Body mass index is 28.19 kg/m².    Physical Exam  Constitutional:        Appearance: Normal appearance. He is normal weight.   HENT:      Head: Normocephalic and atraumatic.      Right Ear: Tympanic membrane, ear canal and external ear normal.      Left Ear: Tympanic membrane, ear canal and external ear normal.      Nose: Nose normal.      Mouth/Throat:      Mouth: Mucous membranes are moist.      Pharynx: No oropharyngeal exudate or posterior oropharyngeal erythema.   Neck:      Musculoskeletal: Normal range of motion and neck supple.   Cardiovascular:      Pulses: Normal pulses.      Heart sounds: Normal heart sounds. No murmur.   Pulmonary:      Effort: Pulmonary effort is normal. No respiratory distress.      Breath sounds: Normal breath sounds.   Abdominal:      General: Bowel sounds are normal. There is no distension.      Palpations: Abdomen is soft. There is no mass.      Tenderness: There is no abdominal tenderness.   Skin:     General: Skin is warm.      Capillary Refill: Capillary refill takes less than 2 seconds.      Coloration: Skin is not jaundiced.      Findings: No bruising or rash.   Neurological:      Mental Status: He is alert and oriented to person, place, and time.      Cranial Nerves: No cranial nerve deficit.      Sensory: No sensory deficit.      Motor: No weakness.      Coordination: Coordination normal.   Psychiatric:         Mood and Affect: Mood normal.         Behavior: Behavior normal.            Significant Labs:   CBC:   Recent Labs   Lab 07/01/20  1015   WBC 10.67   HGB 12.4*   HCT 39.9*        CMP:   Recent Labs   Lab 07/01/20  1015   *   K 4.8   CL 80*   CO2 28   *   BUN 49*   CREATININE 9.7*   CALCIUM 9.9   PROT 9.0*   ALBUMIN 4.8   BILITOT 0.3   ALKPHOS 105   AST 17   ALT 15   ANIONGAP 21*   EGFRNONAA 6*       Significant Imaging: CT: I have reviewed all pertinent results/findings within the past 24 hours and my personal findings are:  unremarkable for a patient his age. Ventricles, sulci and white matter appear unremarkable and  the calvarium appears intact with no fractures.     Assessment/Plan:     Active Diagnoses:    Diagnosis Date Noted POA    Acute renal failure [N17.9]    -Run IVF and assess intravascular volume   -Monitor BUN and Creatinine  -Assess Urine output   -Renal ultrasound ordered  -Avoid NSAIDs,   -Avoid ACEi and ARBs  -Avoid iodinated contrast agents   -Monitor BMP for electrolyte imbalance   -Consult nephrology   -maintain MAP > 55  -awaiting FeNa and UA results    07/01/2020 Yes      Problems Resolved During this Admission:         VTE Risk Mitigation (From admission, onward)    None            [facility]  Department of Hospital Medicine   [facility]

## 2020-07-02 PROBLEM — H61.21 IMPACTED CERUMEN, RIGHT EAR: Status: ACTIVE | Noted: 2020-07-02

## 2020-07-02 LAB
ALBUMIN SERPL BCP-MCNC: 3.6 G/DL (ref 3.5–5.2)
ANION GAP SERPL CALC-SCNC: 13 MMOL/L (ref 8–16)
BASOPHILS # BLD AUTO: 0.02 K/UL (ref 0–0.2)
BASOPHILS NFR BLD: 0.4 % (ref 0–1.9)
BUN SERPL-MCNC: 39 MG/DL (ref 6–20)
CALCIUM SERPL-MCNC: 8.8 MG/DL (ref 8.7–10.5)
CHLORIDE SERPL-SCNC: 89 MMOL/L (ref 95–110)
CK SERPL-CCNC: 101 U/L (ref 20–200)
CO2 SERPL-SCNC: 31 MMOL/L (ref 23–29)
CREAT SERPL-MCNC: 4.1 MG/DL (ref 0.5–1.4)
DIFFERENTIAL METHOD: ABNORMAL
EOSINOPHIL # BLD AUTO: 0.1 K/UL (ref 0–0.5)
EOSINOPHIL NFR BLD: 1.2 % (ref 0–8)
ERYTHROCYTE [DISTWIDTH] IN BLOOD BY AUTOMATED COUNT: 19.7 % (ref 11.5–14.5)
EST. GFR  (AFRICAN AMERICAN): 20 ML/MIN/1.73 M^2
EST. GFR  (NON AFRICAN AMERICAN): 17 ML/MIN/1.73 M^2
ESTIMATED AVG GLUCOSE: 120 MG/DL (ref 68–131)
GLUCOSE SERPL-MCNC: 111 MG/DL (ref 70–110)
HBA1C MFR BLD HPLC: 5.8 % (ref 4–5.6)
HCT VFR BLD AUTO: 34.1 % (ref 40–54)
HGB BLD-MCNC: 10.6 G/DL (ref 14–18)
IMM GRANULOCYTES # BLD AUTO: 0.01 K/UL (ref 0–0.04)
IMM GRANULOCYTES NFR BLD AUTO: 0.2 % (ref 0–0.5)
LYMPHOCYTES # BLD AUTO: 1.5 K/UL (ref 1–4.8)
LYMPHOCYTES NFR BLD: 26.2 % (ref 18–48)
MCH RBC QN AUTO: 24.7 PG (ref 27–31)
MCHC RBC AUTO-ENTMCNC: 31.1 G/DL (ref 32–36)
MCV RBC AUTO: 79 FL (ref 82–98)
MONOCYTES # BLD AUTO: 0.7 K/UL (ref 0.3–1)
MONOCYTES NFR BLD: 11.9 % (ref 4–15)
NEUTROPHILS # BLD AUTO: 3.4 K/UL (ref 1.8–7.7)
NEUTROPHILS NFR BLD: 60.1 % (ref 38–73)
NRBC BLD-RTO: 0 /100 WBC
PHOSPHATE SERPL-MCNC: 3.6 MG/DL (ref 2.7–4.5)
PLATELET # BLD AUTO: 241 K/UL (ref 150–350)
PMV BLD AUTO: 10.2 FL (ref 9.2–12.9)
POTASSIUM SERPL-SCNC: 3.1 MMOL/L (ref 3.5–5.1)
RBC # BLD AUTO: 4.3 M/UL (ref 4.6–6.2)
SODIUM SERPL-SCNC: 133 MMOL/L (ref 136–145)
URATE SERPL-MCNC: 10.3 MG/DL (ref 3.4–7)
WBC # BLD AUTO: 5.65 K/UL (ref 3.9–12.7)

## 2020-07-02 PROCEDURE — 63600175 PHARM REV CODE 636 W HCPCS: Performed by: INTERNAL MEDICINE

## 2020-07-02 PROCEDURE — 83036 HEMOGLOBIN GLYCOSYLATED A1C: CPT

## 2020-07-02 PROCEDURE — 80069 RENAL FUNCTION PANEL: CPT

## 2020-07-02 PROCEDURE — 36415 COLL VENOUS BLD VENIPUNCTURE: CPT

## 2020-07-02 PROCEDURE — 12000002 HC ACUTE/MED SURGE SEMI-PRIVATE ROOM

## 2020-07-02 PROCEDURE — 84550 ASSAY OF BLOOD/URIC ACID: CPT

## 2020-07-02 PROCEDURE — 82550 ASSAY OF CK (CPK): CPT

## 2020-07-02 PROCEDURE — 85025 COMPLETE CBC W/AUTO DIFF WBC: CPT

## 2020-07-02 PROCEDURE — 25000003 PHARM REV CODE 250: Performed by: HOSPITALIST

## 2020-07-02 PROCEDURE — 25000003 PHARM REV CODE 250: Performed by: INTERNAL MEDICINE

## 2020-07-02 RX ORDER — AMOXICILLIN AND CLAVULANATE POTASSIUM 500; 125 MG/1; MG/1
1 TABLET, FILM COATED ORAL 2 TIMES DAILY
Status: DISCONTINUED | OUTPATIENT
Start: 2020-07-02 | End: 2020-07-03 | Stop reason: HOSPADM

## 2020-07-02 RX ORDER — SODIUM CHLORIDE 9 MG/ML
INJECTION, SOLUTION INTRAVENOUS CONTINUOUS
Status: DISCONTINUED | OUTPATIENT
Start: 2020-07-02 | End: 2020-07-03 | Stop reason: HOSPADM

## 2020-07-02 RX ORDER — FERROUS SULFATE 325(65) MG
325 TABLET, DELAYED RELEASE (ENTERIC COATED) ORAL DAILY
Status: DISCONTINUED | OUTPATIENT
Start: 2020-07-03 | End: 2020-07-03 | Stop reason: HOSPADM

## 2020-07-02 RX ORDER — POTASSIUM CHLORIDE 20 MEQ/1
20 TABLET, EXTENDED RELEASE ORAL ONCE
Status: COMPLETED | OUTPATIENT
Start: 2020-07-02 | End: 2020-07-02

## 2020-07-02 RX ORDER — ASCORBIC ACID 500 MG
500 TABLET ORAL DAILY
Status: DISCONTINUED | OUTPATIENT
Start: 2020-07-03 | End: 2020-07-03 | Stop reason: HOSPADM

## 2020-07-02 RX ADMIN — SERTRALINE HYDROCHLORIDE 100 MG: 50 TABLET ORAL at 08:07

## 2020-07-02 RX ADMIN — PANTOPRAZOLE SODIUM 40 MG: 40 TABLET, DELAYED RELEASE ORAL at 08:07

## 2020-07-02 RX ADMIN — THIAMINE HCL TAB 100 MG 100 MG: 100 TAB at 08:07

## 2020-07-02 RX ADMIN — SODIUM CHLORIDE: 0.9 INJECTION, SOLUTION INTRAVENOUS at 09:07

## 2020-07-02 RX ADMIN — MIRTAZAPINE 45 MG: 15 TABLET, FILM COATED ORAL at 09:07

## 2020-07-02 RX ADMIN — CARBAMIDE PEROXIDE 6.5% 5 DROP: 6.5 LIQUID AURICULAR (OTIC) at 09:07

## 2020-07-02 RX ADMIN — POTASSIUM CHLORIDE 20 MEQ: 1500 TABLET, EXTENDED RELEASE ORAL at 11:07

## 2020-07-02 RX ADMIN — AMOXICILLIN AND CLAVULANATE POTASSIUM 500 MG: 500; 125 TABLET, FILM COATED ORAL at 09:07

## 2020-07-02 RX ADMIN — LEVOTHYROXINE SODIUM 50 MCG: 50 TABLET ORAL at 05:07

## 2020-07-02 RX ADMIN — FOLIC ACID 1 MG: 1 TABLET ORAL at 08:07

## 2020-07-02 RX ADMIN — SODIUM CHLORIDE, SODIUM LACTATE, POTASSIUM CHLORIDE, AND CALCIUM CHLORIDE: .6; .31; .03; .02 INJECTION, SOLUTION INTRAVENOUS at 02:07

## 2020-07-02 RX ADMIN — SODIUM CHLORIDE: 0.9 INJECTION, SOLUTION INTRAVENOUS at 10:07

## 2020-07-02 RX ADMIN — QUETIAPINE 300 MG: 100 TABLET ORAL at 09:07

## 2020-07-02 NOTE — PLAN OF CARE
Plan of care reviewed with pt,verbalized understanding. IV intact and clean. Slept well all night. Dressing to the head clean and intact, no bleeding was noted. No complain of pain or discomfort. Call light kept within reach, bed in locked and lowest position, side rails up x2.

## 2020-07-02 NOTE — PLAN OF CARE
Pt AAO x 4. Pt ambulates with standby assistance; encouraged to call staff for mobility. Pt voids in toilet w/o difficulty. Room air. No complaints of pain. Pt afebrile. PIV cdi. IVF administered. K replaced. Call bell in reach, bed locked, bed alarm on, and fall band and non skid socks on. Will continue to monitor.

## 2020-07-02 NOTE — SUBJECTIVE & OBJECTIVE
Past Medical History:   Diagnosis Date    Anxiety     Anxiety     Bipolar affective disorder     Depression     Hypertension     Pancreatitis     Schizophrenia     Thyroid disease        Past Surgical History:   Procedure Laterality Date    LAPAROSCOPIC CHOLECYSTECTOMY N/A 3/9/2020    Procedure: CHOLECYSTECTOMY, LAPAROSCOPIC;  Surgeon: Trenton Deshpande MD;  Location: [facility];  Service: General;  Laterality: N/A;       Review of patient's allergies indicates:  No Known Allergies    No current facility-administered medications on file prior to encounter.      Current Outpatient Medications on File Prior to Encounter   Medication Sig    amLODIPine (NORVASC) 10 MG tablet Take 1 tablet (10 mg total) by mouth once daily.    cloNIDine (CATAPRES) 0.1 MG tablet Take 0.1 mg by mouth 3 (three) times daily.    folic acid (FOLVITE) 1 MG tablet Take 1 tablet (1 mg total) by mouth once daily.    gabapentin (NEURONTIN) 300 MG capsule Take 1 capsule (300 mg total) by mouth 3 (three) times daily.    levothyroxine (SYNTHROID) 50 MCG tablet Take 1 tablet (50 mcg total) by mouth before breakfast.    lisinopril (PRINIVIL,ZESTRIL) 40 MG tablet Take 1 tablet (40 mg total) by mouth once daily.    metoprolol succinate (TOPROL-XL) 25 MG 24 hr tablet Take 12.5 mg by mouth nightly.     mirtazapine (REMERON) 45 MG tablet Take 45 mg by mouth every evening.    pantoprazole (PROTONIX) 40 MG tablet Take 40 mg by mouth once daily.    promethazine (PHENERGAN) 25 MG tablet Take 1 tablet (25 mg total) by mouth every 6 (six) hours as needed for Nausea.    QUEtiapine (SEROQUEL) 300 MG Tab Take 300 mg by mouth nightly.    sertraline (ZOLOFT) 100 MG tablet Take 100 mg by mouth once daily.    terazosin (HYTRIN) 1 MG capsule Take 2 mg by mouth every evening.    ondansetron (ZOFRAN) 8 MG tablet Take 1 tablet (8 mg total) by mouth every 8 (eight) hours as needed for Nausea.    ondansetron (ZOFRAN-ODT) 4 MG TbDL Take 1 tablet (4 mg total)  by mouth every 8 (eight) hours as needed.    [DISCONTINUED] ARIPiprazole (ABILIFY) 10 MG Tab Take 1 tablet (10 mg total) by mouth once daily.    [DISCONTINUED] HYDROcodone-acetaminophen (NORCO)  mg per tablet Take 1 tablet by mouth every 6 (six) hours as needed for Pain.    [DISCONTINUED] naloxone (NARCAN) 4 mg/actuation Spry 4mg by nasal route as needed for opioid overdose; may repeat every 2-3 minutes in alternating nostrils until medical help arrives. Call 911    [DISCONTINUED] thiamine 100 MG tablet Take 1 tablet (100 mg total) by mouth once daily.     Family History     Problem Relation (Age of Onset)    Cancer Paternal Grandfather    Diabetes Maternal Grandmother    Hypertension Maternal Grandfather        Tobacco Use    Smoking status: Current Some Day Smoker     Packs/day: 0.25     Years: 7.00     Pack years: 1.75    Smokeless tobacco: Never Used   Substance and Sexual Activity    Alcohol use: Yes     Alcohol/week: 2.0 standard drinks     Types: 2 Glasses of wine per week     Comment: occasionally    Drug use: Yes     Frequency: 7.0 times per week     Types: Marijuana    Sexual activity: Yes     Partners: Female     Review of Systems   Constitutional: Positive for appetite change and fatigue. Negative for chills and fever.   HENT: Negative for congestion and sinus pressure.    Eyes: Negative for pain and visual disturbance.   Respiratory: Negative for cough, shortness of breath and wheezing.    Cardiovascular: Negative for chest pain, palpitations and leg swelling.   Gastrointestinal: Positive for nausea and vomiting. Negative for abdominal pain and diarrhea.   Genitourinary: Negative for difficulty urinating, dysuria and hematuria.   Musculoskeletal: Negative for arthralgias and myalgias.   Skin: Negative for rash and wound.   Neurological: Positive for dizziness, syncope and light-headedness. Negative for weakness and headaches.   Hematological: Negative for adenopathy. Does not bruise/bleed  easily.   Psychiatric/Behavioral: Negative for confusion and dysphoric mood. The patient is not nervous/anxious.      Objective:     Vital Signs (Most Recent):  Temp: 98.1 °F (36.7 °C) (07/01/20 2000)  Pulse: 71 (07/01/20 2000)  Resp: 18 (07/01/20 2000)  BP: (!) 107/58 (07/01/20 2000)  SpO2: 98 % (07/01/20 2000) Vital Signs (24h Range):  Temp:  [98.1 °F (36.7 °C)-98.7 °F (37.1 °C)] 98.1 °F (36.7 °C)  Pulse:  [64-90] 71  Resp:  [16-18] 18  SpO2:  [95 %-100 %] 98 %  BP: ()/(51-75) 107/58     Weight: 81.6 kg (180 lb)  Body mass index is 28.19 kg/m².    Physical Exam  Constitutional:       General: He is not in acute distress.     Appearance: He is not ill-appearing.   HENT:      Head: Normocephalic and atraumatic.      Right Ear: External ear normal.      Left Ear: External ear normal.      Mouth/Throat:      Pharynx: No oropharyngeal exudate.   Eyes:      General: No scleral icterus.        Right eye: No discharge.         Left eye: No discharge.      Conjunctiva/sclera: Conjunctivae normal.   Neck:      Musculoskeletal: Normal range of motion and neck supple.      Thyroid: No thyromegaly.      Vascular: No JVD.   Cardiovascular:      Rate and Rhythm: Normal rate and regular rhythm.      Heart sounds: No gallop.    Pulmonary:      Effort: Pulmonary effort is normal.      Breath sounds: Normal breath sounds. No wheezing.   Abdominal:      General: Bowel sounds are normal. There is no distension.      Palpations: Abdomen is soft. There is no mass.      Tenderness: There is no abdominal tenderness.   Musculoskeletal:         General: No deformity.   Lymphadenopathy:      Cervical: No cervical adenopathy.   Skin:     General: Skin is warm and dry.      Capillary Refill: Capillary refill takes less than 2 seconds.      Findings: No rash.   Neurological:      Mental Status: He is alert and oriented to person, place, and time.   Psychiatric:         Behavior: Behavior normal.             Significant Labs:   BMP:    Recent Labs   Lab 07/01/20  1015   *   *   K 4.8   CL 80*   CO2 28   BUN 49*   CREATININE 9.7*   CALCIUM 9.9     CBC:   Recent Labs   Lab 07/01/20  1015   WBC 10.67   HGB 12.4*   HCT 39.9*          Significant Imaging: I have reviewed all pertinent imaging results/findings within the past 24 hours.

## 2020-07-02 NOTE — ASSESSMENT & PLAN NOTE
Lab Results   Component Value Date    TSH 1.170 05/04/2020     No need to check another TSH.  Continue usual dose of Synthroid.

## 2020-07-02 NOTE — MEDICAL/APP STUDENT
[facility] Hospital Medicine  Progress Note    Patient Name: Bradley Collado  MRN: [id number]  Patient Class: IP- Inpatient   Admission Date: 7/1/2020  Length of Stay: 1 days  Attending Physician: Ry Duong MD  Primary Care Provider: Arlen Belcher NP        Subjective:     Principal Problem:Acute renal failure    Interval History: Patient reports no experiences of dizziness. He reports a steady increase in appetite and denies any nausea. He reports that he experiences hearing loss in his right ear. He states there is slight soreness in the lacerations on his back and head but denies any bleeding. He reports concern about his renal failure diagnosis.    Review of Systems   Constitutional: Negative for activity change, appetite change and fatigue.   HENT: Positive for hearing loss. Negative for congestion, ear discharge and ear pain.    Eyes: Negative for pain, discharge and redness.   Respiratory: Negative for cough and shortness of breath.    Cardiovascular: Negative for chest pain, palpitations and leg swelling.   Gastrointestinal: Negative for abdominal distention, abdominal pain and nausea.   Endocrine: Negative for cold intolerance, heat intolerance, polydipsia, polyphagia and polyuria.   Genitourinary: Negative for dysuria, frequency and hematuria.   Musculoskeletal: Positive for neck pain. Negative for arthralgias and back pain.   Neurological: Negative for dizziness, syncope, light-headedness and headaches.   Hematological: Negative for adenopathy.   Psychiatric/Behavioral: Negative for agitation, confusion and hallucinations. The patient is not nervous/anxious.      Objective:     Vital Signs (Most Recent):  Temp: 97.8 °F (36.6 °C) (07/02/20 1130)  Pulse: 76 (07/02/20 1130)  Resp: 18 (07/02/20 1130)  BP: (!) 101/59 (07/02/20 1130)  SpO2: 98 % (07/02/20 1130) Vital Signs (24h Range):  Temp:  [97.5 °F (36.4 °C)-98.8 °F (37.1 °C)] 97.8 °F (36.6 °C)  Pulse:  [68-84]  76  Resp:  [17-19] 18  SpO2:  [97 %-99 %] 98 %  BP: (101-133)/(56-75) 101/59     Weight: 81.6 kg (180 lb)  Body mass index is 28.19 kg/m².    Intake/Output Summary (Last 24 hours) at 7/2/2020 1542  Last data filed at 7/2/2020 0600  Gross per 24 hour   Intake 120 ml   Output 600 ml   Net -480 ml      Physical Exam  Constitutional:       General: He is not in acute distress.     Appearance: Normal appearance.   HENT:      Head: Normocephalic and atraumatic.      Right Ear: Decreased hearing noted.      Left Ear: Hearing, tympanic membrane, ear canal and external ear normal.      Nose: Nose normal.      Mouth/Throat:      Mouth: Mucous membranes are moist.      Pharynx: No oropharyngeal exudate or posterior oropharyngeal erythema.   Eyes:      Conjunctiva/sclera: Conjunctivae normal.      Pupils: Pupils are equal, round, and reactive to light.   Neck:      Musculoskeletal: Normal range of motion and neck supple.   Cardiovascular:      Rate and Rhythm: Normal rate and regular rhythm.      Pulses: Normal pulses.      Heart sounds: Normal heart sounds. No murmur.   Pulmonary:      Effort: Pulmonary effort is normal. No respiratory distress.      Breath sounds: Normal breath sounds.   Abdominal:      General: Bowel sounds are normal. There is no distension.      Palpations: Abdomen is soft.      Tenderness: There is no abdominal tenderness.   Musculoskeletal:         General: No swelling or deformity.   Skin:     General: Skin is warm and dry.      Findings: Laceration present.      Comments: Lacerations present in the back of head and Right lower limb    Neurological:      Mental Status: He is alert and oriented to person, place, and time.      Motor: No weakness.   Psychiatric:         Mood and Affect: Mood normal.         Behavior: Behavior normal.           Significant Labs:   BMP:   Recent Labs   Lab 07/02/20  0422   *   *   K 3.1*   CL 89*   CO2 31*   BUN 39*   CREATININE 4.1*   CALCIUM 8.8     CBC:    Recent Labs   Lab 07/01/20  1015 07/02/20  0422   WBC 10.67 5.65   HGB 12.4* 10.6*   HCT 39.9* 34.1*    241     CMP:   Recent Labs   Lab 07/01/20  1015 07/02/20  0422   * 133*   K 4.8 3.1*   CL 80* 89*   CO2 28 31*   * 111*   BUN 49* 39*   CREATININE 9.7* 4.1*   CALCIUM 9.9 8.8   PROT 9.0*  --    ALBUMIN 4.8 3.6   BILITOT 0.3  --    ALKPHOS 105  --    AST 17  --    ALT 15  --    ANIONGAP 21* 13   EGFRNONAA 6* 17*       Significant Imaging: U/S: I have reviewed all pertinent results/findings within the past 24 hours and my personal findings are:  there is no obstructive process and no hydronephrosis     Assessment/Plan:      Active Diagnoses:    Diagnosis Date Noted POA    PRINCIPAL PROBLEM:  Acute renal failure   -Nephrology consulted   -continue volume resuscitation   -No NSAIDs  -No contrast   -do not give lisinopril  -As per nephrology, renally dose meds for crcl of 10-50  -Maintain MAP > 55   07/01/2020 Yes    Hyperglycemia   -most recent A1C is 5.8   -continue monitoring glucose levels in BMP   07/01/2020 Yes    Hyponatremia   -most likely due to hypovolemia  -monitor sodium while giving normal saline IV   07/01/2020 Yes    Recreational drug use  -Urine drug screen positive for Amphetamine and THC  -provide counseling for the consequences of recreational drug use      07/01/2020 Yes    Iron deficiency anemia   Most recent CBC:   HGB: 10.6  HCT: 34.1   Monitor serial CBC and trends 07/01/2020 Yes    Benign essential HTN   -monitor serial Bps  -PRN BP meds only if patient's BP > 180/110 plus worsening chest pain and Shortness of breath.  03/26/2017 Yes    Acquired hypothyroidism   -continue levothyroxine  04/05/2016 Yes    ETOH abuse   -continue folate and thiamine  -librium PRN  04/05/2016 Yes      Problems Resolved During this Admission:     VTE Risk Mitigation (From admission, onward)         Ordered     IP VTE LOW RISK PATIENT  Once      07/01/20 Lidia Alas  [facility]  Department of Hospital Medicine   [facility]

## 2020-07-02 NOTE — HPI
Patient presented to our ED today complaining of multiple things.  He has had lightheadedness and even episodes of blacing out for the past few weeks.  The episodes led to falls and lacerations on multiple parts of his body, which includes his head.  He's had nausea and vomiting.  Also poor appetite and fatigue.  No chest pain.  No fever.  No trouble breathing.   He has history of alcohol abuse, multiple episodes of pancreatitis, bipolar disorder, and HTN.

## 2020-07-02 NOTE — ASSESSMENT & PLAN NOTE
Hypovolemic in type.  Will monitor while getting saline solution IV.    Sodium   Date Value Ref Range Status   07/01/2020 129 (L) 136 - 145 mmol/L Final

## 2020-07-02 NOTE — ASSESSMENT & PLAN NOTE
Severe.  Give IV crystalloid.  Avoid NSAID's.  Avoid contrast.  Keep MAP > 55.  Kidney US.  UA and microscopy.  Calculate FENa.  Consult nephrologist.    Lab Results   Component Value Date    CREATININE 9.7 (H) 07/01/2020     Lab Results   Component Value Date    BUN 49 (H) 07/01/2020

## 2020-07-02 NOTE — ASSESSMENT & PLAN NOTE
Patient's anemia is currently controlled. Has not received any PRBCs to date.. Etiology likely d/t iron def.  Current CBC reviewed-   Lab Results   Component Value Date    HGB 12.4 (L) 07/01/2020    HCT 39.9 (L) 07/01/2020     Monitor serial CBC and transfuse if patient becomes hemodynamically unstable, symptomatic or H/H drops below 7/21.

## 2020-07-02 NOTE — PROGRESS NOTES
INPATIENT NEPHROLOGY CONSULT   [facility] NEPHROLOGY    Bradley Collado  07/02/2020    Reason for consultation:    Acute kidney injury    Chief Complaint:   Chief Complaint   Patient presents with    Weakness     weakness for approx 1 week, possible syncope this morning, intial BP per ems 80/40,           History of Present Illness:    Bradley Collado is a 40 y.o. male who presents to the ED via EMS with an onset of dizziness, weakness, and syncope x1.5 weeks. Per EMS, patient was hypotension upon their arrival (84/38). The patient reports multiple episodes of dizziness, weakness, and syncope over the last 1.5 weeks. The patient endures a laceration to the back of his head. Patient complains of headaches and neck pain secondary to hitting his head. The patient states he had hearing loss in his right ear x3 weeks ago, that was resolved. The patient denies any other symptoms at this time. PMHx of alcohol use, HTN, acute pancreatitis, thyroid disease, schizophrenia, and bipolar affective disorder. PSHx of cholecystectomy.      7/1 seen in the ER.  Pt still weak but nausea better.  Head ache.  No nausea, chest pain, sob, fever, urinary or bowel complaint, new neurologic symptoms, new joint pain,  7/2  Sleeping, no distress      Plan of Care:       Assessment:      Acute kidney injury likely due to intravascular volume depletion  --  ua with micro bland  --cpk normal  --urine sodium normal  --no nsaids, coxibs  --continue volume resuscitation  --hold lisinpril   --renal dose medication for crcl 10-50    Hyponatremia--likely hypovolemic  --volume resuscitation  --no hypotonic iv fluids  --trend sodium.   --on zoloft which can exacerbate hyponatremia.  Check urine osm      Volume depletion  --iv fluids    Mild anemia  --trend for now         Thank you for allowing us to participate in this patient's care. We will continue to follow.    Vital Signs:  Temp Readings from Last 3 Encounters:   07/02/20 97.5 °F (36.4 °C)  (Oral)   06/22/20 98 °F (36.7 °C) (Oral)   06/18/20 98.5 °F (36.9 °C) (Oral)       Pulse Readings from Last 3 Encounters:   07/02/20 77   06/22/20 80   06/18/20 72       BP Readings from Last 3 Encounters:   07/02/20 (!) 112/59   06/22/20 (!) 145/97   06/18/20 122/76       Weight:  Wt Readings from Last 3 Encounters:   07/01/20 81.6 kg (180 lb)   06/22/20 81.6 kg (180 lb)   06/17/20 90.7 kg (200 lb)       Past Medical & Surgical History:  Past Medical History:   Diagnosis Date    Anxiety     Anxiety     Bipolar affective disorder     Depression     Hypertension     Pancreatitis     Schizophrenia     Thyroid disease        Past Surgical History:   Procedure Laterality Date    LAPAROSCOPIC CHOLECYSTECTOMY N/A 3/9/2020    Procedure: CHOLECYSTECTOMY, LAPAROSCOPIC;  Surgeon: Trenton Deshpande MD;  Location: [facility];  Service: General;  Laterality: N/A;       Past Social History:  Social History     Socioeconomic History    Marital status: Single     Spouse name: Not on file    Number of children: Not on file    Years of education: Not on file    Highest education level: Not on file   Occupational History    Not on file   Social Needs    Financial resource strain: Not on file    Food insecurity     Worry: Not on file     Inability: Not on file    Transportation needs     Medical: Not on file     Non-medical: Not on file   Tobacco Use    Smoking status: Current Some Day Smoker     Packs/day: 0.25     Years: 7.00     Pack years: 1.75    Smokeless tobacco: Never Used   Substance and Sexual Activity    Alcohol use: Yes     Alcohol/week: 2.0 standard drinks     Types: 2 Glasses of wine per week     Comment: occasionally    Drug use: Yes     Frequency: 7.0 times per week     Types: Marijuana    Sexual activity: Yes     Partners: Female   Lifestyle    Physical activity     Days per week: Not on file     Minutes per session: Not on file    Stress: Not on file   Relationships    Social connections     Talks  on phone: Not on file     Gets together: Not on file     Attends Religion service: Not on file     Active member of club or organization: Not on file     Attends meetings of clubs or organizations: Not on file     Relationship status: Not on file   Other Topics Concern    Not on file   Social History Narrative    Not on file       Medications:  No current facility-administered medications on file prior to encounter.      Current Outpatient Medications on File Prior to Encounter   Medication Sig Dispense Refill    amLODIPine (NORVASC) 10 MG tablet Take 1 tablet (10 mg total) by mouth once daily. 30 tablet 4    cloNIDine (CATAPRES) 0.1 MG tablet Take 0.1 mg by mouth 3 (three) times daily.      folic acid (FOLVITE) 1 MG tablet Take 1 tablet (1 mg total) by mouth once daily. 30 tablet 3    gabapentin (NEURONTIN) 300 MG capsule Take 1 capsule (300 mg total) by mouth 3 (three) times daily. 90 capsule 1    levothyroxine (SYNTHROID) 50 MCG tablet Take 1 tablet (50 mcg total) by mouth before breakfast. 30 tablet 3    lisinopril (PRINIVIL,ZESTRIL) 40 MG tablet Take 1 tablet (40 mg total) by mouth once daily. 90 tablet 1    metoprolol succinate (TOPROL-XL) 25 MG 24 hr tablet Take 12.5 mg by mouth nightly.       mirtazapine (REMERON) 45 MG tablet Take 45 mg by mouth every evening.      pantoprazole (PROTONIX) 40 MG tablet Take 40 mg by mouth once daily.      promethazine (PHENERGAN) 25 MG tablet Take 1 tablet (25 mg total) by mouth every 6 (six) hours as needed for Nausea. 15 tablet 0    QUEtiapine (SEROQUEL) 300 MG Tab Take 300 mg by mouth nightly.      sertraline (ZOLOFT) 100 MG tablet Take 100 mg by mouth once daily.      terazosin (HYTRIN) 1 MG capsule Take 2 mg by mouth every evening.      ondansetron (ZOFRAN) 8 MG tablet Take 1 tablet (8 mg total) by mouth every 8 (eight) hours as needed for Nausea. 12 tablet 0    ondansetron (ZOFRAN-ODT) 4 MG TbDL Take 1 tablet (4 mg total) by mouth every 8 (eight)  hours as needed. 12 tablet 0     Scheduled Meds:  Continuous Infusions:  PRN Meds:.    Allergies:  Patient has no known allergies.    Past Family History:  Reviewed; refer to Hospitalist Admission Note    Review of Systems:  Review of Systems - All 14 systems reviewed and negative, except as noted in HPI    Physical Exam:    BP (!) 112/59 (BP Location: Left arm, Patient Position: Lying)   Pulse 77   Temp 97.5 °F (36.4 °C) (Oral)   Resp 17   Wt 81.6 kg (180 lb)   SpO2 98%   BMI 28.19 kg/m²     General Appearance:    Alert, cooperative, no distress, appears stated age   Head:    Normocephalic, without obvious abnormality, atraumatic   Eyes:    PER, conjunctiva/corneas clear, EOM's intact in both eyes        Throat:   Lips, mucosa, and tongue normal; teeth and gums normal   Back:     Symmetric, no curvature, ROM normal, no CVA tenderness   Lungs:     Clear to auscultation bilaterally, respirations unlabored   Chest wall:    No tenderness or deformity   Heart:    Regular rate and rhythm, S1 and S2 normal, no murmur, rub   or gallop   Abdomen:     Soft, non-tender, bowel sounds active all four quadrants,     no masses, no organomegaly   Extremities:   Extremities normal, atraumatic, no cyanosis or edema   Pulses:   2+ and symmetric all extremities   MSK:   No joint or muscle swelling, tenderness or deformity   Skin:   Skin color, texture, turgor normal, no rashes or lesions   Neurologic:   CNII-XII intact, normal strength and sensation       Throughout.  No flap     Results:  Lab Results   Component Value Date     (L) 07/02/2020    K 3.1 (L) 07/02/2020    CL 89 (L) 07/02/2020    CO2 31 (H) 07/02/2020    BUN 39 (H) 07/02/2020    CREATININE 4.1 (H) 07/02/2020    CALCIUM 8.8 07/02/2020    ANIONGAP 13 07/02/2020    ESTGFRAFRICA 20 (A) 07/02/2020    EGFRNONAA 17 (A) 07/02/2020       Lab Results   Component Value Date    CALCIUM 8.8 07/02/2020    PHOS 3.6 07/02/2020       Recent Labs   Lab 07/02/20  0422   WBC 5.65    RBC 4.30*   HGB 10.6*   HCT 34.1*      MCV 79*   MCH 24.7*   MCHC 31.1*       I have personally reviewed pertinent radiological imaging and reports.

## 2020-07-02 NOTE — PLAN OF CARE
Cm completed the assessment with pt at bedside.  Pt lives with family and independent in care.  PCP is  CRYSTAL Belcher.  Insurance verified as Medicaid. Pt denies diabetes, dialysis and coumadin.  Disposition; Pt will discharge to home. No needs verbalized at this time.       07/02/20 1025   Discharge Assessment   Assessment Type Discharge Planning Assessment   Confirmed/corrected address and phone number on facesheet? Yes   Assessment information obtained from? Patient   Expected Length of Stay (days) 2   Communicated expected length of stay with patient/caregiver yes   Prior to hospitilization cognitive status: Alert/Oriented   Prior to hospitalization functional status: Independent   Current cognitive status: Alert/Oriented   Current Functional Status: Independent   Facility Arrived From: home   Lives With other relative(s)   Able to Return to Prior Arrangements yes   Is patient able to care for self after discharge? Yes   Who are your caregiver(s) and their phone number(s)? Jayleen- [phone]/Ashley - [phone]   Patient's perception of discharge disposition home or selfcare   Readmission Within the Last 30 Days no previous admission in last 30 days   Patient currently being followed by outpatient case management? No   Patient currently receives any other outside agency services? No   Equipment Currently Used at Home none   Do you have any problems affording any of your prescribed medications? No   Is the patient taking medications as prescribed? yes   Does the patient have transportation home? Yes   Transportation Anticipated family or friend will provide   Dialysis Name and Scheduled days na   Does the patient receive services at the Coumadin Clinic? No   Discharge Plan A Home with family   Discharge Plan B Home with family   DME Needed Upon Discharge  none   Patient/Family in Agreement with Plan yes

## 2020-07-02 NOTE — H&P
[facility] Medicine  History & Physical    Patient Name: Bradley Collado  MRN: [id number]  Admission Date: 7/1/2020  Attending Physician: Ry Duong MD   Primary Care Provider: Arlen Belcher NP         Patient information was obtained from patient, past medical records and ER records.     Subjective:     Principal Problem:Acute renal failure    Chief Complaint:   Chief Complaint   Patient presents with    Weakness     weakness for approx 1 week, possible syncope this morning, intial BP per ems 80/40,         HPI: Patient presented to our ED today complaining of multiple things.  He has had lightheadedness and even episodes of blacing out for the past few weeks.  The episodes led to falls and lacerations on multiple parts of his body, which includes his head.  He's had nausea and vomiting.  Also poor appetite and fatigue.  No chest pain.  No fever.  No trouble breathing.   He has history of alcohol abuse, multiple episodes of pancreatitis, bipolar disorder, and HTN.    Past Medical History:   Diagnosis Date    Anxiety     Anxiety     Bipolar affective disorder     Depression     Hypertension     Pancreatitis     Schizophrenia     Thyroid disease        Past Surgical History:   Procedure Laterality Date    LAPAROSCOPIC CHOLECYSTECTOMY N/A 3/9/2020    Procedure: CHOLECYSTECTOMY, LAPAROSCOPIC;  Surgeon: Trenton Deshpande MD;  Location: [facility];  Service: General;  Laterality: N/A;       Review of patient's allergies indicates:  No Known Allergies    No current facility-administered medications on file prior to encounter.      Current Outpatient Medications on File Prior to Encounter   Medication Sig    amLODIPine (NORVASC) 10 MG tablet Take 1 tablet (10 mg total) by mouth once daily.    cloNIDine (CATAPRES) 0.1 MG tablet Take 0.1 mg by mouth 3 (three) times daily.    folic acid (FOLVITE) 1 MG tablet Take 1 tablet (1 mg total) by mouth once daily.    gabapentin  (NEURONTIN) 300 MG capsule Take 1 capsule (300 mg total) by mouth 3 (three) times daily.    levothyroxine (SYNTHROID) 50 MCG tablet Take 1 tablet (50 mcg total) by mouth before breakfast.    lisinopril (PRINIVIL,ZESTRIL) 40 MG tablet Take 1 tablet (40 mg total) by mouth once daily.    metoprolol succinate (TOPROL-XL) 25 MG 24 hr tablet Take 12.5 mg by mouth nightly.     mirtazapine (REMERON) 45 MG tablet Take 45 mg by mouth every evening.    pantoprazole (PROTONIX) 40 MG tablet Take 40 mg by mouth once daily.    promethazine (PHENERGAN) 25 MG tablet Take 1 tablet (25 mg total) by mouth every 6 (six) hours as needed for Nausea.    QUEtiapine (SEROQUEL) 300 MG Tab Take 300 mg by mouth nightly.    sertraline (ZOLOFT) 100 MG tablet Take 100 mg by mouth once daily.    terazosin (HYTRIN) 1 MG capsule Take 2 mg by mouth every evening.    ondansetron (ZOFRAN) 8 MG tablet Take 1 tablet (8 mg total) by mouth every 8 (eight) hours as needed for Nausea.    ondansetron (ZOFRAN-ODT) 4 MG TbDL Take 1 tablet (4 mg total) by mouth every 8 (eight) hours as needed.    [DISCONTINUED] ARIPiprazole (ABILIFY) 10 MG Tab Take 1 tablet (10 mg total) by mouth once daily.    [DISCONTINUED] HYDROcodone-acetaminophen (NORCO)  mg per tablet Take 1 tablet by mouth every 6 (six) hours as needed for Pain.    [DISCONTINUED] naloxone (NARCAN) 4 mg/actuation Spry 4mg by nasal route as needed for opioid overdose; may repeat every 2-3 minutes in alternating nostrils until medical help arrives. Call 911    [DISCONTINUED] thiamine 100 MG tablet Take 1 tablet (100 mg total) by mouth once daily.     Family History     Problem Relation (Age of Onset)    Cancer Paternal Grandfather    Diabetes Maternal Grandmother    Hypertension Maternal Grandfather        Tobacco Use    Smoking status: Current Some Day Smoker     Packs/day: 0.25     Years: 7.00     Pack years: 1.75    Smokeless tobacco: Never Used   Substance and Sexual Activity     Alcohol use: Yes     Alcohol/week: 2.0 standard drinks     Types: 2 Glasses of wine per week     Comment: occasionally    Drug use: Yes     Frequency: 7.0 times per week     Types: Marijuana    Sexual activity: Yes     Partners: Female     Review of Systems   Constitutional: Positive for appetite change and fatigue. Negative for chills and fever.   HENT: Negative for congestion and sinus pressure.    Eyes: Negative for pain and visual disturbance.   Respiratory: Negative for cough, shortness of breath and wheezing.    Cardiovascular: Negative for chest pain, palpitations and leg swelling.   Gastrointestinal: Positive for nausea and vomiting. Negative for abdominal pain and diarrhea.   Genitourinary: Negative for difficulty urinating, dysuria and hematuria.   Musculoskeletal: Negative for arthralgias and myalgias.   Skin: Negative for rash and wound.   Neurological: Positive for dizziness, syncope and light-headedness. Negative for weakness and headaches.   Hematological: Negative for adenopathy. Does not bruise/bleed easily.   Psychiatric/Behavioral: Negative for confusion and dysphoric mood. The patient is not nervous/anxious.      Objective:     Vital Signs (Most Recent):  Temp: 98.1 °F (36.7 °C) (07/01/20 2000)  Pulse: 71 (07/01/20 2000)  Resp: 18 (07/01/20 2000)  BP: (!) 107/58 (07/01/20 2000)  SpO2: 98 % (07/01/20 2000) Vital Signs (24h Range):  Temp:  [98.1 °F (36.7 °C)-98.7 °F (37.1 °C)] 98.1 °F (36.7 °C)  Pulse:  [64-90] 71  Resp:  [16-18] 18  SpO2:  [95 %-100 %] 98 %  BP: ()/(51-75) 107/58     Weight: 81.6 kg (180 lb)  Body mass index is 28.19 kg/m².    Physical Exam  Constitutional:       General: He is not in acute distress.     Appearance: He is not ill-appearing.   HENT:      Head: Normocephalic and atraumatic.      Right Ear: External ear normal.      Left Ear: External ear normal.      Mouth/Throat:      Pharynx: No oropharyngeal exudate.   Eyes:      General: No scleral icterus.        Right  eye: No discharge.         Left eye: No discharge.      Conjunctiva/sclera: Conjunctivae normal.   Neck:      Musculoskeletal: Normal range of motion and neck supple.      Thyroid: No thyromegaly.      Vascular: No JVD.   Cardiovascular:      Rate and Rhythm: Normal rate and regular rhythm.      Heart sounds: No gallop.    Pulmonary:      Effort: Pulmonary effort is normal.      Breath sounds: Normal breath sounds. No wheezing.   Abdominal:      General: Bowel sounds are normal. There is no distension.      Palpations: Abdomen is soft. There is no mass.      Tenderness: There is no abdominal tenderness.   Musculoskeletal:         General: No deformity.   Lymphadenopathy:      Cervical: No cervical adenopathy.   Skin:     General: Skin is warm and dry.      Capillary Refill: Capillary refill takes less than 2 seconds.      Findings: No rash.   Neurological:      Mental Status: He is alert and oriented to person, place, and time.   Psychiatric:         Behavior: Behavior normal.             Significant Labs:   BMP:   Recent Labs   Lab 07/01/20  1015   *   *   K 4.8   CL 80*   CO2 28   BUN 49*   CREATININE 9.7*   CALCIUM 9.9     CBC:   Recent Labs   Lab 07/01/20  1015   WBC 10.67   HGB 12.4*   HCT 39.9*          Significant Imaging: I have reviewed all pertinent imaging results/findings within the past 24 hours.    Assessment/Plan:     * Acute renal failure  Severe.  Give IV crystalloid.  Avoid NSAID's.  Avoid contrast.  Keep MAP > 55.  Kidney US.  UA and microscopy.  Calculate FENa.  Consult nephrologist.    Lab Results   Component Value Date    CREATININE 9.7 (H) 07/01/2020     Lab Results   Component Value Date    BUN 49 (H) 07/01/2020         Iron deficiency anemia  Patient's anemia is currently controlled. Has not received any PRBCs to date.. Etiology likely d/t iron def.  Current CBC reviewed-   Lab Results   Component Value Date    HGB 12.4 (L) 07/01/2020    HCT 39.9 (L) 07/01/2020      Monitor serial CBC and transfuse if patient becomes hemodynamically unstable, symptomatic or H/H drops below 7/21.         Recreational drug use  UDS positive for amphetamines and THC.  He smokes pot daily.   patient on the consequences of regular recreational drug use.      Hyponatremia  Hypovolemic in type.  Will monitor while getting saline solution IV.    Sodium   Date Value Ref Range Status   07/01/2020 129 (L) 136 - 145 mmol/L Final         Hyperglycemia  Lab Results   Component Value Date    HGBA1C 6.0 02/23/2020     Will get another HGBA1C.  For now, monitor glucose levels on morning BMP panels.    Benign essential HTN  Chronic, controlled.  Latest blood pressure and vitals reviewed-   Temp:  [98.1 °F (36.7 °C)-98.7 °F (37.1 °C)]   Pulse:  [64-90]   Resp:  [16-18]   BP: ()/(51-75)   SpO2:  [95 %-100 %] .     Hypertension Medications at home            amLODIPine (NORVASC) 10 MG tablet Take 1 tablet (10 mg total) by mouth once daily.    cloNIDine (CATAPRES) 0.1 MG tablet Take 0.1 mg by mouth 3 (three) times daily.    lisinopril (PRINIVIL,ZESTRIL) 40 MG tablet Take 1 tablet (40 mg total) by mouth once daily.    metoprolol succinate (TOPROL-XL) 25 MG 24 hr tablet Take 12.5 mg by mouth nightly.     terazosin (HYTRIN) 1 MG capsule Take 2 mg by mouth every evening.        Will utilize p.r.n. blood pressure medication only if patient's blood pressure greater than  180/110 and he develops symptoms such as worsening chest pain or shortness of breath.        ETOH abuse  Continue folate and thiamine supplements.  Librium PRN.    Acquired hypothyroidism  Lab Results   Component Value Date    TSH 1.170 05/04/2020     No need to check another TSH.  Continue usual dose of Synthroid.        VTE Risk Mitigation (From admission, onward)         Ordered     IP VTE LOW RISK PATIENT  Once      07/01/20 1728                   Ry Duong MD  Department of Hospital Medicine   [facility]

## 2020-07-02 NOTE — ASSESSMENT & PLAN NOTE
UDS positive for amphetamines and THC.  He smokes pot daily.   patient on the consequences of regular recreational drug use.

## 2020-07-02 NOTE — ASSESSMENT & PLAN NOTE
Lab Results   Component Value Date    HGBA1C 6.0 02/23/2020     Will get another HGBA1C.  For now, monitor glucose levels on morning BMP panels.

## 2020-07-03 VITALS
TEMPERATURE: 98 F | HEART RATE: 64 BPM | WEIGHT: 180 LBS | OXYGEN SATURATION: 99 % | RESPIRATION RATE: 14 BRPM | DIASTOLIC BLOOD PRESSURE: 58 MMHG | SYSTOLIC BLOOD PRESSURE: 101 MMHG | BODY MASS INDEX: 28.19 KG/M2

## 2020-07-03 PROBLEM — E87.1 HYPONATREMIA: Status: RESOLVED | Noted: 2020-07-01 | Resolved: 2020-07-03

## 2020-07-03 PROBLEM — N17.9 ACUTE RENAL FAILURE: Status: RESOLVED | Noted: 2020-07-01 | Resolved: 2020-07-03

## 2020-07-03 LAB
ALBUMIN SERPL BCP-MCNC: 3.1 G/DL (ref 3.5–5.2)
ANION GAP SERPL CALC-SCNC: 8 MMOL/L (ref 8–16)
BASOPHILS # BLD AUTO: 0.03 K/UL (ref 0–0.2)
BASOPHILS NFR BLD: 0.7 % (ref 0–1.9)
BUN SERPL-MCNC: 21 MG/DL (ref 6–20)
CALCIUM SERPL-MCNC: 8.5 MG/DL (ref 8.7–10.5)
CHLORIDE SERPL-SCNC: 102 MMOL/L (ref 95–110)
CO2 SERPL-SCNC: 28 MMOL/L (ref 23–29)
CREAT SERPL-MCNC: 1.5 MG/DL (ref 0.5–1.4)
DIFFERENTIAL METHOD: ABNORMAL
EOSINOPHIL # BLD AUTO: 0.1 K/UL (ref 0–0.5)
EOSINOPHIL NFR BLD: 1.3 % (ref 0–8)
ERYTHROCYTE [DISTWIDTH] IN BLOOD BY AUTOMATED COUNT: 19.6 % (ref 11.5–14.5)
EST. GFR  (AFRICAN AMERICAN): >60 ML/MIN/1.73 M^2
EST. GFR  (NON AFRICAN AMERICAN): 58 ML/MIN/1.73 M^2
GLUCOSE SERPL-MCNC: 144 MG/DL (ref 70–110)
HCT VFR BLD AUTO: 32.6 % (ref 40–54)
HGB BLD-MCNC: 9.7 G/DL (ref 14–18)
IMM GRANULOCYTES # BLD AUTO: 0.01 K/UL (ref 0–0.04)
IMM GRANULOCYTES NFR BLD AUTO: 0.2 % (ref 0–0.5)
LYMPHOCYTES # BLD AUTO: 1.4 K/UL (ref 1–4.8)
LYMPHOCYTES NFR BLD: 30.7 % (ref 18–48)
MAGNESIUM SERPL-MCNC: 3 MG/DL (ref 1.6–2.6)
MCH RBC QN AUTO: 24.3 PG (ref 27–31)
MCHC RBC AUTO-ENTMCNC: 29.8 G/DL (ref 32–36)
MCV RBC AUTO: 82 FL (ref 82–98)
MONOCYTES # BLD AUTO: 0.5 K/UL (ref 0.3–1)
MONOCYTES NFR BLD: 11.1 % (ref 4–15)
NEUTROPHILS # BLD AUTO: 2.5 K/UL (ref 1.8–7.7)
NEUTROPHILS NFR BLD: 56 % (ref 38–73)
NRBC BLD-RTO: 0 /100 WBC
PHOSPHATE SERPL-MCNC: 1.8 MG/DL (ref 2.7–4.5)
PLATELET # BLD AUTO: 222 K/UL (ref 150–350)
PMV BLD AUTO: 9.9 FL (ref 9.2–12.9)
POTASSIUM SERPL-SCNC: 3.8 MMOL/L (ref 3.5–5.1)
RBC # BLD AUTO: 3.99 M/UL (ref 4.6–6.2)
SODIUM SERPL-SCNC: 138 MMOL/L (ref 136–145)
WBC # BLD AUTO: 4.5 K/UL (ref 3.9–12.7)

## 2020-07-03 PROCEDURE — 83735 ASSAY OF MAGNESIUM: CPT

## 2020-07-03 PROCEDURE — 85025 COMPLETE CBC W/AUTO DIFF WBC: CPT

## 2020-07-03 PROCEDURE — 80069 RENAL FUNCTION PANEL: CPT

## 2020-07-03 PROCEDURE — 36415 COLL VENOUS BLD VENIPUNCTURE: CPT

## 2020-07-03 PROCEDURE — 25000003 PHARM REV CODE 250: Performed by: INTERNAL MEDICINE

## 2020-07-03 PROCEDURE — 25000003 PHARM REV CODE 250: Performed by: HOSPITALIST

## 2020-07-03 RX ORDER — ASCORBIC ACID 500 MG
500 TABLET ORAL DAILY
Status: ON HOLD | COMMUNITY
Start: 2020-07-04 | End: 2020-10-12 | Stop reason: HOSPADM

## 2020-07-03 RX ORDER — FERROUS SULFATE 325(65) MG
325 TABLET, DELAYED RELEASE (ENTERIC COATED) ORAL DAILY
Refills: 0 | Status: ON HOLD | COMMUNITY
Start: 2020-07-03 | End: 2020-10-12 | Stop reason: HOSPADM

## 2020-07-03 RX ORDER — CLONIDINE HYDROCHLORIDE 0.1 MG/1
0.1 TABLET ORAL 2 TIMES DAILY PRN
Qty: 60 TABLET | Refills: 0 | Status: ON HOLD | OUTPATIENT
Start: 2020-07-03 | End: 2020-10-12 | Stop reason: HOSPADM

## 2020-07-03 RX ORDER — SODIUM,POTASSIUM PHOSPHATES 280-250MG
1 POWDER IN PACKET (EA) ORAL
Status: DISCONTINUED | OUTPATIENT
Start: 2020-07-03 | End: 2020-07-03 | Stop reason: HOSPADM

## 2020-07-03 RX ADMIN — CARBAMIDE PEROXIDE 6.5% 5 DROP: 6.5 LIQUID AURICULAR (OTIC) at 08:07

## 2020-07-03 RX ADMIN — SERTRALINE HYDROCHLORIDE 100 MG: 50 TABLET ORAL at 08:07

## 2020-07-03 RX ADMIN — AMOXICILLIN AND CLAVULANATE POTASSIUM 500 MG: 500; 125 TABLET, FILM COATED ORAL at 08:07

## 2020-07-03 RX ADMIN — SODIUM CHLORIDE: 0.9 INJECTION, SOLUTION INTRAVENOUS at 04:07

## 2020-07-03 RX ADMIN — FOLIC ACID 1 MG: 1 TABLET ORAL at 08:07

## 2020-07-03 RX ADMIN — PANTOPRAZOLE SODIUM 40 MG: 40 TABLET, DELAYED RELEASE ORAL at 08:07

## 2020-07-03 RX ADMIN — FERROUS SULFATE TAB EC 325 MG (65 MG FE EQUIVALENT) 325 MG: 325 (65 FE) TABLET DELAYED RESPONSE at 08:07

## 2020-07-03 RX ADMIN — OXYCODONE HYDROCHLORIDE AND ACETAMINOPHEN 500 MG: 500 TABLET ORAL at 08:07

## 2020-07-03 RX ADMIN — LEVOTHYROXINE SODIUM 50 MCG: 50 TABLET ORAL at 05:07

## 2020-07-03 RX ADMIN — THIAMINE HCL TAB 100 MG 100 MG: 100 TAB at 08:07

## 2020-07-03 NOTE — SUBJECTIVE & OBJECTIVE
Interval History: continues to complain of hearing loss in right ear.   Been going on for two weeks.  No ear pain.  He's been having difficulty maintaining good balance during that same time period.  Creatinine  Is less.  Has no new complaints.    Review of Systems   Constitutional: Negative for chills and fever.   Respiratory: Negative for cough, shortness of breath and wheezing.    Cardiovascular: Negative for chest pain and leg swelling.   Gastrointestinal: Negative for abdominal pain and nausea.     Objective:     Vital Signs (Most Recent):  Temp: 98.9 °F (37.2 °C) (07/02/20 1928)  Pulse: 66 (07/02/20 1928)  Resp: 16 (07/02/20 1928)  BP: (!) 99/54 (07/02/20 1928)  SpO2: 98 % (07/02/20 1928) Vital Signs (24h Range):  Temp:  [97.5 °F (36.4 °C)-98.9 °F (37.2 °C)] 98.9 °F (37.2 °C)  Pulse:  [66-84] 66  Resp:  [16-19] 16  SpO2:  [97 %-99 %] 98 %  BP: ()/(54-59) 99/54     Weight: 81.6 kg (180 lb)  Body mass index is 28.19 kg/m².    Intake/Output Summary (Last 24 hours) at 7/2/2020 2139  Last data filed at 7/2/2020 1900  Gross per 24 hour   Intake 1585 ml   Output 600 ml   Net 985 ml      Physical Exam  Vitals signs reviewed.   Constitutional:       General: He is not in acute distress.     Appearance: He is not diaphoretic.   HENT:      Right Ear: Decreased hearing noted. No tenderness. There is impacted cerumen.      Mouth/Throat:      Pharynx: No oropharyngeal exudate.   Eyes:      General: No scleral icterus.        Right eye: No discharge.         Left eye: No discharge.   Neck:      Vascular: No JVD.   Cardiovascular:      Rate and Rhythm: Normal rate and regular rhythm.   Pulmonary:      Effort: Pulmonary effort is normal.      Breath sounds: Normal breath sounds.   Abdominal:      General: Bowel sounds are normal. There is no distension.      Palpations: Abdomen is soft.      Tenderness: There is no abdominal tenderness.   Skin:     General: Skin is warm.      Findings: No rash.   Neurological:       Mental Status: He is alert.         Significant Labs: All pertinent labs within the past 24 hours have been reviewed.    Significant Imaging: I have reviewed all pertinent imaging results/findings within the past 24 hours.

## 2020-07-03 NOTE — PROGRESS NOTES
INPATIENT NEPHROLOGY CONSULT   [facility] NEPHROLOGY    Bradley Collado  07/03/2020    Reason for consultation:    Acute kidney injury    Chief Complaint:   Chief Complaint   Patient presents with    Weakness     weakness for approx 1 week, possible syncope this morning, intial BP per ems 80/40,           History of Present Illness:    Bradley Collado is a 40 y.o. male who presents to the ED via EMS with an onset of dizziness, weakness, and syncope x1.5 weeks. Per EMS, patient was hypotension upon their arrival (84/38). The patient reports multiple episodes of dizziness, weakness, and syncope over the last 1.5 weeks. The patient endures a laceration to the back of his head. Patient complains of headaches and neck pain secondary to hitting his head. The patient states he had hearing loss in his right ear x3 weeks ago, that was resolved. The patient denies any other symptoms at this time. PMHx of alcohol use, HTN, acute pancreatitis, thyroid disease, schizophrenia, and bipolar affective disorder. PSHx of cholecystectomy.      7/1 seen in the ER.  Pt still weak but nausea better.  Head ache.  No nausea, chest pain, sob, fever, urinary or bowel complaint, new neurologic symptoms, new joint pain,  7/2  Sleeping, no distress  7/3  Hypotensive most of the time, renal function much improved.  K+ 3.8, PO4 low, 1.8, will replete.  Added on Mg+ level.  Vit D, PO4 in am.  Sleeping.    Plan of Care:     Assessment:    Acute kidney injury likely due to intravascular volume depletion  --  ua with micro bland  --cpk normal  --urine sodium normal  --no nsaids, coxibs  --continue volume resuscitation  --hold lisinpril   --renal dose medication for crcl 10-50    Hyponatremia--likely hypovolemic  --volume resuscitation  --no hypotonic iv fluids  --trend sodium.   --on zoloft which can exacerbate hyponatremia.  Check urine osm      Volume depletion  --iv fluids    Mild anemia  --trend for now         Thank you for allowing us to  participate in this patient's care. We will continue to follow.    Vital Signs:  Temp Readings from Last 3 Encounters:   07/03/20 97.9 °F (36.6 °C) (Oral)   06/22/20 98 °F (36.7 °C) (Oral)   06/18/20 98.5 °F (36.9 °C) (Oral)       Pulse Readings from Last 3 Encounters:   07/03/20 67   06/22/20 80   06/18/20 72       BP Readings from Last 3 Encounters:   07/03/20 117/73   06/22/20 (!) 145/97   06/18/20 122/76       Weight:  Wt Readings from Last 3 Encounters:   07/01/20 81.6 kg (180 lb)   06/22/20 81.6 kg (180 lb)   06/17/20 90.7 kg (200 lb)       Past Medical & Surgical History:  Past Medical History:   Diagnosis Date    Anxiety     Anxiety     Bipolar affective disorder     Depression     Hypertension     Pancreatitis     Schizophrenia     Thyroid disease        Past Surgical History:   Procedure Laterality Date    LAPAROSCOPIC CHOLECYSTECTOMY N/A 3/9/2020    Procedure: CHOLECYSTECTOMY, LAPAROSCOPIC;  Surgeon: Trenton Deshpande MD;  Location: [facility];  Service: General;  Laterality: N/A;       Past Social History:  Social History     Socioeconomic History    Marital status: Single     Spouse name: Not on file    Number of children: Not on file    Years of education: Not on file    Highest education level: Not on file   Occupational History    Not on file   Social Needs    Financial resource strain: Not on file    Food insecurity     Worry: Not on file     Inability: Not on file    Transportation needs     Medical: Not on file     Non-medical: Not on file   Tobacco Use    Smoking status: Current Some Day Smoker     Packs/day: 0.25     Years: 7.00     Pack years: 1.75    Smokeless tobacco: Never Used   Substance and Sexual Activity    Alcohol use: Yes     Alcohol/week: 2.0 standard drinks     Types: 2 Glasses of wine per week     Comment: occasionally    Drug use: Yes     Frequency: 7.0 times per week     Types: Marijuana    Sexual activity: Yes     Partners: Female   Lifestyle    Physical  activity     Days per week: Not on file     Minutes per session: Not on file    Stress: Not on file   Relationships    Social connections     Talks on phone: Not on file     Gets together: Not on file     Attends Alevism service: Not on file     Active member of club or organization: Not on file     Attends meetings of clubs or organizations: Not on file     Relationship status: Not on file   Other Topics Concern    Not on file   Social History Narrative    Not on file       Medications:  No current facility-administered medications on file prior to encounter.      Current Outpatient Medications on File Prior to Encounter   Medication Sig Dispense Refill    amLODIPine (NORVASC) 10 MG tablet Take 1 tablet (10 mg total) by mouth once daily. 30 tablet 4    cloNIDine (CATAPRES) 0.1 MG tablet Take 0.1 mg by mouth 3 (three) times daily.      folic acid (FOLVITE) 1 MG tablet Take 1 tablet (1 mg total) by mouth once daily. 30 tablet 3    gabapentin (NEURONTIN) 300 MG capsule Take 1 capsule (300 mg total) by mouth 3 (three) times daily. 90 capsule 1    levothyroxine (SYNTHROID) 50 MCG tablet Take 1 tablet (50 mcg total) by mouth before breakfast. 30 tablet 3    lisinopril (PRINIVIL,ZESTRIL) 40 MG tablet Take 1 tablet (40 mg total) by mouth once daily. 90 tablet 1    metoprolol succinate (TOPROL-XL) 25 MG 24 hr tablet Take 12.5 mg by mouth nightly.       mirtazapine (REMERON) 45 MG tablet Take 45 mg by mouth every evening.      pantoprazole (PROTONIX) 40 MG tablet Take 40 mg by mouth once daily.      promethazine (PHENERGAN) 25 MG tablet Take 1 tablet (25 mg total) by mouth every 6 (six) hours as needed for Nausea. 15 tablet 0    QUEtiapine (SEROQUEL) 300 MG Tab Take 300 mg by mouth nightly.      sertraline (ZOLOFT) 100 MG tablet Take 100 mg by mouth once daily.      terazosin (HYTRIN) 1 MG capsule Take 2 mg by mouth every evening.      ondansetron (ZOFRAN) 8 MG tablet Take 1 tablet (8 mg total) by mouth  every 8 (eight) hours as needed for Nausea. 12 tablet 0    ondansetron (ZOFRAN-ODT) 4 MG TbDL Take 1 tablet (4 mg total) by mouth every 8 (eight) hours as needed. 12 tablet 0     Scheduled Meds:  Continuous Infusions:  PRN Meds:.    Allergies:  Patient has no known allergies.    Past Family History:  Reviewed; refer to Hospitalist Admission Note    Review of Systems:  Review of Systems - All 14 systems reviewed and negative, except as noted in HPI    Physical Exam:    /73 (BP Location: Right arm, Patient Position: Lying)   Pulse 67   Temp 97.9 °F (36.6 °C) (Oral)   Resp 17   Wt 81.6 kg (180 lb)   SpO2 99%   BMI 28.19 kg/m²     General Appearance:    Alert, cooperative, no distress, appears stated age   Head:    Normocephalic, without obvious abnormality, atraumatic   Eyes:    PER, conjunctiva/corneas clear, EOM's intact in both eyes        Throat:   Lips, mucosa, and tongue normal; teeth and gums normal   Back:     Symmetric, no curvature, ROM normal, no CVA tenderness   Lungs:     Clear to auscultation bilaterally, respirations unlabored   Chest wall:    No tenderness or deformity   Heart:    Regular rate and rhythm, S1 and S2 normal, no murmur, rub   or gallop   Abdomen:     Soft, non-tender, bowel sounds active all four quadrants,     no masses, no organomegaly   Extremities:   Extremities normal, atraumatic, no cyanosis or edema   Pulses:   2+ and symmetric all extremities   MSK:   No joint or muscle swelling, tenderness or deformity   Skin:   Skin color, texture, turgor normal, no rashes or lesions   Neurologic:   CNII-XII intact, normal strength and sensation       Throughout.  No flap     Results:  Lab Results   Component Value Date     07/03/2020    K 3.8 07/03/2020     07/03/2020    CO2 28 07/03/2020    BUN 21 (H) 07/03/2020    CREATININE 1.5 (H) 07/03/2020    CALCIUM 8.5 (L) 07/03/2020    ANIONGAP 8 07/03/2020    ESTGFRAFRICA >60 07/03/2020    EGFRNONAA 58 (A) 07/03/2020       Lab  Results   Component Value Date    CALCIUM 8.5 (L) 07/03/2020    PHOS 1.8 (L) 07/03/2020       Recent Labs   Lab 07/03/20  0450   WBC 4.50   RBC 3.99*   HGB 9.7*   HCT 32.6*      MCV 82   MCH 24.3*   MCHC 29.8*       I have personally reviewed pertinent radiological imaging and reports.

## 2020-07-03 NOTE — ASSESSMENT & PLAN NOTE
Severe.  Today showing improvement.  Continue IV crystalloid.  Avoid NSAID's.  Avoid contrast.  Keep MAP > 55.  Kidney US.  UA and microscopy.  Calculated FENa --> it is 1%.  Consult nephrologist.    Lab Results   Component Value Date    CREATININE 4.1 (H) 07/02/2020     Lab Results   Component Value Date    BUN 39 (H) 07/02/2020

## 2020-07-03 NOTE — DISCHARGE INSTRUCTIONS
Stop taking all your blood pressure medicines except for clonidine:    If the top number of your blood pressure is over 190 or the bottom number is over 110, take one clonidine tablet.    If your blood pressure keep staying high (top number over 190), call your primary provider to see if she recommends adding back some of your BP meds.

## 2020-07-03 NOTE — PROGRESS NOTES
[facility] Hospital Medicine  Progress Note    Patient Name: Bradley Collado  MRN: [id number]  Patient Class: IP- Inpatient   Admission Date: 7/1/2020  Length of Stay: 1 days  Attending Physician: Ry Duong MD  Primary Care Provider: Arlen Belcher NP        Subjective:     Principal Problem:Acute renal failure        HPI:  Patient presented to our ED today complaining of multiple things.  He has had lightheadedness and even episodes of blacing out for the past few weeks.  The episodes led to falls and lacerations on multiple parts of his body, which includes his head.  He's had nausea and vomiting.  Also poor appetite and fatigue.  No chest pain.  No fever.  No trouble breathing.   He has history of alcohol abuse, multiple episodes of pancreatitis, bipolar disorder, and HTN.    Overview/Hospital Course:  No notes on file    Interval History: continues to complain of hearing loss in right ear.   Been going on for two weeks.  No ear pain.  He's been having difficulty maintaining good balance during that same time period.  Creatinine  Is less.  Has no new complaints.    Review of Systems   Constitutional: Negative for chills and fever.   Respiratory: Negative for cough, shortness of breath and wheezing.    Cardiovascular: Negative for chest pain and leg swelling.   Gastrointestinal: Negative for abdominal pain and nausea.     Objective:     Vital Signs (Most Recent):  Temp: 98.9 °F (37.2 °C) (07/02/20 1928)  Pulse: 66 (07/02/20 1928)  Resp: 16 (07/02/20 1928)  BP: (!) 99/54 (07/02/20 1928)  SpO2: 98 % (07/02/20 1928) Vital Signs (24h Range):  Temp:  [97.5 °F (36.4 °C)-98.9 °F (37.2 °C)] 98.9 °F (37.2 °C)  Pulse:  [66-84] 66  Resp:  [16-19] 16  SpO2:  [97 %-99 %] 98 %  BP: ()/(54-59) 99/54     Weight: 81.6 kg (180 lb)  Body mass index is 28.19 kg/m².    Intake/Output Summary (Last 24 hours) at 7/2/2020 2139  Last data filed at 7/2/2020 1900  Gross per 24 hour   Intake 1585 ml    Output 600 ml   Net 985 ml      Physical Exam  Vitals signs reviewed.   Constitutional:       General: He is not in acute distress.     Appearance: He is not diaphoretic.   HENT:      Right Ear: Decreased hearing noted. No tenderness. There is impacted cerumen.      Mouth/Throat:      Pharynx: No oropharyngeal exudate.   Eyes:      General: No scleral icterus.        Right eye: No discharge.         Left eye: No discharge.   Neck:      Vascular: No JVD.   Cardiovascular:      Rate and Rhythm: Normal rate and regular rhythm.   Pulmonary:      Effort: Pulmonary effort is normal.      Breath sounds: Normal breath sounds.   Abdominal:      General: Bowel sounds are normal. There is no distension.      Palpations: Abdomen is soft.      Tenderness: There is no abdominal tenderness.   Skin:     General: Skin is warm.      Findings: No rash.   Neurological:      Mental Status: He is alert.         Significant Labs: All pertinent labs within the past 24 hours have been reviewed.    Significant Imaging: I have reviewed all pertinent imaging results/findings within the past 24 hours.      Assessment/Plan:      * Acute renal failure  Severe.  Today showing improvement.  Continue IV crystalloid.  Avoid NSAID's.  Avoid contrast.  Keep MAP > 55.  Kidney US.  UA and microscopy.  Calculated FENa --> it is 1%.  Consult nephrologist.    Lab Results   Component Value Date    CREATININE 4.1 (H) 07/02/2020     Lab Results   Component Value Date    BUN 39 (H) 07/02/2020         Impacted cerumen, right ear  Seen on otoscopic exam.  Will have nurses instill Debrox in the ear tonight and in the morning.  Then afterwards, they will irrigate the ear and hopefully remove the cerumen.      Iron deficiency anemia  Patient's anemia is currently controlled. Has not received any PRBCs to date.. Etiology likely d/t iron def.  Current CBC reviewed-   Lab Results   Component Value Date    HGB 10.6 (L) 07/02/2020    HCT 34.1 (L) 07/02/2020     Monitor  serial CBC and transfuse if patient becomes hemodynamically unstable, symptomatic or H/H drops below 7/21.         Recreational drug use  UDS positive for amphetamines and THC.  He smokes pot daily.   patient on the consequences of regular recreational drug use.      Hyponatremia  Hypovolemic in type.  Will monitor while getting saline solution IV.    Sodium   Date Value Ref Range Status   07/02/2020 133 (L) 136 - 145 mmol/L Final         Hyperglycemia  Lab Results   Component Value Date    HGBA1C 5.8 (H) 07/02/2020     A1C shows very good glycemic control.  For now, monitor glucose levels on morning BMP panels.    Benign essential HTN  Chronic, controlled.  Latest blood pressure and vitals reviewed-   Temp:  [97.5 °F (36.4 °C)-98.9 °F (37.2 °C)]   Pulse:  [66-84]   Resp:  [16-19]   BP: ()/(54-59)   SpO2:  [97 %-99 %] .     Hypertension Medications at home            amLODIPine (NORVASC) 10 MG tablet Take 1 tablet (10 mg total) by mouth once daily.    cloNIDine (CATAPRES) 0.1 MG tablet Take 0.1 mg by mouth 3 (three) times daily.    lisinopril (PRINIVIL,ZESTRIL) 40 MG tablet Take 1 tablet (40 mg total) by mouth once daily.    metoprolol succinate (TOPROL-XL) 25 MG 24 hr tablet Take 12.5 mg by mouth nightly.     terazosin (HYTRIN) 1 MG capsule Take 2 mg by mouth every evening.        Will utilize p.r.n. blood pressure medication only if patient's blood pressure greater than  180/110 and he develops symptoms such as worsening chest pain or shortness of breath.        ETOH abuse  Continue folate and thiamine supplements.  Librium PRN.    Acquired hypothyroidism  Lab Results   Component Value Date    TSH 1.170 05/04/2020     No need to check another TSH.  Continue usual dose of Synthroid.        VTE Risk Mitigation (From admission, onward)         Ordered     IP VTE LOW RISK PATIENT  Once      07/01/20 1728                      Ry Duong MD  Department of Hospital Medicine   [facility]

## 2020-07-03 NOTE — ASSESSMENT & PLAN NOTE
Lab Results   Component Value Date    HGBA1C 5.8 (H) 07/02/2020     A1C shows very good glycemic control.  For now, monitor glucose levels on morning BMP panels.

## 2020-07-03 NOTE — ASSESSMENT & PLAN NOTE
Seen on otoscopic exam.  Will have nurses instill Debrox in the ear tonight and in the morning.  Then afterwards, they will irrigate the ear and hopefully remove the cerumen.

## 2020-07-03 NOTE — PLAN OF CARE
AAOx4. Neuro checks completed. Plan of care reviewed with patient. Safety maintained throughout shift. Bed locked and low, SRx2, call light within reach. IVF infusing per orders No c/o pain, dizziness, weakness. Ambulated to bathroom with stand by, patient steady. Ear drops instilled per orders. Hourly/q2 rounding completed this shift for pt safety. Will continue to monitor.

## 2020-07-03 NOTE — ASSESSMENT & PLAN NOTE
Hypovolemic in type.  Will monitor while getting saline solution IV.    Sodium   Date Value Ref Range Status   07/02/2020 133 (L) 136 - 145 mmol/L Final

## 2020-07-03 NOTE — ASSESSMENT & PLAN NOTE
Chronic, controlled.  Latest blood pressure and vitals reviewed-   Temp:  [97.5 °F (36.4 °C)-98.9 °F (37.2 °C)]   Pulse:  [66-84]   Resp:  [16-19]   BP: ()/(54-59)   SpO2:  [97 %-99 %] .     Hypertension Medications at home            amLODIPine (NORVASC) 10 MG tablet Take 1 tablet (10 mg total) by mouth once daily.    cloNIDine (CATAPRES) 0.1 MG tablet Take 0.1 mg by mouth 3 (three) times daily.    lisinopril (PRINIVIL,ZESTRIL) 40 MG tablet Take 1 tablet (40 mg total) by mouth once daily.    metoprolol succinate (TOPROL-XL) 25 MG 24 hr tablet Take 12.5 mg by mouth nightly.     terazosin (HYTRIN) 1 MG capsule Take 2 mg by mouth every evening.        Will utilize p.r.n. blood pressure medication only if patient's blood pressure greater than  180/110 and he develops symptoms such as worsening chest pain or shortness of breath.

## 2020-07-03 NOTE — PLAN OF CARE
07/03/20 1152   Final Note   Assessment Type Final Discharge Note   Anticipated Discharge Disposition Home     Per Dr Duong, an outpt referral to ENT Dr Guevara ([place]) has been made for removal of earwax.

## 2020-07-03 NOTE — ASSESSMENT & PLAN NOTE
Patient's anemia is currently controlled. Has not received any PRBCs to date.. Etiology likely d/t iron def.  Current CBC reviewed-   Lab Results   Component Value Date    HGB 10.6 (L) 07/02/2020    HCT 34.1 (L) 07/02/2020     Monitor serial CBC and transfuse if patient becomes hemodynamically unstable, symptomatic or H/H drops below 7/21.

## 2020-07-04 NOTE — HOSPITAL COURSE
He was diagnosed with acute kidney injury.  Might have been secondary to intravascular volume depletion from dehydration.  He was given crystalloid infusion.  Urine output picked up.  Creatinine went down.  Nephrologist consulted with us on management and workup.  Kidney US was negative for hydronephrosis or abnormal bladder distension.  Pt complained of fullness and lack of hearing in his right ear.  hospitalist performed otoscopic exam and noted impacted cerumen.  Attempts were made to irrigate the ear with saline after instilling Debrox, but there was no success.  The patient's kidney function returned to normal.  On discharge, I ordered a referral to ENT for impacted cerumen.    Physical Exam  Vitals signs reviewed.   Constitutional:       General: He is not in acute distress.     Appearance: He is not diaphoretic.   HENT:      Right Ear: Decreased hearing noted. No tenderness. There is impacted cerumen.      Mouth/Throat:      Pharynx: No oropharyngeal exudate.   Eyes:      General: No scleral icterus.        Right eye: No discharge.         Left eye: No discharge.   Neck:      Vascular: No JVD.   Cardiovascular:      Rate and Rhythm: Normal rate and regular rhythm.

## 2020-07-04 NOTE — DISCHARGE SUMMARY
[facility] Hospital Medicine  Discharge Summary      Patient Name: Bradley Collado  MRN: [id number]  Admission Date: 7/1/2020  Hospital Length of Stay: 2 days  Discharge Date and Time: 7/3/2020  2:00 PM  Attending Physician: No att. providers found   Discharging Provider: Ry Duong MD  Primary Care Provider: Arlen Belcher NP      HPI:   Patient presented to our ED today complaining of multiple things.  He has had lightheadedness and even episodes of blacing out for the past few weeks.  The episodes led to falls and lacerations on multiple parts of his body, which includes his head.  He's had nausea and vomiting.  Also poor appetite and fatigue.  No chest pain.  No fever.  No trouble breathing.   He has history of alcohol abuse, multiple episodes of pancreatitis, bipolar disorder, and HTN.    * No surgery found *      Hospital Course:   He was diagnosed with acute kidney injury.  Might have been secondary to intravascular volume depletion from dehydration.  He was given crystalloid infusion.  Urine output picked up.  Creatinine went down.  Nephrologist consulted with us on management and workup.  Kidney US was negative for hydronephrosis or abnormal bladder distension.  Pt complained of fullness and lack of hearing in his right ear.  hospitalist performed otoscopic exam and noted impacted cerumen.  Attempts were made to irrigate the ear with saline after instilling Debrox, but there was no success.  The patient's kidney function returned to normal.  On discharge, I ordered a referral to ENT for impacted cerumen.    Physical Exam  Vitals signs reviewed.   Constitutional:       General: He is not in acute distress.     Appearance: He is not diaphoretic.   HENT:      Right Ear: Decreased hearing noted. No tenderness. There is impacted cerumen.      Mouth/Throat:      Pharynx: No oropharyngeal exudate.   Eyes:      General: No scleral icterus.        Right eye: No discharge.         Left  eye: No discharge.   Neck:      Vascular: No JVD.   Cardiovascular:      Rate and Rhythm: Normal rate and regular rhythm.     Consults:     No new Assessment & Plan notes have been filed under this hospital service since the last note was generated.  Service: Hospital Medicine    Final Active Diagnoses:    Diagnosis Date Noted POA    Impacted cerumen, right ear [H61.21] 07/02/2020 Yes    Hyperglycemia [R73.9] 07/01/2020 Yes    Recreational drug use [F19.90] 07/01/2020 Yes    Iron deficiency anemia [D50.9] 07/01/2020 Yes    Benign essential HTN [I10] 03/26/2017 Yes    Acquired hypothyroidism [E03.9] 04/05/2016 Yes    ETOH abuse [F10.10] 04/05/2016 Yes      Problems Resolved During this Admission:    Diagnosis Date Noted Date Resolved POA    PRINCIPAL PROBLEM:  Acute renal failure [N17.9] 07/01/2020 07/03/2020 Yes    Hyponatremia [E87.1] 07/01/2020 07/03/2020 Yes       Discharged Condition: good    Disposition: Home or Self Care    Follow Up:  Follow-up Information     Arlen Belcher NP In 2 weeks.    Specialty: Family Medicine  Why: to check up on you after being in the hospital  Contact information:  [address] [facility] [address]  [phone]             Ld Patel MD In 2 weeks.    Specialty: Otolaryngology  Why: He is an ENT who sees Medicaid patients.  Can help you with the ear wax problem.  Contact information:  [address] [place] [address]  [phone]                 Patient Instructions:      Ambulatory referral/consult to ENT   Standing Status: Future   Referral Priority: Routine Referral Type: Consultation   Referral Reason: Specialty Services Required   Referred to Provider: LD PATEL Requested Specialty: Otolaryngology   Number of Visits Requested: 1     Diet Adult Regular     Activity as tolerated       Significant Diagnostic Studies:   Creatinine   Date Value Ref Range Status   07/03/2020 1.5 (H) 0.5 - 1.4 mg/dL Final    07/02/2020 4.1 (H) 0.5 - 1.4 mg/dL Final   07/01/2020 9.7 (H) 0.5 - 1.4 mg/dL Final   12/09/2012 1.4 0.5 - 1.4 mg/dL Final     Lab Results   Component Value Date    WBC 4.50 07/03/2020    HGB 9.7 (L) 07/03/2020    HCT 32.6 (L) 07/03/2020    MCV 82 07/03/2020     07/03/2020         CPK   Date Value Ref Range Status   07/02/2020 101 20 - 200 U/L Final       Pending Diagnostic Studies:     None         Medications:  Reconciled Home Medications:      Medication List      START taking these medications    ascorbic acid (vitamin C) 500 MG tablet  Commonly known as: VITAMIN C  Take 1 tablet (500 mg total) by mouth once daily.  Start taking on: July 4, 2020     ferrous sulfate 325 (65 FE) MG EC tablet  Take 1 tablet (325 mg total) by mouth once daily.        CHANGE how you take these medications    cloNIDine 0.1 MG tablet  Commonly known as: CATAPRES  Take 1 tablet (0.1 mg total) by mouth 2 (two) times daily as needed (if BP is high.  Top number > 190 and/or Bottom number > 110.).  What changed:   · when to take this  · reasons to take this        CONTINUE taking these medications    folic acid 1 MG tablet  Commonly known as: FOLVITE  Take 1 tablet (1 mg total) by mouth once daily.     gabapentin 300 MG capsule  Commonly known as: NEURONTIN  Take 1 capsule (300 mg total) by mouth 3 (three) times daily.     levothyroxine 50 MCG tablet  Commonly known as: SYNTHROID  Take 1 tablet (50 mcg total) by mouth before breakfast.     mirtazapine 45 MG tablet  Commonly known as: REMERON  Take 45 mg by mouth every evening.     ondansetron 4 MG Tbdl  Commonly known as: ZOFRAN-ODT  Take 1 tablet (4 mg total) by mouth every 8 (eight) hours as needed.     ondansetron 8 MG tablet  Commonly known as: ZOFRAN  Take 1 tablet (8 mg total) by mouth every 8 (eight) hours as needed for Nausea.     pantoprazole 40 MG tablet  Commonly known as: PROTONIX  Take 40 mg by mouth once daily.     promethazine 25 MG tablet  Commonly known as:  PHENERGAN  Take 1 tablet (25 mg total) by mouth every 6 (six) hours as needed for Nausea.     QUEtiapine 300 MG Tab  Commonly known as: SEROQUEL  Take 300 mg by mouth nightly.     sertraline 100 MG tablet  Commonly known as: ZOLOFT  Take 100 mg by mouth once daily.        STOP taking these medications    amLODIPine 10 MG tablet  Commonly known as: NORVASC     ARIPiprazole 10 MG Tab  Commonly known as: ABILIFY     HYDROcodone-acetaminophen  mg per tablet  Commonly known as: NORCO     lisinopriL 40 MG tablet  Commonly known as: PRINIVIL,ZESTRIL     metoprolol succinate 25 MG 24 hr tablet  Commonly known as: TOPROL-XL     naloxone 4 mg/actuation Spry  Commonly known as: NARCAN     terazosin 1 MG capsule  Commonly known as: HYTRIN     thiamine 100 MG tablet            Indwelling Lines/Drains at time of discharge:   Lines/Drains/Airways     None                 Time spent on the discharge of patient: 25 minutes  Patient was seen and examined on the date of discharge and determined to be suitable for discharge.         Ry Duong MD  Department of Hospital Medicine  [facility]

## 2020-07-06 ENCOUNTER — TELEPHONE (OUTPATIENT)
Dept: MEDSURG UNIT | Facility: HOSPITAL | Age: 40
End: 2020-07-06

## 2020-10-04 ENCOUNTER — HOSPITAL ENCOUNTER (EMERGENCY)
Facility: HOSPITAL | Age: 40
Discharge: PSYCHIATRIC OR SUBSTANCE USE FACILITY | End: 2020-10-05
Attending: EMERGENCY MEDICINE
Payer: MEDICAID

## 2020-10-04 DIAGNOSIS — F20.3 UNDIFFERENTIATED SCHIZOPHRENIA: Primary | ICD-10-CM

## 2020-10-04 DIAGNOSIS — R45.850 HOMICIDAL IDEATION: ICD-10-CM

## 2020-10-04 DIAGNOSIS — F10.920 ALCOHOLIC INTOXICATION WITHOUT COMPLICATION: ICD-10-CM

## 2020-10-04 DIAGNOSIS — R45.851 SUICIDAL IDEATION: ICD-10-CM

## 2020-10-04 LAB
ALBUMIN SERPL BCP-MCNC: 4.4 G/DL (ref 3.5–5.2)
ALP SERPL-CCNC: 96 U/L (ref 55–135)
ALT SERPL W/O P-5'-P-CCNC: 25 U/L (ref 10–44)
ANION GAP SERPL CALC-SCNC: 19 MMOL/L (ref 8–16)
APAP SERPL-MCNC: <10 UG/ML (ref 10–20)
AST SERPL-CCNC: 39 U/L (ref 10–40)
BASOPHILS # BLD AUTO: 0.04 K/UL (ref 0–0.2)
BASOPHILS NFR BLD: 0.7 % (ref 0–1.9)
BILIRUB SERPL-MCNC: 0.4 MG/DL (ref 0.1–1)
BUN SERPL-MCNC: 8 MG/DL (ref 6–20)
CALCIUM SERPL-MCNC: 9.1 MG/DL (ref 8.7–10.5)
CHLORIDE SERPL-SCNC: 99 MMOL/L (ref 95–110)
CO2 SERPL-SCNC: 26 MMOL/L (ref 23–29)
CREAT SERPL-MCNC: 1.2 MG/DL (ref 0.5–1.4)
DIFFERENTIAL METHOD: ABNORMAL
EOSINOPHIL # BLD AUTO: 0 K/UL (ref 0–0.5)
EOSINOPHIL NFR BLD: 0.2 % (ref 0–8)
ERYTHROCYTE [DISTWIDTH] IN BLOOD BY AUTOMATED COUNT: 18.4 % (ref 11.5–14.5)
EST. GFR  (AFRICAN AMERICAN): >60 ML/MIN/1.73 M^2
EST. GFR  (NON AFRICAN AMERICAN): >60 ML/MIN/1.73 M^2
ETHANOL SERPL-MCNC: 422 MG/DL
GLUCOSE SERPL-MCNC: 128 MG/DL (ref 70–110)
HCT VFR BLD AUTO: 43.4 % (ref 40–54)
HGB BLD-MCNC: 14 G/DL (ref 14–18)
IMM GRANULOCYTES # BLD AUTO: 0.01 K/UL (ref 0–0.04)
IMM GRANULOCYTES NFR BLD AUTO: 0.2 % (ref 0–0.5)
LYMPHOCYTES # BLD AUTO: 1.7 K/UL (ref 1–4.8)
LYMPHOCYTES NFR BLD: 30.1 % (ref 18–48)
MCH RBC QN AUTO: 25.1 PG (ref 27–31)
MCHC RBC AUTO-ENTMCNC: 32.3 G/DL (ref 32–36)
MCV RBC AUTO: 78 FL (ref 82–98)
MONOCYTES # BLD AUTO: 0.4 K/UL (ref 0.3–1)
MONOCYTES NFR BLD: 6.9 % (ref 4–15)
NEUTROPHILS # BLD AUTO: 3.5 K/UL (ref 1.8–7.7)
NEUTROPHILS NFR BLD: 61.9 % (ref 38–73)
NRBC BLD-RTO: 0 /100 WBC
PLATELET # BLD AUTO: 265 K/UL (ref 150–350)
PMV BLD AUTO: 10.2 FL (ref 9.2–12.9)
POTASSIUM SERPL-SCNC: 3.6 MMOL/L (ref 3.5–5.1)
PROT SERPL-MCNC: 7.6 G/DL (ref 6–8.4)
RBC # BLD AUTO: 5.58 M/UL (ref 4.6–6.2)
SALICYLATES SERPL-MCNC: <4 MG/DL (ref 15–30)
SARS-COV-2 RDRP RESP QL NAA+PROBE: NEGATIVE
SODIUM SERPL-SCNC: 144 MMOL/L (ref 136–145)
TSH SERPL DL<=0.005 MIU/L-ACNC: 1.85 UIU/ML (ref 0.34–5.6)
WBC # BLD AUTO: 5.69 K/UL (ref 3.9–12.7)

## 2020-10-04 PROCEDURE — 80320 DRUG SCREEN QUANTALCOHOLS: CPT

## 2020-10-04 PROCEDURE — U0002 COVID-19 LAB TEST NON-CDC: HCPCS

## 2020-10-04 PROCEDURE — 80053 COMPREHEN METABOLIC PANEL: CPT

## 2020-10-04 PROCEDURE — 99285 EMERGENCY DEPT VISIT HI MDM: CPT | Mod: 25

## 2020-10-04 PROCEDURE — 84443 ASSAY THYROID STIM HORMONE: CPT

## 2020-10-04 PROCEDURE — 96372 THER/PROPH/DIAG INJ SC/IM: CPT | Mod: 59

## 2020-10-04 PROCEDURE — 63600175 PHARM REV CODE 636 W HCPCS: Performed by: EMERGENCY MEDICINE

## 2020-10-04 PROCEDURE — 36415 COLL VENOUS BLD VENIPUNCTURE: CPT

## 2020-10-04 PROCEDURE — 80307 DRUG TEST PRSMV CHEM ANLYZR: CPT

## 2020-10-04 PROCEDURE — 85025 COMPLETE CBC W/AUTO DIFF WBC: CPT

## 2020-10-04 RX ORDER — LORAZEPAM 2 MG/ML
2 INJECTION INTRAMUSCULAR
Status: COMPLETED | OUTPATIENT
Start: 2020-10-04 | End: 2020-10-04

## 2020-10-04 RX ORDER — DIPHENHYDRAMINE HYDROCHLORIDE 50 MG/ML
50 INJECTION INTRAMUSCULAR; INTRAVENOUS
Status: COMPLETED | OUTPATIENT
Start: 2020-10-04 | End: 2020-10-04

## 2020-10-04 RX ORDER — HALOPERIDOL 5 MG/ML
5 INJECTION INTRAMUSCULAR
Status: COMPLETED | OUTPATIENT
Start: 2020-10-04 | End: 2020-10-04

## 2020-10-04 RX ADMIN — LORAZEPAM 2 MG: 2 INJECTION INTRAMUSCULAR; INTRAVENOUS at 04:10

## 2020-10-04 RX ADMIN — DIPHENHYDRAMINE HYDROCHLORIDE 50 MG: 50 INJECTION, SOLUTION INTRAMUSCULAR; INTRAVENOUS at 05:10

## 2020-10-04 RX ADMIN — HALOPERIDOL 5 MG: 5 INJECTION INTRAMUSCULAR at 04:10

## 2020-10-04 NOTE — ED NOTES
Pt is very agitates banging on the door. Dr. Stauffer notified and meds ordered. Sitter at the bediside with direct visual observation.

## 2020-10-04 NOTE — ED PROVIDER NOTES
Encounter Date: 10/4/2020       History     Chief Complaint   Patient presents with    Psychiatric Evaluation     40-year-old male with history of bipolar disorder, schizophrenia, anxiety, depression, hypertension, alcohol abuse.  Patient here under OPC after family states that he verbalized suicidal homicidal threats towards his grandmother.  Per OPC patient did verbalize wanting to harm himself as well.  Patient states he is extremely upset because he just recently lost custody of his children.  Patient feels a he has nothing left to live for.  He is upset acting out, states he this does not care about anything anymore.        Review of patient's allergies indicates:  No Known Allergies  Past Medical History:   Diagnosis Date    Anxiety     Anxiety     Bipolar affective disorder     Depression     Hypertension     Pancreatitis     Schizophrenia     Thyroid disease      Past Surgical History:   Procedure Laterality Date    LAPAROSCOPIC CHOLECYSTECTOMY N/A 3/9/2020    Procedure: CHOLECYSTECTOMY, LAPAROSCOPIC;  Surgeon: Trenton Deshpande MD;  Location: [facility];  Service: General;  Laterality: N/A;     Family History   Problem Relation Age of Onset    Diabetes Maternal Grandmother     Hypertension Maternal Grandfather     Cancer Paternal Grandfather      Social History     Tobacco Use    Smoking status: Current Some Day Smoker     Packs/day: 0.25     Years: 7.00     Pack years: 1.75    Smokeless tobacco: Never Used   Substance Use Topics    Alcohol use: Yes     Alcohol/week: 2.0 standard drinks     Types: 2 Glasses of wine per week     Comment: occasionally    Drug use: Yes     Frequency: 7.0 times per week     Types: Marijuana     Review of Systems   Constitutional: Negative for fever.   HENT: Negative for sore throat.    Respiratory: Negative for shortness of breath.    Cardiovascular: Negative for chest pain.   Gastrointestinal: Negative for nausea.   Genitourinary: Negative for dysuria.    Musculoskeletal: Negative for back pain.   Skin: Negative for rash.   Neurological: Negative for weakness.   Hematological: Does not bruise/bleed easily.   Psychiatric/Behavioral: Positive for agitation and suicidal ideas. Negative for self-injury. The patient is not nervous/anxious.         Homicidal ideation       Physical Exam     Initial Vitals [10/04/20 1631]   BP Pulse Resp Temp SpO2   (!) 159/89 90 17 96.8 °F (36 °C) 100 %      MAP       --         Physical Exam    Nursing note and vitals reviewed.  Constitutional:   Patient with alcohol on breath.   HENT:   Head: Normocephalic and atraumatic.   Nose: Nose normal.   Mouth/Throat: Oropharynx is clear and moist.   Eyes: Conjunctivae and EOM are normal. Pupils are equal, round, and reactive to light. No scleral icterus.   Neck: Normal range of motion. Neck supple.   Cardiovascular: Normal rate, regular rhythm, normal heart sounds and intact distal pulses. Exam reveals no gallop and no friction rub.    No murmur heard.  Pulmonary/Chest: No stridor. No respiratory distress.   course bilateral breath sounds no adventitious sounds   Abdominal: Soft. Bowel sounds are normal. He exhibits no mass. There is no abdominal tenderness. There is no rebound and no guarding.   Musculoskeletal: Normal range of motion. No edema.   Lymphadenopathy:     He has no cervical adenopathy.   Neurological: He is alert and oriented to person, place, and time. He has normal strength and normal reflexes. No cranial nerve deficit or sensory deficit. GCS score is 15. GCS eye subscore is 4. GCS verbal subscore is 5. GCS motor subscore is 6.   Skin: Skin is warm and dry. Capillary refill takes less than 2 seconds. No rash noted.   Psychiatric: He has a normal mood and affect. His behavior is normal. Judgment and thought content normal.         ED Course   Procedures  Labs Reviewed   CBC W/ AUTO DIFFERENTIAL - Abnormal; Notable for the following components:       Result Value    Mean  Corpuscular Volume 78 (*)     Mean Corpuscular Hemoglobin 25.1 (*)     RDW 18.4 (*)     All other components within normal limits   COMPREHENSIVE METABOLIC PANEL - Abnormal; Notable for the following components:    Glucose 128 (*)     Anion Gap 19 (*)     All other components within normal limits   ALCOHOL,MEDICAL (ETHANOL) - Abnormal; Notable for the following components:    Alcohol, Medical, Serum 422 (*)     All other components within normal limits    Narrative:     Alcohol, Medical critical result(s) called and verbal readback   obtained from REGINA BEDOYA  by ROSHNI 10/04/2020 17:31   SALICYLATE LEVEL - Abnormal; Notable for the following components:    Salicylate Lvl <4.0 (*)     All other components within normal limits   TSH   ACETAMINOPHEN LEVEL   SARS-COV-2 RNA AMPLIFICATION, QUAL   URINALYSIS, REFLEX TO URINE CULTURE   DRUG SCREEN PANEL, URINE EMERGENCY          Imaging Results    None          Medical Decision Making:   Initial Assessment:   40-year-old male with history of bipolar disorder, schizophrenia, substance abuse, alcoholism here with complaint of homicidal suicidal ideation patient obesity by family.  Differential Diagnosis:   Acute psychosis, homicidal ideation, suicidal ideation  Clinical Tests:   Lab Tests: Ordered and Reviewed  Radiological Study: Ordered and Reviewed                             Clinical Impression:     ICD-10-CM ICD-9-CM   1. Undifferentiated schizophrenia  F20.3 295.90   2. Suicidal ideation  R45.851 V62.84   3. Homicidal ideation  R45.850 V62.85   4. Alcoholic intoxication without complication  F10.920 305.00                          ED Disposition Condition    Transfer to Psych Facility         ED Prescriptions     None        Follow-up Information    None                                      Bay Stauffer MD  10/04/20 1714       Bay Stauffer MD  10/04/20 2954

## 2020-10-04 NOTE — ED TRIAGE NOTES
[facility] under an OPC for verbalizing suicidal thoughts and homicidal thoughts towards his grandmother and substance abuse.

## 2020-10-05 VITALS
BODY MASS INDEX: 25.76 KG/M2 | OXYGEN SATURATION: 100 % | WEIGHT: 170 LBS | TEMPERATURE: 98 F | HEIGHT: 68 IN | SYSTOLIC BLOOD PRESSURE: 135 MMHG | HEART RATE: 98 BPM | DIASTOLIC BLOOD PRESSURE: 85 MMHG | RESPIRATION RATE: 22 BRPM

## 2020-10-05 PROBLEM — T14.91XA SUICIDE ATTEMPT: Status: ACTIVE | Noted: 2020-10-05

## 2020-10-05 LAB
AMPHET+METHAMPHET UR QL: NEGATIVE
BACTERIA #/AREA URNS HPF: NEGATIVE /HPF
BARBITURATES UR QL SCN>200 NG/ML: NEGATIVE
BENZODIAZ UR QL SCN>200 NG/ML: NORMAL
BILIRUB UR QL STRIP: NEGATIVE
BZE UR QL SCN: NEGATIVE
CANNABINOIDS UR QL SCN: NORMAL
CLARITY UR: CLEAR
COLOR UR: YELLOW
CREAT UR-MCNC: 175 MG/DL (ref 23–375)
ETHANOL SERPL-MCNC: 104 MG/DL
ETHANOL SERPL-MCNC: 265 MG/DL
GLUCOSE UR QL STRIP: NEGATIVE
HGB UR QL STRIP: NEGATIVE
HYALINE CASTS #/AREA URNS LPF: 1 /LPF
KETONES UR QL STRIP: NEGATIVE
LEUKOCYTE ESTERASE UR QL STRIP: NEGATIVE
MICROSCOPIC COMMENT: NORMAL
NITRITE UR QL STRIP: NEGATIVE
OPIATES UR QL SCN: NEGATIVE
PCP UR QL SCN>25 NG/ML: NEGATIVE
PH UR STRIP: 7 [PH] (ref 5–8)
PROT UR QL STRIP: ABNORMAL
RBC #/AREA URNS HPF: 2 /HPF (ref 0–4)
SP GR UR STRIP: 1.01 (ref 1–1.03)
SQUAMOUS #/AREA URNS HPF: 0 /HPF
TOXICOLOGY INFORMATION: NORMAL
URN SPEC COLLECT METH UR: ABNORMAL
UROBILINOGEN UR STRIP-ACNC: NEGATIVE EU/DL
WBC #/AREA URNS HPF: 0 /HPF (ref 0–5)

## 2020-10-05 PROCEDURE — 36415 COLL VENOUS BLD VENIPUNCTURE: CPT

## 2020-10-05 PROCEDURE — 80320 DRUG SCREEN QUANTALCOHOLS: CPT

## 2020-10-05 PROCEDURE — 80320 DRUG SCREEN QUANTALCOHOLS: CPT | Mod: 91

## 2020-10-05 PROCEDURE — 80307 DRUG TEST PRSMV CHEM ANLYZR: CPT

## 2020-10-05 PROCEDURE — 81001 URINALYSIS AUTO W/SCOPE: CPT | Mod: 59

## 2020-10-05 NOTE — ED NOTES
"Collected blood. Pt AOx3. Denies SI/HI @ present. Asks "Who brought me here?' Explained arrival to pt. Also encouraged pt to void. Report to Carmen TAPIA  "

## 2020-10-05 NOTE — ED NOTES
Gave meal and warm blankets. Alert at intervals but mostly sleeping. Resp even and unlabored. Cooperative with staff and answers questions appropriately. Denies SI/ HI. Explained to pt plan of care and awaiting ETOH to reduce for psyche eval.

## 2020-10-05 NOTE — ED NOTES
Lying in bed. Wakes @ intervals. Denies SI/ HI @ present. Pt still w/slurred speech. Warm blankets given. No adverse behavior noted

## 2020-10-05 NOTE — ED NOTES
Pt is using the phone to call his family. Pt is currently calm and cooperative. Sitter at the bedside with direct visual observation.

## 2020-10-05 NOTE — ED NOTES
Pt is calm and cooperative breathing even unlabored. Sitter at the bedside with direct visual observation.

## 2020-10-06 PROBLEM — Z13.9 ENCOUNTER FOR MEDICAL SCREENING EXAMINATION: Status: ACTIVE | Noted: 2020-10-06

## 2020-10-07 ENCOUNTER — HOSPITAL ENCOUNTER (EMERGENCY)
Facility: HOSPITAL | Age: 40
Discharge: HOME | End: 2020-10-07
Attending: EMERGENCY MEDICINE
Payer: MEDICAID

## 2020-10-07 VITALS
TEMPERATURE: 98 F | SYSTOLIC BLOOD PRESSURE: 176 MMHG | HEIGHT: 67 IN | DIASTOLIC BLOOD PRESSURE: 124 MMHG | RESPIRATION RATE: 20 BRPM | WEIGHT: 165 LBS | OXYGEN SATURATION: 98 % | BODY MASS INDEX: 25.9 KG/M2 | HEART RATE: 91 BPM

## 2020-10-07 DIAGNOSIS — R00.2 PALPITATIONS: ICD-10-CM

## 2020-10-07 DIAGNOSIS — R07.9 CHEST PAIN: ICD-10-CM

## 2020-10-07 DIAGNOSIS — R07.89 ATYPICAL CHEST PAIN: Primary | ICD-10-CM

## 2020-10-07 DIAGNOSIS — F41.9 ANXIETY: ICD-10-CM

## 2020-10-07 LAB
ALBUMIN SERPL BCP-MCNC: 4.2 G/DL (ref 3.5–5.2)
ALP SERPL-CCNC: 101 U/L (ref 55–135)
ALT SERPL W/O P-5'-P-CCNC: 30 U/L (ref 10–44)
ANION GAP SERPL CALC-SCNC: 12 MMOL/L (ref 8–16)
AST SERPL-CCNC: 54 U/L (ref 10–40)
BASOPHILS # BLD AUTO: 0.02 K/UL (ref 0–0.2)
BASOPHILS NFR BLD: 0.3 % (ref 0–1.9)
BILIRUB SERPL-MCNC: 0.4 MG/DL (ref 0.1–1)
BNP SERPL-MCNC: <10 PG/ML (ref 0–99)
BUN SERPL-MCNC: 6 MG/DL (ref 6–20)
CALCIUM SERPL-MCNC: 9.4 MG/DL (ref 8.7–10.5)
CHLORIDE SERPL-SCNC: 102 MMOL/L (ref 95–110)
CO2 SERPL-SCNC: 25 MMOL/L (ref 23–29)
CREAT SERPL-MCNC: 1.1 MG/DL (ref 0.5–1.4)
DIFFERENTIAL METHOD: ABNORMAL
EOSINOPHIL # BLD AUTO: 0.1 K/UL (ref 0–0.5)
EOSINOPHIL NFR BLD: 0.7 % (ref 0–8)
ERYTHROCYTE [DISTWIDTH] IN BLOOD BY AUTOMATED COUNT: 17.2 % (ref 11.5–14.5)
EST. GFR  (AFRICAN AMERICAN): >60 ML/MIN/1.73 M^2
EST. GFR  (NON AFRICAN AMERICAN): >60 ML/MIN/1.73 M^2
GLUCOSE SERPL-MCNC: 111 MG/DL (ref 70–110)
HCT VFR BLD AUTO: 37.2 % (ref 40–54)
HGB BLD-MCNC: 11.6 G/DL (ref 14–18)
IMM GRANULOCYTES # BLD AUTO: 0.02 K/UL (ref 0–0.04)
IMM GRANULOCYTES NFR BLD AUTO: 0.3 % (ref 0–0.5)
LYMPHOCYTES # BLD AUTO: 0.9 K/UL (ref 1–4.8)
LYMPHOCYTES NFR BLD: 12.6 % (ref 18–48)
MCH RBC QN AUTO: 25.2 PG (ref 27–31)
MCHC RBC AUTO-ENTMCNC: 31.2 G/DL (ref 32–36)
MCV RBC AUTO: 81 FL (ref 82–98)
MONOCYTES # BLD AUTO: 0.4 K/UL (ref 0.3–1)
MONOCYTES NFR BLD: 6.1 % (ref 4–15)
NEUTROPHILS # BLD AUTO: 5.4 K/UL (ref 1.8–7.7)
NEUTROPHILS NFR BLD: 80 % (ref 38–73)
NRBC BLD-RTO: 0 /100 WBC
PLATELET # BLD AUTO: 188 K/UL (ref 150–350)
PMV BLD AUTO: 10.5 FL (ref 9.2–12.9)
POTASSIUM SERPL-SCNC: 3.5 MMOL/L (ref 3.5–5.1)
PROT SERPL-MCNC: 7.3 G/DL (ref 6–8.4)
RBC # BLD AUTO: 4.6 M/UL (ref 4.6–6.2)
SODIUM SERPL-SCNC: 139 MMOL/L (ref 136–145)
TROPONIN I SERPL DL<=0.01 NG/ML-MCNC: <0.006 NG/ML (ref 0–0.03)
WBC # BLD AUTO: 6.74 K/UL (ref 3.9–12.7)

## 2020-10-07 PROCEDURE — 99285 EMERGENCY DEPT VISIT HI MDM: CPT | Mod: 25

## 2020-10-07 PROCEDURE — 93010 EKG 12-LEAD: ICD-10-PCS | Mod: ,,, | Performed by: INTERNAL MEDICINE

## 2020-10-07 PROCEDURE — 80053 COMPREHEN METABOLIC PANEL: CPT

## 2020-10-07 PROCEDURE — 85025 COMPLETE CBC W/AUTO DIFF WBC: CPT

## 2020-10-07 PROCEDURE — 83880 ASSAY OF NATRIURETIC PEPTIDE: CPT

## 2020-10-07 PROCEDURE — 93010 ELECTROCARDIOGRAM REPORT: CPT | Mod: ,,, | Performed by: INTERNAL MEDICINE

## 2020-10-07 PROCEDURE — 25000003 PHARM REV CODE 250: Performed by: EMERGENCY MEDICINE

## 2020-10-07 PROCEDURE — 84484 ASSAY OF TROPONIN QUANT: CPT

## 2020-10-07 PROCEDURE — 93005 ELECTROCARDIOGRAM TRACING: CPT

## 2020-10-07 RX ORDER — OLANZAPINE 5 MG/1
10 TABLET, ORALLY DISINTEGRATING ORAL
Status: COMPLETED | OUTPATIENT
Start: 2020-10-07 | End: 2020-10-07

## 2020-10-07 RX ORDER — LORAZEPAM 1 MG/1
1 TABLET ORAL
Status: COMPLETED | OUTPATIENT
Start: 2020-10-07 | End: 2020-10-07

## 2020-10-07 RX ORDER — ASPIRIN 325 MG
325 TABLET ORAL
Status: COMPLETED | OUTPATIENT
Start: 2020-10-07 | End: 2020-10-07

## 2020-10-07 RX ADMIN — OLANZAPINE 10 MG: 5 TABLET, ORALLY DISINTEGRATING ORAL at 01:10

## 2020-10-07 RX ADMIN — LORAZEPAM 1 MG: 1 TABLET ORAL at 12:10

## 2020-10-07 RX ADMIN — ASPIRIN 325 MG ORAL TABLET 325 MG: 325 PILL ORAL at 11:10

## 2020-10-07 NOTE — ED NOTES
"Pt reports that he is feeling increased anxiety and agitation "I'm shaking and gritting my teeth and I'm really trying not to go off on anybody but I'm feeling like it's coming." Pt reports that PO ativan has not helped to decreased anxiety. Dr Wilson notified and aware.   "

## 2020-10-07 NOTE — ED NOTES
Pt report called to [facility], awaiting [facility] to transport pt back to facility.

## 2020-10-07 NOTE — ED PROVIDER NOTES
Encounter Date: 10/7/2020       History     Chief Complaint   Patient presents with    Chest Pain     pt transferred from [facility] for c/o right side chest pressure x3 days; pt currently under [facility]     Bradley Molina Cousin is a 40 y.o. male who  has a past medical history of Anxiety, Anxiety, Bipolar affective disorder, Depression, Hypertension, Pancreatitis, Schizophrenia, and Thyroid disease.    The patient presents to the ED due to chest pain and palpitations.  He states recurrent episodes of sharp chest tightness associated with palpitations and difficulty catching his breath that have been ongoing for the last several months.  He endorses significant anxiety and feels this may be responsible for his symptoms.  He denies any current symptoms.   [facility] is [facility] in [facility] and is followed by a psychiatrist, who recently started him on anxiety medications, however, he states they have not started to take effect yet.  He denies any fever, cough, recent illness, sick contacts, abdominal pain, nausea/vomiting, or any other significant symptoms.  He endorses compliance with climbing, his only blood pressure medication.  He denies any other complaints at this time.        Review of patient's allergies indicates:  No Known Allergies  Past Medical History:   Diagnosis Date    Anxiety     Anxiety     Bipolar affective disorder     Depression     Hypertension     Pancreatitis     Schizophrenia     Thyroid disease      Past Surgical History:   Procedure Laterality Date    LAPAROSCOPIC CHOLECYSTECTOMY N/A 3/9/2020    Procedure: CHOLECYSTECTOMY, LAPAROSCOPIC;  Surgeon: Trenton Deshpande MD;  [facility];  Service: General;  Laterality: N/A;     Family History   Problem Relation Age of Onset    Diabetes Maternal Grandmother     Hypertension Maternal Grandfather     Cancer Paternal Grandfather      Social History     Tobacco Use    Smoking status: Current Some  Day Smoker     Packs/day: 0.25     Years: 7.00     Pack years: 1.75    Smokeless tobacco: Never Used   Substance Use Topics    Alcohol use: Yes     Alcohol/week: 2.0 standard drinks     Types: 2 Glasses of wine per week     Comment: occasionally    Drug use: Yes     Frequency: 7.0 times per week     Types: Marijuana     Review of Systems   Constitutional: Negative for chills and fever.   HENT: Negative for sore throat.    Respiratory: Negative for shortness of breath.    Cardiovascular: Positive for chest pain and palpitations.   Gastrointestinal: Negative for abdominal pain, constipation, diarrhea, nausea and vomiting.   Genitourinary: Negative for dysuria, frequency and urgency.   Musculoskeletal: Negative for back pain.   Skin: Negative for rash and wound.   Neurological: Negative for weakness.   Hematological: Does not bruise/bleed easily.   Psychiatric/Behavioral: Negative for agitation, behavioral problems and confusion.       Physical Exam     Initial Vitals [10/07/20 1124]   BP Pulse Resp Temp SpO2   (!) 199/116 79 18 98.4 °F (36.9 °C) 100 %      MAP       --         Physical Exam    Nursing note and vitals reviewed.  Constitutional: He appears well-developed and well-nourished. He is not diaphoretic. No distress.   No acute distress.   HENT:   Head: Normocephalic and atraumatic.   Mouth/Throat: Oropharynx is clear and moist.   Eyes: EOM are normal. Pupils are equal, round, and reactive to light.   Neck: No tracheal deviation present.   Cardiovascular: Normal rate, regular rhythm, normal heart sounds and intact distal pulses.   Pulmonary/Chest: Breath sounds normal. No stridor. No respiratory distress.   Abdominal: Soft. He exhibits no distension and no mass. There is no abdominal tenderness.   Musculoskeletal: Normal range of motion. No edema.   Neurological: He is alert and oriented to person, place, and time. No cranial nerve deficit or sensory deficit.   Skin: Skin is warm and dry. Capillary refill  takes less than 2 seconds. No rash noted.   Psychiatric: He has a normal mood and affect. His speech is normal and behavior is normal. Thought content normal.   Calm, cooperative.         ED Course   Procedures  Labs Reviewed   CBC W/ AUTO DIFFERENTIAL - Abnormal; Notable for the following components:       Result Value    Hemoglobin 11.6 (*)     Hematocrit 37.2 (*)     Mean Corpuscular Volume 81 (*)     Mean Corpuscular Hemoglobin 25.2 (*)     Mean Corpuscular Hemoglobin Conc 31.2 (*)     RDW 17.2 (*)     Lymph # 0.9 (*)     Gran% 80.0 (*)     Lymph% 12.6 (*)     All other components within normal limits   COMPREHENSIVE METABOLIC PANEL - Abnormal; Notable for the following components:    Glucose 111 (*)     AST 54 (*)     All other components within normal limits   TROPONIN I   B-TYPE NATRIURETIC PEPTIDE     EKG Readings: (Independently Interpreted)   Initial Reading: No STEMI. Previous EKG: Compared with most recent EKG Rhythm: Normal Sinus Rhythm.   Sinus rhythm with sinus arrhythmia, rate 76, no ST changes or ischemia, normal intervals.  Grossly stable from prior July 2020.       Imaging Results          X-Ray Chest AP Portable (Final result)  Result time 10/07/20 12:07:40    Final result by Hernan Medeiros MD (10/07/20 12:07:40)                 Impression:      No active cardiac or pulmonary findings.      Electronically signed by: Hernan Medeiros  Date:    10/07/2020  Time:    12:07             Narrative:    EXAMINATION:  XR CHEST AP PORTABLE    CLINICAL HISTORY:  Chest Pain;    TECHNIQUE:  Single frontal view of the chest was performed.    COMPARISON:  June 17, 2020    FINDINGS:  Normal heart size.  Normal pulmonary vasculature.  No focal infiltrates.  No free pleural fluid.  No pneumothorax.  Remote left rib fractures.  No acute bony abnormalities.                                 Medical Decision Making:   History:   Old Medical Records: I decided to obtain old medical records.  Old Records Summarized: records  from clinic visits.       <> Summary of Records: Current inpatient at [facility].  Initial Assessment:   40-year-old male with significant psychiatric history and anxiety presents to ER due to episodic chest tightness, shortness of breath, palpitations.  Asymptomatic currently.  Hypertensive, vitals otherwise reassuring on arrival.  Exam benign.  Will obtain cardiac evaluation, labs, CXR, and continue to monitor.  Differential Diagnosis:   Differential Diagnosis includes, but is not limited to:  ACS/MI, PE, aortic dissection, pneumothorax, cardiac tamponade, pericarditis/myocarditis, pneumonia, infection/abscess, lung mass, trauma/fracture, costochondritis/pleurisy, MSK pain/contusion, GERD, biliary disease, pancreatitis, anemia    Independently Interpreted Test(s):   I have ordered and independently interpreted X-rays - see summary below.       <> Summary of X-Ray Reading(s): CXR without acute process, no infiltrates, no pneumothorax  I have ordered and independently interpreted EKG Reading(s) - see summary below       <> Summary of EKG Reading(s): No ischemia or arrhythmia  Clinical Tests:   Lab Tests: Ordered and Reviewed  Radiological Study: Reviewed and Ordered  Medical Tests: Ordered and Reviewed  ED Management:  Labs unremarkable, troponin negative.  Patient asymptomatic, no indication for delta troponin at this time.  Symptoms most likely related to episodes of anxiety.  Recommend close follow-up with PCP and Psychiatry for further management.  Return to ER for worsening symptoms or any other concerns.  Patient stable for discharge back to [facility].    On re-evaluation, the patient's status has improved.  After complete ED evaluation, clinical impression is most consistent with atypical chest pain.  PCP/Psychiatry follow-up within 2-3 days was recommended.    After taking into careful account the patient's history, physical exam findings, as well as empirical and objective  data obtained throughout ED workup, I feel no emergent medical condition has been identified. No further evaluation or admission was felt to be required, and the patient is stable for discharge from the ED. The patient and any additional family present were updated with test results, overall clinical impression, and recommended further plan of care, including discharge instructions as provided and outpatient follow-up for continued evaluation and management as needed. All questions were answered. The patient expressed understanding and agreed with current plan for discharge and follow-up plan of care. Strict ED return precautions were provided, including return/worsening of current symptoms, new symptoms, or any other concerns.                               Clinical Impression:       ICD-10-CM ICD-9-CM   1. Atypical chest pain  R07.89 786.59   2. Chest pain  R07.9 786.50   3. Anxiety  F41.9 300.00   4. Palpitations  R00.2 785.1                          ED Disposition Condition    Discharge Stable        ED Prescriptions     None        Follow-up Information     Follow up With Specialties Details Why Contact Info    Arlen Belcher NP Family Medicine Schedule an appointment as soon as possible for a visit   [address] [place] [address]  [place] [address]  [phone]                                         Fritz Wilson MD  10/08/20 5609

## 2020-10-07 NOTE — ED NOTES
Pt noted to have pulled monitor off, sitting up on side of bed rocking back and forth. Eye contact darting, aggressive when speaking. Pt was calm and cooperative upon initial arrival and assessment. Pt continues to report agitation and increased anxiety. Dr Wilson notified and aware of pt mental status. Pt awaiting for transport back to [facility] at this time. No new orders placed. Risk sitter remains at bedside. Offered pt food and refreshments in attempt to re-establish rapport with patient. Pt not receptive at this time. Will continue to monitor.

## 2020-10-07 NOTE — ED NOTES
"Pt reports that he is starting to get agitated at this time "I am starting to get really pissed off". Pt continues to have verbal contract with this nurse to make staff aware of mental status and feelings of SI/HI and agitation. Risk esteban Berman remains at bedside for one to one observation of high risk patient. Dr Wilson notified and aware, verbal order for PO ativan placed. Will continue to monitor and assess for pt needs. Pt denies any needs at this time.    "

## 2020-10-07 NOTE — DISCHARGE INSTRUCTIONS
Your blood pressure was elevated today.  There is no evidence of any significant issues directly related to your blood pressure currently.  However, your blood pressure will need to be rechecked and managed closely by your primary care doctor.  Return to the ER if you experience any severe chest pain, shortness of breath, weakness to 1 side of your body, difficulty speaking, facial droop, or any other concerning symptoms.

## 2021-01-04 ENCOUNTER — OFFICE VISIT (OUTPATIENT)
Dept: FAMILY MEDICINE | Facility: CLINIC | Age: 41
End: 2021-01-04
Payer: MEDICAID

## 2021-01-04 VITALS
HEIGHT: 67 IN | WEIGHT: 177 LBS | HEART RATE: 115 BPM | SYSTOLIC BLOOD PRESSURE: 120 MMHG | DIASTOLIC BLOOD PRESSURE: 90 MMHG | TEMPERATURE: 98 F | OXYGEN SATURATION: 99 % | BODY MASS INDEX: 27.78 KG/M2

## 2021-01-04 DIAGNOSIS — I10 ESSENTIAL HYPERTENSION: ICD-10-CM

## 2021-01-04 DIAGNOSIS — E11.65 TYPE 2 DIABETES MELLITUS WITH HYPERGLYCEMIA, WITH LONG-TERM CURRENT USE OF INSULIN: Primary | ICD-10-CM

## 2021-01-04 DIAGNOSIS — Z79.4 TYPE 2 DIABETES MELLITUS WITH HYPERGLYCEMIA, WITH LONG-TERM CURRENT USE OF INSULIN: Primary | ICD-10-CM

## 2021-01-04 DIAGNOSIS — K21.9 GASTROESOPHAGEAL REFLUX DISEASE, UNSPECIFIED WHETHER ESOPHAGITIS PRESENT: ICD-10-CM

## 2021-01-04 PROCEDURE — 99203 PR OFFICE/OUTPT VISIT, NEW, LEVL III, 30-44 MIN: ICD-10-PCS | Mod: S$PBB,,, | Performed by: NURSE PRACTITIONER

## 2021-01-04 PROCEDURE — 99203 OFFICE O/P NEW LOW 30 MIN: CPT | Mod: S$PBB,,, | Performed by: NURSE PRACTITIONER

## 2021-01-04 PROCEDURE — 99214 OFFICE O/P EST MOD 30 MIN: CPT | Performed by: NURSE PRACTITIONER

## 2021-01-04 RX ORDER — PANTOPRAZOLE SODIUM 40 MG/1
40 TABLET, DELAYED RELEASE ORAL DAILY
Qty: 90 TABLET | Refills: 0 | Status: SHIPPED | OUTPATIENT
Start: 2021-01-04 | End: 2021-01-04 | Stop reason: SDUPTHER

## 2021-01-04 RX ORDER — INSULIN GLARGINE 100 [IU]/ML
30 INJECTION, SOLUTION SUBCUTANEOUS
COMMUNITY
Start: 2020-12-24 | End: 2021-01-04 | Stop reason: SDUPTHER

## 2021-01-04 RX ORDER — INSULIN LISPRO 100 [IU]/ML
5 INJECTION, SOLUTION INTRAVENOUS; SUBCUTANEOUS 3 TIMES DAILY
Qty: 4.5 ML | Refills: 2 | Status: SHIPPED | OUTPATIENT
Start: 2021-01-04 | End: 2021-04-09

## 2021-01-04 RX ORDER — PEN NEEDLE, DIABETIC 30 GX3/16"
1 NEEDLE, DISPOSABLE MISCELLANEOUS
COMMUNITY
Start: 2020-12-24

## 2021-01-04 RX ORDER — PANTOPRAZOLE SODIUM 40 MG/1
40 TABLET, DELAYED RELEASE ORAL DAILY
Qty: 90 TABLET | Refills: 0 | Status: ON HOLD | OUTPATIENT
Start: 2021-01-04 | End: 2023-12-09 | Stop reason: HOSPADM

## 2021-01-04 RX ORDER — LANCETS 33 GAUGE
1 EACH MISCELLANEOUS
COMMUNITY
Start: 2020-12-24

## 2021-01-04 RX ORDER — INSULIN GLARGINE 100 [IU]/ML
30 INJECTION, SOLUTION SUBCUTANEOUS NIGHTLY
Qty: 9 ML | Refills: 2 | Status: SHIPPED | OUTPATIENT
Start: 2021-01-04 | End: 2021-11-22

## 2021-01-04 RX ORDER — ALPRAZOLAM 1 MG/1
1 TABLET ORAL
COMMUNITY
End: 2021-06-07 | Stop reason: CLARIF

## 2021-01-04 RX ORDER — INSULIN GLARGINE 100 [IU]/ML
30 INJECTION, SOLUTION SUBCUTANEOUS
Status: CANCELLED | OUTPATIENT
Start: 2021-01-04 | End: 2021-02-03

## 2021-01-04 RX ORDER — INSULIN LISPRO 100 [IU]/ML
5 INJECTION, SOLUTION INTRAVENOUS; SUBCUTANEOUS
COMMUNITY
Start: 2020-12-24 | End: 2021-01-04 | Stop reason: SDUPTHER

## 2021-01-06 ENCOUNTER — TELEPHONE (OUTPATIENT)
Dept: FAMILY MEDICINE | Facility: CLINIC | Age: 41
End: 2021-01-06

## 2021-01-11 PROBLEM — Z13.9 ENCOUNTER FOR MEDICAL SCREENING EXAMINATION: Status: RESOLVED | Noted: 2020-10-06 | Resolved: 2021-01-11

## 2021-04-03 DIAGNOSIS — E11.65 TYPE 2 DIABETES MELLITUS WITH HYPERGLYCEMIA, WITH LONG-TERM CURRENT USE OF INSULIN: ICD-10-CM

## 2021-04-03 DIAGNOSIS — Z79.4 TYPE 2 DIABETES MELLITUS WITH HYPERGLYCEMIA, WITH LONG-TERM CURRENT USE OF INSULIN: ICD-10-CM

## 2021-04-05 ENCOUNTER — TELEPHONE (OUTPATIENT)
Dept: FAMILY MEDICINE | Facility: CLINIC | Age: 41
End: 2021-04-05

## 2021-04-07 ENCOUNTER — TELEPHONE (OUTPATIENT)
Dept: FAMILY MEDICINE | Facility: CLINIC | Age: 41
End: 2021-04-07

## 2021-04-09 RX ORDER — INSULIN LISPRO 100 [IU]/ML
INJECTION, SOLUTION INTRAVENOUS; SUBCUTANEOUS
Qty: 6 ML | Refills: 3 | Status: SHIPPED | OUTPATIENT
Start: 2021-04-09

## 2021-06-07 ENCOUNTER — TELEPHONE (OUTPATIENT)
Dept: FAMILY MEDICINE | Facility: CLINIC | Age: 41
End: 2021-06-07

## 2021-06-07 ENCOUNTER — HOSPITAL ENCOUNTER (EMERGENCY)
Facility: HOSPITAL | Age: 41
Discharge: HOME | End: 2021-06-07
Attending: EMERGENCY MEDICINE
Payer: MEDICAID

## 2021-06-07 VITALS
WEIGHT: 170 LBS | HEIGHT: 67 IN | OXYGEN SATURATION: 96 % | HEART RATE: 89 BPM | TEMPERATURE: 99 F | RESPIRATION RATE: 18 BRPM | DIASTOLIC BLOOD PRESSURE: 113 MMHG | BODY MASS INDEX: 26.68 KG/M2 | SYSTOLIC BLOOD PRESSURE: 170 MMHG

## 2021-06-07 DIAGNOSIS — Z79.4 TYPE 2 DIABETES MELLITUS WITH HYPERGLYCEMIA, WITH LONG-TERM CURRENT USE OF INSULIN: ICD-10-CM

## 2021-06-07 DIAGNOSIS — F43.0 ACUTE STRESS REACTION: Primary | ICD-10-CM

## 2021-06-07 DIAGNOSIS — E11.65 TYPE 2 DIABETES MELLITUS WITH HYPERGLYCEMIA, WITH LONG-TERM CURRENT USE OF INSULIN: ICD-10-CM

## 2021-06-07 DIAGNOSIS — I10 HYPERTENSION, UNSPECIFIED TYPE: ICD-10-CM

## 2021-06-07 PROCEDURE — 99284 EMERGENCY DEPT VISIT MOD MDM: CPT | Mod: 25

## 2021-06-07 PROCEDURE — 96374 THER/PROPH/DIAG INJ IV PUSH: CPT

## 2021-06-07 PROCEDURE — 96375 TX/PRO/DX INJ NEW DRUG ADDON: CPT

## 2021-06-07 PROCEDURE — 25000003 PHARM REV CODE 250: Performed by: EMERGENCY MEDICINE

## 2021-06-07 PROCEDURE — 63600175 PHARM REV CODE 636 W HCPCS: Performed by: EMERGENCY MEDICINE

## 2021-06-07 RX ORDER — LISINOPRIL 10 MG/1
10 TABLET ORAL 2 TIMES DAILY
COMMUNITY

## 2021-06-07 RX ORDER — CLONIDINE HYDROCHLORIDE 0.2 MG/1
0.2 TABLET ORAL
Status: COMPLETED | OUTPATIENT
Start: 2021-06-07 | End: 2021-06-07

## 2021-06-07 RX ORDER — PROPRANOLOL HYDROCHLORIDE 10 MG/1
10 TABLET ORAL 2 TIMES DAILY
Status: ON HOLD | COMMUNITY
End: 2023-11-27 | Stop reason: HOSPADM

## 2021-06-07 RX ORDER — DROPERIDOL 2.5 MG/ML
1.25 INJECTION, SOLUTION INTRAMUSCULAR; INTRAVENOUS
Status: COMPLETED | OUTPATIENT
Start: 2021-06-07 | End: 2021-06-07

## 2021-06-07 RX ORDER — LANOLIN ALCOHOL/MO/W.PET/CERES
100 CREAM (GRAM) TOPICAL DAILY
COMMUNITY

## 2021-06-07 RX ORDER — ACETAMINOPHEN 325 MG/1
650 TABLET ORAL
Status: COMPLETED | OUTPATIENT
Start: 2021-06-07 | End: 2021-06-07

## 2021-06-07 RX ORDER — INSULIN GLARGINE 100 [IU]/ML
30 INJECTION, SOLUTION SUBCUTANEOUS NIGHTLY
Qty: 9 ML | Refills: 2 | OUTPATIENT
Start: 2021-06-07

## 2021-06-07 RX ORDER — DIPHENHYDRAMINE HYDROCHLORIDE 50 MG/ML
25 INJECTION INTRAMUSCULAR; INTRAVENOUS
Status: COMPLETED | OUTPATIENT
Start: 2021-06-07 | End: 2021-06-07

## 2021-06-07 RX ORDER — DIVALPROEX SODIUM 500 MG/1
500 TABLET, FILM COATED, EXTENDED RELEASE ORAL 3 TIMES DAILY
COMMUNITY

## 2021-06-07 RX ADMIN — DIPHENHYDRAMINE HYDROCHLORIDE 25 MG: 50 INJECTION INTRAMUSCULAR; INTRAVENOUS at 03:06

## 2021-06-07 RX ADMIN — ACETAMINOPHEN 650 MG: 325 TABLET ORAL at 04:06

## 2021-06-07 RX ADMIN — DROPERIDOL 1.25 MG: 2.5 INJECTION, SOLUTION INTRAMUSCULAR; INTRAVENOUS at 03:06

## 2021-06-07 RX ADMIN — CLONIDINE HYDROCHLORIDE 0.2 MG: 0.2 TABLET ORAL at 02:06

## 2021-06-08 ENCOUNTER — HOSPITAL ENCOUNTER (EMERGENCY)
Facility: HOSPITAL | Age: 41
Discharge: HOME | End: 2021-06-08
Attending: EMERGENCY MEDICINE
Payer: MEDICAID

## 2021-06-08 VITALS
HEART RATE: 88 BPM | OXYGEN SATURATION: 99 % | DIASTOLIC BLOOD PRESSURE: 106 MMHG | TEMPERATURE: 99 F | SYSTOLIC BLOOD PRESSURE: 157 MMHG | RESPIRATION RATE: 20 BRPM

## 2021-06-08 DIAGNOSIS — S01.01XA LACERATION OF SCALP, INITIAL ENCOUNTER: Primary | ICD-10-CM

## 2021-06-08 DIAGNOSIS — R52 PAIN: ICD-10-CM

## 2021-06-08 DIAGNOSIS — R56.9 SEIZURE: ICD-10-CM

## 2021-06-08 DIAGNOSIS — S40.011A CONTUSION OF RIGHT SHOULDER, INITIAL ENCOUNTER: ICD-10-CM

## 2021-06-08 LAB
ALBUMIN SERPL BCP-MCNC: 4.4 G/DL (ref 3.5–5.2)
ALP SERPL-CCNC: 82 U/L (ref 55–135)
ALT SERPL W/O P-5'-P-CCNC: 57 U/L (ref 10–44)
ANION GAP SERPL CALC-SCNC: 12 MMOL/L (ref 8–16)
APTT PPP: 23.6 SEC (ref 25.6–35.8)
AST SERPL-CCNC: 65 U/L (ref 10–40)
BASOPHILS # BLD AUTO: 0.02 K/UL (ref 0–0.2)
BASOPHILS NFR BLD: 0.4 % (ref 0–1.9)
BILIRUB SERPL-MCNC: 0.5 MG/DL (ref 0.1–1)
BUN SERPL-MCNC: 7 MG/DL (ref 6–20)
CALCIUM SERPL-MCNC: 9.4 MG/DL (ref 8.7–10.5)
CHLORIDE SERPL-SCNC: 97 MMOL/L (ref 95–110)
CO2 SERPL-SCNC: 29 MMOL/L (ref 23–29)
CREAT SERPL-MCNC: 1.4 MG/DL (ref 0.5–1.4)
DIFFERENTIAL METHOD: ABNORMAL
EOSINOPHIL # BLD AUTO: 0 K/UL (ref 0–0.5)
EOSINOPHIL NFR BLD: 0.2 % (ref 0–8)
ERYTHROCYTE [DISTWIDTH] IN BLOOD BY AUTOMATED COUNT: 22.7 % (ref 11.5–14.5)
EST. GFR  (AFRICAN AMERICAN): >60 ML/MIN/1.73 M^2
EST. GFR  (NON AFRICAN AMERICAN): >60 ML/MIN/1.73 M^2
ETHANOL SERPL-MCNC: <5 MG/DL
GLUCOSE SERPL-MCNC: 107 MG/DL (ref 70–110)
GLUCOSE SERPL-MCNC: 98 MG/DL (ref 70–110)
HCT VFR BLD AUTO: 35.1 % (ref 40–54)
HGB BLD-MCNC: 11.2 G/DL (ref 14–18)
IMM GRANULOCYTES # BLD AUTO: 0.04 K/UL (ref 0–0.04)
IMM GRANULOCYTES NFR BLD AUTO: 0.8 % (ref 0–0.5)
INR PPP: 1.4
LIPASE SERPL-CCNC: 51 U/L (ref 4–60)
LYMPHOCYTES # BLD AUTO: 0.5 K/UL (ref 1–4.8)
LYMPHOCYTES NFR BLD: 10.7 % (ref 18–48)
MAGNESIUM SERPL-MCNC: 1.3 MG/DL (ref 1.6–2.6)
MCH RBC QN AUTO: 25.8 PG (ref 27–31)
MCHC RBC AUTO-ENTMCNC: 31.9 G/DL (ref 32–36)
MCV RBC AUTO: 81 FL (ref 82–98)
MONOCYTES # BLD AUTO: 0.4 K/UL (ref 0.3–1)
MONOCYTES NFR BLD: 7 % (ref 4–15)
NEUTROPHILS # BLD AUTO: 4.1 K/UL (ref 1.8–7.7)
NEUTROPHILS NFR BLD: 80.9 % (ref 38–73)
NRBC BLD-RTO: 0 /100 WBC
PLATELET # BLD AUTO: 128 K/UL (ref 150–450)
PMV BLD AUTO: 10.7 FL (ref 9.2–12.9)
POTASSIUM SERPL-SCNC: 3 MMOL/L (ref 3.5–5.1)
PROT SERPL-MCNC: 7.2 G/DL (ref 6–8.4)
PROTHROMBIN TIME: 16.2 SEC (ref 11.8–14.3)
RBC # BLD AUTO: 4.34 M/UL (ref 4.6–6.2)
SODIUM SERPL-SCNC: 138 MMOL/L (ref 136–145)
TROPONIN I SERPL DL<=0.01 NG/ML-MCNC: <0.03 NG/ML
TSH SERPL DL<=0.005 MIU/L-ACNC: 3.34 UIU/ML (ref 0.34–5.6)
VALPROATE SERPL-MCNC: <10 UG/ML (ref 50–100)
WBC # BLD AUTO: 5.03 K/UL (ref 3.9–12.7)

## 2021-06-08 PROCEDURE — 80053 COMPREHEN METABOLIC PANEL: CPT | Performed by: EMERGENCY MEDICINE

## 2021-06-08 PROCEDURE — 96365 THER/PROPH/DIAG IV INF INIT: CPT

## 2021-06-08 PROCEDURE — 12002 RPR S/N/AX/GEN/TRNK2.6-7.5CM: CPT

## 2021-06-08 PROCEDURE — 63600175 PHARM REV CODE 636 W HCPCS: Performed by: EMERGENCY MEDICINE

## 2021-06-08 PROCEDURE — 96375 TX/PRO/DX INJ NEW DRUG ADDON: CPT | Mod: 59

## 2021-06-08 PROCEDURE — 93010 EKG 12-LEAD: ICD-10-PCS | Mod: ,,, | Performed by: INTERNAL MEDICINE

## 2021-06-08 PROCEDURE — 83735 ASSAY OF MAGNESIUM: CPT | Performed by: EMERGENCY MEDICINE

## 2021-06-08 PROCEDURE — 84484 ASSAY OF TROPONIN QUANT: CPT | Performed by: EMERGENCY MEDICINE

## 2021-06-08 PROCEDURE — 93010 ELECTROCARDIOGRAM REPORT: CPT | Mod: ,,, | Performed by: INTERNAL MEDICINE

## 2021-06-08 PROCEDURE — 85730 THROMBOPLASTIN TIME PARTIAL: CPT | Performed by: EMERGENCY MEDICINE

## 2021-06-08 PROCEDURE — 93005 ELECTROCARDIOGRAM TRACING: CPT | Mod: 59 | Performed by: INTERNAL MEDICINE

## 2021-06-08 PROCEDURE — 96366 THER/PROPH/DIAG IV INF ADDON: CPT | Mod: 59

## 2021-06-08 PROCEDURE — 99285 EMERGENCY DEPT VISIT HI MDM: CPT | Mod: 25

## 2021-06-08 PROCEDURE — 82962 GLUCOSE BLOOD TEST: CPT

## 2021-06-08 PROCEDURE — 25000003 PHARM REV CODE 250: Performed by: EMERGENCY MEDICINE

## 2021-06-08 PROCEDURE — 85610 PROTHROMBIN TIME: CPT | Performed by: EMERGENCY MEDICINE

## 2021-06-08 PROCEDURE — 83690 ASSAY OF LIPASE: CPT | Performed by: EMERGENCY MEDICINE

## 2021-06-08 PROCEDURE — 82077 ASSAY SPEC XCP UR&BREATH IA: CPT | Performed by: EMERGENCY MEDICINE

## 2021-06-08 PROCEDURE — 80164 ASSAY DIPROPYLACETIC ACD TOT: CPT | Performed by: EMERGENCY MEDICINE

## 2021-06-08 PROCEDURE — 85025 COMPLETE CBC W/AUTO DIFF WBC: CPT | Performed by: EMERGENCY MEDICINE

## 2021-06-08 PROCEDURE — 84443 ASSAY THYROID STIM HORMONE: CPT | Performed by: EMERGENCY MEDICINE

## 2021-06-08 RX ORDER — DIVALPROEX SODIUM 500 MG/1
500 TABLET, DELAYED RELEASE ORAL EVERY 12 HOURS
Qty: 60 TABLET | Refills: 0 | Status: SHIPPED | OUTPATIENT
Start: 2021-06-08 | End: 2021-07-08

## 2021-06-08 RX ORDER — CLONIDINE HYDROCHLORIDE 0.1 MG/1
0.1 TABLET ORAL
Status: COMPLETED | OUTPATIENT
Start: 2021-06-08 | End: 2021-06-08

## 2021-06-08 RX ORDER — FLUOXETINE HYDROCHLORIDE 40 MG/1
40 CAPSULE ORAL DAILY
Status: ON HOLD | COMMUNITY
End: 2023-11-27 | Stop reason: HOSPADM

## 2021-06-08 RX ORDER — DOXEPIN HYDROCHLORIDE 100 MG/1
200 CAPSULE ORAL NIGHTLY
Status: ON HOLD | COMMUNITY
End: 2023-11-27 | Stop reason: HOSPADM

## 2021-06-08 RX ORDER — KETOROLAC TROMETHAMINE 30 MG/ML
15 INJECTION, SOLUTION INTRAMUSCULAR; INTRAVENOUS
Status: COMPLETED | OUTPATIENT
Start: 2021-06-08 | End: 2021-06-08

## 2021-06-08 RX ORDER — BUSPIRONE HYDROCHLORIDE 30 MG/1
30 TABLET ORAL 2 TIMES DAILY
COMMUNITY

## 2021-06-08 RX ORDER — DIVALPROEX SODIUM 250 MG/1
500 TABLET, DELAYED RELEASE ORAL
Status: COMPLETED | OUTPATIENT
Start: 2021-06-08 | End: 2021-06-08

## 2021-06-08 RX ORDER — LIDOCAINE HYDROCHLORIDE AND EPINEPHRINE 10; 10 MG/ML; UG/ML
10 INJECTION, SOLUTION INFILTRATION; PERINEURAL ONCE
Status: COMPLETED | OUTPATIENT
Start: 2021-06-08 | End: 2021-06-08

## 2021-06-08 RX ORDER — METFORMIN HYDROCHLORIDE 500 MG/1
1000 TABLET ORAL 2 TIMES DAILY WITH MEALS
COMMUNITY

## 2021-06-08 RX ORDER — QUETIAPINE FUMARATE 400 MG/1
400 TABLET, FILM COATED ORAL NIGHTLY
COMMUNITY
End: 2021-06-26 | Stop reason: SDUPTHER

## 2021-06-08 RX ORDER — OXCARBAZEPINE 300 MG/1
150 TABLET, FILM COATED ORAL 2 TIMES DAILY
Status: ON HOLD | COMMUNITY
End: 2023-11-27 | Stop reason: HOSPADM

## 2021-06-08 RX ORDER — MAGNESIUM SULFATE HEPTAHYDRATE 40 MG/ML
2 INJECTION, SOLUTION INTRAVENOUS ONCE
Status: COMPLETED | OUTPATIENT
Start: 2021-06-08 | End: 2021-06-08

## 2021-06-08 RX ADMIN — DIVALPROEX SODIUM 500 MG: 250 TABLET, DELAYED RELEASE ORAL at 08:06

## 2021-06-08 RX ADMIN — MAGNESIUM SULFATE IN WATER 2 G: 40 INJECTION, SOLUTION INTRAVENOUS at 08:06

## 2021-06-08 RX ADMIN — KETOROLAC TROMETHAMINE 15 MG: 30 INJECTION, SOLUTION INTRAMUSCULAR at 07:06

## 2021-06-08 RX ADMIN — LIDOCAINE HYDROCHLORIDE,EPINEPHRINE BITARTRATE 10 ML: 10; .01 INJECTION, SOLUTION INFILTRATION; PERINEURAL at 06:06

## 2021-06-08 RX ADMIN — CLONIDINE HYDROCHLORIDE 0.1 MG: 0.1 TABLET ORAL at 10:06

## 2021-06-24 ENCOUNTER — HOSPITAL ENCOUNTER (EMERGENCY)
Facility: HOSPITAL | Age: 41
Discharge: HOME | End: 2021-06-24
Attending: EMERGENCY MEDICINE
Payer: MEDICAID

## 2021-06-24 VITALS
RESPIRATION RATE: 18 BRPM | DIASTOLIC BLOOD PRESSURE: 109 MMHG | HEART RATE: 94 BPM | SYSTOLIC BLOOD PRESSURE: 160 MMHG | WEIGHT: 170 LBS | OXYGEN SATURATION: 100 % | BODY MASS INDEX: 26.63 KG/M2

## 2021-06-24 DIAGNOSIS — F10.920 ALCOHOLIC INTOXICATION WITHOUT COMPLICATION: Primary | ICD-10-CM

## 2021-06-24 LAB
ALBUMIN SERPL BCP-MCNC: 3.9 G/DL (ref 3.5–5.2)
ALP SERPL-CCNC: 126 U/L (ref 55–135)
ALT SERPL W/O P-5'-P-CCNC: 138 U/L (ref 10–44)
ANION GAP SERPL CALC-SCNC: 13 MMOL/L (ref 8–16)
AST SERPL-CCNC: 163 U/L (ref 10–40)
BASOPHILS # BLD AUTO: 0.01 K/UL (ref 0–0.2)
BASOPHILS NFR BLD: 0.2 % (ref 0–1.9)
BILIRUB SERPL-MCNC: 0.8 MG/DL (ref 0.1–1)
BUN SERPL-MCNC: 9 MG/DL (ref 6–20)
CALCIUM SERPL-MCNC: 8.7 MG/DL (ref 8.7–10.5)
CHLORIDE SERPL-SCNC: 103 MMOL/L (ref 95–110)
CO2 SERPL-SCNC: 22 MMOL/L (ref 23–29)
CREAT SERPL-MCNC: 1.4 MG/DL (ref 0.5–1.4)
DIFFERENTIAL METHOD: ABNORMAL
EOSINOPHIL # BLD AUTO: 0 K/UL (ref 0–0.5)
EOSINOPHIL NFR BLD: 0.2 % (ref 0–8)
ERYTHROCYTE [DISTWIDTH] IN BLOOD BY AUTOMATED COUNT: 24.2 % (ref 11.5–14.5)
EST. GFR  (AFRICAN AMERICAN): >60 ML/MIN/1.73 M^2
EST. GFR  (NON AFRICAN AMERICAN): >60 ML/MIN/1.73 M^2
ETHANOL SERPL-MCNC: 101 MG/DL
GLUCOSE SERPL-MCNC: 134 MG/DL (ref 70–110)
GLUCOSE SERPL-MCNC: 137 MG/DL (ref 70–110)
HCT VFR BLD AUTO: 36.7 % (ref 40–54)
HGB BLD-MCNC: 11.6 G/DL (ref 14–18)
IMM GRANULOCYTES # BLD AUTO: 0.04 K/UL (ref 0–0.04)
IMM GRANULOCYTES NFR BLD AUTO: 0.8 % (ref 0–0.5)
LYMPHOCYTES # BLD AUTO: 0.6 K/UL (ref 1–4.8)
LYMPHOCYTES NFR BLD: 12.9 % (ref 18–48)
MCH RBC QN AUTO: 26.2 PG (ref 27–31)
MCHC RBC AUTO-ENTMCNC: 31.6 G/DL (ref 32–36)
MCV RBC AUTO: 83 FL (ref 82–98)
MONOCYTES # BLD AUTO: 0.3 K/UL (ref 0.3–1)
MONOCYTES NFR BLD: 6.1 % (ref 4–15)
NEUTROPHILS # BLD AUTO: 3.8 K/UL (ref 1.8–7.7)
NEUTROPHILS NFR BLD: 79.8 % (ref 38–73)
NRBC BLD-RTO: 0 /100 WBC
PLATELET # BLD AUTO: 236 K/UL (ref 150–450)
PMV BLD AUTO: 10.7 FL (ref 9.2–12.9)
POTASSIUM SERPL-SCNC: 3.9 MMOL/L (ref 3.5–5.1)
PROT SERPL-MCNC: 7.4 G/DL (ref 6–8.4)
RBC # BLD AUTO: 4.43 M/UL (ref 4.6–6.2)
SODIUM SERPL-SCNC: 138 MMOL/L (ref 136–145)
WBC # BLD AUTO: 4.74 K/UL (ref 3.9–12.7)

## 2021-06-24 PROCEDURE — 99284 EMERGENCY DEPT VISIT MOD MDM: CPT | Mod: 25

## 2021-06-24 PROCEDURE — 25000003 PHARM REV CODE 250: Performed by: EMERGENCY MEDICINE

## 2021-06-24 PROCEDURE — 85025 COMPLETE CBC W/AUTO DIFF WBC: CPT | Performed by: EMERGENCY MEDICINE

## 2021-06-24 PROCEDURE — 82077 ASSAY SPEC XCP UR&BREATH IA: CPT | Performed by: EMERGENCY MEDICINE

## 2021-06-24 PROCEDURE — 80053 COMPREHEN METABOLIC PANEL: CPT | Performed by: EMERGENCY MEDICINE

## 2021-06-24 PROCEDURE — 96360 HYDRATION IV INFUSION INIT: CPT

## 2021-06-24 PROCEDURE — 82962 GLUCOSE BLOOD TEST: CPT

## 2021-06-24 RX ORDER — SODIUM CHLORIDE 9 MG/ML
INJECTION, SOLUTION INTRAVENOUS
Status: DISCONTINUED | OUTPATIENT
Start: 2021-06-24 | End: 2021-06-24 | Stop reason: HOSPADM

## 2021-06-24 RX ORDER — SODIUM CHLORIDE 9 MG/ML
INJECTION, SOLUTION INTRAVENOUS
Status: COMPLETED | OUTPATIENT
Start: 2021-06-24 | End: 2021-06-24

## 2021-06-24 RX ADMIN — SODIUM CHLORIDE: 0.9 INJECTION, SOLUTION INTRAVENOUS at 02:06

## 2021-06-26 ENCOUNTER — HOSPITAL ENCOUNTER (EMERGENCY)
Facility: HOSPITAL | Age: 41
Discharge: HOME | End: 2021-06-26
Attending: EMERGENCY MEDICINE
Payer: MEDICAID

## 2021-06-26 VITALS
HEART RATE: 90 BPM | WEIGHT: 175 LBS | HEIGHT: 67 IN | SYSTOLIC BLOOD PRESSURE: 168 MMHG | DIASTOLIC BLOOD PRESSURE: 113 MMHG | RESPIRATION RATE: 20 BRPM | OXYGEN SATURATION: 100 % | BODY MASS INDEX: 27.47 KG/M2 | TEMPERATURE: 99 F

## 2021-06-26 DIAGNOSIS — R10.9 FLANK PAIN: ICD-10-CM

## 2021-06-26 DIAGNOSIS — F20.9 SCHIZOPHRENIA, UNSPECIFIED TYPE: Primary | ICD-10-CM

## 2021-06-26 DIAGNOSIS — I10 HYPERTENSION, UNSPECIFIED TYPE: ICD-10-CM

## 2021-06-26 LAB
ALBUMIN SERPL BCP-MCNC: 4.3 G/DL (ref 3.5–5.2)
ALP SERPL-CCNC: 111 U/L (ref 55–135)
ALT SERPL W/O P-5'-P-CCNC: 88 U/L (ref 10–44)
ANION GAP SERPL CALC-SCNC: 9 MMOL/L (ref 8–16)
AST SERPL-CCNC: 69 U/L (ref 10–40)
BASOPHILS # BLD AUTO: 0.03 K/UL (ref 0–0.2)
BASOPHILS NFR BLD: 0.6 % (ref 0–1.9)
BILIRUB SERPL-MCNC: 1 MG/DL (ref 0.1–1)
BILIRUB UR QL STRIP: NEGATIVE
BUN SERPL-MCNC: 9 MG/DL (ref 6–20)
CALCIUM SERPL-MCNC: 9.7 MG/DL (ref 8.7–10.5)
CHLORIDE SERPL-SCNC: 103 MMOL/L (ref 95–110)
CLARITY UR: CLEAR
CO2 SERPL-SCNC: 24 MMOL/L (ref 23–29)
COLOR UR: YELLOW
CREAT SERPL-MCNC: 1.2 MG/DL (ref 0.5–1.4)
DIFFERENTIAL METHOD: ABNORMAL
EOSINOPHIL # BLD AUTO: 0 K/UL (ref 0–0.5)
EOSINOPHIL NFR BLD: 0.8 % (ref 0–8)
ERYTHROCYTE [DISTWIDTH] IN BLOOD BY AUTOMATED COUNT: 24.3 % (ref 11.5–14.5)
EST. GFR  (AFRICAN AMERICAN): >60 ML/MIN/1.73 M^2
EST. GFR  (NON AFRICAN AMERICAN): >60 ML/MIN/1.73 M^2
GLUCOSE SERPL-MCNC: 127 MG/DL (ref 70–110)
GLUCOSE UR QL STRIP: NEGATIVE
HCT VFR BLD AUTO: 37.5 % (ref 40–54)
HGB BLD-MCNC: 11.6 G/DL (ref 14–18)
HGB UR QL STRIP: NEGATIVE
IMM GRANULOCYTES # BLD AUTO: 0.03 K/UL (ref 0–0.04)
IMM GRANULOCYTES NFR BLD AUTO: 0.6 % (ref 0–0.5)
KETONES UR QL STRIP: NEGATIVE
LEUKOCYTE ESTERASE UR QL STRIP: NEGATIVE
LIPASE SERPL-CCNC: 18 U/L (ref 4–60)
LYMPHOCYTES # BLD AUTO: 0.9 K/UL (ref 1–4.8)
LYMPHOCYTES NFR BLD: 16.9 % (ref 18–48)
MCH RBC QN AUTO: 26.2 PG (ref 27–31)
MCHC RBC AUTO-ENTMCNC: 30.9 G/DL (ref 32–36)
MCV RBC AUTO: 85 FL (ref 82–98)
MONOCYTES # BLD AUTO: 0.5 K/UL (ref 0.3–1)
MONOCYTES NFR BLD: 10.2 % (ref 4–15)
NEUTROPHILS # BLD AUTO: 3.7 K/UL (ref 1.8–7.7)
NEUTROPHILS NFR BLD: 70.9 % (ref 38–73)
NITRITE UR QL STRIP: NEGATIVE
NRBC BLD-RTO: 0 /100 WBC
PH UR STRIP: 7 [PH] (ref 5–8)
PLATELET # BLD AUTO: 206 K/UL (ref 150–450)
PMV BLD AUTO: 10.6 FL (ref 9.2–12.9)
POTASSIUM SERPL-SCNC: 4 MMOL/L (ref 3.5–5.1)
PROT SERPL-MCNC: 7.8 G/DL (ref 6–8.4)
PROT UR QL STRIP: NEGATIVE
RBC # BLD AUTO: 4.43 M/UL (ref 4.6–6.2)
SODIUM SERPL-SCNC: 136 MMOL/L (ref 136–145)
SP GR UR STRIP: 1.01 (ref 1–1.03)
URN SPEC COLLECT METH UR: NORMAL
UROBILINOGEN UR STRIP-ACNC: NEGATIVE EU/DL
WBC # BLD AUTO: 5.22 K/UL (ref 3.9–12.7)

## 2021-06-26 PROCEDURE — 81003 URINALYSIS AUTO W/O SCOPE: CPT | Performed by: EMERGENCY MEDICINE

## 2021-06-26 PROCEDURE — 63600175 PHARM REV CODE 636 W HCPCS: Performed by: EMERGENCY MEDICINE

## 2021-06-26 PROCEDURE — 96361 HYDRATE IV INFUSION ADD-ON: CPT

## 2021-06-26 PROCEDURE — 85025 COMPLETE CBC W/AUTO DIFF WBC: CPT | Performed by: EMERGENCY MEDICINE

## 2021-06-26 PROCEDURE — 25000003 PHARM REV CODE 250: Performed by: EMERGENCY MEDICINE

## 2021-06-26 PROCEDURE — 83690 ASSAY OF LIPASE: CPT | Performed by: EMERGENCY MEDICINE

## 2021-06-26 PROCEDURE — 96374 THER/PROPH/DIAG INJ IV PUSH: CPT

## 2021-06-26 PROCEDURE — 36415 COLL VENOUS BLD VENIPUNCTURE: CPT | Performed by: EMERGENCY MEDICINE

## 2021-06-26 PROCEDURE — 80053 COMPREHEN METABOLIC PANEL: CPT | Performed by: EMERGENCY MEDICINE

## 2021-06-26 PROCEDURE — 99284 EMERGENCY DEPT VISIT MOD MDM: CPT | Mod: 25

## 2021-06-26 RX ORDER — KETOROLAC TROMETHAMINE 30 MG/ML
15 INJECTION, SOLUTION INTRAMUSCULAR; INTRAVENOUS
Status: COMPLETED | OUTPATIENT
Start: 2021-06-26 | End: 2021-06-26

## 2021-06-26 RX ORDER — CLONIDINE HYDROCHLORIDE 0.1 MG/1
0.1 TABLET ORAL
Status: COMPLETED | OUTPATIENT
Start: 2021-06-26 | End: 2021-06-26

## 2021-06-26 RX ORDER — QUETIAPINE FUMARATE 50 MG/1
50 TABLET, FILM COATED ORAL 2 TIMES DAILY
Qty: 60 TABLET | Refills: 0 | Status: ON HOLD | OUTPATIENT
Start: 2021-06-26 | End: 2023-12-08

## 2021-06-26 RX ORDER — QUETIAPINE FUMARATE 100 MG/1
100 TABLET, FILM COATED ORAL
Status: COMPLETED | OUTPATIENT
Start: 2021-06-26 | End: 2021-06-26

## 2021-06-26 RX ORDER — QUETIAPINE FUMARATE 400 MG/1
400 TABLET, FILM COATED ORAL NIGHTLY
Qty: 30 TABLET | Refills: 0 | Status: SHIPPED | OUTPATIENT
Start: 2021-06-26 | End: 2021-07-26

## 2021-06-26 RX ADMIN — KETOROLAC TROMETHAMINE 15 MG: 30 INJECTION, SOLUTION INTRAMUSCULAR; INTRAVENOUS at 07:06

## 2021-06-26 RX ADMIN — CLONIDINE HYDROCHLORIDE 0.1 MG: 0.1 TABLET ORAL at 09:06

## 2021-06-26 RX ADMIN — SODIUM CHLORIDE 1000 ML: 0.9 INJECTION, SOLUTION INTRAVENOUS at 07:06

## 2021-06-26 RX ADMIN — QUETIAPINE 100 MG: 100 TABLET ORAL at 09:06

## 2021-10-15 ENCOUNTER — HOSPITAL ENCOUNTER (EMERGENCY)
Facility: HOSPITAL | Age: 41
Discharge: LEFT BEFORE DISCHARGE | End: 2021-10-16
Attending: EMERGENCY MEDICINE
Payer: MEDICAID

## 2021-10-15 VITALS
RESPIRATION RATE: 15 BRPM | SYSTOLIC BLOOD PRESSURE: 140 MMHG | HEART RATE: 108 BPM | OXYGEN SATURATION: 100 % | TEMPERATURE: 98 F | BODY MASS INDEX: 27.41 KG/M2 | DIASTOLIC BLOOD PRESSURE: 80 MMHG | WEIGHT: 175 LBS

## 2021-10-15 DIAGNOSIS — N17.9 AKI (ACUTE KIDNEY INJURY): ICD-10-CM

## 2021-10-15 DIAGNOSIS — T50.904A DRUG OVERDOSE, UNDETERMINED INTENT, INITIAL ENCOUNTER: Primary | ICD-10-CM

## 2021-10-15 DIAGNOSIS — R41.82 ALTERED MENTAL STATUS: ICD-10-CM

## 2021-10-15 DIAGNOSIS — M62.82 NON-TRAUMATIC RHABDOMYOLYSIS: ICD-10-CM

## 2021-10-15 LAB
ALBUMIN SERPL BCP-MCNC: 4.8 G/DL (ref 3.5–5.2)
ALP SERPL-CCNC: 79 U/L (ref 55–135)
ALT SERPL W/O P-5'-P-CCNC: 46 U/L (ref 10–44)
AMPHET+METHAMPHET UR QL: NEGATIVE
ANION GAP SERPL CALC-SCNC: 17 MMOL/L (ref 8–16)
APAP SERPL-MCNC: <10 UG/ML (ref 10–20)
AST SERPL-CCNC: 82 U/L (ref 10–40)
BARBITURATES UR QL SCN>200 NG/ML: NEGATIVE
BASOPHILS # BLD AUTO: 0.03 K/UL (ref 0–0.2)
BASOPHILS NFR BLD: 0.3 % (ref 0–1.9)
BENZODIAZ UR QL SCN>200 NG/ML: NEGATIVE
BILIRUB SERPL-MCNC: 0.7 MG/DL (ref 0.1–1)
BILIRUB UR QL STRIP: NEGATIVE
BUN SERPL-MCNC: 22 MG/DL (ref 6–20)
BZE UR QL SCN: NEGATIVE
CALCIUM SERPL-MCNC: 9.9 MG/DL (ref 8.7–10.5)
CANNABINOIDS UR QL SCN: ABNORMAL
CHLORIDE SERPL-SCNC: 97 MMOL/L (ref 95–110)
CK SERPL-CCNC: 1616 U/L (ref 20–200)
CLARITY UR: CLEAR
CO2 SERPL-SCNC: 25 MMOL/L (ref 23–29)
COLOR UR: YELLOW
CREAT SERPL-MCNC: 1.9 MG/DL (ref 0.5–1.4)
CREAT UR-MCNC: 160 MG/DL (ref 23–375)
DIFFERENTIAL METHOD: ABNORMAL
EOSINOPHIL # BLD AUTO: 0.2 K/UL (ref 0–0.5)
EOSINOPHIL NFR BLD: 2.4 % (ref 0–8)
ERYTHROCYTE [DISTWIDTH] IN BLOOD BY AUTOMATED COUNT: 15.8 % (ref 11.5–14.5)
EST. GFR  (AFRICAN AMERICAN): 50.2 ML/MIN/1.73 M^2
EST. GFR  (NON AFRICAN AMERICAN): 43.4 ML/MIN/1.73 M^2
ETHANOL SERPL-MCNC: 254 MG/DL
GLUCOSE SERPL-MCNC: 199 MG/DL (ref 70–110)
GLUCOSE UR QL STRIP: ABNORMAL
HCT VFR BLD AUTO: 45.5 % (ref 40–54)
HGB BLD-MCNC: 14.1 G/DL (ref 14–18)
HGB UR QL STRIP: NEGATIVE
IMM GRANULOCYTES # BLD AUTO: 0.03 K/UL (ref 0–0.04)
IMM GRANULOCYTES NFR BLD AUTO: 0.3 % (ref 0–0.5)
KETONES UR QL STRIP: NEGATIVE
LEUKOCYTE ESTERASE UR QL STRIP: NEGATIVE
LYMPHOCYTES # BLD AUTO: 3 K/UL (ref 1–4.8)
LYMPHOCYTES NFR BLD: 34.1 % (ref 18–48)
MCH RBC QN AUTO: 26.7 PG (ref 27–31)
MCHC RBC AUTO-ENTMCNC: 31 G/DL (ref 32–36)
MCV RBC AUTO: 86 FL (ref 82–98)
MONOCYTES # BLD AUTO: 1.5 K/UL (ref 0.3–1)
MONOCYTES NFR BLD: 16.6 % (ref 4–15)
NEUTROPHILS # BLD AUTO: 4.1 K/UL (ref 1.8–7.7)
NEUTROPHILS NFR BLD: 46.3 % (ref 38–73)
NITRITE UR QL STRIP: NEGATIVE
NRBC BLD-RTO: 0 /100 WBC
OPIATES UR QL SCN: NEGATIVE
PCP UR QL SCN>25 NG/ML: NEGATIVE
PH UR STRIP: 6 [PH] (ref 5–8)
PLATELET # BLD AUTO: 307 K/UL (ref 150–450)
PMV BLD AUTO: 10.3 FL (ref 9.2–12.9)
POTASSIUM SERPL-SCNC: 4.4 MMOL/L (ref 3.5–5.1)
PROT SERPL-MCNC: 8.8 G/DL (ref 6–8.4)
PROT UR QL STRIP: ABNORMAL
RBC # BLD AUTO: 5.28 M/UL (ref 4.6–6.2)
SALICYLATES SERPL-MCNC: <4 MG/DL (ref 15–30)
SODIUM SERPL-SCNC: 139 MMOL/L (ref 136–145)
SP GR UR STRIP: 1.01 (ref 1–1.03)
TOXICOLOGY INFORMATION: ABNORMAL
TSH SERPL DL<=0.005 MIU/L-ACNC: 10.34 UIU/ML (ref 0.34–5.6)
URN SPEC COLLECT METH UR: ABNORMAL
UROBILINOGEN UR STRIP-ACNC: NEGATIVE EU/DL
WBC # BLD AUTO: 8.88 K/UL (ref 3.9–12.7)

## 2021-10-15 PROCEDURE — 96360 HYDRATION IV INFUSION INIT: CPT

## 2021-10-15 PROCEDURE — 82077 ASSAY SPEC XCP UR&BREATH IA: CPT | Performed by: STUDENT IN AN ORGANIZED HEALTH CARE EDUCATION/TRAINING PROGRAM

## 2021-10-15 PROCEDURE — 63600175 PHARM REV CODE 636 W HCPCS: Performed by: STUDENT IN AN ORGANIZED HEALTH CARE EDUCATION/TRAINING PROGRAM

## 2021-10-15 PROCEDURE — 93010 ELECTROCARDIOGRAM REPORT: CPT | Mod: ,,, | Performed by: INTERNAL MEDICINE

## 2021-10-15 PROCEDURE — 82550 ASSAY OF CK (CPK): CPT | Performed by: STUDENT IN AN ORGANIZED HEALTH CARE EDUCATION/TRAINING PROGRAM

## 2021-10-15 PROCEDURE — 84443 ASSAY THYROID STIM HORMONE: CPT | Performed by: STUDENT IN AN ORGANIZED HEALTH CARE EDUCATION/TRAINING PROGRAM

## 2021-10-15 PROCEDURE — 80307 DRUG TEST PRSMV CHEM ANLYZR: CPT | Performed by: STUDENT IN AN ORGANIZED HEALTH CARE EDUCATION/TRAINING PROGRAM

## 2021-10-15 PROCEDURE — 83605 ASSAY OF LACTIC ACID: CPT | Performed by: STUDENT IN AN ORGANIZED HEALTH CARE EDUCATION/TRAINING PROGRAM

## 2021-10-15 PROCEDURE — 81003 URINALYSIS AUTO W/O SCOPE: CPT | Performed by: STUDENT IN AN ORGANIZED HEALTH CARE EDUCATION/TRAINING PROGRAM

## 2021-10-15 PROCEDURE — 99284 EMERGENCY DEPT VISIT MOD MDM: CPT | Mod: 25

## 2021-10-15 PROCEDURE — 93010 EKG 12-LEAD: ICD-10-PCS | Mod: ,,, | Performed by: INTERNAL MEDICINE

## 2021-10-15 PROCEDURE — 84439 ASSAY OF FREE THYROXINE: CPT | Performed by: STUDENT IN AN ORGANIZED HEALTH CARE EDUCATION/TRAINING PROGRAM

## 2021-10-15 PROCEDURE — 80143 DRUG ASSAY ACETAMINOPHEN: CPT | Performed by: STUDENT IN AN ORGANIZED HEALTH CARE EDUCATION/TRAINING PROGRAM

## 2021-10-15 PROCEDURE — 93005 ELECTROCARDIOGRAM TRACING: CPT | Performed by: INTERNAL MEDICINE

## 2021-10-15 PROCEDURE — 85025 COMPLETE CBC W/AUTO DIFF WBC: CPT | Performed by: STUDENT IN AN ORGANIZED HEALTH CARE EDUCATION/TRAINING PROGRAM

## 2021-10-15 PROCEDURE — 63600175 PHARM REV CODE 636 W HCPCS

## 2021-10-15 PROCEDURE — 80179 DRUG ASSAY SALICYLATE: CPT | Performed by: STUDENT IN AN ORGANIZED HEALTH CARE EDUCATION/TRAINING PROGRAM

## 2021-10-15 PROCEDURE — 80053 COMPREHEN METABOLIC PANEL: CPT | Performed by: STUDENT IN AN ORGANIZED HEALTH CARE EDUCATION/TRAINING PROGRAM

## 2021-10-15 PROCEDURE — 96361 HYDRATE IV INFUSION ADD-ON: CPT

## 2021-10-15 RX ORDER — LOSARTAN POTASSIUM 25 MG/1
25 TABLET ORAL DAILY
Status: ON HOLD | COMMUNITY
Start: 2021-06-18 | End: 2023-11-22 | Stop reason: SDUPTHER

## 2021-10-15 RX ORDER — NALOXONE HCL 0.4 MG/ML
VIAL (ML) INJECTION
Status: COMPLETED
Start: 2021-10-15 | End: 2021-10-15

## 2021-10-15 RX ORDER — AMLODIPINE BESYLATE 10 MG/1
10 TABLET ORAL DAILY
COMMUNITY

## 2021-10-15 RX ORDER — MIDAZOLAM HYDROCHLORIDE 1 MG/ML
5 INJECTION INTRAMUSCULAR; INTRAVENOUS
Status: DISCONTINUED | OUTPATIENT
Start: 2021-10-15 | End: 2021-10-15

## 2021-10-15 RX ORDER — PRAZOSIN HYDROCHLORIDE 2 MG/1
2 CAPSULE ORAL NIGHTLY
COMMUNITY

## 2021-10-15 RX ORDER — FAMOTIDINE 20 MG/1
20 TABLET, FILM COATED ORAL 2 TIMES DAILY
Status: ON HOLD | COMMUNITY
End: 2023-12-09 | Stop reason: HOSPADM

## 2021-10-15 RX ADMIN — NALXONE HYDROCHLORIDE 0.4 MG: 0.4 INJECTION INTRAMUSCULAR; INTRAVENOUS; SUBCUTANEOUS at 10:10

## 2021-10-15 RX ADMIN — NALOXONE HYDROCHLORIDE 0.4 MG: 0.4 INJECTION, SOLUTION INTRAMUSCULAR; INTRAVENOUS; SUBCUTANEOUS at 10:10

## 2021-10-15 RX ADMIN — SODIUM CHLORIDE, SODIUM LACTATE, POTASSIUM CHLORIDE, AND CALCIUM CHLORIDE 1000 ML: .6; .31; .03; .02 INJECTION, SOLUTION INTRAVENOUS at 11:10

## 2021-10-15 RX ADMIN — SODIUM CHLORIDE, SODIUM LACTATE, POTASSIUM CHLORIDE, AND CALCIUM CHLORIDE 1000 ML: .6; .31; .03; .02 INJECTION, SOLUTION INTRAVENOUS at 10:10

## 2021-10-16 LAB
LACTATE SERPL-SCNC: 5.5 MMOL/L (ref 0.5–1.9)
T4 FREE SERPL-MCNC: 0.75 NG/DL (ref 0.71–1.51)

## 2021-10-18 LAB — GLUCOSE SERPL-MCNC: 167 MG/DL (ref 70–110)

## 2022-01-20 ENCOUNTER — TELEPHONE (OUTPATIENT)
Dept: PODIATRY | Facility: CLINIC | Age: 42
End: 2022-01-20
Payer: MEDICAID

## 2022-01-20 NOTE — TELEPHONE ENCOUNTER
----- Message from Mita Quiñonez sent at 1/20/2022 11:33 AM CST -----  Contact: Herbert [phone]  Type: Patient Call Back         Who called:Herbert arreguin [facility]          What is the request in detail:In regards to referral          Can the clinic reply by [facility]?No         Would the patient rather a call back or a response via [facility]? Call back          Best call back number:[phone]         Additional Information:            Thank You

## 2022-07-21 NOTE — SUBJECTIVE & OBJECTIVE
Interval History:  Patient reports feeling okay.  Abdominal pain was well controlled.  Tolerating clear liquids.  Advance diet to bland diet.  Decrease IV pain regimen.  Started p.o. pain medications.  Blood cultures negative.  Patient afebrile the past 48 hr.  Discontinue antibiotics.  Patient seen ambulating around the halls comfortably.    Review of Systems   Constitutional: Negative for chills, fatigue, fever and unexpected weight change.   HENT: Negative for sore throat.    Eyes: Negative for visual disturbance.   Respiratory: Negative for cough, chest tightness and shortness of breath.    Cardiovascular: Negative for chest pain.   Gastrointestinal: Negative for abdominal pain, constipation, diarrhea, nausea and vomiting.   Endocrine: Negative for polyuria.   Genitourinary: Negative for dysuria and hematuria.   Neurological: Negative for headaches.     Objective:     Vital Signs (Most Recent):  Temp: 99 °F (37.2 °C) (02/28/20 1100)  Pulse: 63 (02/28/20 1100)  Resp: 17 (02/28/20 1100)  BP: 130/85 (02/28/20 1100)  SpO2: 100 % (02/28/20 1100) Vital Signs (24h Range):  Temp:  [98.2 °F (36.8 °C)-99 °F (37.2 °C)] 99 °F (37.2 °C)  Pulse:  [63-77] 63  Resp:  [12-17] 17  SpO2:  [97 %-100 %] 100 %  BP: (112-161)/(75-95) 130/85     Weight: 76.8 kg (169 lb 5 oz)  Body mass index is 26.52 kg/m².    Intake/Output Summary (Last 24 hours) at 2/28/2020 1259  Last data filed at 2/28/2020 0800  Gross per 24 hour   Intake 5860 ml   Output --   Net 5860 ml      Physical Exam   Constitutional: He is oriented to person, place, and time. He appears well-developed and well-nourished. No distress.   HENT:   Head: Normocephalic and atraumatic.   Eyes: EOM are normal.   Neck: Normal range of motion and full passive range of motion without pain.   Cardiovascular: Normal rate and regular rhythm.   Pulmonary/Chest: Effort normal and breath sounds normal. No respiratory distress.   Abdominal: Soft. Bowel sounds are normal.   Epigastric  tenderness   Musculoskeletal: Normal range of motion.   Neurological: He is alert and oriented to person, place, and time.   Skin: Skin is warm. He is not diaphoretic.   Psychiatric: He has a normal mood and affect. His behavior is normal. Judgment and thought content normal.   Nursing note and vitals reviewed.      Significant Labs:   CBC:   Recent Labs   Lab 02/26/20  1631 02/27/20 0320 02/28/20 0449   WBC 10.72 8.66 5.94   HGB 11.3* 9.9* 10.4*   HCT 36.9* 32.6* 33.8*    268 274     CMP:   Recent Labs   Lab 02/26/20  1631 02/27/20 0320 02/28/20 0449    140 139   K 4.4 4.2 4.3    107 109   CO2 26 26 26   * 107 114*   BUN 7 7 <5*   CREATININE 1.2 1.0 1.0   CALCIUM 9.2 8.6* 8.8   PROT 6.8 6.1 6.3   ALBUMIN 3.7 3.1* 3.2*   BILITOT 0.5 0.6 0.4   ALKPHOS 79 64 68   AST 36 20 20   ALT 53* 39 34   ANIONGAP 8 7* 4*   EGFRNONAA >60.0 >60.0 >60.0     Lipase:   Recent Labs   Lab 02/26/20  1631 02/28/20 0449   LIPASE 117* 43        Anesthesia Volume In Cc: 9.5

## 2022-12-01 NOTE — ASSESSMENT & PLAN NOTE
Chronic, controlled.  Latest blood pressure and vitals reviewed-   Temp:  [98.1 °F (36.7 °C)-98.7 °F (37.1 °C)]   Pulse:  [64-90]   Resp:  [16-18]   BP: ()/(51-75)   SpO2:  [95 %-100 %] .     Hypertension Medications at home            amLODIPine (NORVASC) 10 MG tablet Take 1 tablet (10 mg total) by mouth once daily.    cloNIDine (CATAPRES) 0.1 MG tablet Take 0.1 mg by mouth 3 (three) times daily.    lisinopril (PRINIVIL,ZESTRIL) 40 MG tablet Take 1 tablet (40 mg total) by mouth once daily.    metoprolol succinate (TOPROL-XL) 25 MG 24 hr tablet Take 12.5 mg by mouth nightly.     terazosin (HYTRIN) 1 MG capsule Take 2 mg by mouth every evening.        Will utilize p.r.n. blood pressure medication only if patient's blood pressure greater than  180/110 and he develops symptoms such as worsening chest pain or shortness of breath.       97

## 2023-11-22 ENCOUNTER — HOSPITAL ENCOUNTER (OUTPATIENT)
Facility: HOSPITAL | Age: 43
Discharge: HOME | End: 2023-11-27
Attending: EMERGENCY MEDICINE | Admitting: STUDENT IN AN ORGANIZED HEALTH CARE EDUCATION/TRAINING PROGRAM
Payer: MEDICAID

## 2023-11-22 DIAGNOSIS — K92.2 GASTROINTESTINAL HEMORRHAGE, UNSPECIFIED GASTROINTESTINAL HEMORRHAGE TYPE: ICD-10-CM

## 2023-11-22 DIAGNOSIS — K92.2 UPPER GI BLEED: Primary | ICD-10-CM

## 2023-11-22 DIAGNOSIS — K92.0 HEMATEMESIS WITH NAUSEA: ICD-10-CM

## 2023-11-22 DIAGNOSIS — R00.0 TACHYCARDIA: ICD-10-CM

## 2023-11-22 LAB
ABO + RH BLD: NORMAL
ALBUMIN SERPL BCP-MCNC: 4 G/DL (ref 3.5–5.2)
ALP SERPL-CCNC: 79 U/L (ref 55–135)
ALT SERPL W/O P-5'-P-CCNC: 9 U/L (ref 10–44)
ANION GAP SERPL CALC-SCNC: 13 MMOL/L (ref 8–16)
AST SERPL-CCNC: 10 U/L (ref 10–40)
BASOPHILS # BLD AUTO: 0.04 K/UL (ref 0–0.2)
BASOPHILS NFR BLD: 0.4 % (ref 0–1.9)
BILIRUB SERPL-MCNC: 0.3 MG/DL (ref 0.1–1)
BLD GP AB SCN CELLS X3 SERPL QL: NORMAL
BUN SERPL-MCNC: 23 MG/DL (ref 6–20)
CALCIUM SERPL-MCNC: 10.3 MG/DL (ref 8.7–10.5)
CHLORIDE SERPL-SCNC: 99 MMOL/L (ref 95–110)
CO2 SERPL-SCNC: 22 MMOL/L (ref 23–29)
CREAT SERPL-MCNC: 1.5 MG/DL (ref 0.5–1.4)
DIFFERENTIAL METHOD: ABNORMAL
EOSINOPHIL # BLD AUTO: 0.1 K/UL (ref 0–0.5)
EOSINOPHIL NFR BLD: 0.5 % (ref 0–8)
ERYTHROCYTE [DISTWIDTH] IN BLOOD BY AUTOMATED COUNT: 17.8 % (ref 11.5–14.5)
EST. GFR  (NO RACE VARIABLE): 60 ML/MIN/1.73 M^2
GLUCOSE SERPL-MCNC: 197 MG/DL (ref 70–110)
HCT VFR BLD AUTO: 29.5 % (ref 40–54)
HGB BLD-MCNC: 9.3 G/DL (ref 14–18)
IMM GRANULOCYTES # BLD AUTO: 0.04 K/UL (ref 0–0.04)
IMM GRANULOCYTES NFR BLD AUTO: 0.4 % (ref 0–0.5)
LYMPHOCYTES # BLD AUTO: 1.6 K/UL (ref 1–4.8)
LYMPHOCYTES NFR BLD: 15.2 % (ref 18–48)
MCH RBC QN AUTO: 23.7 PG (ref 27–31)
MCHC RBC AUTO-ENTMCNC: 31.5 G/DL (ref 32–36)
MCV RBC AUTO: 75 FL (ref 82–98)
MONOCYTES # BLD AUTO: 0.8 K/UL (ref 0.3–1)
MONOCYTES NFR BLD: 7.3 % (ref 4–15)
NEUTROPHILS # BLD AUTO: 7.8 K/UL (ref 1.8–7.7)
NEUTROPHILS NFR BLD: 76.2 % (ref 38–73)
NRBC BLD-RTO: 0 /100 WBC
PLATELET # BLD AUTO: 242 K/UL (ref 150–450)
PMV BLD AUTO: 10.6 FL (ref 9.2–12.9)
POCT GLUCOSE: 110 MG/DL (ref 70–110)
POCT GLUCOSE: 193 MG/DL (ref 70–110)
POTASSIUM SERPL-SCNC: 4.1 MMOL/L (ref 3.5–5.1)
PROT SERPL-MCNC: 7 G/DL (ref 6–8.4)
RBC # BLD AUTO: 3.93 M/UL (ref 4.6–6.2)
SODIUM SERPL-SCNC: 134 MMOL/L (ref 136–145)
SPECIMEN OUTDATE: NORMAL
WBC # BLD AUTO: 10.23 K/UL (ref 3.9–12.7)

## 2023-11-22 PROCEDURE — 96367 TX/PROPH/DG ADDL SEQ IV INF: CPT

## 2023-11-22 PROCEDURE — C9113 INJ PANTOPRAZOLE SODIUM, VIA: HCPCS | Performed by: EMERGENCY MEDICINE

## 2023-11-22 PROCEDURE — 93010 ELECTROCARDIOGRAM REPORT: CPT | Mod: ,,, | Performed by: GENERAL PRACTICE

## 2023-11-22 PROCEDURE — 96376 TX/PRO/DX INJ SAME DRUG ADON: CPT

## 2023-11-22 PROCEDURE — 82962 GLUCOSE BLOOD TEST: CPT

## 2023-11-22 PROCEDURE — 25000003 PHARM REV CODE 250: Performed by: EMERGENCY MEDICINE

## 2023-11-22 PROCEDURE — G0378 HOSPITAL OBSERVATION PER HR: HCPCS

## 2023-11-22 PROCEDURE — 36415 COLL VENOUS BLD VENIPUNCTURE: CPT | Performed by: EMERGENCY MEDICINE

## 2023-11-22 PROCEDURE — 96361 HYDRATE IV INFUSION ADD-ON: CPT

## 2023-11-22 PROCEDURE — 11000001 HC ACUTE MED/SURG PRIVATE ROOM

## 2023-11-22 PROCEDURE — 93005 ELECTROCARDIOGRAM TRACING: CPT

## 2023-11-22 PROCEDURE — 86850 RBC ANTIBODY SCREEN: CPT | Performed by: EMERGENCY MEDICINE

## 2023-11-22 PROCEDURE — 94760 N-INVAS EAR/PLS OXIMETRY 1: CPT

## 2023-11-22 PROCEDURE — 25000003 PHARM REV CODE 250: Performed by: NURSE PRACTITIONER

## 2023-11-22 PROCEDURE — 63600175 PHARM REV CODE 636 W HCPCS: Mod: JA | Performed by: EMERGENCY MEDICINE

## 2023-11-22 PROCEDURE — 96374 THER/PROPH/DIAG INJ IV PUSH: CPT | Mod: 59

## 2023-11-22 PROCEDURE — 80053 COMPREHEN METABOLIC PANEL: CPT | Performed by: EMERGENCY MEDICINE

## 2023-11-22 PROCEDURE — 85025 COMPLETE CBC W/AUTO DIFF WBC: CPT | Performed by: EMERGENCY MEDICINE

## 2023-11-22 PROCEDURE — 96366 THER/PROPH/DIAG IV INF ADDON: CPT

## 2023-11-22 PROCEDURE — 99285 EMERGENCY DEPT VISIT HI MDM: CPT | Mod: 25

## 2023-11-22 PROCEDURE — 63600175 PHARM REV CODE 636 W HCPCS: Performed by: NURSE PRACTITIONER

## 2023-11-22 PROCEDURE — C9113 INJ PANTOPRAZOLE SODIUM, VIA: HCPCS | Performed by: NURSE PRACTITIONER

## 2023-11-22 PROCEDURE — 96365 THER/PROPH/DIAG IV INF INIT: CPT

## 2023-11-22 PROCEDURE — 93010 EKG 12-LEAD: ICD-10-PCS | Mod: ,,, | Performed by: GENERAL PRACTICE

## 2023-11-22 RX ORDER — DOXEPIN HYDROCHLORIDE 25 MG/1
150 CAPSULE ORAL NIGHTLY
Status: DISCONTINUED | OUTPATIENT
Start: 2023-11-22 | End: 2023-11-27 | Stop reason: HOSPADM

## 2023-11-22 RX ORDER — OXCARBAZEPINE 150 MG/1
150 TABLET, FILM COATED ORAL 2 TIMES DAILY
Status: DISCONTINUED | OUTPATIENT
Start: 2023-11-22 | End: 2023-11-27 | Stop reason: HOSPADM

## 2023-11-22 RX ORDER — CLONAZEPAM 2 MG/1
2 TABLET ORAL 2 TIMES DAILY PRN
COMMUNITY
Start: 2023-11-16

## 2023-11-22 RX ORDER — OCTREOTIDE ACETATE 100 UG/ML
50 INJECTION, SOLUTION INTRAVENOUS; SUBCUTANEOUS
Status: COMPLETED | OUTPATIENT
Start: 2023-11-22 | End: 2023-11-22

## 2023-11-22 RX ORDER — IBUPROFEN 200 MG
24 TABLET ORAL
Status: DISCONTINUED | OUTPATIENT
Start: 2023-11-22 | End: 2023-11-27 | Stop reason: HOSPADM

## 2023-11-22 RX ORDER — PANTOPRAZOLE SODIUM 40 MG/10ML
80 INJECTION, POWDER, LYOPHILIZED, FOR SOLUTION INTRAVENOUS
Status: COMPLETED | OUTPATIENT
Start: 2023-11-22 | End: 2023-11-22

## 2023-11-22 RX ORDER — NALTREXONE 380 MG
380 KIT INTRAMUSCULAR
COMMUNITY
Start: 2023-11-16

## 2023-11-22 RX ORDER — GLUCAGON 1 MG
1 KIT INJECTION
Status: DISCONTINUED | OUTPATIENT
Start: 2023-11-22 | End: 2023-11-27 | Stop reason: HOSPADM

## 2023-11-22 RX ORDER — CLONAZEPAM 0.5 MG/1
2 TABLET ORAL 2 TIMES DAILY PRN
Status: DISCONTINUED | OUTPATIENT
Start: 2023-11-22 | End: 2023-11-26

## 2023-11-22 RX ORDER — DIVALPROEX SODIUM 250 MG/1
500 TABLET, FILM COATED, EXTENDED RELEASE ORAL 3 TIMES DAILY
Status: DISCONTINUED | OUTPATIENT
Start: 2023-11-22 | End: 2023-11-27 | Stop reason: HOSPADM

## 2023-11-22 RX ORDER — TRAZODONE HYDROCHLORIDE 100 MG/1
100 TABLET ORAL NIGHTLY
COMMUNITY
Start: 2023-11-16

## 2023-11-22 RX ORDER — OMEPRAZOLE 40 MG/1
40 CAPSULE, DELAYED RELEASE ORAL EVERY MORNING
Status: ON HOLD | COMMUNITY
Start: 2023-10-04 | End: 2023-12-09 | Stop reason: HOSPADM

## 2023-11-22 RX ORDER — PANTOPRAZOLE SODIUM 40 MG/10ML
40 INJECTION, POWDER, LYOPHILIZED, FOR SOLUTION INTRAVENOUS 2 TIMES DAILY
Status: DISCONTINUED | OUTPATIENT
Start: 2023-11-22 | End: 2023-11-27 | Stop reason: HOSPADM

## 2023-11-22 RX ORDER — IBUPROFEN 200 MG
16 TABLET ORAL
Status: DISCONTINUED | OUTPATIENT
Start: 2023-11-22 | End: 2023-11-27 | Stop reason: HOSPADM

## 2023-11-22 RX ORDER — DULAGLUTIDE 4.5 MG/.5ML
4.5 INJECTION, SOLUTION SUBCUTANEOUS
COMMUNITY
Start: 2023-11-07

## 2023-11-22 RX ORDER — PRAZOSIN HYDROCHLORIDE 1 MG/1
2 CAPSULE ORAL NIGHTLY
Status: DISCONTINUED | OUTPATIENT
Start: 2023-11-22 | End: 2023-11-27 | Stop reason: HOSPADM

## 2023-11-22 RX ORDER — INSULIN ASPART 100 [IU]/ML
INJECTION, SOLUTION INTRAVENOUS; SUBCUTANEOUS
Status: ON HOLD | COMMUNITY
Start: 2023-11-17 | End: 2023-11-27 | Stop reason: HOSPADM

## 2023-11-22 RX ORDER — FLUTICASONE PROPIONATE 50 MCG
1 SPRAY, SUSPENSION (ML) NASAL DAILY
COMMUNITY
Start: 2023-11-07

## 2023-11-22 RX ORDER — INSULIN ASPART 100 [IU]/ML
0-10 INJECTION, SOLUTION INTRAVENOUS; SUBCUTANEOUS
Status: DISCONTINUED | OUTPATIENT
Start: 2023-11-22 | End: 2023-11-27 | Stop reason: HOSPADM

## 2023-11-22 RX ORDER — DULOXETIN HYDROCHLORIDE 30 MG/1
30 CAPSULE, DELAYED RELEASE ORAL DAILY
Status: DISCONTINUED | OUTPATIENT
Start: 2023-11-23 | End: 2023-11-27 | Stop reason: HOSPADM

## 2023-11-22 RX ORDER — LORATADINE 10 MG/1
10 TABLET ORAL DAILY
COMMUNITY
Start: 2023-11-07

## 2023-11-22 RX ORDER — OLANZAPINE 20 MG/1
20 TABLET ORAL NIGHTLY
COMMUNITY
Start: 2023-08-29

## 2023-11-22 RX ORDER — PROPRANOLOL HYDROCHLORIDE 10 MG/1
10 TABLET ORAL 2 TIMES DAILY
Status: DISCONTINUED | OUTPATIENT
Start: 2023-11-22 | End: 2023-11-23

## 2023-11-22 RX ORDER — DULOXETIN HYDROCHLORIDE 30 MG/1
30 CAPSULE, DELAYED RELEASE ORAL DAILY
COMMUNITY
Start: 2023-11-07

## 2023-11-22 RX ORDER — THIAMINE HCL 100 MG
100 TABLET ORAL DAILY
Status: DISCONTINUED | OUTPATIENT
Start: 2023-11-23 | End: 2023-11-27 | Stop reason: HOSPADM

## 2023-11-22 RX ADMIN — DOXEPIN HYDROCHLORIDE 150 MG: 25 CAPSULE ORAL at 10:11

## 2023-11-22 RX ADMIN — OCTREOTIDE ACETATE 50 MCG/HR: 1000 INJECTION, SOLUTION INTRAVENOUS; SUBCUTANEOUS at 07:11

## 2023-11-22 RX ADMIN — CEFTRIAXONE SODIUM 1 G: 1 INJECTION, POWDER, FOR SOLUTION INTRAMUSCULAR; INTRAVENOUS at 05:11

## 2023-11-22 RX ADMIN — CLONAZEPAM 2 MG: 0.5 TABLET ORAL at 10:11

## 2023-11-22 RX ADMIN — DIVALPROEX SODIUM 500 MG: 250 TABLET, EXTENDED RELEASE ORAL at 10:11

## 2023-11-22 RX ADMIN — PANTOPRAZOLE SODIUM 40 MG: 40 INJECTION, POWDER, FOR SOLUTION INTRAVENOUS at 08:11

## 2023-11-22 RX ADMIN — SODIUM CHLORIDE 1000 ML: 9 INJECTION, SOLUTION INTRAVENOUS at 05:11

## 2023-11-22 RX ADMIN — OXCARBAZEPINE 150 MG: 150 TABLET, FILM COATED ORAL at 08:11

## 2023-11-22 RX ADMIN — PRAZOSIN HYDROCHLORIDE 2 MG: 1 CAPSULE ORAL at 08:11

## 2023-11-22 RX ADMIN — PROPRANOLOL HYDROCHLORIDE 10 MG: 10 TABLET ORAL at 08:11

## 2023-11-22 RX ADMIN — PANTOPRAZOLE SODIUM 80 MG: 40 INJECTION, POWDER, LYOPHILIZED, FOR SOLUTION INTRAVENOUS at 05:11

## 2023-11-22 RX ADMIN — OCTREOTIDE ACETATE 50 MCG: 100 INJECTION, SOLUTION INTRAVENOUS; SUBCUTANEOUS at 06:11

## 2023-11-22 NOTE — ED PROVIDER NOTES
Encounter Date: 11/22/2023       History     Chief Complaint   Patient presents with    Hematemesis     Dark red vomit starting last night     Abdominal Pain     Patient is a 43-year-old male with a past medical history of alcoholism, currently sober after a short rehab stent, schizophrenia hypothyroidism depression anxiety GERD who presents emergency room for evaluation of a few episodes of maroon black vomit overnight and this morning.  He has some abdominal distention.  He denies any history of esophageal varices or upper GI bleed.  He has no chest pain shortness of breath syncope but does feel slightly lightheaded.  He did not look at his stool.  He denies any diarrhea.  He is supposed to be taking Protonix.      Review of patient's allergies indicates:  No Known Allergies  Past Medical History:   Diagnosis Date    Anxiety     Anxiety     Bipolar affective disorder     Depression     Hypertension     Pancreatitis     Schizophrenia     Seizures     Thyroid disease      Past Surgical History:   Procedure Laterality Date    LAPAROSCOPIC CHOLECYSTECTOMY N/A 3/9/2020    Procedure: CHOLECYSTECTOMY, LAPAROSCOPIC;  Surgeon: Trenton Deshpande MD;  Location: [facility];  Service: General;  Laterality: N/A;     Family History   Problem Relation Age of Onset    Diabetes Maternal Grandmother     Hypertension Maternal Grandfather     Cancer Paternal Grandfather      Social History     Tobacco Use    Smoking status: Some Days     Current packs/day: 0.25     Average packs/day: 0.3 packs/day for 7.0 years (1.8 ttl pk-yrs)     Types: Cigarettes    Smokeless tobacco: Never   Substance Use Topics    Alcohol use: Yes     Alcohol/week: 2.0 standard drinks of alcohol     Types: 2 Glasses of wine per week     Comment: occasionally    Drug use: Yes     Frequency: 7.0 times per week     Types: Marijuana     Review of Systems   Constitutional:  Negative for fatigue and fever.   HENT:  Negative for ear pain, rhinorrhea and sore throat.     Eyes:  Negative for pain and visual disturbance.   Respiratory:  Negative for cough and shortness of breath.    Cardiovascular:  Negative for chest pain.   Gastrointestinal:  Negative for abdominal pain, diarrhea, nausea and vomiting.        Vomiting blood   Genitourinary:  Negative for difficulty urinating.   Musculoskeletal:  Negative for arthralgias.   Skin:  Negative for rash.   Neurological:  Negative for weakness, numbness and headaches.   All other systems reviewed and are negative.      Physical Exam     Initial Vitals [11/22/23 1551]   BP Pulse Resp Temp SpO2   122/71 (!) 115 20 98 °F (36.7 °C) 98 %      MAP       --         Physical Exam    Nursing note and vitals reviewed.  Constitutional: He appears well-developed and well-nourished. No distress.   HENT:   Head: Normocephalic and atraumatic.   Mouth/Throat: Oropharynx is clear and moist.   Eyes: Conjunctivae and EOM are normal. Pupils are equal, round, and reactive to light.   Neck: Neck supple.   Normal range of motion.  Cardiovascular:  Normal rate, regular rhythm, normal heart sounds and intact distal pulses.     Exam reveals no gallop and no friction rub.       No murmur heard.  Pulmonary/Chest: Breath sounds normal.   Abdominal: Abdomen is soft. Bowel sounds are normal. He exhibits distension. There is no abdominal tenderness. There is no rebound and no guarding.   Musculoskeletal:         General: No edema. Normal range of motion.      Cervical back: Normal range of motion and neck supple.     Neurological: He is alert and oriented to person, place, and time. He has normal strength. No sensory deficit.   Skin: Skin is warm and dry. No rash noted.   Psychiatric: He has a normal mood and affect.         ED Course   Procedures  Labs Reviewed   CBC W/ AUTO DIFFERENTIAL - Abnormal; Notable for the following components:       Result Value    RBC 3.93 (*)     Hemoglobin 9.3 (*)     Hematocrit 29.5 (*)     MCV 75 (*)     MCH 23.7 (*)     MCHC 31.5 (*)      RDW 17.8 (*)     Gran # (ANC) 7.8 (*)     Gran % 76.2 (*)     Lymph % 15.2 (*)     All other components within normal limits   COMPREHENSIVE METABOLIC PANEL - Abnormal; Notable for the following components:    Sodium 134 (*)     CO2 22 (*)     Glucose 197 (*)     BUN 23 (*)     Creatinine 1.5 (*)     ALT 9 (*)     All other components within normal limits   POCT GLUCOSE - Abnormal; Notable for the following components:    POCT Glucose 193 (*)     All other components within normal limits   TYPE & SCREEN          Imaging Results    None          Medications   sodium chloride 0.9% bolus 1,000 mL 1,000 mL (0 mLs Intravenous Stopped 11/22/23 1819)   octreotide injection 50 mcg (has no administration in time range)   octreotide (SANDOSTATIN) 500 mcg in sodium chloride 0.9% 100 mL infusion (has no administration in time range)   pantoprazole injection 80 mg (80 mg Intravenous Given 11/22/23 1719)   cefTRIAXone (ROCEPHIN) 1 g in dextrose 5 % in water (D5W) 100 mL IVPB (MB+) (0 g Intravenous Stopped 11/22/23 1751)     Medical Decision Making  Patient likely has an upper GI bleed.  No hematemesis here in the emergency room.  He is slightly tachycardic.  He will be given Protonix fluids obtain labs and I anticipate admission.  Abdomen soft nontender nondistended.  He does not have a history of esophageal varices but given his abdominal distention and history of alcoholism he very well may have varices.    Amount and/or Complexity of Data Reviewed  Labs: ordered.    Risk  Prescription drug management.               ED Course as of 11/22/23 1806 Wed Nov 22, 2023 1806 I spoke to Gastroenterology on-call, Dr. Anderson, who agrees with octreotide bolus drip Protonix Rocephin clears tonight with anticipation of scope tomorrow.  He recommends consultation with  in AM.  To hospitalist who agrees with admission.  No need to transfuse at this time.  No active vomiting here in the emergency room. [JS]      ED Course  User Index  [JS] Jordan Gorman MD                        Clinical Impression:  Final diagnoses:  [R00.0] Tachycardia  [K92.2] Upper GI bleed (Primary)          ED Disposition Condition    Observation Stable                Jordan Gorman MD  11/22/23 5238

## 2023-11-23 PROBLEM — G40.909 SEIZURE DISORDER: Status: ACTIVE | Noted: 2023-11-23

## 2023-11-23 LAB
ALBUMIN SERPL BCP-MCNC: 3.7 G/DL (ref 3.5–5.2)
ALP SERPL-CCNC: 100 U/L (ref 55–135)
ALT SERPL W/O P-5'-P-CCNC: 65 U/L (ref 10–44)
ANION GAP SERPL CALC-SCNC: 11 MMOL/L (ref 8–16)
AST SERPL-CCNC: 84 U/L (ref 10–40)
BASOPHILS # BLD AUTO: 0.03 K/UL (ref 0–0.2)
BASOPHILS # BLD AUTO: 0.04 K/UL (ref 0–0.2)
BASOPHILS # BLD AUTO: 0.05 K/UL (ref 0–0.2)
BASOPHILS NFR BLD: 0.5 % (ref 0–1.9)
BASOPHILS NFR BLD: 0.6 % (ref 0–1.9)
BASOPHILS NFR BLD: 0.7 % (ref 0–1.9)
BILIRUB SERPL-MCNC: 0.3 MG/DL (ref 0.1–1)
BUN SERPL-MCNC: 19 MG/DL (ref 6–20)
CALCIUM SERPL-MCNC: 9.3 MG/DL (ref 8.7–10.5)
CHLORIDE SERPL-SCNC: 102 MMOL/L (ref 95–110)
CO2 SERPL-SCNC: 22 MMOL/L (ref 23–29)
CREAT SERPL-MCNC: 1.1 MG/DL (ref 0.5–1.4)
DIFFERENTIAL METHOD: ABNORMAL
EOSINOPHIL # BLD AUTO: 0.2 K/UL (ref 0–0.5)
EOSINOPHIL NFR BLD: 2.8 % (ref 0–8)
EOSINOPHIL NFR BLD: 3.5 % (ref 0–8)
EOSINOPHIL NFR BLD: 3.9 % (ref 0–8)
ERYTHROCYTE [DISTWIDTH] IN BLOOD BY AUTOMATED COUNT: 17.7 % (ref 11.5–14.5)
ERYTHROCYTE [DISTWIDTH] IN BLOOD BY AUTOMATED COUNT: 17.8 % (ref 11.5–14.5)
ERYTHROCYTE [DISTWIDTH] IN BLOOD BY AUTOMATED COUNT: 18.2 % (ref 11.5–14.5)
EST. GFR  (NO RACE VARIABLE): >60 ML/MIN/1.73 M^2
GLUCOSE SERPL-MCNC: 113 MG/DL (ref 70–110)
HCT VFR BLD AUTO: 30.7 % (ref 40–54)
HCT VFR BLD AUTO: 30.7 % (ref 40–54)
HCT VFR BLD AUTO: 31.7 % (ref 40–54)
HGB BLD-MCNC: 9.3 G/DL (ref 14–18)
HGB BLD-MCNC: 9.5 G/DL (ref 14–18)
HGB BLD-MCNC: 9.6 G/DL (ref 14–18)
IMM GRANULOCYTES # BLD AUTO: 0.03 K/UL (ref 0–0.04)
IMM GRANULOCYTES NFR BLD AUTO: 0.4 % (ref 0–0.5)
IMM GRANULOCYTES NFR BLD AUTO: 0.4 % (ref 0–0.5)
IMM GRANULOCYTES NFR BLD AUTO: 0.5 % (ref 0–0.5)
LYMPHOCYTES # BLD AUTO: 1.2 K/UL (ref 1–4.8)
LYMPHOCYTES # BLD AUTO: 1.4 K/UL (ref 1–4.8)
LYMPHOCYTES # BLD AUTO: 1.8 K/UL (ref 1–4.8)
LYMPHOCYTES NFR BLD: 17.1 % (ref 18–48)
LYMPHOCYTES NFR BLD: 22.9 % (ref 18–48)
LYMPHOCYTES NFR BLD: 25.8 % (ref 18–48)
MAGNESIUM SERPL-MCNC: 1.7 MG/DL (ref 1.6–2.6)
MCH RBC QN AUTO: 23.2 PG (ref 27–31)
MCH RBC QN AUTO: 23.3 PG (ref 27–31)
MCH RBC QN AUTO: 23.5 PG (ref 27–31)
MCHC RBC AUTO-ENTMCNC: 30.3 G/DL (ref 32–36)
MCHC RBC AUTO-ENTMCNC: 30.3 G/DL (ref 32–36)
MCHC RBC AUTO-ENTMCNC: 30.9 G/DL (ref 32–36)
MCV RBC AUTO: 76 FL (ref 82–98)
MCV RBC AUTO: 77 FL (ref 82–98)
MCV RBC AUTO: 77 FL (ref 82–98)
MONOCYTES # BLD AUTO: 0.5 K/UL (ref 0.3–1)
MONOCYTES # BLD AUTO: 0.6 K/UL (ref 0.3–1)
MONOCYTES # BLD AUTO: 0.7 K/UL (ref 0.3–1)
MONOCYTES NFR BLD: 10 % (ref 4–15)
MONOCYTES NFR BLD: 8.4 % (ref 4–15)
MONOCYTES NFR BLD: 8.8 % (ref 4–15)
NEUTROPHILS # BLD AUTO: 3.9 K/UL (ref 1.8–7.7)
NEUTROPHILS # BLD AUTO: 4.1 K/UL (ref 1.8–7.7)
NEUTROPHILS # BLD AUTO: 4.8 K/UL (ref 1.8–7.7)
NEUTROPHILS NFR BLD: 60.3 % (ref 38–73)
NEUTROPHILS NFR BLD: 63.8 % (ref 38–73)
NEUTROPHILS NFR BLD: 69.6 % (ref 38–73)
NRBC BLD-RTO: 0 /100 WBC
PLATELET # BLD AUTO: 222 K/UL (ref 150–450)
PLATELET # BLD AUTO: 232 K/UL (ref 150–450)
PLATELET # BLD AUTO: 250 K/UL (ref 150–450)
PMV BLD AUTO: 11.2 FL (ref 9.2–12.9)
PMV BLD AUTO: 11.2 FL (ref 9.2–12.9)
PMV BLD AUTO: 11.4 FL (ref 9.2–12.9)
POCT GLUCOSE: 103 MG/DL (ref 70–110)
POCT GLUCOSE: 121 MG/DL (ref 70–110)
POTASSIUM SERPL-SCNC: 4.6 MMOL/L (ref 3.5–5.1)
PROT SERPL-MCNC: 6.7 G/DL (ref 6–8.4)
RBC # BLD AUTO: 3.99 M/UL (ref 4.6–6.2)
RBC # BLD AUTO: 4.05 M/UL (ref 4.6–6.2)
RBC # BLD AUTO: 4.13 M/UL (ref 4.6–6.2)
SODIUM SERPL-SCNC: 135 MMOL/L (ref 136–145)
WBC # BLD AUTO: 6.16 K/UL (ref 3.9–12.7)
WBC # BLD AUTO: 6.77 K/UL (ref 3.9–12.7)
WBC # BLD AUTO: 6.85 K/UL (ref 3.9–12.7)

## 2023-11-23 PROCEDURE — 63600175 PHARM REV CODE 636 W HCPCS: Performed by: NURSE PRACTITIONER

## 2023-11-23 PROCEDURE — 36415 COLL VENOUS BLD VENIPUNCTURE: CPT | Performed by: NURSE PRACTITIONER

## 2023-11-23 PROCEDURE — 51798 US URINE CAPACITY MEASURE: CPT

## 2023-11-23 PROCEDURE — 36415 COLL VENOUS BLD VENIPUNCTURE: CPT | Performed by: STUDENT IN AN ORGANIZED HEALTH CARE EDUCATION/TRAINING PROGRAM

## 2023-11-23 PROCEDURE — G0378 HOSPITAL OBSERVATION PER HR: HCPCS

## 2023-11-23 PROCEDURE — 94761 N-INVAS EAR/PLS OXIMETRY MLT: CPT | Mod: 59

## 2023-11-23 PROCEDURE — 99205 PR OFFICE/OUTPT VISIT, NEW, LEVL V, 60-74 MIN: ICD-10-PCS | Mod: ,,, | Performed by: INTERNAL MEDICINE

## 2023-11-23 PROCEDURE — 25000003 PHARM REV CODE 250: Performed by: NURSE PRACTITIONER

## 2023-11-23 PROCEDURE — 85025 COMPLETE CBC W/AUTO DIFF WBC: CPT | Performed by: NURSE PRACTITIONER

## 2023-11-23 PROCEDURE — 25000003 PHARM REV CODE 250: Performed by: EMERGENCY MEDICINE

## 2023-11-23 PROCEDURE — 83735 ASSAY OF MAGNESIUM: CPT | Performed by: STUDENT IN AN ORGANIZED HEALTH CARE EDUCATION/TRAINING PROGRAM

## 2023-11-23 PROCEDURE — 99205 OFFICE O/P NEW HI 60 MIN: CPT | Mod: ,,, | Performed by: INTERNAL MEDICINE

## 2023-11-23 PROCEDURE — C9113 INJ PANTOPRAZOLE SODIUM, VIA: HCPCS | Performed by: NURSE PRACTITIONER

## 2023-11-23 PROCEDURE — 96376 TX/PRO/DX INJ SAME DRUG ADON: CPT

## 2023-11-23 PROCEDURE — 63600175 PHARM REV CODE 636 W HCPCS: Mod: JA | Performed by: EMERGENCY MEDICINE

## 2023-11-23 PROCEDURE — 80053 COMPREHEN METABOLIC PANEL: CPT | Performed by: STUDENT IN AN ORGANIZED HEALTH CARE EDUCATION/TRAINING PROGRAM

## 2023-11-23 PROCEDURE — 96366 THER/PROPH/DIAG IV INF ADDON: CPT

## 2023-11-23 RX ORDER — OLANZAPINE 5 MG/1
20 TABLET ORAL NIGHTLY
Status: DISCONTINUED | OUTPATIENT
Start: 2023-11-23 | End: 2023-11-27 | Stop reason: HOSPADM

## 2023-11-23 RX ORDER — MAGNESIUM SULFATE HEPTAHYDRATE 40 MG/ML
2 INJECTION, SOLUTION INTRAVENOUS
Status: DISCONTINUED | OUTPATIENT
Start: 2023-11-23 | End: 2023-11-27 | Stop reason: HOSPADM

## 2023-11-23 RX ORDER — MIRTAZAPINE 15 MG/1
30 TABLET, FILM COATED ORAL NIGHTLY
Status: DISCONTINUED | OUTPATIENT
Start: 2023-11-23 | End: 2023-11-27 | Stop reason: HOSPADM

## 2023-11-23 RX ORDER — MAGNESIUM SULFATE HEPTAHYDRATE 40 MG/ML
4 INJECTION, SOLUTION INTRAVENOUS
Status: DISCONTINUED | OUTPATIENT
Start: 2023-11-23 | End: 2023-11-27 | Stop reason: HOSPADM

## 2023-11-23 RX ORDER — GABAPENTIN 300 MG/1
600 CAPSULE ORAL NIGHTLY
Status: DISCONTINUED | OUTPATIENT
Start: 2023-11-23 | End: 2023-11-26

## 2023-11-23 RX ORDER — BUSPIRONE HYDROCHLORIDE 10 MG/1
30 TABLET ORAL 2 TIMES DAILY
Status: DISCONTINUED | OUTPATIENT
Start: 2023-11-23 | End: 2023-11-27 | Stop reason: HOSPADM

## 2023-11-23 RX ADMIN — OXCARBAZEPINE 150 MG: 150 TABLET, FILM COATED ORAL at 09:11

## 2023-11-23 RX ADMIN — GABAPENTIN 600 MG: 300 CAPSULE ORAL at 09:11

## 2023-11-23 RX ADMIN — CLONAZEPAM 2 MG: 0.5 TABLET ORAL at 12:11

## 2023-11-23 RX ADMIN — PRAZOSIN HYDROCHLORIDE 2 MG: 1 CAPSULE ORAL at 09:11

## 2023-11-23 RX ADMIN — PANTOPRAZOLE SODIUM 40 MG: 40 INJECTION, POWDER, FOR SOLUTION INTRAVENOUS at 09:11

## 2023-11-23 RX ADMIN — BUSPIRONE HYDROCHLORIDE 30 MG: 10 TABLET ORAL at 09:11

## 2023-11-23 RX ADMIN — CEFTRIAXONE SODIUM 1 G: 1 INJECTION, POWDER, FOR SOLUTION INTRAMUSCULAR; INTRAVENOUS at 05:11

## 2023-11-23 RX ADMIN — OCTREOTIDE ACETATE 50 MCG/HR: 1000 INJECTION, SOLUTION INTRAVENOUS; SUBCUTANEOUS at 03:11

## 2023-11-23 RX ADMIN — THIAMINE HCL TAB 100 MG 100 MG: 100 TAB at 09:11

## 2023-11-23 RX ADMIN — DOXEPIN HYDROCHLORIDE 150 MG: 25 CAPSULE ORAL at 09:11

## 2023-11-23 RX ADMIN — DIVALPROEX SODIUM 500 MG: 250 TABLET, EXTENDED RELEASE ORAL at 09:11

## 2023-11-23 RX ADMIN — DIVALPROEX SODIUM 500 MG: 250 TABLET, EXTENDED RELEASE ORAL at 03:11

## 2023-11-23 RX ADMIN — OLANZAPINE 20 MG: 5 TABLET, FILM COATED ORAL at 09:11

## 2023-11-23 RX ADMIN — DULOXETINE HYDROCHLORIDE 30 MG: 30 CAPSULE, DELAYED RELEASE ORAL at 09:11

## 2023-11-23 RX ADMIN — MIRTAZAPINE 30 MG: 15 TABLET, FILM COATED ORAL at 09:11

## 2023-11-23 NOTE — PLAN OF CARE
Plan of care reviewed with patient. Patient verbalized complete understanding. Two hour patient rounding maintained throughout shift. All fall precautions maintained. Bed in lowest position, locked, call light within reach. Side rails up x 2. Slip resistant socks maintained. Needs attended to.   Problem: Adult Inpatient Plan of Care  Goal: Plan of Care Review  Outcome: Ongoing, Progressing  Goal: Patient-Specific Goal (Individualized)  Outcome: Ongoing, Progressing  Goal: Absence of Hospital-Acquired Illness or Injury  Outcome: Ongoing, Progressing  Goal: Optimal Comfort and Wellbeing  Outcome: Ongoing, Progressing  Goal: Readiness for Transition of Care  Outcome: Ongoing, Progressing     Problem: Adjustment to Illness (Gastrointestinal Bleeding)  Goal: Optimal Coping with Acute Illness  Outcome: Ongoing, Progressing     Problem: Bleeding (Gastrointestinal Bleeding)  Goal: Hemostasis  Outcome: Ongoing, Progressing     Problem: Fluid and Electrolyte Imbalance (Acute Kidney Injury/Impairment)  Goal: Fluid and Electrolyte Balance  Outcome: Ongoing, Progressing     Problem: Oral Intake Inadequate (Acute Kidney Injury/Impairment)  Goal: Optimal Nutrition Intake  Outcome: Ongoing, Progressing     Problem: Renal Function Impairment (Acute Kidney Injury/Impairment)  Goal: Effective Renal Function  Outcome: Ongoing, Progressing     Problem: Fall Injury Risk  Goal: Absence of Fall and Fall-Related Injury  Outcome: Ongoing, Progressing     Problem: Urinary Retention  Goal: Effective Urinary Elimination  Outcome: Ongoing, Progressing

## 2023-11-23 NOTE — SUBJECTIVE & OBJECTIVE
Past Medical History:   Diagnosis Date    Anxiety     Anxiety     Bipolar affective disorder     Depression     Hypertension     Pancreatitis     Schizophrenia     Seizures     Thyroid disease        Past Surgical History:   Procedure Laterality Date    LAPAROSCOPIC CHOLECYSTECTOMY N/A 3/9/2020    Procedure: CHOLECYSTECTOMY, LAPAROSCOPIC;  Surgeon: Trenton Deshpande MD;  Location: [facility];  Service: General;  Laterality: N/A;       Review of patient's allergies indicates:  No Known Allergies    No current facility-administered medications on file prior to encounter.     Current Outpatient Medications on File Prior to Encounter   Medication Sig    acamprosate calcium (CAMPRAL ORAL) Take 666 mg by mouth 2 (two) times a day.    amLODIPine (NORVASC) 10 MG tablet Take 10 mg by mouth once daily.    busPIRone (BUSPAR) 30 MG Tab Take 30 mg by mouth 2 (two) times daily.    clonazePAM (KLONOPIN) 2 MG Tab Take 2 mg by mouth 2 (two) times daily as needed.    cloNIDine (CATAPRES) 0.1 MG tablet Take 2 tablets (0.2 mg total) by mouth 3 (three) times daily.    divalproex ER (DEPAKOTE ER) 500 MG Tb24 Take 500 mg by mouth 3 (three) times daily.     doxepin (SINEQUAN) 100 MG capsule Take 200 mg by mouth every evening.     DULoxetine (CYMBALTA) 30 MG capsule Take 30 mg by mouth once daily.    famotidine (PEPCID) 20 MG tablet Take 20 mg by mouth 2 (two) times daily. HCS    FLUoxetine 40 MG capsule Take 40 mg by mouth once daily.    fluticasone propionate (FLONASE) 50 mcg/actuation nasal spray 1 spray by Each Nostril route once daily.    FOLIC ACID ORAL Take 2 mg by mouth once daily.    gabapentin (NEURONTIN) 400 MG capsule Take 2 capsules (800 mg total) by mouth 3 (three) times daily.    HUMALOG KWIKPEN INSULIN 100 unit/mL pen ADMINISTER 5 UNITS UNDER THE SKIN THREE TIMES DAILY (Patient taking differently: Inject 5 Units into the skin 3 (three) times daily. )    lancets 33 gauge Misc 1 Package.    LANTUS SOLOSTAR U-100 INSULIN glargine  "100 units/mL (3mL) SubQ pen ADMINISTER 30 UNITS UNDER THE SKIN EVERY EVENING    lisinopriL 10 MG tablet Take 10 mg by mouth 2 (two) times daily.    loratadine (CLARITIN) 10 mg tablet Take 10 mg by mouth once daily.    losartan (COZAAR) 25 MG tablet Take 25 mg by mouth once daily.    metFORMIN (GLUCOPHAGE) 500 MG tablet Take 500 mg by mouth 2 (two) times daily with meals.    mirtazapine (REMERON) 30 MG tablet Take 1 tablet (30 mg total) by mouth every evening.    multivitamin capsule Take 1 capsule by mouth once daily.    NOVOLOG FLEXPEN U-100 INSULIN 100 unit/mL (3 mL) InPn pen Inject into the skin 3 (three) times daily with meals.    OLANZapine (ZYPREXA) 20 MG tablet Take 20 mg by mouth every evening.    omeprazole (PRILOSEC) 40 MG capsule Take 40 mg by mouth every morning.    OXcarbazepine (TRILEPTAL) 300 MG Tab Take 150 mg by mouth 2 (two) times daily.    pantoprazole (PROTONIX) 40 MG tablet Take 1 tablet (40 mg total) by mouth once daily.    pen needle, diabetic 32 gauge x 5/32" Ndle 1 Box.    prazosin (MINIPRESS) 2 MG Cap Take 2 mg by mouth every evening. HCS    propranoloL (INDERAL) 10 MG tablet Take 10 mg by mouth 2 (two) times daily.    QUEtiapine (SEROQUEL) 50 MG tablet Take 1 tablet (50 mg total) by mouth 2 (two) times a day. (Patient taking differently: Take 600 mg by mouth 2 (two) times a day.)    thiamine 100 MG tablet Take 100 mg by mouth once daily.    traZODone (DESYREL) 100 MG tablet Take 100 mg by mouth every evening.    TRULICITY 4.5 mg/0.5 mL pen injector Inject 4.5 mg into the skin every 7 days.    VIVITROL 380 mg SSRR kit Inject 380 mg into the muscle every 30 days.     Family History       Problem Relation (Age of Onset)    Cancer Paternal Grandfather    Diabetes Maternal Grandmother    Hypertension Maternal Grandfather          Tobacco Use    Smoking status: Some Days     Current packs/day: 0.25     Average packs/day: 0.3 packs/day for 7.0 years (1.8 ttl pk-yrs)     Types: Cigarettes    " Smokeless tobacco: Never   Substance and Sexual Activity    Alcohol use: Yes     Alcohol/week: 2.0 standard drinks of alcohol     Types: 2 Glasses of wine per week     Comment: occasionally    Drug use: Yes     Frequency: 7.0 times per week     Types: Marijuana    Sexual activity: Yes     Partners: Female     Review of Systems   Constitutional:  Negative for activity change, chills, diaphoresis and fever.   HENT:  Negative for congestion, nosebleeds and tinnitus.    Eyes:  Negative for photophobia and visual disturbance.   Respiratory:  Negative for cough, chest tightness, shortness of breath and wheezing.    Cardiovascular:  Negative for chest pain, palpitations and leg swelling.   Gastrointestinal:  Positive for nausea and vomiting. Negative for abdominal distention, abdominal pain, constipation and diarrhea.        Hematemesis   Endocrine: Negative for cold intolerance and heat intolerance.   Genitourinary:  Negative for difficulty urinating, dysuria, frequency, hematuria and urgency.   Musculoskeletal:  Negative for arthralgias, back pain and myalgias.   Skin:  Negative for pallor, rash and wound.   Allergic/Immunologic: Negative for immunocompromised state.   Neurological:  Negative for dizziness, tremors, facial asymmetry, speech difficulty and weakness.   Hematological:  Negative for adenopathy. Does not bruise/bleed easily.   Psychiatric/Behavioral:  Negative for confusion and sleep disturbance. The patient is not nervous/anxious.      Objective:     Vital Signs (Most Recent):  Temp: 98 °F (36.7 °C) (11/22/23 1551)  Pulse: 82 (11/22/23 1840)  Resp: 18 (11/22/23 1800)  BP: 125/82 (11/22/23 1800)  SpO2: 98 % (11/22/23 1840) Vital Signs (24h Range):  Temp:  [98 °F (36.7 °C)] 98 °F (36.7 °C)  Pulse:  [] 82  Resp:  [18-20] 18  SpO2:  [98 %] 98 %  BP: (112-125)/(71-82) 125/82     Weight: 82.1 kg (181 lb)  Body mass index is 28.35 kg/m².     Physical Exam  Vitals and nursing note reviewed.   Constitutional:        General: He is not in acute distress.     Appearance: He is well-developed. He is ill-appearing. He is not diaphoretic.   HENT:      Head: Normocephalic.      Mouth/Throat:      Mouth: Mucous membranes are dry.   Eyes:      General: No scleral icterus.     Conjunctiva/sclera: Conjunctivae normal.      Pupils: Pupils are equal, round, and reactive to light.   Neck:      Vascular: No JVD.   Cardiovascular:      Rate and Rhythm: Regular rhythm. Tachycardia present.      Heart sounds: Normal heart sounds. No murmur heard.     No friction rub. No gallop.   Pulmonary:      Effort: Pulmonary effort is normal. No respiratory distress.      Breath sounds: Normal breath sounds. No wheezing or rales.   Abdominal:      General: Bowel sounds are normal. There is no distension.      Palpations: Abdomen is soft.      Tenderness: There is no abdominal tenderness. There is no guarding or rebound.   Musculoskeletal:         General: No tenderness. Normal range of motion.      Cervical back: Normal range of motion and neck supple.   Lymphadenopathy:      Cervical: No cervical adenopathy.   Skin:     General: Skin is warm and dry.      Capillary Refill: Capillary refill takes less than 2 seconds.      Coloration: Skin is not pale.      Findings: No erythema or rash.   Neurological:      Mental Status: He is alert and oriented to person, place, and time.      Cranial Nerves: No cranial nerve deficit.      Sensory: No sensory deficit.      Coordination: Coordination normal.      Deep Tendon Reflexes: Reflexes normal.   Psychiatric:         Behavior: Behavior normal.         Thought Content: Thought content normal.         Judgment: Judgment normal.              CRANIAL NERVES     CN III, IV, VI   Pupils are equal, round, and reactive to light.       Significant Labs: All pertinent labs within the past 24 hours have been reviewed.  CBC:   Recent Labs   Lab 11/22/23  1712   WBC 10.23   HGB 9.3*   HCT 29.5*        CMP:   Recent  Labs   Lab 11/22/23  1712   *   K 4.1   CL 99   CO2 22*   *   BUN 23*   CREATININE 1.5*   CALCIUM 10.3   PROT 7.0   ALBUMIN 4.0   BILITOT 0.3   ALKPHOS 79   AST 10   ALT 9*   ANIONGAP 13       Significant Imaging: I have reviewed all pertinent imaging results/findings within the past 24 hours.

## 2023-11-23 NOTE — PHARMACY MED REC
"              .        Admission Medication History     The home medication history was taken by Ella Negro.    You may go to "Admission" then "Reconcile Home Medications" tabs to review and/or act upon these items.     The home medication list has been updated by the Pharmacy department.   Please read ALL comments highlighted in yellow.   Please address this information as you see fit.    Feel free to contact us if you have any questions or require assistance.      The medications listed below were removed from the home medication list. Please reorder if appropriate:  Patient reports no longer taking the following medication(s):  Multivitamin      Medications listed below were obtained from: Patient/family and Analytic software- [employer]  No current facility-administered medications on file prior to encounter.     Current Outpatient Medications on File Prior to Encounter   Medication Sig Dispense Refill    acamprosate calcium (CAMPRAL ORAL) Take 666 mg by mouth 2 (two) times a day.      amLODIPine (NORVASC) 10 MG tablet Take 10 mg by mouth once daily.      busPIRone (BUSPAR) 30 MG Tab Take 30 mg by mouth 2 (two) times daily.      clonazePAM (KLONOPIN) 2 MG Tab Take 2 mg by mouth 2 (two) times daily as needed.      cloNIDine (CATAPRES) 0.1 MG tablet Take 2 tablets (0.2 mg total) by mouth 3 (three) times daily. 180 tablet 0    divalproex ER (DEPAKOTE ER) 500 MG Tb24 Take 500 mg by mouth 3 (three) times daily.       DULoxetine (CYMBALTA) 30 MG capsule Take 30 mg by mouth once daily.      famotidine (PEPCID) 20 MG tablet Take 20 mg by mouth 2 (two) times daily. HCS      fluticasone propionate (FLONASE) 50 mcg/actuation nasal spray 1 spray by Each Nostril route once daily.      FOLIC ACID ORAL Take 2 mg by mouth once daily.      gabapentin (NEURONTIN) 400 MG capsule Take 2 capsules (800 mg total) by mouth 3 (three) times daily. 180 capsule 0    LANTUS SOLOSTAR U-100 INSULIN glargine 100 units/mL (3mL) " "SubQ pen ADMINISTER 30 UNITS UNDER THE SKIN EVERY EVENING (Patient taking differently: Inject 30 Units into the skin every evening.) 9 mL 2    lisinopriL 10 MG tablet Take 10 mg by mouth 2 (two) times daily.      loratadine (CLARITIN) 10 mg tablet Take 10 mg by mouth once daily.      metFORMIN (GLUCOPHAGE) 500 MG tablet Take 1,000 mg by mouth 2 (two) times daily with meals.      mirtazapine (REMERON) 30 MG tablet Take 1 tablet (30 mg total) by mouth every evening. 30 tablet 0    OLANZapine (ZYPREXA) 20 MG tablet Take 20 mg by mouth every evening.      omeprazole (PRILOSEC) 40 MG capsule Take 40 mg by mouth every morning.      pantoprazole (PROTONIX) 40 MG tablet Take 1 tablet (40 mg total) by mouth once daily. 90 tablet 0    prazosin (MINIPRESS) 2 MG Cap Take 2 mg by mouth every evening.      QUEtiapine (SEROQUEL) 50 MG tablet Take 1 tablet (50 mg total) by mouth 2 (two) times a day. (Patient taking differently: Take 600 mg by mouth 2 (two) times a day.) 60 tablet 0    traZODone (DESYREL) 100 MG tablet Take 100 mg by mouth every evening.      TRULICITY 4.5 mg/0.5 mL pen injector Inject 4.5 mg into the skin every 7 days. TUESDAYS      doxepin (SINEQUAN) 100 MG capsule Take 200 mg by mouth every evening.       FLUoxetine 40 MG capsule Take 40 mg by mouth once daily.      HUMALOG KWIKPEN INSULIN 100 unit/mL pen ADMINISTER 5 UNITS UNDER THE SKIN THREE TIMES DAILY (Patient not taking: Reported on 11/22/2023) 6 mL 3    lancets 33 gauge Misc 1 Package.      NOVOLOG FLEXPEN U-100 INSULIN 100 unit/mL (3 mL) InPn pen Inject into the skin 3 (three) times daily with meals.      OXcarbazepine (TRILEPTAL) 300 MG Tab Take 150 mg by mouth 2 (two) times daily.      pen needle, diabetic 32 gauge x 5/32" Ndle 1 Box.      propranoloL (INDERAL) 10 MG tablet Take 10 mg by mouth 2 (two) times daily.      thiamine 100 MG tablet Take 100 mg by mouth once daily.      VIVITROL 380 mg SSRR kit Inject 380 mg into the muscle " every 30 days.      [DISCONTINUED] losartan (COZAAR) 25 MG tablet Take 25 mg by mouth once daily.      [DISCONTINUED] multivitamin capsule Take 1 capsule by mouth once daily.         Potential issues to be addressed PRIOR TO DISCHARGE  Patient reported not taking the following medications: (Doxepin, Fluoxetine, Humalog, Losartan, Novolog, Trileptal, Propranolol & Thiamine). These medications remain on the home medication list. Please address accordingly.     Ella Negro  EXT 1924

## 2023-11-23 NOTE — ASSESSMENT & PLAN NOTE
Patient with acute kidney injury/acute renal failure likely due to pre-renal azotemia due to IVVD CHAPO is currently improving. Baseline creatinine unknown - Labs reviewed- Renal function/electrolytes with Estimated Creatinine Clearance: 91.4 mL/min (based on SCr of 1.1 mg/dL). according to latest data. Monitor urine output and serial BMP and adjust therapy as needed. Avoid nephrotoxins and renally dose meds for GFR listed above.    Resolved

## 2023-11-23 NOTE — PROGRESS NOTES
11/22/23 2025 11/22/23 2055 11/22/23 2057   Vital Signs   BP (!) 127/93 (!) 138/102 (!) 157/116   MAP (mmHg) 106 117 130   BP Location Left forearm Left forearm Left forearm   Patient Position Lying Sitting Standing   Orthostatic VS Yes Yes Yes   Is Patient Symptomatic in this Position? No No No     Orthostatic VS complete as ordered

## 2023-11-23 NOTE — CONSULTS
Chief Complaint   Patient presents with    Hematemesis       Dark red vomit starting last night     Abdominal Pain      HPI: Bradley Cousin is a 43-year-old male who presents emergency room for evaluation of hematemesis.  He reports that approximately 36 hours ago he began experiencing hematemesis.  He describes the vomitus as dark red.  He denies any coffee-ground substance.  He denies any hematochezia.  He reports he completed inpatient alcohol rehab 8 days ago.  He denies any alcohol use since his discharge.  Previous medical history includes ETOH abuse, hypertension, pancreatitis, hypothyroidism, active smoker, seizure disorder, and schizophrenia.  ER workup:  CBC with mild anemia of 9 and 29.  CMP with glucose of 197, BUN 23, creatinine 1.5.  Patient be admitted to Hospital Medicine for treatment and management.  GI was consulted and recommend octreotide bolus and infusion as well as IV Protonix.  Patient be maintain NPO after midnight tonight for possible EGD in a.m..    GI consulted for hematemesis. Pt described hematemesis approx 2 nights prior to admission, dark blood, nearly black. No recurrent N/V past 48 hours. Denies abd pain. No hematochezia or melena. Positive GERD. No hx ulcer disease. Positive hx ETOH, recent rehab. No known hx cirrhosis or prior GI bleed. No NSAIDs. H/H stable since admit, currently 9.5/30.7. Pt lying comfortably in bed, feels well, tolerating clear liquids. No recent EGD.    Past Medical History:   Diagnosis Date    Anxiety     Anxiety     Bipolar affective disorder     Depression     Hypertension     Pancreatitis     Schizophrenia     Seizures     Thyroid disease      Past Surgical History:   Procedure Laterality Date    LAPAROSCOPIC CHOLECYSTECTOMY N/A 3/9/2020    Procedure: CHOLECYSTECTOMY, LAPAROSCOPIC;  Surgeon: Trenton Deshpande MD;  Location: [facility];  Service: General;  Laterality: N/A;     Scheduled Meds:   busPIRone  30 mg Oral BID    cefTRIAXone (ROCEPHIN) IVPB  1 g  Intravenous Q24H    divalproex ER  500 mg Oral TID    doxepin  150 mg Oral QHS    DULoxetine  30 mg Oral Daily    gabapentin  600 mg Oral QHS    mirtazapine  30 mg Oral QHS    OLANZapine  20 mg Oral QHS    OXcarbazepine  150 mg Oral BID    pantoprazole  40 mg Intravenous BID    prazosin  2 mg Oral QHS    thiamine  100 mg Oral Daily     Continuous Infusions:   octreotide (SANDOSTATIN) 500 mcg in sodium chloride 0.9% 100 mL infusion 50 mcg/hr (11/23/23 0313)     PRN Meds:.clonazePAM, dextrose 10%, dextrose 10%, glucagon (human recombinant), glucose, glucose, insulin aspart U-100, magnesium sulfate IVPB, magnesium sulfate IVPB  Review of patient's allergies indicates:  No Known Allergies  Social History     Socioeconomic History    Marital status: Single   Tobacco Use    Smoking status: Some Days     Current packs/day: 0.25     Average packs/day: 0.3 packs/day for 7.0 years (1.8 ttl pk-yrs)     Types: Cigarettes    Smokeless tobacco: Never   Substance and Sexual Activity    Alcohol use: Yes     Alcohol/week: 2.0 standard drinks of alcohol     Types: 2 Glasses of wine per week     Comment: occasionally    Drug use: Yes     Frequency: 7.0 times per week     Types: Marijuana    Sexual activity: Yes     Partners: Female     Family History   Problem Relation Age of Onset    Diabetes Maternal Grandmother     Hypertension Maternal Grandfather     Cancer Paternal Grandfather      REVIEW OF SYSTEMS:   Constitutional: Negative for fever, appetite change and unexpected weight change.  HENT: Negative for sore throat and trouble swallowing.  Eyes: Negative for visual disturbance.  Respiratory: Negative for chest tightness, shortness of breath and wheezing.  Cardiovascular: Negative for chest pain.  Gastrointestinal:  as per HPI  Genitourinary: Negative for dysuria, frequency and hematuria.  Musculoskeletal: Negative for myalgias, joint swelling and arthralgias.  Skin: Negative for color change and rash.   Neurological: Negative for  syncope, weakness and headaches.  Psychiatric/Behavioral: Negative for confusion.    PHYSICAL EXAMINATION:                                                        GENERAL:  Comfortable, in no acute distress.      SKIN: Non-jaundiced.                             HEENT EXAM:  Nonicteric.  No adenopathy.  Oropharynx is clear.               NECK:  Supple.                                                               LUNGS:  Clear.                                                               CARDIAC:  Regular rate and rhythm.  S1, S2.  No murmur.                      ABDOMEN:  Soft, positive bowel sounds, nontender.  No hepatosplenomegaly or masses.  No rebound or guarding.                                             EXTREMITIES:  No edema.     NEURO: CN II-XII intact; motor/sensory non-focal.    LABS: Reviewed (H/H 9.5/30.7, plts 250k, AST/ALT 84/65, normal bili/AP)    RADIOLOGY: Reviewed    IMP: 1. Hematemesis (resolved) - H/H stable without evidence of active bleeding.           2. GERD           3. Elevated LFT's           4. Hx ETOH    REC: 1. Empiric protonix            2. Check PT/INR; viral hepatitis serology            3. Abd/liver U/S            4. EGD in AM

## 2023-11-23 NOTE — NURSING
0058 pt had urge to urinate but has retention. Bladder scan pt 562 ml. NP notfied. Straight cath ordered.

## 2023-11-23 NOTE — NURSING
Nurses Note -- 4 Eyes      11/22/2023   11:27 PM      Skin assessed during: Admit      [x] No Altered Skin Integrity Present    []Prevention Measures Documented      [] Yes- Altered Skin Integrity Present or Discovered   [] LDA Added if Not in Epic (Describe Wound)   [] New Altered Skin Integrity was Present on Admit and Documented in LDA   [] Wound Image Taken    Wound Care Consulted? No    Attending Nurse:  Randa Anguiano RN/Staff Member:  Joon

## 2023-11-23 NOTE — NURSING
0222 Assisted pt to bathroom to urinate. Pt experiencing retention. Bladder scan measured 658 mL. Pt refused to be catheterized. Pt education provided. Sherlyn Bang NP notified.

## 2023-11-23 NOTE — H&P
[facility] Medicine  History & Physical    Patient Name: Bradley Collado  MRN: [id number]  Patient Class: IP- Inpatient  Admission Date: 11/22/2023  Attending Physician: Chadd Zabala MD   Primary Care Provider: Nanette Primary Doctor         Patient information was obtained from patient, past medical records, and ER records.     Subjective:     Principal Problem:GI bleed    Chief Complaint:   Chief Complaint   Patient presents with    Hematemesis     Dark red vomit starting last night     Abdominal Pain        HPI: Bradley Collado is a 43-year-old male who presents emergency room for evaluation of hematemesis.  He reports that approximately 36 hours ago he began experiencing hematemesis.  He describes the vomitus as dark red.  He denies any coffee-ground substance.  He denies any hematochezia.  He reports he completed inpatient alcohol rehab 8 days ago.  He denies any alcohol use since his discharge.  Previous medical history includes ETOH abuse, hypertension, pancreatitis, hypothyroidism, active smoker, seizure disorder, and schizophrenia.  ER workup:  CBC with mild anemia of 9 and 29.  CMP with glucose of 197, BUN 23, creatinine 1.5.  Patient be admitted to Hospital Medicine for treatment and management.  GI was consulted and recommend octreotide bolus and infusion as well as IV Protonix.  Patient be maintain NPO after midnight tonight for possible EGD in a.m..    Past Medical History:   Diagnosis Date    Anxiety     Anxiety     Bipolar affective disorder     Depression     Hypertension     Pancreatitis     Schizophrenia     Seizures     Thyroid disease        Past Surgical History:   Procedure Laterality Date    LAPAROSCOPIC CHOLECYSTECTOMY N/A 3/9/2020    Procedure: CHOLECYSTECTOMY, LAPAROSCOPIC;  Surgeon: Trenton Deshpande MD;  Location: [facility];  Service: General;  Laterality: N/A;       Review of patient's allergies indicates:  No Known Allergies    No current facility-administered  medications on file prior to encounter.     Current Outpatient Medications on File Prior to Encounter   Medication Sig    acamprosate calcium (CAMPRAL ORAL) Take 666 mg by mouth 2 (two) times a day.    amLODIPine (NORVASC) 10 MG tablet Take 10 mg by mouth once daily.    busPIRone (BUSPAR) 30 MG Tab Take 30 mg by mouth 2 (two) times daily.    clonazePAM (KLONOPIN) 2 MG Tab Take 2 mg by mouth 2 (two) times daily as needed.    cloNIDine (CATAPRES) 0.1 MG tablet Take 2 tablets (0.2 mg total) by mouth 3 (three) times daily.    divalproex ER (DEPAKOTE ER) 500 MG Tb24 Take 500 mg by mouth 3 (three) times daily.     doxepin (SINEQUAN) 100 MG capsule Take 200 mg by mouth every evening.     DULoxetine (CYMBALTA) 30 MG capsule Take 30 mg by mouth once daily.    famotidine (PEPCID) 20 MG tablet Take 20 mg by mouth 2 (two) times daily. HCS    FLUoxetine 40 MG capsule Take 40 mg by mouth once daily.    fluticasone propionate (FLONASE) 50 mcg/actuation nasal spray 1 spray by Each Nostril route once daily.    FOLIC ACID ORAL Take 2 mg by mouth once daily.    gabapentin (NEURONTIN) 400 MG capsule Take 2 capsules (800 mg total) by mouth 3 (three) times daily.    HUMALOG KWIKPEN INSULIN 100 unit/mL pen ADMINISTER 5 UNITS UNDER THE SKIN THREE TIMES DAILY (Patient taking differently: Inject 5 Units into the skin 3 (three) times daily. )    lancets 33 gauge Misc 1 Package.    LANTUS SOLOSTAR U-100 INSULIN glargine 100 units/mL (3mL) SubQ pen ADMINISTER 30 UNITS UNDER THE SKIN EVERY EVENING    lisinopriL 10 MG tablet Take 10 mg by mouth 2 (two) times daily.    loratadine (CLARITIN) 10 mg tablet Take 10 mg by mouth once daily.    losartan (COZAAR) 25 MG tablet Take 25 mg by mouth once daily.    metFORMIN (GLUCOPHAGE) 500 MG tablet Take 500 mg by mouth 2 (two) times daily with meals.    mirtazapine (REMERON) 30 MG tablet Take 1 tablet (30 mg total) by mouth every evening.    multivitamin capsule Take 1 capsule by mouth once daily.     "NOVOLOG FLEXPEN U-100 INSULIN 100 unit/mL (3 mL) InPn pen Inject into the skin 3 (three) times daily with meals.    OLANZapine (ZYPREXA) 20 MG tablet Take 20 mg by mouth every evening.    omeprazole (PRILOSEC) 40 MG capsule Take 40 mg by mouth every morning.    OXcarbazepine (TRILEPTAL) 300 MG Tab Take 150 mg by mouth 2 (two) times daily.    pantoprazole (PROTONIX) 40 MG tablet Take 1 tablet (40 mg total) by mouth once daily.    pen needle, diabetic 32 gauge x 5/32" Ndle 1 Box.    prazosin (MINIPRESS) 2 MG Cap Take 2 mg by mouth every evening. HCS    propranoloL (INDERAL) 10 MG tablet Take 10 mg by mouth 2 (two) times daily.    QUEtiapine (SEROQUEL) 50 MG tablet Take 1 tablet (50 mg total) by mouth 2 (two) times a day. (Patient taking differently: Take 600 mg by mouth 2 (two) times a day.)    thiamine 100 MG tablet Take 100 mg by mouth once daily.    traZODone (DESYREL) 100 MG tablet Take 100 mg by mouth every evening.    TRULICITY 4.5 mg/0.5 mL pen injector Inject 4.5 mg into the skin every 7 days.    VIVITROL 380 mg SSRR kit Inject 380 mg into the muscle every 30 days.     Family History       Problem Relation (Age of Onset)    Cancer Paternal Grandfather    Diabetes Maternal Grandmother    Hypertension Maternal Grandfather          Tobacco Use    Smoking status: Some Days     Current packs/day: 0.25     Average packs/day: 0.3 packs/day for 7.0 years (1.8 ttl pk-yrs)     Types: Cigarettes    Smokeless tobacco: Never   Substance and Sexual Activity    Alcohol use: Yes     Alcohol/week: 2.0 standard drinks of alcohol     Types: 2 Glasses of wine per week     Comment: occasionally    Drug use: Yes     Frequency: 7.0 times per week     Types: Marijuana    Sexual activity: Yes     Partners: Female     Review of Systems   Constitutional:  Negative for activity change, chills, diaphoresis and fever.   HENT:  Negative for congestion, nosebleeds and tinnitus.    Eyes:  Negative for photophobia and visual disturbance. "   Respiratory:  Negative for cough, chest tightness, shortness of breath and wheezing.    Cardiovascular:  Negative for chest pain, palpitations and leg swelling.   Gastrointestinal:  Positive for nausea and vomiting. Negative for abdominal distention, abdominal pain, constipation and diarrhea.        Hematemesis   Endocrine: Negative for cold intolerance and heat intolerance.   Genitourinary:  Negative for difficulty urinating, dysuria, frequency, hematuria and urgency.   Musculoskeletal:  Negative for arthralgias, back pain and myalgias.   Skin:  Negative for pallor, rash and wound.   Allergic/Immunologic: Negative for immunocompromised state.   Neurological:  Negative for dizziness, tremors, facial asymmetry, speech difficulty and weakness.   Hematological:  Negative for adenopathy. Does not bruise/bleed easily.   Psychiatric/Behavioral:  Negative for confusion and sleep disturbance. The patient is not nervous/anxious.      Objective:     Vital Signs (Most Recent):  Temp: 98 °F (36.7 °C) (11/22/23 1551)  Pulse: 82 (11/22/23 1840)  Resp: 18 (11/22/23 1800)  BP: 125/82 (11/22/23 1800)  SpO2: 98 % (11/22/23 1840) Vital Signs (24h Range):  Temp:  [98 °F (36.7 °C)] 98 °F (36.7 °C)  Pulse:  [] 82  Resp:  [18-20] 18  SpO2:  [98 %] 98 %  BP: (112-125)/(71-82) 125/82     Weight: 82.1 kg (181 lb)  Body mass index is 28.35 kg/m².     Physical Exam  Vitals and nursing note reviewed.   Constitutional:       General: He is not in acute distress.     Appearance: He is well-developed. He is ill-appearing. He is not diaphoretic.   HENT:      Head: Normocephalic.      Mouth/Throat:      Mouth: Mucous membranes are dry.   Eyes:      General: No scleral icterus.     Conjunctiva/sclera: Conjunctivae normal.      Pupils: Pupils are equal, round, and reactive to light.   Neck:      Vascular: No JVD.   Cardiovascular:      Rate and Rhythm: Regular rhythm. Tachycardia present.      Heart sounds: Normal heart sounds. No murmur  heard.     No friction rub. No gallop.   Pulmonary:      Effort: Pulmonary effort is normal. No respiratory distress.      Breath sounds: Normal breath sounds. No wheezing or rales.   Abdominal:      General: Bowel sounds are normal. There is no distension.      Palpations: Abdomen is soft.      Tenderness: There is no abdominal tenderness. There is no guarding or rebound.   Musculoskeletal:         General: No tenderness. Normal range of motion.      Cervical back: Normal range of motion and neck supple.   Lymphadenopathy:      Cervical: No cervical adenopathy.   Skin:     General: Skin is warm and dry.      Capillary Refill: Capillary refill takes less than 2 seconds.      Coloration: Skin is not pale.      Findings: No erythema or rash.   Neurological:      Mental Status: He is alert and oriented to person, place, and time.      Cranial Nerves: No cranial nerve deficit.      Sensory: No sensory deficit.      Coordination: Coordination normal.      Deep Tendon Reflexes: Reflexes normal.   Psychiatric:         Behavior: Behavior normal.         Thought Content: Thought content normal.         Judgment: Judgment normal.              CRANIAL NERVES     CN III, IV, VI   Pupils are equal, round, and reactive to light.       Significant Labs: All pertinent labs within the past 24 hours have been reviewed.  CBC:   Recent Labs   Lab 11/22/23  1712   WBC 10.23   HGB 9.3*   HCT 29.5*        CMP:   Recent Labs   Lab 11/22/23  1712   *   K 4.1   CL 99   CO2 22*   *   BUN 23*   CREATININE 1.5*   CALCIUM 10.3   PROT 7.0   ALBUMIN 4.0   BILITOT 0.3   ALKPHOS 79   AST 10   ALT 9*   ANIONGAP 13       Significant Imaging: I have reviewed all pertinent imaging results/findings within the past 24 hours.  Assessment/Plan:     * GI bleed  Acute problem   GI consult   GI bleed pathway   Protonix   Octreotide bolus and infusion   NPO after midnight      Dependence on nicotine from cigarettes  Dangers of cigarette  smoking were reviewed with patient in detail. Patient was Counseled for 3-10 minutes. Nicotine replacement options were discussed. Nicotine replacement was discussed- not prescribed per patient's request    CHAPO (acute kidney injury)  Patient with acute kidney injury/acute renal failure likely due to pre-renal azotemia due to IVVD CHAPO is currently worsening. Baseline creatinine unknown - Labs reviewed- Renal function/electrolytes with Estimated Creatinine Clearance: 66.5 mL/min (A) (based on SCr of 1.5 mg/dL (H)). according to latest data. Monitor urine output and serial BMP and adjust therapy as needed. Avoid nephrotoxins and renally dose meds for GFR listed above.    Hypertension  Chronic, controlled. Latest blood pressure and vitals reviewed-     Temp:  [98 °F (36.7 °C)]   Pulse:  []   Resp:  [18-20]   BP: (112-125)/(71-82)   SpO2:  [98 %] .   Home meds for hypertension were reviewed and noted below.   Hypertension Medications               amLODIPine (NORVASC) 10 MG tablet Take 10 mg by mouth once daily.    cloNIDine (CATAPRES) 0.1 MG tablet Take 2 tablets (0.2 mg total) by mouth 3 (three) times daily.    lisinopriL 10 MG tablet Take 10 mg by mouth 2 (two) times daily.    losartan (COZAAR) 25 MG tablet Take 25 mg by mouth once daily.    prazosin (MINIPRESS) 2 MG Cap Take 2 mg by mouth every evening. HCS    propranoloL (INDERAL) 10 MG tablet Take 10 mg by mouth 2 (two) times daily.            While in the hospital, will manage blood pressure as follows; Continue home antihypertensive regimen    Will utilize p.r.n. blood pressure medication only if patient's blood pressure greater than 160/100 and he develops symptoms such as worsening chest pain or shortness of breath.      VTE Risk Mitigation (From admission, onward)      None                            Sonny Soto NP  Department of Hospital Medicine  [facility]

## 2023-11-23 NOTE — ASSESSMENT & PLAN NOTE
Patient with acute kidney injury/acute renal failure likely due to pre-renal azotemia due to IVVD CHAPO is currently worsening. Baseline creatinine unknown - Labs reviewed- Renal function/electrolytes with Estimated Creatinine Clearance: 66.5 mL/min (A) (based on SCr of 1.5 mg/dL (H)). according to latest data. Monitor urine output and serial BMP and adjust therapy as needed. Avoid nephrotoxins and renally dose meds for GFR listed above.

## 2023-11-23 NOTE — PROGRESS NOTES
[facility] Medicine  Progress Note    Patient Name: Bradley Collado  MRN: [id number]  Patient Class: OP- Observation   Admission Date: 11/22/2023  Length of Stay: 1 days  Attending Physician: Chadd Zabala MD  Primary Care Provider: Cassius Oneill MD        Subjective:     Principal Problem:GI bleed        HPI:  Bradley Collado is a 43-year-old male who presents emergency room for evaluation of hematemesis.  He reports that approximately 36 hours ago he began experiencing hematemesis.  He describes the vomitus as dark red.  He denies any coffee-ground substance.  He denies any hematochezia.  He reports he completed inpatient alcohol rehab 8 days ago.  He denies any alcohol use since his discharge.  Previous medical history includes ETOH abuse, hypertension, pancreatitis, hypothyroidism, active smoker, seizure disorder, and schizophrenia.  ER workup:  CBC with mild anemia of 9 and 29.  CMP with glucose of 197, BUN 23, creatinine 1.5.  Patient be admitted to Hospital Medicine for treatment and management.  GI was consulted and recommend octreotide bolus and infusion as well as IV Protonix.  Patient be maintain NPO after midnight tonight for possible EGD in a.m..    Overview/Hospital Course:  No notes on file    Interval History:  Patient seen and examined.  No acute events overnight.  Has not had any further episodes of vomiting.  H/H is stable this morning.  He does report mild umbilical abdominal pain.    Review of Systems   Gastrointestinal:  Positive for abdominal pain and vomiting.        Hematemesis   All other systems reviewed and are negative.    Objective:     Vital Signs (Most Recent):  Temp: 97.4 °F (36.3 °C) (11/23/23 0743)  Pulse: 73 (11/23/23 0743)  Resp: 16 (11/23/23 0743)  BP: 107/70 (11/23/23 0743)  SpO2: 97 % (11/23/23 0743) Vital Signs (24h Range):  Temp:  [97.4 °F (36.3 °C)-98 °F (36.7 °C)] 97.4 °F (36.3 °C)  Pulse:  [] 73  Resp:  [16-20] 16  SpO2:  [97  %-99 %] 97 %  BP: (102-157)/() 107/70     Weight: 83.5 kg (184 lb 1.4 oz)  Body mass index is 28.83 kg/m².    Intake/Output Summary (Last 24 hours) at 11/23/2023 1049  Last data filed at 11/23/2023 0427  Gross per 24 hour   Intake 468 ml   Output 550 ml   Net -82 ml         Physical Exam  Constitutional:       General: He is not in acute distress.     Appearance: He is normal weight. He is not ill-appearing, toxic-appearing or diaphoretic.   HENT:      Mouth/Throat:      Mouth: Mucous membranes are moist.      Pharynx: Oropharynx is clear.   Cardiovascular:      Rate and Rhythm: Normal rate and regular rhythm.      Pulses: Normal pulses.      Heart sounds: Normal heart sounds.   Pulmonary:      Effort: Pulmonary effort is normal. No respiratory distress.      Breath sounds: Normal breath sounds.   Abdominal:      General: Bowel sounds are normal. There is no distension.      Palpations: Abdomen is soft.      Tenderness: There is abdominal tenderness (Umbilical area). There is no guarding.   Skin:     General: Skin is warm and dry.      Coloration: Skin is not jaundiced or pale.   Neurological:      Mental Status: He is alert and oriented to person, place, and time. Mental status is at baseline.   Psychiatric:         Mood and Affect: Mood normal.         Behavior: Behavior normal.             Significant Labs: All pertinent labs within the past 24 hours have been reviewed.  CBC:   Recent Labs   Lab 11/22/23  1712 11/23/23  0445   WBC 10.23 6.77   HGB 9.3* 9.5*   HCT 29.5* 30.7*    250     CMP:   Recent Labs   Lab 11/22/23  1712 11/23/23  0445   * 135*   K 4.1 4.6   CL 99 102   CO2 22* 22*   * 113*   BUN 23* 19   CREATININE 1.5* 1.1   CALCIUM 10.3 9.3   PROT 7.0 6.7   ALBUMIN 4.0 3.7   BILITOT 0.3 0.3   ALKPHOS 79 100   AST 10 84*   ALT 9* 65*   ANIONGAP 13 11       Significant Imaging: I have reviewed all pertinent imaging results/findings within the past 24 hours.    Assessment/Plan:       * GI bleed  Acute problem   GI consult   GI bleed pathway   Protonix   Octreotide bolus and infusion   NPO   Monitor H/H closely        Seizure disorder  Continue home meds  Seizure precautions        Dependence on nicotine from cigarettes  Dangers of cigarette smoking were reviewed with patient in detail. Patient was Counseled for 3-10 minutes. Nicotine replacement options were discussed. Nicotine replacement was discussed- not prescribed per patient's request    CHAPO (acute kidney injury)  Patient with acute kidney injury/acute renal failure likely due to pre-renal azotemia due to IVVD CHAPO is currently improving. Baseline creatinine unknown - Labs reviewed- Renal function/electrolytes with Estimated Creatinine Clearance: 91.4 mL/min (based on SCr of 1.1 mg/dL). according to latest data. Monitor urine output and serial BMP and adjust therapy as needed. Avoid nephrotoxins and renally dose meds for GFR listed above.    Resolved    Depression  Patient has persistent depression which is unknown and is currently controlled. Will Continue anti-depressant medications. We will not consult psychiatry at this time. Patient does not display psychosis at this time. Continue to monitor closely and adjust plan of care as needed.        Transaminitis  Trend LFTs  GI consulted  Avoid nonessential hepatotoxins    Hypertension  Chronic, controlled. Latest blood pressure and vitals reviewed-     Temp:  [97.4 °F (36.3 °C)-98 °F (36.7 °C)]   Pulse:  []   Resp:  [16-20]   BP: (102-157)/()   SpO2:  [97 %-99 %] .   Home meds for hypertension were reviewed and noted below.   Hypertension Medications               amLODIPine (NORVASC) 10 MG tablet Take 10 mg by mouth once daily.    cloNIDine (CATAPRES) 0.1 MG tablet Take 2 tablets (0.2 mg total) by mouth 3 (three) times daily.    lisinopriL 10 MG tablet Take 10 mg by mouth 2 (two) times daily.    losartan (COZAAR) 25 MG tablet Take 25 mg by mouth once daily.    prazosin  (MINIPRESS) 2 MG Cap Take 2 mg by mouth every evening. HCS    propranoloL (INDERAL) 10 MG tablet Take 10 mg by mouth 2 (two) times daily.            While in the hospital, will manage blood pressure as follows; Holding home meds for now 2/2 to normotension. Will resume once clinically appropriate.     Will utilize p.r.n. blood pressure medication only if patient's blood pressure greater than 160/100 and he develops symptoms such as worsening chest pain or shortness of breath.      VTE Risk Mitigation (From admission, onward)           Ordered     IP VTE LOW RISK PATIENT  Once         11/22/23 2020     Place MARIS hose  Until discontinued         11/22/23 2020     Place sequential compression device  Until discontinued         11/22/23 2020                    Discharge Planning   RICHAR:      Code Status: Full Code   Is the patient medically ready for discharge?:     Reason for patient still in hospital (select all that apply): Patient trending condition, Laboratory test, Treatment, and Consult recommendations  Discharge Plan A: Home                  Danielle Yuen NP  Department of Hospital Medicine   [facility]/Surg

## 2023-11-23 NOTE — ASSESSMENT & PLAN NOTE
Chronic, controlled. Latest blood pressure and vitals reviewed-     Temp:  [97.4 °F (36.3 °C)-98 °F (36.7 °C)]   Pulse:  []   Resp:  [16-20]   BP: (102-157)/()   SpO2:  [97 %-99 %] .   Home meds for hypertension were reviewed and noted below.   Hypertension Medications               amLODIPine (NORVASC) 10 MG tablet Take 10 mg by mouth once daily.    cloNIDine (CATAPRES) 0.1 MG tablet Take 2 tablets (0.2 mg total) by mouth 3 (three) times daily.    lisinopriL 10 MG tablet Take 10 mg by mouth 2 (two) times daily.    losartan (COZAAR) 25 MG tablet Take 25 mg by mouth once daily.    prazosin (MINIPRESS) 2 MG Cap Take 2 mg by mouth every evening. HCS    propranoloL (INDERAL) 10 MG tablet Take 10 mg by mouth 2 (two) times daily.            While in the hospital, will manage blood pressure as follows; Holding home meds for now 2/2 to normotension. Will resume once clinically appropriate.     Will utilize p.r.n. blood pressure medication only if patient's blood pressure greater than 160/100 and he develops symptoms such as worsening chest pain or shortness of breath.

## 2023-11-23 NOTE — SUBJECTIVE & OBJECTIVE
Interval History:  Patient seen and examined.  No acute events overnight.  Has not had any further episodes of vomiting.  H/H is stable this morning.  He does report mild umbilical abdominal pain.    Review of Systems   Gastrointestinal:  Positive for abdominal pain and vomiting.        Hematemesis   All other systems reviewed and are negative.    Objective:     Vital Signs (Most Recent):  Temp: 97.4 °F (36.3 °C) (11/23/23 0743)  Pulse: 73 (11/23/23 0743)  Resp: 16 (11/23/23 0743)  BP: 107/70 (11/23/23 0743)  SpO2: 97 % (11/23/23 0743) Vital Signs (24h Range):  Temp:  [97.4 °F (36.3 °C)-98 °F (36.7 °C)] 97.4 °F (36.3 °C)  Pulse:  [] 73  Resp:  [16-20] 16  SpO2:  [97 %-99 %] 97 %  BP: (102-157)/() 107/70     Weight: 83.5 kg (184 lb 1.4 oz)  Body mass index is 28.83 kg/m².    Intake/Output Summary (Last 24 hours) at 11/23/2023 1049  Last data filed at 11/23/2023 0427  Gross per 24 hour   Intake 468 ml   Output 550 ml   Net -82 ml         Physical Exam  Constitutional:       General: He is not in acute distress.     Appearance: He is normal weight. He is not ill-appearing, toxic-appearing or diaphoretic.   HENT:      Mouth/Throat:      Mouth: Mucous membranes are moist.      Pharynx: Oropharynx is clear.   Cardiovascular:      Rate and Rhythm: Normal rate and regular rhythm.      Pulses: Normal pulses.      Heart sounds: Normal heart sounds.   Pulmonary:      Effort: Pulmonary effort is normal. No respiratory distress.      Breath sounds: Normal breath sounds.   Abdominal:      General: Bowel sounds are normal. There is no distension.      Palpations: Abdomen is soft.      Tenderness: There is abdominal tenderness (Umbilical area). There is no guarding.   Skin:     General: Skin is warm and dry.      Coloration: Skin is not jaundiced or pale.   Neurological:      Mental Status: He is alert and oriented to person, place, and time. Mental status is at baseline.   Psychiatric:         Mood and Affect: Mood  normal.         Behavior: Behavior normal.             Significant Labs: All pertinent labs within the past 24 hours have been reviewed.  CBC:   Recent Labs   Lab 11/22/23  1712 11/23/23  0445   WBC 10.23 6.77   HGB 9.3* 9.5*   HCT 29.5* 30.7*    250     CMP:   Recent Labs   Lab 11/22/23  1712 11/23/23  0445   * 135*   K 4.1 4.6   CL 99 102   CO2 22* 22*   * 113*   BUN 23* 19   CREATININE 1.5* 1.1   CALCIUM 10.3 9.3   PROT 7.0 6.7   ALBUMIN 4.0 3.7   BILITOT 0.3 0.3   ALKPHOS 79 100   AST 10 84*   ALT 9* 65*   ANIONGAP 13 11       Significant Imaging: I have reviewed all pertinent imaging results/findings within the past 24 hours.

## 2023-11-23 NOTE — PLAN OF CARE
Problem: Adult Inpatient Plan of Care  Goal: Plan of Care Review  Outcome: Ongoing, Progressing  Goal: Readiness for Transition of Care  Outcome: Ongoing, Progressing     Problem: Bleeding (Gastrointestinal Bleeding)  Goal: Hemostasis  Outcome: Ongoing, Progressing     Problem: Fluid and Electrolyte Imbalance (Acute Kidney Injury/Impairment)  Goal: Fluid and Electrolyte Balance  Outcome: Ongoing, Progressing     Problem: Renal Function Impairment (Acute Kidney Injury/Impairment)  Goal: Effective Renal Function  Outcome: Ongoing, Progressing     Problem: Fall Injury Risk  Goal: Absence of Fall and Fall-Related Injury  Outcome: Ongoing, Progressing     Problem: Urinary Retention  Goal: Effective Urinary Elimination  Outcome: Ongoing, Progressing   No sings of bleeding, no complaints of nausea, gait unsteady while standing/ambulating. Seizure precautions maintained. Bed alarm set. Urinal within reach. NPO at [facility]

## 2023-11-23 NOTE — ASSESSMENT & PLAN NOTE
Acute problem   GI consult   GI bleed pathway   Protonix   Octreotide bolus and infusion   NPO   Monitor H/H closely

## 2023-11-23 NOTE — ASSESSMENT & PLAN NOTE
Acute problem   GI consult   GI bleed pathway   Protonix   Octreotide bolus and infusion   NPO after midnight

## 2023-11-23 NOTE — ASSESSMENT & PLAN NOTE
Chronic, controlled. Latest blood pressure and vitals reviewed-     Temp:  [98 °F (36.7 °C)]   Pulse:  []   Resp:  [18-20]   BP: (112-125)/(71-82)   SpO2:  [98 %] .   Home meds for hypertension were reviewed and noted below.   Hypertension Medications               amLODIPine (NORVASC) 10 MG tablet Take 10 mg by mouth once daily.    cloNIDine (CATAPRES) 0.1 MG tablet Take 2 tablets (0.2 mg total) by mouth 3 (three) times daily.    lisinopriL 10 MG tablet Take 10 mg by mouth 2 (two) times daily.    losartan (COZAAR) 25 MG tablet Take 25 mg by mouth once daily.    prazosin (MINIPRESS) 2 MG Cap Take 2 mg by mouth every evening. HCS    propranoloL (INDERAL) 10 MG tablet Take 10 mg by mouth 2 (two) times daily.            While in the hospital, will manage blood pressure as follows; Continue home antihypertensive regimen    Will utilize p.r.n. blood pressure medication only if patient's blood pressure greater than 160/100 and he develops symptoms such as worsening chest pain or shortness of breath.

## 2023-11-23 NOTE — HPI
Bradley Cousin is a 43-year-old male who presents emergency room for evaluation of hematemesis.  He reports that approximately 36 hours ago he began experiencing hematemesis.  He describes the vomitus as dark red.  He denies any coffee-ground substance.  He denies any hematochezia.  He reports he completed inpatient alcohol rehab 8 days ago.  He denies any alcohol use since his discharge.  Previous medical history includes ETOH abuse, hypertension, pancreatitis, hypothyroidism, active smoker, seizure disorder, and schizophrenia.  ER workup:  CBC with mild anemia of 9 and 29.  CMP with glucose of 197, BUN 23, creatinine 1.5.  Patient be admitted to Hospital Medicine for treatment and management.  GI was consulted and recommend octreotide bolus and infusion as well as IV Protonix.  Patient be maintain NPO after midnight tonight for possible EGD in a.m..

## 2023-11-23 NOTE — PLAN OF CARE
[facility] - Med/Surg  Initial Discharge Assessment       Primary Care Provider: Dr. Cassius Oneill    Admission Diagnosis: Upper GI bleed [K92.2]    Admission Date: 11/22/2023  Expected Discharge Date:     Transition of Care Barriers: None    Payor: MEDICAID / Plan: [place] [employer] / Product Type: Managed Medicaid /     Extended Emergency Contact Information  Primary Emergency Contact: Jayleen Collado  Address: [address] [place] [address] [facility]  Home Phone: [phone]  Mobile Phone: [phone]  Relation: Grandparent  Secondary Emergency Contact: Ashley Becker  Address: [address] [place] [address] [facility]  Home Phone: [phone]  Mobile Phone: [phone]  Relation: Relative    Discharge Plan A: Home  Discharge Plan B: Home with family      [id number] - [facility] [address]  AT [address] & [facility]  [address] [facility] [address]  Phone: [phone] Fax: [phone]    SW met with patient at bedside to complete discharge planning assessment.  Patient alert and oriented xs 4.  Patient verified all demographic information on facesheet is correct.  Patient verified PCP is Dr. Cassius Oneill.  Patient verified primary health insurance is [place] [employer] [facility] ([place]  Patient with NO home health or DME.  Patient with NO POA or Living Will.  Patient not on dialysis or medication coumadin.  Patient with no 30 day admission.  Patient with no financial issues at this time.  Patient family will provide transportation upon discharge from facility.  Patient independent with ADLs, live with grandmother, drives self.      Initial Assessment (most recent)       Adult Discharge Assessment - 11/23/23 1047          Discharge Assessment    Assessment Type Discharge Planning Assessment     Confirmed/corrected address, phone number and insurance Yes     Confirmed Demographics Correct on Facesheet      Source of Information patient     Does patient/caregiver understand observation status Yes     Communicated RICHAR with patient/caregiver Yes     People in Home grandparent(s)     Facility Arrived From: home     Do you expect to return to your current living situation? Yes     Do you have help at home or someone to help you manage your care at home? Yes     Who are your caregiver(s) and their phone number(s)? grandmother     Prior to hospitilization cognitive status: Alert/Oriented     Current cognitive status: Alert/Oriented     Equipment Currently Used at Home none     Readmission within 30 days? No     Patient currently being followed by outpatient case management? No     Do you currently have service(s) that help you manage your care at home? No     Do you take prescription medications? Yes     Do you have prescription coverage? Yes     Do you have any problems affording any of your prescribed medications? No     Is the patient taking medications as prescribed? yes     Who is going to help you get home at discharge? grandmother     How do you get to doctors appointments? car, drives self     Are you on dialysis? No     Do you take coumadin? No     DME Needed Upon Discharge  none     Discharge Plan discussed with: Patient     Transition of Care Barriers None     Discharge Plan A Home     Discharge Plan B Home with family

## 2023-11-24 ENCOUNTER — ANESTHESIA (OUTPATIENT)
Dept: ENDOSCOPY | Facility: HOSPITAL | Age: 43
End: 2023-11-24
Payer: MEDICAID

## 2023-11-24 ENCOUNTER — ANESTHESIA EVENT (OUTPATIENT)
Dept: ENDOSCOPY | Facility: HOSPITAL | Age: 43
End: 2023-11-24
Payer: MEDICAID

## 2023-11-24 LAB
ALBUMIN SERPL BCP-MCNC: 3.6 G/DL (ref 3.5–5.2)
ALP SERPL-CCNC: 91 U/L (ref 55–135)
ALT SERPL W/O P-5'-P-CCNC: 38 U/L (ref 10–44)
ANION GAP SERPL CALC-SCNC: 9 MMOL/L (ref 8–16)
AST SERPL-CCNC: 24 U/L (ref 10–40)
BASOPHILS # BLD AUTO: 0.05 K/UL (ref 0–0.2)
BASOPHILS NFR BLD: 0.9 % (ref 0–1.9)
BILIRUB SERPL-MCNC: 0.3 MG/DL (ref 0.1–1)
BUN SERPL-MCNC: 15 MG/DL (ref 6–20)
CALCIUM SERPL-MCNC: 8.9 MG/DL (ref 8.7–10.5)
CHLORIDE SERPL-SCNC: 103 MMOL/L (ref 95–110)
CO2 SERPL-SCNC: 25 MMOL/L (ref 23–29)
CREAT SERPL-MCNC: 1.1 MG/DL (ref 0.5–1.4)
DIFFERENTIAL METHOD: ABNORMAL
EOSINOPHIL # BLD AUTO: 0.3 K/UL (ref 0–0.5)
EOSINOPHIL NFR BLD: 4.9 % (ref 0–8)
ERYTHROCYTE [DISTWIDTH] IN BLOOD BY AUTOMATED COUNT: 17.8 % (ref 11.5–14.5)
EST. GFR  (NO RACE VARIABLE): >60 ML/MIN/1.73 M^2
GLUCOSE SERPL-MCNC: 108 MG/DL (ref 70–110)
HCT VFR BLD AUTO: 29.8 % (ref 40–54)
HGB BLD-MCNC: 9.2 G/DL (ref 14–18)
IMM GRANULOCYTES # BLD AUTO: 0.03 K/UL (ref 0–0.04)
IMM GRANULOCYTES NFR BLD AUTO: 0.5 % (ref 0–0.5)
INR PPP: 1.1 (ref 0.8–1.2)
LYMPHOCYTES # BLD AUTO: 1.6 K/UL (ref 1–4.8)
LYMPHOCYTES NFR BLD: 27.6 % (ref 18–48)
MAGNESIUM SERPL-MCNC: 2 MG/DL (ref 1.6–2.6)
MCH RBC QN AUTO: 23.3 PG (ref 27–31)
MCHC RBC AUTO-ENTMCNC: 30.9 G/DL (ref 32–36)
MCV RBC AUTO: 75 FL (ref 82–98)
MONOCYTES # BLD AUTO: 0.5 K/UL (ref 0.3–1)
MONOCYTES NFR BLD: 9.3 % (ref 4–15)
NEUTROPHILS # BLD AUTO: 3.3 K/UL (ref 1.8–7.7)
NEUTROPHILS NFR BLD: 56.8 % (ref 38–73)
NRBC BLD-RTO: 0 /100 WBC
PLATELET # BLD AUTO: 236 K/UL (ref 150–450)
PMV BLD AUTO: 11.3 FL (ref 9.2–12.9)
POCT GLUCOSE: 105 MG/DL (ref 70–110)
POCT GLUCOSE: 116 MG/DL (ref 70–110)
POCT GLUCOSE: 118 MG/DL (ref 70–110)
POCT GLUCOSE: 169 MG/DL (ref 70–110)
POTASSIUM SERPL-SCNC: 4.2 MMOL/L (ref 3.5–5.1)
PROT SERPL-MCNC: 6.6 G/DL (ref 6–8.4)
PROTHROMBIN TIME: 11.9 SEC (ref 9–12.5)
RBC # BLD AUTO: 3.95 M/UL (ref 4.6–6.2)
SODIUM SERPL-SCNC: 137 MMOL/L (ref 136–145)
WBC # BLD AUTO: 5.72 K/UL (ref 3.9–12.7)

## 2023-11-24 PROCEDURE — 80074 ACUTE HEPATITIS PANEL: CPT | Performed by: NURSE PRACTITIONER

## 2023-11-24 PROCEDURE — 96376 TX/PRO/DX INJ SAME DRUG ADON: CPT | Mod: 59

## 2023-11-24 PROCEDURE — C9113 INJ PANTOPRAZOLE SODIUM, VIA: HCPCS | Performed by: NURSE PRACTITIONER

## 2023-11-24 PROCEDURE — 83735 ASSAY OF MAGNESIUM: CPT | Performed by: STUDENT IN AN ORGANIZED HEALTH CARE EDUCATION/TRAINING PROGRAM

## 2023-11-24 PROCEDURE — 43235 EGD DIAGNOSTIC BRUSH WASH: CPT | Performed by: INTERNAL MEDICINE

## 2023-11-24 PROCEDURE — 43235 EGD DIAGNOSTIC BRUSH WASH: CPT | Mod: ,,, | Performed by: INTERNAL MEDICINE

## 2023-11-24 PROCEDURE — D9220A PRA ANESTHESIA: Mod: CRNA,,, | Performed by: NURSE ANESTHETIST, CERTIFIED REGISTERED

## 2023-11-24 PROCEDURE — 25000003 PHARM REV CODE 250: Performed by: NURSE PRACTITIONER

## 2023-11-24 PROCEDURE — 63600175 PHARM REV CODE 636 W HCPCS: Mod: JA | Performed by: EMERGENCY MEDICINE

## 2023-11-24 PROCEDURE — 36415 COLL VENOUS BLD VENIPUNCTURE: CPT | Performed by: STUDENT IN AN ORGANIZED HEALTH CARE EDUCATION/TRAINING PROGRAM

## 2023-11-24 PROCEDURE — 63600175 PHARM REV CODE 636 W HCPCS: Performed by: NURSE ANESTHETIST, CERTIFIED REGISTERED

## 2023-11-24 PROCEDURE — 37000008 HC ANESTHESIA 1ST 15 MINUTES: Performed by: INTERNAL MEDICINE

## 2023-11-24 PROCEDURE — 80053 COMPREHEN METABOLIC PANEL: CPT | Performed by: STUDENT IN AN ORGANIZED HEALTH CARE EDUCATION/TRAINING PROGRAM

## 2023-11-24 PROCEDURE — 36415 COLL VENOUS BLD VENIPUNCTURE: CPT | Performed by: NURSE PRACTITIONER

## 2023-11-24 PROCEDURE — G0378 HOSPITAL OBSERVATION PER HR: HCPCS

## 2023-11-24 PROCEDURE — 63600175 PHARM REV CODE 636 W HCPCS: Performed by: NURSE PRACTITIONER

## 2023-11-24 PROCEDURE — D9220A PRA ANESTHESIA: Mod: ANES,,, | Performed by: ANESTHESIOLOGY

## 2023-11-24 PROCEDURE — D9220A PRA ANESTHESIA: ICD-10-PCS | Mod: CRNA,,, | Performed by: NURSE ANESTHETIST, CERTIFIED REGISTERED

## 2023-11-24 PROCEDURE — 43235 PR EGD, FLEX, DIAGNOSTIC: ICD-10-PCS | Mod: ,,, | Performed by: INTERNAL MEDICINE

## 2023-11-24 PROCEDURE — 96366 THER/PROPH/DIAG IV INF ADDON: CPT | Mod: 59

## 2023-11-24 PROCEDURE — 85025 COMPLETE CBC W/AUTO DIFF WBC: CPT | Performed by: NURSE PRACTITIONER

## 2023-11-24 PROCEDURE — 85610 PROTHROMBIN TIME: CPT | Performed by: NURSE PRACTITIONER

## 2023-11-24 PROCEDURE — D9220A PRA ANESTHESIA: ICD-10-PCS | Mod: ANES,,, | Performed by: ANESTHESIOLOGY

## 2023-11-24 PROCEDURE — 25000003 PHARM REV CODE 250: Performed by: NURSE ANESTHETIST, CERTIFIED REGISTERED

## 2023-11-24 PROCEDURE — 94761 N-INVAS EAR/PLS OXIMETRY MLT: CPT | Mod: 59

## 2023-11-24 PROCEDURE — 25000003 PHARM REV CODE 250: Performed by: EMERGENCY MEDICINE

## 2023-11-24 RX ORDER — LIDOCAINE HYDROCHLORIDE 20 MG/ML
INJECTION INTRAVENOUS
Status: DISCONTINUED | OUTPATIENT
Start: 2023-11-24 | End: 2023-11-24

## 2023-11-24 RX ORDER — PROPOFOL 10 MG/ML
VIAL (ML) INTRAVENOUS
Status: DISCONTINUED | OUTPATIENT
Start: 2023-11-24 | End: 2023-11-24

## 2023-11-24 RX ADMIN — PROPOFOL 60 MG: 10 INJECTION, EMULSION INTRAVENOUS at 12:11

## 2023-11-24 RX ADMIN — MIRTAZAPINE 30 MG: 15 TABLET, FILM COATED ORAL at 08:11

## 2023-11-24 RX ADMIN — OXCARBAZEPINE 150 MG: 150 TABLET, FILM COATED ORAL at 08:11

## 2023-11-24 RX ADMIN — PRAZOSIN HYDROCHLORIDE 2 MG: 1 CAPSULE ORAL at 08:11

## 2023-11-24 RX ADMIN — DIVALPROEX SODIUM 500 MG: 250 TABLET, EXTENDED RELEASE ORAL at 08:11

## 2023-11-24 RX ADMIN — DIVALPROEX SODIUM 500 MG: 250 TABLET, EXTENDED RELEASE ORAL at 03:11

## 2023-11-24 RX ADMIN — DULOXETINE HYDROCHLORIDE 30 MG: 30 CAPSULE, DELAYED RELEASE ORAL at 09:11

## 2023-11-24 RX ADMIN — PANTOPRAZOLE SODIUM 40 MG: 40 INJECTION, POWDER, FOR SOLUTION INTRAVENOUS at 09:11

## 2023-11-24 RX ADMIN — BUSPIRONE HYDROCHLORIDE 30 MG: 10 TABLET ORAL at 08:11

## 2023-11-24 RX ADMIN — PANTOPRAZOLE SODIUM 40 MG: 40 INJECTION, POWDER, FOR SOLUTION INTRAVENOUS at 10:11

## 2023-11-24 RX ADMIN — THIAMINE HCL TAB 100 MG 100 MG: 100 TAB at 09:11

## 2023-11-24 RX ADMIN — DOXEPIN HYDROCHLORIDE 150 MG: 25 CAPSULE ORAL at 08:11

## 2023-11-24 RX ADMIN — DIVALPROEX SODIUM 500 MG: 250 TABLET, EXTENDED RELEASE ORAL at 09:11

## 2023-11-24 RX ADMIN — GABAPENTIN 600 MG: 300 CAPSULE ORAL at 08:11

## 2023-11-24 RX ADMIN — SODIUM CHLORIDE: 0.9 INJECTION, SOLUTION INTRAVENOUS at 12:11

## 2023-11-24 RX ADMIN — CEFTRIAXONE SODIUM 1 G: 1 INJECTION, POWDER, FOR SOLUTION INTRAMUSCULAR; INTRAVENOUS at 05:11

## 2023-11-24 RX ADMIN — LIDOCAINE HYDROCHLORIDE 100 MG: 20 INJECTION, SOLUTION INTRAVENOUS at 12:11

## 2023-11-24 RX ADMIN — OXCARBAZEPINE 150 MG: 150 TABLET, FILM COATED ORAL at 09:11

## 2023-11-24 RX ADMIN — BUSPIRONE HYDROCHLORIDE 30 MG: 10 TABLET ORAL at 09:11

## 2023-11-24 RX ADMIN — OLANZAPINE 20 MG: 5 TABLET, FILM COATED ORAL at 08:11

## 2023-11-24 RX ADMIN — OCTREOTIDE ACETATE 50 MCG/HR: 1000 INJECTION, SOLUTION INTRAVENOUS; SUBCUTANEOUS at 03:11

## 2023-11-24 NOTE — CONSULTS
EGD;  -No old blood or active bleeding, large amount of food obscuring gastric body/fundus    Recommendation:  -Continue PPI BID  -Stop Octreotide  -Clear liquid diet  -CBC in AM  -Patient will need repeat EGD prior to discharge, clinical course will determine timing tomorrow vs Monday    Shannen Meyer MD

## 2023-11-24 NOTE — TRANSFER OF CARE
"Anesthesia Transfer of Care Note    Patient: Bradley Collado    Procedure(s) Performed: Procedure(s) (LRB):  EGD (ESOPHAGOGASTRODUODENOSCOPY) (N/A)    Patient location: GI    Anesthesia Type: general    Transport from OR: Transported from OR on room air with adequate spontaneous ventilation    Post pain: adequate analgesia    Post assessment: no apparent anesthetic complications    Post vital signs: stable    Level of consciousness: responds to stimulation    Nausea/Vomiting: no nausea/vomiting    Complications: none    Transfer of care protocol was followed      Last vitals: Visit Vitals  BP (!) 144/85 (BP Location: Left arm, Patient Position: Lying)   Pulse 75   Temp 37.1 °C (98.8 °F) (Skin)   Resp 16   Ht 5' 7" (1.702 m)   Wt 83.5 kg (184 lb 1.4 oz)   SpO2 97%   BMI 28.83 kg/m²     "

## 2023-11-24 NOTE — ANESTHESIA PREPROCEDURE EVALUATION
11/24/2023  Bradley Molina Cousin is a 43 y.o., male.      Pre-op Assessment    I have reviewed the Patient Summary Reports.     I have reviewed the Nursing Notes. I have reviewed the NPO Status.   I have reviewed the Medications.     Review of Systems  Social:  Smoker       Cardiovascular:     Hypertension                                  Hypertension         Renal/:  Chronic Renal Disease        Kidney Function/Disease             Hepatic/GI:     GERD   H/o pancreatitis    Gerd          Musculoskeletal:  Arthritis        Arthritis          Neurological:      Headaches Seizures     Dx of Headaches   Arthritis      Seizure Disorder                          Endocrine:   Hypothyroidism       Hypothyroidism          Psych:  Psychiatric History  depression              Physical Exam  General: Well nourished, Cooperative, Alert and Oriented    Airway:  Mallampati: II   Mouth Opening: Normal  TM Distance: Normal  Tongue: Normal    Dental:  Intact    Chest/Lungs:  Normal Respiratory Rate    Heart:  Rate: Normal    Anesthesia Plan  Type of Anesthesia, risks & benefits discussed:    Anesthesia Type: Gen Natural Airway  Intra-op Monitoring Plan: Standard ASA Monitors  Informed Consent: Informed consent signed with the Patient and all parties understand the risks and agree with anesthesia plan.  All questions answered.   ASA Score: 3    Ready For Surgery From Anesthesia Perspective.   .

## 2023-11-24 NOTE — PROVATION PATIENT INSTRUCTIONS
Discharge Summary/Instructions after an Endoscopic Procedure  Patient Name: Bradley Menasin  Patient MRN: [id number]  Patient YOB: 1980 Friday, November 24, 2023  Shannen Meyer MD  Dear patient,  As a result of recent federal legislation (The Federal Cures Act), you may   receive lab or pathology results from your procedure in your [facility]   account before your physician is able to contact you. Your physician or   their representative will relay the results to you with their   recommendations at their soonest availability.  Thank you,  RESTRICTIONS:  During your procedure today, you received medications for sedation.  These   medications may affect your judgment, balance and coordination.  Therefore,   for 24 hours, you have the following restrictions:   - DO NOT drive a car, operate machinery, make legal/financial decisions,   sign important papers or drink alcohol.    ACTIVITY:  Today: no heavy lifting, straining or running due to procedural   sedation/anesthesia.  The following day: return to full activity including work.  DIET:  Eat and drink normally unless instructed otherwise.     TREATMENT FOR COMMON SIDE EFFECTS:  - Mild abdominal pain, nausea, belching, bloating or excessive gas:  rest,   eat lightly and use a heating pad.  - Sore Throat: treat with throat lozenges and/or gargle with warm salt   water.  - Because air was used during the procedure, expelling large amounts of air   from your rectum or belching is normal.  - If a bowel prep was taken, you may not have a bowel movement for 1-3 days.    This is normal.  SYMPTOMS TO WATCH FOR AND REPORT TO YOUR PHYSICIAN:  1. Abdominal pain or bloating, other than gas cramps.  2. Chest pain.  3. Back pain.  4. Signs of infection such as: chills or fever occurring within 24 hours   after the procedure.  5. Rectal bleeding, which would show as bright red, maroon, or black stools.   (A tablespoon of blood from the rectum is not serious,  especially if   hemorrhoids are present.)  6. Vomiting.  7. Weakness or dizziness.  GO DIRECTLY TO THE NEAREST EMERGENCY ROOM IF YOU HAVE ANY OF THE FOLLOWING:      Difficulty breathing              Chills and/or fever over 101 F   Persistent vomiting and/or vomiting blood   Severe abdominal pain   Severe chest pain   Black, tarry stools   Bleeding- more than one tablespoon   Any other symptom or condition that you feel may need urgent attention  Your doctor recommends these additional instructions:  If any biopsies were taken, your doctors clinic will contact you in 1 to 2   weeks with any results.  - [facility].   - Clear liquid diet.   - PPI BID  -CBC in AM- will need repeat EGD prior to discharge, clinical course will   determine timing tomorrow vs Monday  For questions, problems or results please call your physician - Shannen Meyer MD at Work:  [phone].  [facility], EMERGENCY ROOM PHONE NUMBER: [phone]  IF A COMPLICATION OR EMERGENCY SITUATION ARISES AND YOU ARE UNABLE TO REACH   YOUR PHYSICIAN - GO DIRECTLY TO THE EMERGENCY ROOM.  Shannen Meyer MD  11/24/2023 1:38:20 PM  This report has been verified and signed electronically.  Dear patient,  As a result of recent federal legislation (The Federal Cures Act), you may   receive lab or pathology results from your procedure in your [facility]   account before your physician is able to contact you. Your physician or   their representative will relay the results to you with their   recommendations at their soonest availability.  Thank you,  PROVATION

## 2023-11-24 NOTE — PLAN OF CARE
Pt alert and oriented x 4. Bed alarm on, call light within reach. Pt on NPO since midnight.  Problem: Adult Inpatient Plan of Care  Goal: Plan of Care Review  Outcome: Ongoing, Progressing     Problem: Adult Inpatient Plan of Care  Goal: Optimal Comfort and Wellbeing  Outcome: Ongoing, Progressing     Problem: Fluid and Electrolyte Imbalance (Acute Kidney Injury/Impairment)  Goal: Fluid and Electrolyte Balance  Outcome: Ongoing, Progressing     Problem: Adult Inpatient Plan of Care  Goal: Absence of Hospital-Acquired Illness or Injury  Outcome: Ongoing, Progressing     Problem: Fall Injury Risk  Goal: Absence of Fall and Fall-Related Injury  Outcome: Ongoing, Progressing

## 2023-11-24 NOTE — NURSING
Bladder scanning done, it shoed volume of 483 ml. Pt denies to void and straight cathether. Needs sometime to void.

## 2023-11-25 LAB
ALBUMIN SERPL BCP-MCNC: 3.9 G/DL (ref 3.5–5.2)
ALP SERPL-CCNC: 102 U/L (ref 55–135)
ALT SERPL W/O P-5'-P-CCNC: 29 U/L (ref 10–44)
ANION GAP SERPL CALC-SCNC: 11 MMOL/L (ref 8–16)
AST SERPL-CCNC: 15 U/L (ref 10–40)
BASOPHILS # BLD AUTO: 0.03 K/UL (ref 0–0.2)
BASOPHILS NFR BLD: 0.5 % (ref 0–1.9)
BILIRUB SERPL-MCNC: 0.3 MG/DL (ref 0.1–1)
BUN SERPL-MCNC: 13 MG/DL (ref 6–20)
CALCIUM SERPL-MCNC: 9.2 MG/DL (ref 8.7–10.5)
CHLORIDE SERPL-SCNC: 102 MMOL/L (ref 95–110)
CO2 SERPL-SCNC: 27 MMOL/L (ref 23–29)
CREAT SERPL-MCNC: 1.2 MG/DL (ref 0.5–1.4)
DIFFERENTIAL METHOD: ABNORMAL
EOSINOPHIL # BLD AUTO: 0.3 K/UL (ref 0–0.5)
EOSINOPHIL NFR BLD: 4.3 % (ref 0–8)
ERYTHROCYTE [DISTWIDTH] IN BLOOD BY AUTOMATED COUNT: 17.9 % (ref 11.5–14.5)
EST. GFR  (NO RACE VARIABLE): >60 ML/MIN/1.73 M^2
GLUCOSE SERPL-MCNC: 110 MG/DL (ref 70–110)
HAV IGM SERPL QL IA: NEGATIVE
HBV CORE IGM SERPL QL IA: NEGATIVE
HBV SURFACE AG SERPL QL IA: NEGATIVE
HCT VFR BLD AUTO: 36.3 % (ref 40–54)
HCV IGG SERPL QL IA: NEGATIVE
HGB BLD-MCNC: 10.8 G/DL (ref 14–18)
IMM GRANULOCYTES # BLD AUTO: 0.03 K/UL (ref 0–0.04)
IMM GRANULOCYTES NFR BLD AUTO: 0.5 % (ref 0–0.5)
LYMPHOCYTES # BLD AUTO: 1.4 K/UL (ref 1–4.8)
LYMPHOCYTES NFR BLD: 23.6 % (ref 18–48)
MAGNESIUM SERPL-MCNC: 2.2 MG/DL (ref 1.6–2.6)
MCH RBC QN AUTO: 22.8 PG (ref 27–31)
MCHC RBC AUTO-ENTMCNC: 29.8 G/DL (ref 32–36)
MCV RBC AUTO: 77 FL (ref 82–98)
MONOCYTES # BLD AUTO: 0.5 K/UL (ref 0.3–1)
MONOCYTES NFR BLD: 8.3 % (ref 4–15)
NEUTROPHILS # BLD AUTO: 3.7 K/UL (ref 1.8–7.7)
NEUTROPHILS NFR BLD: 62.8 % (ref 38–73)
NRBC BLD-RTO: 0 /100 WBC
PLATELET # BLD AUTO: 244 K/UL (ref 150–450)
PMV BLD AUTO: 11.5 FL (ref 9.2–12.9)
POCT GLUCOSE: 102 MG/DL (ref 70–110)
POCT GLUCOSE: 111 MG/DL (ref 70–110)
POCT GLUCOSE: 118 MG/DL (ref 70–110)
POCT GLUCOSE: 135 MG/DL (ref 70–110)
POTASSIUM SERPL-SCNC: 4.2 MMOL/L (ref 3.5–5.1)
PROT SERPL-MCNC: 7.4 G/DL (ref 6–8.4)
RBC # BLD AUTO: 4.73 M/UL (ref 4.6–6.2)
SODIUM SERPL-SCNC: 140 MMOL/L (ref 136–145)
WBC # BLD AUTO: 5.81 K/UL (ref 3.9–12.7)

## 2023-11-25 PROCEDURE — C9113 INJ PANTOPRAZOLE SODIUM, VIA: HCPCS | Performed by: NURSE PRACTITIONER

## 2023-11-25 PROCEDURE — 25000003 PHARM REV CODE 250: Performed by: NURSE PRACTITIONER

## 2023-11-25 PROCEDURE — G0378 HOSPITAL OBSERVATION PER HR: HCPCS

## 2023-11-25 PROCEDURE — 36415 COLL VENOUS BLD VENIPUNCTURE: CPT | Performed by: STUDENT IN AN ORGANIZED HEALTH CARE EDUCATION/TRAINING PROGRAM

## 2023-11-25 PROCEDURE — 80053 COMPREHEN METABOLIC PANEL: CPT | Performed by: STUDENT IN AN ORGANIZED HEALTH CARE EDUCATION/TRAINING PROGRAM

## 2023-11-25 PROCEDURE — 94761 N-INVAS EAR/PLS OXIMETRY MLT: CPT

## 2023-11-25 PROCEDURE — 63600175 PHARM REV CODE 636 W HCPCS: Performed by: NURSE PRACTITIONER

## 2023-11-25 PROCEDURE — 85025 COMPLETE CBC W/AUTO DIFF WBC: CPT | Performed by: NURSE PRACTITIONER

## 2023-11-25 PROCEDURE — 96376 TX/PRO/DX INJ SAME DRUG ADON: CPT

## 2023-11-25 PROCEDURE — 99214 PR OFFICE/OUTPT VISIT, EST, LEVL IV, 30-39 MIN: ICD-10-PCS | Mod: ,,, | Performed by: INTERNAL MEDICINE

## 2023-11-25 PROCEDURE — 99214 OFFICE O/P EST MOD 30 MIN: CPT | Mod: ,,, | Performed by: INTERNAL MEDICINE

## 2023-11-25 PROCEDURE — 83735 ASSAY OF MAGNESIUM: CPT | Performed by: STUDENT IN AN ORGANIZED HEALTH CARE EDUCATION/TRAINING PROGRAM

## 2023-11-25 RX ADMIN — CLONAZEPAM 2 MG: 0.5 TABLET ORAL at 10:11

## 2023-11-25 RX ADMIN — DULOXETINE HYDROCHLORIDE 30 MG: 30 CAPSULE, DELAYED RELEASE ORAL at 09:11

## 2023-11-25 RX ADMIN — DOXEPIN HYDROCHLORIDE 150 MG: 25 CAPSULE ORAL at 08:11

## 2023-11-25 RX ADMIN — OXCARBAZEPINE 150 MG: 150 TABLET, FILM COATED ORAL at 09:11

## 2023-11-25 RX ADMIN — DIVALPROEX SODIUM 500 MG: 250 TABLET, EXTENDED RELEASE ORAL at 08:11

## 2023-11-25 RX ADMIN — GABAPENTIN 600 MG: 300 CAPSULE ORAL at 08:11

## 2023-11-25 RX ADMIN — DIVALPROEX SODIUM 500 MG: 250 TABLET, EXTENDED RELEASE ORAL at 03:11

## 2023-11-25 RX ADMIN — DIVALPROEX SODIUM 500 MG: 250 TABLET, EXTENDED RELEASE ORAL at 09:11

## 2023-11-25 RX ADMIN — BUSPIRONE HYDROCHLORIDE 30 MG: 10 TABLET ORAL at 09:11

## 2023-11-25 RX ADMIN — PRAZOSIN HYDROCHLORIDE 2 MG: 1 CAPSULE ORAL at 08:11

## 2023-11-25 RX ADMIN — PANTOPRAZOLE SODIUM 40 MG: 40 INJECTION, POWDER, FOR SOLUTION INTRAVENOUS at 10:11

## 2023-11-25 RX ADMIN — MIRTAZAPINE 30 MG: 15 TABLET, FILM COATED ORAL at 08:11

## 2023-11-25 RX ADMIN — OXCARBAZEPINE 150 MG: 150 TABLET, FILM COATED ORAL at 08:11

## 2023-11-25 RX ADMIN — THIAMINE HCL TAB 100 MG 100 MG: 100 TAB at 09:11

## 2023-11-25 RX ADMIN — PANTOPRAZOLE SODIUM 40 MG: 40 INJECTION, POWDER, FOR SOLUTION INTRAVENOUS at 08:11

## 2023-11-25 RX ADMIN — CEFTRIAXONE SODIUM 1 G: 1 INJECTION, POWDER, FOR SOLUTION INTRAMUSCULAR; INTRAVENOUS at 04:11

## 2023-11-25 RX ADMIN — BUSPIRONE HYDROCHLORIDE 30 MG: 10 TABLET ORAL at 08:11

## 2023-11-25 RX ADMIN — OLANZAPINE 20 MG: 5 TABLET, FILM COATED ORAL at 08:11

## 2023-11-25 NOTE — SUBJECTIVE & OBJECTIVE
Interval History:  Patient seen and examined.  No acute events overnight.  Has not had any further episodes of vomiting.  H/H remains stable.  EGD performed yesterday revealed foods still and patient's stomach.  He was placed on a clear liquid diet and repeat EGD is planned per GI.    Review of Systems   Gastrointestinal:  Positive for abdominal pain and vomiting.        Hematemesis   All other systems reviewed and are negative.    Objective:     Vital Signs (Most Recent):  Temp: 97.7 °F (36.5 °C) (11/25/23 1157)  Pulse: 96 (11/25/23 1157)  Resp: 16 (11/25/23 1157)  BP: (!) 156/84 (11/25/23 1157)  SpO2: 100 % (11/25/23 1157) Vital Signs (24h Range):  Temp:  [96.9 °F (36.1 °C)-98.8 °F (37.1 °C)] 97.7 °F (36.5 °C)  Pulse:  [74-96] 96  Resp:  [13-21] 16  SpO2:  [97 %-100 %] 100 %  BP: (118-156)/(59-92) 156/84     Weight: 83.5 kg (184 lb 1.4 oz)  Body mass index is 28.83 kg/m².    Intake/Output Summary (Last 24 hours) at 11/25/2023 1259  Last data filed at 11/25/2023 0553  Gross per 24 hour   Intake 543.14 ml   Output --   Net 543.14 ml           Physical Exam  Constitutional:       General: He is not in acute distress.     Appearance: He is normal weight. He is not ill-appearing, toxic-appearing or diaphoretic.   HENT:      Mouth/Throat:      Mouth: Mucous membranes are moist.      Pharynx: Oropharynx is clear.   Cardiovascular:      Rate and Rhythm: Normal rate and regular rhythm.      Pulses: Normal pulses.      Heart sounds: Normal heart sounds.   Pulmonary:      Effort: Pulmonary effort is normal. No respiratory distress.      Breath sounds: Normal breath sounds.   Abdominal:      General: Bowel sounds are normal. There is no distension.      Palpations: Abdomen is soft.      Tenderness: There is abdominal tenderness (Umbilical area). There is no guarding.   Skin:     General: Skin is warm and dry.      Coloration: Skin is not jaundiced or pale.   Neurological:      Mental Status: He is alert and oriented to  person, place, and time. Mental status is at baseline.   Psychiatric:         Mood and Affect: Mood normal.         Behavior: Behavior normal.             Significant Labs: All pertinent labs within the past 24 hours have been reviewed.  CBC:   Recent Labs   Lab 11/23/23  1804 11/24/23  0431 11/25/23  0453   WBC 6.16 5.72 5.81   HGB 9.6* 9.2* 10.8*   HCT 31.7* 29.8* 36.3*    236 244       CMP:   Recent Labs   Lab 11/24/23  0431 11/25/23  0453    140   K 4.2 4.2    102   CO2 25 27    110   BUN 15 13   CREATININE 1.1 1.2   CALCIUM 8.9 9.2   PROT 6.6 7.4   ALBUMIN 3.6 3.9   BILITOT 0.3 0.3   ALKPHOS 91 102   AST 24 15   ALT 38 29   ANIONGAP 9 11         Significant Imaging: I have reviewed all pertinent imaging results/findings within the past 24 hours.    mpression:            - Normal esophagus.                          - A large amount of food (residue) in the stomach.                          - Normal examined duodenum.                          - No specimens collected.                          -No evdience of active bleeding or old blood, food                          obscured large portion of stomach.   Recommendation:        - [facility] to [facility] for [facility].                          - Clear liquid diet.                          - PPI BID                          -CBC in AM- will need repeat EGD prior to                          discharge, clinical course will determine timing                          tomorrow vs Monday

## 2023-11-25 NOTE — PLAN OF CARE
Patient is resting quietly in bed. No signs or symptoms of distress. No complaints of pain. No additional needs at this time. Will continue to monitor.

## 2023-11-25 NOTE — PROGRESS NOTES
[facility] Note    CC: Hematemesis    HPI 43 y.o. male who presents emergency room for evaluation of hematemesis.  He reports that approximately 36 hours ago he began experiencing hematemesis.  He describes the vomitus as dark red.  He denies any coffee-ground substance.  He denies any hematochezia.  He reports he completed inpatient alcohol rehab 8 days ago.  He denies any alcohol use since his discharge.  Previous medical history includes ETOH abuse, hypertension, pancreatitis, hypothyroidism, active smoker, seizure disorder, and schizophrenia.  ER workup:  CBC with mild anemia of 9 and 29.  CMP with glucose of 197, BUN 23, creatinine 1.5.  Patient be admitted to Hospital Medicine for treatment and management.  GI was consulted and recommend octreotide bolus and infusion as well as IV Protonix.  Patient be maintain NPO after midnight tonight for possible EGD in a.m..     GI consulted for hematemesis. Pt described hematemesis approx 2 nights prior to admission, dark blood, nearly black. No recurrent N/V past 48 hours. Denies abd pain. No hematochezia or melena. Positive GERD. No hx ulcer disease. Positive hx ETOH, recent rehab. No known hx cirrhosis or prior GI bleed. No NSAIDs. H/H stable since admit, currently 9.5/30.7. Pt lying comfortably in bed, feels well, tolerating clear liquids. No recent EGD.    INTERVAL HISTORY:  No new complaints per nursing.  Copious retained food in stomach yesterday.  Patient had diet today.  No further evidence of bleeding.    Past Medical History:   Diagnosis Date    Anxiety     Anxiety     Bipolar affective disorder     Depression     Hypertension     Pancreatitis     Schizophrenia     Seizures     Thyroid disease          Review of Systems  General ROS: negative for - chills, fever or weight loss  Cardiovascular ROS: no chest pain or dyspnea on exertion  Gastrointestinal ROS: as above    Physical Examination  BP (!) 156/84 (BP Location: Left arm, Patient Position:  "Lying)   Pulse 96   Temp 97.7 °F (36.5 °C) (Axillary)   Resp 16   Ht 5' 7" (1.702 m)   Wt 83.5 kg (184 lb 1.4 oz)   SpO2 100%   BMI 28.83 kg/m²   General appearance: alert, cooperative, no distress  HENT: Normocephalic, atraumatic, neck symmetrical, no nasal discharge, sclera anicteric   Lungs: clear to auscultation bilaterally, symmetric chest wall expansion bilaterally  Heart: regular rate and rhythm without rub; no displacement of the PMI   Abdomen: soft, NT  Extremities: extremities symmetric; no clubbing, cyanosis, or edema  Neurologic: Alert and oriented X 3, no sensory or motor neurologic deficits      Labs:  Lab Results   Component Value Date    WBC 5.81 11/25/2023    HGB 10.8 (L) 11/25/2023    HCT 36.3 (L) 11/25/2023    MCV 77 (L) 11/25/2023     11/25/2023         CMP  Sodium   Date Value Ref Range Status   11/25/2023 140 136 - 145 mmol/L Final     Potassium   Date Value Ref Range Status   11/25/2023 4.2 3.5 - 5.1 mmol/L Final     Chloride   Date Value Ref Range Status   11/25/2023 102 95 - 110 mmol/L Final     CO2   Date Value Ref Range Status   11/25/2023 27 23 - 29 mmol/L Final     Glucose   Date Value Ref Range Status   11/25/2023 110 70 - 110 mg/dL Final     BUN   Date Value Ref Range Status   11/25/2023 13 6 - 20 mg/dL Final     Creatinine   Date Value Ref Range Status   11/25/2023 1.2 0.5 - 1.4 mg/dL Final   12/09/2012 1.4 0.5 - 1.4 mg/dL Final     Calcium   Date Value Ref Range Status   11/25/2023 9.2 8.7 - 10.5 mg/dL Final   12/09/2012 9.3 8.7 - 10.5 mg/dL Final     Total Protein   Date Value Ref Range Status   11/25/2023 7.4 6.0 - 8.4 g/dL Final     Albumin   Date Value Ref Range Status   11/25/2023 3.9 3.5 - 5.2 g/dL Final     Total Bilirubin   Date Value Ref Range Status   11/25/2023 0.3 0.1 - 1.0 mg/dL Final     Comment:     For infants and newborns, interpretation of results should be based  on gestational age, weight and in agreement with clinical  observations.    Premature " Infant recommended reference ranges:  Up to 24 hours.............<8.0 mg/dL  Up to 48 hours............<12.0 mg/dL  3-5 days..................<15.0 mg/dL  6-29 days.................<15.0 mg/dL       Alkaline Phosphatase   Date Value Ref Range Status   11/25/2023 102 55 - 135 U/L Final   12/09/2012 59 55 - 135 U/L Final     AST   Date Value Ref Range Status   11/25/2023 15 10 - 40 U/L Final   12/09/2012 29 10 - 40 U/L Final     ALT   Date Value Ref Range Status   11/25/2023 29 10 - 44 U/L Final     Anion Gap   Date Value Ref Range Status   11/25/2023 11 8 - 16 mmol/L Final   12/09/2012 18 (H) 5 - 15 meq/L Final     eGFR   Date Value Ref Range Status   11/25/2023 >60 >60 mL/min/1.73 m^2 Final           Assessment:   Hematemesis  GERD  Retained food on EGD    Plan:  Diet today  Switch to clears tomorrow  Repeat EGD Monday.  Further recommendations to follow after above.      Steve Matias MD  [facility]  [address], [address]  [place] [address]  Office: [phone]  Fax: [phone]

## 2023-11-25 NOTE — ASSESSMENT & PLAN NOTE
Acute problem   GI consult   GI bleed pathway   Protonix   Octreotide bolus and infusion   NPO   Monitor H/H closely  EGD planned for today

## 2023-11-25 NOTE — ASSESSMENT & PLAN NOTE
Acute problem   GI consult   GI bleed pathway   Protonix   Octreotide stopped per GI  CLD  Monitor H/H closely  Repeat EGD per GI

## 2023-11-25 NOTE — PLAN OF CARE
Problem: Adult Inpatient Plan of Care  Goal: Plan of Care Review  Outcome: Ongoing, Progressing   Safety maintained, call light within reach, bed locked and in low position, and side rails up. Patient remains free from injury.    Monitoring and following care plan.

## 2023-11-25 NOTE — PROGRESS NOTES
[facility], [facility] Medicine  Progress Note    Patient Name: Bradley Collado  MRN: [id number]  Patient Class: OP- Observation   Admission Date: 11/22/2023  Length of Stay: 1 days  Attending Physician: Chadd Zabala MD  Primary Care Provider: Cassius Oneill MD        Subjective:     Principal Problem:GI bleed        HPI:  Bradley Collado is a 43-year-old male who presents emergency room for evaluation of hematemesis.  He reports that approximately 36 hours ago he began experiencing hematemesis.  He describes the vomitus as dark red.  He denies any coffee-ground substance.  He denies any hematochezia.  He reports he completed inpatient alcohol rehab 8 days ago.  He denies any alcohol use since his discharge.  Previous medical history includes ETOH abuse, hypertension, pancreatitis, hypothyroidism, active smoker, seizure disorder, and schizophrenia.  ER workup:  CBC with mild anemia of 9 and 29.  CMP with glucose of 197, BUN 23, creatinine 1.5.  Patient be admitted to Hospital Medicine for treatment and management.  GI was consulted and recommend octreotide bolus and infusion as well as IV Protonix.  Patient be maintain NPO after midnight tonight for possible EGD in a.m..    Overview/Hospital Course:  No notes on file    Interval History:  Patient seen and examined.  No acute events overnight.  Has not had any further episodes of vomiting.  H/H remains stable.  EGD performed yesterday revealed foods still and patient's stomach.  He was placed on a clear liquid diet and repeat EGD is planned per GI.    Review of Systems   Gastrointestinal:  Positive for abdominal pain and vomiting.        Hematemesis   All other systems reviewed and are negative.    Objective:     Vital Signs (Most Recent):  Temp: 97.7 °F (36.5 °C) (11/25/23 1157)  Pulse: 96 (11/25/23 1157)  Resp: 16 (11/25/23 1157)  BP: (!) 156/84 (11/25/23 1157)  SpO2: 100 % (11/25/23 1157) Vital Signs (24h Range):  Temp:  [96.9 °F (36.1  °C)-98.8 °F (37.1 °C)] 97.7 °F (36.5 °C)  Pulse:  [74-96] 96  Resp:  [13-21] 16  SpO2:  [97 %-100 %] 100 %  BP: (118-156)/(59-92) 156/84     Weight: 83.5 kg (184 lb 1.4 oz)  Body mass index is 28.83 kg/m².    Intake/Output Summary (Last 24 hours) at 11/25/2023 1259  Last data filed at 11/25/2023 0553  Gross per 24 hour   Intake 543.14 ml   Output --   Net 543.14 ml           Physical Exam  Constitutional:       General: He is not in acute distress.     Appearance: He is normal weight. He is not ill-appearing, toxic-appearing or diaphoretic.   HENT:      Mouth/Throat:      Mouth: Mucous membranes are moist.      Pharynx: Oropharynx is clear.   Cardiovascular:      Rate and Rhythm: Normal rate and regular rhythm.      Pulses: Normal pulses.      Heart sounds: Normal heart sounds.   Pulmonary:      Effort: Pulmonary effort is normal. No respiratory distress.      Breath sounds: Normal breath sounds.   Abdominal:      General: Bowel sounds are normal. There is no distension.      Palpations: Abdomen is soft.      Tenderness: There is abdominal tenderness (Umbilical area). There is no guarding.   Skin:     General: Skin is warm and dry.      Coloration: Skin is not jaundiced or pale.   Neurological:      Mental Status: He is alert and oriented to person, place, and time. Mental status is at baseline.   Psychiatric:         Mood and Affect: Mood normal.         Behavior: Behavior normal.             Significant Labs: All pertinent labs within the past 24 hours have been reviewed.  CBC:   Recent Labs   Lab 11/23/23  1804 11/24/23  0431 11/25/23  0453   WBC 6.16 5.72 5.81   HGB 9.6* 9.2* 10.8*   HCT 31.7* 29.8* 36.3*    236 244       CMP:   Recent Labs   Lab 11/24/23  0431 11/25/23  0453    140   K 4.2 4.2    102   CO2 25 27    110   BUN 15 13   CREATININE 1.1 1.2   CALCIUM 8.9 9.2   PROT 6.6 7.4   ALBUMIN 3.6 3.9   BILITOT 0.3 0.3   ALKPHOS 91 102   AST 24 15   ALT 38 29   ANIONGAP 9 11          Significant Imaging: I have reviewed all pertinent imaging results/findings within the past 24 hours.    mpression:            - Normal esophagus.                          - A large amount of food (residue) in the stomach.                          - Normal examined duodenum.                          - No specimens collected.                          -No evdience of active bleeding or old blood, food                          obscured large portion of stomach.   Recommendation:        - [facility] to [facility] for [facility] care.                          - Clear liquid diet.                          - PPI BID                          -CBC in AM- will need repeat EGD prior to                          discharge, clinical course will determine timing                          tomorrow vs Monday     Assessment/Plan:      * GI bleed  Acute problem   GI consult   GI bleed pathway   Protonix   Octreotide stopped per GI  CLD  Monitor H/H closely  Repeat EGD per GI      Seizure disorder  Continue home meds  Seizure precautions        Dependence on nicotine from cigarettes  Dangers of cigarette smoking were reviewed with patient in detail. Patient was Counseled for 3-10 minutes. Nicotine replacement options were discussed. Nicotine replacement was discussed- not prescribed per patient's request    CHAPO (acute kidney injury)  Patient with acute kidney injury/acute renal failure likely due to pre-renal azotemia due to IVVD CHPAO is currently improving. Baseline creatinine unknown - Labs reviewed- Renal function/electrolytes with Estimated Creatinine Clearance: 83.8 mL/min (based on SCr of 1.2 mg/dL). according to latest data. Monitor urine output and serial BMP and adjust therapy as needed. Avoid nephrotoxins and renally dose meds for GFR listed above.    Resolved    Depression  Patient has persistent depression which is unknown and is currently controlled. Will Continue anti-depressant medications. We will not consult  psychiatry at this time. Patient does not display psychosis at this time. Continue to monitor closely and adjust plan of care as needed.        Transaminitis  Trend LFTs  GI consulted  Avoid nonessential hepatotoxins  Resolved    Hypertension  Chronic, controlled. Latest blood pressure and vitals reviewed-     Temp:  [97.4 °F (36.3 °C)-98 °F (36.7 °C)]   Pulse:  []   Resp:  [16-20]   BP: (102-157)/()   SpO2:  [97 %-99 %] .   Home meds for hypertension were reviewed and noted below.   Hypertension Medications               amLODIPine (NORVASC) 10 MG tablet Take 10 mg by mouth once daily.    cloNIDine (CATAPRES) 0.1 MG tablet Take 2 tablets (0.2 mg total) by mouth 3 (three) times daily.    lisinopriL 10 MG tablet Take 10 mg by mouth 2 (two) times daily.    losartan (COZAAR) 25 MG tablet Take 25 mg by mouth once daily.    prazosin (MINIPRESS) 2 MG Cap Take 2 mg by mouth every evening. HCS    propranoloL (INDERAL) 10 MG tablet Take 10 mg by mouth 2 (two) times daily.            While in the hospital, will manage blood pressure as follows; Holding home meds for now 2/2 to normotension. Will resume once clinically appropriate.     Will utilize p.r.n. blood pressure medication only if patient's blood pressure greater than 160/100 and he develops symptoms such as worsening chest pain or shortness of breath.      VTE Risk Mitigation (From admission, onward)           Ordered     IP VTE LOW RISK PATIENT  Once         11/22/23 2020     Place MARIS hose  Until discontinued         11/22/23 2020     Place sequential compression device  Until discontinued         11/22/23 2020                    Discharge Planning   RICHAR: 11/27/2023     Code Status: Full Code   Is the patient medically ready for discharge?:     Reason for patient still in hospital (select all that apply): Patient trending condition and Consult recommendations  Discharge Plan A: Home                  Danielle Yuen NP  Department of Hospital Medicine    [facility] - [facility]/Surg

## 2023-11-25 NOTE — PROGRESS NOTES
[facility] Medicine  Progress Note    Patient Name: Bradley Collado  MRN: [id number]  Patient Class: OP- Observation   Admission Date: 11/22/2023  Length of Stay: 1 days  Attending Physician: Chadd Zabala MD  Primary Care Provider: Cassius Oneill MD        Subjective:     Principal Problem:GI bleed        HPI:  Bradley Collado is a 43-year-old male who presents emergency room for evaluation of hematemesis.  He reports that approximately 36 hours ago he began experiencing hematemesis.  He describes the vomitus as dark red.  He denies any coffee-ground substance.  He denies any hematochezia.  He reports he completed inpatient alcohol rehab 8 days ago.  He denies any alcohol use since his discharge.  Previous medical history includes ETOH abuse, hypertension, pancreatitis, hypothyroidism, active smoker, seizure disorder, and schizophrenia.  ER workup:  CBC with mild anemia of 9 and 29.  CMP with glucose of 197, BUN 23, creatinine 1.5.  Patient be admitted to Hospital Medicine for treatment and management.  GI was consulted and recommend octreotide bolus and infusion as well as IV Protonix.  Patient be maintain NPO after midnight tonight for possible EGD in a.m..    Overview/Hospital Course:  No notes on file    Interval History:  Patient seen and examined.  No acute events overnight.  Has not had any further episodes of vomiting.  H/H remains stable.  Awaiting EGD today.    Review of Systems   Gastrointestinal:  Positive for abdominal pain and vomiting.        Hematemesis   All other systems reviewed and are negative.    Objective:     Vital Signs (Most Recent):  Temp: 96.9 °F (36.1 °C) (11/24/23 2338)  Pulse: 86 (11/25/23 0805)  Resp: 16 (11/25/23 0805)  BP: (!) 123/92 (11/25/23 0455)  SpO2: 97 % (11/25/23 0805) Vital Signs (24h Range):  Temp:  [96.9 °F (36.1 °C)-98.8 °F (37.1 °C)] 96.9 °F (36.1 °C)  Pulse:  [74-86] 86  Resp:  [13-21] 16  SpO2:  [97 %-100 %] 97 %  BP:  (100-144)/(59-92) 123/92     Weight: 83.5 kg (184 lb 1.4 oz)  Body mass index is 28.83 kg/m².    Intake/Output Summary (Last 24 hours) at 11/25/2023 0821  Last data filed at 11/25/2023 0553  Gross per 24 hour   Intake 543.14 ml   Output --   Net 543.14 ml           Physical Exam  Constitutional:       General: He is not in acute distress.     Appearance: He is normal weight. He is not ill-appearing, toxic-appearing or diaphoretic.   HENT:      Mouth/Throat:      Mouth: Mucous membranes are moist.      Pharynx: Oropharynx is clear.   Cardiovascular:      Rate and Rhythm: Normal rate and regular rhythm.      Pulses: Normal pulses.      Heart sounds: Normal heart sounds.   Pulmonary:      Effort: Pulmonary effort is normal. No respiratory distress.      Breath sounds: Normal breath sounds.   Abdominal:      General: Bowel sounds are normal. There is no distension.      Palpations: Abdomen is soft.      Tenderness: There is abdominal tenderness (Umbilical area). There is no guarding.   Skin:     General: Skin is warm and dry.      Coloration: Skin is not jaundiced or pale.   Neurological:      Mental Status: He is alert and oriented to person, place, and time. Mental status is at baseline.   Psychiatric:         Mood and Affect: Mood normal.         Behavior: Behavior normal.             Significant Labs: All pertinent labs within the past 24 hours have been reviewed.  CBC:   Recent Labs   Lab 11/23/23  1804 11/24/23  0431 11/25/23  0453   WBC 6.16 5.72 5.81   HGB 9.6* 9.2* 10.8*   HCT 31.7* 29.8* 36.3*    236 244       CMP:   Recent Labs   Lab 11/24/23  0431 11/25/23  0453    140   K 4.2 4.2    102   CO2 25 27    110   BUN 15 13   CREATININE 1.1 1.2   CALCIUM 8.9 9.2   PROT 6.6 7.4   ALBUMIN 3.6 3.9   BILITOT 0.3 0.3   ALKPHOS 91 102   AST 24 15   ALT 38 29   ANIONGAP 9 11         Significant Imaging: I have reviewed all pertinent imaging results/findings within the past 24  hours.    Assessment/Plan:      * GI bleed  Acute problem   GI consult   GI bleed pathway   Protonix   Octreotide bolus and infusion   NPO   Monitor H/H closely  EGD planned for today      Seizure disorder  Continue home meds  Seizure precautions        Dependence on nicotine from cigarettes  Dangers of cigarette smoking were reviewed with patient in detail. Patient was Counseled for 3-10 minutes. Nicotine replacement options were discussed. Nicotine replacement was discussed- not prescribed per patient's request    CHAPO (acute kidney injury)  Patient with acute kidney injury/acute renal failure likely due to pre-renal azotemia due to IVVD CHAPO is currently improving. Baseline creatinine unknown - Labs reviewed- Renal function/electrolytes with Estimated Creatinine Clearance: 83.8 mL/min (based on SCr of 1.2 mg/dL). according to latest data. Monitor urine output and serial BMP and adjust therapy as needed. Avoid nephrotoxins and renally dose meds for GFR listed above.    Resolved    Depression  Patient has persistent depression which is unknown and is currently controlled. Will Continue anti-depressant medications. We will not consult psychiatry at this time. Patient does not display psychosis at this time. Continue to monitor closely and adjust plan of care as needed.        Transaminitis  Trend LFTs  GI consulted  Avoid nonessential hepatotoxins    Hypertension  Chronic, controlled. Latest blood pressure and vitals reviewed-     Temp:  [97.4 °F (36.3 °C)-98 °F (36.7 °C)]   Pulse:  []   Resp:  [16-20]   BP: (102-157)/()   SpO2:  [97 %-99 %] .   Home meds for hypertension were reviewed and noted below.   Hypertension Medications               amLODIPine (NORVASC) 10 MG tablet Take 10 mg by mouth once daily.    cloNIDine (CATAPRES) 0.1 MG tablet Take 2 tablets (0.2 mg total) by mouth 3 (three) times daily.    lisinopriL 10 MG tablet Take 10 mg by mouth 2 (two) times daily.    losartan (COZAAR) 25 MG tablet  Take 25 mg by mouth once daily.    prazosin (MINIPRESS) 2 MG Cap Take 2 mg by mouth every evening. HCS    propranoloL (INDERAL) 10 MG tablet Take 10 mg by mouth 2 (two) times daily.            While in the hospital, will manage blood pressure as follows; Holding home meds for now 2/2 to normotension. Will resume once clinically appropriate.     Will utilize p.r.n. blood pressure medication only if patient's blood pressure greater than 160/100 and he develops symptoms such as worsening chest pain or shortness of breath.      VTE Risk Mitigation (From admission, onward)           Ordered     IP VTE LOW RISK PATIENT  Once         11/22/23 2020     Place MARIS hose  Until discontinued         11/22/23 2020     Place sequential compression device  Until discontinued         11/22/23 2020                    Discharge Planning   RICHAR: 11/25/2023     Code Status: Full Code   Is the patient medically ready for discharge?:     Reason for patient still in hospital (select all that apply): Patient trending condition, Treatment, Imaging, and Consult recommendations  Discharge Plan A: Chun Yuen NP  Department of Hospital Medicine   [facility]/Surg

## 2023-11-26 LAB
ABO + RH BLD: NORMAL
ALBUMIN SERPL BCP-MCNC: 4 G/DL (ref 3.5–5.2)
ALP SERPL-CCNC: 103 U/L (ref 55–135)
ALT SERPL W/O P-5'-P-CCNC: 22 U/L (ref 10–44)
ANION GAP SERPL CALC-SCNC: 12 MMOL/L (ref 8–16)
AST SERPL-CCNC: 14 U/L (ref 10–40)
BASOPHILS # BLD AUTO: 0.03 K/UL (ref 0–0.2)
BASOPHILS NFR BLD: 0.5 % (ref 0–1.9)
BILIRUB SERPL-MCNC: 0.3 MG/DL (ref 0.1–1)
BLD GP AB SCN CELLS X3 SERPL QL: NORMAL
BUN SERPL-MCNC: 8 MG/DL (ref 6–20)
CALCIUM SERPL-MCNC: 9.5 MG/DL (ref 8.7–10.5)
CHLORIDE SERPL-SCNC: 102 MMOL/L (ref 95–110)
CO2 SERPL-SCNC: 25 MMOL/L (ref 23–29)
CREAT SERPL-MCNC: 1.3 MG/DL (ref 0.5–1.4)
DIFFERENTIAL METHOD: ABNORMAL
EOSINOPHIL # BLD AUTO: 0.2 K/UL (ref 0–0.5)
EOSINOPHIL NFR BLD: 3.5 % (ref 0–8)
ERYTHROCYTE [DISTWIDTH] IN BLOOD BY AUTOMATED COUNT: 18.4 % (ref 11.5–14.5)
EST. GFR  (NO RACE VARIABLE): >60 ML/MIN/1.73 M^2
GLUCOSE SERPL-MCNC: 123 MG/DL (ref 70–110)
HCT VFR BLD AUTO: 36.1 % (ref 40–54)
HGB BLD-MCNC: 10.9 G/DL (ref 14–18)
IMM GRANULOCYTES # BLD AUTO: 0.03 K/UL (ref 0–0.04)
IMM GRANULOCYTES NFR BLD AUTO: 0.5 % (ref 0–0.5)
LYMPHOCYTES # BLD AUTO: 1.1 K/UL (ref 1–4.8)
LYMPHOCYTES NFR BLD: 20.1 % (ref 18–48)
MAGNESIUM SERPL-MCNC: 2.2 MG/DL (ref 1.6–2.6)
MCH RBC QN AUTO: 23.2 PG (ref 27–31)
MCHC RBC AUTO-ENTMCNC: 30.2 G/DL (ref 32–36)
MCV RBC AUTO: 77 FL (ref 82–98)
MONOCYTES # BLD AUTO: 0.5 K/UL (ref 0.3–1)
MONOCYTES NFR BLD: 9.2 % (ref 4–15)
NEUTROPHILS # BLD AUTO: 3.8 K/UL (ref 1.8–7.7)
NEUTROPHILS NFR BLD: 66.2 % (ref 38–73)
NRBC BLD-RTO: 0 /100 WBC
PLATELET # BLD AUTO: 252 K/UL (ref 150–450)
PMV BLD AUTO: 11 FL (ref 9.2–12.9)
POCT GLUCOSE: 118 MG/DL (ref 70–110)
POCT GLUCOSE: 119 MG/DL (ref 70–110)
POCT GLUCOSE: 123 MG/DL (ref 70–110)
POCT GLUCOSE: 123 MG/DL (ref 70–110)
POCT GLUCOSE: 155 MG/DL (ref 70–110)
POTASSIUM SERPL-SCNC: 4.1 MMOL/L (ref 3.5–5.1)
PROT SERPL-MCNC: 7.6 G/DL (ref 6–8.4)
RBC # BLD AUTO: 4.69 M/UL (ref 4.6–6.2)
SODIUM SERPL-SCNC: 139 MMOL/L (ref 136–145)
SPECIMEN OUTDATE: NORMAL
WBC # BLD AUTO: 5.68 K/UL (ref 3.9–12.7)

## 2023-11-26 PROCEDURE — 25000003 PHARM REV CODE 250: Performed by: NURSE PRACTITIONER

## 2023-11-26 PROCEDURE — 94761 N-INVAS EAR/PLS OXIMETRY MLT: CPT

## 2023-11-26 PROCEDURE — 96376 TX/PRO/DX INJ SAME DRUG ADON: CPT

## 2023-11-26 PROCEDURE — 36415 COLL VENOUS BLD VENIPUNCTURE: CPT | Performed by: NURSE PRACTITIONER

## 2023-11-26 PROCEDURE — 99214 OFFICE O/P EST MOD 30 MIN: CPT | Mod: ,,, | Performed by: INTERNAL MEDICINE

## 2023-11-26 PROCEDURE — 86901 BLOOD TYPING SEROLOGIC RH(D): CPT | Performed by: NURSE PRACTITIONER

## 2023-11-26 PROCEDURE — 85025 COMPLETE CBC W/AUTO DIFF WBC: CPT | Performed by: NURSE PRACTITIONER

## 2023-11-26 PROCEDURE — 99214 PR OFFICE/OUTPT VISIT, EST, LEVL IV, 30-39 MIN: ICD-10-PCS | Mod: ,,, | Performed by: INTERNAL MEDICINE

## 2023-11-26 PROCEDURE — C9113 INJ PANTOPRAZOLE SODIUM, VIA: HCPCS | Performed by: NURSE PRACTITIONER

## 2023-11-26 PROCEDURE — 63600175 PHARM REV CODE 636 W HCPCS: Performed by: NURSE PRACTITIONER

## 2023-11-26 PROCEDURE — G0378 HOSPITAL OBSERVATION PER HR: HCPCS

## 2023-11-26 PROCEDURE — 80053 COMPREHEN METABOLIC PANEL: CPT | Performed by: STUDENT IN AN ORGANIZED HEALTH CARE EDUCATION/TRAINING PROGRAM

## 2023-11-26 PROCEDURE — 83735 ASSAY OF MAGNESIUM: CPT | Performed by: STUDENT IN AN ORGANIZED HEALTH CARE EDUCATION/TRAINING PROGRAM

## 2023-11-26 PROCEDURE — 96372 THER/PROPH/DIAG INJ SC/IM: CPT | Performed by: NURSE PRACTITIONER

## 2023-11-26 RX ORDER — CLONAZEPAM 0.5 MG/1
0.5 TABLET ORAL 2 TIMES DAILY PRN
Status: DISCONTINUED | OUTPATIENT
Start: 2023-11-26 | End: 2023-11-27 | Stop reason: HOSPADM

## 2023-11-26 RX ADMIN — BUSPIRONE HYDROCHLORIDE 30 MG: 10 TABLET ORAL at 08:11

## 2023-11-26 RX ADMIN — PANTOPRAZOLE SODIUM 40 MG: 40 INJECTION, POWDER, FOR SOLUTION INTRAVENOUS at 08:11

## 2023-11-26 RX ADMIN — DULOXETINE HYDROCHLORIDE 30 MG: 30 CAPSULE, DELAYED RELEASE ORAL at 08:11

## 2023-11-26 RX ADMIN — PRAZOSIN HYDROCHLORIDE 2 MG: 1 CAPSULE ORAL at 08:11

## 2023-11-26 RX ADMIN — DIVALPROEX SODIUM 500 MG: 250 TABLET, EXTENDED RELEASE ORAL at 03:11

## 2023-11-26 RX ADMIN — MIRTAZAPINE 30 MG: 15 TABLET, FILM COATED ORAL at 08:11

## 2023-11-26 RX ADMIN — OLANZAPINE 20 MG: 5 TABLET, FILM COATED ORAL at 08:11

## 2023-11-26 RX ADMIN — INSULIN ASPART 2 UNITS: 100 INJECTION, SOLUTION INTRAVENOUS; SUBCUTANEOUS at 04:11

## 2023-11-26 RX ADMIN — DIVALPROEX SODIUM 500 MG: 250 TABLET, EXTENDED RELEASE ORAL at 08:11

## 2023-11-26 RX ADMIN — THIAMINE HCL TAB 100 MG 100 MG: 100 TAB at 08:11

## 2023-11-26 RX ADMIN — DOXEPIN HYDROCHLORIDE 150 MG: 25 CAPSULE ORAL at 08:11

## 2023-11-26 RX ADMIN — OXCARBAZEPINE 150 MG: 150 TABLET, FILM COATED ORAL at 08:11

## 2023-11-26 RX ADMIN — CEFTRIAXONE SODIUM 1 G: 1 INJECTION, POWDER, FOR SOLUTION INTRAMUSCULAR; INTRAVENOUS at 04:11

## 2023-11-26 RX ADMIN — CLONAZEPAM 0.5 MG: 0.5 TABLET ORAL at 05:11

## 2023-11-26 NOTE — SUBJECTIVE & OBJECTIVE
Interval History:  Patient seen and examined.  No acute events overnight.  Will be npo after midnight tonight in anticipation of EGD Monday.    Review of Systems   Gastrointestinal:  Positive for abdominal pain and vomiting.        Hematemesis   All other systems reviewed and are negative.    Objective:     Vital Signs (Most Recent):  Temp: 97.8 °F (36.6 °C) (11/26/23 0332)  Pulse: 92 (11/26/23 0332)  Resp: 20 (11/26/23 0332)  BP: 123/89 (11/26/23 0332)  SpO2: 100 % (11/26/23 0332) Vital Signs (24h Range):  Temp:  [97.2 °F (36.2 °C)-98 °F (36.7 °C)] 97.8 °F (36.6 °C)  Pulse:  [] 92  Resp:  [14-20] 20  SpO2:  [97 %-100 %] 100 %  BP: (119-156)/(84-95) 123/89     Weight: 83.5 kg (184 lb 1.4 oz)  Body mass index is 28.83 kg/m².    Intake/Output Summary (Last 24 hours) at 11/26/2023 0725  Last data filed at 11/26/2023 0443  Gross per 24 hour   Intake 240 ml   Output --   Net 240 ml           Physical Exam  Constitutional:       General: He is not in acute distress.     Appearance: He is normal weight. He is not ill-appearing, toxic-appearing or diaphoretic.   HENT:      Mouth/Throat:      Mouth: Mucous membranes are moist.      Pharynx: Oropharynx is clear.   Cardiovascular:      Rate and Rhythm: Normal rate and regular rhythm.      Pulses: Normal pulses.      Heart sounds: Normal heart sounds.   Pulmonary:      Effort: Pulmonary effort is normal. No respiratory distress.      Breath sounds: Normal breath sounds.   Abdominal:      General: Bowel sounds are normal. There is no distension.      Palpations: Abdomen is soft.      Tenderness: There is abdominal tenderness (Umbilical area). There is no guarding.   Skin:     General: Skin is warm and dry.      Coloration: Skin is not jaundiced or pale.   Neurological:      Mental Status: He is alert and oriented to person, place, and time. Mental status is at baseline.   Psychiatric:         Mood and Affect: Mood normal.         Behavior: Behavior normal.              Significant Labs: All pertinent labs within the past 24 hours have been reviewed.  CBC:   Recent Labs   Lab 11/25/23 0453 11/26/23  0606   WBC 5.81 5.68   HGB 10.8* 10.9*   HCT 36.3* 36.1*    252       CMP:   Recent Labs   Lab 11/25/23 0453 11/26/23  0606    139   K 4.2 4.1    102   CO2 27 25    123*   BUN 13 8   CREATININE 1.2 1.3   CALCIUM 9.2 9.5   PROT 7.4 7.6   ALBUMIN 3.9 4.0   BILITOT 0.3 0.3   ALKPHOS 102 103   AST 15 14   ALT 29 22   ANIONGAP 11 12         Significant Imaging: I have reviewed all pertinent imaging results/findings within the past 24 hours.    mpression:            - Normal esophagus.                          - A large amount of food (residue) in the stomach.                          - Normal examined duodenum.                          - No specimens collected.                          -No evdience of active bleeding or old blood, food                          obscured large portion of stomach.   Recommendation:        - [facility] patient to [facility] for [facility] care.                          - Clear liquid diet.                          - PPI BID                          -CBC in AM- will need repeat EGD prior to                          discharge, clinical course will determine timing                          tomorrow vs Monday

## 2023-11-26 NOTE — ASSESSMENT & PLAN NOTE
Acute problem   GI consult   GI bleed pathway   Protonix   Octreotide stopped per GI  CLD  Monitor H/H closely  Repeat EGD per GI - Monday  NPO after midnight

## 2023-11-26 NOTE — ASSESSMENT & PLAN NOTE
Bed: 19  Expected date:   Expected time:   Means of arrival:   Comments:   Continue home meds  Seizure precautions

## 2023-11-26 NOTE — HOSPITAL COURSE
Patient was admitted with upper GI bleed.  He was placed on GI bleeding pathway.  He was initiated on IV Protonix and octreotide.  GI was consulted.  Underwent EGD which noted copious amount of food.  His octreotide was stopped per GI recommendations.  His H/H remained stable.  He was continued on clear liquid diet and underwent repeat EGD on 11/27 with no signs of bleeding. GI rec no further inpt workup. Pt tolerated diet and felt well. He was cleared for discharge. Pt verbalized understanding of discharge instructions and return precautions.     PE:  AAO x 3, pleasant, NAD  Heart - RRR, no edema  Lungs-clear auscultation bilaterally, respirations even unlabored   Abdomen-soft nontender normoactive bowel sounds

## 2023-11-26 NOTE — PLAN OF CARE
Problem: Adult Inpatient Plan of Care  Goal: Plan of Care Review  Outcome: Ongoing, Progressing  Goal: Patient-Specific Goal (Individualized)  Outcome: Ongoing, Progressing  Goal: Absence of Hospital-Acquired Illness or Injury  Outcome: Ongoing, Progressing  Goal: Optimal Comfort and Wellbeing  Outcome: Ongoing, Progressing  Goal: Readiness for Transition of Care  Outcome: Ongoing, Progressing     Problem: Adjustment to Illness (Gastrointestinal Bleeding)  Goal: Optimal Coping with Acute Illness  Outcome: Ongoing, Progressing     Problem: Bleeding (Gastrointestinal Bleeding)  Goal: Hemostasis  Outcome: Ongoing, Progressing     Problem: Fall Injury Risk  Goal: Absence of Fall and Fall-Related Injury  Outcome: Ongoing, Progressing     POC discussed with patient. Meds given as ordered. Q2H purposeful rounding. Turning, coughing, deep breathing, and weight shifting encouraged. Safety measures maintained with side rails up, bed alarm on, slip resistant socks on, call light in reach, pt instructed to call for needs.

## 2023-11-26 NOTE — NURSING
[facility] on [address] called for medication reconciliation not done on admit.  Employee at [facility] states pt has not filled medications at that pharmacy since June states he does not have gabapentin on file there.

## 2023-11-26 NOTE — PROGRESS NOTES
[facility] Medicine  Progress Note    Patient Name: Bradley Collado  MRN: [id number]  Patient Class: OP- Observation   Admission Date: 11/22/2023  Length of Stay: 1 days  Attending Physician: Lindsay Colindres MD  Primary Care Provider: Casisus Oneill MD        Subjective:     Principal Problem:GI bleed        HPI:  Bradley Collado is a 43-year-old male who presents emergency room for evaluation of hematemesis.  He reports that approximately 36 hours ago he began experiencing hematemesis.  He describes the vomitus as dark red.  He denies any coffee-ground substance.  He denies any hematochezia.  He reports he completed inpatient alcohol rehab 8 days ago.  He denies any alcohol use since his discharge.  Previous medical history includes ETOH abuse, hypertension, pancreatitis, hypothyroidism, active smoker, seizure disorder, and schizophrenia.  ER workup:  CBC with mild anemia of 9 and 29.  CMP with glucose of 197, BUN 23, creatinine 1.5.  Patient be admitted to Hospital Medicine for treatment and management.  GI was consulted and recommend octreotide bolus and infusion as well as IV Protonix.  Patient be maintain NPO after midnight tonight for possible EGD in a.m..    Overview/Hospital Course:  No notes on file    Interval History:  Patient seen and examined.  No acute events overnight.  Will be npo after midnight tonight in anticipation of EGD Monday.     Review of Systems   Gastrointestinal:  Positive for abdominal pain and vomiting.        Hematemesis   All other systems reviewed and are negative.    Objective:     Vital Signs (Most Recent):  Temp: 97.8 °F (36.6 °C) (11/26/23 0332)  Pulse: 92 (11/26/23 0332)  Resp: 20 (11/26/23 0332)  BP: 123/89 (11/26/23 0332)  SpO2: 100 % (11/26/23 0332) Vital Signs (24h Range):  Temp:  [97.2 °F (36.2 °C)-98 °F (36.7 °C)] 97.8 °F (36.6 °C)  Pulse:  [] 92  Resp:  [14-20] 20  SpO2:  [97 %-100 %] 100 %  BP: (119-156)/(84-95) 123/89      Weight: 83.5 kg (184 lb 1.4 oz)  Body mass index is 28.83 kg/m².    Intake/Output Summary (Last 24 hours) at 11/26/2023 0746  Last data filed at 11/26/2023 0443  Gross per 24 hour   Intake 240 ml   Output --   Net 240 ml           Physical Exam  Constitutional:       General: He is not in acute distress.     Appearance: He is normal weight. He is not ill-appearing, toxic-appearing or diaphoretic.   HENT:      Mouth/Throat:      Mouth: Mucous membranes are moist.      Pharynx: Oropharynx is clear.   Cardiovascular:      Rate and Rhythm: Normal rate and regular rhythm.      Pulses: Normal pulses.      Heart sounds: Normal heart sounds.   Pulmonary:      Effort: Pulmonary effort is normal. No respiratory distress.      Breath sounds: Normal breath sounds.   Abdominal:      General: Bowel sounds are normal. There is no distension.      Palpations: Abdomen is soft.      Tenderness: There is abdominal tenderness (Umbilical area). There is no guarding.   Skin:     General: Skin is warm and dry.      Coloration: Skin is not jaundiced or pale.   Neurological:      Mental Status: He is alert and oriented to person, place, and time. Mental status is at baseline.   Psychiatric:         Mood and Affect: Mood normal.         Behavior: Behavior normal.             Significant Labs: All pertinent labs within the past 24 hours have been reviewed.  CBC:   Recent Labs   Lab 11/25/23 0453 11/26/23  0606   WBC 5.81 5.68   HGB 10.8* 10.9*   HCT 36.3* 36.1*    252       CMP:   Recent Labs   Lab 11/25/23 0453 11/26/23  0606    139   K 4.2 4.1    102   CO2 27 25    123*   BUN 13 8   CREATININE 1.2 1.3   CALCIUM 9.2 9.5   PROT 7.4 7.6   ALBUMIN 3.9 4.0   BILITOT 0.3 0.3   ALKPHOS 102 103   AST 15 14   ALT 29 22   ANIONGAP 11 12         Significant Imaging: I have reviewed all pertinent imaging results/findings within the past 24 hours.    mpression:            - Normal esophagus.                          - A  large amount of food (residue) in the stomach.                          - Normal examined duodenum.                          - No specimens collected.                          -No evdience of active bleeding or old blood, food                          obscured large portion of stomach.   Recommendation:        - [facility] for [facility].                          - Clear liquid diet.                          - PPI BID                          -CBC in AM- will need repeat EGD prior to                          discharge, clinical course will determine timing                          tomorrow vs Monday     Assessment/Plan:      * GI bleed  Acute problem   GI consult   GI bleed pathway   Protonix   Octreotide stopped per GI  CLD  Monitor H/H closely  Repeat EGD per GI - Monday  NPO after midnight      Seizure disorder  Continue home meds  Seizure precautions        Dependence on nicotine from cigarettes  Dangers of cigarette smoking were reviewed with patient in detail. Patient was Counseled for 3-10 minutes. Nicotine replacement options were discussed. Nicotine replacement was discussed- not prescribed per patient's request    CHAPO (acute kidney injury)  Patient with acute kidney injury/acute renal failure likely due to pre-renal azotemia due to IVVD CHAPO is currently improving. Baseline creatinine unknown - Labs reviewed- Renal function/electrolytes with Estimated Creatinine Clearance: 83.8 mL/min (based on SCr of 1.2 mg/dL). according to latest data. Monitor urine output and serial BMP and adjust therapy as needed. Avoid nephrotoxins and renally dose meds for GFR listed above.    Resolved    Depression  Patient has persistent depression which is unknown and is currently controlled. Will Continue anti-depressant medications. We will not consult psychiatry at this time. Patient does not display psychosis at this time. Continue to monitor closely and adjust plan of care as  needed.        Transaminitis  Trend LFTs  GI consulted  Avoid nonessential hepatotoxins  Resolved    Hypertension  Chronic, controlled. Latest blood pressure and vitals reviewed-     Temp:  [97.4 °F (36.3 °C)-98 °F (36.7 °C)]   Pulse:  []   Resp:  [16-20]   BP: (102-157)/()   SpO2:  [97 %-99 %] .   Home meds for hypertension were reviewed and noted below.   Hypertension Medications               amLODIPine (NORVASC) 10 MG tablet Take 10 mg by mouth once daily.    cloNIDine (CATAPRES) 0.1 MG tablet Take 2 tablets (0.2 mg total) by mouth 3 (three) times daily.    lisinopriL 10 MG tablet Take 10 mg by mouth 2 (two) times daily.    losartan (COZAAR) 25 MG tablet Take 25 mg by mouth once daily.    prazosin (MINIPRESS) 2 MG Cap Take 2 mg by mouth every evening. HCS    propranoloL (INDERAL) 10 MG tablet Take 10 mg by mouth 2 (two) times daily.            While in the hospital, will manage blood pressure as follows; Holding home meds for now 2/2 to normotension. Will resume once clinically appropriate.     Will utilize p.r.n. blood pressure medication only if patient's blood pressure greater than 160/100 and he develops symptoms such as worsening chest pain or shortness of breath.      VTE Risk Mitigation (From admission, onward)           Ordered     IP VTE LOW RISK PATIENT  Once         11/22/23 2020     Place MARIS hose  Until discontinued         11/22/23 2020     Place sequential compression device  Until discontinued         11/22/23 2020                    Discharge Planning   RICHAR: 11/27/2023     Code Status: Full Code   Is the patient medically ready for discharge?:     Reason for patient still in hospital (select all that apply): Imaging and Consult recommendations  Discharge Plan A: Home                  Danielle Yuen NP  Department of Hospital Medicine   [facility]/Surg

## 2023-11-27 ENCOUNTER — ANESTHESIA (OUTPATIENT)
Dept: ENDOSCOPY | Facility: HOSPITAL | Age: 43
End: 2023-11-27
Payer: MEDICAID

## 2023-11-27 ENCOUNTER — ANESTHESIA EVENT (OUTPATIENT)
Dept: ENDOSCOPY | Facility: HOSPITAL | Age: 43
End: 2023-11-27
Payer: MEDICAID

## 2023-11-27 VITALS
BODY MASS INDEX: 28.89 KG/M2 | DIASTOLIC BLOOD PRESSURE: 97 MMHG | OXYGEN SATURATION: 99 % | HEIGHT: 67 IN | TEMPERATURE: 98 F | HEART RATE: 90 BPM | WEIGHT: 184.06 LBS | SYSTOLIC BLOOD PRESSURE: 127 MMHG | RESPIRATION RATE: 16 BRPM

## 2023-11-27 LAB
ALBUMIN SERPL BCP-MCNC: 3.8 G/DL (ref 3.5–5.2)
ALP SERPL-CCNC: 96 U/L (ref 55–135)
ALT SERPL W/O P-5'-P-CCNC: 16 U/L (ref 10–44)
ANION GAP SERPL CALC-SCNC: 12 MMOL/L (ref 8–16)
AST SERPL-CCNC: 9 U/L (ref 10–40)
BASOPHILS # BLD AUTO: 0.02 K/UL (ref 0–0.2)
BASOPHILS NFR BLD: 0.3 % (ref 0–1.9)
BILIRUB SERPL-MCNC: 0.2 MG/DL (ref 0.1–1)
BUN SERPL-MCNC: 6 MG/DL (ref 6–20)
CALCIUM SERPL-MCNC: 9.1 MG/DL (ref 8.7–10.5)
CHLORIDE SERPL-SCNC: 101 MMOL/L (ref 95–110)
CO2 SERPL-SCNC: 24 MMOL/L (ref 23–29)
CREAT SERPL-MCNC: 1.2 MG/DL (ref 0.5–1.4)
DIFFERENTIAL METHOD: ABNORMAL
EOSINOPHIL # BLD AUTO: 0.1 K/UL (ref 0–0.5)
EOSINOPHIL NFR BLD: 1.3 % (ref 0–8)
ERYTHROCYTE [DISTWIDTH] IN BLOOD BY AUTOMATED COUNT: 18.3 % (ref 11.5–14.5)
EST. GFR  (NO RACE VARIABLE): >60 ML/MIN/1.73 M^2
GLUCOSE SERPL-MCNC: 135 MG/DL (ref 70–110)
HCT VFR BLD AUTO: 33.2 % (ref 40–54)
HGB BLD-MCNC: 10.2 G/DL (ref 14–18)
IMM GRANULOCYTES # BLD AUTO: 0.03 K/UL (ref 0–0.04)
IMM GRANULOCYTES NFR BLD AUTO: 0.4 % (ref 0–0.5)
LYMPHOCYTES # BLD AUTO: 1.2 K/UL (ref 1–4.8)
LYMPHOCYTES NFR BLD: 15.5 % (ref 18–48)
MAGNESIUM SERPL-MCNC: 1.9 MG/DL (ref 1.6–2.6)
MCH RBC QN AUTO: 23.3 PG (ref 27–31)
MCHC RBC AUTO-ENTMCNC: 30.7 G/DL (ref 32–36)
MCV RBC AUTO: 76 FL (ref 82–98)
MONOCYTES # BLD AUTO: 0.6 K/UL (ref 0.3–1)
MONOCYTES NFR BLD: 7.1 % (ref 4–15)
NEUTROPHILS # BLD AUTO: 6 K/UL (ref 1.8–7.7)
NEUTROPHILS NFR BLD: 75.4 % (ref 38–73)
NRBC BLD-RTO: 0 /100 WBC
PLATELET # BLD AUTO: 219 K/UL (ref 150–450)
PMV BLD AUTO: 11 FL (ref 9.2–12.9)
POTASSIUM SERPL-SCNC: 4 MMOL/L (ref 3.5–5.1)
PROT SERPL-MCNC: 7.2 G/DL (ref 6–8.4)
RBC # BLD AUTO: 4.37 M/UL (ref 4.6–6.2)
SODIUM SERPL-SCNC: 137 MMOL/L (ref 136–145)
WBC # BLD AUTO: 8 K/UL (ref 3.9–12.7)

## 2023-11-27 PROCEDURE — 85025 COMPLETE CBC W/AUTO DIFF WBC: CPT | Performed by: NURSE PRACTITIONER

## 2023-11-27 PROCEDURE — 63600175 PHARM REV CODE 636 W HCPCS: Performed by: NURSE ANESTHETIST, CERTIFIED REGISTERED

## 2023-11-27 PROCEDURE — G0378 HOSPITAL OBSERVATION PER HR: HCPCS

## 2023-11-27 PROCEDURE — 25000003 PHARM REV CODE 250: Performed by: NURSE ANESTHETIST, CERTIFIED REGISTERED

## 2023-11-27 PROCEDURE — D9220A PRA ANESTHESIA: Mod: ANES,,, | Performed by: ANESTHESIOLOGY

## 2023-11-27 PROCEDURE — 94761 N-INVAS EAR/PLS OXIMETRY MLT: CPT

## 2023-11-27 PROCEDURE — D9220A PRA ANESTHESIA: Mod: CRNA,,, | Performed by: NURSE ANESTHETIST, CERTIFIED REGISTERED

## 2023-11-27 PROCEDURE — D9220A PRA ANESTHESIA: ICD-10-PCS | Mod: ANES,,, | Performed by: ANESTHESIOLOGY

## 2023-11-27 PROCEDURE — 43239 EGD BIOPSY SINGLE/MULTIPLE: CPT | Performed by: INTERNAL MEDICINE

## 2023-11-27 PROCEDURE — 43239 EGD BIOPSY SINGLE/MULTIPLE: CPT | Mod: ,,, | Performed by: INTERNAL MEDICINE

## 2023-11-27 PROCEDURE — 25000003 PHARM REV CODE 250: Performed by: NURSE PRACTITIONER

## 2023-11-27 PROCEDURE — 83735 ASSAY OF MAGNESIUM: CPT | Performed by: STUDENT IN AN ORGANIZED HEALTH CARE EDUCATION/TRAINING PROGRAM

## 2023-11-27 PROCEDURE — 80053 COMPREHEN METABOLIC PANEL: CPT | Performed by: STUDENT IN AN ORGANIZED HEALTH CARE EDUCATION/TRAINING PROGRAM

## 2023-11-27 PROCEDURE — D9220A PRA ANESTHESIA: ICD-10-PCS | Mod: CRNA,,, | Performed by: NURSE ANESTHETIST, CERTIFIED REGISTERED

## 2023-11-27 PROCEDURE — 27201012 HC FORCEPS, HOT/COLD, DISP: Performed by: INTERNAL MEDICINE

## 2023-11-27 PROCEDURE — 36415 COLL VENOUS BLD VENIPUNCTURE: CPT | Performed by: STUDENT IN AN ORGANIZED HEALTH CARE EDUCATION/TRAINING PROGRAM

## 2023-11-27 PROCEDURE — 43239 PR EGD, FLEX, W/BIOPSY, SGL/MULTI: ICD-10-PCS | Mod: ,,, | Performed by: INTERNAL MEDICINE

## 2023-11-27 PROCEDURE — 37000008 HC ANESTHESIA 1ST 15 MINUTES: Performed by: INTERNAL MEDICINE

## 2023-11-27 RX ORDER — LIDOCAINE HYDROCHLORIDE 20 MG/ML
INJECTION INTRAVENOUS
Status: DISCONTINUED | OUTPATIENT
Start: 2023-11-27 | End: 2023-11-27

## 2023-11-27 RX ORDER — PROPOFOL 10 MG/ML
VIAL (ML) INTRAVENOUS
Status: DISCONTINUED | OUTPATIENT
Start: 2023-11-27 | End: 2023-11-27

## 2023-11-27 RX ADMIN — PROPOFOL 10 MG: 10 INJECTION, EMULSION INTRAVENOUS at 08:11

## 2023-11-27 RX ADMIN — THIAMINE HCL TAB 100 MG 100 MG: 100 TAB at 10:11

## 2023-11-27 RX ADMIN — CLONAZEPAM 0.5 MG: 0.5 TABLET ORAL at 10:11

## 2023-11-27 RX ADMIN — OXCARBAZEPINE 150 MG: 150 TABLET, FILM COATED ORAL at 09:11

## 2023-11-27 RX ADMIN — LIDOCAINE HYDROCHLORIDE 50 MG: 20 INJECTION, SOLUTION INTRAVENOUS at 08:11

## 2023-11-27 RX ADMIN — BUSPIRONE HYDROCHLORIDE 30 MG: 10 TABLET ORAL at 10:11

## 2023-11-27 RX ADMIN — DULOXETINE HYDROCHLORIDE 30 MG: 30 CAPSULE, DELAYED RELEASE ORAL at 09:11

## 2023-11-27 RX ADMIN — DIVALPROEX SODIUM 500 MG: 250 TABLET, EXTENDED RELEASE ORAL at 10:11

## 2023-11-27 NOTE — PLAN OF CARE
Pt awake, alert.  Vital signs stable, denies nausea, states pain is 0 , abd soft. No adverse effects of anesthesia noted. Transferred to room per wheelchair,  Report given to Gabby.  Bed in low and locked position, call light in reach, bed alarm active.  Pt instructed to call for any needs, do not get out of bed unassisted.  Verbalized understanding.

## 2023-11-27 NOTE — TRANSFER OF CARE
"Anesthesia Transfer of Care Note    Patient: Bradley Collado    Procedure(s) Performed: Procedure(s) (LRB):  EGD (ESOPHAGOGASTRODUODENOSCOPY) (N/A)    Patient location: GI    Anesthesia Type: general    Transport from OR: Transported from OR on 2-3 L/min O2 by NC with adequate spontaneous ventilation    Post pain: adequate analgesia    Post assessment: no apparent anesthetic complications    Post vital signs: stable    Level of consciousness: awake    Nausea/Vomiting: no nausea/vomiting    Complications: none    Transfer of care protocol was followed      Last vitals: Visit Vitals  BP (!) 141/101 (BP Location: Left arm, Patient Position: Lying)   Pulse 92   Temp 36.5 °C (97.7 °F) (Skin)   Resp 18   Ht 5' 7" (1.702 m)   Wt 83.5 kg (184 lb 1.4 oz)   SpO2 99%   BMI 28.83 kg/m²     "

## 2023-11-27 NOTE — PLAN OF CARE
Problem: Adult Inpatient Plan of Care  Goal: Plan of Care Review  Outcome: Met  Goal: Patient-Specific Goal (Individualized)  Outcome: Met  Goal: Absence of Hospital-Acquired Illness or Injury  Outcome: Met  Goal: Optimal Comfort and Wellbeing  Outcome: Met  Goal: Readiness for Transition of Care  Outcome: Met     Problem: Adjustment to Illness (Gastrointestinal Bleeding)  Goal: Optimal Coping with Acute Illness  Outcome: Met     Problem: Bleeding (Gastrointestinal Bleeding)  Goal: Hemostasis  Outcome: Met     Problem: Fall Injury Risk  Goal: Absence of Fall and Fall-Related Injury  Outcome: Met   Discharge instructions given to patient. Patient verbalized complete understanding. IV discontinued with catheter intact, pressure applied to site and secured with tape. Patient tolerated well.Discharge instructions given to patient. Patient verbalized complete understanding. IV discontinued with catheter intact, pressure applied to site and secured with tape. Patient tolerated well.

## 2023-11-27 NOTE — CARE UPDATE
11/27/23 1007   Patient Assessment/Suction   Level of Consciousness (AVPU) alert   PRE-TX-O2   Device (Oxygen Therapy) room air   SpO2 99 %   Pulse Oximetry Type Intermittent   $ Pulse Oximetry - Multiple Charge Pulse Oximetry - Multiple   Pulse 90   Resp 16

## 2023-11-27 NOTE — DISCHARGE INSTRUCTIONS
Rest and drink adequate fluids.  Take medications as prescribed.  Follow-up as directed.  Return to ED for any worsening symptoms or concerns.    Discharge Instructions, [facility] Medicine    Thank you for choosing [facility] for your medical care.     You were admitted to the hospital with GI bleed.     Please note your discharge instructions, including diet/activity restrictions, follow-up appointments, and medication changes.  If you have any questions about your medical issues, prescriptions, or any other questions, please feel free to contact the [facility] Medicine Dept at [phone] and we will help.    If you are previously with Home health, outpatient PT/OT or under a therapy program, you are cleared to return to those programs.    Please direct all long term medication refills and follow up to your primary care provider, Cassius Oneill MD. Thank you again for letting us take care of your health care needs.    Please note the following discharge instructions per your discharging physician-  Carolin Wayne NP

## 2023-11-27 NOTE — PLAN OF CARE
Received cab approval from Nurse Manager, NICHOLAS Rivera. Called [facility] [employer] to set up transport.

## 2023-11-27 NOTE — PLAN OF CARE
Procedure documentation start gastroscope number 5338 unable to document in sedation navigator hallie TAPIA aware and working on issue.

## 2023-11-27 NOTE — PLAN OF CARE
Patient cleared for discharge from case management standpoint.    SW scheduled hospital follow up and placed on AVS.       11/27/23 1002   Final Note   Assessment Type Final Discharge Note   Anticipated Discharge Disposition Home   What phone number can be called within the next 1-3 days to see how you are doing after discharge? [phone]   Hospital Resources/Appts/Education Provided Provided patient/caregiver with written discharge plan information;Provided education on problems/symptoms using teachback;Appointments scheduled and added to AVS   Post-Acute Status   Post-Acute Authorization Other   Other Status No Post-Acute Service Needs   Discharge Delays None known at this time

## 2023-11-27 NOTE — PROVATION PATIENT INSTRUCTIONS
Discharge Summary/Instructions after an Endoscopic Procedure  Patient Name: Bradley Menasin  Patient MRN: [id number]  Patient YOB: 1980 Monday, November 27, 2023  Steve Matias MD  Dear patient,  As a result of recent federal legislation (The Federal Cures Act), you may   receive lab or pathology results from your procedure in your [facility]   account before your physician is able to contact you. Your physician or   their representative will relay the results to you with their   recommendations at their soonest availability.  Thank you,  RESTRICTIONS:  During your procedure today, you received medications for sedation.  These   medications may affect your judgment, balance and coordination.  Therefore,   for 24 hours, you have the following restrictions:   - DO NOT drive a car, operate machinery, make legal/financial decisions,   sign important papers or drink alcohol.    ACTIVITY:  Today: no heavy lifting, straining or running due to procedural   sedation/anesthesia.  The following day: return to full activity including work.  DIET:  Eat and drink normally unless instructed otherwise.     TREATMENT FOR COMMON SIDE EFFECTS:  - Mild abdominal pain, nausea, belching, bloating or excessive gas:  rest,   eat lightly and use a heating pad.  - Sore Throat: treat with throat lozenges and/or gargle with warm salt   water.  - Because air was used during the procedure, expelling large amounts of air   from your rectum or belching is normal.  - If a bowel prep was taken, you may not have a bowel movement for 1-3 days.    This is normal.  SYMPTOMS TO WATCH FOR AND REPORT TO YOUR PHYSICIAN:  1. Abdominal pain or bloating, other than gas cramps.  2. Chest pain.  3. Back pain.  4. Signs of infection such as: chills or fever occurring within 24 hours   after the procedure.  5. Rectal bleeding, which would show as bright red, maroon, or black stools.   (A tablespoon of blood from the rectum is not serious, especially  Please refer to Pediatric Sedation for formal procedure note   if   hemorrhoids are present.)  6. Vomiting.  7. Weakness or dizziness.  GO DIRECTLY TO THE NEAREST EMERGENCY ROOM IF YOU HAVE ANY OF THE FOLLOWING:      Difficulty breathing              Chills and/or fever over 101 F   Persistent vomiting and/or vomiting blood   Severe abdominal pain   Severe chest pain   Black, tarry stools   Bleeding- more than one tablespoon   Any other symptom or condition that you feel may need urgent attention  Your doctor recommends these additional instructions:  If any biopsies were taken, your doctors clinic will contact you in 1 to 2   weeks with any results.  - Return patient to [facility] for possible discharge same day.   - Resume previous diet.   - Continue present medications.   - No aspirin, ibuprofen, naproxen, or other non-steroidal anti-inflammatory   drugs.   - Await pathology results.   - Follow an antireflux regimen.   - Return to GI office PRN.  For questions, problems or results please call your physician - Steve Matias MD at Work:  [phone].  [facility], EMERGENCY ROOM PHONE NUMBER: [phone]  IF A COMPLICATION OR EMERGENCY SITUATION ARISES AND YOU ARE UNABLE TO REACH   YOUR PHYSICIAN - GO DIRECTLY TO THE EMERGENCY ROOM.  Steve Matias MD  11/27/2023 8:45:05 AM  This report has been verified and signed electronically.  Dear patient,  As a result of recent federal legislation (The Federal Cures Act), you may   receive lab or pathology results from your procedure in your [facility]   account before your physician is able to contact you. Your physician or   their representative will relay the results to you with their   recommendations at their soonest availability.  Thank you,  PROVATION

## 2023-11-27 NOTE — ANESTHESIA POSTPROCEDURE EVALUATION
Anesthesia Post Evaluation    Patient: Bradley Collado    Procedure(s) Performed: Procedure(s) (LRB):  EGD (ESOPHAGOGASTRODUODENOSCOPY) (N/A)    Final Anesthesia Type: general      Patient location during evaluation: PACU  Patient participation: Yes- Able to Participate  Level of consciousness: awake and alert  Post-procedure vital signs: reviewed and stable  Pain management: adequate  Airway patency: patent    PONV status at discharge: No PONV  Anesthetic complications: no      Cardiovascular status: hemodynamically stable  Respiratory status: unassisted and room air  Hydration status: euvolemic  Follow-up not needed.          Vitals Value Taken Time   /97 11/27/23 0915   Temp 36.6 °C (97.9 °F) 11/27/23 0845   Pulse 90 11/27/23 1007   Resp 16 11/27/23 1007   SpO2 99 % 11/27/23 1007         Event Time   Out of Recovery 11/27/2023 09:28:05         Pain/Raz Score: Raz Score: 10 (11/27/2023  9:15 AM)

## 2023-11-27 NOTE — PLAN OF CARE
Problem: Adult Inpatient Plan of Care  Goal: Plan of Care Review  Outcome: Ongoing, Progressing     Problem: Adjustment to Illness (Gastrointestinal Bleeding)  Goal: Optimal Coping with Acute Illness  Outcome: Ongoing, Progressing     Problem: Fall Injury Risk  Goal: Absence of Fall and Fall-Related Injury  Outcome: Ongoing, Progressing

## 2023-11-27 NOTE — PROGRESS NOTES
[facility] Note    CC: Hematemesis    HPI 43 y.o. male who presents emergency room for evaluation of hematemesis.  He reports that approximately 36 hours ago he began experiencing hematemesis.  He describes the vomitus as dark red.  He denies any coffee-ground substance.  He denies any hematochezia.  He reports he completed inpatient alcohol rehab 8 days ago.  He denies any alcohol use since his discharge.  Previous medical history includes ETOH abuse, hypertension, pancreatitis, hypothyroidism, active smoker, seizure disorder, and schizophrenia.  ER workup:  CBC with mild anemia of 9 and 29.  CMP with glucose of 197, BUN 23, creatinine 1.5.  Patient be admitted to Hospital Medicine for treatment and management.  GI was consulted and recommend octreotide bolus and infusion as well as IV Protonix.  Patient be maintain NPO after midnight tonight for possible EGD in a.m..     GI consulted for hematemesis. Pt described hematemesis approx 2 nights prior to admission, dark blood, nearly black. No recurrent N/V past 48 hours. Denies abd pain. No hematochezia or melena. Positive GERD. No hx ulcer disease. Positive hx ETOH, recent rehab. No known hx cirrhosis or prior GI bleed. No NSAIDs. H/H stable since admit, currently 9.5/30.7. Pt lying comfortably in bed, feels well, tolerating clear liquids. No recent EGD.     INTERVAL HISTORY:  No new complaints per nursing.  Copious retained food in stomach on prior EGD attempt.  Patient had liquid diet today.  No further evidence of bleeding.    Past Medical History:   Diagnosis Date    Anxiety     Anxiety     Bipolar affective disorder     Depression     Hypertension     Pancreatitis     Schizophrenia     Seizures     Thyroid disease          Review of Systems  General ROS: negative for - chills, fever or weight loss  Cardiovascular ROS: no chest pain or dyspnea on exertion  Gastrointestinal ROS: as above    Physical Examination  BP (!) 138/96 (BP Location: Left arm,  "Patient Position: Lying)   Pulse 94   Temp 97.3 °F (36.3 °C) (Oral)   Resp 18   Ht 5' 7" (1.702 m)   Wt 83.5 kg (184 lb 1.4 oz)   SpO2 100%   BMI 28.83 kg/m²   General appearance: alert, cooperative, no distress  HENT: Normocephalic, atraumatic, neck symmetrical, no nasal discharge, sclera anicteric   Lungs: clear to auscultation bilaterally, symmetric chest wall expansion bilaterally  Heart: regular rate and rhythm without rub; no displacement of the PMI   Abdomen: soft, NT  Extremities: extremities symmetric; no clubbing, cyanosis, or edema  Neurologic: Alert and oriented X 3, no sensory or motor neurologic deficits      Labs:  Lab Results   Component Value Date    WBC 5.68 11/26/2023    HGB 10.9 (L) 11/26/2023    HCT 36.1 (L) 11/26/2023    MCV 77 (L) 11/26/2023     11/26/2023           Assessment:   Hematemesis  GERD  Retained food on EGD     Plan:  Clear liquid diet  Repeat EGD Monday.  Further recommendations to follow after above.    Steve Matias MD  [facility]  [address], [address]  [place] [address]  Office: [phone]  Fax: [phone]    "

## 2023-11-27 NOTE — ANESTHESIA PREPROCEDURE EVALUATION
11/27/2023  Bradley Molina Cousin is a 43 y.o., male.      Pre-op Assessment    I have reviewed the Patient Summary Reports.     I have reviewed the Nursing Notes. I have reviewed the NPO Status.   I have reviewed the Medications.     Review of Systems  Social:  Smoker       Cardiovascular:     Hypertension                                  Hypertension         Renal/:  Chronic Renal Disease        Kidney Function/Disease             Hepatic/GI:     GERD   H/o pancreatitis    Gerd          Musculoskeletal:  Arthritis        Arthritis          Neurological:      Headaches Seizures     Dx of Headaches   Arthritis      Seizure Disorder                          Endocrine:   Hypothyroidism       Hypothyroidism          Psych:  Psychiatric History  depression                Physical Exam  General: Well nourished, Cooperative, Alert and Oriented    Airway:  Mallampati: II   Mouth Opening: Normal  TM Distance: Normal  Tongue: Normal    Dental:  Intact    Chest/Lungs:  Normal Respiratory Rate    Heart:  Rate: Normal      Anesthesia Plan  Type of Anesthesia, risks & benefits discussed:    Anesthesia Type: Gen Natural Airway  Intra-op Monitoring Plan: Standard ASA Monitors  Informed Consent: Informed consent signed with the Patient and all parties understand the risks and agree with anesthesia plan.  All questions answered.   ASA Score: 3    Ready For Surgery From Anesthesia Perspective.     .

## 2023-11-28 NOTE — DISCHARGE SUMMARY
[facility] Medicine  Discharge Summary      Patient Name: Bradley Collado  MRN: [id number]  SMITHA: [id number]  Patient Class: OP- Observation  Admission Date: 11/22/2023  Hospital Length of Stay: 1 days  Discharge Date and Time: 11/27/2023 12:15 PM  Attending Physician: No att. providers found   Discharging Provider: Carolin Wayne NP  Primary Care Provider: Cassius Oneill MD    Primary Care Team: Networked reference to record PCT     HPI:   Bradley Collado is a 43-year-old male who presents emergency room for evaluation of hematemesis.  He reports that approximately 36 hours ago he began experiencing hematemesis.  He describes the vomitus as dark red.  He denies any coffee-ground substance.  He denies any hematochezia.  He reports he completed inpatient alcohol rehab 8 days ago.  He denies any alcohol use since his discharge.  Previous medical history includes ETOH abuse, hypertension, pancreatitis, hypothyroidism, active smoker, seizure disorder, and schizophrenia.  ER workup:  CBC with mild anemia of 9 and 29.  CMP with glucose of 197, BUN 23, creatinine 1.5.  Patient be admitted to Hospital Medicine for treatment and management.  GI was consulted and recommend octreotide bolus and infusion as well as IV Protonix.  Patient be maintain NPO after midnight tonight for possible EGD in a.m..    Procedure(s) (LRB):  EGD (ESOPHAGOGASTRODUODENOSCOPY) (N/A)      Hospital Course:     Patient was admitted with upper GI bleed.  He was placed on GI bleeding pathway.  He was initiated on IV Protonix and octreotide.  GI was consulted.  Underwent EGD which noted copious amount of food.  His octreotide was stopped per GI recommendations.  His H/H remained stable.  He was continued on clear liquid diet and underwent repeat EGD on 11/27 with no signs of bleeding. GI rec no further inpt workup. Pt tolerated diet and felt well. He was cleared for discharge. Pt verbalized understanding of  discharge instructions and return precautions.     PE:  AAO x 3, pleasant, NAD  Heart - RRR, no edema  Lungs-clear auscultation bilaterally, respirations even unlabored   Abdomen-soft nontender normoactive bowel sounds     Goals of Care Treatment Preferences:  Code Status: Full Code      Consults:   Consults (From admission, onward)          Status Ordering Provider     Inpatient consult to Gastroenterology  Once        Provider:  Manuel Curran MD    Completed SATYA AVILA     Inpatient consult to Gastroenterology  Once        Provider:  Manuel Curran MD    Completed IRMA BRITTON            No new Assessment & Plan notes have been filed under this hospital service since the last note was generated.  Service: Hospital Medicine    Final Active Diagnoses:    Diagnosis Date Noted POA    PRINCIPAL PROBLEM:  GI bleed [K92.2] 11/22/2023 Yes    Seizure disorder [G40.909] 11/23/2023 Yes    Dependence on nicotine from cigarettes [F17.210] 04/04/2020 Yes    CHAPO (acute kidney injury) [N17.9] 05/14/2018 Yes    Hypertension [I10] 04/05/2016 Yes     Chronic    Depression [F32.A] 04/05/2016 Yes    Transaminitis [R74.01] 04/05/2016 Yes      Problems Resolved During this Admission:       Discharged Condition: good    Disposition: Home or Self Care    Follow Up:   Follow-up Information       Cassius Oneill MD. Go on 12/1/2023.    Specialty: Family Medicine  Why: at 1:00pm to recheck your symptoms, hospital follow up  Contact information:  [address] [place] [id number] [phone]                           Patient Instructions:      Diet Adult Regular     Notify your health care provider if you experience any of the following:  temperature >100.4     Notify your health care provider if you experience any of the following:  persistent nausea and vomiting or diarrhea     Notify your health care provider if you experience any of the following:  severe uncontrolled pain     Notify your health  care provider if you experience any of the following:  persistent dizziness, light-headedness, or visual disturbances     Notify your health care provider if you experience any of the following:  increased confusion or weakness     Activity as tolerated       Significant Diagnostic Studies: Labs: CMP   Recent Labs   Lab 11/27/23 0416      K 4.0      CO2 24   *   BUN 6   CREATININE 1.2   CALCIUM 9.1   PROT 7.2   ALBUMIN 3.8   BILITOT 0.2   ALKPHOS 96   AST 9*   ALT 16   ANIONGAP 12   , CBC   Recent Labs   Lab 11/27/23 0416   WBC 8.00   HGB 10.2*   HCT 33.2*      , and All labs within the past 24 hours have been reviewed    Pending Diagnostic Studies:       Procedure Component Value Units Date/Time    Specimen to Pathology - Surgery [[id number]] Collected: 11/27/23 0844    Order Status: Sent Lab Status: No result     Specimen: Tissue            Medications:  Reconciled Home Medications:      Medication List        CHANGE how you take these medications      LANTUS SOLOSTAR U-100 INSULIN glargine 100 units/mL SubQ pen  Generic drug: insulin  ADMINISTER 30 UNITS UNDER THE SKIN EVERY EVENING  What changed: See the new instructions.     QUEtiapine 50 MG tablet  Commonly known as: SeroqueL  Take 1 tablet (50 mg total) by mouth 2 (two) times a day.  What changed: how much to take            CONTINUE taking these medications      amLODIPine 10 MG tablet  Commonly known as: NORVASC  Take 10 mg by mouth once daily.     busPIRone 30 MG Tab  Commonly known as: BUSPAR  Take 30 mg by mouth 2 (two) times daily.     CAMPRAL ORAL  Take 666 mg by mouth 2 (two) times a day.     clonazePAM 2 MG Tab  Commonly known as: KlonoPIN  Take 2 mg by mouth 2 (two) times daily as needed.     cloNIDine 0.1 MG tablet  Commonly known as: CATAPRES  Take 2 tablets (0.2 mg total) by mouth 3 (three) times daily.     DEPAKOTE  MG Tb24  Generic drug: divalproex ER  Take 500 mg by mouth 3 (three) times daily.     DULoxetine  "30 MG capsule  Commonly known as: CYMBALTA  Take 30 mg by mouth once daily.     famotidine 20 MG tablet  Commonly known as: PEPCID  Take 20 mg by mouth 2 (two) times daily. HCS     fluticasone propionate 50 mcg/actuation nasal spray  Commonly known as: FLONASE  1 spray by Each Nostril route once daily.     FOLIC ACID ORAL  Take 2 mg by mouth once daily.     gabapentin 400 MG capsule  Commonly known as: NEURONTIN  Take 2 capsules (800 mg total) by mouth 3 (three) times daily.     lancets 33 gauge Misc  1 Package.     lisinopriL 10 MG tablet  Take 10 mg by mouth 2 (two) times daily.     loratadine 10 mg tablet  Commonly known as: CLARITIN  Take 10 mg by mouth once daily.     metFORMIN 500 MG tablet  Commonly known as: GLUCOPHAGE  Take 1,000 mg by mouth 2 (two) times daily with meals.     mirtazapine 30 MG tablet  Commonly known as: REMERON  Take 1 tablet (30 mg total) by mouth every evening.     OLANZapine 20 MG tablet  Commonly known as: ZyPREXA  Take 20 mg by mouth every evening.     omeprazole 40 MG capsule  Commonly known as: PRILOSEC  Take 40 mg by mouth every morning.     pantoprazole 40 MG tablet  Commonly known as: PROTONIX  Take 1 tablet (40 mg total) by mouth once daily.     pen needle, diabetic 32 gauge x 5/32" Ndle  1 Box.     prazosin 2 MG Cap  Commonly known as: MINIPRESS  Take 2 mg by mouth every evening.     thiamine 100 MG tablet  Take 100 mg by mouth once daily.     traZODone 100 MG tablet  Commonly known as: DESYREL  Take 100 mg by mouth every evening.     TRULICITY 4.5 mg/0.5 mL pen injector  Generic drug: dulaglutide  Inject 4.5 mg into the skin every 7 days. TUESDAYS     VivitroL 380 mg Ssrr kit  Generic drug: naltrexone microspheres  Inject 380 mg into the muscle every 30 days.            STOP taking these medications      doxepin 100 MG capsule  Commonly known as: SINEQUAN     FLUoxetine 40 MG capsule     NovoLOG Flexpen U-100 Insulin 100 unit/mL (3 mL) Inpn pen  Generic drug: insulin aspart " U-100     OXcarbazepine 300 MG Tab  Commonly known as: TRILEPTAL     propranoloL 10 MG tablet  Commonly known as: INDERAL            ASK your doctor about these medications      HumaLOG KwikPen Insulin 100 unit/mL pen  Generic drug: insulin lispro  ADMINISTER 5 UNITS UNDER THE SKIN THREE TIMES DAILY              Indwelling Lines/Drains at time of discharge:   Lines/Drains/Airways       None                   Time spent on the discharge of patient: 40 minutes         Carolin Wayne NP  Department of Hospital Medicine  [facility]/Surg

## 2023-12-07 ENCOUNTER — HOSPITAL ENCOUNTER (OUTPATIENT)
Facility: HOSPITAL | Age: 43
Discharge: HOME | End: 2023-12-09
Attending: STUDENT IN AN ORGANIZED HEALTH CARE EDUCATION/TRAINING PROGRAM | Admitting: HOSPITALIST
Payer: MEDICAID

## 2023-12-07 DIAGNOSIS — K92.0 HEMATEMESIS WITH NAUSEA: ICD-10-CM

## 2023-12-07 DIAGNOSIS — I95.9 HYPOTENSION: ICD-10-CM

## 2023-12-07 DIAGNOSIS — R11.2 NAUSEA AND VOMITING, UNSPECIFIED VOMITING TYPE: ICD-10-CM

## 2023-12-07 DIAGNOSIS — E87.1 HYPONATREMIA: ICD-10-CM

## 2023-12-07 DIAGNOSIS — D62 ACUTE BLOOD LOSS ANEMIA: Primary | ICD-10-CM

## 2023-12-07 PROBLEM — E11.9 DIABETES: Status: ACTIVE | Noted: 2023-12-07

## 2023-12-07 LAB
ABO + RH BLD: NORMAL
ALBUMIN SERPL BCP-MCNC: 3.9 G/DL (ref 3.5–5.2)
ALP SERPL-CCNC: 63 U/L (ref 55–135)
ALT SERPL W/O P-5'-P-CCNC: 9 U/L (ref 10–44)
AMMONIA PLAS-SCNC: 37 UMOL/L (ref 10–50)
ANION GAP SERPL CALC-SCNC: 14 MMOL/L (ref 8–16)
AST SERPL-CCNC: 19 U/L (ref 10–40)
BASOPHILS # BLD AUTO: 0.04 K/UL (ref 0–0.2)
BASOPHILS NFR BLD: 0.6 % (ref 0–1.9)
BILIRUB SERPL-MCNC: 0.2 MG/DL (ref 0.1–1)
BLD GP AB SCN CELLS X3 SERPL QL: NORMAL
BUN SERPL-MCNC: 18 MG/DL (ref 6–20)
CALCIUM SERPL-MCNC: 9.1 MG/DL (ref 8.7–10.5)
CHLORIDE SERPL-SCNC: 94 MMOL/L (ref 95–110)
CO2 SERPL-SCNC: 23 MMOL/L (ref 23–29)
CREAT SERPL-MCNC: 1.4 MG/DL (ref 0.5–1.4)
DIFFERENTIAL METHOD: ABNORMAL
EOSINOPHIL # BLD AUTO: 0.1 K/UL (ref 0–0.5)
EOSINOPHIL NFR BLD: 1.9 % (ref 0–8)
ERYTHROCYTE [DISTWIDTH] IN BLOOD BY AUTOMATED COUNT: 17.7 % (ref 11.5–14.5)
EST. GFR  (NO RACE VARIABLE): >60 ML/MIN/1.73 M^2
GLUCOSE SERPL-MCNC: 191 MG/DL (ref 70–110)
HCT VFR BLD AUTO: 25.1 % (ref 40–54)
HGB BLD-MCNC: 7.8 G/DL (ref 14–18)
IMM GRANULOCYTES # BLD AUTO: 0.03 K/UL (ref 0–0.04)
IMM GRANULOCYTES NFR BLD AUTO: 0.5 % (ref 0–0.5)
INR PPP: 1.2 (ref 0.8–1.2)
LIPASE SERPL-CCNC: 3 U/L (ref 4–60)
LYMPHOCYTES # BLD AUTO: 1.8 K/UL (ref 1–4.8)
LYMPHOCYTES NFR BLD: 27.2 % (ref 18–48)
MCH RBC QN AUTO: 23.5 PG (ref 27–31)
MCHC RBC AUTO-ENTMCNC: 31.1 G/DL (ref 32–36)
MCV RBC AUTO: 76 FL (ref 82–98)
MONOCYTES # BLD AUTO: 0.6 K/UL (ref 0.3–1)
MONOCYTES NFR BLD: 9.1 % (ref 4–15)
NEUTROPHILS # BLD AUTO: 3.9 K/UL (ref 1.8–7.7)
NEUTROPHILS NFR BLD: 60.7 % (ref 38–73)
NRBC BLD-RTO: 0 /100 WBC
PLATELET # BLD AUTO: 357 K/UL (ref 150–450)
PMV BLD AUTO: 10.2 FL (ref 9.2–12.9)
POTASSIUM SERPL-SCNC: 3.9 MMOL/L (ref 3.5–5.1)
PROT SERPL-MCNC: 6.8 G/DL (ref 6–8.4)
PROTHROMBIN TIME: 12.9 SEC (ref 9–12.5)
RBC # BLD AUTO: 3.32 M/UL (ref 4.6–6.2)
SODIUM SERPL-SCNC: 131 MMOL/L (ref 136–145)
SPECIMEN OUTDATE: NORMAL
TSH SERPL DL<=0.005 MIU/L-ACNC: 0.69 UIU/ML (ref 0.4–4)
WBC # BLD AUTO: 6.48 K/UL (ref 3.9–12.7)

## 2023-12-07 PROCEDURE — 25500020 PHARM REV CODE 255

## 2023-12-07 PROCEDURE — P9016 RBC LEUKOCYTES REDUCED: HCPCS | Performed by: STUDENT IN AN ORGANIZED HEALTH CARE EDUCATION/TRAINING PROGRAM

## 2023-12-07 PROCEDURE — G0378 HOSPITAL OBSERVATION PER HR: HCPCS

## 2023-12-07 PROCEDURE — 36415 COLL VENOUS BLD VENIPUNCTURE: CPT | Performed by: STUDENT IN AN ORGANIZED HEALTH CARE EDUCATION/TRAINING PROGRAM

## 2023-12-07 PROCEDURE — 96361 HYDRATE IV INFUSION ADD-ON: CPT

## 2023-12-07 PROCEDURE — 93010 EKG 12-LEAD: ICD-10-PCS | Mod: ,,, | Performed by: INTERNAL MEDICINE

## 2023-12-07 PROCEDURE — 85025 COMPLETE CBC W/AUTO DIFF WBC: CPT | Performed by: STUDENT IN AN ORGANIZED HEALTH CARE EDUCATION/TRAINING PROGRAM

## 2023-12-07 PROCEDURE — 86901 BLOOD TYPING SEROLOGIC RH(D): CPT | Performed by: STUDENT IN AN ORGANIZED HEALTH CARE EDUCATION/TRAINING PROGRAM

## 2023-12-07 PROCEDURE — 99285 EMERGENCY DEPT VISIT HI MDM: CPT | Mod: 25

## 2023-12-07 PROCEDURE — 86920 COMPATIBILITY TEST SPIN: CPT | Performed by: STUDENT IN AN ORGANIZED HEALTH CARE EDUCATION/TRAINING PROGRAM

## 2023-12-07 PROCEDURE — 25000003 PHARM REV CODE 250: Performed by: STUDENT IN AN ORGANIZED HEALTH CARE EDUCATION/TRAINING PROGRAM

## 2023-12-07 PROCEDURE — 93010 ELECTROCARDIOGRAM REPORT: CPT | Mod: ,,, | Performed by: INTERNAL MEDICINE

## 2023-12-07 PROCEDURE — 36430 TRANSFUSION BLD/BLD COMPNT: CPT

## 2023-12-07 PROCEDURE — 82140 ASSAY OF AMMONIA: CPT | Performed by: STUDENT IN AN ORGANIZED HEALTH CARE EDUCATION/TRAINING PROGRAM

## 2023-12-07 PROCEDURE — 80053 COMPREHEN METABOLIC PANEL: CPT | Performed by: STUDENT IN AN ORGANIZED HEALTH CARE EDUCATION/TRAINING PROGRAM

## 2023-12-07 PROCEDURE — 84443 ASSAY THYROID STIM HORMONE: CPT | Performed by: STUDENT IN AN ORGANIZED HEALTH CARE EDUCATION/TRAINING PROGRAM

## 2023-12-07 PROCEDURE — 93005 ELECTROCARDIOGRAM TRACING: CPT

## 2023-12-07 PROCEDURE — 85610 PROTHROMBIN TIME: CPT | Performed by: STUDENT IN AN ORGANIZED HEALTH CARE EDUCATION/TRAINING PROGRAM

## 2023-12-07 PROCEDURE — 63600175 PHARM REV CODE 636 W HCPCS: Performed by: STUDENT IN AN ORGANIZED HEALTH CARE EDUCATION/TRAINING PROGRAM

## 2023-12-07 PROCEDURE — 96375 TX/PRO/DX INJ NEW DRUG ADDON: CPT

## 2023-12-07 PROCEDURE — 83690 ASSAY OF LIPASE: CPT | Performed by: STUDENT IN AN ORGANIZED HEALTH CARE EDUCATION/TRAINING PROGRAM

## 2023-12-07 RX ORDER — FAMOTIDINE 10 MG/ML
20 INJECTION INTRAVENOUS
Status: COMPLETED | OUTPATIENT
Start: 2023-12-07 | End: 2023-12-07

## 2023-12-07 RX ORDER — DIVALPROEX SODIUM 250 MG/1
500 TABLET, FILM COATED, EXTENDED RELEASE ORAL 2 TIMES DAILY
Status: DISCONTINUED | OUTPATIENT
Start: 2023-12-08 | End: 2023-12-09 | Stop reason: HOSPADM

## 2023-12-07 RX ORDER — SODIUM CHLORIDE 9 MG/ML
INJECTION, SOLUTION INTRAVENOUS ONCE
Status: COMPLETED | OUTPATIENT
Start: 2023-12-07 | End: 2023-12-08

## 2023-12-07 RX ORDER — OLANZAPINE 5 MG/1
20 TABLET ORAL NIGHTLY
Status: DISCONTINUED | OUTPATIENT
Start: 2023-12-07 | End: 2023-12-09 | Stop reason: HOSPADM

## 2023-12-07 RX ORDER — PANTOPRAZOLE SODIUM 40 MG/10ML
40 INJECTION, POWDER, LYOPHILIZED, FOR SOLUTION INTRAVENOUS 2 TIMES DAILY
Status: DISCONTINUED | OUTPATIENT
Start: 2023-12-07 | End: 2023-12-09

## 2023-12-07 RX ORDER — IBUPROFEN 200 MG
24 TABLET ORAL
Status: DISCONTINUED | OUTPATIENT
Start: 2023-12-07 | End: 2023-12-09 | Stop reason: HOSPADM

## 2023-12-07 RX ORDER — THIAMINE HCL 100 MG
100 TABLET ORAL DAILY
Status: DISCONTINUED | OUTPATIENT
Start: 2023-12-08 | End: 2023-12-07

## 2023-12-07 RX ORDER — MAG HYDROX/ALUMINUM HYD/SIMETH 200-200-20
30 SUSPENSION, ORAL (FINAL DOSE FORM) ORAL
Status: COMPLETED | OUTPATIENT
Start: 2023-12-07 | End: 2023-12-07

## 2023-12-07 RX ORDER — IBUPROFEN 200 MG
16 TABLET ORAL
Status: DISCONTINUED | OUTPATIENT
Start: 2023-12-07 | End: 2023-12-09 | Stop reason: HOSPADM

## 2023-12-07 RX ORDER — DULOXETIN HYDROCHLORIDE 30 MG/1
30 CAPSULE, DELAYED RELEASE ORAL DAILY
Status: DISCONTINUED | OUTPATIENT
Start: 2023-12-08 | End: 2023-12-09 | Stop reason: HOSPADM

## 2023-12-07 RX ORDER — GLUCAGON 1 MG
1 KIT INJECTION
Status: DISCONTINUED | OUTPATIENT
Start: 2023-12-07 | End: 2023-12-09 | Stop reason: HOSPADM

## 2023-12-07 RX ORDER — DIVALPROEX SODIUM 250 MG/1
500 TABLET, FILM COATED, EXTENDED RELEASE ORAL 3 TIMES DAILY
Status: DISCONTINUED | OUTPATIENT
Start: 2023-12-08 | End: 2023-12-07

## 2023-12-07 RX ORDER — ONDANSETRON 2 MG/ML
4 INJECTION INTRAMUSCULAR; INTRAVENOUS
Status: COMPLETED | OUTPATIENT
Start: 2023-12-07 | End: 2023-12-07

## 2023-12-07 RX ORDER — INSULIN ASPART 100 [IU]/ML
0-10 INJECTION, SOLUTION INTRAVENOUS; SUBCUTANEOUS
Status: DISCONTINUED | OUTPATIENT
Start: 2023-12-07 | End: 2023-12-09 | Stop reason: HOSPADM

## 2023-12-07 RX ORDER — HYDROCODONE BITARTRATE AND ACETAMINOPHEN 500; 5 MG/1; MG/1
TABLET ORAL
Status: DISCONTINUED | OUTPATIENT
Start: 2023-12-07 | End: 2023-12-09 | Stop reason: HOSPADM

## 2023-12-07 RX ADMIN — SODIUM CHLORIDE 1000 ML: 9 INJECTION, SOLUTION INTRAVENOUS at 07:12

## 2023-12-07 RX ADMIN — FAMOTIDINE 20 MG: 10 INJECTION, SOLUTION INTRAVENOUS at 07:12

## 2023-12-07 RX ADMIN — IOHEXOL 75 ML: 350 INJECTION, SOLUTION INTRAVENOUS at 08:12

## 2023-12-07 RX ADMIN — ALUMINUM HYDROXIDE, MAGNESIUM HYDROXIDE, AND SIMETHICONE 30 ML: 200; 200; 20 SUSPENSION ORAL at 07:12

## 2023-12-07 RX ADMIN — ONDANSETRON 4 MG: 2 INJECTION INTRAMUSCULAR; INTRAVENOUS at 07:12

## 2023-12-08 ENCOUNTER — ANESTHESIA EVENT (OUTPATIENT)
Dept: ENDOSCOPY | Facility: HOSPITAL | Age: 43
End: 2023-12-08
Payer: MEDICAID

## 2023-12-08 ENCOUNTER — ANESTHESIA (OUTPATIENT)
Dept: ENDOSCOPY | Facility: HOSPITAL | Age: 43
End: 2023-12-08
Payer: MEDICAID

## 2023-12-08 LAB
BASOPHILS # BLD AUTO: 0.03 K/UL (ref 0–0.2)
BASOPHILS # BLD AUTO: 0.04 K/UL (ref 0–0.2)
BASOPHILS # BLD AUTO: 0.04 K/UL (ref 0–0.2)
BASOPHILS # BLD AUTO: 0.05 K/UL (ref 0–0.2)
BASOPHILS NFR BLD: 0.6 % (ref 0–1.9)
BASOPHILS NFR BLD: 0.7 % (ref 0–1.9)
BASOPHILS NFR BLD: 0.8 % (ref 0–1.9)
BASOPHILS NFR BLD: 0.9 % (ref 0–1.9)
DIFFERENTIAL METHOD: ABNORMAL
EOSINOPHIL # BLD AUTO: 0.2 K/UL (ref 0–0.5)
EOSINOPHIL # BLD AUTO: 0.2 K/UL (ref 0–0.5)
EOSINOPHIL # BLD AUTO: 0.3 K/UL (ref 0–0.5)
EOSINOPHIL # BLD AUTO: 0.3 K/UL (ref 0–0.5)
EOSINOPHIL NFR BLD: 3.2 % (ref 0–8)
EOSINOPHIL NFR BLD: 3.9 % (ref 0–8)
EOSINOPHIL NFR BLD: 4.6 % (ref 0–8)
EOSINOPHIL NFR BLD: 5.9 % (ref 0–8)
ERYTHROCYTE [DISTWIDTH] IN BLOOD BY AUTOMATED COUNT: 17.3 % (ref 11.5–14.5)
ERYTHROCYTE [DISTWIDTH] IN BLOOD BY AUTOMATED COUNT: 17.3 % (ref 11.5–14.5)
ERYTHROCYTE [DISTWIDTH] IN BLOOD BY AUTOMATED COUNT: 17.5 % (ref 11.5–14.5)
ERYTHROCYTE [DISTWIDTH] IN BLOOD BY AUTOMATED COUNT: 17.6 % (ref 11.5–14.5)
FOLATE SERPL-MCNC: 5.4 NG/ML (ref 4–24)
HCT VFR BLD AUTO: 26 % (ref 40–54)
HCT VFR BLD AUTO: 28.2 % (ref 40–54)
HCT VFR BLD AUTO: 29 % (ref 40–54)
HCT VFR BLD AUTO: 29.9 % (ref 40–54)
HGB BLD-MCNC: 8 G/DL (ref 14–18)
HGB BLD-MCNC: 8.8 G/DL (ref 14–18)
HGB BLD-MCNC: 9.2 G/DL (ref 14–18)
HGB BLD-MCNC: 9.5 G/DL (ref 14–18)
IMM GRANULOCYTES # BLD AUTO: 0.03 K/UL (ref 0–0.04)
IMM GRANULOCYTES NFR BLD AUTO: 0.6 % (ref 0–0.5)
IRON SERPL-MCNC: 350 UG/DL (ref 45–160)
LYMPHOCYTES # BLD AUTO: 1.3 K/UL (ref 1–4.8)
LYMPHOCYTES # BLD AUTO: 1.8 K/UL (ref 1–4.8)
LYMPHOCYTES # BLD AUTO: 2 K/UL (ref 1–4.8)
LYMPHOCYTES # BLD AUTO: 2.1 K/UL (ref 1–4.8)
LYMPHOCYTES NFR BLD: 25.1 % (ref 18–48)
LYMPHOCYTES NFR BLD: 34.6 % (ref 18–48)
LYMPHOCYTES NFR BLD: 36.4 % (ref 18–48)
LYMPHOCYTES NFR BLD: 38.6 % (ref 18–48)
MCH RBC QN AUTO: 24 PG (ref 27–31)
MCH RBC QN AUTO: 24.4 PG (ref 27–31)
MCH RBC QN AUTO: 24.5 PG (ref 27–31)
MCH RBC QN AUTO: 24.7 PG (ref 27–31)
MCHC RBC AUTO-ENTMCNC: 30.8 G/DL (ref 32–36)
MCHC RBC AUTO-ENTMCNC: 31.2 G/DL (ref 32–36)
MCHC RBC AUTO-ENTMCNC: 31.7 G/DL (ref 32–36)
MCHC RBC AUTO-ENTMCNC: 31.8 G/DL (ref 32–36)
MCV RBC AUTO: 77 FL (ref 82–98)
MCV RBC AUTO: 78 FL (ref 82–98)
MCV RBC AUTO: 78 FL (ref 82–98)
MCV RBC AUTO: 79 FL (ref 82–98)
MONOCYTES # BLD AUTO: 0.5 K/UL (ref 0.3–1)
MONOCYTES # BLD AUTO: 0.6 K/UL (ref 0.3–1)
MONOCYTES # BLD AUTO: 0.6 K/UL (ref 0.3–1)
MONOCYTES # BLD AUTO: 0.7 K/UL (ref 0.3–1)
MONOCYTES NFR BLD: 10.2 % (ref 4–15)
MONOCYTES NFR BLD: 10.7 % (ref 4–15)
MONOCYTES NFR BLD: 11.3 % (ref 4–15)
MONOCYTES NFR BLD: 12.6 % (ref 4–15)
NEUTROPHILS # BLD AUTO: 2.3 K/UL (ref 1.8–7.7)
NEUTROPHILS # BLD AUTO: 2.5 K/UL (ref 1.8–7.7)
NEUTROPHILS # BLD AUTO: 2.5 K/UL (ref 1.8–7.7)
NEUTROPHILS # BLD AUTO: 3.2 K/UL (ref 1.8–7.7)
NEUTROPHILS NFR BLD: 43.3 % (ref 38–73)
NEUTROPHILS NFR BLD: 46.4 % (ref 38–73)
NEUTROPHILS NFR BLD: 47.5 % (ref 38–73)
NEUTROPHILS NFR BLD: 60.3 % (ref 38–73)
NRBC BLD-RTO: 0 /100 WBC
PLATELET # BLD AUTO: 301 K/UL (ref 150–450)
PLATELET # BLD AUTO: 303 K/UL (ref 150–450)
PLATELET # BLD AUTO: 309 K/UL (ref 150–450)
PLATELET # BLD AUTO: 316 K/UL (ref 150–450)
PMV BLD AUTO: 10.1 FL (ref 9.2–12.9)
PMV BLD AUTO: 10.1 FL (ref 9.2–12.9)
PMV BLD AUTO: 10.2 FL (ref 9.2–12.9)
PMV BLD AUTO: 10.2 FL (ref 9.2–12.9)
POCT GLUCOSE: 117 MG/DL (ref 70–110)
POCT GLUCOSE: 167 MG/DL (ref 70–110)
POCT GLUCOSE: 184 MG/DL (ref 70–110)
RBC # BLD AUTO: 3.34 M/UL (ref 4.6–6.2)
RBC # BLD AUTO: 3.59 M/UL (ref 4.6–6.2)
RBC # BLD AUTO: 3.77 M/UL (ref 4.6–6.2)
RBC # BLD AUTO: 3.85 M/UL (ref 4.6–6.2)
SATURATED IRON: 83 % (ref 20–50)
TOTAL IRON BINDING CAPACITY: 420 UG/DL (ref 250–450)
TRANSFERRIN SERPL-MCNC: 300 MG/DL (ref 200–375)
VIT B12 SERPL-MCNC: 1168 PG/ML (ref 210–950)
WBC # BLD AUTO: 5.29 K/UL (ref 3.9–12.7)
WBC # BLD AUTO: 5.32 K/UL (ref 3.9–12.7)
WBC # BLD AUTO: 5.38 K/UL (ref 3.9–12.7)
WBC # BLD AUTO: 5.41 K/UL (ref 3.9–12.7)

## 2023-12-08 PROCEDURE — 25000242 PHARM REV CODE 250 ALT 637 W/ HCPCS: Performed by: NURSE PRACTITIONER

## 2023-12-08 PROCEDURE — 99214 PR OFFICE/OUTPT VISIT, EST, LEVL IV, 30-39 MIN: ICD-10-PCS | Mod: 25,,, | Performed by: INTERNAL MEDICINE

## 2023-12-08 PROCEDURE — C9113 INJ PANTOPRAZOLE SODIUM, VIA: HCPCS | Performed by: NURSE PRACTITIONER

## 2023-12-08 PROCEDURE — 25000003 PHARM REV CODE 250: Performed by: NURSE PRACTITIONER

## 2023-12-08 PROCEDURE — 43235 EGD DIAGNOSTIC BRUSH WASH: CPT | Mod: ,,, | Performed by: INTERNAL MEDICINE

## 2023-12-08 PROCEDURE — D9220A PRA ANESTHESIA: ICD-10-PCS | Mod: ANES,,, | Performed by: ANESTHESIOLOGY

## 2023-12-08 PROCEDURE — 85025 COMPLETE CBC W/AUTO DIFF WBC: CPT | Performed by: NURSE PRACTITIONER

## 2023-12-08 PROCEDURE — 63600175 PHARM REV CODE 636 W HCPCS: Performed by: NURSE ANESTHETIST, CERTIFIED REGISTERED

## 2023-12-08 PROCEDURE — 43235 EGD DIAGNOSTIC BRUSH WASH: CPT | Performed by: INTERNAL MEDICINE

## 2023-12-08 PROCEDURE — D9220A PRA ANESTHESIA: Mod: CRNA,,, | Performed by: NURSE ANESTHETIST, CERTIFIED REGISTERED

## 2023-12-08 PROCEDURE — 36415 COLL VENOUS BLD VENIPUNCTURE: CPT | Performed by: NURSE PRACTITIONER

## 2023-12-08 PROCEDURE — 82607 VITAMIN B-12: CPT | Performed by: INTERNAL MEDICINE

## 2023-12-08 PROCEDURE — 96372 THER/PROPH/DIAG INJ SC/IM: CPT | Mod: 59 | Performed by: NURSE PRACTITIONER

## 2023-12-08 PROCEDURE — 96376 TX/PRO/DX INJ SAME DRUG ADON: CPT | Mod: 59

## 2023-12-08 PROCEDURE — 96375 TX/PRO/DX INJ NEW DRUG ADDON: CPT | Mod: 59

## 2023-12-08 PROCEDURE — 82746 ASSAY OF FOLIC ACID SERUM: CPT | Performed by: INTERNAL MEDICINE

## 2023-12-08 PROCEDURE — D9220A PRA ANESTHESIA: Mod: ANES,,, | Performed by: ANESTHESIOLOGY

## 2023-12-08 PROCEDURE — 99214 OFFICE O/P EST MOD 30 MIN: CPT | Mod: 25,,, | Performed by: INTERNAL MEDICINE

## 2023-12-08 PROCEDURE — 37000008 HC ANESTHESIA 1ST 15 MINUTES: Performed by: INTERNAL MEDICINE

## 2023-12-08 PROCEDURE — 43235 PR EGD, FLEX, DIAGNOSTIC: ICD-10-PCS | Mod: ,,, | Performed by: INTERNAL MEDICINE

## 2023-12-08 PROCEDURE — 25000003 PHARM REV CODE 250: Performed by: NURSE ANESTHETIST, CERTIFIED REGISTERED

## 2023-12-08 PROCEDURE — 83540 ASSAY OF IRON: CPT | Performed by: INTERNAL MEDICINE

## 2023-12-08 PROCEDURE — G0378 HOSPITAL OBSERVATION PER HR: HCPCS

## 2023-12-08 PROCEDURE — 63600175 PHARM REV CODE 636 W HCPCS: Performed by: NURSE PRACTITIONER

## 2023-12-08 PROCEDURE — 36415 COLL VENOUS BLD VENIPUNCTURE: CPT | Performed by: INTERNAL MEDICINE

## 2023-12-08 PROCEDURE — 25000003 PHARM REV CODE 250: Performed by: INTERNAL MEDICINE

## 2023-12-08 PROCEDURE — D9220A PRA ANESTHESIA: ICD-10-PCS | Mod: CRNA,,, | Performed by: NURSE ANESTHETIST, CERTIFIED REGISTERED

## 2023-12-08 PROCEDURE — 96365 THER/PROPH/DIAG IV INF INIT: CPT | Mod: 59

## 2023-12-08 PROCEDURE — 84466 ASSAY OF TRANSFERRIN: CPT | Performed by: INTERNAL MEDICINE

## 2023-12-08 RX ORDER — CLONAZEPAM 0.5 MG/1
2 TABLET ORAL 2 TIMES DAILY PRN
Status: DISCONTINUED | OUTPATIENT
Start: 2023-12-08 | End: 2023-12-09 | Stop reason: HOSPADM

## 2023-12-08 RX ORDER — QUETIAPINE FUMARATE 300 MG/1
600 TABLET, FILM COATED ORAL NIGHTLY
COMMUNITY
Start: 2023-10-30

## 2023-12-08 RX ORDER — PROPOFOL 10 MG/ML
VIAL (ML) INTRAVENOUS
Status: DISCONTINUED | OUTPATIENT
Start: 2023-12-08 | End: 2023-12-08

## 2023-12-08 RX ORDER — SODIUM CHLORIDE 9 MG/ML
INJECTION, SOLUTION INTRAVENOUS CONTINUOUS
Status: DISCONTINUED | OUTPATIENT
Start: 2023-12-08 | End: 2023-12-09 | Stop reason: HOSPADM

## 2023-12-08 RX ORDER — LIDOCAINE HYDROCHLORIDE 20 MG/ML
INJECTION INTRAVENOUS
Status: DISCONTINUED | OUTPATIENT
Start: 2023-12-08 | End: 2023-12-08

## 2023-12-08 RX ADMIN — OLANZAPINE 20 MG: 5 TABLET, FILM COATED ORAL at 01:12

## 2023-12-08 RX ADMIN — QUETIAPINE 600 MG: 100 TABLET ORAL at 09:12

## 2023-12-08 RX ADMIN — PANTOPRAZOLE SODIUM 40 MG: 40 INJECTION, POWDER, FOR SOLUTION INTRAVENOUS at 11:12

## 2023-12-08 RX ADMIN — PROPOFOL 50 MG: 10 INJECTION, EMULSION INTRAVENOUS at 02:12

## 2023-12-08 RX ADMIN — DIVALPROEX SODIUM 500 MG: 250 TABLET, EXTENDED RELEASE ORAL at 10:12

## 2023-12-08 RX ADMIN — INSULIN ASPART 1 UNITS: 100 INJECTION, SOLUTION INTRAVENOUS; SUBCUTANEOUS at 07:12

## 2023-12-08 RX ADMIN — PANTOPRAZOLE SODIUM 40 MG: 40 INJECTION, POWDER, FOR SOLUTION INTRAVENOUS at 01:12

## 2023-12-08 RX ADMIN — QUETIAPINE 600 MG: 100 TABLET ORAL at 01:12

## 2023-12-08 RX ADMIN — OLANZAPINE 20 MG: 5 TABLET, FILM COATED ORAL at 09:12

## 2023-12-08 RX ADMIN — THIAMINE HYDROCHLORIDE 100 MG: 100 INJECTION, SOLUTION INTRAMUSCULAR; INTRAVENOUS at 01:12

## 2023-12-08 RX ADMIN — PANTOPRAZOLE SODIUM 40 MG: 40 INJECTION, POWDER, FOR SOLUTION INTRAVENOUS at 09:12

## 2023-12-08 RX ADMIN — DIVALPROEX SODIUM 500 MG: 250 TABLET, EXTENDED RELEASE ORAL at 09:12

## 2023-12-08 RX ADMIN — DULOXETINE HYDROCHLORIDE 30 MG: 30 CAPSULE, DELAYED RELEASE ORAL at 10:12

## 2023-12-08 RX ADMIN — SODIUM CHLORIDE: 9 INJECTION, SOLUTION INTRAVENOUS at 01:12

## 2023-12-08 RX ADMIN — LIDOCAINE HYDROCHLORIDE 100 MG: 20 INJECTION, SOLUTION INTRAVENOUS at 02:12

## 2023-12-08 NOTE — ASSESSMENT & PLAN NOTE
Patient's anemia is currently uncontrolled. Has received 1 units of PRBCs on 12/7/2023 . Etiology likely d/t acute blood loss which was from GI bleed.  Current CBC reviewed-   Lab Results   Component Value Date    HGB 7.8 (L) 12/07/2023    HCT 25.1 (L) 12/07/2023     Monitor serial CBC and transfuse if patient becomes hemodynamically unstable, symptomatic or H/H drops below 7/21.

## 2023-12-08 NOTE — ASSESSMENT & PLAN NOTE
Chronic, controlled. Latest blood pressure and vitals reviewed-     Temp:  [97.5 °F (36.4 °C)-98 °F (36.7 °C)]   Pulse:  []   Resp:  [14-18]   BP: ()/(54-73)   SpO2:  [98 %-100 %] .   Home meds for hypertension were reviewed and noted below.   Hypertension Medications               amLODIPine (NORVASC) 10 MG tablet Take 10 mg by mouth once daily.    cloNIDine (CATAPRES) 0.1 MG tablet Take 2 tablets (0.2 mg total) by mouth 3 (three) times daily.    lisinopriL 10 MG tablet Take 10 mg by mouth 2 (two) times daily.    prazosin (MINIPRESS) 2 MG Cap Take 2 mg by mouth every evening.            While in the hospital, will manage blood pressure as follows; Continue home antihypertensive regimen    Will utilize p.r.n. blood pressure medication only if patient's blood pressure greater than 160/100 and he develops symptoms such as worsening chest pain or shortness of breath.

## 2023-12-08 NOTE — PLAN OF CARE
Problem: Adjustment to Illness (Gastrointestinal Bleeding)  Goal: Optimal Coping with Acute Illness  12/8/2023 0513 by Torri Negro RN  Outcome: Ongoing, Progressing  12/8/2023 0513 by Torri Negro RN  Outcome: Ongoing, Progressing     Problem: Bleeding (Gastrointestinal Bleeding)  Goal: Hemostasis  12/8/2023 0513 by Torri Negro RN  Outcome: Ongoing, Progressing  12/8/2023 0513 by Torri Negro RN  Outcome: Ongoing, Progressing     Problem: Adult Inpatient Plan of Care  Goal: Plan of Care Review  12/8/2023 0513 by Torri Negro RN  Outcome: Ongoing, Progressing  12/8/2023 0513 by Torri Negro RN  Outcome: Ongoing, Progressing  Goal: Patient-Specific Goal (Individualized)  12/8/2023 0513 by Torri Negro RN  Outcome: Ongoing, Progressing  12/8/2023 0513 by Torri Negro RN  Outcome: Ongoing, Progressing  Goal: Absence of Hospital-Acquired Illness or Injury  12/8/2023 0513 by Torri Negro RN  Outcome: Ongoing, Progressing  12/8/2023 0513 by Torri Negro RN  Outcome: Ongoing, Progressing  Goal: Optimal Comfort and Wellbeing  12/8/2023 0513 by Torri Negro RN  Outcome: Ongoing, Progressing  12/8/2023 0513 by Torri Negro RN  Outcome: Ongoing, Progressing  Goal: Readiness for Transition of Care  12/8/2023 0513 by Torri Negro RN  Outcome: Ongoing, Progressing  12/8/2023 0513 by Torri Negro RN  Outcome: Ongoing, Progressing     Problem: Diabetes Comorbidity  Goal: Blood Glucose Level Within Targeted Range  12/8/2023 0513 by Torri Negro RN  Outcome: Ongoing, Progressing  12/8/2023 0513 by Torri Negro RN  Outcome: Ongoing, Progressing     Problem: Fluid and Electrolyte Imbalance (Acute Kidney Injury/Impairment)  Goal: Fluid and Electrolyte Balance  12/8/2023 0513 by Torri Negro RN  Outcome: Ongoing, Progressing  12/8/2023 0513 by Torri Negro RN  Outcome: Ongoing, Progressing     Problem: Oral Intake Inadequate (Acute Kidney Injury/Impairment)  Goal:  Optimal Nutrition Intake  12/8/2023 0513 by Torri Negro RN  Outcome: Ongoing, Progressing  12/8/2023 0513 by Torri Negro RN  Outcome: Ongoing, Progressing     Problem: Renal Function Impairment (Acute Kidney Injury/Impairment)  Goal: Effective Renal Function  12/8/2023 0513 by Torri Negro RN  Outcome: Ongoing, Progressing  12/8/2023 0513 by Torri Negro RN  Outcome: Ongoing, Progressing     Problem: Anemia  Goal: Anemia Symptom Improvement  12/8/2023 0513 by Torri Negro RN  Outcome: Ongoing, Progressing  12/8/2023 0513 by Torri Negro RN  Outcome: Ongoing, Progressing

## 2023-12-08 NOTE — ASSESSMENT & PLAN NOTE
Acute problem   GI bleed pathway   Transfusing 1 unit of packed red blood cells now   GI consult

## 2023-12-08 NOTE — PROGRESS NOTES
[facility] Medicine  Progress Note    Patient Name: Bradley Collado  MRN: [id number]  Patient Class: OP- Observation   Admission Date: 12/7/2023  Length of Stay: 0 days  Attending Physician: Gumaro Pimentel MD  Primary Care Provider: Cassius Oneill MD        Subjective:     Principal Problem:GI bleed        HPI:  Bradley Collado is a 43 year male who presents emergency room complaining of nausea, vomiting, and upper abdominal pain.  The symptoms onset yesterday progressively worsened.  He describes vomit as dark red with some bright red streaking.  He also endorses some dark stools but does not describe them as black.  Abdominal pain as similar to previous episodes of acute on chronic pancreatitis.  He denies any fever chills.  No known sick contacts or travel.  No aggravating or alleviating factors.  He denies any recent alcohol use.  He denies any drug use other than marijuana.  Patient had recent admission with EGD which demonstrated gastritis.  Previous medical history includes acute on chronic pancreatitis, hypertension, EtOH abuse, hypothyroidism, active smoker, GERD, and seizure disorder.  ER workup:  CBC with anemia of 7.8/25 (10/33.210 days ago).  Glucose elevated at 191.  Lipase was unremarkable.  CT of the abdomen and pelvis demonstrates chronic pancreatitis and diffuse wall thickening of the visualized portions of the distal esophagus and gastroesophageal junction, may be due to infectious or inflammatory esophagitis.  Patient admitted to Hospital Medicine for treatment management.  Will consult GI in a.m..  Patient started on GI bleed pathway.  Will transfuse patient 1 unit packed red blood cells.    Overview/Hospital Course:  No notes on file    Interval History:  Patient admitted with upper GI bleeding.  Patient is scheduled to undergo esophagogastroduodenoscopy.  Patient denies any ongoing epigastric abdominal pain, nausea and vomiting.  Patient received 1 unit of  packed red blood cell overnight.  No melena or bleeding per rectum reported.  Denies any chest pain or shortness of breath.  Patient appears somewhat sedated.    Review of Systems   Constitutional:  Negative for activity change, chills, diaphoresis and fever.   HENT:  Negative for congestion, nosebleeds and tinnitus.    Eyes:  Negative for photophobia and visual disturbance.   Respiratory:  Negative for cough, chest tightness, shortness of breath and wheezing.    Cardiovascular:  Negative for chest pain, palpitations and leg swelling.   Gastrointestinal:  Positive for abdominal pain, blood in stool, nausea and vomiting. Negative for abdominal distention, constipation and diarrhea.   Endocrine: Negative for cold intolerance and heat intolerance.   Genitourinary:  Negative for difficulty urinating, dysuria, frequency, hematuria and urgency.   Musculoskeletal:  Negative for arthralgias, back pain and myalgias.   Skin:  Negative for pallor, rash and wound.   Allergic/Immunologic: Negative for immunocompromised state.   Neurological:  Negative for dizziness, tremors, facial asymmetry, speech difficulty and weakness.   Hematological:  Negative for adenopathy. Does not bruise/bleed easily.   Psychiatric/Behavioral:  Negative for confusion and sleep disturbance. The patient is not nervous/anxious.      Objective:     Vital Signs (Most Recent):  Temp: 97.9 °F (36.6 °C) (12/08/23 0739)  Pulse: 77 (12/08/23 0739)  Resp: 17 (12/08/23 0739)  BP: 121/71 (12/08/23 0739)  SpO2: 99 % (12/08/23 0739) Vital Signs (24h Range):  Temp:  [97.5 °F (36.4 °C)-98 °F (36.7 °C)] 97.9 °F (36.6 °C)  Pulse:  [] 77  Resp:  [14-18] 17  SpO2:  [98 %-100 %] 99 %  BP: ()/(54-73) 121/71     Weight: 79.7 kg (175 lb 11.3 oz)  Body mass index is 27.52 kg/m².    Intake/Output Summary (Last 24 hours) at 12/8/2023 0951  Last data filed at 12/8/2023 0147  Gross per 24 hour   Intake 440.45 ml   Output --   Net 440.45 ml         Physical Exam  Vitals  and nursing note reviewed.   Constitutional:       General: He is not in acute distress.     Appearance: He is well-developed. He is ill-appearing. He is not diaphoretic.   HENT:      Head: Normocephalic.      Mouth/Throat:      Mouth: Mucous membranes are dry.   Eyes:      General: No scleral icterus.     Conjunctiva/sclera: Conjunctivae normal.      Pupils: Pupils are equal, round, and reactive to light.   Neck:      Vascular: No JVD.   Cardiovascular:      Rate and Rhythm: Regular rhythm. Tachycardia present.      Heart sounds: Normal heart sounds. No murmur heard.     No friction rub. No gallop.   Pulmonary:      Effort: Pulmonary effort is normal. No respiratory distress.      Breath sounds: Normal breath sounds. No wheezing or rales.   Abdominal:      General: Bowel sounds are normal. There is no distension.      Palpations: Abdomen is soft.      Tenderness: There is no abdominal tenderness. There is no guarding or rebound.   Musculoskeletal:         General: No tenderness. Normal range of motion.      Cervical back: Normal range of motion and neck supple.   Lymphadenopathy:      Cervical: No cervical adenopathy.   Skin:     General: Skin is warm and dry.      Capillary Refill: Capillary refill takes less than 2 seconds.      Coloration: Skin is not pale.      Findings: No erythema or rash.   Neurological:      Mental Status: He is alert and oriented to person, place, and time.      Cranial Nerves: No cranial nerve deficit.      Sensory: No sensory deficit.      Coordination: Coordination normal.      Deep Tendon Reflexes: Reflexes normal.   Psychiatric:         Behavior: Behavior normal.         Thought Content: Thought content normal.         Judgment: Judgment normal.             Significant Labs: All pertinent labs within the past 24 hours have been reviewed.  CBC:   Recent Labs   Lab 12/07/23  1930 12/08/23  0428 12/08/23  0750   WBC 6.48 5.41 5.38   HGB 7.8* 8.0* 8.8*   HCT 25.1* 26.0* 28.2*     "303 301     CMP:   Recent Labs   Lab 12/07/23 1930   *   K 3.9   CL 94*   CO2 23   *   BUN 18   CREATININE 1.4   CALCIUM 9.1   PROT 6.8   ALBUMIN 3.9   BILITOT 0.2   ALKPHOS 63   AST 19   ALT 9*   ANIONGAP 14     Coagulation:   Recent Labs   Lab 12/07/23 1930   INR 1.2     Lactic Acid: No results for input(s): "LACTATE" in the last 48 hours.  Lipase:   Recent Labs   Lab 12/07/23 1930   LIPASE 3*     Urine Studies: No results for input(s): "COLORU", "APPEARANCEUA", "PHUR", "SPECGRAV", "PROTEINUA", "GLUCUA", "KETONESU", "BILIRUBINUA", "OCCULTUA", "NITRITE", "UROBILINOGEN", "LEUKOCYTESUR", "RBCUA", "WBCUA", "BACTERIA", "SQUAMEPITHEL", "HYALINECASTS" in the last 48 hours.    Invalid input(s): "WRIGHTSUR"    Significant Imaging:   CT abdomen and pelvis with IV contrast:   1. Partially imaged distal esophageal wall thickening.  Esophagitis or reflux are two considerations.  2. Sequela of chronic pancreatitis.  3. Query colonic constipation.    Assessment/Plan:      * GI bleed  Keep patient NPO.  Patient is scheduled for EGD today.  Follow H/H closely.  Status post 1 unit of packed red blood cell transfusion.  Continue IV Protonix 40 mg twice daily.  Consult Gastronetrologist.   Check Iron, TIBC, B12 and folic acid.   Continue IVF hydration.   Use IV anti-emetics as needed.   Patient counseled regarding smoking cessation and abstinence of alcohol use.          Diabetes  Patient's FSGs are uncontrolled due to hyperglycemia on current medication regimen.  Last A1c reviewed-   Lab Results   Component Value Date    HGBA1C 5.8 (H) 07/02/2020     Most recent fingerstick glucose reviewed- No results for input(s): "POCTGLUCOSE" in the last 24 hours.  Current correctional scale  Medium  Maintain anti-hyperglycemic dose as follows-   Antihyperglycemics (From admission, onward)      Start     Stop Route Frequency Ordered    12/07/23 8662  insulin aspart U-100 pen 0-10 Units         -- SubQ Before meals & nightly PRN " 12/07/23 2222          Hold Oral hypoglycemics while patient is in the hospital.    Acute blood loss anemia  Patient's anemia is currently uncontrolled. Has received 1 units of PRBCs on 12/7/2023 . Etiology likely d/t acute blood loss which was from GI bleed.  Current CBC reviewed-   Lab Results   Component Value Date    HGB 7.8 (L) 12/07/2023    HCT 25.1 (L) 12/07/2023     Monitor serial CBC and transfuse if patient becomes hemodynamically unstable, symptomatic or H/H drops below 7/21.    Seizure disorder  Chronic problem   Seizure precautions   Continue Depakote         Tobacco use  Chronic  Dangers of cigarette smoking were reviewed with patient in detail for 10 minutes and patient was encouraged to quit. Nicotine replacement options were discussed. Nicotine replacement options were discussed. Nicotine replacement was not prescribed per patient request.       Hypertension  Chronic, controlled. Latest blood pressure and vitals reviewed-     Temp:  [97.5 °F (36.4 °C)-98 °F (36.7 °C)]   Pulse:  []   Resp:  [14-18]   BP: ()/(54-73)   SpO2:  [98 %-100 %] .   Home meds for hypertension were reviewed and noted below.   Hypertension Medications               amLODIPine (NORVASC) 10 MG tablet Take 10 mg by mouth once daily.    cloNIDine (CATAPRES) 0.1 MG tablet Take 2 tablets (0.2 mg total) by mouth 3 (three) times daily.    lisinopriL 10 MG tablet Take 10 mg by mouth 2 (two) times daily.    prazosin (MINIPRESS) 2 MG Cap Take 2 mg by mouth every evening.            While in the hospital, will manage blood pressure as follows; Continue home antihypertensive regimen    Will utilize p.r.n. blood pressure medication only if patient's blood pressure greater than 160/100 and he develops symptoms such as worsening chest pain or shortness of breath.    Discussed with  regarding discharge planning.  VTE Risk Mitigation (From admission, onward)           Ordered     IP VTE HIGH RISK PATIENT  Once .  Avoiding  anticoagulation therapy due to GI bleeding.        12/07/23 2220                    Discharge Planning   RICHAR: 12/9/2023     Code Status: Full Code   Is the patient medically ready for discharge?:     Reason for patient still in hospital (select all that apply): Patient trending condition and Consult recommendations                     Gumaro Pimentel MD  Department of Hospital Medicine   [facility]/Surg

## 2023-12-08 NOTE — H&P
[facility]  History & Physical    Patient Name: Bradley Collado  MRN: [id number]  Patient Class: OP- Observation  Admission Date: 12/7/2023  Attending Physician: Lindsay Colindres MD   Primary Care Provider: aCssius Oneill MD         Patient information was obtained from patient, past medical records, and ER records.     Subjective:     Principal Problem:GI bleed    Chief Complaint:   Chief Complaint   Patient presents with    Vomiting     Pt reports dark emesis with vomiting and dark stools. Denies diarrhea. 1-2 weeks        HPI: Bradley Collado is a 43 year male who presents emergency room complaining of nausea, vomiting, and upper abdominal pain.  The symptoms onset yesterday progressively worsened.  He describes vomit as dark red with some bright red streaking.  He also endorses some dark stools but does not describe them as black.  Abdominal pain as similar to previous episodes of acute on chronic pancreatitis.  He denies any fever chills.  No known sick contacts or travel.  No aggravating or alleviating factors.  He denies any recent alcohol use.  He denies any drug use other than marijuana.  Patient had recent admission with EGD which demonstrated gastritis.  Previous medical history includes acute on chronic pancreatitis, hypertension, EtOH abuse, hypothyroidism, active smoker, GERD, and seizure disorder.  ER workup:  CBC with anemia of 7.8/25 (10/33.210 days ago).  Glucose elevated at 191.  Lipase was unremarkable.  CT of the abdomen and pelvis demonstrates chronic pancreatitis and diffuse wall thickening of the visualized portions of the distal esophagus and gastroesophageal junction, may be due to infectious or inflammatory esophagitis.  Patient admitted to Hospital Medicine for treatment management.  Will consult GI in a.m..  Patient started on GI bleed pathway.  Will transfuse patient 1 unit packed red blood cells.    Past Medical History:   Diagnosis Date     Anxiety     Anxiety     Bipolar affective disorder     Depression     Hypertension     Pancreatitis     Schizophrenia     Seizures     Thyroid disease        Past Surgical History:   Procedure Laterality Date    ESOPHAGOGASTRODUODENOSCOPY N/A 11/24/2023    Procedure: EGD (ESOPHAGOGASTRODUODENOSCOPY);  Surgeon: Shannen Meyer MD;  Location: [facility];  Service: Endoscopy;  Laterality: N/A;    ESOPHAGOGASTRODUODENOSCOPY N/A 11/27/2023    Procedure: EGD (ESOPHAGOGASTRODUODENOSCOPY);  Surgeon: Steve Anderson MD;  Location: [facility];  Service: Endoscopy;  Laterality: N/A;    LAPAROSCOPIC CHOLECYSTECTOMY N/A 3/9/2020    Procedure: CHOLECYSTECTOMY, LAPAROSCOPIC;  Surgeon: Trenton Deshpande MD;  Location: [facility];  Service: General;  Laterality: N/A;       Review of patient's allergies indicates:  No Known Allergies    No current facility-administered medications on file prior to encounter.     Current Outpatient Medications on File Prior to Encounter   Medication Sig    acamprosate calcium (CAMPRAL ORAL) Take 666 mg by mouth 2 (two) times a day.    amLODIPine (NORVASC) 10 MG tablet Take 10 mg by mouth once daily.    busPIRone (BUSPAR) 30 MG Tab Take 30 mg by mouth 2 (two) times daily.    clonazePAM (KLONOPIN) 2 MG Tab Take 2 mg by mouth 2 (two) times daily as needed.    cloNIDine (CATAPRES) 0.1 MG tablet Take 2 tablets (0.2 mg total) by mouth 3 (three) times daily.    divalproex ER (DEPAKOTE ER) 500 MG Tb24 Take 500 mg by mouth 3 (three) times daily.     DULoxetine (CYMBALTA) 30 MG capsule Take 30 mg by mouth once daily.    famotidine (PEPCID) 20 MG tablet Take 20 mg by mouth 2 (two) times daily. HCS    fluticasone propionate (FLONASE) 50 mcg/actuation nasal spray 1 spray by Each Nostril route once daily.    FOLIC ACID ORAL Take 2 mg by mouth once daily.    gabapentin (NEURONTIN) 400 MG capsule Take 2 capsules (800 mg total) by mouth 3 (three) times daily.    HUMALOG KWIKPEN INSULIN 100 unit/mL pen ADMINISTER 5  "UNITS UNDER THE SKIN THREE TIMES DAILY (Patient not taking: Reported on 11/22/2023)    lancets 33 gauge Misc 1 Package.    LANTUS SOLOSTAR U-100 INSULIN glargine 100 units/mL (3mL) SubQ pen ADMINISTER 30 UNITS UNDER THE SKIN EVERY EVENING (Patient taking differently: Inject 30 Units into the skin every evening.)    lisinopriL 10 MG tablet Take 10 mg by mouth 2 (two) times daily.    loratadine (CLARITIN) 10 mg tablet Take 10 mg by mouth once daily.    metFORMIN (GLUCOPHAGE) 500 MG tablet Take 1,000 mg by mouth 2 (two) times daily with meals.    mirtazapine (REMERON) 30 MG tablet Take 1 tablet (30 mg total) by mouth every evening.    OLANZapine (ZYPREXA) 20 MG tablet Take 20 mg by mouth every evening.    omeprazole (PRILOSEC) 40 MG capsule Take 40 mg by mouth every morning.    pantoprazole (PROTONIX) 40 MG tablet Take 1 tablet (40 mg total) by mouth once daily.    pen needle, diabetic 32 gauge x 5/32" Ndle 1 Box.    prazosin (MINIPRESS) 2 MG Cap Take 2 mg by mouth every evening.    QUEtiapine (SEROQUEL) 50 MG tablet Take 1 tablet (50 mg total) by mouth 2 (two) times a day. (Patient taking differently: Take 600 mg by mouth 2 (two) times a day.)    thiamine 100 MG tablet Take 100 mg by mouth once daily.    traZODone (DESYREL) 100 MG tablet Take 100 mg by mouth every evening.    TRULICITY 4.5 mg/0.5 mL pen injector Inject 4.5 mg into the skin every 7 days. TUESDAYS    VIVITROL 380 mg SSRR kit Inject 380 mg into the muscle every 30 days.     Family History       Problem Relation (Age of Onset)    Cancer Paternal Grandfather    Diabetes Maternal Grandmother    Hypertension Maternal Grandfather          Tobacco Use    Smoking status: Some Days     Current packs/day: 0.25     Average packs/day: 0.3 packs/day for 7.0 years (1.8 ttl pk-yrs)     Types: Cigarettes    Smokeless tobacco: Never   Substance and Sexual Activity    Alcohol use: Yes     Alcohol/week: 2.0 standard drinks of alcohol     Types: 2 Glasses of wine per week "     Comment: occasionally    Drug use: Yes     Frequency: 7.0 times per week     Types: Marijuana    Sexual activity: Yes     Partners: Female     Review of Systems   Constitutional:  Negative for activity change, chills, diaphoresis and fever.   HENT:  Negative for congestion, nosebleeds and tinnitus.    Eyes:  Negative for photophobia and visual disturbance.   Respiratory:  Negative for cough, chest tightness, shortness of breath and wheezing.    Cardiovascular:  Negative for chest pain, palpitations and leg swelling.   Gastrointestinal:  Positive for abdominal pain, blood in stool, nausea and vomiting. Negative for abdominal distention, constipation and diarrhea.   Endocrine: Negative for cold intolerance and heat intolerance.   Genitourinary:  Negative for difficulty urinating, dysuria, frequency, hematuria and urgency.   Musculoskeletal:  Negative for arthralgias, back pain and myalgias.   Skin:  Negative for pallor, rash and wound.   Allergic/Immunologic: Negative for immunocompromised state.   Neurological:  Negative for dizziness, tremors, facial asymmetry, speech difficulty and weakness.   Hematological:  Negative for adenopathy. Does not bruise/bleed easily.   Psychiatric/Behavioral:  Negative for confusion and sleep disturbance. The patient is not nervous/anxious.      Objective:     Vital Signs (Most Recent):  Temp: 97.6 °F (36.4 °C) (12/07/23 2222)  Pulse: 75 (12/07/23 2222)  Resp: 14 (12/07/23 2222)  BP: 94/66 (12/07/23 2222)  SpO2: 100 % (12/07/23 2222) Vital Signs (24h Range):  Temp:  [97.6 °F (36.4 °C)-98 °F (36.7 °C)] 97.6 °F (36.4 °C)  Pulse:  [] 75  Resp:  [14-18] 14  SpO2:  [100 %] 100 %  BP: ()/(54-69) 94/66     Weight: 79.4 kg (175 lb)  Body mass index is 27.41 kg/m².     Physical Exam  Vitals and nursing note reviewed.   Constitutional:       General: He is not in acute distress.     Appearance: He is well-developed. He is ill-appearing. He is not diaphoretic.   HENT:      Head:  Normocephalic.      Mouth/Throat:      Mouth: Mucous membranes are dry.   Eyes:      General: No scleral icterus.     Conjunctiva/sclera: Conjunctivae normal.      Pupils: Pupils are equal, round, and reactive to light.   Neck:      Vascular: No JVD.   Cardiovascular:      Rate and Rhythm: Regular rhythm. Tachycardia present.      Heart sounds: Normal heart sounds. No murmur heard.     No friction rub. No gallop.   Pulmonary:      Effort: Pulmonary effort is normal. No respiratory distress.      Breath sounds: Normal breath sounds. No wheezing or rales.   Abdominal:      General: Bowel sounds are normal. There is no distension.      Palpations: Abdomen is soft.      Tenderness: There is no abdominal tenderness. There is no guarding or rebound.   Musculoskeletal:         General: No tenderness. Normal range of motion.      Cervical back: Normal range of motion and neck supple.   Lymphadenopathy:      Cervical: No cervical adenopathy.   Skin:     General: Skin is warm and dry.      Capillary Refill: Capillary refill takes less than 2 seconds.      Coloration: Skin is not pale.      Findings: No erythema or rash.   Neurological:      Mental Status: He is alert and oriented to person, place, and time.      Cranial Nerves: No cranial nerve deficit.      Sensory: No sensory deficit.      Coordination: Coordination normal.      Deep Tendon Reflexes: Reflexes normal.   Psychiatric:         Behavior: Behavior normal.         Thought Content: Thought content normal.         Judgment: Judgment normal.              CRANIAL NERVES     CN III, IV, VI   Pupils are equal, round, and reactive to light.       Significant Labs: All pertinent labs within the past 24 hours have been reviewed.  CBC:   Recent Labs   Lab 12/07/23 1930   WBC 6.48   HGB 7.8*   HCT 25.1*        CMP:   Recent Labs   Lab 12/07/23 1930   *   K 3.9   CL 94*   CO2 23   *   BUN 18   CREATININE 1.4   CALCIUM 9.1   PROT 6.8   ALBUMIN 3.9  "  BILITOT 0.2   ALKPHOS 63   AST 19   ALT 9*   ANIONGAP 14     Lipase:   Recent Labs   Lab 12/07/23  1930   LIPASE 3*       Significant Imaging: I have reviewed all pertinent imaging results/findings within the past 24 hours.    CT    IMPRESSION:  1. Diffuse wall thickening of the visualized portions of the distal esophagus and gastroesophageal  junction, may be due to infectious or inflammatory esophagitis.  2. Mild bilateral hydroureteronephrosis without ureterolithiasis, may be due to the distended urinary  bladder.  3. Chronic pancreatitis.  4. Small hiatal hernia.  5. Status post cholecystectomy.  6. Moderate constipation.  Assessment/Plan:     * GI bleed  Acute problem   GI bleed pathway   Transfusing 1 unit of packed red blood cells now   GI consult         Diabetes  Patient's FSGs are uncontrolled due to hyperglycemia on current medication regimen.  Last A1c reviewed-   Lab Results   Component Value Date    HGBA1C 5.8 (H) 07/02/2020     Most recent fingerstick glucose reviewed- No results for input(s): "POCTGLUCOSE" in the last 24 hours.  Current correctional scale  Medium  Maintain anti-hyperglycemic dose as follows-   Antihyperglycemics (From admission, onward)      Start     Stop Route Frequency Ordered    12/07/23 2322  insulin aspart U-100 pen 0-10 Units         -- SubQ Before meals & nightly PRN 12/07/23 2222          Hold Oral hypoglycemics while patient is in the hospital.    Acute blood loss anemia  Patient's anemia is currently uncontrolled. Has received 1 units of PRBCs on 12/7/2023 . Etiology likely d/t acute blood loss which was from GI bleed.  Current CBC reviewed-   Lab Results   Component Value Date    HGB 7.8 (L) 12/07/2023    HCT 25.1 (L) 12/07/2023     Monitor serial CBC and transfuse if patient becomes hemodynamically unstable, symptomatic or H/H drops below 7/21.    Seizure disorder  Chronic problem   Seizure precautions   Continue Depakote         Tobacco use  Chronic  Dangers of " cigarette smoking were reviewed with patient in detail for 10 minutes and patient was encouraged to quit. Nicotine replacement options were discussed. Nicotine replacement options were discussed. Nicotine replacement was not prescribed per patient request.       Hypertension  Chronic, controlled. Latest blood pressure and vitals reviewed-     Temp:  [97.6 °F (36.4 °C)-98 °F (36.7 °C)]   Pulse:  []   Resp:  [14-18]   BP: ()/(54-69)   SpO2:  [100 %] .   Home meds for hypertension were reviewed and noted below.   Hypertension Medications               amLODIPine (NORVASC) 10 MG tablet Take 10 mg by mouth once daily.    cloNIDine (CATAPRES) 0.1 MG tablet Take 2 tablets (0.2 mg total) by mouth 3 (three) times daily.    lisinopriL 10 MG tablet Take 10 mg by mouth 2 (two) times daily.    prazosin (MINIPRESS) 2 MG Cap Take 2 mg by mouth every evening.            While in the hospital, will manage blood pressure as follows; Continue home antihypertensive regimen    Will utilize p.r.n. blood pressure medication only if patient's blood pressure greater than 160/100 and he develops symptoms such as worsening chest pain or shortness of breath.      VTE Risk Mitigation (From admission, onward)           Ordered     IP VTE HIGH RISK PATIENT  Once         12/07/23 0150                              Sonny Soto NP  Department of Hospital Medicine  [facility]

## 2023-12-08 NOTE — ANESTHESIA PREPROCEDURE EVALUATION
12/08/2023  Bradley Molina Cousin is a 43 y.o., male.      Pre-op Assessment    I have reviewed the Patient Summary Reports.     I have reviewed the Nursing Notes. I have reviewed the NPO Status.   I have reviewed the Medications.     Review of Systems  Anesthesia Hx:  No problems with previous Anesthesia                Cardiovascular:     Hypertension                                  Hypertension         Renal/:  Chronic Renal Disease        Kidney Function/Disease             Hepatic/GI:     GERD   ETOH pancreatitis   Gerd          Musculoskeletal:  Arthritis        Arthritis          Neurological:      Headaches Seizures     Dx of Headaches   Arthritis      Seizure Disorder                          Endocrine:  Diabetes    Diabetes                      Psych:  Psychiatric History                  Physical Exam  General: Well nourished    Airway:  Mallampati: II   Mouth Opening: Normal  TM Distance: Normal  Neck ROM: Normal ROM        Anesthesia Plan  Type of Anesthesia, risks & benefits discussed:    Anesthesia Type: Gen Natural Airway  Intra-op Monitoring Plan: Standard ASA Monitors  Induction:  IV  Informed Consent: Informed consent signed with the Patient and all parties understand the risks and agree with anesthesia plan.  All questions answered.   ASA Score: 3    Ready For Surgery From Anesthesia Perspective.     .

## 2023-12-08 NOTE — PLAN OF CARE
Vss, sonal po fluids, denies pain, ambulates easily. Report given to Gabby RN on floor. Procedure report reviewed with patient per RN. Pt transferred back to room via wheelchair.

## 2023-12-08 NOTE — ASSESSMENT & PLAN NOTE
Chronic, controlled. Latest blood pressure and vitals reviewed-     Temp:  [97.6 °F (36.4 °C)-98 °F (36.7 °C)]   Pulse:  []   Resp:  [14-18]   BP: ()/(54-69)   SpO2:  [100 %] .   Home meds for hypertension were reviewed and noted below.   Hypertension Medications               amLODIPine (NORVASC) 10 MG tablet Take 10 mg by mouth once daily.    cloNIDine (CATAPRES) 0.1 MG tablet Take 2 tablets (0.2 mg total) by mouth 3 (three) times daily.    lisinopriL 10 MG tablet Take 10 mg by mouth 2 (two) times daily.    prazosin (MINIPRESS) 2 MG Cap Take 2 mg by mouth every evening.            While in the hospital, will manage blood pressure as follows; Continue home antihypertensive regimen    Will utilize p.r.n. blood pressure medication only if patient's blood pressure greater than 160/100 and he develops symptoms such as worsening chest pain or shortness of breath.

## 2023-12-08 NOTE — PLAN OF CARE
Hospital follow up scheduled with PCP for 12/13. Added to AVS     12/08/23 1220   Post-Acute Status   Hospital Resources/Appts/Education Provided Appointments scheduled and added to AVS        Subjective    Patient seen and examined. Intubated and sedated in MICU.     Objective    Vital signs  T(F): , Max: 100.6 (02-28-23 @ 17:00)  HR: 117 (03-01-23 @ 15:01)  BP: --  SpO2: 97% (03-01-23 @ 15:01)  Wt(kg): --    Output     02-28 @ 07:01  -  03-01 @ 07:00  --------------------------------------------------------  IN: 8029.1 mL / OUT: 1303 mL / NET: 6726.1 mL    03-01 @ 07:01  -  03-01 @ 15:24  --------------------------------------------------------  IN: 3271.8 mL / OUT: 2460 mL / NET: 811.8 mL        General: intubated and sedated, icteric   Abdomen: soft  : keenan in place draining clear orange-tinged urine    Labs      03-01 @ 10:25    WBC 0.61  / Hct 25.3  / SCr 2.75     03-01 @ 02:16    WBC 0.75  / Hct 22.3  / SCr 2.85       Culture - Blood (02.26.23 @ 20:24)    Specimen Source: .Blood Blood-Peripheral    Culture Results:   No growth to date.    Culture - Urine (02.24.23 @ 13:05)    Specimen Source: Clean Catch Clean Catch (Midstream)    Culture Results:   <10,000 CFU/mL Normal Urogenital Vale    < from: CT Abdomen and Pelvis No Cont (03.01.23 @ 11:17) >  IMPRESSION:  Bibasilar consolidation at the lung bases, right greater than left   consistent with pneumonia and/or atelectasis.    Small bilateral pleural effusions.    Moderate left hydroureteronephrosis to the level of the bladder without   evidence of obstructing stone.    < end of copied text >

## 2023-12-08 NOTE — HPI
Bradley Cousin is a 43 year male who presents emergency room complaining of nausea, vomiting, and upper abdominal pain.  The symptoms onset yesterday progressively worsened.  He describes vomit as dark red with some bright red streaking.  He also endorses some dark stools but does not describe them as black.  Abdominal pain as similar to previous episodes of acute on chronic pancreatitis.  He denies any fever chills.  No known sick contacts or travel.  No aggravating or alleviating factors.  He denies any recent alcohol use.  He denies any drug use other than marijuana.  Patient had recent admission with EGD which demonstrated gastritis.  Previous medical history includes acute on chronic pancreatitis, hypertension, EtOH abuse, hypothyroidism, active smoker, GERD, and seizure disorder.  ER workup:  CBC with anemia of 7.8/25 (10/33.210 days ago).  Glucose elevated at 191.  Lipase was unremarkable.  CT of the abdomen and pelvis demonstrates chronic pancreatitis and diffuse wall thickening of the visualized portions of the distal esophagus and gastroesophageal junction, may be due to infectious or inflammatory esophagitis.  Patient admitted to Hospital Medicine for treatment management.  Will consult GI in a.m..  Patient started on GI bleed pathway.  Will transfuse patient 1 unit packed red blood cells.

## 2023-12-08 NOTE — CONSULTS
[facility]     CC: Hematemesis    HPI 43 y.o. male who presents emergency room complaining of nausea, vomiting, and upper abdominal pain.  The symptoms onset yesterday progressively worsened.  He describes vomit as dark red with some bright red streaking.  He also endorses some dark stools but does not describe them as black.  Abdominal pain as similar to previous episodes of acute on chronic pancreatitis.  He denies any fever chills.  No known sick contacts or travel.  No aggravating or alleviating factors.  He denies any recent alcohol use.  He denies any drug use other than marijuana.  Patient had recent admission with EGD which demonstrated gastritis.  Previous medical history includes acute on chronic pancreatitis, hypertension, EtOH abuse, hypothyroidism, active smoker, GERD, and seizure disorder.  ER workup:  CBC with anemia of 7.8/25 (10/33.210 days ago).  Glucose elevated at 191.  Lipase was unremarkable.  CT of the abdomen and pelvis demonstrates chronic pancreatitis and diffuse wall thickening of the visualized portions of the distal esophagus and gastroesophageal junction, may be due to infectious or inflammatory esophagitis.  Patient admitted to Hospital Medicine for treatment management.  Will consult GI in a.m..  Patient started on GI bleed pathway.  Will transfuse patient 1 unit packed red blood cells.     Overview/Hospital Course:  No notes on file     Interval History:  Patient admitted with upper GI bleeding.  Patient is scheduled to undergo esophagogastroduodenoscopy.  Patient denies any ongoing epigastric abdominal pain, nausea and vomiting.  Patient received 1 unit of packed red blood cell overnight.  No melena or bleeding per rectum reported.  Denies any chest pain or shortness of breath.  Patient appears somewhat sedated.       FURTHER HISTORY:  Above obtained from independent review of records from admitting provider as well as from direct discussion with nursing who states  patient resting comfortably.  In addition, on my interview, I note the following:  Patient with hematemesis, as described above, recurrent, recent onset, associated with anemia.  Recent EGD reviewed.      Past Medical History:   Diagnosis Date    Anxiety     Anxiety     Bipolar affective disorder     Depression     Hypertension     Pancreatitis     Schizophrenia     Seizures     Thyroid disease        Past Surgical History:   Procedure Laterality Date    ESOPHAGOGASTRODUODENOSCOPY N/A 11/24/2023    Procedure: EGD (ESOPHAGOGASTRODUODENOSCOPY);  Surgeon: Shannen Meyer MD;  Location: [facility];  Service: Endoscopy;  Laterality: N/A;    ESOPHAGOGASTRODUODENOSCOPY N/A 11/27/2023    Procedure: EGD (ESOPHAGOGASTRODUODENOSCOPY);  Surgeon: Steve Anderson MD;  Location: [facility];  Service: Endoscopy;  Laterality: N/A;    LAPAROSCOPIC CHOLECYSTECTOMY N/A 3/9/2020    Procedure: CHOLECYSTECTOMY, LAPAROSCOPIC;  Surgeon: Trenton Deshpande MD;  Location: [facility];  Service: General;  Laterality: N/A;       Social History     Tobacco Use    Smoking status: Some Days     Current packs/day: 0.25     Average packs/day: 0.3 packs/day for 7.0 years (1.8 ttl pk-yrs)     Types: Cigarettes    Smokeless tobacco: Never   Substance Use Topics    Alcohol use: Yes     Alcohol/week: 2.0 standard drinks of alcohol     Types: 2 Glasses of wine per week     Comment: occasionally    Drug use: Yes     Frequency: 7.0 times per week     Types: Marijuana       Family History   Problem Relation Age of Onset    Diabetes Maternal Grandmother     Hypertension Maternal Grandfather     Cancer Paternal Grandfather        Review of Systems  General ROS: negative for - chills, fever or weight loss  Psychological ROS: negative for - hallucination, depression or suicidal ideation  Ophthalmic ROS: negative for - blurry vision, photophobia or eye pain  ENT ROS: negative for - epistaxis, sore throat or rhinorrhea  Respiratory ROS: no cough, shortness of  "breath, or wheezing  Cardiovascular ROS: no chest pain or dyspnea on exertion  Gastrointestinal ROS: as above  Genito-Urinary ROS: no dysuria, trouble voiding, or hematuria  Musculoskeletal ROS: negative for - arthralgia, myalgia, weakness  Neurological ROS: no syncope or seizures; no ataxia  Dermatological ROS: negative for pruritis, rash and jaundice    Physical Examination  /70 (BP Location: Left arm, Patient Position: Lying)   Pulse 90   Temp 98.2 °F (36.8 °C) (Oral)   Resp 17   Ht 5' 7" (1.702 m)   Wt 79.7 kg (175 lb 11.3 oz)   SpO2 99%   BMI 27.52 kg/m²   Physical Exam  Vitals and nursing note reviewed.   Constitutional:       General: He is not in acute distress.     Appearance: He is well-developed. He is ill-appearing. He is not diaphoretic.   HENT:      Head: Normocephalic.      Mouth/Throat:      Mouth: Mucous membranes are dry.   Eyes:      General: No scleral icterus.     Conjunctiva/sclera: Conjunctivae normal.      Pupils: Pupils are equal, round, and reactive to light.   Neck:      Vascular: No JVD.   Cardiovascular:      Rate and Rhythm: Regular rhythm. Tachycardia present.      Heart sounds: Normal heart sounds. No murmur heard.     No friction rub. No gallop.   Pulmonary:      Effort: Pulmonary effort is normal. No respiratory distress.      Breath sounds: Normal breath sounds. No wheezing or rales.   Abdominal:      General: Bowel sounds are normal. There is no distension.      Palpations: Abdomen is soft.      Tenderness: There is no abdominal tenderness. There is no guarding or rebound.   Musculoskeletal:         General: No tenderness. Normal range of motion.      Cervical back: Normal range of motion and neck supple.   Lymphadenopathy:      Cervical: No cervical adenopathy.   Skin:     General: Skin is warm and dry.      Capillary Refill: Capillary refill takes less than 2 seconds.      Coloration: Skin is not pale.      Findings: No erythema or rash.   Neurological:      Mental " Status: He is alert and oriented to person, place, and time.      Cranial Nerves: No cranial nerve deficit.      Sensory: No sensory deficit.      Coordination: Coordination normal.      Deep Tendon Reflexes: Reflexes normal.   Psychiatric:         Behavior: Behavior normal.         Thought Content: Thought content normal.         Judgment: Judgment normal.     Labs:  Lab Results   Component Value Date    WBC 5.29 12/08/2023    HGB 9.2 (L) 12/08/2023    HCT 29.0 (L) 12/08/2023    MCV 77 (L) 12/08/2023     12/08/2023         CMP  Sodium   Date Value Ref Range Status   12/07/2023 131 (L) 136 - 145 mmol/L Final     Potassium   Date Value Ref Range Status   12/07/2023 3.9 3.5 - 5.1 mmol/L Final     Chloride   Date Value Ref Range Status   12/07/2023 94 (L) 95 - 110 mmol/L Final     CO2   Date Value Ref Range Status   12/07/2023 23 23 - 29 mmol/L Final     Glucose   Date Value Ref Range Status   12/07/2023 191 (H) 70 - 110 mg/dL Final     BUN   Date Value Ref Range Status   12/07/2023 18 6 - 20 mg/dL Final     Creatinine   Date Value Ref Range Status   12/07/2023 1.4 0.5 - 1.4 mg/dL Final   12/09/2012 1.4 0.5 - 1.4 mg/dL Final     Calcium   Date Value Ref Range Status   12/07/2023 9.1 8.7 - 10.5 mg/dL Final   12/09/2012 9.3 8.7 - 10.5 mg/dL Final     Total Protein   Date Value Ref Range Status   12/07/2023 6.8 6.0 - 8.4 g/dL Final     Albumin   Date Value Ref Range Status   12/07/2023 3.9 3.5 - 5.2 g/dL Final     Total Bilirubin   Date Value Ref Range Status   12/07/2023 0.2 0.1 - 1.0 mg/dL Final     Comment:     For infants and newborns, interpretation of results should be based  on gestational age, weight and in agreement with clinical  observations.    Premature Infant recommended reference ranges:  Up to 24 hours.............<8.0 mg/dL  Up to 48 hours............<12.0 mg/dL  3-5 days..................<15.0 mg/dL  6-29 days.................<15.0 mg/dL       Alkaline Phosphatase   Date Value Ref Range Status    12/07/2023 63 55 - 135 U/L Final   12/09/2012 59 55 - 135 U/L Final     AST   Date Value Ref Range Status   12/07/2023 19 10 - 40 U/L Final   12/09/2012 29 10 - 40 U/L Final     ALT   Date Value Ref Range Status   12/07/2023 9 (L) 10 - 44 U/L Final     Anion Gap   Date Value Ref Range Status   12/07/2023 14 8 - 16 mmol/L Final   12/09/2012 18 (H) 5 - 15 meq/L Final     eGFR   Date Value Ref Range Status   12/07/2023 >60 >60 mL/min/1.73 m^2 Final     Lab Results   Component Value Date    IRON 42 (L) 07/01/2020    TRANSFERRIN 352 07/01/2020    TIBC 521 (H) 07/01/2020    FESATURATED 8 (L) 07/01/2020          Imaging:  CT was independently visualized and reviewed by me and showed esophageal wall thickening.    I have personally reviewed these images      Case discussed as above.      Assessment:   N/V  Hematemesis  Abnormal CT  MAY    Plan:  NPO  PPI  EGD today  Needs outpatient colonoscopy at some point for MAY  Further recommendations to follow after above.  Communication will be sent to the referring provider regarding my assessment and plan on this patient via EPIC.      Steve Matias MD  [facility]  [address], [address]  [place] [address]  Office: [phone]  Fax: [phone]

## 2023-12-08 NOTE — PLAN OF CARE
[facility]  Initial Discharge Assessment       Primary Care Provider: Cassius Oneill MD    Admission Diagnosis: Acute blood loss anemia [D62]    Admission Date: 12/7/2023  Expected Discharge Date: 12/9/2023    Met with pt at bedside to complete discharge assessment, verified PCP, pharmacy and information on facesheet.  Pt has glucometer at home but no other DME.  No HH or dialysis. Pt will need Medicaid transportation upon discharge.  No needs identified at this time.    Transition of Care Barriers: None    Payor: MEDICAID / Plan: [place] [id number] [place] / Product Type: Managed Medicaid /     Extended Emergency Contact Information  Primary Emergency Contact: Jayleen Collado  [address]           [place] [address] [facility]  Home Phone: [phone]  Mobile Phone: [phone]  Relation: Grandparent  Secondary Emergency Contact: Ashley Becker  Address: [address] [place] [address] [facility]  Home Phone: [phone]  Mobile Phone: [phone]  Relation: Relative    Discharge Plan A: Home with family  Discharge Plan B: Home with family    [facility] [address] [address]  Phone: [phone] Fax: [phone]      Initial Assessment (most recent)       Adult Discharge Assessment - 12/08/23 1157          Discharge Assessment    Assessment Type Discharge Planning Assessment     Confirmed/corrected address, phone number and insurance Yes     Confirmed Demographics Correct on Facesheet     Source of Information patient     Communicated RICHAR with patient/caregiver No     People in Home grandparent(s)     Do you expect to return to your current living situation? Yes     Prior to hospitilization cognitive status: Alert/Oriented     Current cognitive status: Alert/Oriented     Walking or Climbing Stairs Difficulty no     Dressing/Bathing Difficulty no     Home  Accessibility wheelchair accessible     Home Layout Able to live on 1st floor     Equipment Currently Used at Home glucometer     Readmission within 30 days? No     Patient currently being followed by outpatient case management? No     Do you currently have service(s) that help you manage your care at home? No     Do you take prescription medications? Yes     Do you have prescription coverage? Yes     Coverage Medicaid     Do you have any problems affording any of your prescribed medications? No     Is the patient taking medications as prescribed? yes     Who is going to help you get home at discharge? Medicaid transportation     How do you get to doctors appointments? health plan transportation     Are you on dialysis? No     Do you take coumadin? No     Discharge Plan A Home with family     Discharge Plan B Home with family     DME Needed Upon Discharge  none     Discharge Plan discussed with: Patient     Transition of Care Barriers None

## 2023-12-08 NOTE — ASSESSMENT & PLAN NOTE
"Patient's FSGs are uncontrolled due to hyperglycemia on current medication regimen.  Last A1c reviewed-   Lab Results   Component Value Date    HGBA1C 5.8 (H) 07/02/2020     Most recent fingerstick glucose reviewed- No results for input(s): "POCTGLUCOSE" in the last 24 hours.  Current correctional scale  Medium  Maintain anti-hyperglycemic dose as follows-   Antihyperglycemics (From admission, onward)      Start     Stop Route Frequency Ordered    12/07/23 2322  insulin aspart U-100 pen 0-10 Units         -- SubQ Before meals & nightly PRN 12/07/23 2222          Hold Oral hypoglycemics while patient is in the hospital.  "

## 2023-12-08 NOTE — ANESTHESIA POSTPROCEDURE EVALUATION
Anesthesia Post Evaluation    Patient: Bradley Collado    Procedure(s) Performed: Procedure(s) (LRB):  EGD (ESOPHAGOGASTRODUODENOSCOPY) (N/A)    Final Anesthesia Type: general      Patient location during evaluation: PACU  Patient participation: Yes- Able to Participate  Level of consciousness: awake and alert and oriented  Post-procedure vital signs: reviewed and stable  Pain management: adequate  Airway patency: patent    PONV status at discharge: No PONV  Anesthetic complications: no      Cardiovascular status: blood pressure returned to baseline and stable  Respiratory status: unassisted and spontaneous ventilation  Hydration status: euvolemic  Follow-up not needed.              Vitals Value Taken Time   /83 12/08/23 1520   Temp 36.8 °C (98.2 °F) 12/08/23 1502   Pulse 76 12/08/23 1524   Resp 16 12/08/23 1502   SpO2 100 % 12/08/23 1524   Vitals shown include unvalidated device data.      No case tracking events are documented in the log.      Pain/Raz Score: Raz Score: 9 (12/8/2023  3:15 PM)

## 2023-12-08 NOTE — SUBJECTIVE & OBJECTIVE
Past Medical History:   Diagnosis Date    Anxiety     Anxiety     Bipolar affective disorder     Depression     Hypertension     Pancreatitis     Schizophrenia     Seizures     Thyroid disease        Past Surgical History:   Procedure Laterality Date    ESOPHAGOGASTRODUODENOSCOPY N/A 11/24/2023    Procedure: EGD (ESOPHAGOGASTRODUODENOSCOPY);  Surgeon: Shannen Meyer MD;  Location: [facility];  Service: Endoscopy;  Laterality: N/A;    ESOPHAGOGASTRODUODENOSCOPY N/A 11/27/2023    Procedure: EGD (ESOPHAGOGASTRODUODENOSCOPY);  Surgeon: Steve Anderson MD;  Location: [facility];  Service: Endoscopy;  Laterality: N/A;    LAPAROSCOPIC CHOLECYSTECTOMY N/A 3/9/2020    Procedure: CHOLECYSTECTOMY, LAPAROSCOPIC;  Surgeon: Trenton Deshpande MD;  Location: [facility];  Service: General;  Laterality: N/A;       Review of patient's allergies indicates:  No Known Allergies    No current facility-administered medications on file prior to encounter.     Current Outpatient Medications on File Prior to Encounter   Medication Sig    acamprosate calcium (CAMPRAL ORAL) Take 666 mg by mouth 2 (two) times a day.    amLODIPine (NORVASC) 10 MG tablet Take 10 mg by mouth once daily.    busPIRone (BUSPAR) 30 MG Tab Take 30 mg by mouth 2 (two) times daily.    clonazePAM (KLONOPIN) 2 MG Tab Take 2 mg by mouth 2 (two) times daily as needed.    cloNIDine (CATAPRES) 0.1 MG tablet Take 2 tablets (0.2 mg total) by mouth 3 (three) times daily.    divalproex ER (DEPAKOTE ER) 500 MG Tb24 Take 500 mg by mouth 3 (three) times daily.     DULoxetine (CYMBALTA) 30 MG capsule Take 30 mg by mouth once daily.    famotidine (PEPCID) 20 MG tablet Take 20 mg by mouth 2 (two) times daily. HCS    fluticasone propionate (FLONASE) 50 mcg/actuation nasal spray 1 spray by Each Nostril route once daily.    FOLIC ACID ORAL Take 2 mg by mouth once daily.    gabapentin (NEURONTIN) 400 MG capsule Take 2 capsules (800 mg total) by mouth 3 (three) times daily.    HUMALOG  "KWIKPEN INSULIN 100 unit/mL pen ADMINISTER 5 UNITS UNDER THE SKIN THREE TIMES DAILY (Patient not taking: Reported on 11/22/2023)    lancets 33 gauge Misc 1 Package.    LANTUS SOLOSTAR U-100 INSULIN glargine 100 units/mL (3mL) SubQ pen ADMINISTER 30 UNITS UNDER THE SKIN EVERY EVENING (Patient taking differently: Inject 30 Units into the skin every evening.)    lisinopriL 10 MG tablet Take 10 mg by mouth 2 (two) times daily.    loratadine (CLARITIN) 10 mg tablet Take 10 mg by mouth once daily.    metFORMIN (GLUCOPHAGE) 500 MG tablet Take 1,000 mg by mouth 2 (two) times daily with meals.    mirtazapine (REMERON) 30 MG tablet Take 1 tablet (30 mg total) by mouth every evening.    OLANZapine (ZYPREXA) 20 MG tablet Take 20 mg by mouth every evening.    omeprazole (PRILOSEC) 40 MG capsule Take 40 mg by mouth every morning.    pantoprazole (PROTONIX) 40 MG tablet Take 1 tablet (40 mg total) by mouth once daily.    pen needle, diabetic 32 gauge x 5/32" Ndle 1 Box.    prazosin (MINIPRESS) 2 MG Cap Take 2 mg by mouth every evening.    QUEtiapine (SEROQUEL) 50 MG tablet Take 1 tablet (50 mg total) by mouth 2 (two) times a day. (Patient taking differently: Take 600 mg by mouth 2 (two) times a day.)    thiamine 100 MG tablet Take 100 mg by mouth once daily.    traZODone (DESYREL) 100 MG tablet Take 100 mg by mouth every evening.    TRULICITY 4.5 mg/0.5 mL pen injector Inject 4.5 mg into the skin every 7 days. TUESDAYS    VIVITROL 380 mg SSRR kit Inject 380 mg into the muscle every 30 days.     Family History       Problem Relation (Age of Onset)    Cancer Paternal Grandfather    Diabetes Maternal Grandmother    Hypertension Maternal Grandfather          Tobacco Use    Smoking status: Some Days     Current packs/day: 0.25     Average packs/day: 0.3 packs/day for 7.0 years (1.8 ttl pk-yrs)     Types: Cigarettes    Smokeless tobacco: Never   Substance and Sexual Activity    Alcohol use: Yes     Alcohol/week: 2.0 standard drinks of " alcohol     Types: 2 Glasses of wine per week     Comment: occasionally    Drug use: Yes     Frequency: 7.0 times per week     Types: Marijuana    Sexual activity: Yes     Partners: Female     Review of Systems   Constitutional:  Negative for activity change, chills, diaphoresis and fever.   HENT:  Negative for congestion, nosebleeds and tinnitus.    Eyes:  Negative for photophobia and visual disturbance.   Respiratory:  Negative for cough, chest tightness, shortness of breath and wheezing.    Cardiovascular:  Negative for chest pain, palpitations and leg swelling.   Gastrointestinal:  Positive for abdominal pain, blood in stool, nausea and vomiting. Negative for abdominal distention, constipation and diarrhea.   Endocrine: Negative for cold intolerance and heat intolerance.   Genitourinary:  Negative for difficulty urinating, dysuria, frequency, hematuria and urgency.   Musculoskeletal:  Negative for arthralgias, back pain and myalgias.   Skin:  Negative for pallor, rash and wound.   Allergic/Immunologic: Negative for immunocompromised state.   Neurological:  Negative for dizziness, tremors, facial asymmetry, speech difficulty and weakness.   Hematological:  Negative for adenopathy. Does not bruise/bleed easily.   Psychiatric/Behavioral:  Negative for confusion and sleep disturbance. The patient is not nervous/anxious.      Objective:     Vital Signs (Most Recent):  Temp: 97.6 °F (36.4 °C) (12/07/23 2222)  Pulse: 75 (12/07/23 2222)  Resp: 14 (12/07/23 2222)  BP: 94/66 (12/07/23 2222)  SpO2: 100 % (12/07/23 2222) Vital Signs (24h Range):  Temp:  [97.6 °F (36.4 °C)-98 °F (36.7 °C)] 97.6 °F (36.4 °C)  Pulse:  [] 75  Resp:  [14-18] 14  SpO2:  [100 %] 100 %  BP: ()/(54-69) 94/66     Weight: 79.4 kg (175 lb)  Body mass index is 27.41 kg/m².     Physical Exam  Vitals and nursing note reviewed.   Constitutional:       General: He is not in acute distress.     Appearance: He is well-developed. He is  ill-appearing. He is not diaphoretic.   HENT:      Head: Normocephalic.      Mouth/Throat:      Mouth: Mucous membranes are dry.   Eyes:      General: No scleral icterus.     Conjunctiva/sclera: Conjunctivae normal.      Pupils: Pupils are equal, round, and reactive to light.   Neck:      Vascular: No JVD.   Cardiovascular:      Rate and Rhythm: Regular rhythm. Tachycardia present.      Heart sounds: Normal heart sounds. No murmur heard.     No friction rub. No gallop.   Pulmonary:      Effort: Pulmonary effort is normal. No respiratory distress.      Breath sounds: Normal breath sounds. No wheezing or rales.   Abdominal:      General: Bowel sounds are normal. There is no distension.      Palpations: Abdomen is soft.      Tenderness: There is no abdominal tenderness. There is no guarding or rebound.   Musculoskeletal:         General: No tenderness. Normal range of motion.      Cervical back: Normal range of motion and neck supple.   Lymphadenopathy:      Cervical: No cervical adenopathy.   Skin:     General: Skin is warm and dry.      Capillary Refill: Capillary refill takes less than 2 seconds.      Coloration: Skin is not pale.      Findings: No erythema or rash.   Neurological:      Mental Status: He is alert and oriented to person, place, and time.      Cranial Nerves: No cranial nerve deficit.      Sensory: No sensory deficit.      Coordination: Coordination normal.      Deep Tendon Reflexes: Reflexes normal.   Psychiatric:         Behavior: Behavior normal.         Thought Content: Thought content normal.         Judgment: Judgment normal.              CRANIAL NERVES     CN III, IV, VI   Pupils are equal, round, and reactive to light.       Significant Labs: All pertinent labs within the past 24 hours have been reviewed.  CBC:   Recent Labs   Lab 12/07/23 1930   WBC 6.48   HGB 7.8*   HCT 25.1*        CMP:   Recent Labs   Lab 12/07/23 1930   *   K 3.9   CL 94*   CO2 23   *   BUN 18    CREATININE 1.4   CALCIUM 9.1   PROT 6.8   ALBUMIN 3.9   BILITOT 0.2   ALKPHOS 63   AST 19   ALT 9*   ANIONGAP 14     Lipase:   Recent Labs   Lab 12/07/23  1930   LIPASE 3*       Significant Imaging: I have reviewed all pertinent imaging results/findings within the past 24 hours.    CT    IMPRESSION:  1. Diffuse wall thickening of the visualized portions of the distal esophagus and gastroesophageal  junction, may be due to infectious or inflammatory esophagitis.  2. Mild bilateral hydroureteronephrosis without ureterolithiasis, may be due to the distended urinary  bladder.  3. Chronic pancreatitis.  4. Small hiatal hernia.  5. Status post cholecystectomy.  6. Moderate constipation.

## 2023-12-08 NOTE — ASSESSMENT & PLAN NOTE
Chronic  Dangers of cigarette smoking were reviewed with patient in detail for 10 minutes and patient was encouraged to quit. Nicotine replacement options were discussed. Nicotine replacement options were discussed. Nicotine replacement was not prescribed per patient request.

## 2023-12-08 NOTE — PROGRESS NOTES
Formerly [facility], [facility] Medicine  Progress Note    Patient Name: Bradley Collado  MRN: [id number]  Patient Class: OP- Observation   Admission Date: 12/7/2023  Length of Stay: 0 days  Attending Physician: Gumaro Pimentel MD  Primary Care Provider: Cassius Oneill MD        Subjective:     Principal Problem:GI bleed        HPI:  Bradley Collado is a 43 year male who presents emergency room complaining of nausea, vomiting, and upper abdominal pain.  The symptoms onset yesterday progressively worsened.  He describes vomit as dark red with some bright red streaking.  He also endorses some dark stools but does not describe them as black.  Abdominal pain as similar to previous episodes of acute on chronic pancreatitis.  He denies any fever chills.  No known sick contacts or travel.  No aggravating or alleviating factors.  He denies any recent alcohol use.  He denies any drug use other than marijuana.  Patient had recent admission with EGD which demonstrated gastritis.  Previous medical history includes acute on chronic pancreatitis, hypertension, EtOH abuse, hypothyroidism, active smoker, GERD, and seizure disorder.  ER workup:  CBC with anemia of 7.8/25 (10/33.210 days ago).  Glucose elevated at 191.  Lipase was unremarkable.  CT of the abdomen and pelvis demonstrates chronic pancreatitis and diffuse wall thickening of the visualized portions of the distal esophagus and gastroesophageal junction, may be due to infectious or inflammatory esophagitis.  Patient admitted to Hospital Medicine for treatment management.  Will consult GI in a.m..  Patient started on GI bleed pathway.  Will transfuse patient 1 unit packed red blood cells.    Overview/Hospital Course:  No notes on file    Interval History:  Patient admitted with upper GI bleeding.  Patient is scheduled to undergo esophagogastroduodenoscopy.  Patient denies any ongoing epigastric abdominal pain, nausea and vomiting.  Patient received 1 unit of  packed red blood cell overnight.  No melena or bleeding per rectum reported.  Denies any chest pain or shortness of breath.    Review of Systems   Constitutional:  Negative for activity change, chills, diaphoresis and fever.   HENT:  Negative for congestion, nosebleeds and tinnitus.    Eyes:  Negative for photophobia and visual disturbance.   Respiratory:  Negative for cough, chest tightness, shortness of breath and wheezing.    Cardiovascular:  Negative for chest pain, palpitations and leg swelling.   Gastrointestinal:  Positive for abdominal pain, blood in stool, nausea and vomiting. Negative for abdominal distention, constipation and diarrhea.   Endocrine: Negative for cold intolerance and heat intolerance.   Genitourinary:  Negative for difficulty urinating, dysuria, frequency, hematuria and urgency.   Musculoskeletal:  Negative for arthralgias, back pain and myalgias.   Skin:  Negative for pallor, rash and wound.   Allergic/Immunologic: Negative for immunocompromised state.   Neurological:  Negative for dizziness, tremors, facial asymmetry, speech difficulty and weakness.   Hematological:  Negative for adenopathy. Does not bruise/bleed easily.   Psychiatric/Behavioral:  Negative for confusion and sleep disturbance. The patient is not nervous/anxious.      Objective:     Vital Signs (Most Recent):  Temp: 97.9 °F (36.6 °C) (12/08/23 0739)  Pulse: 77 (12/08/23 0739)  Resp: 17 (12/08/23 0739)  BP: 121/71 (12/08/23 0739)  SpO2: 99 % (12/08/23 0739) Vital Signs (24h Range):  Temp:  [97.5 °F (36.4 °C)-98 °F (36.7 °C)] 97.9 °F (36.6 °C)  Pulse:  [] 77  Resp:  [14-18] 17  SpO2:  [98 %-100 %] 99 %  BP: ()/(54-73) 121/71     Weight: 79.7 kg (175 lb 11.3 oz)  Body mass index is 27.52 kg/m².    Intake/Output Summary (Last 24 hours) at 12/8/2023 0951  Last data filed at 12/8/2023 0147  Gross per 24 hour   Intake 440.45 ml   Output --   Net 440.45 ml         Physical Exam  Vitals and nursing note reviewed.    Constitutional:       General: He is not in acute distress.     Appearance: He is well-developed. He is ill-appearing. He is not diaphoretic.   HENT:      Head: Normocephalic.      Mouth/Throat:      Mouth: Mucous membranes are dry.   Eyes:      General: No scleral icterus.     Conjunctiva/sclera: Conjunctivae normal.      Pupils: Pupils are equal, round, and reactive to light.   Neck:      Vascular: No JVD.   Cardiovascular:      Rate and Rhythm: Regular rhythm. Tachycardia present.      Heart sounds: Normal heart sounds. No murmur heard.     No friction rub. No gallop.   Pulmonary:      Effort: Pulmonary effort is normal. No respiratory distress.      Breath sounds: Normal breath sounds. No wheezing or rales.   Abdominal:      General: Bowel sounds are normal. There is no distension.      Palpations: Abdomen is soft.      Tenderness: There is no abdominal tenderness. There is no guarding or rebound.   Musculoskeletal:         General: No tenderness. Normal range of motion.      Cervical back: Normal range of motion and neck supple.   Lymphadenopathy:      Cervical: No cervical adenopathy.   Skin:     General: Skin is warm and dry.      Capillary Refill: Capillary refill takes less than 2 seconds.      Coloration: Skin is not pale.      Findings: No erythema or rash.   Neurological:      Mental Status: He is alert and oriented to person, place, and time.      Cranial Nerves: No cranial nerve deficit.      Sensory: No sensory deficit.      Coordination: Coordination normal.      Deep Tendon Reflexes: Reflexes normal.   Psychiatric:         Behavior: Behavior normal.         Thought Content: Thought content normal.         Judgment: Judgment normal.             Significant Labs: All pertinent labs within the past 24 hours have been reviewed.  CBC:   Recent Labs   Lab 12/07/23  1930 12/08/23  0428 12/08/23  0750   WBC 6.48 5.41 5.38   HGB 7.8* 8.0* 8.8*   HCT 25.1* 26.0* 28.2*    303 301     CMP:   Recent  "Labs   Lab 12/07/23 1930   *   K 3.9   CL 94*   CO2 23   *   BUN 18   CREATININE 1.4   CALCIUM 9.1   PROT 6.8   ALBUMIN 3.9   BILITOT 0.2   ALKPHOS 63   AST 19   ALT 9*   ANIONGAP 14     Coagulation:   Recent Labs   Lab 12/07/23 1930   INR 1.2     Lactic Acid: No results for input(s): "LACTATE" in the last 48 hours.  Lipase:   Recent Labs   Lab 12/07/23 1930   LIPASE 3*     Urine Studies: No results for input(s): "COLORU", "APPEARANCEUA", "PHUR", "SPECGRAV", "PROTEINUA", "GLUCUA", "KETONESU", "BILIRUBINUA", "OCCULTUA", "NITRITE", "UROBILINOGEN", "LEUKOCYTESUR", "RBCUA", "WBCUA", "BACTERIA", "SQUAMEPITHEL", "HYALINECASTS" in the last 48 hours.    Invalid input(s): "WRIGHTSUR"    Significant Imaging:   CT abdomen and pelvis with IV contrast:   1. Partially imaged distal esophageal wall thickening.  Esophagitis or reflux are two considerations.  2. Sequela of chronic pancreatitis.  3. Query colonic constipation.    Assessment/Plan:      * GI bleed  Keep patient NPO.  Patient is scheduled for EGD today.  Follow H/H closely.  Status post 1 unit of packed red blood cell transfusion.  Continue IV Protonix 40 mg twice daily.  Consult Gastronetrologist.   Check Iron, TIBC, B12 and folic acid.   Continue IVF hydration.   Use IV anti-emetics as needed.   Patient counseled regarding smoking cessation and abstinence of alcohol use.          Diabetes  Patient's FSGs are uncontrolled due to hyperglycemia on current medication regimen.  Last A1c reviewed-   Lab Results   Component Value Date    HGBA1C 5.8 (H) 07/02/2020     Most recent fingerstick glucose reviewed- No results for input(s): "POCTGLUCOSE" in the last 24 hours.  Current correctional scale  Medium  Maintain anti-hyperglycemic dose as follows-   Antihyperglycemics (From admission, onward)      Start     Stop Route Frequency Ordered    12/07/23 2322  insulin aspart U-100 pen 0-10 Units         -- SubQ Before meals & nightly PRN 12/07/23 2222          Hold " Oral hypoglycemics while patient is in the hospital.    Acute blood loss anemia  Patient's anemia is currently uncontrolled. Has received 1 units of PRBCs on 12/7/2023 . Etiology likely d/t acute blood loss which was from GI bleed.  Current CBC reviewed-   Lab Results   Component Value Date    HGB 7.8 (L) 12/07/2023    HCT 25.1 (L) 12/07/2023     Monitor serial CBC and transfuse if patient becomes hemodynamically unstable, symptomatic or H/H drops below 7/21.    Seizure disorder  Chronic problem   Seizure precautions   Continue Depakote         Tobacco use  Chronic  Dangers of cigarette smoking were reviewed with patient in detail for 10 minutes and patient was encouraged to quit. Nicotine replacement options were discussed. Nicotine replacement options were discussed. Nicotine replacement was not prescribed per patient request.       Hypertension  Chronic, controlled. Latest blood pressure and vitals reviewed-     Temp:  [97.5 °F (36.4 °C)-98 °F (36.7 °C)]   Pulse:  []   Resp:  [14-18]   BP: ()/(54-73)   SpO2:  [98 %-100 %] .   Home meds for hypertension were reviewed and noted below.   Hypertension Medications               amLODIPine (NORVASC) 10 MG tablet Take 10 mg by mouth once daily.    cloNIDine (CATAPRES) 0.1 MG tablet Take 2 tablets (0.2 mg total) by mouth 3 (three) times daily.    lisinopriL 10 MG tablet Take 10 mg by mouth 2 (two) times daily.    prazosin (MINIPRESS) 2 MG Cap Take 2 mg by mouth every evening.            While in the hospital, will manage blood pressure as follows; Continue home antihypertensive regimen    Will utilize p.r.n. blood pressure medication only if patient's blood pressure greater than 160/100 and he develops symptoms such as worsening chest pain or shortness of breath.      VTE Risk Mitigation (From admission, onward)           Ordered     IP VTE HIGH RISK PATIENT  Once         12/07/23 2220                    Discharge Planning   RICHAR: 12/9/2023     Code Status:  Full Code   Is the patient medically ready for discharge?:     Reason for patient still in hospital (select all that apply): {HMREASONPATIENTINHOSP:89972}                     Gumaro Pimentel MD  Department of Hospital Medicine   [facility]

## 2023-12-08 NOTE — ED PROVIDER NOTES
Encounter Date: 12/7/2023       History     Chief Complaint   Patient presents with    Vomiting     Pt reports dark emesis with vomiting and dark stools. Denies diarrhea. 1-2 weeks     43 y.o. male with chronic pancreatitis, alcohol use disorder, history of gastritis, HTN, schizophrenia, bipolar affective disorder, anxiety, thyroid disease presents for vomiting for the past several weeks, worsening over the past several days.  Patient was recently admitted for similar symptoms, had a EGD at that time which showed gastritis.  Since then, he had been feeling better but has been vomiting for the past several days.  He also reports dark brown emesis and in his stool.  He denies any bright red blood in his emesis or stool.  He also has had several episodes of dizziness and has been unsteady on his feet for the past several weeks.  He denies any headache, chest pain, shortness of breath, fevers    The history is provided by the patient and medical records.     Review of patient's allergies indicates:  No Known Allergies  Past Medical History:   Diagnosis Date    Anxiety     Anxiety     Bipolar affective disorder     Depression     Hypertension     Pancreatitis     Schizophrenia     Seizures     Thyroid disease      Past Surgical History:   Procedure Laterality Date    ESOPHAGOGASTRODUODENOSCOPY N/A 11/24/2023    Procedure: EGD (ESOPHAGOGASTRODUODENOSCOPY);  Surgeon: Shannen Meyer MD;  Location: [facility];  Service: Endoscopy;  Laterality: N/A;    ESOPHAGOGASTRODUODENOSCOPY N/A 11/27/2023    Procedure: EGD (ESOPHAGOGASTRODUODENOSCOPY);  Surgeon: Steve Anderson MD;  Location: [facility];  Service: Endoscopy;  Laterality: N/A;    LAPAROSCOPIC CHOLECYSTECTOMY N/A 3/9/2020    Procedure: CHOLECYSTECTOMY, LAPAROSCOPIC;  Surgeon: Trenton Deshpande MD;  Location: [facility];  Service: General;  Laterality: N/A;     Family History   Problem Relation Age of Onset    Diabetes Maternal Grandmother     Hypertension Maternal  Grandfather     Cancer Paternal Grandfather      Social History     Tobacco Use    Smoking status: Some Days     Current packs/day: 0.25     Average packs/day: 0.3 packs/day for 7.0 years (1.8 ttl pk-yrs)     Types: Cigarettes    Smokeless tobacco: Never   Substance Use Topics    Alcohol use: Yes     Alcohol/week: 2.0 standard drinks of alcohol     Types: 2 Glasses of wine per week     Comment: occasionally    Drug use: Yes     Frequency: 7.0 times per week     Types: Marijuana     Review of Systems   Reason unable to perform ROS: See HPI for relevant ROS.       Physical Exam     Initial Vitals [12/07/23 1854]   BP Pulse Resp Temp SpO2   (!) 91/54 104 18 98 °F (36.7 °C) 100 %      MAP       --         Physical Exam    Nursing note and vitals reviewed.  Constitutional:   Alert, speaking full sentences, no acute distress   Eyes: Conjunctivae and EOM are normal. Pupils are equal, round, and reactive to light. No scleral icterus.   Cardiovascular:  Regular rhythm and intact distal pulses.           Mild tachycardia   Pulmonary/Chest: Breath sounds normal. No respiratory distress.   Abdominal: Abdomen is soft. He exhibits no distension. There is abdominal tenderness (mild LLQ tenderness).   Musculoskeletal:         General: No edema.     Neurological: He is alert and oriented to person, place, and time. He has normal strength. No cranial nerve deficit or sensory deficit.   Skin: Skin is warm and dry.   Psychiatric:   Flat affect, linear, organized         ED Course   Critical Care    Date/Time: 12/7/2023 6:48 PM    Performed by: Garcia Burden MD  Authorized by: Garcia Burden MD  Direct patient critical care time: 10 minutes  Additional history critical care time: 5 minutes  Ordering / reviewing critical care time: 5 minutes  Documentation critical care time: 5 minutes  Consult with family critical care time: 5 minutes  Other critical care time: 5 minutes  Total critical care time (exclusive of procedural time) : 35  minutes  Critical care time was exclusive of separately billable procedures and treating other patients.  Critical care was necessary to treat or prevent imminent or life-threatening deterioration of the following conditions: circulatory failure.  Critical care was time spent personally by me on the following activities: development of treatment plan with patient or surrogate, obtaining history from patient or surrogate, ordering and performing treatments and interventions, ordering and review of laboratory studies, evaluation of patient's response to treatment, examination of patient, re-evaluation of patient's condition, review of old charts and pulse oximetry.        Labs Reviewed   CBC W/ AUTO DIFFERENTIAL - Abnormal; Notable for the following components:       Result Value    RBC 3.32 (*)     Hemoglobin 7.8 (*)     Hematocrit 25.1 (*)     MCV 76 (*)     MCH 23.5 (*)     MCHC 31.1 (*)     RDW 17.7 (*)     All other components within normal limits   COMPREHENSIVE METABOLIC PANEL - Abnormal; Notable for the following components:    Sodium 131 (*)     Chloride 94 (*)     Glucose 191 (*)     ALT 9 (*)     All other components within normal limits   PROTIME-INR - Abnormal; Notable for the following components:    Prothrombin Time 12.9 (*)     All other components within normal limits   LIPASE - Abnormal; Notable for the following components:    Lipase 3 (*)     All other components within normal limits   AMMONIA   TSH   URINALYSIS, REFLEX TO URINE CULTURE   DRUG SCREEN PANEL, URINE EMERGENCY   TYPE & SCREEN   PREPARE RBC SOFT          Imaging Results              CT Abdomen Pelvis With IV Contrast NO Oral Contrast (In process)                      Medications   0.9%  NaCl infusion (for blood administration) (has no administration in time range)   pantoprazole injection 40 mg (40 mg Intravenous Given 12/8/23 0116)   glucose chewable tablet 16 g (has no administration in time range)   glucose chewable tablet 24 g (has  no administration in time range)   glucagon (human recombinant) injection 1 mg (has no administration in time range)   insulin aspart U-100 pen 0-10 Units (has no administration in time range)   dextrose 10% bolus 125 mL 125 mL (has no administration in time range)   dextrose 10% bolus 250 mL 250 mL (has no administration in time range)   DULoxetine DR capsule 30 mg (has no administration in time range)   OLANZapine tablet 20 mg (20 mg Oral Given 12/8/23 0118)   thiamine (B-1) 100 mg in dextrose 5 % (D5W) 100 mL IVPB (0 mg Intravenous Stopped 12/8/23 0147)   divalproex ER 24 hr tablet 500 mg (has no administration in time range)   clonazePAM tablet 2 mg (has no administration in time range)   QUEtiapine tablet 600 mg (600 mg Oral Given 12/8/23 0155)   sodium chloride 0.9% bolus 1,000 mL 1,000 mL (0 mLs Intravenous Stopped 12/7/23 2045)   ondansetron injection 4 mg (4 mg Intravenous Given 12/7/23 1945)   famotidine (PF) injection 20 mg (20 mg Intravenous Given 12/7/23 1945)   aluminum-magnesium hydroxide-simethicone 200-200-20 mg/5 mL suspension 30 mL (30 mLs Oral Given 12/7/23 1942)   iohexoL (OMNIPAQUE 350) 350 mg iodine/mL injection (75 mLs Intravenous Given 12/7/23 2051)   0.9%  NaCl infusion ( Intravenous New Bag 12/8/23 0117)     Medical Decision Making  43 y.o. male with chronic pancreatitis, alcohol use disorder, history of gastritis, HTN, schizophrenia, bipolar affective disorder, anxiety, thyroid disease presents for vomiting for the past several weeks  Differentials include gastritis, pancreatitis, gastroenteritis, PUD, diverticulitis, less likely esophageal varices  Patient is mildly hypotensive mildly tachycardic, there is no reported blood in his emesis or stool but they are dark.  Of note, he did take Pepto-Bismol recently  Patient had EGD within the last 2 weeks which showed gastritis and no varices, no ulcers  Patient has history of pancreatitis and alcohol use disorder, denies any alcohol use over  the past several months.  Family also reports he has been confused occasionally making statements that do not make sense.  He also feels slightly unsteady on his feet which is not persistent but happens frequently.  No focal neurological deficits on exam, patient does have a flat affect.  Unclear if this is a medication side effect or Wernicke's the patient does not have any nystagmus.  No persistent dizziness, no evidence of posterior stroke on exam, patient ambulatory without assistance    Amount and/or Complexity of Data Reviewed  Independent Historian: parent  External Data Reviewed: labs, radiology and notes.  Labs: ordered. Decision-making details documented in ED Course.  Radiology: ordered. Decision-making details documented in ED Course.    Risk  OTC drugs.  Prescription drug management.               ED Course as of 12/08/23 0654   Thu Dec 07, 2023   2014 Sodium(!): 131 [OK]   2014 Hemoglobin(!): 7.8 [OK]   2014 Hemoglobin(!): 7.8  Down from 10.2 ten days ago [OK]   2129 CT Abdomen Pelvis With IV Contrast NO Oral Contrast  Per [facility]: 1. Diffuse wall thickening of the visualized portions of the distal esophagus and gastroesophageal  junction, may be due to infectious or inflammatory esophagitis.  2. Mild bilateral hydroureteronephrosis without ureterolithiasis, may be due to the distended urinary  bladder.  3. Chronic pancreatitis.  4. Small hiatal hernia.  5. Status post cholecystectomy.  6. Moderate constipation. [OK]   2145 Patient given 1 unit PRBCs.  Admitted to hospital medicine for acute blood loss anemia possibly secondary to GI bleed.  He remained hemodynamically stable throughout ED stay [OK]      ED Course User Index  [OK] Garcia Burden MD                           Clinical Impression:  Final diagnoses:  [I95.9] Hypotension  [E87.1] Hyponatremia  [D62] Acute blood loss anemia (Primary)  [R11.2] Nausea and vomiting, unspecified vomiting type          ED Disposition Condition    Observation                  Garcia Burden MD  12/08/23 0654

## 2023-12-08 NOTE — ASSESSMENT & PLAN NOTE
Keep patient NPO.  Patient is scheduled for EGD today.  Follow H/H closely.  Status post 1 unit of packed red blood cell transfusion.  Continue IV Protonix 40 mg twice daily.  Consult Gastronetrologist.   Check Iron, TIBC, B12 and folic acid.   Continue IVF hydration.   Use IV anti-emetics as needed.   Patient counseled regarding smoking cessation and abstinence of alcohol use.

## 2023-12-08 NOTE — PROVATION PATIENT INSTRUCTIONS
Discharge Summary/Instructions after an Endoscopic Procedure  Patient Name: Bradley Menasin  Patient MRN: [id number]  Patient YOB: 1980 Friday, December 8, 2023  Steve Matias MD  Dear patient,  As a result of recent federal legislation (The Federal Cures Act), you may   receive lab or pathology results from your procedure in your [facility]   account before your physician is able to contact you. Your physician or   their representative will relay the results to you with their   recommendations at their soonest availability.  Thank you,  RESTRICTIONS:  During your procedure today, you received medications for sedation.  These   medications may affect your judgment, balance and coordination.  Therefore,   for 24 hours, you have the following restrictions:   - DO NOT drive a car, operate machinery, make legal/financial decisions,   sign important papers or drink alcohol.    ACTIVITY:  Today: no heavy lifting, straining or running due to procedural   sedation/anesthesia.  The following day: return to full activity including work.  DIET:  Eat and drink normally unless instructed otherwise.     TREATMENT FOR COMMON SIDE EFFECTS:  - Mild abdominal pain, nausea, belching, bloating or excessive gas:  rest,   eat lightly and use a heating pad.  - Sore Throat: treat with throat lozenges and/or gargle with warm salt   water.  - Because air was used during the procedure, expelling large amounts of air   from your rectum or belching is normal.  - If a bowel prep was taken, you may not have a bowel movement for 1-3 days.    This is normal.  SYMPTOMS TO WATCH FOR AND REPORT TO YOUR PHYSICIAN:  1. Abdominal pain or bloating, other than gas cramps.  2. Chest pain.  3. Back pain.  4. Signs of infection such as: chills or fever occurring within 24 hours   after the procedure.  5. Rectal bleeding, which would show as bright red, maroon, or black stools.   (A tablespoon of blood from the rectum is not serious, especially if    hemorrhoids are present.)  6. Vomiting.  7. Weakness or dizziness.  GO DIRECTLY TO THE NEAREST EMERGENCY ROOM IF YOU HAVE ANY OF THE FOLLOWING:      Difficulty breathing              Chills and/or fever over 101 F   Persistent vomiting and/or vomiting blood   Severe abdominal pain   Severe chest pain   Black, tarry stools   Bleeding- more than one tablespoon   Any other symptom or condition that you feel may need urgent attention  Your doctor recommends these additional instructions:  If any biopsies were taken, your doctors clinic will contact you in 1 to 2   weeks with any results.  - Return patient to [facility] for [facility] care.   - Clear liquid diet.   - Continue present medications.   - No aspirin, ibuprofen, naproxen, or other non-steroidal anti-inflammatory   drugs.   - Return patient to hospital [facility] for ongoing care.   - Follow an antireflux regimen.   - Perform a colonoscopy at appointment to be scheduled.  For questions, problems or results please call your physician - Steve Matias MD at Work:  [phone].  [facility], EMERGENCY ROOM PHONE NUMBER: [phone]  IF A COMPLICATION OR EMERGENCY SITUATION ARISES AND YOU ARE UNABLE TO REACH   YOUR PHYSICIAN - GO DIRECTLY TO THE EMERGENCY ROOM.  Steve Matias MD  12/8/2023 2:59:32 PM  This report has been verified and signed electronically.  Dear patient,  As a result of recent federal legislation (The Federal Cures Act), you may   receive lab or pathology results from your procedure in your [facility]   account before your physician is able to contact you. Your physician or   their representative will relay the results to you with their   recommendations at their soonest availability.  Thank you,  PROVATION

## 2023-12-08 NOTE — NURSING
Nurses Note -- 4 Eyes      12/8/2023   1:28 AM      Skin assessed during: Admit      [x] No Altered Skin Integrity Present    []Prevention Measures Documented      [] Yes- Altered Skin Integrity Present or Discovered   [] LDA Added if Not in Epic (Describe Wound)   [] New Altered Skin Integrity was Present on Admit and Documented in LDA   [] Wound Image Taken    Wound Care Consulted? No    Attending Nurse:  Torri Anguiano RN/Staff Member:   Prudence Dumont

## 2023-12-08 NOTE — PLAN OF CARE
Plan of care reviewed with patient. Patient verbalized complete understanding. Two hour patient rounding maintained throughout shift. All fall precautions maintained. Bed in lowest position, locked, call light within reach. Side rails up x 2. Slip resistant socks maintained. Needs attended to.   Problem: Adjustment to Illness (Gastrointestinal Bleeding)  Goal: Optimal Coping with Acute Illness  Outcome: Ongoing, Progressing     Problem: Bleeding (Gastrointestinal Bleeding)  Goal: Hemostasis  Outcome: Ongoing, Progressing     Problem: Adult Inpatient Plan of Care  Goal: Plan of Care Review  Outcome: Ongoing, Progressing  Goal: Patient-Specific Goal (Individualized)  Outcome: Ongoing, Progressing  Goal: Absence of Hospital-Acquired Illness or Injury  Outcome: Ongoing, Progressing  Goal: Optimal Comfort and Wellbeing  Outcome: Ongoing, Progressing  Goal: Readiness for Transition of Care  Outcome: Ongoing, Progressing     Problem: Diabetes Comorbidity  Goal: Blood Glucose Level Within Targeted Range  Outcome: Ongoing, Progressing     Problem: Fluid and Electrolyte Imbalance (Acute Kidney Injury/Impairment)  Goal: Fluid and Electrolyte Balance  Outcome: Ongoing, Progressing     Problem: Oral Intake Inadequate (Acute Kidney Injury/Impairment)  Goal: Optimal Nutrition Intake  Outcome: Ongoing, Progressing     Problem: Renal Function Impairment (Acute Kidney Injury/Impairment)  Goal: Effective Renal Function  Outcome: Ongoing, Progressing     Problem: Anemia  Goal: Anemia Symptom Improvement  Outcome: Ongoing, Progressing

## 2023-12-08 NOTE — SUBJECTIVE & OBJECTIVE
Interval History:  Patient admitted with upper GI bleeding.  Patient is scheduled to undergo esophagogastroduodenoscopy.  Patient denies any ongoing epigastric abdominal pain, nausea and vomiting.  Patient received 1 unit of packed red blood cell overnight.  No melena or bleeding per rectum reported.  Denies any chest pain or shortness of breath.  Patient appears somewhat sedated.    Review of Systems   Constitutional:  Negative for activity change, chills, diaphoresis and fever.   HENT:  Negative for congestion, nosebleeds and tinnitus.    Eyes:  Negative for photophobia and visual disturbance.   Respiratory:  Negative for cough, chest tightness, shortness of breath and wheezing.    Cardiovascular:  Negative for chest pain, palpitations and leg swelling.   Gastrointestinal:  Positive for abdominal pain, blood in stool, nausea and vomiting. Negative for abdominal distention, constipation and diarrhea.   Endocrine: Negative for cold intolerance and heat intolerance.   Genitourinary:  Negative for difficulty urinating, dysuria, frequency, hematuria and urgency.   Musculoskeletal:  Negative for arthralgias, back pain and myalgias.   Skin:  Negative for pallor, rash and wound.   Allergic/Immunologic: Negative for immunocompromised state.   Neurological:  Negative for dizziness, tremors, facial asymmetry, speech difficulty and weakness.   Hematological:  Negative for adenopathy. Does not bruise/bleed easily.   Psychiatric/Behavioral:  Negative for confusion and sleep disturbance. The patient is not nervous/anxious.      Objective:     Vital Signs (Most Recent):  Temp: 97.9 °F (36.6 °C) (12/08/23 0739)  Pulse: 77 (12/08/23 0739)  Resp: 17 (12/08/23 0739)  BP: 121/71 (12/08/23 0739)  SpO2: 99 % (12/08/23 0739) Vital Signs (24h Range):  Temp:  [97.5 °F (36.4 °C)-98 °F (36.7 °C)] 97.9 °F (36.6 °C)  Pulse:  [] 77  Resp:  [14-18] 17  SpO2:  [98 %-100 %] 99 %  BP: ()/(54-73) 121/71     Weight: 79.7 kg (175 lb 11.3  oz)  Body mass index is 27.52 kg/m².    Intake/Output Summary (Last 24 hours) at 12/8/2023 0951  Last data filed at 12/8/2023 0147  Gross per 24 hour   Intake 440.45 ml   Output --   Net 440.45 ml         Physical Exam  Vitals and nursing note reviewed.   Constitutional:       General: He is not in acute distress.     Appearance: He is well-developed. He is ill-appearing. He is not diaphoretic.   HENT:      Head: Normocephalic.      Mouth/Throat:      Mouth: Mucous membranes are dry.   Eyes:      General: No scleral icterus.     Conjunctiva/sclera: Conjunctivae normal.      Pupils: Pupils are equal, round, and reactive to light.   Neck:      Vascular: No JVD.   Cardiovascular:      Rate and Rhythm: Regular rhythm. Tachycardia present.      Heart sounds: Normal heart sounds. No murmur heard.     No friction rub. No gallop.   Pulmonary:      Effort: Pulmonary effort is normal. No respiratory distress.      Breath sounds: Normal breath sounds. No wheezing or rales.   Abdominal:      General: Bowel sounds are normal. There is no distension.      Palpations: Abdomen is soft.      Tenderness: There is no abdominal tenderness. There is no guarding or rebound.   Musculoskeletal:         General: No tenderness. Normal range of motion.      Cervical back: Normal range of motion and neck supple.   Lymphadenopathy:      Cervical: No cervical adenopathy.   Skin:     General: Skin is warm and dry.      Capillary Refill: Capillary refill takes less than 2 seconds.      Coloration: Skin is not pale.      Findings: No erythema or rash.   Neurological:      Mental Status: He is alert and oriented to person, place, and time.      Cranial Nerves: No cranial nerve deficit.      Sensory: No sensory deficit.      Coordination: Coordination normal.      Deep Tendon Reflexes: Reflexes normal.   Psychiatric:         Behavior: Behavior normal.         Thought Content: Thought content normal.         Judgment: Judgment normal.          "    Significant Labs: All pertinent labs within the past 24 hours have been reviewed.  CBC:   Recent Labs   Lab 12/07/23 1930 12/08/23  0428 12/08/23  0750   WBC 6.48 5.41 5.38   HGB 7.8* 8.0* 8.8*   HCT 25.1* 26.0* 28.2*    303 301     CMP:   Recent Labs   Lab 12/07/23 1930   *   K 3.9   CL 94*   CO2 23   *   BUN 18   CREATININE 1.4   CALCIUM 9.1   PROT 6.8   ALBUMIN 3.9   BILITOT 0.2   ALKPHOS 63   AST 19   ALT 9*   ANIONGAP 14     Coagulation:   Recent Labs   Lab 12/07/23 1930   INR 1.2     Lactic Acid: No results for input(s): "LACTATE" in the last 48 hours.  Lipase:   Recent Labs   Lab 12/07/23 1930   LIPASE 3*     Urine Studies: No results for input(s): "COLORU", "APPEARANCEUA", "PHUR", "SPECGRAV", "PROTEINUA", "GLUCUA", "KETONESU", "BILIRUBINUA", "OCCULTUA", "NITRITE", "UROBILINOGEN", "LEUKOCYTESUR", "RBCUA", "WBCUA", "BACTERIA", "SQUAMEPITHEL", "HYALINECASTS" in the last 48 hours.    Invalid input(s): "WRIGHTSUR"    Significant Imaging:   CT abdomen and pelvis with IV contrast:   1. Partially imaged distal esophageal wall thickening.  Esophagitis or reflux are two considerations.  2. Sequela of chronic pancreatitis.  3. Query colonic constipation.  "

## 2023-12-08 NOTE — TRANSFER OF CARE
"Anesthesia Transfer of Care Note    Patient: Bradley Collado    Procedure(s) Performed: Procedure(s) (LRB):  EGD (ESOPHAGOGASTRODUODENOSCOPY) (N/A)    Patient location: GI    Anesthesia Type: general    Transport from OR: Transported from OR on room air with adequate spontaneous ventilation    Post pain: adequate analgesia    Post assessment: no apparent anesthetic complications    Post vital signs: stable    Level of consciousness: responds to stimulation    Nausea/Vomiting: no nausea/vomiting    Complications: none    Transfer of care protocol was followed      Last vitals: Visit Vitals  /70 (BP Location: Left arm, Patient Position: Lying)   Pulse 90   Temp 36.8 °C (98.2 °F) (Oral)   Resp 17   Ht 5' 7" (1.702 m)   Wt 79.7 kg (175 lb 11.3 oz)   SpO2 99%   BMI 27.52 kg/m²     "

## 2023-12-08 NOTE — PROGRESS NOTES
EGD done.  No evidence of bleeding whatsoever.  Continue PPI.  Resume clear liquid diet.  Dr. Stinson on service this weekend.  Needs colonoscopy for MAY at some point soon.  If still in hospital on Monday, then this can be done as inpatient.

## 2023-12-08 NOTE — HOSPITAL COURSE
Patient admitted to Hospital Medicine on telemetry floor. Patient was evaluated by GI specialist.   Hemoglobin and hematocrit closely monitored.  Patient received 1 unit of packed red blood cells for symptomatic anemia.  During hospital stay patient was counseled regarding dangers of tobacco cigarette smoking and avoidance of use of NSAIDs.  Patient underwent EGD which did not show evidence of bleeding.  He was continued on PPI per GI recommendation.  Patient tolerated regular diet.  Patient did not wish to stay in the hospital until Monday for colonoscopy.  He was discharged home and understands to follow-up with GI as outpatient.  Patient opted for outpatient colonoscopy.

## 2023-12-09 VITALS
HEART RATE: 75 BPM | BODY MASS INDEX: 27.57 KG/M2 | OXYGEN SATURATION: 100 % | RESPIRATION RATE: 18 BRPM | TEMPERATURE: 99 F | SYSTOLIC BLOOD PRESSURE: 118 MMHG | WEIGHT: 175.69 LBS | DIASTOLIC BLOOD PRESSURE: 83 MMHG | HEIGHT: 67 IN

## 2023-12-09 LAB
AMPHET+METHAMPHET UR QL: NEGATIVE
BARBITURATES UR QL SCN>200 NG/ML: NEGATIVE
BASOPHILS # BLD AUTO: 0.03 K/UL (ref 0–0.2)
BASOPHILS NFR BLD: 0.6 % (ref 0–1.9)
BENZODIAZ UR QL SCN>200 NG/ML: NEGATIVE
BZE UR QL SCN: NEGATIVE
CANNABINOIDS UR QL SCN: ABNORMAL
CREAT UR-MCNC: 31.4 MG/DL (ref 23–375)
DIFFERENTIAL METHOD: ABNORMAL
EOSINOPHIL # BLD AUTO: 0.2 K/UL (ref 0–0.5)
EOSINOPHIL NFR BLD: 4.6 % (ref 0–8)
ERYTHROCYTE [DISTWIDTH] IN BLOOD BY AUTOMATED COUNT: 17.2 % (ref 11.5–14.5)
HCT VFR BLD AUTO: 28.5 % (ref 40–54)
HGB BLD-MCNC: 8.8 G/DL (ref 14–18)
IMM GRANULOCYTES # BLD AUTO: 0.03 K/UL (ref 0–0.04)
IMM GRANULOCYTES NFR BLD AUTO: 0.6 % (ref 0–0.5)
LYMPHOCYTES # BLD AUTO: 1.8 K/UL (ref 1–4.8)
LYMPHOCYTES NFR BLD: 35.3 % (ref 18–48)
MCH RBC QN AUTO: 24 PG (ref 27–31)
MCHC RBC AUTO-ENTMCNC: 30.9 G/DL (ref 32–36)
MCV RBC AUTO: 78 FL (ref 82–98)
METHADONE UR QL SCN>300 NG/ML: NEGATIVE
MONOCYTES # BLD AUTO: 0.7 K/UL (ref 0.3–1)
MONOCYTES NFR BLD: 13.3 % (ref 4–15)
NEUTROPHILS # BLD AUTO: 2.3 K/UL (ref 1.8–7.7)
NEUTROPHILS NFR BLD: 45.6 % (ref 38–73)
NRBC BLD-RTO: 0 /100 WBC
OPIATES UR QL SCN: NEGATIVE
PCP UR QL SCN>25 NG/ML: NEGATIVE
PLATELET # BLD AUTO: 316 K/UL (ref 150–450)
PMV BLD AUTO: 10.6 FL (ref 9.2–12.9)
RBC # BLD AUTO: 3.66 M/UL (ref 4.6–6.2)
TOXICOLOGY INFORMATION: ABNORMAL
WBC # BLD AUTO: 5.02 K/UL (ref 3.9–12.7)

## 2023-12-09 PROCEDURE — 25000003 PHARM REV CODE 250: Performed by: NURSE PRACTITIONER

## 2023-12-09 PROCEDURE — 85025 COMPLETE CBC W/AUTO DIFF WBC: CPT | Performed by: NURSE PRACTITIONER

## 2023-12-09 PROCEDURE — G0378 HOSPITAL OBSERVATION PER HR: HCPCS

## 2023-12-09 PROCEDURE — 36415 COLL VENOUS BLD VENIPUNCTURE: CPT | Performed by: NURSE PRACTITIONER

## 2023-12-09 PROCEDURE — 25000003 PHARM REV CODE 250: Performed by: HOSPITALIST

## 2023-12-09 PROCEDURE — 80307 DRUG TEST PRSMV CHEM ANLYZR: CPT | Performed by: NURSE PRACTITIONER

## 2023-12-09 RX ORDER — PANTOPRAZOLE SODIUM 40 MG/1
40 TABLET, DELAYED RELEASE ORAL DAILY
Status: DISCONTINUED | OUTPATIENT
Start: 2023-12-09 | End: 2023-12-09

## 2023-12-09 RX ORDER — PANTOPRAZOLE SODIUM 40 MG/1
40 TABLET, DELAYED RELEASE ORAL 2 TIMES DAILY
Qty: 60 TABLET | Refills: 1 | Status: SHIPPED | OUTPATIENT
Start: 2023-12-09 | End: 2024-12-08

## 2023-12-09 RX ORDER — PANTOPRAZOLE SODIUM 40 MG/1
40 TABLET, DELAYED RELEASE ORAL 2 TIMES DAILY
Status: DISCONTINUED | OUTPATIENT
Start: 2023-12-09 | End: 2023-12-09 | Stop reason: HOSPADM

## 2023-12-09 RX ADMIN — DIVALPROEX SODIUM 500 MG: 250 TABLET, EXTENDED RELEASE ORAL at 09:12

## 2023-12-09 RX ADMIN — PANTOPRAZOLE SODIUM 40 MG: 40 TABLET, DELAYED RELEASE ORAL at 09:12

## 2023-12-09 RX ADMIN — DULOXETINE HYDROCHLORIDE 30 MG: 30 CAPSULE, DELAYED RELEASE ORAL at 09:12

## 2023-12-09 NOTE — NURSING
Pt requesting sandwich. Instructed he's on clear liquid diet per GI recommendations. Pt states he wants to leave AMA if he can not get food. Spoke with Sonny JAMES NP on pt requests. States diet will remain as ordered by GI and follow with GI in am, but if he still wants to AMA he will come talk with him. I updated pt on NP response regarding diet and GI recommendation. Pt more agreeable to stay. He states he will stay overnight and speak with his doctor in am. Pt requesting jello and juice. Given per request. Pt currently resting in bed. Will update Nadir Moralez RN.

## 2023-12-09 NOTE — DISCHARGE SUMMARY
[facility] Medicine  Discharge Summary      Patient Name: Bradley Collado  MRN: [id number]  SMITHA: [id number]  Patient Class: OP- Observation  Admission Date: 12/7/2023  Hospital Length of Stay: 0 days  Discharge Date and Time: 12/9/2023 10:00 AM  Attending Physician: No att. providers found   Discharging Provider: Lindsay Colindres MD  Primary Care Provider: Cassius Oneill MD    Primary Care Team: Networked reference to record PCT     HPI:   Bradley Collado is a 43 year male who presents emergency room complaining of nausea, vomiting, and upper abdominal pain.  The symptoms onset yesterday progressively worsened.  He describes vomit as dark red with some bright red streaking.  He also endorses some dark stools but does not describe them as black.  Abdominal pain as similar to previous episodes of acute on chronic pancreatitis.  He denies any fever chills.  No known sick contacts or travel.  No aggravating or alleviating factors.  He denies any recent alcohol use.  He denies any drug use other than marijuana.  Patient had recent admission with EGD which demonstrated gastritis.  Previous medical history includes acute on chronic pancreatitis, hypertension, EtOH abuse, hypothyroidism, active smoker, GERD, and seizure disorder.  ER workup:  CBC with anemia of 7.8/25 (10/33.210 days ago).  Glucose elevated at 191.  Lipase was unremarkable.  CT of the abdomen and pelvis demonstrates chronic pancreatitis and diffuse wall thickening of the visualized portions of the distal esophagus and gastroesophageal junction, may be due to infectious or inflammatory esophagitis.  Patient admitted to Hospital Medicine for treatment management.  Will consult GI in a.m..  Patient started on GI bleed pathway.  Will transfuse patient 1 unit packed red blood cells.    Procedure(s) (LRB):  EGD (ESOPHAGOGASTRODUODENOSCOPY) (N/A)      Hospital Course:   Patient admitted to Hospital Medicine on telemetry floor.  Patient was evaluated by GI specialist.   Hemoglobin and hematocrit closely monitored.  Patient received 1 unit of packed red blood cells for symptomatic anemia.  During hospital stay patient was counseled regarding dangers of tobacco cigarette smoking and avoidance of use of NSAIDs.  Patient underwent EGD which did not show evidence of bleeding.  He was continued on PPI per GI recommendation.  Patient tolerated regular diet.  Patient did not wish to stay in the hospital until Monday for colonoscopy.  He was discharged home and understands to follow-up with GI as outpatient.  Patient opted for outpatient colonoscopy.       Goals of Care Treatment Preferences:  Code Status: Full Code      Consults:   Consults (From admission, onward)          Status Ordering Provider     Inpatient consult to Gastroenterology  Once        Provider:  Steve Anderson MD    Completed SATYA AVILA            No new Assessment & Plan notes have been filed under this hospital service since the last note was generated.  Service: Hospital Medicine    Final Active Diagnoses:    Diagnosis Date Noted POA    PRINCIPAL PROBLEM:  GI bleed [K92.2] 11/22/2023 Yes    Acute blood loss anemia [D62] 12/07/2023 Yes    Diabetes [E11.9] 12/07/2023 Yes    Seizure disorder [G40.909] 11/23/2023 Yes    Hypertension [I10] 04/05/2016 Yes     Chronic    Tobacco use [Z72.0] 04/05/2016 Yes      Problems Resolved During this Admission:       Discharged Condition: good    Disposition: Home or Self Care    Follow Up:   Follow-up Information       Cassius Oneill MD. Go on 12/13/2023.    Specialty: Family Medicine  Why: hospital follow up at 10:30  Contact information:  [address] [place] [address]  [phone]               Steve Anderson MD Follow up.    Specialty: Gastroenterology  Why: Need to get colonoscopy ASAP  Contact information:  [address]  [address]  [facility] [address]  [phone]                           Patient  Instructions:      Notify your health care provider if you experience any of the following:  temperature >100.4     Notify your health care provider if you experience any of the following:  persistent nausea and vomiting or diarrhea     Notify your health care provider if you experience any of the following:  severe uncontrolled pain     Notify your health care provider if you experience any of the following:  difficulty breathing or increased cough     Notify your health care provider if you experience any of the following:  severe persistent headache     Notify your health care provider if you experience any of the following:  increased confusion or weakness     Notify your health care provider if you experience any of the following:  persistent dizziness, light-headedness, or visual disturbances     Activity as tolerated       Significant Diagnostic Studies: Labs: BMP:   Recent Labs   Lab 12/07/23  1930   *   *   K 3.9   CL 94*   CO2 23   BUN 18   CREATININE 1.4   CALCIUM 9.1    and CBC   Recent Labs   Lab 12/08/23  1319 12/08/23  1819 12/09/23  0431   WBC 5.29 5.32 5.02   HGB 9.2* 9.5* 8.8*   HCT 29.0* 29.9* 28.5*    309 316     Radiology Results (last 7 days)    Procedure Component Value Units Date/Time   CT Abdomen Pelvis With IV Contrast NO Oral Contrast [[id number]] Resulted: 12/08/23 0810   Order Status: Completed Updated: 12/08/23 0812   Narrative:     EXAMINATION:  CT ABDOMEN PELVIS WITH IV CONTRAST    CLINICAL HISTORY:  Nausea/vomiting;Epigastric pain;Hx. pancreatitis and pseudocyst;    TECHNIQUE:  Low dose axial images, sagittal and coronal reformations were obtained from the lung bases to the pubic symphysis following the IV administration of 75 mL of Omnipaque 350 .  Oral contrast was not given.    COMPARISON:  04/04/2020    FINDINGS:  Mild dependent atelectasis in the lower lobes.  Normal size heart.  Diffuse wall thickening distal esophagus.  Cholecystectomy.    There is mild and  symmetric bilateral hydroureter which is presumably physiologic.  Pancreas is atrophied with diffuse dystrophic calcifications and borderline pancreas ductal dilation at 3 mm diameter.  Remaining solid abdominal organs are unremarkable.    There is no enteric contrast which limits bowel assessment.  No dilated bowel loops.  Normal appendix.  Moderate degree of stool in the colon.    Distended urinary bladder.  Unremarkable prostate.  Small fat containing umbilical defect.    No acute osseous abnormality.   Impression:       1. Partially imaged distal esophageal wall thickening.  Esophagitis or reflux are two considerations.  2. Sequela of chronic pancreatitis.  3. Query colonic constipation.      Electronically signed by: Manuel Olson  Date: 12/08/2023  Time: 08:10     Pending Diagnostic Studies:       None         Upper GI endoscopy  Order: [id number]  Status: Final result       Visible to patient: No (inaccessible in Patient Portal)       Next appt: None    0 Result Notes        Narrative  Performed by: [facility]  Patient Name: Bradley Collado  Procedure Date: 12/8/2023 2:32 PM  MRN: [id number]  Account Number: [id number]  YOB: 1980  Admit Type: Outpatient  Age: 43  Room: endo room 1  Gender: Male  Attending MD: Steve Matias MD, [id number]  Instrument Name: GIF- ([id number])  Procedure:             Upper GI endoscopy  Indications:           Iron deficiency anemia, Hematemesis  Providers:             Steve Matias MD, Irineo Dash, RN,                         Cathleen Castro, Technician (Technician)  Referring MD:          Steve Matias MD (Referring MD)  Complications:         No immediate complications.  Medicines:             Propofol per Anesthesia  Procedure:             Pre-Anesthesia Assessment:                         - Prior to the procedure, a History and Physical                         was performed, and patient medications and                         allergies were  reviewed. The patient's tolerance                         of previous anesthesia was also reviewed. The                         risks and benefits of the procedure and the                         sedation options and risks were discussed with the                         patient. All questions were answered, and informed                         consent was obtained. Prior Anticoagulants: The                         patient has taken no anticoagulant or antiplatelet                         agents. ASA Grade Assessment: III - A patient with                         severe systemic disease. After reviewing the risks                         and benefits, the patient was deemed in                         satisfactory condition to undergo the procedure.                         After obtaining informed consent, the endoscope                         was passed under direct vision. Throughout the                         procedure, the patient's blood pressure, pulse,                         and oxygen saturations were monitored                         continuously. The Olympus scope GIF-                         ([id number]) was introduced through the mouth, and                         advanced to the second part of duodenum. The upper                         GI endoscopy was accomplished with ease. The                         patient tolerated the procedure well.  Findings:       The upper third of the esophagus was normal.       Moderately severe esophagitis with no bleeding was found in the       lower third of the esophagus.       A medium-sized hiatal hernia was present.       Patchy moderate inflammation characterized by congestion (edema),       erosions and erythema was found in the gastric antrum.       The examined duodenum was normal.  Impression:            - Normal upper third of esophagus.                         - Moderately severe reflux esophagitis with no                         bleeding.                          - Medium-sized hiatal hernia.                         - Gastritis.                         - Normal examined duodenum.                         - No specimens collected.  Recommendation:        - [facility].                         - Clear liquid diet.                         - Continue present medications.                         - No aspirin, ibuprofen, naproxen, or other                         non-steroidal anti-inflammatory drugs.                         - Return patient to [facility] for ongoing care.                         - Follow an antireflux regimen.                         - Perform a colonoscopy at appointment to be                         scheduled.  Procedure Code(s):       --- Professional ---       81875, Esophagogastroduodenoscopy, flexible, transoral; diagnostic,       including collection of specimen(s) by brushing or washing, when       performed (separate procedure)  Diagnosis Code(s):       --- Professional ---       K21.00, Gastro-esophageal reflux disease with esophagitis, without       bleeding       K44.9, Diaphragmatic hernia without obstruction or gangrene       K29.70, Gastritis, unspecified, without bleeding       D50.9, Iron deficiency anemia, unspecified       K92.0, Hematemesis  CPT copyright 2021 [employer]. All rights reserved.  The codes documented in this report are preliminary and upon  review  may be revised to meet current compliance requirements.  Steve Matias MD  12/8/2023 2:59:32 PM  This report has been verified and signed electronically.  Dear patient,  As a result of recent federal legislation (The Federal Cures Act), you may  receive lab or pathology results from your procedure in your [facility]  account before your physician is able to contact you. Your physician or  their representative will relay the results to you with their  recommendations at their soonest availability.  Thank you,  Number of  Addenda: 0  Note Initiated On: 12/8/2023 2:32 PM  Estimated Blood Loss:  Estimated blood loss: none.       This report has been verified and signed electronically.      Specimen Collected: 12/08/23 14:32 CST Last Resulted: 12/08/23 14:59 CST           Medications:  Reconciled Home Medications:      Medication List        CHANGE how you take these medications      LANTUS SOLOSTAR U-100 INSULIN glargine 100 units/mL SubQ pen  Generic drug: insulin  ADMINISTER 30 UNITS UNDER THE SKIN EVERY EVENING  What changed: See the new instructions.     pantoprazole 40 MG tablet  Commonly known as: PROTONIX  Take 1 tablet (40 mg total) by mouth 2 (two) times daily.  What changed: when to take this            CONTINUE taking these medications      amLODIPine 10 MG tablet  Commonly known as: NORVASC  Take 10 mg by mouth once daily.     busPIRone 30 MG Tab  Commonly known as: BUSPAR  Take 30 mg by mouth 2 (two) times daily.     CAMPRAL ORAL  Take 666 mg by mouth 2 (two) times a day.     clonazePAM 2 MG Tab  Commonly known as: KlonoPIN  Take 2 mg by mouth 2 (two) times daily as needed.     cloNIDine 0.1 MG tablet  Commonly known as: CATAPRES  Take 2 tablets (0.2 mg total) by mouth 3 (three) times daily.     DEPAKOTE  MG Tb24  Generic drug: divalproex ER  Take 500 mg by mouth 3 (three) times daily.     DULoxetine 30 MG capsule  Commonly known as: CYMBALTA  Take 30 mg by mouth once daily.     fluticasone propionate 50 mcg/actuation nasal spray  Commonly known as: FLONASE  1 spray by Each Nostril route once daily.     FOLIC ACID ORAL  Take 2 mg by mouth once daily.     gabapentin 400 MG capsule  Commonly known as: NEURONTIN  Take 2 capsules (800 mg total) by mouth 3 (three) times daily.     lancets 33 gauge Misc  1 Package.     lisinopriL 10 MG tablet  Take 10 mg by mouth 2 (two) times daily.     loratadine 10 mg tablet  Commonly known as: CLARITIN  Take 10 mg by mouth once daily.     metFORMIN 500 MG tablet  Commonly known as:  "GLUCOPHAGE  Take 1,000 mg by mouth 2 (two) times daily with meals.     mirtazapine 30 MG tablet  Commonly known as: REMERON  Take 1 tablet (30 mg total) by mouth every evening.     OLANZapine 20 MG tablet  Commonly known as: ZyPREXA  Take 20 mg by mouth every evening.     pen needle, diabetic 32 gauge x 5/32" Ndle  1 Box.     prazosin 2 MG Cap  Commonly known as: MINIPRESS  Take 2 mg by mouth every evening.     QUEtiapine 300 MG Tab  Commonly known as: SEROQUEL  Take 600 mg by mouth every evening.     thiamine 100 MG tablet  Take 100 mg by mouth once daily.     traZODone 100 MG tablet  Commonly known as: DESYREL  Take 100 mg by mouth every evening.     TRULICITY 4.5 mg/0.5 mL pen injector  Generic drug: dulaglutide  Inject 4.5 mg into the skin every 7 days. TUESDAYS     VivitroL 380 mg Ssrr kit  Generic drug: naltrexone microspheres  Inject 380 mg into the muscle every 30 days.            STOP taking these medications      famotidine 20 MG tablet  Commonly known as: PEPCID     omeprazole 40 MG capsule  Commonly known as: PRILOSEC            ASK your doctor about these medications      HumaLOG KwikPen Insulin 100 unit/mL pen  Generic drug: insulin lispro  ADMINISTER 5 UNITS UNDER THE SKIN THREE TIMES DAILY              Indwelling Lines/Drains at time of discharge:   Lines/Drains/Airways       None                   Time spent on the discharge of patient: 35 minutes         Lindsay Colindres MD  Department of Hospital Medicine  [facility]  "

## 2023-12-09 NOTE — PLAN OF CARE
Pt is clear for dc from [facility] after seen by hospital medicine and if tolerates diet         12/09/23 0857   Final Note   Assessment Type Final Discharge Note   Anticipated Discharge Disposition Home   Hospital Resources/Appts/Education Provided Appointments scheduled and added to AVS

## 2023-12-09 NOTE — NURSING
Bed alarm is triggered. Seen patient with IV pulled out trying to get out of bed. Assisted on toilet.  Refuses re insertion of Iv access. Informed practitioner on duty.

## 2023-12-11 LAB
BLD PROD TYP BPU: NORMAL
BLOOD UNIT EXPIRATION DATE: NORMAL
BLOOD UNIT TYPE CODE: 5100
BLOOD UNIT TYPE CODE: 5100
BLOOD UNIT TYPE CODE: 9500
BLOOD UNIT TYPE: NORMAL
CODING SYSTEM: NORMAL
CROSSMATCH INTERPRETATION: NORMAL
DISPENSE STATUS: NORMAL
NUM UNITS TRANS PACKED RBC: NORMAL

## 2023-12-15 DIAGNOSIS — D50.9 IRON DEFICIENCY ANEMIA, UNSPECIFIED IRON DEFICIENCY ANEMIA TYPE: Primary | ICD-10-CM

## 2024-01-01 NOTE — ASSESSMENT & PLAN NOTE
Chronic problem   Seizure precautions   Continue Depakote        Allergies:-  No Known Allergies

## 2024-02-26 PROBLEM — N17.9 AKI (ACUTE KIDNEY INJURY): Status: RESOLVED | Noted: 2018-05-14 | Resolved: 2024-02-26

## 2024-03-11 PROBLEM — K92.2 GI BLEED: Status: RESOLVED | Noted: 2023-11-22 | Resolved: 2024-03-11

## 2024-06-10 NOTE — PLAN OF CARE
Problem: Patient Care Overview  Goal: Plan of Care Review  Outcome: Ongoing (interventions implemented as appropriate)  Pt on room air with 100% sats         Detail Level: Zone Plan: recommended apply sunscreen daily SPF 30 and up

## 2024-12-27 ENCOUNTER — HOSPITAL ENCOUNTER (EMERGENCY)
Facility: HOSPITAL | Age: 44
Discharge: HOME | End: 2024-12-27
Attending: STUDENT IN AN ORGANIZED HEALTH CARE EDUCATION/TRAINING PROGRAM
Payer: MEDICAID

## 2024-12-27 VITALS
DIASTOLIC BLOOD PRESSURE: 97 MMHG | TEMPERATURE: 98 F | WEIGHT: 168 LBS | BODY MASS INDEX: 26.37 KG/M2 | RESPIRATION RATE: 18 BRPM | SYSTOLIC BLOOD PRESSURE: 138 MMHG | OXYGEN SATURATION: 100 % | HEART RATE: 87 BPM | HEIGHT: 67 IN

## 2024-12-27 DIAGNOSIS — S52.502A CLOSED FRACTURE OF DISTAL END OF LEFT RADIUS, UNSPECIFIED FRACTURE MORPHOLOGY, INITIAL ENCOUNTER: Primary | ICD-10-CM

## 2024-12-27 DIAGNOSIS — S69.90XA HAND INJURY: ICD-10-CM

## 2024-12-27 PROCEDURE — 99284 EMERGENCY DEPT VISIT MOD MDM: CPT | Mod: 25

## 2024-12-27 PROCEDURE — 29125 APPL SHORT ARM SPLINT STATIC: CPT | Mod: LT

## 2024-12-27 PROCEDURE — 25000003 PHARM REV CODE 250

## 2024-12-27 RX ORDER — IBUPROFEN 200 MG
600 TABLET ORAL
Status: COMPLETED | OUTPATIENT
Start: 2024-12-27 | End: 2024-12-27

## 2024-12-27 RX ADMIN — IBUPROFEN 600 MG: 200 TABLET, FILM COATED ORAL at 03:12

## 2024-12-27 NOTE — DISCHARGE INSTRUCTIONS
Please follow up with orthopedics regarding your visit here today. I have also attached their phone number. You may rotate between ibuprofen and tylenol for pain.
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
## 2025-02-18 ENCOUNTER — HOSPITAL ENCOUNTER (EMERGENCY)
Facility: HOSPITAL | Age: 45
Discharge: HOME | End: 2025-02-18
Attending: EMERGENCY MEDICINE
Payer: MEDICAID

## 2025-02-18 VITALS
WEIGHT: 160 LBS | HEIGHT: 67 IN | BODY MASS INDEX: 25.11 KG/M2 | OXYGEN SATURATION: 98 % | TEMPERATURE: 98 F | HEART RATE: 101 BPM | DIASTOLIC BLOOD PRESSURE: 81 MMHG | SYSTOLIC BLOOD PRESSURE: 115 MMHG | RESPIRATION RATE: 18 BRPM

## 2025-02-18 VITALS
SYSTOLIC BLOOD PRESSURE: 122 MMHG | OXYGEN SATURATION: 100 % | HEIGHT: 67 IN | TEMPERATURE: 98 F | DIASTOLIC BLOOD PRESSURE: 88 MMHG | BODY MASS INDEX: 25.11 KG/M2 | WEIGHT: 160 LBS | RESPIRATION RATE: 17 BRPM | HEART RATE: 90 BPM

## 2025-02-18 DIAGNOSIS — R33.8 ACUTE URINARY RETENTION: Primary | ICD-10-CM

## 2025-02-18 DIAGNOSIS — T83.9XXA PROBLEM WITH FOLEY CATHETER, INITIAL ENCOUNTER: Primary | ICD-10-CM

## 2025-02-18 DIAGNOSIS — R06.6 CHRONIC HICCUPS: ICD-10-CM

## 2025-02-18 LAB
ALBUMIN SERPL BCP-MCNC: 5 G/DL (ref 3.5–5.2)
ALP SERPL-CCNC: 77 U/L (ref 55–135)
ALT SERPL W/O P-5'-P-CCNC: 8 U/L (ref 10–44)
ANION GAP SERPL CALC-SCNC: 10 MMOL/L (ref 8–16)
AST SERPL-CCNC: 11 U/L (ref 10–40)
BACTERIA #/AREA URNS HPF: NORMAL /HPF
BASOPHILS # BLD AUTO: 0.03 K/UL (ref 0–0.2)
BASOPHILS NFR BLD: 0.4 % (ref 0–1.9)
BILIRUB SERPL-MCNC: 0.4 MG/DL (ref 0.1–1)
BILIRUB UR QL STRIP: NEGATIVE
BUN SERPL-MCNC: 12 MG/DL (ref 6–20)
CALCIUM SERPL-MCNC: 10.4 MG/DL (ref 8.7–10.5)
CHLORIDE SERPL-SCNC: 96 MMOL/L (ref 95–110)
CLARITY UR: CLEAR
CO2 SERPL-SCNC: 29 MMOL/L (ref 23–29)
COLOR UR: YELLOW
CREAT SERPL-MCNC: 1.2 MG/DL (ref 0.5–1.4)
DIFFERENTIAL METHOD BLD: ABNORMAL
EOSINOPHIL # BLD AUTO: 0.2 K/UL (ref 0–0.5)
EOSINOPHIL NFR BLD: 2.7 % (ref 0–8)
ERYTHROCYTE [DISTWIDTH] IN BLOOD BY AUTOMATED COUNT: 16.5 % (ref 11.5–14.5)
EST. GFR  (NO RACE VARIABLE): >60 ML/MIN/1.73 M^2
GLUCOSE SERPL-MCNC: 88 MG/DL (ref 70–110)
GLUCOSE UR QL STRIP: ABNORMAL
HCT VFR BLD AUTO: 42.5 % (ref 40–54)
HGB BLD-MCNC: 13.4 G/DL (ref 14–18)
HGB UR QL STRIP: NEGATIVE
IMM GRANULOCYTES # BLD AUTO: 0.03 K/UL (ref 0–0.04)
IMM GRANULOCYTES NFR BLD AUTO: 0.4 % (ref 0–0.5)
KETONES UR QL STRIP: NEGATIVE
LEUKOCYTE ESTERASE UR QL STRIP: NEGATIVE
LIPASE SERPL-CCNC: 14 U/L (ref 4–60)
LYMPHOCYTES # BLD AUTO: 1.7 K/UL (ref 1–4.8)
LYMPHOCYTES NFR BLD: 23.7 % (ref 18–48)
MCH RBC QN AUTO: 25.6 PG (ref 27–31)
MCHC RBC AUTO-ENTMCNC: 31.5 G/DL (ref 32–36)
MCV RBC AUTO: 81 FL (ref 82–98)
MICROSCOPIC COMMENT: NORMAL
MONOCYTES # BLD AUTO: 0.5 K/UL (ref 0.3–1)
MONOCYTES NFR BLD: 6.6 % (ref 4–15)
NEUTROPHILS # BLD AUTO: 4.7 K/UL (ref 1.8–7.7)
NEUTROPHILS NFR BLD: 66.2 % (ref 38–73)
NITRITE UR QL STRIP: NEGATIVE
NRBC BLD-RTO: 0 /100 WBC
PH UR STRIP: 7 [PH] (ref 5–8)
PLATELET # BLD AUTO: 254 K/UL (ref 150–450)
PMV BLD AUTO: 10.4 FL (ref 9.2–12.9)
POTASSIUM SERPL-SCNC: 4 MMOL/L (ref 3.5–5.1)
PROT SERPL-MCNC: 8.3 G/DL (ref 6–8.4)
PROT UR QL STRIP: NEGATIVE
RBC # BLD AUTO: 5.24 M/UL (ref 4.6–6.2)
RBC #/AREA URNS HPF: 0 /HPF (ref 0–4)
SODIUM SERPL-SCNC: 135 MMOL/L (ref 136–145)
SP GR UR STRIP: 1.01 (ref 1–1.03)
SQUAMOUS #/AREA URNS HPF: 0 /HPF
URN SPEC COLLECT METH UR: ABNORMAL
UROBILINOGEN UR STRIP-ACNC: NEGATIVE EU/DL
WBC # BLD AUTO: 7.16 K/UL (ref 3.9–12.7)
YEAST URNS QL MICRO: NORMAL

## 2025-02-18 PROCEDURE — 96361 HYDRATE IV INFUSION ADD-ON: CPT

## 2025-02-18 PROCEDURE — 63600175 PHARM REV CODE 636 W HCPCS: Performed by: EMERGENCY MEDICINE

## 2025-02-18 PROCEDURE — 96374 THER/PROPH/DIAG INJ IV PUSH: CPT

## 2025-02-18 PROCEDURE — 25000003 PHARM REV CODE 250: Performed by: EMERGENCY MEDICINE

## 2025-02-18 PROCEDURE — 80053 COMPREHEN METABOLIC PANEL: CPT | Performed by: EMERGENCY MEDICINE

## 2025-02-18 PROCEDURE — 51798 US URINE CAPACITY MEASURE: CPT

## 2025-02-18 PROCEDURE — 83690 ASSAY OF LIPASE: CPT | Performed by: EMERGENCY MEDICINE

## 2025-02-18 PROCEDURE — 99281 EMR DPT VST MAYX REQ PHY/QHP: CPT

## 2025-02-18 PROCEDURE — 99284 EMERGENCY DEPT VISIT MOD MDM: CPT

## 2025-02-18 PROCEDURE — 85025 COMPLETE CBC W/AUTO DIFF WBC: CPT | Performed by: EMERGENCY MEDICINE

## 2025-02-18 PROCEDURE — 51702 INSERT TEMP BLADDER CATH: CPT

## 2025-02-18 PROCEDURE — 81001 URINALYSIS AUTO W/SCOPE: CPT | Performed by: EMERGENCY MEDICINE

## 2025-02-18 RX ORDER — TAMSULOSIN HYDROCHLORIDE 0.4 MG/1
0.4 CAPSULE ORAL DAILY
Qty: 10 CAPSULE | Refills: 0 | Status: SHIPPED | OUTPATIENT
Start: 2025-02-18 | End: 2025-02-28

## 2025-02-18 RX ORDER — CEFUROXIME AXETIL 500 MG/1
250 TABLET ORAL EVERY 12 HOURS
Qty: 20 TABLET | Refills: 0 | Status: SHIPPED | OUTPATIENT
Start: 2025-02-18 | End: 2025-02-24

## 2025-02-18 RX ORDER — CEFTRIAXONE 2 G/1
2 INJECTION, POWDER, FOR SOLUTION INTRAMUSCULAR; INTRAVENOUS
Status: COMPLETED | OUTPATIENT
Start: 2025-02-18 | End: 2025-02-18

## 2025-02-18 RX ORDER — LIDOCAINE HYDROCHLORIDE 20 MG/ML
JELLY TOPICAL
Status: COMPLETED | OUTPATIENT
Start: 2025-02-18 | End: 2025-02-18

## 2025-02-18 RX ORDER — CHLORPROMAZINE HYDROCHLORIDE 25 MG/1
25 TABLET, FILM COATED ORAL 3 TIMES DAILY
Status: DISCONTINUED | OUTPATIENT
Start: 2025-02-18 | End: 2025-02-18 | Stop reason: HOSPADM

## 2025-02-18 RX ORDER — CHLORPROMAZINE HYDROCHLORIDE 25 MG/1
25 TABLET, FILM COATED ORAL 2 TIMES DAILY
Qty: 30 TABLET | Refills: 0 | Status: SHIPPED | OUTPATIENT
Start: 2025-02-18 | End: 2026-02-18

## 2025-02-18 RX ORDER — TAMSULOSIN HYDROCHLORIDE 0.4 MG/1
0.4 CAPSULE ORAL
Status: COMPLETED | OUTPATIENT
Start: 2025-02-18 | End: 2025-02-18

## 2025-02-18 RX ADMIN — SODIUM CHLORIDE 1000 ML: 9 INJECTION, SOLUTION INTRAVENOUS at 03:02

## 2025-02-18 RX ADMIN — TAMSULOSIN HYDROCHLORIDE 0.4 MG: 0.4 CAPSULE ORAL at 03:02

## 2025-02-18 RX ADMIN — LIDOCAINE HYDROCHLORIDE 10 ML: 20 JELLY TOPICAL at 03:02

## 2025-02-18 RX ADMIN — CHLORPROMAZINE HYDROCHLORIDE 25 MG: 25 TABLET, FILM COATED ORAL at 04:02

## 2025-02-18 RX ADMIN — CEFTRIAXONE 2 G: 2 INJECTION, POWDER, FOR SOLUTION INTRAMUSCULAR; INTRAVENOUS at 03:02

## 2025-02-18 NOTE — DISCHARGE INSTRUCTIONS
Urinary catheter to leg bag.  You must follow-up with urologist for further management and treatment.  Return to emergency department for worsening symptoms or any problems.  Drink lots of fluids.

## 2025-02-18 NOTE — ED PROVIDER NOTES
Encounter Date: 2/18/2025       History     Chief Complaint   Patient presents with    Urinary Retention     Pt states he has been having trouble urinating for 3 days. He is able to pass some urine but not as much as normal.      44-year-old male presented emergency department with urinary retention.  Patient having difficulty urinating for the past week intermittently.  Denies any chest pain or shortness of breath or abdominal pain or weakness or numbness.  Patient states he also has chronic hiccups and want to see if he can get medication for that.  Denies dysuria or hematuria.  Denies any focal weakness or numbness.      Review of patient's allergies indicates:  No Known Allergies  Past Medical History:   Diagnosis Date    Anxiety     Anxiety     Bipolar affective disorder     Depression     Hypertension     Pancreatitis     Schizophrenia     Seizures     Thyroid disease      Past Surgical History:   Procedure Laterality Date    ESOPHAGOGASTRODUODENOSCOPY N/A 11/24/2023    Procedure: EGD (ESOPHAGOGASTRODUODENOSCOPY);  Surgeon: Shannen Meyer MD;  Location: [facility];  Service: Endoscopy;  Laterality: N/A;    ESOPHAGOGASTRODUODENOSCOPY N/A 11/27/2023    Procedure: EGD (ESOPHAGOGASTRODUODENOSCOPY);  Surgeon: Steve Anderson MD;  Location: [facility];  Service: Endoscopy;  Laterality: N/A;    ESOPHAGOGASTRODUODENOSCOPY N/A 12/8/2023    Procedure: EGD (ESOPHAGOGASTRODUODENOSCOPY);  Surgeon: Steve Anderson MD;  Location: [facility];  Service: Endoscopy;  Laterality: N/A;    LAPAROSCOPIC CHOLECYSTECTOMY N/A 3/9/2020    Procedure: CHOLECYSTECTOMY, LAPAROSCOPIC;  Surgeon: Trenton Deshpande MD;  Location: [facility];  Service: General;  Laterality: N/A;     Family History   Problem Relation Name Age of Onset    Diabetes Maternal Grandmother      Hypertension Maternal Grandfather      Cancer Paternal Grandfather       Social History[1]  Review of Systems   Constitutional: Negative.    HENT: Negative.     Eyes:  Negative.    Respiratory: Negative.     Cardiovascular: Negative.    Gastrointestinal: Negative.    Endocrine: Negative.    Genitourinary:  Positive for difficulty urinating.   Musculoskeletal: Negative.    Skin: Negative.    Allergic/Immunologic: Negative.    Neurological: Negative.    Hematological: Negative.    Psychiatric/Behavioral: Negative.     All other systems reviewed and are negative.      Physical Exam     Initial Vitals [02/18/25 1432]   BP Pulse Resp Temp SpO2   (!) 127/90 105 17 98 °F (36.7 °C) 100 %      MAP       --         Physical Exam    Nursing note and vitals reviewed.  Constitutional: He appears well-developed and well-nourished.   HENT:   Head: Normocephalic and atraumatic.   Nose: Nose normal.   Eyes: Conjunctivae and EOM are normal.   Neck: No tracheal deviation present.   Normal range of motion.  Cardiovascular:  Normal rate, regular rhythm, normal heart sounds and intact distal pulses.     Exam reveals no friction rub.       No murmur heard.  Pulmonary/Chest: Breath sounds normal. No respiratory distress. He has no wheezes. He has no rales.   Abdominal: Abdomen is soft. He exhibits distension. There is no abdominal tenderness.   Suprapubic distention secondary to urinary retention   Musculoskeletal:         General: Normal range of motion.      Cervical back: Normal range of motion.     Neurological: He is alert and oriented to person, place, and time. He has normal strength. GCS score is 15. GCS eye subscore is 4. GCS verbal subscore is 5. GCS motor subscore is 6.   Skin: Skin is warm and dry. Capillary refill takes less than 2 seconds.   Psychiatric: He has a normal mood and affect. Thought content normal.         ED Course   Procedures  Labs Reviewed   CBC W/ AUTO DIFFERENTIAL - Abnormal       Result Value    WBC 7.16      RBC 5.24      Hemoglobin 13.4 (*)     Hematocrit 42.5      MCV 81 (*)     MCH 25.6 (*)     MCHC 31.5 (*)     RDW 16.5 (*)     Platelets 254      MPV 10.4       Immature Granulocytes 0.4      Gran # (ANC) 4.7      Immature Grans (Abs) 0.03      Lymph # 1.7      Mono # 0.5      Eos # 0.2      Baso # 0.03      nRBC 0      Gran % 66.2      Lymph % 23.7      Mono % 6.6      Eosinophil % 2.7      Basophil % 0.4      Differential Method Automated     URINALYSIS, REFLEX TO URINE CULTURE - Abnormal    Specimen UA Urine, Clean Catch      Color, UA Yellow      Appearance, UA Clear      pH, UA 7.0      Specific Gravity, UA 1.010      Protein, UA Negative      Glucose, UA 3+ (*)     Ketones, UA Negative      Bilirubin (UA) Negative      Occult Blood UA Negative      Nitrite, UA Negative      Urobilinogen, UA Negative      Leukocytes, UA Negative      Narrative:     Specimen Source->Urine   URINALYSIS MICROSCOPIC    RBC, UA 0      Bacteria None      Yeast, UA None      Squam Epithel, UA 0      Microscopic Comment SEE COMMENT      Narrative:     Specimen Source->Urine   COMPREHENSIVE METABOLIC PANEL   LIPASE          Imaging Results    None          Medications   sodium chloride 0.9% bolus 1,000 mL 1,000 mL (1,000 mLs Intravenous New Bag 2/18/25 1539)   chlorproMAZINE tablet 25 mg (has no administration in time range)   LIDOcaine HCl 2% urojet (10 mLs Mucous Membrane Given 2/18/25 1515)   tamsulosin 24 hr capsule 0.4 mg (0.4 mg Oral Given 2/18/25 1547)   cefTRIAXone injection 2 g (2 g Intravenous Given 2/18/25 1436)     Medical Decision Making  44-year-old male with urinary retention secondary to prostatic hypertrophy.  Patient has latency and hesitancy of urination and now to a point could not urinate.  Patient having these symptoms for the past week.  Urinary catheter place and urinary retention treated.  Started on Flomax.  Prophylactic antibiotic given.  Patient also has chronic hiccups so treated with Thorazine.  Discharged with instructions to follow-up with urologist and Neurology referral sent.  Discharged with return precautions and instructions and follow-up    Amount and/or  Complexity of Data Reviewed  Labs: ordered. Decision-making details documented in ED Course.    Risk  Prescription drug management.                                      Clinical Impression:  Final diagnoses:  [R33.8] Acute urinary retention (Primary)  [R06.6] Chronic hiccups          ED Disposition Condition    Discharge Stable          ED Prescriptions       Medication Sig Dispense Start Date End Date Auth. Provider    tamsulosin (FLOMAX) 0.4 mg Cap Take 1 capsule (0.4 mg total) by mouth once daily. for 10 days 10 capsule 2/18/2025 2/28/2025 Ministerio Mcgrath MD    chlorproMAZINE (THORAZINE) 25 MG tablet Take 1 tablet (25 mg total) by mouth 2 (two) times daily. 30 tablet 2/18/2025 2/18/2026 Ministerio Mcgrath MD    cefUROXime (CEFTIN) 500 MG tablet Take 0.5 tablets (250 mg total) by mouth every 12 (twelve) hours. for 20 days 20 tablet 2/18/2025 3/10/2025 Ministerio Mcgrath MD          Follow-up Information       Follow up With Specialties Details Why Contact Info    Cassius Oneill MD Family Medicine In 2 days  [address] [place] [address]  [phone]      Gage Thomas MD Urology In 2 days  [address] [place] [address]  [address]  [facility] [address]  [phone]                 [1]   Social History  Tobacco Use    Smoking status: Some Days     Current packs/day: 0.25     Average packs/day: 0.3 packs/day for 7.0 years (1.8 ttl pk-yrs)     Types: Cigarettes    Smokeless tobacco: Never   Substance Use Topics    Alcohol use: Yes     Alcohol/week: 2.0 standard drinks of alcohol     Types: 2 Glasses of wine per week     Comment: occasionally    Drug use: Yes     Frequency: 7.0 times per week     Types: Marijuana        Ministerio Mcgrath MD  02/18/25 3425

## 2025-02-19 NOTE — ED PROVIDER NOTES
Encounter Date: 2/18/2025       History     Chief Complaint   Patient presents with    urinary catheter problem     Pt states he is leaking urine around the catheter     This is a 44-year-old male presenting for evaluation of his Mccoy catheter.  The patient was just seen here air today for urinary retention.  Had Mccoy catheter placed, was discharged with Flomax and antibiotics and urology follow up.  Patient says that it felt like he had to urinate and when he tried to urinate that has urine that leaked from his penis around the catheter.  He also complains of suprapubic discomfort.  The Mccoy is draining and there is urine in the leg bag.    The history is provided by the patient.     Review of patient's allergies indicates:  No Known Allergies  Past Medical History:   Diagnosis Date    Anxiety     Anxiety     Bipolar affective disorder     Depression     Hypertension     Pancreatitis     Schizophrenia     Seizures     Thyroid disease      Past Surgical History:   Procedure Laterality Date    ESOPHAGOGASTRODUODENOSCOPY N/A 11/24/2023    Procedure: EGD (ESOPHAGOGASTRODUODENOSCOPY);  Surgeon: Shannen Meyer MD;  Location: [facility];  Service: Endoscopy;  Laterality: N/A;    ESOPHAGOGASTRODUODENOSCOPY N/A 11/27/2023    Procedure: EGD (ESOPHAGOGASTRODUODENOSCOPY);  Surgeon: Steve Anderson MD;  Location: [facility];  Service: Endoscopy;  Laterality: N/A;    ESOPHAGOGASTRODUODENOSCOPY N/A 12/8/2023    Procedure: EGD (ESOPHAGOGASTRODUODENOSCOPY);  Surgeon: Steve Anderson MD;  Location: [facility];  Service: Endoscopy;  Laterality: N/A;    LAPAROSCOPIC CHOLECYSTECTOMY N/A 3/9/2020    Procedure: CHOLECYSTECTOMY, LAPAROSCOPIC;  Surgeon: Trenton Deshpande MD;  Location: [facility];  Service: General;  Laterality: N/A;     Family History   Problem Relation Name Age of Onset    Diabetes Maternal Grandmother      Hypertension Maternal Grandfather      Cancer Paternal Grandfather       Social History[1]  Review of  Systems   Gastrointestinal:  Negative for abdominal pain.   All other systems reviewed and are negative.      Physical Exam     Initial Vitals [02/18/25 1842]   BP Pulse Resp Temp SpO2   115/81 101 18 98 °F (36.7 °C) 98 %      MAP       --         Physical Exam    Nursing note and vitals reviewed.  Constitutional: He appears well-developed and well-nourished. He is not diaphoretic. No distress.   HENT:   Head: Normocephalic and atraumatic.   Eyes: Conjunctivae are normal.   Neck: Neck supple.   Normal range of motion.  Cardiovascular:  Normal rate.           Pulmonary/Chest: No respiratory distress.   Abdominal: He exhibits no distension.   Genitourinary:    Genitourinary Comments: Mccoy catheter in place, normal penis, no visible urinary leakage     Musculoskeletal:         General: No edema.      Cervical back: Normal range of motion and neck supple.     Neurological: He is alert. He has normal strength.   Skin: Skin is warm and dry. No rash noted. No erythema.   Psychiatric: He has a normal mood and affect.         ED Course   Procedures  Labs Reviewed - No data to display       Imaging Results    None          Medications - No data to display  Medical Decision Making  The Mccoy is draining, on ladder scan shows 0 bladder volume.  I do not see the need for further intervention, advised the patient the Mccoy seems to be working properly and to follow up with urology clinic.                                      Clinical Impression:  Final diagnoses:  [T83.9XXA] Problem with Mccoy catheter, initial encounter (Primary)          ED Disposition Condition    Discharge Stable          ED Prescriptions    None       Follow-up Information       Follow up With Specialties Details Why Contact Info    urology clinic                   [1]   Social History  Tobacco Use    Smoking status: Some Days     Current packs/day: 0.25     Average packs/day: 0.3 packs/day for 7.0 years (1.8 ttl pk-yrs)     Types: Cigarettes    Smokeless  tobacco: Never   Substance Use Topics    Alcohol use: Yes     Alcohol/week: 2.0 standard drinks of alcohol     Types: 2 Glasses of wine per week     Comment: occasionally    Drug use: Yes     Frequency: 7.0 times per week     Types: Marijuana        Perry Adan MD  02/18/25 2040

## 2025-02-21 ENCOUNTER — TELEPHONE (OUTPATIENT)
Dept: UROLOGY | Facility: CLINIC | Age: 45
End: 2025-02-21
Payer: MEDICAID

## 2025-02-21 NOTE — TELEPHONE ENCOUNTER
----- Message from Crystal sent at 2/21/2025 11:49 AM CST -----  Contact: PT  Type:  Sooner Appointment RequestCaller is requesting a sooner appointment.  Caller declined first available appointment listed below.  Caller will not accept being placed on the waitlist and is requesting a message be sent to doctor.Name of Caller:  PTWhen is the first available appointment?  No solutionsSymptoms:  New pt  ER f/up was told by [facility] to f/up with provider within 2 days had a catheter put inWould the patient rather a call back or a response via [facility]? Call [facility] Call Back Number:   [phone] Information:  Would like to be seen Monday if possible

## 2025-02-24 ENCOUNTER — HOSPITAL ENCOUNTER (EMERGENCY)
Facility: HOSPITAL | Age: 45
Discharge: HOME | End: 2025-02-24
Attending: EMERGENCY MEDICINE
Payer: MEDICAID

## 2025-02-24 VITALS
HEIGHT: 67 IN | RESPIRATION RATE: 18 BRPM | TEMPERATURE: 98 F | DIASTOLIC BLOOD PRESSURE: 80 MMHG | SYSTOLIC BLOOD PRESSURE: 122 MMHG | HEART RATE: 88 BPM | BODY MASS INDEX: 25.9 KG/M2 | WEIGHT: 165 LBS | OXYGEN SATURATION: 100 %

## 2025-02-24 DIAGNOSIS — Z46.6 ENCOUNTER FOR FOLEY CATHETER REMOVAL: Primary | ICD-10-CM

## 2025-02-24 PROCEDURE — 99283 EMERGENCY DEPT VISIT LOW MDM: CPT

## 2025-02-24 RX ORDER — CEFUROXIME AXETIL 500 MG/1
250 TABLET ORAL EVERY 12 HOURS
Qty: 20 TABLET | Refills: 0 | Status: SHIPPED | OUTPATIENT
Start: 2025-02-24 | End: 2025-03-16

## 2025-02-24 NOTE — ED PROVIDER NOTES
Encounter Date: 2/24/2025       History     Chief Complaint   Patient presents with    catheter removal     Patient has saldana catheter here to have removed     45-year-old male with a past medical history of depression, schizophrenia, seizure disorder presents emergency department requesting for his Saldana catheter to be removed.  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  Patient denies any associated fevers.  He states that he feels like he can urinate now.  He has not followed up with a urologist.  Patient denies any associated nausea vomiting or back pain.    The history is provided by the patient.     Review of patient's allergies indicates:  No Known Allergies  Past Medical History:   Diagnosis Date    Anxiety     Anxiety     Bipolar affective disorder     Depression     Hypertension     Pancreatitis     Schizophrenia     Seizures     Thyroid disease      Past Surgical History:   Procedure Laterality Date    ESOPHAGOGASTRODUODENOSCOPY N/A 11/24/2023    Procedure: EGD (ESOPHAGOGASTRODUODENOSCOPY);  Surgeon: Shannen Meyer MD;  Location: [facility];  Service: Endoscopy;  Laterality: N/A;    ESOPHAGOGASTRODUODENOSCOPY N/A 11/27/2023    Procedure: EGD (ESOPHAGOGASTRODUODENOSCOPY);  Surgeon: Steve Anderson MD;  Location: [facility];  Service: Endoscopy;  Laterality: N/A;    ESOPHAGOGASTRODUODENOSCOPY N/A 12/8/2023    Procedure: EGD (ESOPHAGOGASTRODUODENOSCOPY);  Surgeon: Steve Anderson MD;  Location: [facility];  Service: Endoscopy;  Laterality: N/A;    LAPAROSCOPIC CHOLECYSTECTOMY N/A 3/9/2020    Procedure: CHOLECYSTECTOMY, LAPAROSCOPIC;  Surgeon: Trenton Deshpande MD;  Location: [facility];  Service: General;  Laterality: N/A;     Family History   Problem Relation Name Age of Onset    Diabetes Maternal Grandmother      Hypertension Maternal Grandfather      Cancer Paternal Grandfather       Social History[1]  Review of Systems    Constitutional:  Negative for fever.   HENT: Negative.     Respiratory: Negative.     Cardiovascular: Negative.    Genitourinary:  Negative for flank pain, penile swelling, scrotal swelling and testicular pain.       Physical Exam     Initial Vitals [02/24/25 1139]   BP Pulse Resp Temp SpO2   117/82 90 18 98.1 °F (36.7 °C) 100 %      MAP       --         Physical Exam    Nursing note and vitals reviewed.  Constitutional: He appears well-developed and well-nourished.   HENT:   Head: Normocephalic and atraumatic.   Eyes: EOM are normal. Pupils are equal, round, and reactive to light.   Cardiovascular:  Normal rate.           Pulmonary/Chest: Breath sounds normal.   Abdominal: Abdomen is soft. Bowel sounds are normal. He exhibits no distension. There is no abdominal tenderness.     Neurological: He is alert and oriented to person, place, and time. He has normal strength.   Skin: Skin is warm. No rash noted.   Psychiatric: He has a normal mood and affect.         ED Course   Procedures  Labs Reviewed - No data to display       Imaging Results    None          Medications - No data to display  Medical Decision Making  45-year-old male with a past medical history of depression, schizophrenia, seizure disorder presents emergency department requesting for his Mccoy catheter to be removed.  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  Patient denies any associated fevers.  He states that he feels like he can urinate now.  He has not followed up with a urologist.  Patient denies any associated nausea vomiting or back pain.    The history is provided by the patient.     Considerations include but not limited to, BPH, UTI, urinary retention    45-year-old male presents emergency department has a past medical history of depression and schizophrenia, presented here 5 days ago secondary to urinary retention related to prostatic hypertrophy patient had a  UTI as well he was given Rocephin and discharged home with Ceftin however he did not get his medication filled.  The patient denies any fevers nausea or vomiting.  He states he was unable to follow up with the urologist as directed, the patient wants his Mccoy catheter removed today.  I instructed the patient that in his only been 5 days that likely had a urinary retention is high because it has not been in that long.  Patient states that he will return to the emergency department if he is unable to urinate, that he does not wish to have his catheter in place anymore.  The patient reported that he lost his previous prescriptions for Ceftin.  I will discharge the patient home with repeat Ceftin prescription.  The patient was given referral to Urology, detailed return precautions.    Risk  Prescription drug management.                                      Clinical Impression:  Final diagnoses:  [Z46.6] Encounter for Mccoy catheter removal (Primary)          ED Disposition Condition    Discharge Stable          ED Prescriptions       Medication Sig Dispense Start Date End Date Auth. Provider    cefUROXime (CEFTIN) 500 MG tablet Take 0.5 tablets (250 mg total) by mouth every 12 (twelve) hours. for 20 days 20 tablet 2/24/2025 3/16/2025 Berta Shah FNP          Follow-up Information       Follow up With Specialties Details Why Contact Info    Diego Garza MD Urology Schedule an appointment as soon as possible for a visit in 2 days  [address] [facility]  [address] [place] [address]  [phone]                   [1]   Social History  Tobacco Use    Smoking status: Some Days     Current packs/day: 0.25     Average packs/day: 0.3 packs/day for 7.0 years (1.8 ttl pk-yrs)     Types: Cigarettes    Smokeless tobacco: Never   Substance Use Topics    Alcohol use: Yes     Alcohol/week: 2.0 standard drinks of alcohol     Types: 2 Glasses of wine per week     Comment: occasionally    Drug use: Yes     Frequency: 7.0 times  per week     Types: Marijuana        Berta Shah, AMBROCIO  02/24/25 [id number]

## 2025-02-24 NOTE — DISCHARGE INSTRUCTIONS
Please get your antibiotics filled and take it as directed until all gone  Please follow up with the urologist as directed  You may develop urinary retention again you may need to return to the emergency department for reinsertion of Mccoy catheter  Return for any concerns

## 2025-04-11 ENCOUNTER — HOSPITAL ENCOUNTER (OUTPATIENT)
Facility: HOSPITAL | Age: 45
Discharge: HOME | End: 2025-04-12
Attending: STUDENT IN AN ORGANIZED HEALTH CARE EDUCATION/TRAINING PROGRAM | Admitting: INTERNAL MEDICINE
Payer: COMMERCIAL

## 2025-04-11 DIAGNOSIS — G93.40 ENCEPHALOPATHY ACUTE: ICD-10-CM

## 2025-04-11 DIAGNOSIS — R07.9 CHEST PAIN: ICD-10-CM

## 2025-04-11 DIAGNOSIS — R79.89 INCREASED AMMONIA LEVEL: ICD-10-CM

## 2025-04-11 DIAGNOSIS — R41.82 ALTERED MENTAL STATUS, UNSPECIFIED ALTERED MENTAL STATUS TYPE: Primary | ICD-10-CM

## 2025-04-11 LAB
ABSOLUTE EOSINOPHIL (SMH): 0.07 K/UL
ABSOLUTE MONOCYTE (SMH): 0.4 K/UL (ref 0.3–1)
ABSOLUTE NEUTROPHIL COUNT (SMH): 4.1 K/UL (ref 1.8–7.7)
ALBUMIN SERPL-MCNC: 4.7 G/DL (ref 3.5–5.2)
ALP SERPL-CCNC: 86 UNIT/L (ref 55–135)
ALT SERPL-CCNC: 28 UNIT/L (ref 10–44)
AMMONIA PLAS-SCNC: 234 UMOL/L (ref 10–50)
AMPHET UR QL SCN: NEGATIVE
ANION GAP (SMH): 15 MMOL/L (ref 8–16)
APAP SERPL-MCNC: 0.2 UG/ML (ref 10–20)
AST SERPL-CCNC: 13 UNIT/L (ref 10–40)
BARBITURATE SCN PRESENT UR: NEGATIVE
BASOPHILS # BLD AUTO: 0.03 K/UL
BASOPHILS NFR BLD AUTO: 0.5 %
BENZODIAZ UR QL SCN: NEGATIVE
BILIRUB SERPL-MCNC: 0.4 MG/DL (ref 0.1–1)
BILIRUB UR QL STRIP.AUTO: NEGATIVE
BUN SERPL-MCNC: 5 MG/DL (ref 6–20)
CALCIUM SERPL-MCNC: 9.9 MG/DL (ref 8.7–10.5)
CANNABINOIDS UR QL SCN: ABNORMAL
CHLORIDE SERPL-SCNC: 104 MMOL/L (ref 95–110)
CLARITY UR: CLEAR
CO2 SERPL-SCNC: 21 MMOL/L (ref 23–29)
COCAINE UR QL SCN: NEGATIVE
COLOR UR AUTO: YELLOW
CREAT SERPL-MCNC: 1.2 MG/DL (ref 0.5–1.4)
CREAT UR-MCNC: 99.4 MG/DL (ref 23–375)
ERYTHROCYTE [DISTWIDTH] IN BLOOD BY AUTOMATED COUNT: 19.2 % (ref 11.5–14.5)
ETHANOL SERPL-MCNC: 35 MG/DL
GFR SERPLBLD CREATININE-BSD FMLA CKD-EPI: >60 ML/MIN/1.73/M2
GLUCOSE SERPL-MCNC: 158 MG/DL (ref 70–110)
GLUCOSE UR QL STRIP: ABNORMAL
HCT VFR BLD AUTO: 41.2 % (ref 40–54)
HGB BLD-MCNC: 12.8 GM/DL (ref 14–18)
HGB UR QL STRIP: NEGATIVE
IMM GRANULOCYTES # BLD AUTO: 0.03 K/UL (ref 0–0.04)
IMM GRANULOCYTES NFR BLD AUTO: 0.5 % (ref 0–0.5)
KETONES UR QL STRIP: NEGATIVE
LEUKOCYTE ESTERASE UR QL STRIP: NEGATIVE
LYMPHOCYTES # BLD AUTO: 1.05 K/UL (ref 1–4.8)
MAGNESIUM SERPL-MCNC: 2 MG/DL (ref 1.6–2.6)
MCH RBC QN AUTO: 25.7 PG (ref 27–31)
MCHC RBC AUTO-ENTMCNC: 31.1 G/DL (ref 32–36)
MCV RBC AUTO: 83 FL (ref 82–98)
NITRITE UR QL STRIP: NEGATIVE
NUCLEATED RBC (/100WBC) (SMH): 0 /100 WBC
OPIATES UR QL SCN: NEGATIVE
PCP UR QL: NEGATIVE
PH UR STRIP: 7 [PH]
PLATELET # BLD AUTO: 133 K/UL (ref 150–450)
PMV BLD AUTO: ABNORMAL FL
POTASSIUM SERPL-SCNC: 4.3 MMOL/L (ref 3.5–5.1)
PROT SERPL-MCNC: 7.7 GM/DL (ref 6–8.4)
PROT UR QL STRIP: NEGATIVE
RBC # BLD AUTO: 4.99 M/UL (ref 4.6–6.2)
RELATIVE EOSINOPHIL (SMH): 1.2 % (ref 0–8)
RELATIVE LYMPHOCYTE (SMH): 18.4 % (ref 18–48)
RELATIVE MONOCYTE (SMH): 7 % (ref 4–15)
RELATIVE NEUTROPHIL (SMH): 72.4 % (ref 38–73)
SALICYLATES SERPL-MCNC: <1.5 MG/DL (ref 15–30)
SODIUM SERPL-SCNC: 140 MMOL/L (ref 136–145)
SP GR UR STRIP: 1.01
UROBILINOGEN UR STRIP-ACNC: NEGATIVE EU/DL
WBC # BLD AUTO: 5.7 K/UL (ref 3.9–12.7)

## 2025-04-11 PROCEDURE — 80179 DRUG ASSAY SALICYLATE: CPT | Performed by: STUDENT IN AN ORGANIZED HEALTH CARE EDUCATION/TRAINING PROGRAM

## 2025-04-11 PROCEDURE — 84484 ASSAY OF TROPONIN QUANT: CPT | Performed by: STUDENT IN AN ORGANIZED HEALTH CARE EDUCATION/TRAINING PROGRAM

## 2025-04-11 PROCEDURE — 96375 TX/PRO/DX INJ NEW DRUG ADDON: CPT

## 2025-04-11 PROCEDURE — 83735 ASSAY OF MAGNESIUM: CPT | Performed by: STUDENT IN AN ORGANIZED HEALTH CARE EDUCATION/TRAINING PROGRAM

## 2025-04-11 PROCEDURE — 82140 ASSAY OF AMMONIA: CPT | Performed by: STUDENT IN AN ORGANIZED HEALTH CARE EDUCATION/TRAINING PROGRAM

## 2025-04-11 PROCEDURE — 82077 ASSAY SPEC XCP UR&BREATH IA: CPT | Performed by: STUDENT IN AN ORGANIZED HEALTH CARE EDUCATION/TRAINING PROGRAM

## 2025-04-11 PROCEDURE — 63600175 PHARM REV CODE 636 W HCPCS: Performed by: STUDENT IN AN ORGANIZED HEALTH CARE EDUCATION/TRAINING PROGRAM

## 2025-04-11 PROCEDURE — 87389 HIV-1 AG W/HIV-1&-2 AB AG IA: CPT | Performed by: EMERGENCY MEDICINE

## 2025-04-11 PROCEDURE — 86803 HEPATITIS C AB TEST: CPT | Performed by: EMERGENCY MEDICINE

## 2025-04-11 PROCEDURE — 80307 DRUG TEST PRSMV CHEM ANLYZR: CPT | Performed by: STUDENT IN AN ORGANIZED HEALTH CARE EDUCATION/TRAINING PROGRAM

## 2025-04-11 PROCEDURE — 80164 ASSAY DIPROPYLACETIC ACD TOT: CPT | Performed by: STUDENT IN AN ORGANIZED HEALTH CARE EDUCATION/TRAINING PROGRAM

## 2025-04-11 PROCEDURE — 81003 URINALYSIS AUTO W/O SCOPE: CPT | Mod: 59 | Performed by: STUDENT IN AN ORGANIZED HEALTH CARE EDUCATION/TRAINING PROGRAM

## 2025-04-11 PROCEDURE — 93005 ELECTROCARDIOGRAM TRACING: CPT | Performed by: GENERAL PRACTICE

## 2025-04-11 PROCEDURE — 99285 EMERGENCY DEPT VISIT HI MDM: CPT | Mod: 25

## 2025-04-11 PROCEDURE — 80143 DRUG ASSAY ACETAMINOPHEN: CPT | Performed by: STUDENT IN AN ORGANIZED HEALTH CARE EDUCATION/TRAINING PROGRAM

## 2025-04-11 PROCEDURE — 83880 ASSAY OF NATRIURETIC PEPTIDE: CPT | Performed by: STUDENT IN AN ORGANIZED HEALTH CARE EDUCATION/TRAINING PROGRAM

## 2025-04-11 PROCEDURE — 84443 ASSAY THYROID STIM HORMONE: CPT | Performed by: STUDENT IN AN ORGANIZED HEALTH CARE EDUCATION/TRAINING PROGRAM

## 2025-04-11 PROCEDURE — 85025 COMPLETE CBC W/AUTO DIFF WBC: CPT | Performed by: STUDENT IN AN ORGANIZED HEALTH CARE EDUCATION/TRAINING PROGRAM

## 2025-04-11 PROCEDURE — 93010 ELECTROCARDIOGRAM REPORT: CPT | Mod: ,,, | Performed by: GENERAL PRACTICE

## 2025-04-11 PROCEDURE — 80053 COMPREHEN METABOLIC PANEL: CPT | Performed by: STUDENT IN AN ORGANIZED HEALTH CARE EDUCATION/TRAINING PROGRAM

## 2025-04-11 RX ORDER — DROPERIDOL 2.5 MG/ML
1.25 INJECTION, SOLUTION INTRAMUSCULAR; INTRAVENOUS ONCE
Status: COMPLETED | OUTPATIENT
Start: 2025-04-12 | End: 2025-04-12

## 2025-04-11 RX ORDER — DROPERIDOL 2.5 MG/ML
1.25 INJECTION, SOLUTION INTRAMUSCULAR; INTRAVENOUS ONCE
Status: COMPLETED | OUTPATIENT
Start: 2025-04-12 | End: 2025-04-11

## 2025-04-11 RX ADMIN — DROPERIDOL 1.25 MG: 2.5 INJECTION, SOLUTION INTRAMUSCULAR; INTRAVENOUS at 11:04

## 2025-04-11 NOTE — Clinical Note
Diagnosis: Altered mental status, unspecified altered mental status type [[id number]]   Future Attending Provider: CRYSTAL BOWIE [[id number]]   Place in Observation: [facility] [[id number]]   Special Needs:: No Special Needs [1]

## 2025-04-12 VITALS
SYSTOLIC BLOOD PRESSURE: 141 MMHG | TEMPERATURE: 98 F | BODY MASS INDEX: 26.68 KG/M2 | RESPIRATION RATE: 16 BRPM | WEIGHT: 170 LBS | HEART RATE: 79 BPM | HEIGHT: 67 IN | DIASTOLIC BLOOD PRESSURE: 90 MMHG | OXYGEN SATURATION: 98 %

## 2025-04-12 PROBLEM — E72.20 HYPERAMMONEMIA: Status: ACTIVE | Noted: 2025-04-12

## 2025-04-12 PROBLEM — G93.40 ENCEPHALOPATHY ACUTE: Status: ACTIVE | Noted: 2025-04-12

## 2025-04-12 PROBLEM — R41.82 ALTERED MENTAL STATUS: Status: ACTIVE | Noted: 2025-04-12

## 2025-04-12 LAB
ABSOLUTE EOSINOPHIL (SMH): 0.06 K/UL
ABSOLUTE MONOCYTE (SMH): 0.59 K/UL (ref 0.3–1)
ABSOLUTE NEUTROPHIL COUNT (SMH): 5 K/UL (ref 1.8–7.7)
ALBUMIN SERPL-MCNC: 4.6 G/DL (ref 3.5–5.2)
ALP SERPL-CCNC: 87 UNIT/L (ref 55–135)
ALT SERPL-CCNC: 29 UNIT/L (ref 10–44)
AMMONIA PLAS-SCNC: 27 UMOL/L (ref 10–50)
ANION GAP (SMH): 10 MMOL/L (ref 8–16)
AST SERPL-CCNC: 19 UNIT/L (ref 10–40)
BASOPHILS # BLD AUTO: 0.02 K/UL
BASOPHILS NFR BLD AUTO: 0.3 %
BILIRUB SERPL-MCNC: 0.6 MG/DL (ref 0.1–1)
BNP SERPL-MCNC: 13 PG/ML
BUN SERPL-MCNC: 9 MG/DL (ref 6–20)
CALCIUM SERPL-MCNC: 10.2 MG/DL (ref 8.7–10.5)
CHLORIDE SERPL-SCNC: 105 MMOL/L (ref 95–110)
CO2 SERPL-SCNC: 27 MMOL/L (ref 23–29)
CREAT SERPL-MCNC: 1.2 MG/DL (ref 0.5–1.4)
EAG (SMH): 154 MG/DL (ref 68–131)
ERYTHROCYTE [DISTWIDTH] IN BLOOD BY AUTOMATED COUNT: 18.7 % (ref 11.5–14.5)
FOLATE SERPL-MCNC: 8.4 NG/ML (ref 4–24)
GFR SERPLBLD CREATININE-BSD FMLA CKD-EPI: >60 ML/MIN/1.73/M2
GLUCOSE SERPL-MCNC: 115 MG/DL (ref 70–110)
HBA1C MFR BLD: 7 % (ref 4.5–6.2)
HCT VFR BLD AUTO: 38.7 % (ref 40–54)
HCV AB SERPL QL IA: NORMAL
HGB BLD-MCNC: 12.6 GM/DL (ref 14–18)
HIV 1+2 AB+HIV1 P24 AG SERPL QL IA: NORMAL
IMM GRANULOCYTES # BLD AUTO: 0.02 K/UL (ref 0–0.04)
IMM GRANULOCYTES NFR BLD AUTO: 0.3 % (ref 0–0.5)
INR PPP: 1.2 (ref 0.8–1.2)
LYMPHOCYTES # BLD AUTO: 1.34 K/UL (ref 1–4.8)
MAGNESIUM SERPL-MCNC: 2 MG/DL (ref 1.6–2.6)
MCH RBC QN AUTO: 26.2 PG (ref 27–31)
MCHC RBC AUTO-ENTMCNC: 32.6 G/DL (ref 32–36)
MCV RBC AUTO: 81 FL (ref 82–98)
NUCLEATED RBC (/100WBC) (SMH): 0 /100 WBC
OHS QRS DURATION: 72 MS
OHS QTC CALCULATION: 435 MS
PLATELET # BLD AUTO: 224 K/UL (ref 150–450)
PLATELET BLD QL SMEAR: NORMAL
PMV BLD AUTO: 10.9 FL (ref 9.2–12.9)
POCT GLUCOSE: 104 MG/DL (ref 70–110)
POCT GLUCOSE: 119 MG/DL (ref 70–110)
POTASSIUM SERPL-SCNC: 3.7 MMOL/L (ref 3.5–5.1)
PROT SERPL-MCNC: 7.6 GM/DL (ref 6–8.4)
PROTHROMBIN TIME: 12.9 SECONDS (ref 9–12.5)
RBC # BLD AUTO: 4.81 M/UL (ref 4.6–6.2)
RELATIVE EOSINOPHIL (SMH): 0.8 % (ref 0–8)
RELATIVE LYMPHOCYTE (SMH): 19 % (ref 18–48)
RELATIVE MONOCYTE (SMH): 8.4 % (ref 4–15)
RELATIVE NEUTROPHIL (SMH): 71.2 % (ref 38–73)
SODIUM SERPL-SCNC: 142 MMOL/L (ref 136–145)
T4 FREE SERPL-MCNC: 0.76 NG/DL (ref 0.71–1.51)
TROPONIN HIGH SENSITIVE (SMH): 7 PG/ML
TSH SERPL-ACNC: 0.71 UIU/ML (ref 0.34–5.6)
VALPROATE SERPL-MCNC: 42.4 UG/ML (ref 50–100)
VIT B12 SERPL-MCNC: 588 PG/ML (ref 210–950)
WBC # BLD AUTO: 7.06 K/UL (ref 3.9–12.7)

## 2025-04-12 PROCEDURE — 84439 ASSAY OF FREE THYROXINE: CPT | Performed by: INTERNAL MEDICINE

## 2025-04-12 PROCEDURE — 95816 EEG AWAKE AND DROWSY: CPT | Mod: 26,,, | Performed by: PSYCHIATRY & NEUROLOGY

## 2025-04-12 PROCEDURE — 25000003 PHARM REV CODE 250: Performed by: STUDENT IN AN ORGANIZED HEALTH CARE EDUCATION/TRAINING PROGRAM

## 2025-04-12 PROCEDURE — 85610 PROTHROMBIN TIME: CPT | Performed by: INTERNAL MEDICINE

## 2025-04-12 PROCEDURE — 82962 GLUCOSE BLOOD TEST: CPT

## 2025-04-12 PROCEDURE — 96376 TX/PRO/DX INJ SAME DRUG ADON: CPT

## 2025-04-12 PROCEDURE — 96375 TX/PRO/DX INJ NEW DRUG ADDON: CPT

## 2025-04-12 PROCEDURE — 63600175 PHARM REV CODE 636 W HCPCS: Performed by: INTERNAL MEDICINE

## 2025-04-12 PROCEDURE — G0378 HOSPITAL OBSERVATION PER HR: HCPCS

## 2025-04-12 PROCEDURE — 96365 THER/PROPH/DIAG IV INF INIT: CPT

## 2025-04-12 PROCEDURE — 82746 ASSAY OF FOLIC ACID SERUM: CPT | Performed by: INTERNAL MEDICINE

## 2025-04-12 PROCEDURE — 82140 ASSAY OF AMMONIA: CPT | Performed by: INTERNAL MEDICINE

## 2025-04-12 PROCEDURE — 83036 HEMOGLOBIN GLYCOSYLATED A1C: CPT | Performed by: INTERNAL MEDICINE

## 2025-04-12 PROCEDURE — 25000003 PHARM REV CODE 250: Performed by: NURSE PRACTITIONER

## 2025-04-12 PROCEDURE — 25000003 PHARM REV CODE 250: Performed by: INTERNAL MEDICINE

## 2025-04-12 PROCEDURE — 83735 ASSAY OF MAGNESIUM: CPT | Performed by: INTERNAL MEDICINE

## 2025-04-12 PROCEDURE — 85025 COMPLETE CBC W/AUTO DIFF WBC: CPT | Performed by: INTERNAL MEDICINE

## 2025-04-12 PROCEDURE — 96372 THER/PROPH/DIAG INJ SC/IM: CPT | Performed by: INTERNAL MEDICINE

## 2025-04-12 PROCEDURE — 95819 EEG AWAKE AND ASLEEP: CPT

## 2025-04-12 PROCEDURE — 80053 COMPREHEN METABOLIC PANEL: CPT | Performed by: INTERNAL MEDICINE

## 2025-04-12 PROCEDURE — 96361 HYDRATE IV INFUSION ADD-ON: CPT

## 2025-04-12 PROCEDURE — 82607 VITAMIN B-12: CPT | Performed by: INTERNAL MEDICINE

## 2025-04-12 PROCEDURE — 63600175 PHARM REV CODE 636 W HCPCS: Performed by: STUDENT IN AN ORGANIZED HEALTH CARE EDUCATION/TRAINING PROGRAM

## 2025-04-12 RX ORDER — FAMOTIDINE 10 MG/ML
20 INJECTION, SOLUTION INTRAVENOUS 2 TIMES DAILY
Status: DISCONTINUED | OUTPATIENT
Start: 2025-04-12 | End: 2025-04-12 | Stop reason: HOSPADM

## 2025-04-12 RX ORDER — OMEPRAZOLE 40 MG/1
40 CAPSULE, DELAYED RELEASE ORAL EVERY MORNING
COMMUNITY
Start: 2025-04-08

## 2025-04-12 RX ORDER — INSULIN GLARGINE 100 [IU]/ML
10 INJECTION, SOLUTION SUBCUTANEOUS NIGHTLY
Status: DISCONTINUED | OUTPATIENT
Start: 2025-04-12 | End: 2025-04-12 | Stop reason: HOSPADM

## 2025-04-12 RX ORDER — IBUPROFEN 200 MG
24 TABLET ORAL
Status: DISCONTINUED | OUTPATIENT
Start: 2025-04-12 | End: 2025-04-12 | Stop reason: HOSPADM

## 2025-04-12 RX ORDER — THIAMINE HYDROCHLORIDE 100 MG/ML
400 INJECTION, SOLUTION INTRAMUSCULAR; INTRAVENOUS EVERY 8 HOURS
Status: DISCONTINUED | OUTPATIENT
Start: 2025-04-12 | End: 2025-04-12 | Stop reason: HOSPADM

## 2025-04-12 RX ORDER — SODIUM CHLORIDE 0.9 % (FLUSH) 0.9 %
3 SYRINGE (ML) INJECTION EVERY 12 HOURS PRN
Status: DISCONTINUED | OUTPATIENT
Start: 2025-04-12 | End: 2025-04-12 | Stop reason: HOSPADM

## 2025-04-12 RX ORDER — ALPRAZOLAM 0.25 MG/1
0.5 TABLET ORAL ONCE
Status: COMPLETED | OUTPATIENT
Start: 2025-04-12 | End: 2025-04-12

## 2025-04-12 RX ORDER — ALUMINUM HYDROXIDE, MAGNESIUM HYDROXIDE, AND SIMETHICONE 1200; 120; 1200 MG/30ML; MG/30ML; MG/30ML
30 SUSPENSION ORAL 4 TIMES DAILY PRN
Status: DISCONTINUED | OUTPATIENT
Start: 2025-04-12 | End: 2025-04-12 | Stop reason: HOSPADM

## 2025-04-12 RX ORDER — ONDANSETRON HYDROCHLORIDE 2 MG/ML
4 INJECTION, SOLUTION INTRAVENOUS EVERY 6 HOURS PRN
Status: DISCONTINUED | OUTPATIENT
Start: 2025-04-12 | End: 2025-04-12 | Stop reason: HOSPADM

## 2025-04-12 RX ORDER — ACETAMINOPHEN 325 MG/1
650 TABLET ORAL EVERY 8 HOURS PRN
Status: DISCONTINUED | OUTPATIENT
Start: 2025-04-12 | End: 2025-04-12 | Stop reason: HOSPADM

## 2025-04-12 RX ORDER — LANOLIN ALCOHOL/MO/W.PET/CERES
800 CREAM (GRAM) TOPICAL
Status: DISCONTINUED | OUTPATIENT
Start: 2025-04-12 | End: 2025-04-12 | Stop reason: HOSPADM

## 2025-04-12 RX ORDER — VENLAFAXINE HYDROCHLORIDE 150 MG/1
150 CAPSULE, EXTENDED RELEASE ORAL DAILY
COMMUNITY
Start: 2025-04-08

## 2025-04-12 RX ORDER — INSULIN ASPART 100 [IU]/ML
0-10 INJECTION, SOLUTION INTRAVENOUS; SUBCUTANEOUS EVERY 6 HOURS PRN
Status: DISCONTINUED | OUTPATIENT
Start: 2025-04-12 | End: 2025-04-12 | Stop reason: HOSPADM

## 2025-04-12 RX ORDER — NALOXONE HCL 0.4 MG/ML
0.02 VIAL (ML) INJECTION
Status: DISCONTINUED | OUTPATIENT
Start: 2025-04-12 | End: 2025-04-12 | Stop reason: HOSPADM

## 2025-04-12 RX ORDER — SODIUM,POTASSIUM PHOSPHATES 280-250MG
2 POWDER IN PACKET (EA) ORAL
Status: DISCONTINUED | OUTPATIENT
Start: 2025-04-12 | End: 2025-04-12 | Stop reason: HOSPADM

## 2025-04-12 RX ORDER — SODIUM CHLORIDE, SODIUM LACTATE, POTASSIUM CHLORIDE, CALCIUM CHLORIDE 600; 310; 30; 20 MG/100ML; MG/100ML; MG/100ML; MG/100ML
INJECTION, SOLUTION INTRAVENOUS CONTINUOUS
Status: DISCONTINUED | OUTPATIENT
Start: 2025-04-12 | End: 2025-04-12

## 2025-04-12 RX ORDER — LEVOCARNITINE 1 G/5ML
77 INJECTION INTRAVENOUS ONCE
Status: DISCONTINUED | OUTPATIENT
Start: 2025-04-12 | End: 2025-04-12

## 2025-04-12 RX ORDER — IBUPROFEN 200 MG
16 TABLET ORAL
Status: DISCONTINUED | OUTPATIENT
Start: 2025-04-12 | End: 2025-04-12 | Stop reason: HOSPADM

## 2025-04-12 RX ORDER — TALC
6 POWDER (GRAM) TOPICAL NIGHTLY PRN
Status: DISCONTINUED | OUTPATIENT
Start: 2025-04-12 | End: 2025-04-12 | Stop reason: HOSPADM

## 2025-04-12 RX ORDER — DIVALPROEX SODIUM 500 MG/1
500 TABLET, DELAYED RELEASE ORAL 2 TIMES DAILY
COMMUNITY

## 2025-04-12 RX ORDER — ALPRAZOLAM 1 MG/1
1 TABLET ORAL 3 TIMES DAILY PRN
COMMUNITY
Start: 2025-04-08

## 2025-04-12 RX ORDER — ACETAMINOPHEN 325 MG/1
650 TABLET ORAL EVERY 4 HOURS PRN
Status: DISCONTINUED | OUTPATIENT
Start: 2025-04-12 | End: 2025-04-12 | Stop reason: HOSPADM

## 2025-04-12 RX ORDER — AMOXICILLIN 250 MG
1 CAPSULE ORAL 2 TIMES DAILY
Status: DISCONTINUED | OUTPATIENT
Start: 2025-04-12 | End: 2025-04-12 | Stop reason: HOSPADM

## 2025-04-12 RX ORDER — LISINOPRIL 20 MG/1
20 TABLET ORAL DAILY
COMMUNITY

## 2025-04-12 RX ORDER — GLUCAGON 1 MG
1 KIT INJECTION
Status: DISCONTINUED | OUTPATIENT
Start: 2025-04-12 | End: 2025-04-12 | Stop reason: HOSPADM

## 2025-04-12 RX ORDER — METFORMIN HYDROCHLORIDE 1000 MG/1
1000 TABLET ORAL EVERY MORNING
COMMUNITY

## 2025-04-12 RX ADMIN — INSULIN GLARGINE 10 UNITS: 100 INJECTION, SOLUTION SUBCUTANEOUS at 02:04

## 2025-04-12 RX ADMIN — FAMOTIDINE 20 MG: 10 INJECTION, SOLUTION INTRAVENOUS at 09:04

## 2025-04-12 RX ADMIN — LACTULOSE 10 G: 20 SOLUTION ORAL at 01:04

## 2025-04-12 RX ADMIN — SODIUM CHLORIDE, POTASSIUM CHLORIDE, SODIUM LACTATE AND CALCIUM CHLORIDE: 600; 310; 30; 20 INJECTION, SOLUTION INTRAVENOUS at 06:04

## 2025-04-12 RX ADMIN — SODIUM CHLORIDE 250 MG: 9 INJECTION, SOLUTION INTRAVENOUS at 07:04

## 2025-04-12 RX ADMIN — SODIUM CHLORIDE, POTASSIUM CHLORIDE, SODIUM LACTATE AND CALCIUM CHLORIDE 1000 ML: 600; 310; 30; 20 INJECTION, SOLUTION INTRAVENOUS at 01:04

## 2025-04-12 RX ADMIN — DROPERIDOL 1.25 MG: 2.5 INJECTION, SOLUTION INTRAMUSCULAR; INTRAVENOUS at 12:04

## 2025-04-12 RX ADMIN — ALPRAZOLAM 0.5 MG: 0.25 TABLET ORAL at 03:04

## 2025-04-12 NOTE — ASSESSMENT & PLAN NOTE
No cirrhosis mentioned in past history or notes or Imaging US or CT    But notes all  say he has alcohol abuse problem       Today his LFTs normal   T Bili normal   No INR checked     But ammonia was high  and its the only explanation we have for his mental status change         PLAN      - clears for diet for now     - run LR IVFs    - check ammonia again next am labs     - lactulose 20 gm PO TID   when he can take it     But if not, its ok.   With no food intake there will be no protein intake and he can't generate Ammonia     Should come down on its own     - pepcid IV BID      - check new Ultra sound RUQ to see if cirrhosis developed

## 2025-04-12 NOTE — H&P
[facility] - Emergency Dept  Hospital Medicine  History & Physical    Patient Name: Bradley Collado  MRN: [id number]  Patient Class: OP- Observation  Admission Date: 4/11/2025  Attending Physician: Alex Mancera MD  Primary Care Provider: Cassius Oneill MD         Patient information was obtained from EMS personnel and ER records.     Subjective:     Principal Problem:<principal problem not specified>    Chief Complaint:   Chief Complaint   Patient presents with    Altered Mental Status     Pt mother called EMS for pt acting erratically         HPI: 45-year-old man with a history of schizophrenia , seizures, ETOH abuse ,  pancreatitis hx ,  thyroid disorder , bipolar,  presents for evaluation of not acting like his normal self since at least last night per the family. He is brought in by EMS. His mom at home thinks he might be doing some drugs but isn't sure what kind of drugs he does. He offers no useful history     Smokes  Drinks     Maybe uses drugs . We dont know    THC only on drug screen     The patient wakes up some for me but drowsy and can't talk or answer questions       His labs were normal but Ammonia was high       LFTs normal     CT head normal       Depakote level was LOW 42           We will admit for the encephalopathy     Past Medical History:   Diagnosis Date    Anxiety     Anxiety     Bipolar affective disorder     Depression     Hypertension     Pancreatitis     Schizophrenia     Seizures     Thyroid disease        Past Surgical History:   Procedure Laterality Date    ESOPHAGOGASTRODUODENOSCOPY N/A 11/24/2023    Procedure: EGD (ESOPHAGOGASTRODUODENOSCOPY);  Surgeon: Shannen Meyer MD;  Location: [facility];  Service: Endoscopy;  Laterality: N/A;    ESOPHAGOGASTRODUODENOSCOPY N/A 11/27/2023    Procedure: EGD (ESOPHAGOGASTRODUODENOSCOPY);  Surgeon: Steve Anderson MD;  Location: [facility];  Service: Endoscopy;  Laterality: N/A;    ESOPHAGOGASTRODUODENOSCOPY N/A  12/8/2023    Procedure: EGD (ESOPHAGOGASTRODUODENOSCOPY);  Surgeon: Steve Anderson MD;  Location: [facility];  Service: Endoscopy;  Laterality: N/A;    LAPAROSCOPIC CHOLECYSTECTOMY N/A 3/9/2020    Procedure: CHOLECYSTECTOMY, LAPAROSCOPIC;  Surgeon: Trenton Deshpande MD;  Location: [facility];  Service: General;  Laterality: N/A;       Review of patient's allergies indicates:  No Known Allergies    No current facility-administered medications on file prior to encounter.     Current Outpatient Medications on File Prior to Encounter   Medication Sig    acamprosate calcium (CAMPRAL ORAL) Take 666 mg by mouth 2 (two) times a day.    amLODIPine (NORVASC) 10 MG tablet Take 10 mg by mouth once daily.    busPIRone (BUSPAR) 30 MG Tab Take 30 mg by mouth 2 (two) times daily.    chlorproMAZINE (THORAZINE) 25 MG tablet Take 1 tablet (25 mg total) by mouth 2 (two) times daily.    clonazePAM (KLONOPIN) 2 MG Tab Take 2 mg by mouth 2 (two) times daily as needed.    cloNIDine (CATAPRES) 0.1 MG tablet Take 2 tablets (0.2 mg total) by mouth 3 (three) times daily.    divalproex ER (DEPAKOTE ER) 500 MG Tb24 Take 500 mg by mouth 3 (three) times daily.     DULoxetine (CYMBALTA) 30 MG capsule Take 30 mg by mouth once daily.    fluticasone propionate (FLONASE) 50 mcg/actuation nasal spray 1 spray by Each Nostril route once daily.    FOLIC ACID ORAL Take 2 mg by mouth once daily.    gabapentin (NEURONTIN) 400 MG capsule Take 2 capsules (800 mg total) by mouth 3 (three) times daily.    HUMALOG KWIKPEN INSULIN 100 unit/mL pen ADMINISTER 5 UNITS UNDER THE SKIN THREE TIMES DAILY (Patient not taking: Reported on 11/22/2023)    lancets 33 gauge Misc 1 Package.    LANTUS SOLOSTAR U-100 INSULIN glargine 100 units/mL (3mL) SubQ pen ADMINISTER 30 UNITS UNDER THE SKIN EVERY EVENING (Patient taking differently: Inject 30 Units into the skin every evening.)    lisinopriL 10 MG tablet Take 10 mg by mouth 2 (two) times daily.    loratadine (CLARITIN) 10 mg  "tablet Take 10 mg by mouth once daily.    metFORMIN (GLUCOPHAGE) 500 MG tablet Take 1,000 mg by mouth 2 (two) times daily with meals.    mirtazapine (REMERON) 30 MG tablet Take 1 tablet (30 mg total) by mouth every evening.    OLANZapine (ZYPREXA) 20 MG tablet Take 20 mg by mouth every evening.    pantoprazole (PROTONIX) 40 MG tablet Take 1 tablet (40 mg total) by mouth 2 (two) times daily.    pen needle, diabetic 32 gauge x 5/32" Ndle 1 Box.    prazosin (MINIPRESS) 2 MG Cap Take 2 mg by mouth every evening.    QUEtiapine (SEROQUEL) 300 MG Tab Take 600 mg by mouth every evening.    tamsulosin (FLOMAX) 0.4 mg Cap Take 1 capsule (0.4 mg total) by mouth once daily. for 10 days    thiamine 100 MG tablet Take 100 mg by mouth once daily.    traZODone (DESYREL) 100 MG tablet Take 100 mg by mouth every evening.    TRULICITY 4.5 mg/0.5 mL pen injector Inject 4.5 mg into the skin every 7 days. TUESDAYS    VIVITROL 380 mg SSRR kit Inject 380 mg into the muscle every 30 days.     Family History       Problem Relation (Age of Onset)    Cancer Paternal Grandfather    Diabetes Maternal Grandmother    Hypertension Maternal Grandfather          Tobacco Use    Smoking status: Some Days     Current packs/day: 0.25     Average packs/day: 0.3 packs/day for 7.0 years (1.8 ttl pk-yrs)     Types: Cigarettes    Smokeless tobacco: Never   Substance and Sexual Activity    Alcohol use: Yes     Alcohol/week: 2.0 standard drinks of alcohol     Types: 2 Glasses of wine per week     Comment: occasionally    Drug use: Yes     Frequency: 7.0 times per week     Types: Marijuana    Sexual activity: Yes     Partners: Female     Review of Systems   Unable to perform ROS: Mental status change     Objective:     Vital Signs (Most Recent):  Temp: 97.8 °F (36.6 °C) (04/11/25 2258)  Pulse: 95 (04/11/25 2258)  Resp: 14 (04/11/25 2258)  BP: (!) 165/92 (04/11/25 2258)  SpO2: 95 % (04/11/25 2258) Vital Signs (24h Range):  Temp:  [97.8 °F (36.6 °C)] 97.8 °F (36.6 " "°C)  Pulse:  [95] 95  Resp:  [14] 14  SpO2:  [95 %] 95 %  BP: (165)/(92) 165/92     Weight: 77.1 kg (170 lb)  Body mass index is 26.63 kg/m².     Physical Exam  Vitals and nursing note reviewed.   Constitutional:       Appearance: Normal appearance.   HENT:      Head: Normocephalic.      Nose: Nose normal.      Mouth/Throat:      Mouth: Mucous membranes are dry.   Eyes:      Conjunctiva/sclera: Conjunctivae normal.      Pupils: Pupils are equal, round, and reactive to light.   Cardiovascular:      Rate and Rhythm: Normal rate and regular rhythm.      Pulses: Normal pulses.      Heart sounds: Normal heart sounds.   Pulmonary:      Effort: Pulmonary effort is normal.      Breath sounds: Normal breath sounds.   Abdominal:      General: Abdomen is flat. Bowel sounds are normal.      Palpations: Abdomen is soft.   Musculoskeletal:         General: Normal range of motion.      Cervical back: Normal range of motion and neck supple.   Skin:     General: Skin is warm.      Capillary Refill: Capillary refill takes less than 2 seconds.   Neurological:      General: No focal deficit present.      Comments: Drowsy somnolent wont speak   wakes up briefly               CRANIAL NERVES     CN III, IV, VI   Pupils are equal, round, and reactive to light.       Significant Labs: All pertinent labs within the past 24 hours have been reviewed.  CBC:   Recent Labs   Lab 04/11/25  2303   WBC 5.70   HGB 12.8*   HCT 41.2   *     CMP:   Recent Labs   Lab 04/11/25  2302      K 4.3   CO2 21*   BUN 5*   CREATININE 1.2   CALCIUM 9.9   ALBUMIN 4.7   BILITOT 0.4   ALKPHOS 86   AST 13   ALT 28     Cardiac Markers:   Recent Labs   Lab 04/11/25  2303   BNP 13     Coagulation: No results for input(s): "PT", "INR", "APTT" in the last 48 hours.  Lactic Acid: No results for input(s): "LACTATE" in the last 48 hours.  Lipase: No results for input(s): "LIPASE" in the last 48 hours.  Magnesium:   Recent Labs   Lab 04/11/25  2302   MG 2.0 " "    Respiratory Culture: No results for input(s): "GSRESP", "RESPIRATORYC" in the last 48 hours.  Troponin: No results for input(s): "TROPONINI", "TROPONINIHS" in the last 48 hours.  TSH:   Recent Labs   Lab 04/11/25  2303   TSH 0.712     Urine Studies:   Recent Labs   Lab 04/11/25 2302   APPEARANCEUA Clear   SPECGRAV 1.010   PROTEINUA Negative   BILIRUBINUA Negative   UROBILINOGEN Negative   LEUKOCYTESUR Negative       Significant Imaging: I have reviewed all pertinent imaging results/findings within the past 24 hours.  I have reviewed and interpreted all pertinent imaging results/findings within the past 24 hours.  Assessment/Plan:     Assessment & Plan  Alcohol abuse    Its in his history     And I dont know if he still does    But ammonia was elevated     LFTs normal       PLAN    - IVFs    - check US RUQ to see liver    - I may start high dose Thiamine IV on him since we are not sure why he is having encephalopathy     - check B12, Folate , tsh ,free T4   Hyperammonemia  No cirrhosis mentioned in past history or notes or Imaging US or CT    But notes all  say he has alcohol abuse problem       Today his LFTs normal   T Bili normal   No INR checked     But ammonia was high  and its the only explanation we have for his mental status change         PLAN      - clears for diet for now     - run LR IVFs    - check ammonia again next am labs     - lactulose 20 gm PO TID   when he can take it     But if not, its ok.   With no food intake there will be no protein intake and he can't generate Ammonia     Should come down on its own     - pepcid IV BID      - check new Ultra sound RUQ to see if cirrhosis developed   Encephalopathy acute    Only answer we have is Ammonia so far     Looks like he is a drinker or was    No mention of cirrhosis US CT scans from 2023     Tox screen shows only THC       I wonder if he had Seizures nobody saw ??    He has that history and was on depakote    Depakote level was LOW   "       PLAN      - EEG     - IVFs    - no protein intake for now      Clears only     - lactulose for the ammonia     - I will try high dose Thiamine IV also     - check b12, folate,  thyroid       - keep mag over 2 and Potasium over 4  to raise seizure threshold   VTE Risk Mitigation (From admission, onward)           Ordered     IP VTE LOW RISK PATIENT  Once         04/12/25 0130     Place sequential compression device  Until discontinued         04/12/25 0130                       On 04/12/2025, patient should be placed in hospital observation services under my care.             Alex Mancera MD  Department of Hospital Medicine  [facility] - Emergency Dept

## 2025-04-12 NOTE — ASSESSMENT & PLAN NOTE
Its in his history     And I dont know if he still does    But ammonia was elevated     LFTs normal       PLAN    - IVFs    - check US RUQ to see liver    - I may start high dose Thiamine IV on him since we are not sure why he is having encephalopathy     - check B12, Folate , tsh ,free T4

## 2025-04-12 NOTE — ED NOTES
Security at bedside to wand pt. Pt changed in paper scrubs per hospital policy. Pt belongings taken to inventory and lock in safe. Environment clear and safe from harmful objects. ED sitter placed at bedside for direct pt observation. Pt labile and agitated, but able to be redirected. Restroom and comfort needs addressed. Pt denies pain or needs at this time.

## 2025-04-12 NOTE — HPI
45-year-old man with a history of schizophrenia , seizures, ETOH abuse ,  pancreatitis hx ,  thyroid disorder , bipolar,  presents for evaluation of not acting like his normal self since at least last night per the family. He is brought in by EMS. His mom at home thinks he might be doing some drugs but isn't sure what kind of drugs he does. He offers no useful history     Smokes  Drinks     Maybe uses drugs . We dont know    THC only on drug screen     The patient wakes up some for me but drowsy and can't talk or answer questions       His labs were normal but Ammonia was high       LFTs normal     CT head normal       Depakote level was LOW 42           We will admit for the encephalopathy

## 2025-04-12 NOTE — ED NOTES
Pt remains in paper scrubs per hospital policy. Environment remains clear and safe from harmful objects. ED sitter remains at bedside for direct pt observation. Pt is sleeping in stretcher, chest rise and fall noted. Pt awakens to verbal stimuli. Restroom and comfort needs addressed. Pt denies pain or needs at this time.

## 2025-04-12 NOTE — ED PROVIDER NOTES
Encounter Date: 4/11/2025       History     Chief Complaint   Patient presents with    Altered Mental Status     Pt mother called EMS for pt acting erratically      HPI  45-year-old man with a history of schizophrenia seizures thyroid disorder bipolar presents for evaluation of not acting like his normal self since at least last night per the family.  He is brought in by EMS.  His mom at home thinks he might be doing some drugs but isn't sure what kind of drugs he does.  He offers no useful history    Review of patient's allergies indicates:  No Known Allergies  Past Medical History:   Diagnosis Date    Anxiety     Anxiety     Bipolar affective disorder     Depression     Hypertension     Pancreatitis     Schizophrenia     Seizures     Thyroid disease      Past Surgical History:   Procedure Laterality Date    ESOPHAGOGASTRODUODENOSCOPY N/A 11/24/2023    Procedure: EGD (ESOPHAGOGASTRODUODENOSCOPY);  Surgeon: Shannen Meyer MD;  Location: [facility];  Service: Endoscopy;  Laterality: N/A;    ESOPHAGOGASTRODUODENOSCOPY N/A 11/27/2023    Procedure: EGD (ESOPHAGOGASTRODUODENOSCOPY);  Surgeon: Steve Anedrson MD;  Location: [facility];  Service: Endoscopy;  Laterality: N/A;    ESOPHAGOGASTRODUODENOSCOPY N/A 12/8/2023    Procedure: EGD (ESOPHAGOGASTRODUODENOSCOPY);  Surgeon: Steve Anderson MD;  Location: [facility];  Service: Endoscopy;  Laterality: N/A;    LAPAROSCOPIC CHOLECYSTECTOMY N/A 3/9/2020    Procedure: CHOLECYSTECTOMY, LAPAROSCOPIC;  Surgeon: Trenton Deshpande MD;  Location: [facility];  Service: General;  Laterality: N/A;     Family History   Problem Relation Name Age of Onset    Diabetes Maternal Grandmother      Hypertension Maternal Grandfather      Cancer Paternal Grandfather       Social History[1]  Review of Systems   All other systems reviewed and are negative.      Physical Exam     Initial Vitals [04/11/25 2258]   BP Pulse Resp Temp SpO2   (!) 165/92 95 14 97.8 °F (36.6 °C) 95 %      MAP       --          Physical Exam    Nursing note and vitals reviewed.  Constitutional: He appears well-developed and well-nourished.   HENT:   Head: Normocephalic and atraumatic.   I do not see signs of open or depressed skull fracture   Eyes: EOM are normal. Pupils are equal, round, and reactive to light. Right eye exhibits no discharge. Left eye exhibits no discharge.   Neck: Neck supple. No tracheal deviation present.   Normal range of motion.  Cardiovascular:  Normal rate, regular rhythm and intact distal pulses.           Pulmonary/Chest: Breath sounds normal. No stridor. No respiratory distress.   Abdominal: Abdomen is soft. Bowel sounds are normal. There is no abdominal tenderness.   Musculoskeletal:         General: No edema.      Cervical back: Normal range of motion and neck supple.     Neurological: He is alert and oriented to person, place, and time.   He is awake, he is alert, he does not respond appropriately to questions, he does not follow commands, he attempts to bite, he moves all his extremities equally his face is grossly symmetric   Skin: Skin is warm and dry.         ED Course   Procedures  Labs Reviewed   COMPREHENSIVE METABOLIC PANEL - Abnormal       Result Value    Sodium 140      Potassium 4.3      Chloride 104      CO2 21 (*)     Glucose 158 (*)     BUN 5 (*)     Creatinine 1.2      Calcium 9.9      Protein Total 7.7      Albumin 4.7      Bilirubin Total 0.4      ALP 86      AST 13      ALT 28      Anion Gap 15      eGFR >60     URINALYSIS, REFLEX TO URINE CULTURE - Abnormal    Color, UA Yellow      Appearance, UA Clear      Spec Grav UA 1.010      pH, UA 7.0      Protein, UA Negative      Glucose, UA Trace (*)     Ketones, UA Negative      Blood, UA Negative      Bilirubin, UA Negative      Urobilinogen, UA Negative      Nitrites, UA Negative      Leukocyte Esterase, UA Negative     DRUG SCREEN PANEL, URINE EMERGENCY - Abnormal    Benzodiazepine, Urine Negative      Cocaine, Urine Negative       Opiates, Urine Negative      Barbituates, Urine Negative      Amphetamines, Urine Negative      THC Presumptive Positive (*)     Phencyclidine, Urine Negative      Urine Creatinine 99.4      Narrative:     This screen includes the following classes of drugs at the   listed cut-off:    Benzodiazepines                  200 ng/ml  Cocaine metabolite               300 ng/ml  Opiates                          300 ng/ml  Barbiturates                     200 ng/ml  Amphetamines                    1000 ng/ml  Marijuana metabs (THC)            50 ng/ml  Phencyclidine (PCP)               25 ng/ml    High concentrations of Methylenedioxymethamphetamine (MDMA aka  Ectasy) and other structurally similar compounds may cross-   react with the Amphetamine/Methamphetamine screening   immunoassay giving a false positive result.    Note: This exception list includes only more common   interferants in toxicology screen testing.  Because of many cross-reactantspositive results on toxicology drug screens   should be confirmed whenever results do not correlate with   clinical presentation.    This report is intended for use in clinical monitoring and  management of patients. It is not intended for use in   employment related drug testing.    Because of any cross-reactants, positive results on toxicology  drug screens should be confirmed whenever results do not  correlate with clinical presentation.    Presumptive positive results are unconfirmed and may be used   only for medical purposes.   ALCOHOL,MEDICAL (ETHANOL) - Abnormal    Alcohol, Serum 35 (*)    ACETAMINOPHEN LEVEL - Abnormal    Acetaminophen Level 0.2 (*)    SALICYLATE LEVEL - Abnormal    Salicylate Level <1.5 (*)    AMMONIA - Abnormal    Ammonia 234 (*)    CBC WITH DIFFERENTIAL - Abnormal    WBC 5.70      RBC 4.99      Hgb 12.8 (*)     Hct 41.2      MCV 83      MCH 25.7 (*)     MCHC 31.1 (*)     RDW 19.2 (*)     Platelet Count 133 (*)     MPV        Nucleated RBC 0      Neut %  72.4      Lymph % 18.4      Mono % 7.0      Eos % 1.2      Basophil % 0.5      Imm Grans % 0.5      Neut # 4.1      Lymph # 1.05      Mono # 0.40      Eos # 0.07      Baso # 0.03      Imm Grans # 0.03     VALPROIC ACID - Abnormal    Valproic Acid 42.4 (*)    MAGNESIUM - Normal    Magnesium 2.0     TROPONIN I HIGH SENSITIVITY - Normal    Troponin High Sensitive 7.0     B-TYPE NATRIURETIC PEPTIDE - Normal    BNP 13     TSH - Normal    TSH 0.712     CBC W/ AUTO DIFFERENTIAL    Narrative:     The following orders were created for panel order CBC auto differential.  Procedure                               Abnormality         Status                     ---------                               -----------         ------                     CBC with Differential[[id number]]       Abnormal            Final result                 Please view results for these tests on the individual orders.   HEPATITIS C ANTIBODY   HIV 1 / 2 ANTIBODY   COMPREHENSIVE METABOLIC PANEL   MAGNESIUM   CBC W/ AUTO DIFFERENTIAL    Narrative:     The following orders were created for panel order CBC with Automated Differential.  Procedure                               Abnormality         Status                     ---------                               -----------         ------                     CBC with Differential[[id number]]                                                        Please view results for these tests on the individual orders.   AMMONIA   CBC WITH DIFFERENTIAL   PROTIME-INR   FOLATE   VITAMIN B12   T4, FREE   HEMOGLOBIN A1C   POCT GLUCOSE MONITORING CONTINUOUS   POCT GLUCOSE MONITORING CONTINUOUS          Imaging Results              CT Head Without Contrast (Final result)  Result time 04/12/25 02:02:15      Final result by Garrett Novoa DO (04/12/25 02:02:15)                   Impression:      No acute intracranial abnormality.      Electronically signed by: Garrett Novoa  Date:    04/12/2025  Time:    02:02                Narrative:    EXAMINATION:  CT HEAD WITHOUT CONTRAST    CLINICAL HISTORY:  Mental status change, unknown cause;    TECHNIQUE:  Low dose axial CT images obtained throughout the head without intravenous contrast. Sagittal and coronal reconstructions were performed.    COMPARISON:  CT head 10/16/2021.    FINDINGS:  Ventricles and sulci are normal in size for age without evidence of hydrocephalus. No extra-axial blood or fluid collections.  There is hypoattenuation in the supratentorial white matter, nonspecific but can be seen with chronic microvascular ischemic changes or additional etiologies.  No parenchymal mass, hemorrhage, edema or major vascular distribution infarct.    No calvarial fracture.  The scalp is unremarkable.  Bilateral paranasal sinuses and mastoid air cells are clear.                                       X-Ray Chest AP Portable (Final result)  Result time 04/11/25 23:50:14      Final result by Wale Farrell MD (04/11/25 23:50:14)                   Impression:      No acute findings      Electronically signed by: Wale Farrell  Date:    04/11/2025  Time:    23:50               Narrative:    EXAMINATION:  XR CHEST AP PORTABLE    CLINICAL HISTORY:  Chest Pain;    TECHNIQUE:  Single frontal view of the chest was performed.    COMPARISON:  10/07/2020    FINDINGS:  Lungs are clear. No focal consolidation. No pleural effusion. No pneumothorax. Normal heart size.  Left-sided rib fracture deformities.                                       Medications   lactated ringers bolus 1,000 mL (1,000 mLs Intravenous New Bag 4/12/25 0139)   lactated ringers infusion (has no administration in time range)   lactulose 20 gram/30 mL solution Soln 20 g (has no administration in time range)   famotidine (PF) injection 20 mg (has no administration in time range)   sodium chloride 0.9% flush 3 mL (has no administration in time range)   melatonin tablet 6 mg (has no administration in time range)   ondansetron  injection 4 mg (has no administration in time range)   senna-docusate 8.6-50 mg per tablet 1 tablet (has no administration in time range)   acetaminophen tablet 650 mg (has no administration in time range)   aluminum-magnesium hydroxide-simethicone 200-200-20 mg/5 mL suspension 30 mL (has no administration in time range)   acetaminophen tablet 650 mg (has no administration in time range)   naloxone 0.4 mg/mL injection 0.02 mg (has no administration in time range)   potassium bicarbonate disintegrating tablet 50 mEq (has no administration in time range)   potassium bicarbonate disintegrating tablet 35 mEq (has no administration in time range)   potassium bicarbonate disintegrating tablet 60 mEq (has no administration in time range)   magnesium oxide tablet 800 mg (has no administration in time range)   magnesium oxide tablet 800 mg (has no administration in time range)   potassium, sodium phosphates 280-160-250 mg packet 2 packet (has no administration in time range)   potassium, sodium phosphates 280-160-250 mg packet 2 packet (has no administration in time range)   potassium, sodium phosphates 280-160-250 mg packet 2 packet (has no administration in time range)   glucose chewable tablet 16 g (has no administration in time range)   glucose chewable tablet 24 g (has no administration in time range)   dextrose 50% injection 12.5 g (has no administration in time range)   dextrose 50% injection 25 g (has no administration in time range)   glucagon (human recombinant) injection 1 mg (has no administration in time range)   dextrose 50% injection 12.5 g (has no administration in time range)   glucagon (human recombinant) injection 1 mg (has no administration in time range)   insulin glargine U-100 (Lantus) pen 10 Units (has no administration in time range)   insulin aspart U-100 pen 0-10 Units (has no administration in time range)   droPERidol injection 1.25 mg (1.25 mg Intravenous Given 4/11/25 5207)   droPERidol  injection 1.25 mg (1.25 mg Intravenous Given 4/12/25 0003)   lactulose 20 gram/30 mL solution Soln 10 g (10 g Oral Given 4/12/25 0138)     Medical Decision Making  45-year-old man brought in for not acting like his normal self for greater than a day, mom thinks that is drugs, his vital signs are notable for htn, afebrile, not, on exam he is not responding appropriately to questions, GCS 14 for confusion, differential for altered mental status is broad in his gentleman including tox, intracranial pathology, psychiatric disorder, metabolic or endocrine derangement.  We will pursue a broad mental altered mental status workup, and scan his head as well    I filled out a PC as I was initially concerned that this may be related to psychiatric illness, with workup thus far I do not feel it is safe to medically clear him for behavioral landis at this time, will admit medicine    Has a ammonia level was elevated, I added on a valproate level which is detectable but below therapeutic levels, it appears that you can have symptomatic hyper ammonia anemia without having elevated vpa levels therefore I elected to order levo carnitine after discussion with pharmacy.  I also ordered lactulose and some fluid resuscitation.  Admit to hospital medicine.    Amount and/or Complexity of Data Reviewed  Independent Historian: EMS     Details: Reports that he has been not his self for greater than 24 hours  Labs: ordered.  Radiology: ordered and independent interpretation performed. Decision-making details documented in ED Course.  ECG/medicine tests: ordered and independent interpretation performed. Decision-making details documented in ED Course.    Risk  Prescription drug management.               ED Course as of 04/12/25 0210   Fri Apr 11, 2025 2303 He is failing verbal redirection, he represents a danger to himself and staff, we will give him some droperidol for non redirectable agitation [IC]   2307 EKG on my independent  interpretation normal sinus 92 beats per minute normal axis no STEMI QTC less than 500, [IC]   2331 He is attempting to climb out of the bed he is a danger to himself, he is not redirectable, we will give additional droperidol [IC]   Sat Apr 12, 2025   0057 Do not appreciate acute midline shift on my independent interpretation of head CT [IC]   0107 After discussion with pharmacy I have elected to give this patient levo care and teen as he has elevated ammonia levels and takes Depakote, feel that the risk profile is reasonable [IC]      ED Course User Index  [IC] Jordan Blackwell MD                           Clinical Impression:  Final diagnoses:  [R07.9] Chest pain  [R41.82] Altered mental status, unspecified altered mental status type (Primary)  [R79.89] Increased ammonia level          ED Disposition Condition    Observation                   Jordan Blackwell MD  04/12/25 0142         [1]   Social History  Tobacco Use    Smoking status: Some Days     Current packs/day: 0.25     Average packs/day: 0.3 packs/day for 7.0 years (1.8 ttl pk-yrs)     Types: Cigarettes    Smokeless tobacco: Never   Substance Use Topics    Alcohol use: Yes     Alcohol/week: 2.0 standard drinks of alcohol     Types: 2 Glasses of wine per week     Comment: occasionally    Drug use: Yes     Frequency: 7.0 times per week     Types: Marijuana        Jordan Blackwell MD  04/12/25 0211

## 2025-04-12 NOTE — ED NOTES
Pt remains in paper scrubs per hospital policy. Environment remains clear and safe from harmful objects. ED sitter remains at bedside for direct pt observation. Pt is sleeping in stretcher, chest rise and fall noted. Pt awakens to verbal stimuli. Restroom and comfort needs addressed. Pt denies pain or needs at this time.    sensory intact

## 2025-04-12 NOTE — PROCEDURES
DATE: 4/12/25    EEG NUMBER: SM     REFERRING PHYSICIAN:  Dr. Mancera      This EEG was performed to assess for subclinical seizures      ELECTROENCEPHALOGRAM REPORT     METHODOLOGY:  Electroencephalographic (EEG) recording is with electrodes placed according to the International 10-20 placement system.  Thirty two (32) channels of digital signal are simultaneously recorded from the scalp and may include EKG, EMG, and/or eye monitors.   Recording band pass was 0.1 to 512 hz.  Digital video recording of the patient is simultaneously recorded with the EEG.  The nursing staff report clinical symptoms and may press an event button when the patient has symptoms of clinical interest to the treating physicians.  EEG and video recording is stored and archived in digital format.  The entire recording is visually reviewed, and the times identified by computer analysis as being spikes or seizures are reviewed again.  Activation procedures which include photic stimulation, hyperventilation and instructing patients to perform simple task are done in selected patients.   Compresses spectral analysis (CSA) is also performed on the activity recorded from each individual channel.  This is displayed as a power display of frequencies from 0 to 30 Hz over time.   The CSA analysis is done and displayed continuously.  This is reviewed for asymmetries in power between homologous areas of the scalp and for presence of changes in power which can be seen when seizures occur.  Sections of suspected abnormalities on the CSA is then compared with the original EEG recording.                [employer] Anywhere software was also utilized in the review of this study.  This software suite analyzes the EEG recording in multiple domains.  Coherence and rhythmicity is computed to identify EEG sections which may contain organized seizures.  Each channel undergoes analysis to detect presence of spike and sharp waves which have special and morphological  characteristic of epileptic activity.  The routine EEG recording is converted from spacial into frequency domain.  This is then displayed comparing homologous areas to identify areas of significant asymmetry.  Algorithm to identify non-cortically generated artifact is used to separate eye movement, EMG and other artifact from the EEG.     EEG FINDINGS:  The recording was obtained with a number of standard bipolar and referential montages during wakefulness, drowsiness.  In the alert state, the posterior background rhythm was a symmetric, well-modulated 7 Hz activity, which reacted symmetrically to eye opening.  Activation procedures not performed.  Intermittent generalized synchronous semi rhythmical delta activity was noted.  During drowsiness, the background rhythm waxed and waned and there were periods of slowing. There were no interictal epileptiform abnormalities and no clinical or electrographic seizures were recorded.    The EKG channel revealed a sinus rhythm.     IMPRESSION:  This is an abnormal EEG during wakefulness, drowsiness. Diffuse slowing was noted.      CLINICAL CORRELATION:  45-year-old man with a history of schizophrenia , seizures, ETOH abuse , pancreatitis hx , thyroid disorder , bipolar, is being evaluated for altered sensorium.  The patient is currently maintained on Depakote.  This is an abnormal EEG during wakefulness and drowsiness.  The overall degree of slowing disorganization is suggestive moderate degree of encephalopathy likely toxic metabolic encephalopathy.  There is no evidence of an epileptic process on this recording.  No seizures were recorded during this study.

## 2025-04-12 NOTE — SUBJECTIVE & OBJECTIVE
Past Medical History:   Diagnosis Date    Anxiety     Anxiety     Bipolar affective disorder     Depression     Hypertension     Pancreatitis     Schizophrenia     Seizures     Thyroid disease        Past Surgical History:   Procedure Laterality Date    ESOPHAGOGASTRODUODENOSCOPY N/A 11/24/2023    Procedure: EGD (ESOPHAGOGASTRODUODENOSCOPY);  Surgeon: Shannen Meyer MD;  Location: [facility];  Service: Endoscopy;  Laterality: N/A;    ESOPHAGOGASTRODUODENOSCOPY N/A 11/27/2023    Procedure: EGD (ESOPHAGOGASTRODUODENOSCOPY);  Surgeon: Steve Anderson MD;  Location: [facility];  Service: Endoscopy;  Laterality: N/A;    ESOPHAGOGASTRODUODENOSCOPY N/A 12/8/2023    Procedure: EGD (ESOPHAGOGASTRODUODENOSCOPY);  Surgeon: Steve Anderson MD;  Location: [facility];  Service: Endoscopy;  Laterality: N/A;    LAPAROSCOPIC CHOLECYSTECTOMY N/A 3/9/2020    Procedure: CHOLECYSTECTOMY, LAPAROSCOPIC;  Surgeon: Trenton Deshpande MD;  Location: [facility];  Service: General;  Laterality: N/A;       Review of patient's allergies indicates:  No Known Allergies    No current facility-administered medications on file prior to encounter.     Current Outpatient Medications on File Prior to Encounter   Medication Sig    acamprosate calcium (CAMPRAL ORAL) Take 666 mg by mouth 2 (two) times a day.    amLODIPine (NORVASC) 10 MG tablet Take 10 mg by mouth once daily.    busPIRone (BUSPAR) 30 MG Tab Take 30 mg by mouth 2 (two) times daily.    chlorproMAZINE (THORAZINE) 25 MG tablet Take 1 tablet (25 mg total) by mouth 2 (two) times daily.    clonazePAM (KLONOPIN) 2 MG Tab Take 2 mg by mouth 2 (two) times daily as needed.    cloNIDine (CATAPRES) 0.1 MG tablet Take 2 tablets (0.2 mg total) by mouth 3 (three) times daily.    divalproex ER (DEPAKOTE ER) 500 MG Tb24 Take 500 mg by mouth 3 (three) times daily.     DULoxetine (CYMBALTA) 30 MG capsule Take 30 mg by mouth once daily.    fluticasone propionate (FLONASE) 50 mcg/actuation nasal spray 1  "spray by Each Nostril route once daily.    FOLIC ACID ORAL Take 2 mg by mouth once daily.    gabapentin (NEURONTIN) 400 MG capsule Take 2 capsules (800 mg total) by mouth 3 (three) times daily.    HUMALOG KWIKPEN INSULIN 100 unit/mL pen ADMINISTER 5 UNITS UNDER THE SKIN THREE TIMES DAILY (Patient not taking: Reported on 11/22/2023)    lancets 33 gauge Misc 1 Package.    LANTUS SOLOSTAR U-100 INSULIN glargine 100 units/mL (3mL) SubQ pen ADMINISTER 30 UNITS UNDER THE SKIN EVERY EVENING (Patient taking differently: Inject 30 Units into the skin every evening.)    lisinopriL 10 MG tablet Take 10 mg by mouth 2 (two) times daily.    loratadine (CLARITIN) 10 mg tablet Take 10 mg by mouth once daily.    metFORMIN (GLUCOPHAGE) 500 MG tablet Take 1,000 mg by mouth 2 (two) times daily with meals.    mirtazapine (REMERON) 30 MG tablet Take 1 tablet (30 mg total) by mouth every evening.    OLANZapine (ZYPREXA) 20 MG tablet Take 20 mg by mouth every evening.    pantoprazole (PROTONIX) 40 MG tablet Take 1 tablet (40 mg total) by mouth 2 (two) times daily.    pen needle, diabetic 32 gauge x 5/32" Ndle 1 Box.    prazosin (MINIPRESS) 2 MG Cap Take 2 mg by mouth every evening.    QUEtiapine (SEROQUEL) 300 MG Tab Take 600 mg by mouth every evening.    tamsulosin (FLOMAX) 0.4 mg Cap Take 1 capsule (0.4 mg total) by mouth once daily. for 10 days    thiamine 100 MG tablet Take 100 mg by mouth once daily.    traZODone (DESYREL) 100 MG tablet Take 100 mg by mouth every evening.    TRULICITY 4.5 mg/0.5 mL pen injector Inject 4.5 mg into the skin every 7 days. TUESDAYS    VIVITROL 380 mg SSRR kit Inject 380 mg into the muscle every 30 days.     Family History       Problem Relation (Age of Onset)    Cancer Paternal Grandfather    Diabetes Maternal Grandmother    Hypertension Maternal Grandfather          Tobacco Use    Smoking status: Some Days     Current packs/day: 0.25     Average packs/day: 0.3 packs/day for 7.0 years (1.8 ttl pk-yrs)     " Types: Cigarettes    Smokeless tobacco: Never   Substance and Sexual Activity    Alcohol use: Yes     Alcohol/week: 2.0 standard drinks of alcohol     Types: 2 Glasses of wine per week     Comment: occasionally    Drug use: Yes     Frequency: 7.0 times per week     Types: Marijuana    Sexual activity: Yes     Partners: Female     Review of Systems   Unable to perform ROS: Mental status change     Objective:     Vital Signs (Most Recent):  Temp: 97.8 °F (36.6 °C) (04/11/25 2258)  Pulse: 95 (04/11/25 2258)  Resp: 14 (04/11/25 2258)  BP: (!) 165/92 (04/11/25 2258)  SpO2: 95 % (04/11/25 2258) Vital Signs (24h Range):  Temp:  [97.8 °F (36.6 °C)] 97.8 °F (36.6 °C)  Pulse:  [95] 95  Resp:  [14] 14  SpO2:  [95 %] 95 %  BP: (165)/(92) 165/92     Weight: 77.1 kg (170 lb)  Body mass index is 26.63 kg/m².     Physical Exam  Vitals and nursing note reviewed.   Constitutional:       Appearance: Normal appearance.   HENT:      Head: Normocephalic.      Nose: Nose normal.      Mouth/Throat:      Mouth: Mucous membranes are dry.   Eyes:      Conjunctiva/sclera: Conjunctivae normal.      Pupils: Pupils are equal, round, and reactive to light.   Cardiovascular:      Rate and Rhythm: Normal rate and regular rhythm.      Pulses: Normal pulses.      Heart sounds: Normal heart sounds.   Pulmonary:      Effort: Pulmonary effort is normal.      Breath sounds: Normal breath sounds.   Abdominal:      General: Abdomen is flat. Bowel sounds are normal.      Palpations: Abdomen is soft.   Musculoskeletal:         General: Normal range of motion.      Cervical back: Normal range of motion and neck supple.   Skin:     General: Skin is warm.      Capillary Refill: Capillary refill takes less than 2 seconds.   Neurological:      General: No focal deficit present.      Comments: Drowsy somnolent wont speak   wakes up briefly               CRANIAL NERVES     CN III, IV, VI   Pupils are equal, round, and reactive to light.       Significant Labs: All  "pertinent labs within the past 24 hours have been reviewed.  CBC:   Recent Labs   Lab 04/11/25 2303   WBC 5.70   HGB 12.8*   HCT 41.2   *     CMP:   Recent Labs   Lab 04/11/25 2302      K 4.3   CO2 21*   BUN 5*   CREATININE 1.2   CALCIUM 9.9   ALBUMIN 4.7   BILITOT 0.4   ALKPHOS 86   AST 13   ALT 28     Cardiac Markers:   Recent Labs   Lab 04/11/25 2303   BNP 13     Coagulation: No results for input(s): "PT", "INR", "APTT" in the last 48 hours.  Lactic Acid: No results for input(s): "LACTATE" in the last 48 hours.  Lipase: No results for input(s): "LIPASE" in the last 48 hours.  Magnesium:   Recent Labs   Lab 04/11/25 2302   MG 2.0     Respiratory Culture: No results for input(s): "GSRESP", "RESPIRATORYC" in the last 48 hours.  Troponin: No results for input(s): "TROPONINI", "TROPONINIHS" in the last 48 hours.  TSH:   Recent Labs   Lab 04/11/25 2303   TSH 0.712     Urine Studies:   Recent Labs   Lab 04/11/25 2302   APPEARANCEUA Clear   SPECGRAV 1.010   PROTEINUA Negative   BILIRUBINUA Negative   UROBILINOGEN Negative   LEUKOCYTESUR Negative       Significant Imaging: I have reviewed all pertinent imaging results/findings within the past 24 hours.  I have reviewed and interpreted all pertinent imaging results/findings within the past 24 hours.  "

## 2025-04-12 NOTE — ASSESSMENT & PLAN NOTE
Only answer we have is Ammonia so far     Looks like he is a drinker or was    No mention of cirrhosis US CT scans from 2023     Tox screen shows only THC       I wonder if he had Seizures nobody saw ??    He has that history and was on depakote    Depakote level was LOW         PLAN      - EEG     - IVFs    - no protein intake for now      Clears only     - lactulose for the ammonia     - I will try high dose Thiamine IV also     - check b12, folate,  thyroid       - keep mag over 2 and Potasium over 4  to raise seizure threshold

## 2025-04-12 NOTE — ED NOTES
CC:  Princess Bunn is here today for:   Chief Complaint   Patient presents with    Office Visit     PO 1 R CTR  DOI: 8/2/23  DOS: 8/21/23 (PIP fusion RSF)  7/15/24  WC: NO  Suture removal was today w/o complications. Pt states that she is doing well. She has little numbness on the hand and wrist area. She has been wearing a carpal tunnel brace on both hands.         Referring MD: Reuben Valdivia MD  PCP: D'Amico, Michael D, MD   Medications: medications verified and updated  Refills needed today?  NO  denies known Latex allergy or symptoms of Latex sensitivity.  Patient would like communication of their results via:    Cell Phone:   Telephone Information:   Mobile [phone]     Okay to leave a message containing results? Yes  Tobacco history: verified  Body mass index is 36.61 kg/m².               PEC rescinded.

## 2025-04-14 NOTE — DISCHARGE SUMMARY
[facility] - Emergency Dept  Hospital Medicine  Discharge Summary      Patient Name: Bradley Collado  MRN: [id number]  SMITHA: [id number]  Patient Class: OP- Observation  Admission Date: 4/11/2025  Hospital Length of Stay: 0 days  Discharge Date and Time: 4/12/2025  3:16 PM  Attending Physician: No att. providers found   Discharging Provider: Carolin Garcia NP  Primary Care Provider: Cassius Oneill MD    Primary Care Team: Networked reference to record PCT     HPI:   45-year-old man with a history of schizophrenia , seizures, ETOH abuse ,  pancreatitis hx ,  thyroid disorder , bipolar,  presents for evaluation of not acting like his normal self since at least last night per the family. He is brought in by EMS. His mom at home thinks he might be doing some drugs but isn't sure what kind of drugs he does. He offers no useful history     Smokes  Drinks     Maybe uses drugs . We dont know    THC only on drug screen     The patient wakes up some for me but drowsy and can't talk or answer questions       His labs were normal but Ammonia was high       LFTs normal     CT head normal       Depakote level was LOW 42           We will admit for the encephalopathy     * No surgery found *      Hospital Course:   Patient monitor closely during observation stay.  He was noted to have an elevated ammonia level and received a dose of lactulose.  Ammonia level improved to normal inpatient with resolution of confusion.  He is awake alert and oriented and at baseline mental status.  Pec rescinded.  Patient denies suicidal or homicidal ideation.  EEG obtained and is normal without epileptic discharges.  He is medically stable for discharge with outpatient follow-up.     Goals of Care Treatment Preferences:  Code Status: Full Code         Consults:     Assessment & Plan  Alcohol abuse    Its in his history     And I dont know if he still does    But ammonia was elevated     LFTs normal       PLAN    - IVFs    -  check US RUQ to see liver    - I may start high dose Thiamine IV on him since we are not sure why he is having encephalopathy     - check B12, Folate , tsh ,free T4   Hyperammonemia  No cirrhosis mentioned in past history or notes or Imaging US or CT    But notes all  say he has alcohol abuse problem       Today his LFTs normal   T Bili normal   No INR checked     But ammonia was high  and its the only explanation we have for his mental status change         PLAN      - clears for diet for now     - run LR IVFs    - check ammonia again next am labs     - lactulose 20 gm PO TID   when he can take it     But if not, its ok.   With no food intake there will be no protein intake and he can't generate Ammonia     Should come down on its own     - pepcid IV BID      - check new Ultra sound RUQ to see if cirrhosis developed   Encephalopathy acute    Only answer we have is Ammonia so far     Looks like he is a drinker or was    No mention of cirrhosis US CT scans from 2023     Tox screen shows only THC       I wonder if he had Seizures nobody saw ??    He has that history and was on depakote    Depakote level was LOW         PLAN      - EEG     - IVFs    - no protein intake for now      Clears only     - lactulose for the ammonia     - I will try high dose Thiamine IV also     - check b12, folate,  thyroid       - keep mag over 2 and Potasium over 4  to raise seizure threshold   Final Active Diagnoses:    Diagnosis Date Noted POA    PRINCIPAL PROBLEM:  Encephalopathy acute [G93.40] 04/12/2025 Yes    Hyperammonemia [E72.20] 04/12/2025 Yes    Alcohol abuse [F10.10] 04/05/2016 Yes      Problems Resolved During this Admission:       Discharged Condition: stable    Disposition: Home or Self Care    Follow Up:   Follow-up Information       Cassius Oneill MD Follow up in 1 week(s).    Specialty: Family Medicine  Contact information:  [address] [place] [address] [phone]                            Patient Instructions:      Ambulatory referral/consult to Gastroenterology   Standing Status: Future   Referral Priority: Routine Referral Type: Consultation   Referral Reason: Specialty Services Required   Requested Specialty: Gastroenterology   Number of Visits Requested: 1     Ambulatory referral/consult to Addiction Specialist   Standing Status: Future   Referral Priority: Routine Referral Type: Psychiatric   Referral Reason: Specialty Services Required   Requested Specialty: Addiction Medicine   Number of Visits Requested: 1     Ambulatory referral/consult to Gastroenterology   Standing Status: Future   Referral Priority: Routine Referral Type: Consultation   Referral Reason: Specialty Services Required   Requested Specialty: Gastroenterology   Number of Visits Requested: 1     Diet Cardiac     Diet Cardiac     Diet diabetic     Notify your health care provider if you experience any of the following:  severe uncontrolled pain     Notify your health care provider if you experience any of the following:  temperature >100.4     Notify your health care provider if you experience any of the following:  persistent nausea and vomiting or diarrhea     Notify your health care provider if you experience any of the following:  increased confusion or weakness     Notify your health care provider if you experience any of the following:  severe uncontrolled pain     Notify your health care provider if you experience any of the following:  increased confusion or weakness     Activity as tolerated     Activity as tolerated       Significant Diagnostic Studies: Labs: All labs within the past 24 hours have been reviewed    Pending Diagnostic Studies:       None           Medications:  Reconciled Home Medications:      Medication List        CONTINUE taking these medications      ALPRAZolam 1 MG tablet  Commonly known as: XANAX  Take 1 mg by mouth 3 (three) times daily as needed for Anxiety.     amLODIPine 10 MG  "tablet  Commonly known as: NORVASC  Take 10 mg by mouth once daily.     divalproex 500 MG Tbec  Commonly known as: DEPAKOTE  Take 500 mg by mouth 2 (two) times daily.     lancets 33 gauge Misc  1 Package.     lisinopriL 20 MG tablet  Commonly known as: PRINIVIL,ZESTRIL  Take 20 mg by mouth once daily.     metFORMIN 1000 MG tablet  Commonly known as: GLUCOPHAGE  Take 1,000 mg by mouth every morning.     omeprazole 40 MG capsule  Commonly known as: PRILOSEC  Take 40 mg by mouth every morning.     pen needle, diabetic 32 gauge x 5/32" Ndle  1 Box.     QUEtiapine 300 MG Tab  Commonly known as: SEROQUEL  Take 300 mg by mouth 2 (two) times daily.     traZODone 100 MG tablet  Commonly known as: DESYREL  Take 100 mg by mouth every evening.     venlafaxine 150 MG Cp24  Commonly known as: EFFEXOR-XR  Take 150 mg by mouth once daily.            STOP taking these medications      cloNIDine 0.1 MG tablet  Commonly known as: CATAPRES     gabapentin 400 MG capsule  Commonly known as: NEURONTIN     HumaLOG KwikPen Insulin 100 unit/mL pen  Generic drug: insulin lispro            ASK your doctor about these medications      LANTUS SOLOSTAR U-100 INSULIN 100 unit/mL (3 mL) Inpn pen  Generic drug: insulin glargine U-100 (Lantus)  ADMINISTER 30 UNITS UNDER THE SKIN EVERY EVENING     TRULICITY 4.5 mg/0.5 mL pen injector  Generic drug: dulaglutide  Inject 4.5 mg into the skin every 7 days. TUESDAYS              Indwelling Lines/Drains at time of discharge:   Lines/Drains/Airways       None                   Time spent on the discharge of patient: 50 minutes         Carolin Garcia NP  Department of Hospital Medicine  [facility] - Emergency Dept  "

## 2025-04-14 NOTE — HOSPITAL COURSE
Patient monitor closely during observation stay.  He was noted to have an elevated ammonia level and received a dose of lactulose.  Ammonia level improved to normal inpatient with resolution of confusion.  He is awake alert and oriented and at baseline mental status.  Pec rescinded.  Patient denies suicidal or homicidal ideation.  EEG obtained and is normal without epileptic discharges.  He is medically stable for discharge with outpatient follow-up.

## 2025-04-16 DIAGNOSIS — K76.82 HEPATIC ENCEPHALOPATHY: Primary | ICD-10-CM

## 2025-04-29 ENCOUNTER — TELEPHONE (OUTPATIENT)
Dept: HEPATOLOGY | Facility: CLINIC | Age: 45
End: 2025-04-29
Payer: MEDICAID

## 2025-04-29 NOTE — TELEPHONE ENCOUNTER
----- Message from REGINA Beaver sent at 4/29/2025  9:33 AM CDT -----  Regarding: FW: Scheduling Request  Contact: [phone]  Please see the message below.There is a referral for Hepatology for [facility] f/u.Thank you,Virginie  ----- Message -----  From: Scott Zimmerman  Sent: 4/28/2025   4:24 PM CDT  To: [facility] Navigation  Subject: Scheduling Request                               Scheduling Request   Appt Type:   K76.82 (ICD-10-CM) - Hepatic encephalopathy Date/Time Preference: As soon as available Treating Provider: N/A Caller Name: [facility] Contact Preference: [phone] Comments/notes:

## 2025-04-29 NOTE — TELEPHONE ENCOUNTER
Contacted the pt no answer,I left a voicemail to informed the pt that I'm unable to schedule him to hepatology clinic due to out of coverage.I give the [facility]  Assistance number that he can reach out to I forwarded the message to Ms Paula Mcmullen to assist this pt.No further question is needed.

## 2025-05-05 ENCOUNTER — TELEPHONE (OUTPATIENT)
Dept: HEPATOLOGY | Facility: CLINIC | Age: 45
End: 2025-05-05
Payer: MEDICAID

## 2025-05-05 NOTE — TELEPHONE ENCOUNTER
Scheduled pt as a new pt on 8/4/2025.No sooner available to other provider.Pt has been offer to  on 5/6/2025 at 3;00 pm.but pt decline due to transportation.Mailed appt.

## 2025-05-05 NOTE — TELEPHONE ENCOUNTER
Contacted the pt offer the appt slot for  ON 5/6/2025 AT 3;00 PM,but pt declined due to transportation issue.Pt dont accept on waiting list and provider are all booked up till August. available slot is on September.

## 2025-05-05 NOTE — TELEPHONE ENCOUNTER
----- Message from Dejuan sent at 5/5/2025 11:06 AM CDT -----  Contact: Mary  Type:  Sooner Apoointment RequestCaller is requesting a sooner appointment.  Caller declined first available appointment listed below.  Caller will not accept being placed on the waitlist and is requesting a message be sent to doctor.Name of Caller: LOIDA (Mary)When is the first available appointment? N/aSymptoms:checking status of referral  Would the patient rather a call back or a response via [facility]? Call Best Call Back Number:  Additional Information:

## 2025-05-29 ENCOUNTER — TELEPHONE (OUTPATIENT)
Dept: UROLOGY | Facility: CLINIC | Age: 45
End: 2025-05-29
Payer: MEDICAID

## 2025-07-28 ENCOUNTER — HOSPITAL ENCOUNTER (INPATIENT)
Facility: HOSPITAL | Age: 45
LOS: 1 days | Discharge: ANOTHER HOSPITAL | DRG: 871 | End: 2025-07-29
Attending: EMERGENCY MEDICINE
Payer: MEDICAID

## 2025-07-28 DIAGNOSIS — R94.31 PROLONGED Q-T INTERVAL ON ECG: ICD-10-CM

## 2025-07-28 DIAGNOSIS — R65.20 SEVERE SEPSIS: Primary | ICD-10-CM

## 2025-07-28 DIAGNOSIS — M62.82 NON-TRAUMATIC RHABDOMYOLYSIS: ICD-10-CM

## 2025-07-28 DIAGNOSIS — R41.82 ALTERED MENTAL STATUS, UNSPECIFIED ALTERED MENTAL STATUS TYPE: ICD-10-CM

## 2025-07-28 DIAGNOSIS — R07.9 CHEST PAIN: ICD-10-CM

## 2025-07-28 DIAGNOSIS — E83.42 HYPOMAGNESEMIA: ICD-10-CM

## 2025-07-28 DIAGNOSIS — A41.9 SEVERE SEPSIS: Primary | ICD-10-CM

## 2025-07-28 PROBLEM — J18.9 COMMUNITY ACQUIRED PNEUMONIA: Status: ACTIVE | Noted: 2025-07-28

## 2025-07-28 PROBLEM — T50.904A OVERDOSE OF UNDETERMINED INTENT: Status: ACTIVE | Noted: 2025-07-28

## 2025-07-28 LAB
ABSOLUTE EOSINOPHIL (SMH): 0 K/UL
ABSOLUTE MONOCYTE (SMH): 0.52 K/UL (ref 0.3–1)
ABSOLUTE NEUTROPHIL COUNT (SMH): 11.8 K/UL (ref 1.8–7.7)
ALBUMIN SERPL-MCNC: 4.1 G/DL (ref 3.5–5.2)
ALP SERPL-CCNC: 105 UNIT/L (ref 55–135)
ALT SERPL-CCNC: 60 UNIT/L (ref 10–44)
AMMONIA PLAS-SCNC: 39 UMOL/L (ref 10–50)
ANION GAP (SMH): 9 MMOL/L (ref 8–16)
AST SERPL-CCNC: 119 UNIT/L (ref 10–40)
BASOPHILS # BLD AUTO: 0.03 K/UL
BASOPHILS NFR BLD AUTO: 0.2 %
BILIRUB SERPL-MCNC: 0.8 MG/DL (ref 0.1–1)
BUN SERPL-MCNC: 14 MG/DL (ref 6–20)
CALCIUM SERPL-MCNC: 8.4 MG/DL (ref 8.7–10.5)
CHLORIDE SERPL-SCNC: 108 MMOL/L (ref 95–110)
CK SERPL-CCNC: 4774 U/L (ref 20–200)
CO2 SERPL-SCNC: 25 MMOL/L (ref 23–29)
CREAT SERPL-MCNC: 1.3 MG/DL (ref 0.5–1.4)
ERYTHROCYTE [DISTWIDTH] IN BLOOD BY AUTOMATED COUNT: 18 % (ref 11.5–14.5)
ETHANOL SERPL-MCNC: <10 MG/DL
GFR SERPLBLD CREATININE-BSD FMLA CKD-EPI: >60 ML/MIN/1.73/M2
GLUCOSE SERPL-MCNC: 192 MG/DL (ref 70–110)
HCT VFR BLD AUTO: 33 % (ref 40–54)
HGB BLD-MCNC: 10.7 GM/DL (ref 14–18)
IMM GRANULOCYTES # BLD AUTO: 0.04 K/UL (ref 0–0.04)
IMM GRANULOCYTES NFR BLD AUTO: 0.3 % (ref 0–0.5)
INR PPP: 1.2 (ref 0.8–1.2)
LDH SERPL L TO P-CCNC: 3.44 MMOL/L (ref 0.5–2.2)
LIPASE SERPL-CCNC: 3 U/L (ref 4–60)
LYMPHOCYTES # BLD AUTO: 0.36 K/UL (ref 1–4.8)
MAGNESIUM SERPL-MCNC: 1.3 MG/DL (ref 1.6–2.6)
MCH RBC QN AUTO: 26.4 PG (ref 27–31)
MCHC RBC AUTO-ENTMCNC: 32.4 G/DL (ref 32–36)
MCV RBC AUTO: 82 FL (ref 82–98)
NUCLEATED RBC (/100WBC) (SMH): 0 /100 WBC
OSMOLALITY SERPL: 303 MOSM/KG (ref 280–300)
PLATELET # BLD AUTO: 184 K/UL (ref 150–450)
PMV BLD AUTO: 10.5 FL (ref 9.2–12.9)
POTASSIUM SERPL-SCNC: 3.6 MMOL/L (ref 3.5–5.1)
PROT SERPL-MCNC: 6.5 GM/DL (ref 6–8.4)
PROTHROMBIN TIME: 13.5 SECONDS (ref 9–12.5)
RBC # BLD AUTO: 4.05 M/UL (ref 4.6–6.2)
RELATIVE EOSINOPHIL (SMH): 0 % (ref 0–8)
RELATIVE LYMPHOCYTE (SMH): 2.8 % (ref 18–48)
RELATIVE MONOCYTE (SMH): 4.1 % (ref 4–15)
RELATIVE NEUTROPHIL (SMH): 92.6 % (ref 38–73)
SAMPLE: ABNORMAL
SODIUM SERPL-SCNC: 142 MMOL/L (ref 136–145)
TSH SERPL-ACNC: 0.86 UIU/ML (ref 0.34–5.6)
VALPROATE SERPL-MCNC: <4 UG/ML (ref 50–100)
WBC # BLD AUTO: 12.75 K/UL (ref 3.9–12.7)

## 2025-07-28 PROCEDURE — 83690 ASSAY OF LIPASE: CPT

## 2025-07-28 PROCEDURE — 82140 ASSAY OF AMMONIA: CPT

## 2025-07-28 PROCEDURE — 20000000 HC ICU ROOM

## 2025-07-28 PROCEDURE — 99900031 HC PATIENT EDUCATION (STAT)

## 2025-07-28 PROCEDURE — 96375 TX/PRO/DX INJ NEW DRUG ADDON: CPT

## 2025-07-28 PROCEDURE — 36415 COLL VENOUS BLD VENIPUNCTURE: CPT

## 2025-07-28 PROCEDURE — 99233 SBSQ HOSP IP/OBS HIGH 50: CPT | Mod: 95,,, | Performed by: PSYCHIATRY & NEUROLOGY

## 2025-07-28 PROCEDURE — 63600175 PHARM REV CODE 636 W HCPCS

## 2025-07-28 PROCEDURE — 87040 BLOOD CULTURE FOR BACTERIA: CPT

## 2025-07-28 PROCEDURE — 25000003 PHARM REV CODE 250

## 2025-07-28 PROCEDURE — 94761 N-INVAS EAR/PLS OXIMETRY MLT: CPT

## 2025-07-28 PROCEDURE — 99291 CRITICAL CARE FIRST HOUR: CPT

## 2025-07-28 PROCEDURE — 83930 ASSAY OF BLOOD OSMOLALITY: CPT

## 2025-07-28 PROCEDURE — 83735 ASSAY OF MAGNESIUM: CPT

## 2025-07-28 PROCEDURE — 85610 PROTHROMBIN TIME: CPT

## 2025-07-28 PROCEDURE — 82550 ASSAY OF CK (CPK): CPT | Performed by: EMERGENCY MEDICINE

## 2025-07-28 PROCEDURE — 84443 ASSAY THYROID STIM HORMONE: CPT

## 2025-07-28 PROCEDURE — 93005 ELECTROCARDIOGRAM TRACING: CPT | Performed by: GENERAL PRACTICE

## 2025-07-28 PROCEDURE — 93010 ELECTROCARDIOGRAM REPORT: CPT | Mod: ,,, | Performed by: GENERAL PRACTICE

## 2025-07-28 PROCEDURE — 80053 COMPREHEN METABOLIC PANEL: CPT

## 2025-07-28 PROCEDURE — 85025 COMPLETE CBC W/AUTO DIFF WBC: CPT

## 2025-07-28 PROCEDURE — 80164 ASSAY DIPROPYLACETIC ACD TOT: CPT | Performed by: EMERGENCY MEDICINE

## 2025-07-28 PROCEDURE — 99900035 HC TECH TIME PER 15 MIN (STAT)

## 2025-07-28 PROCEDURE — 96365 THER/PROPH/DIAG IV INF INIT: CPT

## 2025-07-28 PROCEDURE — 82077 ASSAY SPEC XCP UR&BREATH IA: CPT | Performed by: EMERGENCY MEDICINE

## 2025-07-28 RX ORDER — LANOLIN ALCOHOL/MO/W.PET/CERES
800 CREAM (GRAM) TOPICAL
Status: DISCONTINUED | OUTPATIENT
Start: 2025-07-28 | End: 2025-07-29 | Stop reason: HOSPADM

## 2025-07-28 RX ORDER — MAGNESIUM SULFATE HEPTAHYDRATE 40 MG/ML
2 INJECTION, SOLUTION INTRAVENOUS ONCE
Status: DISCONTINUED | OUTPATIENT
Start: 2025-07-28 | End: 2025-07-28

## 2025-07-28 RX ORDER — MAGNESIUM SULFATE HEPTAHYDRATE 40 MG/ML
2 INJECTION, SOLUTION INTRAVENOUS ONCE
Status: COMPLETED | OUTPATIENT
Start: 2025-07-28 | End: 2025-07-28

## 2025-07-28 RX ORDER — TALC
6 POWDER (GRAM) TOPICAL NIGHTLY PRN
Status: DISCONTINUED | OUTPATIENT
Start: 2025-07-28 | End: 2025-07-29 | Stop reason: HOSPADM

## 2025-07-28 RX ORDER — CEFEPIME HYDROCHLORIDE 2 G/1
2 INJECTION, POWDER, FOR SOLUTION INTRAVENOUS
Status: DISCONTINUED | OUTPATIENT
Start: 2025-07-28 | End: 2025-07-28

## 2025-07-28 RX ORDER — CEFEPIME HYDROCHLORIDE 2 G/1
2 INJECTION, POWDER, FOR SOLUTION INTRAVENOUS
Status: DISCONTINUED | OUTPATIENT
Start: 2025-07-29 | End: 2025-07-29

## 2025-07-28 RX ORDER — DIAZEPAM 10 MG/2ML
5 INJECTION INTRAMUSCULAR EVERY 4 HOURS PRN
Status: DISCONTINUED | OUTPATIENT
Start: 2025-07-28 | End: 2025-07-29 | Stop reason: HOSPADM

## 2025-07-28 RX ORDER — NALOXONE HCL 0.4 MG/ML
0.02 VIAL (ML) INJECTION
Status: DISCONTINUED | OUTPATIENT
Start: 2025-07-28 | End: 2025-07-29 | Stop reason: HOSPADM

## 2025-07-28 RX ORDER — AMOXICILLIN 250 MG
1 CAPSULE ORAL DAILY PRN
Status: DISCONTINUED | OUTPATIENT
Start: 2025-07-28 | End: 2025-07-29 | Stop reason: HOSPADM

## 2025-07-28 RX ORDER — SODIUM,POTASSIUM PHOSPHATES 280-250MG
2 POWDER IN PACKET (EA) ORAL
Status: DISCONTINUED | OUTPATIENT
Start: 2025-07-28 | End: 2025-07-29 | Stop reason: HOSPADM

## 2025-07-28 RX ORDER — THIAMINE HYDROCHLORIDE 100 MG/ML
400 INJECTION, SOLUTION INTRAMUSCULAR; INTRAVENOUS ONCE
Status: COMPLETED | OUTPATIENT
Start: 2025-07-28 | End: 2025-07-28

## 2025-07-28 RX ORDER — GLUCAGON 1 MG
1 KIT INJECTION
Status: DISCONTINUED | OUTPATIENT
Start: 2025-07-28 | End: 2025-07-29 | Stop reason: HOSPADM

## 2025-07-28 RX ORDER — FOLIC ACID 1 MG/1
1 TABLET ORAL DAILY
Status: DISCONTINUED | OUTPATIENT
Start: 2025-07-29 | End: 2025-07-29 | Stop reason: HOSPADM

## 2025-07-28 RX ORDER — IBUPROFEN 200 MG
24 TABLET ORAL
Status: DISCONTINUED | OUTPATIENT
Start: 2025-07-28 | End: 2025-07-29 | Stop reason: HOSPADM

## 2025-07-28 RX ORDER — CEFEPIME HYDROCHLORIDE 2 G/1
2 INJECTION, POWDER, FOR SOLUTION INTRAVENOUS
Status: COMPLETED | OUTPATIENT
Start: 2025-07-28 | End: 2025-07-28

## 2025-07-28 RX ORDER — ALUMINUM HYDROXIDE, MAGNESIUM HYDROXIDE, AND SIMETHICONE 1200; 120; 1200 MG/30ML; MG/30ML; MG/30ML
30 SUSPENSION ORAL 4 TIMES DAILY PRN
Status: DISCONTINUED | OUTPATIENT
Start: 2025-07-28 | End: 2025-07-29 | Stop reason: HOSPADM

## 2025-07-28 RX ORDER — IBUPROFEN 200 MG
16 TABLET ORAL
Status: DISCONTINUED | OUTPATIENT
Start: 2025-07-28 | End: 2025-07-29 | Stop reason: HOSPADM

## 2025-07-28 RX ORDER — THIAMINE HCL 100 MG
100 TABLET ORAL DAILY
Status: DISCONTINUED | OUTPATIENT
Start: 2025-07-29 | End: 2025-07-29 | Stop reason: HOSPADM

## 2025-07-28 RX ADMIN — SODIUM CHLORIDE, POTASSIUM CHLORIDE, SODIUM LACTATE AND CALCIUM CHLORIDE 1000 ML: 600; 310; 30; 20 INJECTION, SOLUTION INTRAVENOUS at 08:07

## 2025-07-28 RX ADMIN — THIAMINE HYDROCHLORIDE 400 MG: 100 INJECTION, SOLUTION INTRAMUSCULAR; INTRAVENOUS at 08:07

## 2025-07-28 RX ADMIN — CEFEPIME 2 G: 2 INJECTION, POWDER, FOR SOLUTION INTRAVENOUS at 10:07

## 2025-07-28 RX ADMIN — VANCOMYCIN HYDROCHLORIDE 1500 MG: 1.5 INJECTION, POWDER, LYOPHILIZED, FOR SOLUTION INTRAVENOUS at 10:07

## 2025-07-28 RX ADMIN — MAGNESIUM SULFATE HEPTAHYDRATE 2 G: 40 INJECTION, SOLUTION INTRAVENOUS at 08:07

## 2025-07-28 RX ADMIN — FOLIC ACID 1 MG: 5 INJECTION, SOLUTION INTRAMUSCULAR; INTRAVENOUS; SUBCUTANEOUS at 09:07

## 2025-07-28 NOTE — ED PROVIDER NOTES
Encounter Date: 7/28/2025       History     Chief Complaint   Patient presents with    Altered Mental Status     Pt arrived via ambulance where his family found him supine and has hx of abusing his home meds. Pt family stated the pt is taking 6 800mg of Seroquel. Pt also has a right wrist deformity.       HPI    44 Yo M PMH/o schizophrenia (on seroquel 300mg BID, effexor, xanax), seizures (on depakote), thyroid disease, HTN (on amlodipine, lisinopril), DM (on metformin) alcohol use disorder, pancreatitis BIBEMS after patient found him down, minimally responsive, and resisting movement at home. He stays with his grandmother and over the past few days, has been noted to have increasingly bizarre behavior. Family concerned about drug use, specifically etoh and marijuana. Also concern about overdosing on seroquel: 4 pill daily instead of 2. Patient does mention he feels short of breath. Unable to obtain further history due to patients status.     Review of patient's allergies indicates:   Allergen Reactions    Amlodipine      Past Medical History:   Diagnosis Date    Anxiety     Anxiety     Bipolar affective disorder     Depression     Hypertension     Pancreatitis     Schizophrenia     Seizures     Thyroid disease      Past Surgical History:   Procedure Laterality Date    ESOPHAGOGASTRODUODENOSCOPY N/A 11/24/2023    Procedure: EGD (ESOPHAGOGASTRODUODENOSCOPY);  Surgeon: Shannen Meyer MD;  Location: [facility];  Service: Endoscopy;  Laterality: N/A;    ESOPHAGOGASTRODUODENOSCOPY N/A 11/27/2023    Procedure: EGD (ESOPHAGOGASTRODUODENOSCOPY);  Surgeon: Steve Anderson MD;  Location: [facility];  Service: Endoscopy;  Laterality: N/A;    ESOPHAGOGASTRODUODENOSCOPY N/A 12/8/2023    Procedure: EGD (ESOPHAGOGASTRODUODENOSCOPY);  Surgeon: Steve Anderson MD;  Location: [facility];  Service: Endoscopy;  Laterality: N/A;    LAPAROSCOPIC CHOLECYSTECTOMY N/A 3/9/2020    Procedure: CHOLECYSTECTOMY, LAPAROSCOPIC;  Surgeon:  Trenton Deshpande MD;  Location: [facility];  Service: General;  Laterality: N/A;     Family History   Problem Relation Name Age of Onset    Diabetes Maternal Grandmother      Hypertension Maternal Grandfather      Cancer Paternal Grandfather       Social History[1]  Review of Systems   Unable to perform ROS: Mental status change           Physical Exam     Initial Vitals [07/28/25 1827]   BP Pulse Resp Temp SpO2   (!) 143/60 108 (!) 22 99.1 °F (37.3 °C) 96 %      MAP       --         Physical Exam    Nursing note and vitals reviewed.  Constitutional: He appears well-developed and well-nourished. He appears lethargic. He is not diaphoretic.  Non-toxic appearance. He appears ill. No distress.   HENT:   Head: Normocephalic and atraumatic. Mouth/Throat: Oropharynx is clear and moist.   Eyes: Conjunctivae and EOM are normal. Pupils are equal, round, and reactive to light. Right eye exhibits no discharge. Left eye exhibits no discharge. No scleral icterus.   Neck: Neck supple.   Normal range of motion.  Cardiovascular:  Normal rate, regular rhythm and normal heart sounds.     Exam reveals no gallop and no friction rub.       No murmur heard.  Pulses:       Radial pulses are 2+ on the right side and 2+ on the left side.   Pulmonary/Chest: Effort normal and breath sounds normal. No stridor. No respiratory distress. He has no decreased breath sounds. He has no wheezes. He has no rhonchi. He has no rales. He exhibits tenderness.   R chest wall ttp, abrasions   Abdominal: Abdomen is soft and flat. He exhibits no distension. There is no abdominal tenderness. There is no rebound and no guarding.   Musculoskeletal:         General: Tenderness present. No edema.      Cervical back: Normal range of motion and neck supple.      Comments: Deformity to the R wrist, patient not wishing to move his R arm, secondary to pain.     Neurological: He is oriented to person, place, and time. He appears lethargic. No cranial nerve deficit. GCS  score is 15. GCS eye subscore is 4. GCS verbal subscore is 5. GCS motor subscore is 6.   Skin: Skin is warm and dry. Capillary refill takes less than 2 seconds. No rash noted. No erythema.         ED Course   Critical Care    Date/Time: 7/28/2025 8:20 PM    Performed by: Radha Burr MD  Authorized by: Radha Burr MD  Direct patient critical care time: 10 minutes  Additional history critical care time: 10 minutes  Ordering / reviewing critical care time: 10 minutes  Documentation critical care time: 10 minutes  Consulting other physicians critical care time: 5 minutes  Total critical care time (exclusive of procedural time) : 45 minutes  Critical care time was exclusive of separately billable procedures and treating other patients.  Critical care was necessary to treat or prevent imminent or life-threatening deterioration of the following conditions: sepsis.  Critical care was time spent personally by me on the following activities: blood draw for specimens, development of treatment plan with patient or surrogate, discussions with consultants, interpretation of cardiac output measurements, evaluation of patient's response to treatment, examination of patient, obtaining history from patient or surrogate, ordering and review of laboratory studies, ordering and review of radiographic studies, ordering and performing treatments and interventions, pulse oximetry, re-evaluation of patient's condition and review of old charts.        Labs Reviewed   COMPREHENSIVE METABOLIC PANEL - Abnormal       Result Value    Sodium 142      Potassium 3.6      Chloride 108      CO2 25      Glucose 192 (*)     BUN 14      Creatinine 1.3      Calcium 8.4 (*)     Protein Total 6.5      Albumin 4.1      Bilirubin Total 0.8             (*)     ALT 60 (*)     Anion Gap 9      eGFR >60     LIPASE - Abnormal    Lipase Level 3 (*)    MAGNESIUM - Abnormal    Magnesium 1.3 (*)    PROTIME-INR - Abnormal    PT 13.5 (*)     INR  1.2     OSMOLALITY, SERUM - Abnormal    Osmolality 303 (*)    CBC WITH DIFFERENTIAL - Abnormal    WBC 12.75 (*)     RBC 4.05 (*)     Hgb 10.7 (*)     Hct 33.0 (*)     MCV 82      MCH 26.4 (*)     MCHC 32.4      RDW 18.0 (*)     Platelet Count 184      MPV 10.5      Nucleated RBC 0      Neut % 92.6 (*)     Lymph % 2.8 (*)     Mono % 4.1      Eos % 0.0      Basophil % 0.2      Imm Grans % 0.3      Neut # 11.8 (*)     Lymph # 0.36 (*)     Mono # 0.52      Eos # 0.00      Baso # 0.03      Imm Grans # 0.04     VALPROIC ACID - Abnormal    Valproic Acid <4.0 (*)    CK - Abnormal    CPK 4,774 (*)    ISTAT LACTATE - Abnormal    POC Lactate 3.44 (*)     Sample VENOUS     AMMONIA - Normal    Ammonia 39     TSH - Normal    TSH 0.860     ALCOHOL,MEDICAL (ETHANOL) - Normal    Alcohol, Serum <10     CULTURE, BLOOD   CULTURE, BLOOD   CULTURE, RESPIRATORY   MRSA SCREEN BY PCR   CBC W/ AUTO DIFFERENTIAL    Narrative:     The following orders were created for panel order CBC auto differential.  Procedure                               Abnormality         Status                     ---------                               -----------         ------                     CBC with Differential[[id number]]       Abnormal            Final result                 Please view results for these tests on the individual orders.   DRUG SCREEN PANEL, URINE EMERGENCY   EXTRA TUBES    Narrative:     The following orders were created for panel order EXTRA TUBES.  Procedure                               Abnormality         Status                     ---------                               -----------         ------                     Lavender Top Hold[[id number]]                               In process                   Please view results for these tests on the individual orders.   LAVENDER TOP HOLD   URINALYSIS, REFLEX TO URINE CULTURE   LACTIC ACID, PLASMA   LACTIC ACID, PLASMA          Imaging Results              X-Ray Shoulder Trauma Right (Final  result)  Result time 07/28/25 20:55:42      Final result by Guillermo Johns MD (07/28/25 20:55:42)                   Impression:      No evidence of acute osseous process involving the right shoulder.      Electronically signed by: Guillermo Johns  Date:    07/28/2025  Time:    20:55               Narrative:    EXAMINATION:  XR SHOULDER TRAUMA 3 VIEW RIGHT    CLINICAL HISTORY:  Pain, unspecified    TECHNIQUE:  Three views of the right shoulder were obtained.    COMPARISON:  None available.    FINDINGS:  No evidence of acute fracture or dislocation involving the right shoulder.  No evidence of subcutaneous emphysema or radiopaque foreign body.  No evidence of soft tissue or osseous mass.                                       X-Ray Elbow Complete Right (Final result)  Result time 07/28/25 20:53:36      Final result by Guillermo Johns MD (07/28/25 20:53:36)                   Impression:      No evidence of acute osseous process involving the right elbow.      Electronically signed by: Guillermo Johns  Date:    07/28/2025  Time:    20:53               Narrative:    EXAMINATION:  XR ELBOW COMPLETE 3 VIEW RIGHT    CLINICAL HISTORY:  . Pain, unspecified    TECHNIQUE:  Three views of the right elbow were obtained.    COMPARISON:  None available.    FINDINGS:  No evidence of acute fracture or dislocation involving the right elbow.  No evidence of subcutaneous emphysema or radiopaque foreign body.  No evidence of soft tissue or osseous mass.                                       X-Ray Wrist Complete Right (Final result)  Result time 07/28/25 20:51:05      Final result by Guillermo Johns MD (07/28/25 20:51:05)                   Impression:      No evidence of acute osseous process involving the right wrist.      Electronically signed by: Guillermo Johns  Date:    07/28/2025  Time:    20:51               Narrative:    EXAMINATION:  XR WRIST COMPLETE 3 VIEWS RIGHT    CLINICAL HISTORY:  Pain,  unspecified    TECHNIQUE:  PA, lateral, and oblique views of the right wrist were performed.    COMPARISON:  None available.    FINDINGS:  No evidence of acute fracture or dislocation involving the right wrist.  No evidence of subcutaneous emphysema or radiopaque foreign body.  No evidence of soft tissue or osseous mass.                                       X-Ray Ribs 2 View Right (Final result)  Result time 07/28/25 20:49:19      Final result by Guillermo Johns MD (07/28/25 20:49:19)                   Impression:      1. No evidence of right-sided rib fracture.  2. Clinical correlation is recommended for right pneumonia versus pulmonary contusion.      Electronically signed by: Guillermo Johns  Date:    07/28/2025  Time:    20:49               Narrative:    EXAMINATION:  XR RIBS 2 VIEW RIGHT    CLINICAL HISTORY:  Injury, unspecified, initial encounter    TECHNIQUE:  Two views of the right ribs were performed.    COMPARISON:  None    FINDINGS:  No evidence of right-sided rib fracture.  Patchy opacities are present involving the right lung.  No evidence of pneumothorax or pleural effusion.                                       X-Ray Humerus 2 View Right (Final result)  Result time 07/28/25 20:39:32      Final result by Guillermo Johns MD (07/28/25 20:39:32)                   Impression:      No evidence of acute osseous process involving the right humerus.      Electronically signed by: Guillermo Johns  Date:    07/28/2025  Time:    20:39               Narrative:    EXAMINATION:  XR HUMERUS 2 VIEW RIGHT    CLINICAL HISTORY:  Injury, unspecified, initial encounter    TECHNIQUE:  Two views of the right humerus were obtained.    COMPARISON:  Radiographs of the right humerus from 06/08/2021.    FINDINGS:  No evidence of acute fracture or dislocation involving the right humerus.  No evidence of subcutaneous emphysema or radiopaque foreign body.  No evidence of soft tissue or osseous mass.                                        X-Ray Forearm Right (Final result)  Result time 07/28/25 20:38:59      Final result by Guillermo Johns MD (07/28/25 20:38:59)                   Impression:      No evidence of acute osseous process involving the right forearm.      Electronically signed by: Guillermo Johns  Date:    07/28/2025  Time:    20:38               Narrative:    EXAMINATION:  XR FOREARM RIGHT    CLINICAL HISTORY:  Injury, unspecified, initial encounter    TECHNIQUE:  AP and lateral views of the right forearm were performed.    COMPARISON:  None available.    FINDINGS:  No evidence of acute fracture or dislocation involving the right forearm.  No evidence of subcutaneous emphysema or radiopaque foreign body.  No evidence of soft tissue or osseous mass.                                       X-Ray Chest AP Portable (Final result)  Result time 07/28/25 20:37:52   Procedure changed from X-Ray Chest PA And Lateral     Final result by Guillermo Johns MD (07/28/25 20:37:52)                   Impression:      Patchy opacities are present involving the right lung.  Clinical correlation for pneumonia versus pulmonary contusion is recommended.      Electronically signed by: Guillermo Johns  Date:    07/28/2025  Time:    20:37               Narrative:    EXAMINATION:  XR CHEST AP PORTABLE    CLINICAL HISTORY:  fall; Injury, unspecified, initial encounter    TECHNIQUE:  Single frontal view of the chest was performed.    COMPARISON:  Radiograph of the chest from 04/11/2025.    FINDINGS:  Heart size and pulmonary vasculature are within normal limits.  Patchy opacities are present involving the right lung.  No evidence of pneumothorax or pleural effusion.  No evidence of acute osseous process.                                       CT Cervical Spine Without Contrast (Final result)  Result time 07/28/25 20:35:23      Final result by Guillermo Johns MD (07/28/25 20:35:23)                   Impression:      1. No  evidence of acute fracture or subluxation involving the cervical spine.  2. Ground-glass opacities are present at the right lung apex and may represent pneumonia versus pulmonary contusion in the setting of trauma.      Electronically signed by: Guillermo Johns  Date:    07/28/2025  Time:    20:35               Narrative:    EXAMINATION:  CT CERVICAL SPINE WITHOUT CONTRAST    CLINICAL HISTORY:  Neck pain, acute, no red flags;    TECHNIQUE:  Low dose axial images, sagittal and coronal reformations were performed though the cervical spine.  Contrast was not administered.    COMPARISON:  None available.    FINDINGS:  No evidence of acute fracture or subluxation involving the cervical spine.  Mild multilevel chronic degenerative changes are present involving the cervical spine.  Ground-glass opacities are present at the right lung apex.  The paraspinal soft tissues are unremarkable.                                       CT Head Without Contrast (Final result)  Result time 07/28/25 20:06:42      Final result by Guillermo Johns MD (07/28/25 20:06:42)                   Impression:      No evidence of acute intracranial abnormality.      Electronically signed by: Guillermo Johns  Date:    07/28/2025  Time:    20:06               Narrative:    EXAMINATION:  CT HEAD WITHOUT CONTRAST    CLINICAL HISTORY:  Mental status change, unknown cause;    TECHNIQUE:  Low dose axial CT images obtained throughout the head without intravenous contrast. Sagittal and coronal reconstructions were performed.    COMPARISON:  None available.    FINDINGS:  Intracranial compartment:    Ventricles and sulci are normal in size for age without evidence of hydrocephalus. No extra-axial blood or fluid collections.    The brain parenchyma appears normal. No parenchymal mass, hemorrhage, edema or major vascular distribution infarct.    Skull/extracranial contents (limited evaluation):    No fracture. Mastoid air cells and paranasal sinuses are  essentially clear.  The orbits are unremarkable.                                       Medications   lactated ringers bolus 1,000 mL (has no administration in time range)   vancomycin - pharmacy to dose (has no administration in time range)   melatonin tablet 6 mg (has no administration in time range)   senna-docusate 8.6-50 mg per tablet 1 tablet (has no administration in time range)   aluminum-magnesium hydroxide-simethicone 200-200-20 mg/5 mL suspension 30 mL (has no administration in time range)   naloxone 0.4 mg/mL injection 0.02 mg (has no administration in time range)   potassium bicarbonate disintegrating tablet 50 mEq (has no administration in time range)   potassium bicarbonate disintegrating tablet 35 mEq (has no administration in time range)   potassium bicarbonate disintegrating tablet 60 mEq (has no administration in time range)   magnesium oxide tablet 800 mg (has no administration in time range)   magnesium oxide tablet 800 mg (has no administration in time range)   potassium, sodium phosphates 280-160-250 mg packet 2 packet (has no administration in time range)   potassium, sodium phosphates 280-160-250 mg packet 2 packet (has no administration in time range)   potassium, sodium phosphates 280-160-250 mg packet 2 packet (has no administration in time range)   glucose chewable tablet 16 g (has no administration in time range)   glucose chewable tablet 24 g (has no administration in time range)   dextrose 50% injection 12.5 g (has no administration in time range)   dextrose 50% injection 25 g (has no administration in time range)   glucagon (human recombinant) injection 1 mg (has no administration in time range)   diazePAM injection 5 mg (has no administration in time range)   folic acid tablet 1 mg (has no administration in time range)   thiamine tablet 100 mg (has no administration in time range)   ceFEPIme injection 2 g (has no administration in time range)   lactated ringers bolus 1,000 mL (0 mLs  Intravenous Stopped 7/29/25 0045)   magnesium sulfate 2g in water 50mL IVPB (premix) (0 g Intravenous Stopped 7/28/25 2225)   thiamine injection 400 mg (400 mg Intravenous Given 7/28/25 2024)   folic acid 1 mg in D5W 100 mL IVPB (0 mg Intravenous Stopped 7/28/25 2240)   ceFEPIme injection 2 g (2 g Intravenous Given 7/28/25 2250)   vancomycin 1,500 mg in 0.9% NaCl 250 mL IVPB (admixture device) (0 mg Intravenous Stopped 7/29/25 0045)     Medical Decision Making  Problems Addressed:  Altered mental status, unspecified altered mental status type: acute illness or injury that poses a threat to life or bodily functions  Hypomagnesemia: acute illness or injury  Non-traumatic rhabdomyolysis: acute illness or injury that poses a threat to life or bodily functions  Severe sepsis: acute illness or injury that poses a threat to life or bodily functions    Amount and/or Complexity of Data Reviewed  External Data Reviewed: notes.  Labs: ordered. Decision-making details documented in ED Course.  Radiology: ordered. Decision-making details documented in ED Course.  ECG/medicine tests: ordered and independent interpretation performed. Decision-making details documented in ED Course.     Details: Normal sinus rhythm, normal intervals, no OMAR/D, no evidence of electrolyte abnormality    Risk  Prescription drug management.  Decision regarding hospitalization.     NAD, hemodynamically stable, afebrile. Altered, but oriented. Arousable. PE as above. AMS workup initiated. Injury pattern unusual, unclear etiology. Imaging ordered. Meeting sepsis criteria. Will give antibiotics and will tailor fluids. See ED course for further            APC / Resident Notes:   I have reviewed and concur with the resident's history, physical, assessment, documentation, medical decision making and plan.  I have personally interviewed and examined the patient at bedside.  I was present for the key/critical portions of the procedure.  This is a 45-year-old  male with a history of schizophrenia, alcohol abuse, polysubstance abuse brought in by EMS after his stepmother found him on the ground covered in urine.  She reports that he has been staying with his grandmother and she is concerned that he has been taking too much of his psychiatric medication.  Patient here is altered and covered in bruises.  He is unable to provide any further history.  He has no focal neurologic deficits.  His orders included head CT, C-spine CT and multiple extremity x-rays.  Chest x-ray was ordered.  He was cultured and labs including ammonia were ordered.  His workup was concerning for a normal ammonia with an elevated white count, elevated heart rate and elevated lactic acid.  Chest x-ray was concerning for bilateral interstitial infiltrates versus edema.  CPK was elevated.  He did meet severe sepsis criteria.  He was cultured and given IV antibiotics.  He was fluid resuscitated.  He was admitted.        ED Course as of 07/29/25 0052 Mon Jul 28, 2025 1902 POC Lactate(!!): 3.44 [MK]   1916 CBC auto differential(!)  Leukocytosis to 12.75, hemoglobin 10.7 down from >12, no thrombocytopenia [MK]   1943 Ammonia: 39 [MK]   1943 Valproic Acid Lvl(!): <4.0 [MK]   1958 CPK(!): 4,774 [MK]   1958 Comprehensive metabolic panel(!)  Electroyltes wnl, AST> ALT consistent with etoh, no CHAPO,hyperglycemic to 192 [MK]   2020 CT Head Without Contrast  Impression:     No evidence of acute intracranial abnormality.   [MK]   2112 X-Ray Shoulder Trauma Right  Extremity radiographs all unremarkable for fx, soft tissue edema [MK]   2113 X-Ray Chest AP Portable  Impression:     Patchy opacities are present involving the right lung.  Clinical correlation for pneumonia versus pulmonary contusion is recommended.   [MK]   2113 Patient admitted to medicine.  [MK]   2113 Given xray findings, will reduce fluid infusion. Full rapid bolus of sepsis fluids is contraindicated.  [MK]      ED Course User Index  [MK] Pat  MD Gage                               Clinical Impression:  Final diagnoses:  [A41.9, R65.20] Severe sepsis (Primary)  [M62.82] Non-traumatic rhabdomyolysis  [E83.42] Hypomagnesemia  [R41.82] Altered mental status, unspecified altered mental status type          ED Disposition Condition    Admit                       [1]   Social History  Tobacco Use    Smoking status: Some Days     Current packs/day: 0.25     Average packs/day: 0.3 packs/day for 7.0 years (1.8 ttl pk-yrs)     Types: Cigarettes    Smokeless tobacco: Never   Substance Use Topics    Alcohol use: Yes     Alcohol/week: 2.0 standard drinks of alcohol     Types: 2 Glasses of wine per week     Comment: occasionally    Drug use: Yes     Frequency: 7.0 times per week     Types: Marijuana        Gage Stewart MD  Resident  07/29/25 0053       Radha Burr MD  08/01/25 [id number]

## 2025-07-29 VITALS
OXYGEN SATURATION: 95 % | DIASTOLIC BLOOD PRESSURE: 95 MMHG | WEIGHT: 154.13 LBS | BODY MASS INDEX: 24.19 KG/M2 | TEMPERATURE: 98 F | HEIGHT: 67 IN | SYSTOLIC BLOOD PRESSURE: 150 MMHG | HEART RATE: 80 BPM | RESPIRATION RATE: 49 BRPM

## 2025-07-29 PROBLEM — M79.89 SWELLING OF RIGHT HAND: Status: ACTIVE | Noted: 2025-07-29

## 2025-07-29 PROBLEM — E11.9 TYPE 2 DIABETES MELLITUS, WITHOUT LONG-TERM CURRENT USE OF INSULIN: Status: ACTIVE | Noted: 2025-07-29

## 2025-07-29 PROBLEM — F20.9 SCHIZOPHRENIA: Status: ACTIVE | Noted: 2025-07-29

## 2025-07-29 PROBLEM — R94.31 PROLONGED Q-T INTERVAL ON ECG: Status: ACTIVE | Noted: 2025-07-29

## 2025-07-29 LAB
ABSOLUTE EOSINOPHIL (SMH): 0.02 K/UL
ABSOLUTE MONOCYTE (SMH): 0.4 K/UL (ref 0.3–1)
ABSOLUTE NEUTROPHIL COUNT (SMH): 10.7 K/UL (ref 1.8–7.7)
ALBUMIN SERPL-MCNC: 3.9 G/DL (ref 3.5–5.2)
ALLENS TEST: ABNORMAL
ALP SERPL-CCNC: 87 UNIT/L (ref 55–135)
ALT SERPL-CCNC: 67 UNIT/L (ref 10–44)
AMPHET UR QL SCN: NEGATIVE
ANION GAP (SMH): 9 MMOL/L (ref 8–16)
AST SERPL-CCNC: 153 UNIT/L (ref 10–40)
BARBITURATE SCN PRESENT UR: NEGATIVE
BASOPHILS # BLD AUTO: 0.03 K/UL
BASOPHILS NFR BLD AUTO: 0.3 %
BENZODIAZ UR QL SCN: NEGATIVE
BILIRUB SERPL-MCNC: 0.7 MG/DL (ref 0.1–1)
BILIRUB UR QL STRIP.AUTO: NEGATIVE
BUN SERPL-MCNC: 16 MG/DL (ref 6–20)
CALCIUM SERPL-MCNC: 8.3 MG/DL (ref 8.7–10.5)
CANNABINOIDS UR QL SCN: ABNORMAL
CHLORIDE SERPL-SCNC: 107 MMOL/L (ref 95–110)
CK SERPL-CCNC: 6945 U/L (ref 20–200)
CLARITY UR: CLEAR
CO2 SERPL-SCNC: 26 MMOL/L (ref 23–29)
COCAINE UR QL SCN: NEGATIVE
COLOR UR AUTO: COLORLESS
CREAT SERPL-MCNC: 1.2 MG/DL (ref 0.5–1.4)
CREAT UR-MCNC: 53 MG/DL (ref 23–375)
CREAT UR-MCNC: 53 MG/DL (ref 23–375)
DELSYS: ABNORMAL
ERYTHROCYTE [DISTWIDTH] IN BLOOD BY AUTOMATED COUNT: 18.1 % (ref 11.5–14.5)
FENTANYL UR QL SCN: NEGATIVE
FLOW: 5
GFR SERPLBLD CREATININE-BSD FMLA CKD-EPI: >60 ML/MIN/1.73/M2
GLUCOSE SERPL-MCNC: 149 MG/DL (ref 70–110)
GLUCOSE UR QL STRIP: ABNORMAL
HCO3 UR-SCNC: 23.1 MMOL/L (ref 24–28)
HCT VFR BLD AUTO: 32.4 % (ref 40–54)
HGB BLD-MCNC: 10.6 GM/DL (ref 14–18)
HGB UR QL STRIP: ABNORMAL
IMM GRANULOCYTES # BLD AUTO: 0.04 K/UL (ref 0–0.04)
IMM GRANULOCYTES NFR BLD AUTO: 0.3 % (ref 0–0.5)
KETONES UR QL STRIP: NEGATIVE
LACTATE SERPL-SCNC: 1.6 MMOL/L (ref 0.5–1.9)
LACTATE SERPL-SCNC: 2.8 MMOL/L (ref 0.5–1.9)
LACTATE SERPL-SCNC: 3.5 MMOL/L (ref 0.5–1.9)
LEUKOCYTE ESTERASE UR QL STRIP: NEGATIVE
LYMPHOCYTES # BLD AUTO: 0.7 K/UL (ref 1–4.8)
MAGNESIUM SERPL-MCNC: 1.8 MG/DL (ref 1.6–2.6)
MCH RBC QN AUTO: 26.6 PG (ref 27–31)
MCHC RBC AUTO-ENTMCNC: 32.7 G/DL (ref 32–36)
MCV RBC AUTO: 81 FL (ref 82–98)
MICROSCOPIC COMMENT: NORMAL
MODE: ABNORMAL
MRSA PCR SCRN (SMH): DETECTED
NITRITE UR QL STRIP: NEGATIVE
NUCLEATED RBC (/100WBC) (SMH): 0 /100 WBC
OHS QRS DURATION: 66 MS
OHS QRS DURATION: 72 MS
OHS QTC CALCULATION: 449 MS
OHS QTC CALCULATION: 452 MS
OHS QTC CALCULATION: 467 MS
OHS QTC CALCULATION: 495 MS
OHS QTC CALCULATION: 498 MS
OPIATES UR QL SCN: NEGATIVE
PCO2 BLDA: 36 MMHG (ref 35–45)
PCP UR QL: NEGATIVE
PH SMN: 7.42 [PH] (ref 7.35–7.45)
PH UR STRIP: 6 [PH]
PLATELET # BLD AUTO: 163 K/UL (ref 150–450)
PMV BLD AUTO: 10.3 FL (ref 9.2–12.9)
PO2 BLDA: 68 MMHG (ref 80–100)
POC BE: -1 MMOL/L (ref -2–2)
POC SATURATED O2: 94 % (ref 95–100)
POC TCO2: 24 MMOL/L (ref 23–27)
POTASSIUM SERPL-SCNC: 3.3 MMOL/L (ref 3.5–5.1)
PROT SERPL-MCNC: 6.1 GM/DL (ref 6–8.4)
PROT UR QL STRIP: NEGATIVE
RBC # BLD AUTO: 3.99 M/UL (ref 4.6–6.2)
RBC #/AREA URNS AUTO: <1 /HPF
RELATIVE EOSINOPHIL (SMH): 0.2 % (ref 0–8)
RELATIVE LYMPHOCYTE (SMH): 5.9 % (ref 18–48)
RELATIVE MONOCYTE (SMH): 3.4 % (ref 4–15)
RELATIVE NEUTROPHIL (SMH): 89.9 % (ref 38–73)
SAMPLE: ABNORMAL
SITE: ABNORMAL
SODIUM SERPL-SCNC: 142 MMOL/L (ref 136–145)
SP GR UR STRIP: 1.01
TROPONIN HIGH SENSITIVE (SMH): 13.8 PG/ML
TROPONIN HIGH SENSITIVE (SMH): 16.2 PG/ML
UROBILINOGEN UR STRIP-ACNC: NEGATIVE EU/DL
WBC # BLD AUTO: 11.92 K/UL (ref 3.9–12.7)
WBC #/AREA URNS AUTO: <1 /HPF

## 2025-07-29 PROCEDURE — 83605 ASSAY OF LACTIC ACID: CPT | Performed by: EMERGENCY MEDICINE

## 2025-07-29 PROCEDURE — 63600175 PHARM REV CODE 636 W HCPCS: Mod: TB,JZ

## 2025-07-29 PROCEDURE — 99900031 HC PATIENT EDUCATION (STAT)

## 2025-07-29 PROCEDURE — 80307 DRUG TEST PRSMV CHEM ANLYZR: CPT | Performed by: EMERGENCY MEDICINE

## 2025-07-29 PROCEDURE — 87641 MR-STAPH DNA AMP PROBE: CPT

## 2025-07-29 PROCEDURE — 25000003 PHARM REV CODE 250: Performed by: INTERNAL MEDICINE

## 2025-07-29 PROCEDURE — 99900035 HC TECH TIME PER 15 MIN (STAT)

## 2025-07-29 PROCEDURE — 82803 BLOOD GASES ANY COMBINATION: CPT

## 2025-07-29 PROCEDURE — 80053 COMPREHEN METABOLIC PANEL: CPT

## 2025-07-29 PROCEDURE — 25500020 PHARM REV CODE 255

## 2025-07-29 PROCEDURE — 81001 URINALYSIS AUTO W/SCOPE: CPT | Performed by: INTERNAL MEDICINE

## 2025-07-29 PROCEDURE — 27100171 HC OXYGEN HIGH FLOW UP TO 24 HOURS

## 2025-07-29 PROCEDURE — 36415 COLL VENOUS BLD VENIPUNCTURE: CPT

## 2025-07-29 PROCEDURE — 82550 ASSAY OF CK (CPK): CPT

## 2025-07-29 PROCEDURE — 83605 ASSAY OF LACTIC ACID: CPT | Performed by: INTERNAL MEDICINE

## 2025-07-29 PROCEDURE — 36415 COLL VENOUS BLD VENIPUNCTURE: CPT | Performed by: INTERNAL MEDICINE

## 2025-07-29 PROCEDURE — 36600 WITHDRAWAL OF ARTERIAL BLOOD: CPT

## 2025-07-29 PROCEDURE — 93005 ELECTROCARDIOGRAM TRACING: CPT | Performed by: GENERAL PRACTICE

## 2025-07-29 PROCEDURE — 94660 CPAP INITIATION&MGMT: CPT

## 2025-07-29 PROCEDURE — 63600175 PHARM REV CODE 636 W HCPCS: Performed by: INTERNAL MEDICINE

## 2025-07-29 PROCEDURE — 5A09357 ASSISTANCE WITH RESPIRATORY VENTILATION, LESS THAN 24 CONSECUTIVE HOURS, CONTINUOUS POSITIVE AIRWAY PRESSURE: ICD-10-PCS

## 2025-07-29 PROCEDURE — 80307 DRUG TEST PRSMV CHEM ANLYZR: CPT

## 2025-07-29 PROCEDURE — 84484 ASSAY OF TROPONIN QUANT: CPT | Performed by: INTERNAL MEDICINE

## 2025-07-29 PROCEDURE — 94761 N-INVAS EAR/PLS OXIMETRY MLT: CPT | Mod: XB

## 2025-07-29 PROCEDURE — 20000000 HC ICU ROOM

## 2025-07-29 PROCEDURE — 85025 COMPLETE CBC W/AUTO DIFF WBC: CPT

## 2025-07-29 PROCEDURE — 25000003 PHARM REV CODE 250

## 2025-07-29 PROCEDURE — 94799 UNLISTED PULMONARY SVC/PX: CPT

## 2025-07-29 PROCEDURE — 95816 EEG AWAKE AND DROWSY: CPT | Mod: 26,,, | Performed by: PSYCHIATRY & NEUROLOGY

## 2025-07-29 PROCEDURE — 95819 EEG AWAKE AND ASLEEP: CPT

## 2025-07-29 PROCEDURE — 63600175 PHARM REV CODE 636 W HCPCS

## 2025-07-29 PROCEDURE — 27000207 HC ISOLATION

## 2025-07-29 PROCEDURE — 93010 ELECTROCARDIOGRAM REPORT: CPT | Mod: ,,, | Performed by: GENERAL PRACTICE

## 2025-07-29 PROCEDURE — 83735 ASSAY OF MAGNESIUM: CPT

## 2025-07-29 RX ORDER — DIAZEPAM 10 MG/2ML
5 INJECTION INTRAMUSCULAR EVERY 4 HOURS PRN
Status: CANCELLED | OUTPATIENT
Start: 2025-07-29

## 2025-07-29 RX ORDER — KETOROLAC TROMETHAMINE 30 MG/ML
15 INJECTION, SOLUTION INTRAMUSCULAR; INTRAVENOUS ONCE
Status: COMPLETED | OUTPATIENT
Start: 2025-07-29 | End: 2025-07-29

## 2025-07-29 RX ORDER — THIAMINE HCL 100 MG
100 TABLET ORAL DAILY
Status: CANCELLED | OUTPATIENT
Start: 2025-07-30

## 2025-07-29 RX ORDER — LANOLIN ALCOHOL/MO/W.PET/CERES
800 CREAM (GRAM) TOPICAL
Status: CANCELLED | OUTPATIENT
Start: 2025-07-29

## 2025-07-29 RX ORDER — SODIUM,POTASSIUM PHOSPHATES 280-250MG
2 POWDER IN PACKET (EA) ORAL
Status: CANCELLED | OUTPATIENT
Start: 2025-07-29

## 2025-07-29 RX ORDER — AMOXICILLIN 250 MG
1 CAPSULE ORAL DAILY PRN
Status: CANCELLED | OUTPATIENT
Start: 2025-07-29

## 2025-07-29 RX ORDER — TALC
6 POWDER (GRAM) TOPICAL NIGHTLY PRN
Status: CANCELLED | OUTPATIENT
Start: 2025-07-29

## 2025-07-29 RX ORDER — SODIUM CHLORIDE 9 MG/ML
INJECTION, SOLUTION INTRAVENOUS CONTINUOUS
Status: CANCELLED | OUTPATIENT
Start: 2025-07-29

## 2025-07-29 RX ORDER — MUPIROCIN 20 MG/G
OINTMENT TOPICAL 2 TIMES DAILY
Status: CANCELLED | OUTPATIENT
Start: 2025-07-29 | End: 2025-08-03

## 2025-07-29 RX ORDER — HYDRALAZINE HYDROCHLORIDE 20 MG/ML
10 INJECTION INTRAMUSCULAR; INTRAVENOUS EVERY 6 HOURS PRN
Status: DISCONTINUED | OUTPATIENT
Start: 2025-07-29 | End: 2025-07-29 | Stop reason: HOSPADM

## 2025-07-29 RX ORDER — HYDRALAZINE HYDROCHLORIDE 20 MG/ML
10 INJECTION INTRAMUSCULAR; INTRAVENOUS EVERY 6 HOURS PRN
Status: CANCELLED | OUTPATIENT
Start: 2025-07-29

## 2025-07-29 RX ORDER — ACETAMINOPHEN 325 MG/1
650 TABLET ORAL EVERY 8 HOURS PRN
Status: DISCONTINUED | OUTPATIENT
Start: 2025-07-29 | End: 2025-07-29 | Stop reason: HOSPADM

## 2025-07-29 RX ORDER — IBUPROFEN 200 MG
16 TABLET ORAL
Status: CANCELLED | OUTPATIENT
Start: 2025-07-29

## 2025-07-29 RX ORDER — GLUCAGON 1 MG
1 KIT INJECTION
Status: CANCELLED | OUTPATIENT
Start: 2025-07-29

## 2025-07-29 RX ORDER — IBUPROFEN 200 MG
24 TABLET ORAL
Status: CANCELLED | OUTPATIENT
Start: 2025-07-29

## 2025-07-29 RX ORDER — MUPIROCIN 20 MG/G
OINTMENT TOPICAL 2 TIMES DAILY
Status: DISCONTINUED | OUTPATIENT
Start: 2025-07-29 | End: 2025-07-29 | Stop reason: HOSPADM

## 2025-07-29 RX ORDER — NALOXONE HCL 0.4 MG/ML
0.02 VIAL (ML) INJECTION
Status: CANCELLED | OUTPATIENT
Start: 2025-07-29

## 2025-07-29 RX ORDER — ALUMINUM HYDROXIDE, MAGNESIUM HYDROXIDE, AND SIMETHICONE 1200; 120; 1200 MG/30ML; MG/30ML; MG/30ML
30 SUSPENSION ORAL 4 TIMES DAILY PRN
Status: CANCELLED | OUTPATIENT
Start: 2025-07-29

## 2025-07-29 RX ORDER — SODIUM CHLORIDE 9 MG/ML
INJECTION, SOLUTION INTRAVENOUS CONTINUOUS
Status: DISCONTINUED | OUTPATIENT
Start: 2025-07-29 | End: 2025-07-29 | Stop reason: HOSPADM

## 2025-07-29 RX ORDER — DIVALPROEX SODIUM 250 MG/1
500 TABLET, DELAYED RELEASE ORAL 2 TIMES DAILY
Status: DISCONTINUED | OUTPATIENT
Start: 2025-07-29 | End: 2025-07-29 | Stop reason: HOSPADM

## 2025-07-29 RX ORDER — MORPHINE SULFATE 2 MG/ML
1 INJECTION, SOLUTION INTRAMUSCULAR; INTRAVENOUS EVERY 6 HOURS PRN
Status: DISCONTINUED | OUTPATIENT
Start: 2025-07-29 | End: 2025-07-29

## 2025-07-29 RX ORDER — FOLIC ACID 1 MG/1
1 TABLET ORAL DAILY
Status: CANCELLED | OUTPATIENT
Start: 2025-07-30

## 2025-07-29 RX ORDER — ACETAMINOPHEN 325 MG/1
650 TABLET ORAL EVERY 8 HOURS PRN
Status: CANCELLED | OUTPATIENT
Start: 2025-07-29

## 2025-07-29 RX ADMIN — SODIUM CHLORIDE: 9 INJECTION, SOLUTION INTRAVENOUS at 03:07

## 2025-07-29 RX ADMIN — HYDRALAZINE HYDROCHLORIDE 10 MG: 20 INJECTION INTRAMUSCULAR; INTRAVENOUS at 09:07

## 2025-07-29 RX ADMIN — KETOROLAC TROMETHAMINE 15 MG: 30 INJECTION, SOLUTION INTRAMUSCULAR; INTRAVENOUS at 11:07

## 2025-07-29 RX ADMIN — MORPHINE SULFATE 1 MG: 2 INJECTION, SOLUTION INTRAMUSCULAR; INTRAVENOUS at 12:07

## 2025-07-29 RX ADMIN — MUPIROCIN 1 G: 20 OINTMENT TOPICAL at 08:07

## 2025-07-29 RX ADMIN — Medication 100 MG: at 08:07

## 2025-07-29 RX ADMIN — Medication 800 MG: at 09:07

## 2025-07-29 RX ADMIN — Medication 800 MG: at 05:07

## 2025-07-29 RX ADMIN — POTASSIUM BICARBONATE 35 MEQ: 391 TABLET, EFFERVESCENT ORAL at 05:07

## 2025-07-29 RX ADMIN — POTASSIUM BICARBONATE 35 MEQ: 391 TABLET, EFFERVESCENT ORAL at 09:07

## 2025-07-29 RX ADMIN — CEFEPIME 2 G: 2 INJECTION, POWDER, FOR SOLUTION INTRAVENOUS at 08:07

## 2025-07-29 RX ADMIN — IOHEXOL 100 ML: 350 INJECTION, SOLUTION INTRAVENOUS at 02:07

## 2025-07-29 RX ADMIN — SODIUM CHLORIDE: 9 INJECTION, SOLUTION INTRAVENOUS at 09:07

## 2025-07-29 RX ADMIN — VANCOMYCIN HYDROCHLORIDE 1000 MG: 1 INJECTION, POWDER, LYOPHILIZED, FOR SOLUTION INTRAVENOUS at 11:07

## 2025-07-29 RX ADMIN — FOLIC ACID 1 MG: 1 TABLET ORAL at 08:07

## 2025-07-29 RX ADMIN — ACETAMINOPHEN 650 MG: 325 TABLET ORAL at 09:07

## 2025-07-29 RX ADMIN — SODIUM CHLORIDE 1000 ML: 9 INJECTION, SOLUTION INTRAVENOUS at 02:07

## 2025-07-29 NOTE — ASSESSMENT & PLAN NOTE
Patient with excessive ingestion of Seroquel although story is unclear, longstanding history of schizophrenia, PEC'd in the emergency department  Serial EKGs given QT prolongation  Avoid QTC prolonging agents  Hold home psychotropic  Continuous telemetry monitoring  Close monitoring in ICU  Telepsych consult follow up ordered  Consult poison control

## 2025-07-29 NOTE — NURSING
Concerns for compartment syndrome. Swelling and blistering to affected rt hand. Consult for general surgery called at [phone] today. Dr. Zimmerman states Orthopedics would have to take the consult 2/2 hand involvement. Consult placed and attempted to call cell phone. No answer. Called answering service. Answering service stated all consults go through secure chat. MD Morales's secure chat is busy. Reading 'OR'. Consult placed through secure chat and MD Waldron notified.

## 2025-07-29 NOTE — HOSPITAL COURSE
Patient closely monitored in ICU.  Psychiatry was consulted and recommended PEC and holding home medications.  Patient and patient's mother were unclear about any overdose.  Serial EKGs done.  EKG was repeated and QTC still prolonged.  Home Depakote could not be restarted.  EEG was done and no epileptiform activity seen.  But given his presentation, concern of seizure.  PRN benzodiazepine in place.  Neurology consult.  Regarding pneumonia, His oxygen requirement improved to 4 L. was continued on broad-spectrum IV antibiotics.  Procalcitonin ordered and elevated to 33.  Infectious diseases was consulted.  MRSA resulted positive and was continued on vancomycin.      For rhabdomyolysis, started on IV fluids, CK repeated and up trended.  Adjust fluid rate.  Also had transaminitis, LFTs trended with IV hydration.    Also had right hand swelling, blisters, erythema, initially orthopedics consulted and recommended conservative management with limb elevation/ice, patient had good radial and ulnar pulses, was able to move/flex his fingers and had full sensations but eventually worsened he started having numbness, worsening pain, reducing mobility in the joints.  Concern for compartment syndrome, CT stat showed edema and less likely hematoma or abscess.  Discussed with orthopedics, vascular surgery, General surgery - no coverage for hand surgery available at [facility], transfer center contacted and patient accepted at [facility] by Dr. Liu.  Discharge readmit orders in place.  Discussed hand off/follow-ups with Dr. Liu.

## 2025-07-29 NOTE — PROGRESS NOTES
Pharmacist Renal Adjustment Note    Bradley Molina Cousin is a 45 y.o. male being treated with Cefepime.     Patient Data:    Vital Signs (Most Recent):  Temp: 99.1 °F (37.3 °C) (07/28/25 1827)  Pulse: 107 (07/28/25 2125)  Resp: 20 (07/28/25 1915)  BP: (!) 147/95 (07/28/25 2130)  SpO2: (!) 89 % (07/28/25 2145) Vital Signs (72h Range):  Temp:  [99.1 °F (37.3 °C)]   Pulse:  [100-108]   Resp:  [20-22]   BP: (130-147)/(60-99)   SpO2:  [88 %-97 %]      Recent Labs   Lab 07/28/25  1849   CREATININE 1.3     Serum creatinine: 1.3 mg/dL 07/28/25 1849  Estimated creatinine clearance: 68.5 mL/min    Cefepime 2 g every 12 hours will be changed to Cefepime 2 g every 8 hours due to CrCl > 60 mL/min per Renal Dose Adjustment protocol.     Pharmacist's Name: Smita Cueva  Pharmacist's Extension: [phone]

## 2025-07-29 NOTE — ASSESSMENT & PLAN NOTE
- long history  - on Effexor, Seroquel, Depakote  - tele psychiatry consulted  - holding home meds with current QTC prolongation  - PECed  - reconsult as appropriate

## 2025-07-29 NOTE — SUBJECTIVE & OBJECTIVE
"Past Medical History:   Diagnosis Date    Anxiety     Anxiety     Bipolar affective disorder     Depression     Hypertension     Pancreatitis     Schizophrenia     Seizures     Thyroid disease        Past Surgical History:   Procedure Laterality Date    ESOPHAGOGASTRODUODENOSCOPY N/A 11/24/2023    Procedure: EGD (ESOPHAGOGASTRODUODENOSCOPY);  Surgeon: Shannen Meyer MD;  Location: [facility];  Service: Endoscopy;  Laterality: N/A;    ESOPHAGOGASTRODUODENOSCOPY N/A 11/27/2023    Procedure: EGD (ESOPHAGOGASTRODUODENOSCOPY);  Surgeon: Steve Anderson MD;  Location: [facility];  Service: Endoscopy;  Laterality: N/A;    ESOPHAGOGASTRODUODENOSCOPY N/A 12/8/2023    Procedure: EGD (ESOPHAGOGASTRODUODENOSCOPY);  Surgeon: Steve Anderson MD;  Location: [facility];  Service: Endoscopy;  Laterality: N/A;    LAPAROSCOPIC CHOLECYSTECTOMY N/A 3/9/2020    Procedure: CHOLECYSTECTOMY, LAPAROSCOPIC;  Surgeon: Trenton Deshpande MD;  Location: [facility];  Service: General;  Laterality: N/A;       Review of patient's allergies indicates:   Allergen Reactions    Amlodipine        No current facility-administered medications on file prior to encounter.     Current Outpatient Medications on File Prior to Encounter   Medication Sig    ALPRAZolam (XANAX) 1 MG tablet Take 1 mg by mouth 3 (three) times daily as needed for Anxiety.    amLODIPine (NORVASC) 10 MG tablet Take 10 mg by mouth once daily.    divalproex (DEPAKOTE) 500 MG TbEC Take 500 mg by mouth 2 (two) times daily.    lancets 33 gauge Misc 1 Package.    LANTUS SOLOSTAR U-100 INSULIN glargine 100 units/mL (3mL) SubQ pen ADMINISTER 30 UNITS UNDER THE SKIN EVERY EVENING (Patient not taking: Reported on 4/12/2025)    lisinopriL (PRINIVIL,ZESTRIL) 20 MG tablet Take 20 mg by mouth once daily.    metFORMIN (GLUCOPHAGE) 1000 MG tablet Take 1,000 mg by mouth every morning.    omeprazole (PRILOSEC) 40 MG capsule Take 40 mg by mouth every morning.    pen needle, diabetic 32 gauge x 5/32" " Ndle 1 Box.    QUEtiapine (SEROQUEL) 300 MG Tab Take 300 mg by mouth 2 (two) times daily.    traZODone (DESYREL) 100 MG tablet Take 100 mg by mouth every evening.    TRULICITY 4.5 mg/0.5 mL pen injector Inject 4.5 mg into the skin every 7 days. TUESDAYS (Patient not taking: Reported on 4/12/2025)    venlafaxine (EFFEXOR-XR) 150 MG Cp24 Take 150 mg by mouth once daily.     Family History       Problem Relation (Age of Onset)    Cancer Paternal Grandfather    Diabetes Maternal Grandmother    Hypertension Maternal Grandfather          Tobacco Use    Smoking status: Some Days     Current packs/day: 0.25     Average packs/day: 0.3 packs/day for 7.0 years (1.8 ttl pk-yrs)     Types: Cigarettes    Smokeless tobacco: Never   Substance and Sexual Activity    Alcohol use: Yes     Alcohol/week: 2.0 standard drinks of alcohol     Types: 2 Glasses of wine per week     Comment: occasionally    Drug use: Yes     Frequency: 7.0 times per week     Types: Marijuana    Sexual activity: Yes     Partners: Female     Review of Systems   Constitutional:  Negative for chills and fever.   Respiratory:  Positive for cough and shortness of breath.    Psychiatric/Behavioral:  Positive for hallucinations. Negative for self-injury and suicidal ideas.      Objective:     Vital Signs (Most Recent):  Temp: 99.1 °F (37.3 °C) (07/28/25 1827)  Pulse: 107 (07/28/25 2125)  Resp: 20 (07/28/25 1915)  BP: (!) 147/95 (07/28/25 2130)  SpO2: (!) 89 % (07/28/25 2145) Vital Signs (24h Range):  Temp:  [99.1 °F (37.3 °C)] 99.1 °F (37.3 °C)  Pulse:  [100-108] 107  Resp:  [20-22] 20  SpO2:  [88 %-97 %] 89 %  BP: (130-147)/(60-99) 147/95     Weight: 77.6 kg (171 lb)  Body mass index is 26.78 kg/m².     Physical Exam  Constitutional:       Comments: Lethargic   Eyes:      Extraocular Movements: Extraocular movements intact.   Cardiovascular:      Rate and Rhythm: Regular rhythm. Tachycardia present.   Pulmonary:      Effort: Pulmonary effort is normal. No respiratory  distress.      Breath sounds: No wheezing.   Abdominal:      General: There is no distension.      Palpations: Abdomen is soft.      Tenderness: There is no abdominal tenderness.   Musculoskeletal:      Right lower leg: No edema.      Left lower leg: No edema.   Skin:     General: Skin is warm and dry.   Neurological:      Comments: Patient oriented but seems a bit confused   Psychiatric:      Comments: Reports visual and auditory hallucinations                Significant Labs: All pertinent labs within the past 24 hours have been reviewed.  BMP:   Recent Labs   Lab 07/28/25  1849   *      K 3.6      CO2 25   BUN 14   CREATININE 1.3   CALCIUM 8.4*   MG 1.3*     CBC:   Recent Labs   Lab 07/28/25  1849   WBC 12.75*   HGB 10.7*   HCT 33.0*          Significant Imaging: I have reviewed all pertinent imaging results/findings within the past 24 hours.

## 2025-07-29 NOTE — PLAN OF CARE
Pt being transferred to [facility] due to increased swelling in Right hand with limited movement.     Family informed by nurse    Transportation arranged by transportation center via EMS       07/29/25 1529   Final Note   Assessment Type Final Discharge Note   Anticipated Discharge Disposition [facility]

## 2025-07-29 NOTE — ASSESSMENT & PLAN NOTE
Patient has Abnormal Magnesium: hypomagnesemia. Will continue to monitor electrolytes closely. Will replace the affected electrolytes and repeat labs to be done after interventions completed. The patient's magnesium results have been reviewed and are listed below.  Recent Labs   Lab 07/28/25  1849   MG 1.3*

## 2025-07-29 NOTE — NURSING
CT backed up. Transfer order placed. Dr. Waldron notified that patient's hand is more swollen, reddening, numb, and patient has extremely limited movement. Transfer center to be called.

## 2025-07-29 NOTE — CONSULTS
[facility]   DEPARTMENT OF PSYCHIATRY     IDENTIFIERS & DEMOGRAPHICS:     SERVICE: Telepsychiatry  ENCOUNTER: initial    TELEPSYCHIATRY (AUDIOVISUAL): Each patient who is provided psychiatric services via telehealth is: (1) informed of the relationship between the psychiatric provider and patient, as well as the respective role of any other health care staff/providers with respect to management of the patient; and (2) notified that he or she may decline to receive psychiatric services by telehealth and may withdraw from such care at any time.  Risks of telehealth include the potential for security breaches (HIPPA compliant platforms notwithstanding) and technological failure, as well as the limitations to physical examination inherent to the modality. The patient was agreeable to the use of telehealth services.    START TIME: 7/28/2025 9:54 PM  STOP TIME: 7/28/2025 10:45 PM    -- PATIENT IDENTIFIERS: Bradley Collado  [id number]  1980  45 y.o.  male  -- REQUESTING PROVIDER: No att. providers found *  -- LOCATION OF PATIENT: ED    -- SOURCES OF INFORMATION: PATIENT, EHR/chart  -- LOCATION OF ENCOUNTER PROVIDER: [place], [place]    -- ENCOUNTER PROVIDER: Damon Mena MD        PRESENTATION:   History of Present Illness   **  OVERVIEW OF THE HPI:    44yo male, history of schizophrenia, brought in with altered mental status after overdose on seroquel.  Indeterminate intent, though presumed to be abusing his medication.  However chart reports increasingly bizarre behavior the past several days.  Patient is altered during my exam, mumbling and falling asleep, cannot give coherent history.  Denies this was a suicide attempt.  Currently being admitted to ICU per chart review.    SUBJECTIVE/CURRENT FINDINGS:    Per Patient:  - drowsy, mumbling, nodding off during session  - here because of his finger  - took six 300mg tabs of seroquel  - took extra to get some sleep  - denies this was suicide attempt  -  denies any suicidal ideation/homicidal ideation     Per Chart Review:    Altered Mental Status: Pt arrived via ambulance where his family found him supine and has hx of abusing his home meds. Pt family stated the pt is taking 6 800mg of Seroquel. Pt also has a right wrist deformity.      46 Yo M PMH/o schizophrenia (on seroquel 300mg BID, effexor, xanax), seizures (on depakote), thyroid disease, HTN (on amlodipine, lisinopril), DM (on metformin) alcohol use disorder, pancreatitis BIBEMS after patient found him down, minimally responsive, and resisting movement at home. He stays with his grandmother and over the past few days, has been noted to have increasingly bizarre behavior.     HPI: This is a pleasant 45-year-old gentleman with complex history including schizophrenia, alcohol use disorder multiple admissions for pancreatitis who presents to the emergency department via EMS after he was found down for an unknown amount of time in his home.  Patient unable to recall what happened prior to this.  Unable to say what he may have ingested.  ED doc suggested collateral history implicates overuse of Seroquel.  Patient quite drowsy on my assessment but able to protect his airway.  Imaging consistent with a pneumonia and patient does endorse cough.  Patient is saturating 88% on room air on my assessment.  He endorses some nausea.  Patient in sinus tachycardia with rates hovering around 100.  QTC is less than 450.  Unclear if patient has seizure history.  We will order EEG, serial EKG, continuous telemetry and observation in the ICU.    Per Collateral:    Father [phone]:  - no answer    REVIEW OF SYSTEMS:    >> SOURCES: patient     Y   Sleep Disturbance/Disruption  +insomnia     N   Appetite/Weight Change   N   Alterations in Energy Level   N   Impaired Focus/Concentration   Y   Depressive Symptomatology  +depressed mood, +hopelessness     Y   Excessive Anxiety/Worry   Y   Dysregulated  Mood/Behavior  +irritability     N   Manic Symptomatology   N   Psychosis  no hallucinations, no paranoid ideation      Regarding the current presentation, no other significant issues or complaints are voiced or known at this time.      ADD-ON PSYCHOTHERAPY:     N/A         HISTORY:   Patient Information   **  >> SOURCES: patient, chart review       PSYCH  SUBSTANCE  FAMILY  SOCIAL  MEDICAL     Y   Previous/Pre-Existing Psychiatric Diagnoses  Schizophrenia   Y   Past Psychotropic Trials  abilify, neurontin, vistaril, remeron, seroquel, zoloft, clonidine   Y   Current Psychiatric Provider   Y   Hx of Outpatient Psychiatric Treatment   Y   Hx of Psychiatric Hospitalization   N   Hx of Suicidal Ideation/Threats   N   Hx of Suicide Attempts/Gestures   N   Hx of Perpetrated Violence   N   Documented Hx of Malingering     Y   Hx of Formal DUDLEY Treatment   Y   Hx of Rehab   Y   Recent Alcohol Consumption   +   Drinking Pattern (frequency)  +social   Y   Hx of Nicotine Use   Y   Hx of Alcohol Misuse/Abuse   Y   Hx of Illicit Drug Misuse/Abuse   +   Drug Experimentation/Usage  +cannabis       U   Family Psychiatric History  patient unsure     N   Hx of Abuse     N   Developmental Delay/Disability   Y   GED/High School Diploma   Y   Post-Secondary Education  some college   Y   Currently Employed  landscaping   Y   Domiciled  lives between grandmother's and father's   Y   Intact Support System   N   Currently in a Relationship   N   Ever    N   Ever /   Y   Children/Dependents   N   Yarsani/Spiritual   N   Hx of  Service     Y   Ever Charged/Convicted     Y   Hx of Seizures   Y   Hx of Head Trauma     Y   Medical Hx & Diagnoses   +   Key Diagnoses  +HTN      >> EHR (EPIC): reviewed/reconciled      The patient's past medical  "history has been reviewed and updated as appropriate within the electronic health record system.  See PROBLEM LIST & HISTORY for details.    Scheduled and PRN Medications: The electronic chart was reviewed and updated as appropriate.  See MEDCARD for details.    Allergies:  Amlodipine      EXAMINATION:   Objective   **  VITALS:  BP (!) 147/95   Pulse 107   Temp 99.1 °F (37.3 °C) (Oral)   Resp 20   Ht 5' 7" (1.702 m)   Wt 77.6 kg (171 lb)   SpO2 (!) 89%   BMI 26.78 kg/m²     MENTAL STATUS EXAMINATION:  Appearance: adequately groomed    Behavior & Attitude: minimally participative  calm    Movements & Motor Activity: no psychomotor agitation    Speech & Language:   mumbling, answers minimally  Mood: depressed  Affect:   unknown/unable to assess  Thought Process & Associations:   unknown/unable to assess  Thought Content & Perceptions:   unknown/unable to assess  Sensorium: sedated    Orientation:   unknown/unable to assess  Recent & Remote Memory:   unknown/unable to assess  Attention & Concentration: inattentive to conversation, easily distracted    Fund of Knowledge: intact    Insight: impaired    Judgment: impaired        RISK & REGULATORY:   Impression   **   RISK PARAMETERS:     N   Suicidal Ideation/Threats   N   Suicide Attempts/Gestures   +   [place] (C-SSRS)  Suicide Risk: No Risk  clinical impression  significant AMBIGUITY   N   Homicidal Ideation/Threats   N   Homicidal Behavior   N   Non-Suicidal Self-Injurious Behavior   N   Perpetrated Violence      LEGAL (current status  certification criteria):     - DANGER TO SELF: no   - DANGER TO OTHERS: no   - GRAVELY DISABLED: yes    NOTE: RISK PARAMETERS are current to the encounter/episode  NOT inclusive of past history.       FIREARMS & WEAPONS:     N   Ready Access to Firearms   Y   Gun Safety Counseled  e.g., proper storage, inherent risk     SAFETY SCREENINGS:    -- RISK FACTORS: IDENTIFIED   "   - SPECIFIC MODIFIABLE FACTORS IDENTIFIED: substance abuse     - SPECIFIC NON-MODIFIABLE FACTORS IDENTIFIED: hx psych tx    -- CONTRACTS FOR SAFETY: unknown  unable to assess  -- FUTURE ORIENTED: unknown  unable to assess    -- CAREGIVER(S)     - SUPPORTIVE & APPROPRIATELY INVOLVED: YES     REGULATORY:    -- : REVIEWED      INFORMED CONSENT & SHARED DECISION MAKING are the hallmark and bedrock of good clinical care, and as such have been employed and obtained, respectively, to the degree possible.  Discussed, to the extent possible, diagnosis, risks and benefits of proposed treatment (e.g., medication, therapy) vs alternative treatments vs no treatment, potential side effects of these treatments, and the inherent unpredictability of treatment.  Resources have been provided via the patient instructions in the AVS to supplement, augment, and reinforce discussions, counseling, and/or interventions.       - ABILITY TO UNDERSTAND, PARTICIPATE & ENGAGE: impaired     - RELIABILITY/ACCURACY: the patient is deemed to be a vague historian      WARNINGS & PRECAUTIONS:  >> In cases of emergencies (e.g. SI/HI resulting in danger to self or others, functioning deteriorating to the level of grave disability), call 911 or [phone], or present to the emergency department for immediate assistance.    >> Individuals should not operate a motor vehicle or heavy machinery if effects of medications or underlying symptoms/condition impair the ability to do so safely.    >> FULLY comply with ANY/ALL medication as prescribed/instructed and report ANY/ALL suspected adverse effects to appropriate health care providers.      ASSESSMENT & PLAN:   Assessment & Plan   **  DIAGNOSES & PROBLEMS:    Schizophrenia  Overdose    PSYCHOTROPIC REGIMEN:  []Continue  []Adjust  []Initiate  [x]Defer  []D/C  []N/A    Seroquel 300mg PO bid  Effexor XR  Ativan 2mg PO tid - per  filled 7/21/25  Depakote     A&P (Synthesis  Analysis  Summation  Dispo   Goals  Recs & Mgmt):    Patient with altered mental status, presumed secondary to misuse of his medications, but rule out organic cause.  Also unknown if he was decompensating psychiatrically prior to presentation or it was more consistent with early signs of delirium.  Agree with PEC at this time - he is being admitted to medicine - he will need psych re-eval as his mental status clears.  Hold his psychotropics today - re-eval tomorrow to resume.     - CURRENT CONDITION: exacerbated  as compared to typical baseline  - WITH REASONABLE MEDICAL CERTAINTY, based on history, chart review, available collateral information, and a present-state examination:   -- currently best managed on a medical unit, owing to the need for medical stabilization - however, the need for psychiatric hospitalization is currently anticipated upon stabilization of acute medical condition    * PEC is INDICATED - enact if not yet in place, and ensure safety measures and elopement precautions, per unit protocol         CHART REVIEW: available documentation has been reviewed, and pertinent elements of the chart have been incorporated into this evaluation where appropriate.       KEY & LINKS:        Y  = yes/endorses     N  = no/denies     U  = unknown/unable to assess    ADHD   AIMS   AUDIT   AUDIT-C   C-SSRS (Screen)   C-SSRS (Short)   C-SSRS (Full)   DAST   DAST-10   EVERARDO-7   MoCA   PCL-5   PHQ-9   DUDLEY   YMRS       DIAGNOSTIC TESTING:   Results   **   Glu 192 (H)  7/28/2025  Li *   *  TSH 0.860  7/28/2025    HgA1c 7.0 (H)  4/12/2025  VPA <4.0 (L)  7/28/2025   FT4 0.76  4/12/2025    Na 142  7/28/2025  CLZ *   *  WBC 12.75 (H)  7/28/2025    Cr 1.3  7/28/2025  ANC 4.7; 66.2;   2/18/2025   Hgb 10.7 (L)  7/28/2025     BUN 14  7/28/2025  Trop I *   *  HCT 33.0 (L)  7/28/2025     GFR >60  7/28/2025   CPK 4,774 (H)  7/28/2025    7/28/2025     Alb 4.1  7/28/2025   PRL *   *  B12 588  4/12/2025     T Bili 0.8   7/28/2025  Chol *   *  B9 8.4  4/12/2025      7/28/2025  TGs *   *  B1 *   *     (H)  7/28/2025  HDL *   *  Vit D *   *     ALT 60 (H)  7/28/2025  LDL *   *  HIV Non-Reactive  4/11/2025     INR 1.2  7/28/2025  Denisse *   *   Hep C Non-Reactive  4/11/2025    GGT *   *  Lip 3 (L)  7/28/2025  RPR *   *    MCV 82  7/28/2025   NH4 39  7/28/2025  UPT *   *      PETH *   *  THC Presumptive Positive (A)  4/11/2025    ETOH <10  7/28/2025  AMALIA Negative  12/9/2023    EtG *   *  AMP Negative  12/9/2023    ALC *   *  OPI Negative  12/9/2023    BZO Negative  12/9/2023  MTD Negative  12/9/2023     BAR Negative  12/9/2023  BUP *   *    PCP Negative  12/9/2023  FEN *   *     Results for orders placed or performed during the hospital encounter of 04/11/25   EKG 12-lead    Collection Time: 04/11/25 10:59 PM   Result Value Ref Range    QRS Duration 72 ms    [facility] QTC Calculation 435 ms    Narrative    Test Reason : R07.9,    Vent. Rate :  92 BPM     Atrial Rate :  92 BPM     P-R Int : 136 ms          QRS Dur :  72 ms      QT Int : 352 ms       P-R-T Axes :  57   3  18 degrees    QTcB Int : 435 ms    Normal sinus rhythm  Possible Left atrial enlargement  Septal infarct ,age undetermined  Abnormal ECG  No previous ECGs available  Confirmed by Blake Quintero ([id number]) on 4/12/2025 8:57:53 PM    Referred By: AAAREFERRAL SELF           Confirmed By: Blake Quintero     Problem List[1]     07/21/2025 07/21/2025 1 Lorazepam 2 Mg Tablet 90.00 30 St You [id number] Dch ([id number]) 0 6.00 LME Comm Ins [place]   06/23/2025 06/23/2025 1 Lorazepam 2 Mg Tablet 90.00 30 Ma Com [id number] Dch ([id number]) 0 6.00 [place]   05/13/2025 05/13/2025 1 Lorazepam 1 Mg Tablet 90.00 30 Ma Com [id number] Dch ([id number]) 0 3.00 [place]   04/16/2025 04/16/2025 1 Lorazepam 1 Mg Tablet 90.00 30 Ma Com [id number] Dch ([id number]) 0 3.00 [place]   04/08/2025 04/08/2025 1 Alprazolam 1 Mg Tablet 90.00 30 Ma Com [id number] Dch ([id number]) 0 6.00 [place]    03/11/2025 02/06/2025 1 Alprazolam 1 Mg Tablet 60.00 30 Ma Com [id number] Dch ([id number]) 1 4.00 LME [place]   02/10/2025 02/06/2025 3 Alprazolam 1 Mg Tablet 60.00 30 Ma Com [id number] Dch ([id number]) 0 4.00 LME Medicaid [place]   01/14/2025 01/14/2025 3 Alprazolam 1 Mg Tablet 60.00 30 Ma Com [id number] Dch ([id number]) 0 4.00 LME Private Pay [place]   01/10/2025 01/10/2025 3 Alprazolam 1 Mg Tablet 12.00 6 Ma Com [id number] Dch ([id number]) 0 4.00 LME Medicaid [place]     Inpatient consult to Telemedicine - Psych  Consult performed by: Damon Mena MD  Consult ordered by: Adia Becker MD        [facility] EMERGENCY DEPARTMENT         [1]   Patient Active Problem List  Diagnosis    Pancreatic pseudocyst    Alcohol-induced acute pancreatitis    Hypertension    Normocytic anemia    Transaminitis    Elevated alkaline phosphatase level    Alcohol abuse    Depression    Insomnia    Tobacco use    Marijuana use    Essential hypertension    Epigastric pain    Osteoarthritis of hip    Colitis- transverse colon    Elevated lipase    Abdominal pain    Alcohol-induced chronic pancreatitis    Nicotine abuse    Pseudocyst, pancreas    Benign essential HTN    Hypokalemia    Pancreatic mass    Intractable migraine    Suicidal ideation    Gastroesophageal reflux disease without esophagitis    Pancreatitis    Chronic pancreatitis    Acute on chronic pancreatitis    Hyperkalemia    Suspected COVID-19 virus infection    Dependence on nicotine from cigarettes    Diverticulitis    Pancreatic pseudocyst/cyst    Alcohol use    Hyperglycemia    Recreational drug use    Iron deficiency anemia    Impacted cerumen, right ear    Suicide attempt    Seizure disorder    Acute blood loss anemia    Diabetes    Altered mental status    Hyperammonemia    Encephalopathy acute    Non-traumatic rhabdomyolysis    Overdose of undetermined intent    Community acquired pneumonia    Sepsis    Hypomagnesemia

## 2025-07-29 NOTE — ASSESSMENT & PLAN NOTE
"Patient has a diagnosis of pneumonia. The cause of the pneumonia is suspected to be bacterial in etiology but organism is not known. The pneumonia is stable. The patient has the following signs/symptoms of pneumonia: cough and sputum production. The patient does have a current oxygen requirement and the patient does not have a home oxygen requirement. I have reviewed the pertinent imaging. The following cultures have been collected: Blood cultures and Sputum culture The culture results are listed below.     Current antimicrobial regimen consists of the antibiotics listed below. Will monitor patient closely and continue current treatment plan unchanged.    Antibiotics (From admission, onward)      Start     Stop Route Frequency Ordered    07/28/25 2215  vancomycin - pharmacy to dose  (ED Adult Sepsis Treatment)        Placed in "And" Linked Group    -- IV pharmacy to manage frequency 07/28/25 2116 07/28/25 2200  vancomycin 1,500 mg in 0.9% NaCl 250 mL IVPB (admixture device)         -- IV Once 07/28/25 2119 07/28/25 2130  ceFEPIme injection 2 g  (ED Adult Sepsis Treatment)         07/29/25 0929 IV ED 1 Time 07/28/25 2116 07/28/25 0500  ceFEPIme injection 2 g         -- IV Every 12 hours (non-standard times) 07/28/25 2123            Microbiology Results (last 7 days)       Procedure Component Value Units Date/Time    Blood culture x two cultures. Draw prior to antibiotics. [[id number]] Collected: 07/28/25 2138    Order Status: Sent Specimen: Blood from Peripheral, Antecubital, Right Updated: 07/28/25 2145    Blood culture x two cultures. Draw prior to antibiotics. [[id number]] Collected: 07/28/25 2138    Order Status: Sent Specimen: Blood from Peripheral, Hand, Left Updated: 07/28/25 2144    Culture, Respiratory with Gram Stain [[id number]]     Order Status: Canceled Specimen: Respiratory     MRSA Screen by PCR [[id number]]     Order Status: Sent Specimen: Nasal Swab     Culture, Respiratory with Gram Stain " Okay [[id number]]     Order Status: Sent Specimen: Respiratory

## 2025-07-29 NOTE — PROGRESS NOTES
[facility] Medicine  Progress Note    Patient Name: Bradley Collado  MRN: [id number]  Patient Class: IP- Inpatient   Admission Date: 7/28/2025  Length of Stay: 1 days  Attending Physician: No att. providers found  Primary Care Provider: Cassius Oneill MD        Subjective     Principal Problem:Non-traumatic rhabdomyolysis        HPI:  This is a pleasant 45-year-old gentleman with complex history including schizophrenia, alcohol use disorder multiple admissions for pancreatitis who presents to the emergency department via EMS after he was found down for an unknown amount of time in his home.  Patient unable to recall what happened prior to this.  Unable to say what he may have ingested.  ED doc suggested collateral history implicates overuse of Seroquel.  Patient quite drowsy on my assessment but able to protect his airway.  Imaging consistent with a pneumonia and patient does endorse cough.  Patient is saturating 88% on room air on my assessment.  He endorses some nausea.  Patient in sinus tachycardia with rates hovering around 100.  QTC is less than 450.  Unclear if patient has seizure history.  We will order EEG, serial EKG, continuous telemetry and observation in the ICU.    Overview/Hospital Course:  Patient closely monitored in ICU.  Psychiatry was consulted and recommended PEC and holding home medications.  Serial EKGs done.  EKG was repeated and QTC still prolonged.  Home Depakote could not be restarted.  EEG was done and no epileptiform activity seen.  Regarding pneumonia, His oxygen requirement improved to 4 L. was continued on broad-spectrum IV antibiotics.  Procalcitonin ordered and pending.  Infectious diseases was consulted.  MRSA resulted positive and was continued on vancomycin.    For rhabdomyolysis, started on IV fluids, CK repeated and up trended.  Increase fluid rate.    Concern of seizure, depakote level ordered. PRN Benzo, scheduled meds on hold until neurology  consult. Seizure precautions in place.  Also had right hand swelling, blisters, erythema, initially orthopedics consulted and recommended conservative management with limb elevation/ice, patient had good radial and ulnar pulses, was able to move/flex his fingers and had full sensations but eventually worsened he started having numbness, worsening pain, reducing mobility in the joints.  Concern for compartment syndrome, CT stat showed edema and less likely hematoma or abscess.  Discussed with orthopedics, vascular surgery, General surgery - no coverage for hand surgery available at [facility], transfer center contacted and patient accepted at [facility] by Dr. Liu.  Discharge readmit orders in place.    Interval History:  Patient is seen and examined.  He is alert, oriented x4.  He is not sure what happened or why he took Seroquel, but he refuses any SI or HI.  Discussed with the patient's mother and she mentioned, she found him down in a pool of urine, unknown downtime.  EEG ordered and being done today.  At home on Depakote, gives me history of seizure disorder, EKG repeated and QTC is still prolonged, holding Depakote and other home psychiatric medications.  Follow up EEG and restart antiseizure medications as appropriate.    Potassium low, repleted this morning.  Add on CK, IV fluids administering in left arm.  Right arm and forearm within normal limits, complains of pain in the left arm.  On exam had good radial and ulnar pulses, had full sensations intact, was able to flex and extend his fingers.  Recommended ice and elevation at bedside.  Reached out to DR Morales - who recommended to continue conservative management and if worsens transfer to where hand surgery available.  Called Dr Zimmerman and vascular Dr Khoobehi - no hand procedures done at [facility].    Stat CT hand ordered, pending.    PRN Hydralazine for hypertension, also likely cause of pain. Avoid strict control with concern of overdose.      Review of  Systems  Objective:     Vital Signs (Most Recent):  Temp: 98.3 °F (36.8 °C) (07/29/25 1208)  Pulse: 80 (07/29/25 1300)  Resp: (!) 49 (07/29/25 1300)  BP: (!) 150/95 (07/29/25 1210)  SpO2: 95 % (07/29/25 1300) Vital Signs (24h Range):  Temp:  [97.8 °F (36.6 °C)-99.1 °F (37.3 °C)] 98.3 °F (36.8 °C)  Pulse:  [] 80  Resp:  [20-54] 49  SpO2:  [86 %-100 %] 95 %  BP: (130-174)/() 150/95     Weight: 69.9 kg (154 lb 1.6 oz)  Body mass index is 24.14 kg/m².    Intake/Output Summary (Last 24 hours) at 7/29/2025 1605  Last data filed at 7/29/2025 1359  Gross per 24 hour   Intake 4933.78 ml   Output 1200 ml   Net 3733.78 ml         Physical Exam  Constitutional:       General: He is not in acute distress.     Appearance: Normal appearance. He is not ill-appearing.   Eyes:      Extraocular Movements: Extraocular movements intact.   Cardiovascular:      Rate and Rhythm: Regular rhythm. Tachycardia present.   Pulmonary:      Effort: Pulmonary effort is normal. No respiratory distress.      Breath sounds: No wheezing.   Abdominal:      General: There is no distension.      Palpations: Abdomen is soft.      Tenderness: There is no abdominal tenderness.   Musculoskeletal:      Right lower leg: No edema.      Left lower leg: No edema.   Skin:     General: Skin is warm and dry.   Neurological:      General: No focal deficit present.      Mental Status: He is alert and oriented to person, place, and time. Mental status is at baseline.      Comments: Poor insight   Psychiatric:      Comments: Poor insight, judgment abnormal, mood and behavior normal               Significant Labs: All pertinent labs within the past 24 hours have been reviewed.    Significant Imaging: I have reviewed all pertinent imaging results/findings within the past 24 hours.      Assessment & Plan  Non-traumatic rhabdomyolysis  Found down, unknown downtime, , patient unable to care for himself  CK>4,000, repeat ordered, renal function baseline  IVF,  increase rate if up trending    Transaminitis  In the setting of alcohol abuse  Mildly up trended, follow closely    Alcohol abuse  On CIWA protocol  Seizure precautions  P.r.n. diazepam  Consider substance use consult    Overdose of undetermined intent  Patient with excessive ingestion of Seroquel although story is unclear, longstanding history of schizophrenia, PEC'd in the emergency department  Serial EKGs given QT prolongation  Avoid QTC prolonging agents  Hold home psychotropic  Continuous telemetry monitoring  Close monitoring in ICU  Telepsych consult follow up ordered  Consult poison control    Community acquired pneumonia  Patient has a diagnosis of pneumonia. The cause of the pneumonia is suspected to be bacterial in etiology but organism is not known. The pneumonia is stable. The patient has the following signs/symptoms of pneumonia: cough and sputum production. The patient does have a current oxygen requirement and the patient does not have a home oxygen requirement. I have reviewed the pertinent imaging. The following cultures have been collected: Blood cultures and Sputum culture The culture results are listed below.     Current antimicrobial regimen consists of the antibiotics listed below. Will monitor patient closely and continue current treatment plan unchanged.    Antibiotics (From admission, onward)      None            Microbiology Results (last 7 days)       Procedure Component Value Units Date/Time    Blood culture x two cultures. Draw prior to antibiotics. [[id number]]  (Normal) Collected: 07/28/25 2138    Order Status: Completed Specimen: Blood from Peripheral, Antecubital, Right Updated: 07/29/25 0408     CULTURE, BLOOD ([facility]) No Growth After 6 Hours    Blood culture x two cultures. Draw prior to antibiotics. [[id number]]  (Normal) Collected: 07/28/25 2138    Order Status: Completed Specimen: Blood from Peripheral, Hand, Left Updated: 07/29/25 0408     CULTURE, BLOOD ([facility]) No Growth After 6  "Hours    MRSA Screen by PCR [[id number]]  (Abnormal) Collected: 07/29/25 0059    Order Status: Completed Specimen: Nasal Swab Updated: 07/29/25 0239     MRSA PCR SCRN ([facility]) Detected    Culture, Respiratory with Gram Stain [[id number]]     Order Status: Canceled Specimen: Respiratory     Culture, Respiratory with Gram Stain [[id number]]     Order Status: Canceled Specimen: Respiratory           Sepsis  Patient has sepsis with Encephalopathy secondary to Respiratory Infection. A review of systems was completed. Patient's sepsis is stable    Current Antibiotics     Vancomycin, meropenem    Lactate  Recent Labs   Lab 07/28/25  1857 07/29/25 0059 07/29/25 0225 07/29/25  0648   LACTATE  --  3.5* 2.8* 1.6   POCLAC 3.44*  --   --   --      Culture Data  Blood Cultures   CULTURE, BLOOD ([facility])   Date Value Ref Range Status   07/28/2025 No Growth After 6 Hours  Preliminary   07/28/2025 No Growth After 6 Hours  Preliminary      Urine Culture No results found for: "LABURIN"   Sputum Culture No results found for: "RESPCULT"   mSOFA  MSOFA Total  Min: 0   Min taken time: 07/28/25 1830  Max: 6   Max taken time: 07/29/25 0100    Plan  - Antibiotics as listed above  - Fluid resuscitation as follows:Actual Body Weight- The patient's actual body weight will be used to calculate the 30 ml/kg fluid bolus.   - lactate normalized  - MRSA positive  - Vasopressors were not needed  - follow up culture data      Hypomagnesemia  Patient has Abnormal Magnesium: hypomagnesemia. Will continue to monitor electrolytes closely. Will replace the affected electrolytes and repeat labs to be done after interventions completed. The patient's magnesium results have been reviewed and are listed below.  Recent Labs   Lab 07/29/25 0225   MG 1.8      Hypokalemia  Patient's most recent potassium results are listed below.   Recent Labs     07/28/25  1849 07/29/25 0225   K 3.6 3.3*     Plan  - Replete potassium per protocol  - Monitor potassium " Daily    Prolonged Q-T interval on ECG  Serial EKGs  Hold any QT prolonging medications    Swelling of right hand  - swelling, erythema, blisters  - pulses intact  - worsening symptoms and signs, high concern of landing in compartment syndrome  - hand surgery at [facility] consulted  - patient being transferred for further care  - broad-spectrum antibiotics  - CT ordered and pending  - caution with pain meds with QTC prolongation    Schizophrenia  - long history  - on Effexor, Seroquel, Depakote  - tele psychiatry consulted  - holding home meds with current QTC prolongation  - PECed  - reconsult as appropriate      Seizure disorder  - unclear history per patient, but patient's mother mentioned he did have seizure history in the past  - records documented seizure history, possible alcohol withdrawal seizures versus seizure disorder  - concern of urinary incontinence, seizure?  - EEG  - Depakote on hold with QTC  - PRN Ativan  - consult neurology for further recommendations, antiseizure medications as appropriate  - seizure precautions  - Consider Keppra loading and BID dosing if necessary        Elevated troponin  - mild elevation  - likely demand  - trend  - clinically no anginal signs  - EKG no ischemic changes      Addendum:  Repeat CK resulted high, increase fluid read back to 150.  Concern of seizure episode at home, prn Ativan.  Procalcitonin resulted high at almost 34.  Neurology/tele psychiatry consult pending at the time of transfer.  Please note that these labs/radiology/EEG resulted after the patient left the hospital.  I called the physician who took over the care, Dr. Liu and updated the plan. Appreciate help.      VTE Risk Mitigation (From admission, onward)      None            Discharge Planning   RICHAR: 8/1/2025     Code Status: Full Code   Medical Readiness for Discharge Date: 7/29/2025  Discharge Plan A: [facility]              Critical care time spent on the evaluation and treatment  of severe organ dysfunction, review of pertinent labs and imaging studies, discussions with consulting providers and discussions with patient/family: 50 minutes.          Kamila Waldron MD  Department of Hospital Medicine   [facility]

## 2025-07-29 NOTE — ASSESSMENT & PLAN NOTE
Patient's most recent potassium results are listed below.   Recent Labs     07/28/25  1849 07/29/25  0225   K 3.6 3.3*     Plan  - Replete potassium per protocol  - Monitor potassium Daily

## 2025-07-29 NOTE — HPI
This is a pleasant 45-year-old gentleman with complex history including schizophrenia, alcohol use disorder multiple admissions for pancreatitis who presents to the emergency department via EMS after he was found down for an unknown amount of time in his home.  Patient unable to recall what happened prior to this.  Unable to say what he may have ingested.  ED doc suggested collateral history implicates overuse of Seroquel.  Patient quite drowsy on my assessment but able to protect his airway.  Imaging consistent with a pneumonia and patient does endorse cough.  Patient is saturating 88% on room air on my assessment.  He endorses some nausea.  Patient in sinus tachycardia with rates hovering around 100.  QTC is less than 450.  Unclear if patient has seizure history.  We will order EEG, serial EKG, continuous telemetry and observation in the ICU.
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
## 2025-07-29 NOTE — NURSING
ED physicians unable to come to bedside to assist 2/2 patient load. Per nurse Physician said it is not a diagnostic tool but more of an assessment to compartment syndrome. Dr. Waldron notified.

## 2025-07-29 NOTE — ASSESSMENT & PLAN NOTE
Patient has a diagnosis of pneumonia. The cause of the pneumonia is suspected to be bacterial in etiology but organism is not known. The pneumonia is stable. The patient has the following signs/symptoms of pneumonia: cough and sputum production. The patient does have a current oxygen requirement and the patient does not have a home oxygen requirement. I have reviewed the pertinent imaging. The following cultures have been collected: Blood cultures and Sputum culture The culture results are listed below.     Current antimicrobial regimen consists of the antibiotics listed below. Will monitor patient closely and continue current treatment plan unchanged.    Antibiotics (From admission, onward)      None            Microbiology Results (last 7 days)       Procedure Component Value Units Date/Time    Blood culture x two cultures. Draw prior to antibiotics. [[id number]]  (Normal) Collected: 07/28/25 2138    Order Status: Completed Specimen: Blood from Peripheral, Antecubital, Right Updated: 07/29/25 0408     CULTURE, BLOOD ([facility]) No Growth After 6 Hours    Blood culture x two cultures. Draw prior to antibiotics. [[id number]]  (Normal) Collected: 07/28/25 2138    Order Status: Completed Specimen: Blood from Peripheral, Hand, Left Updated: 07/29/25 0408     CULTURE, BLOOD ([facility]) No Growth After 6 Hours    MRSA Screen by PCR [[id number]]  (Abnormal) Collected: 07/29/25 0059    Order Status: Completed Specimen: Nasal Swab Updated: 07/29/25 0239     MRSA PCR SCRN ([facility]) Detected    Culture, Respiratory with Gram Stain [[id number]]     Order Status: Canceled Specimen: Respiratory     Culture, Respiratory with Gram Stain [[id number]]     Order Status: Canceled Specimen: Respiratory

## 2025-07-29 NOTE — ASSESSMENT & PLAN NOTE
- mild elevation  - likely demand  - trend  - clinically no anginal signs  - EKG no ischemic changes      Addendum:  Repeat CK resulted high, increase fluid read back to 150.  Concern of seizure episode at home, prn Ativan.  Procalcitonin resulted high at almost 34.  Neurology/tele psychiatry consult pending at the time of transfer.  Please note that these labs/radiology/EEG resulted after the patient left the hospital.  I called the physician who took over the care, Dr. Liu and updated the plan. Appreciate help.

## 2025-07-29 NOTE — SUBJECTIVE & OBJECTIVE
Interval History:  Patient is seen and examined.  He is alert, oriented x4.  He is not sure what happened or why he took Seroquel, but he refuses any SI or HI.  Discussed with the patient's mother and she mentioned, she found him down in a pool of urine, unknown downtime.  EEG ordered and being done today.  At home on Depakote, gives me history of seizure disorder, EKG repeated and QTC is still prolonged, holding Depakote and other home psychiatric medications.  Follow up EEG and restart antiseizure medications as appropriate.    Potassium low, repleted this morning.  Add on CK, IV fluids administering in left arm.  Right arm and forearm within normal limits, complains of pain in the left arm.  On exam had good radial and ulnar pulses, had full sensations intact, was able to flex and extend his fingers.  Recommended ice and elevation at bedside.  Reached out to DR Morales - who recommended to continue conservative management and if worsens transfer to wear hand surgeries available.  Called Dr Zimmerman and vascular Dr Khoobehi - no hand procedures done with their speciality.    Stat CT hand ordered, pending.    Review of Systems  Objective:     Vital Signs (Most Recent):  Temp: 98.3 °F (36.8 °C) (07/29/25 1208)  Pulse: 80 (07/29/25 1300)  Resp: (!) 49 (07/29/25 1300)  BP: (!) 150/95 (07/29/25 1210)  SpO2: 95 % (07/29/25 1300) Vital Signs (24h Range):  Temp:  [97.8 °F (36.6 °C)-99.1 °F (37.3 °C)] 98.3 °F (36.8 °C)  Pulse:  [] 80  Resp:  [20-54] 49  SpO2:  [86 %-100 %] 95 %  BP: (130-174)/() 150/95     Weight: 69.9 kg (154 lb 1.6 oz)  Body mass index is 24.14 kg/m².    Intake/Output Summary (Last 24 hours) at 7/29/2025 1605  Last data filed at 7/29/2025 1359  Gross per 24 hour   Intake 4933.78 ml   Output 1200 ml   Net 3733.78 ml         Physical Exam  Constitutional:       General: He is not in acute distress.     Appearance: Normal appearance. He is not ill-appearing.   Eyes:      Extraocular Movements:  Extraocular movements intact.   Cardiovascular:      Rate and Rhythm: Regular rhythm. Tachycardia present.   Pulmonary:      Effort: Pulmonary effort is normal. No respiratory distress.      Breath sounds: No wheezing.   Abdominal:      General: There is no distension.      Palpations: Abdomen is soft.      Tenderness: There is no abdominal tenderness.   Musculoskeletal:      Right lower leg: No edema.      Left lower leg: No edema.   Skin:     General: Skin is warm and dry.   Neurological:      General: No focal deficit present.      Mental Status: He is alert and oriented to person, place, and time. Mental status is at baseline.      Comments: Poor insight   Psychiatric:      Comments: Poor insight, judgment abnormal, mood and behavior normal               Significant Labs: All pertinent labs within the past 24 hours have been reviewed.    Significant Imaging: I have reviewed all pertinent imaging results/findings within the past 24 hours.

## 2025-07-29 NOTE — ASSESSMENT & PLAN NOTE
Patient with excessive ingestion of Seroquel although story is unclear, longstanding history of schizophrenia, patient gravely disabled, PEC'd in the emergency department  Serial EKGs  Avoid QTC prolonging agents  Hold home psychotropic  Continuous telemetry monitoring  Observation in the ICU  Telepsych consult

## 2025-07-29 NOTE — ASSESSMENT & PLAN NOTE
"Patient has sepsis with Encephalopathy secondary to Respiratory Infection. A review of systems was completed. Patient's sepsis is stable    Current Antibiotics    ceFEPIme injection 2 g, ED 1 Time, Intravenous  vancomycin - pharmacy to dose, pharmacy to manage frequency, Intravenous  vancomycin 1,500 mg in 0.9% NaCl 250 mL IVPB (admixture device), Once, Intravenous  ceFEPIme injection 2 g, Every 12 hours (non-standard times), Intravenous    Lactate  Recent Labs   Lab 07/28/25  1857   POCLAC 3.44*     Culture Data  Blood Cultures No results found for: "CULTBLD"   Urine Culture No results found for: "LABURIN"   Sputum Culture No results found for: "RESPCULT"   mSOFA  MSOFA Total  Min: 0   Min taken time: 07/28/25 1830  Max: 3   Max taken time: 07/28/25 2145    Plan  - Antibiotics as listed above  - Fluid resuscitation as follows:Actual Body Weight- The patient's actual body weight will be used to calculate the 30 ml/kg fluid bolus.   - Trend lactate to resolution  - Follow up culture data  - Vasopressors were not needed      "

## 2025-07-29 NOTE — ASSESSMENT & PLAN NOTE
- unclear history per patient, but patient's mother mentioned he did have seizure history in the past  - records documented seizure history, possible alcohol withdrawal seizures versus seizure disorder  - concern of urinary incontinence, seizure?  - EEG  - Depakote on hold with QTC  - PRN Ativan  - consult neurology for further recommendations, antiseizure medications as appropriate  - seizure precautions  - Consider Keppra loading and BID dosing if necessary

## 2025-07-29 NOTE — NURSING
MRI is a 2 hour long scan and they are unable to schedule him until this afternoon. Dr. Waldron aware. Ct to be obtained after EEG complete.

## 2025-07-29 NOTE — PLAN OF CARE
[facility]  Initial Discharge Assessment       Primary Care Provider: Cassius Oneill MD    Admission Diagnosis: Severe sepsis [A41.9, R65.20]    Admission Date: 7/28/2025  Expected Discharge Date: 8/1/2025    CM met with patient bedside. Pt is PEC and has a flat affect; gives little information. CM verified demographics, insurance, supports, and PCP. CM assessed patient's needs. Patient is able to complete ADLs independently. Patient verified at home DME: Blood pressure machine.  Patient verified No HH, No Dialysis, No Blood Thinners, and No Oxygen.     Patient verified pharmacy of choice: [facility] on [address]  Patient confirmed Dad Zander or his dad's wife Ninfa will be source of transportation at the time of discharge.     Transition of Care Barriers: None    Payor: [facility] / Plan: [facility] EXCHANGE PLAN / Product Type: Commercial /     Extended Emergency Contact Information  Primary Emergency Contact: Zander Collado  Mobile Phone: [phone]  Relation: Father   needed? No    Discharge Plan A: Home  Discharge Plan B: [facility] [place] [id number] [facility]  [address] [facility] [address]  Phone: [phone] Fax: [phone]      Initial Assessment (most recent)       Adult Discharge Assessment - 07/29/25 1017          Discharge Assessment    Assessment Type Discharge Planning Assessment     Confirmed/corrected address, phone number and insurance Yes     Confirmed Demographics Correct on Facesheet     Source of Information patient     Communicated RICHAR with patient/caregiver Yes     People in Home friend(s)     Name(s) of People in Home Pt states he lives with someone but did not give name     Do you expect to return to your current living situation? Yes     Do you have help at home or someone to help you manage your care at home? Yes     Who are your caregiver(s) and their phone number(s)? Pt states he  lives with someone but did not give name     Prior to hospitilization cognitive status: Alert/Oriented     Current cognitive status: Alert/Oriented     Walking or Climbing Stairs Difficulty no     Dressing/Bathing Difficulty no     Equipment Currently Used at Home blood pressure machine     Readmission within 30 days? No     Patient currently being followed by outpatient case management? No     Do you currently have service(s) that help you manage your care at home? No     Do you take prescription medications? Yes     Do you have prescription coverage? Yes     Do you have any problems affording any of your prescribed medications? No     Is the patient taking medications as prescribed? yes     Who is going to help you get home at discharge? His dad Zander or is dad's wife Ninfa     How do you get to doctors appointments? health plan transportation     Are you on dialysis? No     Do you take coumadin? No     Discharge Plan A Home     Discharge Plan B Psychiatric hospital     DME Needed Upon Discharge  none     Discharge Plan discussed with: Patient     Transition of Care Barriers None

## 2025-07-29 NOTE — NURSING
Pt left via stretcher with EMS, security, and family member in tow. IV fluids infusing, 4L/NC in place upon leaving facility. Pt tolerated well. PEC form and facesheet left with EMS/Patient.

## 2025-07-29 NOTE — PROGRESS NOTES
7/29/25  1135am  MRI Called ICU about patient coming down for Scan and RN states that patient is not ready or able to come down; RN will call when ready.

## 2025-07-29 NOTE — H&P
Past Medical History:   Diagnosis Date    Anxiety     Anxiety     Bipolar affective disorder     Depression     Hypertension     Pancreatitis     Schizophrenia     Seizures     Thyroid disease        Past Surgical History:   Procedure Laterality Date    ESOPHAGOGASTRODUODENOSCOPY N/A 11/24/2023    Procedure: EGD (ESOPHAGOGASTRODUODENOSCOPY);  Surgeon: Shannen Meyer MD;  Location: [facility];  Service: Endoscopy;  Laterality: N/A;    ESOPHAGOGASTRODUODENOSCOPY N/A 11/27/2023    Procedure: EGD (ESOPHAGOGASTRODUODENOSCOPY);  Surgeon: Steve Anderson MD;  Location: [facility];  Service: Endoscopy;  Laterality: N/A;    ESOPHAGOGASTRODUODENOSCOPY N/A 12/8/2023    Procedure: EGD (ESOPHAGOGASTRODUODENOSCOPY);  Surgeon: Steve Anderson MD;  Location: [facility];  Service: Endoscopy;  Laterality: N/A;    LAPAROSCOPIC CHOLECYSTECTOMY N/A 3/9/2020    Procedure: CHOLECYSTECTOMY, LAPAROSCOPIC;  Surgeon: Trenton Deshpande MD;  Location: [facility];  Service: General;  Laterality: N/A;       Current Medications[1]    Allergies as of 07/28/2025 - Reviewed 07/28/2025   Allergen Reaction Noted    Amlodipine  07/28/2025       Family History   Problem Relation Name Age of Onset    Diabetes Maternal Grandmother      Hypertension Maternal Grandfather      Cancer Paternal Grandfather         Social History[2]      CC:  Right upper extremity pain    HPI:  Patient is a 45-year-old male admitted last evening after having been found down in his home for an unknown numb.  Of time.  Was admitted by hospitalist services with a multiple medical issues and what was felt to be acute rhabdomyolysis..  He states that when he originally awoke he had severe body and hand aches he states that he is about 90% improved.    Review of Systems   Constitution: Negative for chills and fever.   HENT: Negative for headaches and blurry vision.   Cardiovascular: Negative for chest pain, irregular heartbeat, leg swelling and palpitations.   Respiratory:  Negative for cough and shortness of breath.   Endocrine: Negative for polyuria.   Hematologic/Lymphatic: Negative for bleeding problem. Does not bruise/bleed easily.   Skin: Negative for poor wound healing and rash.   Musculoskeletal: Negative for joint pain. Negative for arthritis, joint swelling, muscle weakness and myalgias.   Gastrointestinal: Negative for abdominal pain, heartburn, melena, nausea and vomiting.   Genitourinary: Negative for bladder incontinence and dysuria.   Neurological: Negative for numbness.   Psychiatric/Behavioral: Negative for depression. The patient is not nervous/anxious.     PE:  Temp:  [97.8 °F (36.6 °C)-99.1 °F (37.3 °C)] 98.8 °F (37.1 °C)  Pulse:  [] 94  Resp:  [20-54] 50  SpO2:  [86 %-100 %] 95 %  BP: (130-174)/() 142/94    Constitutional:   Patient is alert  and oriented in no acute distress  HEENT:  normocephalic atraumatic; PERRL EOMI  Neck:  Supple without adenopathy  Cardiovascular:  Normal rate and rhythm  Pulmonary:  Normal respiratory effort normal chest wall expansion  Abdominal:  Nonprotuberant nondistended  Musculoskeletal:  Patient has moderate swelling primarily over the dorsum of the hand in his edematous he has brisk capillary refill of the digits intact sensation  I can passively move all of his digits without pain.  He does have intact flexor and extensor tendon function but some discomfort secondary to the amount of swelling he has some bullous lesions over the palmar aspect of the hand and over the dorsum of his PIP joints no drainage or purulence  Neurological:  No focal defect; cranial nerves 2-12 grossly intact  Psychiatric/behavioral:  Mood and behavior normal    Xrays:  Radiographs of the right.hand and wrist without acute osseous abnormalities      Labs:    Recent Results (from the past 24 hours)   EKG 12-lead    Collection Time: 07/28/25  6:41 PM   Result Value Ref Range    QRS Duration 66 ms    OHS QTC Calculation 449 ms   Ethanol     Collection Time: 07/28/25  6:48 PM   Result Value Ref Range    Alcohol, Serum <10 <10 mg/dL   Valproic Acid    Collection Time: 07/28/25  6:48 PM   Result Value Ref Range    Valproic Acid <4.0 (L) 50.0 - 100.0 ug/ml   CPK    Collection Time: 07/28/25  6:48 PM   Result Value Ref Range    CPK 4,774 (H) 20 - 200 U/L   Comprehensive metabolic panel    Collection Time: 07/28/25  6:49 PM   Result Value Ref Range    Sodium 142 136 - 145 mmol/L    Potassium 3.6 3.5 - 5.1 mmol/L    Chloride 108 95 - 110 mmol/L    CO2 25 23 - 29 mmol/L    Glucose 192 (H) 70 - 110 mg/dL    BUN 14 6 - 20 mg/dL    Creatinine 1.3 0.5 - 1.4 mg/dL    Calcium 8.4 (L) 8.7 - 10.5 mg/dL    Protein Total 6.5 6.0 - 8.4 gm/dL    Albumin 4.1 3.5 - 5.2 g/dL    Bilirubin Total 0.8 0.1 - 1.0 mg/dL     55 - 135 unit/L     (H) 10 - 40 unit/L    ALT 60 (H) 10 - 44 unit/L    Anion Gap 9 8 - 16 mmol/L    eGFR >60 >60 mL/min/1.73/m2   Lipase    Collection Time: 07/28/25  6:49 PM   Result Value Ref Range    Lipase Level 3 (L) 4 - 60 U/L   Magnesium    Collection Time: 07/28/25  6:49 PM   Result Value Ref Range    Magnesium 1.3 (L) 1.6 - 2.6 mg/dL   Protime-INR    Collection Time: 07/28/25  6:49 PM   Result Value Ref Range    PT 13.5 (H) 9.0 - 12.5 seconds    INR 1.2 0.8 - 1.2   Ammonia    Collection Time: 07/28/25  6:49 PM   Result Value Ref Range    Ammonia 39 10 - 50 umol/L   TSH    Collection Time: 07/28/25  6:49 PM   Result Value Ref Range    TSH 0.860 0.340 - 5.600 uIU/mL   Osmolality, Serum    Collection Time: 07/28/25  6:49 PM   Result Value Ref Range    Osmolality 303 (H) 280 - 300 mOsm/kg   CBC with Differential    Collection Time: 07/28/25  6:49 PM   Result Value Ref Range    WBC 12.75 (H) 3.90 - 12.70 K/uL    RBC 4.05 (L) 4.60 - 6.20 M/uL    Hgb 10.7 (L) 14.0 - 18.0 gm/dL    Hct 33.0 (L) 40.0 - 54.0 %    MCV 82 82 - 98 fL    MCH 26.4 (L) 27.0 - 31.0 pg    MCHC 32.4 32.0 - 36.0 g/dL    RDW 18.0 (H) 11.5 - 14.5 %    Platelet Count 184 150 -  450 K/uL    MPV 10.5 9.2 - 12.9 fL    Nucleated RBC 0 <=0 /100 WBC    Neut % 92.6 (H) 38 - 73 %    Lymph % 2.8 (L) 18 - 48 %    Mono % 4.1 4 - 15 %    Eos % 0.0 0 - 8 %    Basophil % 0.2 <=1.9 %    Imm Grans % 0.3 0.0 - 0.5 %    Neut # 11.8 (H) 1.8 - 7.7 K/uL    Lymph # 0.36 (L) 1 - 4.8 K/uL    Mono # 0.52 0.3 - 1 K/uL    Eos # 0.00 <=0.5 K/uL    Baso # 0.03 <=0.2 K/uL    Imm Grans # 0.04 0.00 - 0.04 K/uL   ISTAT Lactate    Collection Time: 07/28/25  6:57 PM   Result Value Ref Range    POC Lactate 3.44 (HH) 0.5 - 2.2 mmol/L    Sample VENOUS    Blood culture x two cultures. Draw prior to antibiotics.    Collection Time: 07/28/25  9:38 PM    Specimen: Peripheral, Antecubital, Right; Blood   Result Value Ref Range    CULTURE, BLOOD ([facility]) No Growth After 6 Hours    Blood culture x two cultures. Draw prior to antibiotics.    Collection Time: 07/28/25  9:38 PM    Specimen: Peripheral, Hand, Left; Blood   Result Value Ref Range    CULTURE, BLOOD ([facility]) No Growth After 6 Hours    EKG 12-lead    Collection Time: 07/29/25 12:56 AM   Result Value Ref Range    QRS Duration 72 ms    OHS QTC Calculation 452 ms   MRSA Screen by PCR    Collection Time: 07/29/25 12:59 AM    Specimen: Nasal Swab   Result Value Ref Range    MRSA PCR SCRN ([facility]) Detected (AA) Not Detected   Lactic acid, plasma    Collection Time: 07/29/25 12:59 AM   Result Value Ref Range    Lactic Acid Level 3.5 (HH) 0.5 - 1.9 mmol/L   ISTAT PROCEDURE    Collection Time: 07/29/25  1:07 AM   Result Value Ref Range    POC PH 7.415 7.35 - 7.45    POC PCO2 36.0 35 - 45 mmHg    POC PO2 68 (L) 80 - 100 mmHg    POC HCO3 23.1 (L) 24 - 28 mmol/L    POC BE -1 -2 to 2 mmol/L    POC SATURATED O2 94 95 - 100 %    POC TCO2 24 23 - 27 mmol/L    Sample ARTERIAL     Site LR     Allens Test Pass     DelSys Aero Mask     Mode SPONT     Flow 5    Comprehensive Metabolic Panel (CMP)    Collection Time: 07/29/25  2:25 AM   Result Value Ref Range    Sodium 142 136 - 145 mmol/L    Potassium  3.3 (L) 3.5 - 5.1 mmol/L    Chloride 107 95 - 110 mmol/L    CO2 26 23 - 29 mmol/L    Glucose 149 (H) 70 - 110 mg/dL    BUN 16 6 - 20 mg/dL    Creatinine 1.2 0.5 - 1.4 mg/dL    Calcium 8.3 (L) 8.7 - 10.5 mg/dL    Protein Total 6.1 6.0 - 8.4 gm/dL    Albumin 3.9 3.5 - 5.2 g/dL    Bilirubin Total 0.7 0.1 - 1.0 mg/dL    ALP 87 55 - 135 unit/L     (H) 10 - 40 unit/L    ALT 67 (H) 10 - 44 unit/L    Anion Gap 9 8 - 16 mmol/L    eGFR >60 >60 mL/min/1.73/m2   Magnesium    Collection Time: 07/29/25  2:25 AM   Result Value Ref Range    Magnesium 1.8 1.6 - 2.6 mg/dL   CBC with Differential    Collection Time: 07/29/25  2:25 AM   Result Value Ref Range    WBC 11.92 3.90 - 12.70 K/uL    RBC 3.99 (L) 4.60 - 6.20 M/uL    Hgb 10.6 (L) 14.0 - 18.0 gm/dL    Hct 32.4 (L) 40.0 - 54.0 %    MCV 81 (L) 82 - 98 fL    MCH 26.6 (L) 27.0 - 31.0 pg    MCHC 32.7 32.0 - 36.0 g/dL    RDW 18.1 (H) 11.5 - 14.5 %    Platelet Count 163 150 - 450 K/uL    MPV 10.3 9.2 - 12.9 fL    Nucleated RBC 0 <=0 /100 WBC    Neut % 89.9 (H) 38 - 73 %    Lymph % 5.9 (L) 18 - 48 %    Mono % 3.4 (L) 4 - 15 %    Eos % 0.2 0 - 8 %    Basophil % 0.3 <=1.9 %    Imm Grans % 0.3 0.0 - 0.5 %    Neut # 10.7 (H) 1.8 - 7.7 K/uL    Lymph # 0.70 (L) 1 - 4.8 K/uL    Mono # 0.40 0.3 - 1 K/uL    Eos # 0.02 <=0.5 K/uL    Baso # 0.03 <=0.2 K/uL    Imm Grans # 0.04 0.00 - 0.04 K/uL   Troponin I High Sensitivity    Collection Time: 07/29/25  2:25 AM   Result Value Ref Range    Troponin High Sensitive 13.8 <=14.9 pg/mL   Lactic Acid, Plasma    Collection Time: 07/29/25  2:25 AM   Result Value Ref Range    Lactic Acid Level 2.8 (HH) 0.5 - 1.9 mmol/L   Urinalysis, Reflex to Urine Culture Urine, Clean Catch    Collection Time: 07/29/25  3:34 AM    Specimen: Urine   Result Value Ref Range    Color, UA Colorless (A) Yellow, Straw, [place]    Appearance, UA Clear Clear    Spec Grav UA 1.010 1.005 - 1.030    pH, UA 6.0 5.0 - 8.0    Protein, UA Negative Negative    Glucose, UA 2+ (A)  Negative    Ketones, UA Negative Negative    Blood, UA 1+ (A) Negative    Bilirubin, UA Negative Negative    Urobilinogen, UA Negative <2.0 EU/dL    Nitrites, UA Negative Negative    Leukocyte Esterase, UA Negative Negative   Urinalysis Microscopic    Collection Time: 07/29/25  3:34 AM   Result Value Ref Range    RBC, UA <1 <=4 /HPF    WBC, UA <1 <=5 /HPF    Microscopic Comment     Drug screen panel, emergency    Collection Time: 07/29/25  3:36 AM   Result Value Ref Range    Benzodiazepine, Urine Negative Negative    Cocaine, Urine Negative Negative    Opiates, Urine Negative Negative    Barbituates, Urine Negative Negative    Amphetamines, Urine Negative Negative    THC Presumptive Positive (A) Negative    Phencyclidine, Urine Negative Negative    Urine Creatinine 53.0 23.0 - 375.0 mg/dL   Fentanyl, Urine    Collection Time: 07/29/25  3:36 AM   Result Value Ref Range    Fentanyl, Urine Negative Negative, In QC, Out QC    Urine Creatinine 53.0 23.0 - 375.0 mg/dL   Troponin I High Sensitivity    Collection Time: 07/29/25  6:48 AM   Result Value Ref Range    Troponin High Sensitive 16.2 (H) <=14.9 pg/mL   Lactic acid, plasma    Collection Time: 07/29/25  6:48 AM   Result Value Ref Range    Lactic Acid Level 1.6 0.5 - 1.9 mmol/L   EKG 12-lead    Collection Time: 07/29/25  7:30 AM   Result Value Ref Range    QRS Duration 72 ms    OHS QTC Calculation 467 ms   EKG 12-lead    Collection Time: 07/29/25  8:16 AM   Result Value Ref Range    QRS Duration 72 ms    OHS QTC Calculation 498 ms       A:  Rhabdomyolysis/right-hand swelling      P:  I have discussed medical condition treatment options with the patient at length.  I have discussed close observation gentle range of motion exercises as tolerated ice elevation.  Hospitalist service in his has ordered MRIs and CTs in his right hand.  That has no concern presently in my opinion of any acute compartment syndrome.  If however swelling worsens or he has progressing of the  bullous lesions may consider transfer for referral for evaluation for 1 of our hand specialists.       [1]   Current Facility-Administered Medications:     0.9% NaCl infusion, , Intravenous, Continuous, Tika Luna MD, Last Rate: 150 mL/hr at 07/29/25 0952, New Bag at 07/29/25 0952    acetaminophen tablet 650 mg, 650 mg, Oral, Q8H PRN, Kamila Waldron MD, 650 mg at 07/29/25 0944    aluminum-magnesium hydroxide-simethicone 200-200-20 mg/5 mL suspension 30 mL, 30 mL, Oral, QID PRN, Adia Becker MD    ceFEPIme injection 2 g, 2 g, Intravenous, Q8H, Adia Becker MD, 2 g at 07/29/25 0824    dextrose 50% injection 12.5 g, 12.5 g, Intravenous, PRN, Adia Becker MD    dextrose 50% injection 25 g, 25 g, Intravenous, PRN, Adia Becker MD    diazePAM injection 5 mg, 5 mg, Intravenous, Q4H PRN, Adia Becker MD    folic acid tablet 1 mg, 1 mg, Oral, Daily, Adia Becker MD, 1 mg at 07/29/25 0824    glucagon (human recombinant) injection 1 mg, 1 mg, Intramuscular, PRN, Adia Becker MD    glucose chewable tablet 16 g, 16 g, Oral, PRN, Adia Becker MD    glucose chewable tablet 24 g, 24 g, Oral, PRN, Adia Becker MD    hydrALAZINE injection 10 mg, 10 mg, Intravenous, Q6H PRN, Kamila Waldron MD, 10 mg at 07/29/25 0945    lactated ringers bolus 1,000 mL, 1,000 mL, Intravenous, Once, Gage Stewart MD    magnesium oxide tablet 800 mg, 800 mg, Oral, PRN, Adia Becker MD, 800 mg at 07/29/25 0944    magnesium oxide tablet 800 mg, 800 mg, Oral, PRN, Adia Becker MD    melatonin tablet 6 mg, 6 mg, Oral, Nightly PRN, Adia Becker MD    mupirocin 2 % ointment, , Nasal, BID, Tika Luna MD, 1 g at 07/29/25 0824    naloxone 0.4 mg/mL injection 0.02 mg, 0.02 mg, Intravenous, PRN, Adia Becker MD    potassium bicarbonate disintegrating tablet 35 mEq, 35 mEq, Oral, PRN, Adia Becker MD, 35 mEq at 07/29/25 0945    potassium  bicarbonate disintegrating tablet 50 mEq, 50 mEq, Oral, PRN, Adia Becker MD    potassium bicarbonate disintegrating tablet 60 mEq, 60 mEq, Oral, PRN, Adia Becker MD    potassium, sodium phosphates 280-160-250 mg packet 2 packet, 2 packet, Oral, PRN, Adia Becker MD    potassium, sodium phosphates 280-160-250 mg packet 2 packet, 2 packet, Oral, PRN, Adia Becker MD    potassium, sodium phosphates 280-160-250 mg packet 2 packet, 2 packet, Oral, PRN, Adia Becker MD    senna-docusate 8.6-50 mg per tablet 1 tablet, 1 tablet, Oral, Daily PRN, Adia Becker MD    thiamine tablet 100 mg, 100 mg, Oral, Daily, Adia Becker MD, 100 mg at 07/29/25 0824    vancomycin (VANCOCIN) 1,000 mg in 0.9% NaCl 250 mL IVPB (admixture device), 1,000 mg, Intravenous, Q12H, Tika Luna MD, Last Rate: 166.7 mL/hr at 07/29/25 1107, 1,000 mg at 07/29/25 1107    Pharmacy to dose Vancomycin consult, , , Once **AND** vancomycin - pharmacy to dose, , Intravenous, pharmacy to manage frequency, Gage Stewart MD  [2]   Social History  Socioeconomic History    Marital status: Single   Tobacco Use    Smoking status: Some Days     Current packs/day: 0.25     Average packs/day: 0.3 packs/day for 7.0 years (1.8 ttl pk-yrs)     Types: Cigarettes    Smokeless tobacco: Never   Substance and Sexual Activity    Alcohol use: Yes     Alcohol/week: 2.0 standard drinks of alcohol     Types: 2 Glasses of wine per week     Comment: occasionally    Drug use: Yes     Frequency: 7.0 times per week     Types: Marijuana    Sexual activity: Yes     Partners: Female     Social Drivers of Health     Financial Resource Strain: Patient Declined (7/29/2025)    Overall Financial Resource Strain (CARDIA)     Difficulty of Paying Living Expenses: Patient declined   Food Insecurity: Patient Declined (7/29/2025)    Hunger Vital Sign     Worried About Running Out of Food in the Last Year: Patient declined     Ran Out of Food in  the Last Year: Patient declined   Transportation Needs: Patient Declined (7/29/2025)    PRAPARE - Transportation     Lack of Transportation (Medical): Patient declined     Lack of Transportation (Non-Medical): Patient declined   Stress: Patient Declined (7/29/2025)    [facility] - Occupational Stress Questionnaire     Feeling of Stress : Patient declined   Housing Stability: Patient Declined (7/29/2025)    Housing Stability Vital Sign     Unable to Pay for Housing in the Last Year: Patient declined     Homeless in the Last Year: Patient declined

## 2025-07-29 NOTE — ASSESSMENT & PLAN NOTE
Found down, unknown downtime, , patient unable to care for himself  CK>4,000, repeat ordered, renal function baseline  IVF, increase rate if up trending

## 2025-07-29 NOTE — PLAN OF CARE
Problem: Adult Inpatient Plan of Care  Goal: Plan of Care Review  Outcome: Ongoing  Goal: Patient-Specific Goal (Individualized)  Outcome: Ongoing  Goal: Absence of Hospital-Acquired Illness or Injury  Outcome: Ongoing  Goal: Optimal Comfort and Wellbeing  Outcome: Ongoing  Goal: Readiness for Transition of Care  Outcome: Ongoing     Problem: Pneumonia  Goal: Fluid Balance  Outcome: Ongoing  Goal: Resolution of Infection Signs and Symptoms  Outcome: Ongoing  Goal: Effective Oxygenation and Ventilation  Outcome: Ongoing     Problem: Skin Injury Risk Increased  Goal: Skin Health and Integrity  Outcome: Ongoing

## 2025-07-29 NOTE — NURSING
Unable to reach orthopedics. MD Vanita HUIZAR with ER physician to assist in diagnosing compartment syndrome. ED called. Awaiting answer. Will follow up shortly.

## 2025-07-29 NOTE — H&P
[facility] - Emergency Dept  Hospital Medicine  History & Physical    Patient Name: Bradley Collado  MRN: [id number]  Patient Class: IP- Inpatient  Admission Date: 7/28/2025  Attending Physician: No att. providers found   Primary Care Provider: Cassius Oneill MD         Patient information was obtained from patient, relative(s), and ER records.     Subjective:     Principal Problem:Non-traumatic rhabdomyolysis    Chief Complaint:   Chief Complaint   Patient presents with    Altered Mental Status     Pt arrived via ambulance where his family found him supine and has hx of abusing his home meds. Pt family stated the pt is taking 6 800mg of Seroquel. Pt also has a right wrist deformity.          HPI: This is a pleasant 45-year-old gentleman with complex history including schizophrenia, alcohol use disorder multiple admissions for pancreatitis who presents to the emergency department via EMS after he was found down for an unknown amount of time in his home.  Patient unable to recall what happened prior to this.  Unable to say what he may have ingested.  ED doc suggested collateral history implicates overuse of Seroquel.  Patient quite drowsy on my assessment but able to protect his airway.  Imaging consistent with a pneumonia and patient does endorse cough.  Patient is saturating 88% on room air on my assessment.  He endorses some nausea.  Patient in sinus tachycardia with rates hovering around 100.  QTC is less than 450.  Unclear if patient has seizure history.  We will order EEG, serial EKG, continuous telemetry and observation in the ICU.    Past Medical History:   Diagnosis Date    Anxiety     Anxiety     Bipolar affective disorder     Depression     Hypertension     Pancreatitis     Schizophrenia     Seizures     Thyroid disease        Past Surgical History:   Procedure Laterality Date    ESOPHAGOGASTRODUODENOSCOPY N/A 11/24/2023    Procedure: EGD (ESOPHAGOGASTRODUODENOSCOPY);  Surgeon:  "Shannen Meyer MD;  Location: [facility];  Service: Endoscopy;  Laterality: N/A;    ESOPHAGOGASTRODUODENOSCOPY N/A 11/27/2023    Procedure: EGD (ESOPHAGOGASTRODUODENOSCOPY);  Surgeon: Steve Anderson MD;  Location: [facility];  Service: Endoscopy;  Laterality: N/A;    ESOPHAGOGASTRODUODENOSCOPY N/A 12/8/2023    Procedure: EGD (ESOPHAGOGASTRODUODENOSCOPY);  Surgeon: Steve Anderson MD;  Location: [facility];  Service: Endoscopy;  Laterality: N/A;    LAPAROSCOPIC CHOLECYSTECTOMY N/A 3/9/2020    Procedure: CHOLECYSTECTOMY, LAPAROSCOPIC;  Surgeon: Trenton Deshpande MD;  Location: [facility];  Service: General;  Laterality: N/A;       Review of patient's allergies indicates:   Allergen Reactions    Amlodipine        No current facility-administered medications on file prior to encounter.     Current Outpatient Medications on File Prior to Encounter   Medication Sig    ALPRAZolam (XANAX) 1 MG tablet Take 1 mg by mouth 3 (three) times daily as needed for Anxiety.    amLODIPine (NORVASC) 10 MG tablet Take 10 mg by mouth once daily.    divalproex (DEPAKOTE) 500 MG TbEC Take 500 mg by mouth 2 (two) times daily.    lancets 33 gauge Misc 1 Package.    LANTUS SOLOSTAR U-100 INSULIN glargine 100 units/mL (3mL) SubQ pen ADMINISTER 30 UNITS UNDER THE SKIN EVERY EVENING (Patient not taking: Reported on 4/12/2025)    lisinopriL (PRINIVIL,ZESTRIL) 20 MG tablet Take 20 mg by mouth once daily.    metFORMIN (GLUCOPHAGE) 1000 MG tablet Take 1,000 mg by mouth every morning.    omeprazole (PRILOSEC) 40 MG capsule Take 40 mg by mouth every morning.    pen needle, diabetic 32 gauge x 5/32" Ndle 1 Box.    QUEtiapine (SEROQUEL) 300 MG Tab Take 300 mg by mouth 2 (two) times daily.    traZODone (DESYREL) 100 MG tablet Take 100 mg by mouth every evening.    TRULICITY 4.5 mg/0.5 mL pen injector Inject 4.5 mg into the skin every 7 days. TUESDAYS (Patient not taking: Reported on 4/12/2025)    venlafaxine (EFFEXOR-XR) 150 MG Cp24 Take 150 mg by " mouth once daily.     Family History       Problem Relation (Age of Onset)    Cancer Paternal Grandfather    Diabetes Maternal Grandmother    Hypertension Maternal Grandfather          Tobacco Use    Smoking status: Some Days     Current packs/day: 0.25     Average packs/day: 0.3 packs/day for 7.0 years (1.8 ttl pk-yrs)     Types: Cigarettes    Smokeless tobacco: Never   Substance and Sexual Activity    Alcohol use: Yes     Alcohol/week: 2.0 standard drinks of alcohol     Types: 2 Glasses of wine per week     Comment: occasionally    Drug use: Yes     Frequency: 7.0 times per week     Types: Marijuana    Sexual activity: Yes     Partners: Female     Review of Systems   Constitutional:  Negative for chills and fever.   Respiratory:  Positive for cough and shortness of breath.    Psychiatric/Behavioral:  Positive for hallucinations. Negative for self-injury and suicidal ideas.      Objective:     Vital Signs (Most Recent):  Temp: 99.1 °F (37.3 °C) (07/28/25 1827)  Pulse: 107 (07/28/25 2125)  Resp: 20 (07/28/25 1915)  BP: (!) 147/95 (07/28/25 2130)  SpO2: (!) 89 % (07/28/25 2145) Vital Signs (24h Range):  Temp:  [99.1 °F (37.3 °C)] 99.1 °F (37.3 °C)  Pulse:  [100-108] 107  Resp:  [20-22] 20  SpO2:  [88 %-97 %] 89 %  BP: (130-147)/(60-99) 147/95     Weight: 77.6 kg (171 lb)  Body mass index is 26.78 kg/m².     Physical Exam  Constitutional:       Comments: Lethargic   Eyes:      Extraocular Movements: Extraocular movements intact.   Cardiovascular:      Rate and Rhythm: Regular rhythm. Tachycardia present.   Pulmonary:      Effort: Pulmonary effort is normal. No respiratory distress.      Breath sounds: No wheezing.   Abdominal:      General: There is no distension.      Palpations: Abdomen is soft.      Tenderness: There is no abdominal tenderness.   Musculoskeletal:      Right lower leg: No edema.      Left lower leg: No edema.   Skin:     General: Skin is warm and dry.   Neurological:      Comments: Patient oriented  but seems a bit confused   Psychiatric:      Comments: Reports visual and auditory hallucinations                Significant Labs: All pertinent labs within the past 24 hours have been reviewed.  BMP:   Recent Labs   Lab 07/28/25  1849   *      K 3.6      CO2 25   BUN 14   CREATININE 1.3   CALCIUM 8.4*   MG 1.3*     CBC:   Recent Labs   Lab 07/28/25  1849   WBC 12.75*   HGB 10.7*   HCT 33.0*          Significant Imaging: I have reviewed all pertinent imaging results/findings within the past 24 hours.  Assessment/Plan:     Assessment & Plan  Non-traumatic rhabdomyolysis  Found down, unknown downtime, details unknown, patient unable to care for himself  CK>4,000, repeat in the AM, monitor renal function  IVF    Transaminitis  In the setting of alcohol abuse  Trend    Alcohol abuse  Initiate CIWA protocol  Seizure precautions  P.r.n. diazepam  Consider substance use consult    Overdose of undetermined intent  Patient with excessive ingestion of Seroquel although story is unclear, longstanding history of schizophrenia, patient gravely disabled, PEC'd in the emergency department  Serial EKGs  Avoid QTC prolonging agents  Hold home psychotropic  Continuous telemetry monitoring  Observation in the ICU  Telepsych consult    Community acquired pneumonia  Patient has a diagnosis of pneumonia. The cause of the pneumonia is suspected to be bacterial in etiology but organism is not known. The pneumonia is stable. The patient has the following signs/symptoms of pneumonia: cough and sputum production. The patient does have a current oxygen requirement and the patient does not have a home oxygen requirement. I have reviewed the pertinent imaging. The following cultures have been collected: Blood cultures and Sputum culture The culture results are listed below.     Current antimicrobial regimen consists of the antibiotics listed below. Will monitor patient closely and continue current treatment plan  "unchanged.    Antibiotics (From admission, onward)      Start     Stop Route Frequency Ordered    07/28/25 2215  vancomycin - pharmacy to dose  (ED Adult Sepsis Treatment)        Placed in "And" Linked Group    -- IV pharmacy to manage frequency 07/28/25 2116 07/28/25 2200  vancomycin 1,500 mg in 0.9% NaCl 250 mL IVPB (admixture device)         -- IV Once 07/28/25 2119 07/28/25 2130  ceFEPIme injection 2 g  (ED Adult Sepsis Treatment)         07/29/25 0929 IV ED 1 Time 07/28/25 2116 07/28/25 0500  ceFEPIme injection 2 g         -- IV Every 12 hours (non-standard times) 07/28/25 2123            Microbiology Results (last 7 days)       Procedure Component Value Units Date/Time    Blood culture x two cultures. Draw prior to antibiotics. [[id number]] Collected: 07/28/25 2138    Order Status: Sent Specimen: Blood from Peripheral, Antecubital, Right Updated: 07/28/25 2145    Blood culture x two cultures. Draw prior to antibiotics. [[id number]] Collected: 07/28/25 2138    Order Status: Sent Specimen: Blood from Peripheral, Hand, Left Updated: 07/28/25 2144    Culture, Respiratory with Gram Stain [[id number]]     Order Status: Canceled Specimen: Respiratory     MRSA Screen by PCR [[id number]]     Order Status: Sent Specimen: Nasal Swab     Culture, Respiratory with Gram Stain [[id number]]     Order Status: Sent Specimen: Respiratory           Sepsis  Patient has sepsis with Encephalopathy secondary to Respiratory Infection. A review of systems was completed. Patient's sepsis is stable    Current Antibiotics    ceFEPIme injection 2 g, ED 1 Time, Intravenous  vancomycin - pharmacy to dose, pharmacy to manage frequency, Intravenous  vancomycin 1,500 mg in 0.9% NaCl 250 mL IVPB (admixture device), Once, Intravenous  ceFEPIme injection 2 g, Every 12 hours (non-standard times), Intravenous    Lactate  Recent Labs   Lab 07/28/25  1857   POCLAC 3.44*     Culture Data  Blood Cultures No results found for: "CULTBLD"   Urine " "Culture No results found for: "LABURIN"   Sputum Culture No results found for: "RESPCULT"   mSOFA  MSOFA Total  Min: 0   Min taken time: 07/28/25 1830  Max: 3   Max taken time: 07/28/25 2145    Plan  - Antibiotics as listed above  - Fluid resuscitation as follows:Actual Body Weight- The patient's actual body weight will be used to calculate the 30 ml/kg fluid bolus.   - Trend lactate to resolution  - Follow up culture data  - Vasopressors were not needed      Hypomagnesemia  Patient has Abnormal Magnesium: hypomagnesemia. Will continue to monitor electrolytes closely. Will replace the affected electrolytes and repeat labs to be done after interventions completed. The patient's magnesium results have been reviewed and are listed below.  Recent Labs   Lab 07/28/25  1849   MG 1.3*      VTE Risk Mitigation (From admission, onward)           Ordered     IP VTE LOW RISK PATIENT  Once         07/28/25 2122     Place sequential compression device  Until discontinued         07/28/25 2122                                                Adia Becker MD  Department of Hospital Medicine  Former[facility] - Emergency Dept          "

## 2025-07-29 NOTE — PROCEDURES
EEG REPORT      Bradley Collado  [id number]  1980    DATE OF SERVICE: 7/29/2025         METHODOLOGY      Extended electroencephalographic recording is made while the patient is ambulatory and continuing normal daily activities.  Electrodes are placed according to the International 10-20 placement system and included T1 and T2 electrode placement.  Twenty four (24) channels of digital signal (sampling rate of 512/sec) was simultaneously recorded from the scalp including EKG and eye monitors.  Recording band pass was 0.1 to 100 hz and all data was stored digitally on the recorder.  The patient is instructed to press an event button when clinical symptoms occur and write the symptoms into a diary. Activation procedures which include photic stimulation, hyperventilation and instructing patients to perform simple task are done in selected patients.        The EEG is displayed on a monitor screen and can be reformatted into different montages for evaluation.  The entire recoding is submitted for computer assisted analysis to detect spike and electrographic seizure activity.  The entire recording is visually reviewed and the times identified by computer analysis as being spikes or seizures are reviewed again.  Compresses spectral analysis (CSA) is also performed on the activity recorded from each individual channel.  This is displayed as a power display of frequencies from 0 to 30 Hz over time.   The CSA analysis is done and displayed continuously.  This is reviewed for asymmetries in power between homologous areas of the scalp and for presence of changes in power which canbe seen when seizures occur.  Sections of suspected abnormalities on the CSA is then compared with the original EEG recording.  .     [employer] software was also utilized in the review of this study.  This software suite analyzes the EEG recording in multiple domains.  Coherence and rhythmicity is computed to identify EEG sections which may  contain organized seizures.  Each channel undergoes analysis to detect presence of spike and sharp waves which have special and morphological characteristic of epileptic activity.  The routine EEG recording is converted from spacial into frequency domain.  This is then displayed comparing homologous areas to identify areas of significant asymmetry.  Algorithm to identify non-cortically generated artifact is used to separate eye movement, EMG and other artifact from the EEG     Recording Times    A total of 00:25:20 hours of EEG was recorded.      EEG FINDINGS:  Background activity:   The background rhythm was characterized by alpha and anterior dominant beta activity with a 11-12Hz posterior dominant alpha rhythm at 30-70 microvolts.   Symmetry and continuity: the background was continuous and symmetric     Sleep:   No sleep transients were seen.    Activation procedures:   Photic stimulation was performed with no abnormalities seen    Abnormal activity:   No epileptiform discharges, periodic discharges, lateralized rhythmic delta activity or electrographic seizures were seen.    IMPRESSION:   Normal EEG of the waking state.      Theo Rodriguez MD  Neurology-Epilepsy.  [facility].

## 2025-07-29 NOTE — ASSESSMENT & PLAN NOTE
- swelling, erythema, blisters  - pulses intact  - worsening symptoms and signs, high concern of landing in compartment syndrome  - hand surgery at [facility] consulted  - patient being transferred for further care  - broad-spectrum antibiotics  - CT ordered and pending  - caution with pain meds with QTC prolongation

## 2025-07-29 NOTE — PROGRESS NOTES
"Pharmacokinetic Initial Assessment: IV Vancomycin    Assessment/Plan:    Initiate intravenous vancomycin with loading dose of 1500 mg once followed by a maintenance dose of vancomycin 1000 mg IV every 12 hours  Desired empiric serum trough concentration is 15 to 20 mcg/mL  Draw vancomycin trough level 60 min prior to fourth dose on 7/30 at approximately 1000  Pharmacy will continue to follow and monitor vancomycin.      Please contact pharmacy at extension [phone] with any questions regarding this assessment.     Thank you for the consult,   Carolin Dasilva       Patient brief summary:  Bradley Collado is a 45 y.o. male initiated on antimicrobial therapy with IV Vancomycin for treatment of suspected bacteremia    Drug Allergies:   Review of patient's allergies indicates:   Allergen Reactions    Amlodipine        Actual Body Weight:   69.9    Renal Function:   Estimated Creatinine Clearance: 74.2 mL/min (based on SCr of 1.2 mg/dL).,     Dialysis Method (if applicable):  N/A    CBC (last 72 hours):  Recent Labs   Lab Result Units 07/28/25 1849 07/29/25  0225   WBC K/uL 12.75* 11.92   Hgb gm/dL 10.7* 10.6*   Hct % 33.0* 32.4*   Platelet Count K/uL 184 163   Mono % % 4.1 3.4*   Eos % % 0.0 0.2   Basophil % % 0.2 0.3       Metabolic Panel (last 72 hours):  Recent Labs   Lab Result Units 07/28/25  1849 07/29/25  0225   Sodium mmol/L 142 142   Potassium mmol/L 3.6 3.3*   Chloride mmol/L 108 107   CO2 mmol/L 25 26   Glucose mg/dL 192* 149*   BUN mg/dL 14 16   Creatinine mg/dL 1.3 1.2   Albumin g/dL 4.1 3.9   Bilirubin Total mg/dL 0.8 0.7   ALP unit/L 105 87   AST unit/L 119* 153*   ALT unit/L 60* 67*   Magnesium mg/dL 1.3* 1.8       Drug levels (last 3 results):  No results for input(s): "VANCOMYCINRA", "VANCORANDOM", "VANCOMYCINPE", "VANCOPEAK", "VANCOMYCINTR", "VANCOTROUGH" in the last 72 hours.    Microbiologic Results:  Microbiology Results (last 7 days)       Procedure Component Value Units Date/Time    MRSA Screen by " PCR [[id number]]  (Abnormal) Collected: 07/29/25 0059    Order Status: Completed Specimen: Nasal Swab Updated: 07/29/25 0239     MRSA PCR SCRN ([facility]) Detected    Blood culture x two cultures. Draw prior to antibiotics. [[id number]] Collected: 07/28/25 2138    Order Status: Sent Specimen: Blood from Peripheral, Antecubital, Right Updated: 07/28/25 2145    Blood culture x two cultures. Draw prior to antibiotics. [[id number]] Collected: 07/28/25 2138    Order Status: Sent Specimen: Blood from Peripheral, Hand, Left Updated: 07/28/25 2144    Culture, Respiratory with Gram Stain [[id number]]     Order Status: Canceled Specimen: Respiratory     Culture, Respiratory with Gram Stain [[id number]]     Order Status: Sent Specimen: Respiratory

## 2025-07-29 NOTE — ASSESSMENT & PLAN NOTE
"Patient has sepsis with Encephalopathy secondary to Respiratory Infection. A review of systems was completed. Patient's sepsis is stable    Current Antibiotics     Vancomycin, meropenem    Lactate  Recent Labs   Lab 07/28/25  1857 07/29/25  0059 07/29/25  0225 07/29/25  0648   LACTATE  --  3.5* 2.8* 1.6   POCLAC 3.44*  --   --   --      Culture Data  Blood Cultures   CULTURE, BLOOD ([facility])   Date Value Ref Range Status   07/28/2025 No Growth After 6 Hours  Preliminary   07/28/2025 No Growth After 6 Hours  Preliminary      Urine Culture No results found for: "LABURIN"   Sputum Culture No results found for: "RESPCULT"   mSOFA  MSOFA Total  Min: 0   Min taken time: 07/28/25 1830  Max: 6   Max taken time: 07/29/25 0100    Plan  - Antibiotics as listed above  - Fluid resuscitation as follows:Actual Body Weight- The patient's actual body weight will be used to calculate the 30 ml/kg fluid bolus.   - lactate normalized  - MRSA positive  - Vasopressors were not needed  - follow up culture data      "

## 2025-07-29 NOTE — ASSESSMENT & PLAN NOTE
Patient has Abnormal Magnesium: hypomagnesemia. Will continue to monitor electrolytes closely. Will replace the affected electrolytes and repeat labs to be done after interventions completed. The patient's magnesium results have been reviewed and are listed below.  Recent Labs   Lab 07/29/25  0225   MG 1.8

## 2025-07-29 NOTE — ASSESSMENT & PLAN NOTE
Found down, unknown downtime, details unknown, patient unable to care for himself  CK>4,000, repeat in the AM, monitor renal function  IVF

## 2025-07-30 PROBLEM — R79.89 ABNORMAL LIVER FUNCTION TESTS: Status: ACTIVE | Noted: 2025-07-30

## 2025-07-30 PROBLEM — J96.01 ACUTE HYPOXEMIC RESPIRATORY FAILURE: Status: ACTIVE | Noted: 2025-07-30

## 2025-07-31 NOTE — ASSESSMENT & PLAN NOTE
"Patient has sepsis with Encephalopathy secondary to Respiratory Infection. A review of systems was completed. Patient's sepsis is stable    Current Antibiotics     Vancomycin, meropenem    Lactate  Recent Labs   Lab 07/28/25  1857 07/29/25  0059 07/29/25  0225 07/29/25  0648   LACTATE  --  3.5* 2.8* 1.6   POCLAC 3.44*  --   --   --      Culture Data  Blood Cultures   CULTURE, BLOOD ([facility])   Date Value Ref Range Status   07/28/2025 No Growth After 48 Hours  Preliminary   07/28/2025 No Growth After 48 Hours  Preliminary      Urine Culture No results found for: "LABURIN"   Sputum Culture No results found for: "RESPCULT"   mSOFA  MSOFA Total  Min: 0   Min taken time: 07/31/25 0800  Max: 0   Max taken time: 07/31/25 0800    Plan  - Antibiotics as listed above  - Fluid resuscitation as follows:Actual Body Weight- The patient's actual body weight will be used to calculate the 30 ml/kg fluid bolus.   - lactate normalized  - MRSA positive  - Vasopressors were not needed  - follow up culture data      "

## 2025-07-31 NOTE — DISCHARGE SUMMARY
[facility] Medicine  Discharge Summary      Patient Name: rBadley Collado  MRN: [id number]  SMITHA: [id number]  Patient Class: IP- Inpatient  Admission Date: 7/28/2025  Hospital Length of Stay: 1 days  Discharge Date and Time: 7/29/2025  2:45 PM  Attending Physician: No att. providers found   Discharging Provider: Kamila Waldron MD  Primary Care Provider: Cassius Oneill MD    Primary Care Team: Networked reference to record PCT     HPI:   This is a pleasant 45-year-old gentleman with complex history including schizophrenia, alcohol use disorder multiple admissions for pancreatitis who presents to the emergency department via EMS after he was found down for an unknown amount of time in his home.  Patient unable to recall what happened prior to this.  Unable to say what he may have ingested.  ED doc suggested collateral history implicates overuse of Seroquel.  Patient quite drowsy on my assessment but able to protect his airway.  Imaging consistent with a pneumonia and patient does endorse cough.  Patient is saturating 88% on room air on my assessment.  He endorses some nausea.  Patient in sinus tachycardia with rates hovering around 100.  QTC is less than 450.  Unclear if patient has seizure history.  We will order EEG, serial EKG, continuous telemetry and observation in the ICU.    * No surgery found *      Hospital Course:   Patient closely monitored in ICU.  Psychiatry was consulted and recommended PEC and holding home medications.  Patient and patient's mother were unclear about any overdose.  Serial EKGs done.  EKG was repeated and QTC still prolonged.  Home Depakote could not be restarted.  EEG was done and no epileptiform activity seen.  But given his presentation, concern of seizure.  PRN benzodiazepine in place.  Neurology consult.  Regarding pneumonia, His oxygen requirement improved to 4 L. was continued on broad-spectrum IV antibiotics.  Procalcitonin ordered and  elevated to 33.  Infectious diseases was consulted.  MRSA resulted positive and was continued on vancomycin.      For rhabdomyolysis, started on IV fluids, CK repeated and up trended.  Adjust fluid rate.  Also had transaminitis, LFTs trended with IV hydration.    Also had right hand swelling, blisters, erythema, initially orthopedics consulted and recommended conservative management with limb elevation/ice, patient had good radial and ulnar pulses, was able to move/flex his fingers and had full sensations but eventually worsened he started having numbness, worsening pain, reducing mobility in the joints.  Concern for compartment syndrome, CT stat showed edema and less likely hematoma or abscess.  Discussed with orthopedics, vascular surgery, General surgery - no coverage for hand surgery available at [facility], transfer center contacted and patient accepted at [facility] by Dr. Liu.  Discharge readmit orders in place.  Discussed hand off/follow-ups with Dr. Liu.     Goals of Care Treatment Preferences:  Code Status: Full Code         Consults:   Consults (From admission, onward)          Status Ordering Provider     Inpatient consult to General Surgery  Once        Provider:  Adeel Zimmerman MD    Completed BELKIS EDWARDS     Inpatient consult to Telemedicine - Psych  Once        Provider:  Mariaelena Verma MD    Completed MAURICIO BOOTH            Assessment & Plan  Non-traumatic rhabdomyolysis  Found down, unknown downtime, , patient unable to care for himself  CK>4,000, repeat ordered, renal function baseline  IVF, increase rate if up trending    Transaminitis  In the setting of alcohol abuse  Mildly up trended, follow closely    Alcohol abuse  On CIWA protocol  Seizure precautions  P.r.n. diazepam  Consider substance use consult    Overdose of undetermined intent  Patient with excessive ingestion of Seroquel although story is unclear, longstanding history of schizophrenia, PEC'd in the  emergency department  Serial EKGs given QT prolongation  Avoid QTC prolonging agents  Hold home psychotropic  Continuous telemetry monitoring  Close monitoring in ICU  Telepsych consult follow up ordered  Consult poison control    Pneumonia  Patient has a diagnosis of pneumonia. The cause of the pneumonia is suspected to be bacterial in etiology but organism is not known. The pneumonia is stable. The patient has the following signs/symptoms of pneumonia: cough and sputum production. The patient does have a current oxygen requirement and the patient does not have a home oxygen requirement. I have reviewed the pertinent imaging. The following cultures have been collected: Blood cultures and Sputum culture The culture results are listed below.     Current antimicrobial regimen consists of the antibiotics listed below. Will monitor patient closely and continue current treatment plan unchanged.    Antibiotics (From admission, onward)      None            Microbiology Results (last 7 days)       Procedure Component Value Units Date/Time    MRSA Screen by PCR [[id number]]  (Abnormal) Collected: 07/29/25 0059    Order Status: Completed Specimen: Nasal Swab Updated: 07/29/25 0239     MRSA PCR SCRN ([facility]) Detected    Culture, Respiratory with Gram Stain [[id number]]     Order Status: Canceled Specimen: Respiratory     Culture, Respiratory with Gram Stain [[id number]]     Order Status: Canceled Specimen: Respiratory           Sepsis  Patient has sepsis with Encephalopathy secondary to Respiratory Infection. A review of systems was completed. Patient's sepsis is stable    Current Antibiotics     Vancomycin, meropenem    Lactate  Recent Labs   Lab 07/28/25  1857 07/29/25  0059 07/29/25  0225 07/29/25  0648   LACTATE  --  3.5* 2.8* 1.6   POCLAC 3.44*  --   --   --      Culture Data  Blood Cultures   CULTURE, BLOOD ([facility])   Date Value Ref Range Status   07/28/2025 No Growth After 48 Hours  Preliminary   07/28/2025 No Growth After 48  "Hours  Preliminary      Urine Culture No results found for: "LABURIN"   Sputum Culture No results found for: "RESPCULT"   mSOFA  MSOFA Total  Min: 0   Min taken time: 07/31/25 0800  Max: 0   Max taken time: 07/31/25 0800    Plan  - Antibiotics as listed above  - Fluid resuscitation as follows:Actual Body Weight- The patient's actual body weight will be used to calculate the 30 ml/kg fluid bolus.   - lactate normalized  - MRSA positive  - Vasopressors were not needed  - follow up culture data      Hypomagnesemia  Patient has Abnormal Magnesium: hypomagnesemia. Will continue to monitor electrolytes closely. Will replace the affected electrolytes and repeat labs to be done after interventions completed. The patient's magnesium results have been reviewed and are listed below.  Recent Labs   Lab 07/31/25  0356   MG 2.0      Hypokalemia  Patient's most recent potassium results are listed below.   Recent Labs     07/29/25  1629 07/30/25  0337 07/31/25  0356   K 3.5 3.5 4.2     Plan  - Replete potassium per protocol  - Monitor potassium Daily    Prolonged Q-T interval on ECG  Serial EKGs  Hold any QT prolonging medications    Swelling of right hand  - swelling, erythema, blisters  - pulses intact  - worsening symptoms and signs, high concern of landing in compartment syndrome  - hand surgery at [facility] consulted  - patient being transferred for further care  - broad-spectrum antibiotics  - CT ordered and pending  - caution with pain meds with QTC prolongation    Schizophrenia  - long history  - on Effexor, Seroquel, Depakote  - tele psychiatry consulted  - holding home meds with current QTC prolongation  - PECed  - reconsult as appropriate      Seizure disorder  - unclear history per patient, but patient's mother mentioned he did have seizure history in the past  - records documented seizure history, possible alcohol withdrawal seizures versus seizure disorder  - concern of urinary incontinence, seizure?  - EEG  - " Depakote on hold with QTC  - PRN Ativan  - consult neurology for further recommendations, antiseizure medications as appropriate  - seizure precautions  - Consider Keppra loading and BID dosing if necessary        Elevated troponin  - mild elevation  - likely demand  - trend  - clinically no anginal signs  - EKG no ischemic changes      Addendum:  Repeat CK resulted high, increase fluid read back to 150.  Concern of seizure episode at home, prn Ativan.  Procalcitonin resulted high at almost 34.  Neurology/tele psychiatry consult pending at the time of transfer.  Please note that these labs/radiology/EEG resulted after the patient left the hospital.  I called the physician who took over the care, Dr. Liu and updated the plan. Appreciate help.      Final Active Diagnoses:    Diagnosis Date Noted POA    PRINCIPAL PROBLEM:  Non-traumatic rhabdomyolysis [M62.82] 07/28/2025 Yes    Prolonged Q-T interval on ECG [R94.31] 07/29/2025 Unknown    Swelling of right hand [M79.89] 07/29/2025 Yes    Schizophrenia [F20.9] 07/29/2025 Yes    Overdose of undetermined intent [T50.904A] 07/28/2025 Yes    Pneumonia [J18.9] 07/28/2025 Yes    Sepsis [A41.9] 07/28/2025 Yes    Hypomagnesemia [E83.42] 07/28/2025 Yes    Seizure disorder [G40.909] 11/23/2023 Yes    Hypokalemia [E87.6] 03/26/2017 Yes    Elevated troponin [R79.89] 03/26/2017 Yes    Alcohol abuse [F10.10] 04/05/2016 Yes    Transaminitis [R74.01] 04/05/2016 Yes      Problems Resolved During this Admission:       Discharged Condition: stable    Disposition: Short Term Hospital    Follow Up:    Patient Instructions:   No discharge procedures on file.    Significant Diagnostic Studies: N/A    Pending Diagnostic Studies:       Procedure Component Value Units Date/Time    EKG 12-lead [[id number]] Collected: 07/29/25 0439    Order Status: Sent Lab Status: No result     EXTRA TUBES [[id number]] Collected: 07/28/25 1901    Order Status: Sent Lab Status: In process Updated: 07/28/25 1901     Specimen: Blood, Venous     Narrative:      The following orders were created for panel order EXTRA TUBES.  Procedure                               Abnormality         Status                     ---------                               -----------         ------                     Lavender Top Hold[[id number]]                               In process                   Please view results for these tests on the individual orders.           Medications:  Transfer Medications (for Discharge Readmit only): Current Medications[1]    Indwelling Lines/Drains at time of discharge:   Lines/Drains/Airways       None                       Time spent on the discharge of patient: 50 minutes    Critical care time spent on the evaluation and treatment of severe organ dysfunction, review of pertinent labs and imaging studies, discussions with consulting providers and discussions with patient/family: 80 minutes.     Kamila Waldron MD  Department of Hospital Medicine  [facility]       [1]   No current facility-administered medications for this encounter.     No current outpatient medications on file.     Facility-Administered Medications Ordered in Other Encounters   Medication Dose Route Frequency Provider Last Rate Last Admin    0.9% NaCl infusion   Intravenous Continuous Conner Liu  mL/hr at 07/31/25 0206 New Bag at 07/31/25 0206    acetaminophen tablet 650 mg  650 mg Oral Q6H PRN Conner Liu MD        aluminum-magnesium hydroxide-simethicone 200-200-20 mg/5 mL suspension 30 mL  30 mL Oral QID PRN Conner Liu MD        amLODIPine tablet 10 mg  10 mg Oral Daily Conner Liu MD   10 mg at 07/30/25 0839    ampicillin-sulbactam (UNASYN) 3 g in 0.9% NaCl 100 mL IVPB (MB+)  3 g Intravenous Q6H Conner Liu MD   Stopped at 07/31/25 0300    chlorhexidine 0.12 % solution 15 mL  15 mL Mouth/Throat BID Conner Liu MD   15 mL at 07/30/25 2032    dextromethorphan-guaiFENesin  mg/5 ml  liquid 10 mL  10 mL Oral Q6H PRN Conner Liu MD   10 mL at 07/31/25 0200    dextrose 50% injection 12.5 g  12.5 g Intravenous PRN Conner Liu MD        dextrose 50% injection 25 g  25 g Intravenous PRN Conner Liu MD        dextrose oral liquid/gel 15 g  15 g Oral PRN Conner Liu MD        dextrose oral liquid/gel 30 g  30 g Oral PRN Conner Liu MD        diazePAM injection 5 mg  5 mg Intravenous Q4H PRN Conner Liu MD        enoxaparin injection 40 mg  40 mg Subcutaneous Q24H (prophylaxis, 1700) Conner Liu MD   40 mg at 07/30/25 1608    folic acid tablet 1 mg  1 mg Oral Daily Conner Liu MD   1 mg at 07/30/25 0839    glucagon (human recombinant) injection 1 mg  1 mg Intramuscular PRN Conner Liu MD        hydrALAZINE injection 10 mg  10 mg Intravenous Q6H PRN Conner Liu MD        HYDROmorphone injection 1 mg  1 mg Intravenous Q6H PRN Conner Liu MD   1 mg at 07/31/25 0200    insulin aspart U-100 injection 0-10 Units  0-10 Units Subcutaneous QID (AC + HS) PRN Conner Liu MD   2 Units at 07/30/25 1725    lacosamide tablet 50 mg  50 mg Oral Q12H Stephen Reyes MD   50 mg at 07/30/25 2032    Lactobacillus rhamnosus GG capsule 1 capsule  1 capsule Oral Daily Conner Liu MD   1 capsule at 07/30/25 0839    linezolid tablet 600 mg  600 mg Oral Q12H Luis Avalos MD   600 mg at 07/30/25 2032    lisinopriL tablet 20 mg  20 mg Oral Daily Conner Liu MD   20 mg at 07/30/25 0839    magnesium oxide tablet 800 mg  800 mg Oral PRN Conner Liu MD        melatonin tablet 6 mg  6 mg Oral Nightly PRN Conner Liu MD   6 mg at 07/30/25 2037    midazolam (PF) (VERSED) 5 mg/mL injection 4 mg  4 mg Intravenous Q10 Min PRN Conner Liu MD        mupirocin 2 % ointment   Nasal BID Conner Liu MD   1 g at 07/30/25 2032    naloxone 0.4 mg/mL injection 0.02 mg  0.02 mg Intravenous PRN Conner Liu MD        ondansetron injection 4 mg  4 mg Intravenous Q8H PRN Conner Liu,  MD        oxyCODONE-acetaminophen 7.5-325 mg per tablet 1 tablet  1 tablet Oral Q6H PRN Conner Liu MD   1 tablet at 07/31/25 0404    pantoprazole EC tablet 40 mg  40 mg Oral Daily Conner Liu MD   40 mg at 07/30/25 0839    potassium bicarbonate disintegrating tablet 35 mEq  35 mEq Oral PRN Conner Liu MD        potassium bicarbonate disintegrating tablet 50 mEq  50 mEq Oral PRConner Yost MD   50 mEq at 07/30/25 0439    potassium bicarbonate disintegrating tablet 60 mEq  60 mEq Oral PRN Conner Liu MD        potassium, sodium phosphates 280-160-250 mg packet 2 packet  2 packet Oral PRConner Yost MD        potassium, sodium phosphates 280-160-250 mg packet 2 packet  2 packet Oral PRConner Yost MD   2 packet at 07/30/25 0439    potassium, sodium phosphates 280-160-250 mg packet 2 packet  2 packet Oral PRN Conner Liu MD        senna-docusate 8.6-50 mg per tablet 1 tablet  1 tablet Oral Daily PRN Conner Liu MD        sodium chloride 0.9% flush 10 mL  10 mL Intravenous Q12H PRN Conner Liu MD        thiamine tablet 100 mg  100 mg Oral Daily Conner Liu MD   100 mg at 07/30/25 0839

## 2025-07-31 NOTE — ASSESSMENT & PLAN NOTE
Patient has Abnormal Magnesium: hypomagnesemia. Will continue to monitor electrolytes closely. Will replace the affected electrolytes and repeat labs to be done after interventions completed. The patient's magnesium results have been reviewed and are listed below.  Recent Labs   Lab 07/31/25  0356   MG 2.0

## 2025-07-31 NOTE — ASSESSMENT & PLAN NOTE
Patient's most recent potassium results are listed below.   Recent Labs     07/29/25  1629 07/30/25  0337 07/31/25  0356   K 3.5 3.5 4.2     Plan  - Replete potassium per protocol  - Monitor potassium Daily

## 2025-07-31 NOTE — ASSESSMENT & PLAN NOTE
Patient has a diagnosis of pneumonia. The cause of the pneumonia is suspected to be bacterial in etiology but organism is not known. The pneumonia is stable. The patient has the following signs/symptoms of pneumonia: cough and sputum production. The patient does have a current oxygen requirement and the patient does not have a home oxygen requirement. I have reviewed the pertinent imaging. The following cultures have been collected: Blood cultures and Sputum culture The culture results are listed below.     Current antimicrobial regimen consists of the antibiotics listed below. Will monitor patient closely and continue current treatment plan unchanged.    Antibiotics (From admission, onward)      None            Microbiology Results (last 7 days)       Procedure Component Value Units Date/Time    MRSA Screen by PCR [[id number]]  (Abnormal) Collected: 07/29/25 0059    Order Status: Completed Specimen: Nasal Swab Updated: 07/29/25 0239     MRSA PCR SCRN ([facility]) Detected    Culture, Respiratory with Gram Stain [[id number]]     Order Status: Canceled Specimen: Respiratory     Culture, Respiratory with Gram Stain [[id number]]     Order Status: Canceled Specimen: Respiratory

## 2025-08-02 PROBLEM — I5A MYOCARDIAL INJURY: Status: ACTIVE | Noted: 2017-03-26

## 2025-08-02 LAB
BACTERIA BLD CULT: NORMAL
BACTERIA BLD CULT: NORMAL

## 2025-08-20 ENCOUNTER — HOSPITAL ENCOUNTER (INPATIENT)
Facility: HOSPITAL | Age: 45
LOS: 1 days | Discharge: HOME | DRG: 917 | End: 2025-08-21
Attending: EMERGENCY MEDICINE | Admitting: INTERNAL MEDICINE
Payer: MEDICAID

## 2025-08-20 DIAGNOSIS — T50.904A OVERDOSE OF UNDETERMINED INTENT, INITIAL ENCOUNTER: Primary | ICD-10-CM

## 2025-08-20 DIAGNOSIS — J18.9 COMMUNITY ACQUIRED BILATERAL LOWER LOBE PNEUMONIA: ICD-10-CM

## 2025-08-20 DIAGNOSIS — R41.82 ALTERED MENTAL STATUS, UNSPECIFIED ALTERED MENTAL STATUS TYPE: ICD-10-CM

## 2025-08-20 DIAGNOSIS — I26.99 BILATERAL PULMONARY EMBOLISM: ICD-10-CM

## 2025-08-20 PROBLEM — R45.851 SUICIDAL IDEATION: Status: RESOLVED | Noted: 2018-09-12 | Resolved: 2025-08-20

## 2025-08-20 PROBLEM — K85.90 PANCREATITIS: Status: RESOLVED | Noted: 2020-02-24 | Resolved: 2025-08-20

## 2025-08-20 PROBLEM — R79.89 ABNORMAL LIVER FUNCTION TESTS: Status: RESOLVED | Noted: 2025-07-30 | Resolved: 2025-08-20

## 2025-08-20 PROBLEM — R73.9 HYPERGLYCEMIA: Status: RESOLVED | Noted: 2020-07-01 | Resolved: 2025-08-20

## 2025-08-20 PROBLEM — E87.6 HYPOKALEMIA: Status: RESOLVED | Noted: 2017-03-26 | Resolved: 2025-08-20

## 2025-08-20 PROBLEM — A41.9 SEPSIS: Status: RESOLVED | Noted: 2025-07-28 | Resolved: 2025-08-20

## 2025-08-20 PROBLEM — Z20.822 SUSPECTED COVID-19 VIRUS INFECTION: Status: RESOLVED | Noted: 2020-04-04 | Resolved: 2025-08-20

## 2025-08-20 PROBLEM — E87.5 HYPERKALEMIA: Status: RESOLVED | Noted: 2020-03-07 | Resolved: 2025-08-20

## 2025-08-20 LAB
ABSOLUTE EOSINOPHIL (SMH): 0.02 K/UL
ABSOLUTE MONOCYTE (SMH): 0.52 K/UL (ref 0.3–1)
ABSOLUTE NEUTROPHIL COUNT (SMH): 10.6 K/UL (ref 1.8–7.7)
ADENOVIRUS (SMH): NOT DETECTED
ALBUMIN SERPL-MCNC: 4 G/DL (ref 3.5–5.2)
ALP SERPL-CCNC: 138 UNIT/L (ref 55–135)
ALT SERPL-CCNC: 36 UNIT/L (ref 10–44)
AMPHET UR QL SCN: NEGATIVE
ANION GAP (SMH): 7 MMOL/L (ref 8–16)
AST SERPL-CCNC: 26 UNIT/L (ref 10–40)
BARBITURATE SCN PRESENT UR: NEGATIVE
BASOPHILS # BLD AUTO: 0.03 K/UL
BASOPHILS NFR BLD AUTO: 0.2 %
BENZODIAZ UR QL SCN: NEGATIVE
BILIRUB SERPL-MCNC: 0.3 MG/DL (ref 0.1–1)
BILIRUB UR QL STRIP.AUTO: NEGATIVE
BNP SERPL-MCNC: 41 PG/ML
BORDETELLA PARAPERTUSSIS (IS1001) (SMH): NOT DETECTED
BORDETELLA PERTUSSIS (PTXP) (SMH): NOT DETECTED
BUN SERPL-MCNC: 11 MG/DL (ref 6–20)
CALCIUM SERPL-MCNC: 8.8 MG/DL (ref 8.7–10.5)
CANNABINOIDS UR QL SCN: ABNORMAL
CHLAMYDIA PNEUMONIAE (SMH): NOT DETECTED
CHLORIDE SERPL-SCNC: 101 MMOL/L (ref 95–110)
CK SERPL-CCNC: 80 U/L (ref 20–200)
CLARITY UR: CLEAR
CO2 SERPL-SCNC: 27 MMOL/L (ref 23–29)
COCAINE UR QL SCN: NEGATIVE
COLOR UR AUTO: COLORLESS
CORONAVIRUS 229E, COMMON COLD VIRUS (SMH): NOT DETECTED
CORONAVIRUS HKU1, COMMON COLD VIRUS (SMH): NOT DETECTED
CORONAVIRUS NL63, COMMON COLD VIRUS (SMH): NOT DETECTED
CORONAVIRUS OC43, COMMON COLD VIRUS (SMH): NOT DETECTED
CREAT SERPL-MCNC: 1 MG/DL (ref 0.5–1.4)
CREAT UR-MCNC: 47.2 MG/DL (ref 23–375)
CREAT UR-MCNC: 47.2 MG/DL (ref 23–375)
ERYTHROCYTE [DISTWIDTH] IN BLOOD BY AUTOMATED COUNT: 18.1 % (ref 11.5–14.5)
FENTANYL UR QL SCN: ABNORMAL
FLUBV RNA NPH QL NAA+NON-PROBE: NOT DETECTED
GFR SERPLBLD CREATININE-BSD FMLA CKD-EPI: >60 ML/MIN/1.73/M2
GLUCOSE SERPL-MCNC: 133 MG/DL (ref 70–110)
GLUCOSE UR QL STRIP: ABNORMAL
HCT VFR BLD AUTO: 31.8 % (ref 40–54)
HGB BLD-MCNC: 10.1 GM/DL (ref 14–18)
HGB UR QL STRIP: NEGATIVE
HPIV1 RNA NPH QL NAA+NON-PROBE: NOT DETECTED
HPIV2 RNA NPH QL NAA+NON-PROBE: NOT DETECTED
HPIV3 RNA NPH QL NAA+NON-PROBE: NOT DETECTED
HPIV4 RNA NPH QL NAA+NON-PROBE: NOT DETECTED
HUMAN METAPNEUMOVIRUS (SMH): NOT DETECTED
HYALINE CASTS UR QL AUTO: 7 /LPF (ref 0–1)
IMM GRANULOCYTES # BLD AUTO: 0.08 K/UL (ref 0–0.04)
IMM GRANULOCYTES NFR BLD AUTO: 0.7 % (ref 0–0.5)
INFLUENZA A (SUBTYPES H1,H1-2009,H3) (SMH): NOT DETECTED
KETONES UR QL STRIP: NEGATIVE
LEUKOCYTE ESTERASE UR QL STRIP: NEGATIVE
LYMPHOCYTES # BLD AUTO: 0.87 K/UL (ref 1–4.8)
MAGNESIUM SERPL-MCNC: 1.5 MG/DL (ref 1.6–2.6)
MCH RBC QN AUTO: 25.6 PG (ref 27–31)
MCHC RBC AUTO-ENTMCNC: 31.8 G/DL (ref 32–36)
MCV RBC AUTO: 81 FL (ref 82–98)
MICROSCOPIC COMMENT: ABNORMAL
MRSA PCR SCRN (SMH): NOT DETECTED
MYCOPLASMA PNEUMONIAE (SMH): NOT DETECTED
NITRITE UR QL STRIP: NEGATIVE
NUCLEATED RBC (/100WBC) (SMH): 0 /100 WBC
OPIATES UR QL SCN: NEGATIVE
PCP UR QL: NEGATIVE
PH UR STRIP: 5 [PH]
PLATELET # BLD AUTO: 355 K/UL (ref 150–450)
PMV BLD AUTO: 10.7 FL (ref 9.2–12.9)
POCT GLUCOSE: 158 MG/DL (ref 70–110)
POCT GLUCOSE: 180 MG/DL (ref 70–110)
POTASSIUM SERPL-SCNC: 4.3 MMOL/L (ref 3.5–5.1)
PROT SERPL-MCNC: 6.4 GM/DL (ref 6–8.4)
PROT UR QL STRIP: NEGATIVE
RBC # BLD AUTO: 3.94 M/UL (ref 4.6–6.2)
RBC #/AREA URNS AUTO: <1 /HPF
RELATIVE EOSINOPHIL (SMH): 0.2 % (ref 0–8)
RELATIVE LYMPHOCYTE (SMH): 7.2 % (ref 18–48)
RELATIVE MONOCYTE (SMH): 4.3 % (ref 4–15)
RELATIVE NEUTROPHIL (SMH): 87.4 % (ref 38–73)
RESPIRATORY INFECTION PANEL SOURCE (SMH): NORMAL
RSV RNA NPH QL NAA+NON-PROBE: NOT DETECTED
RV+EV RNA NPH QL NAA+NON-PROBE: NOT DETECTED
SARS-COV-2 RNA RESP QL NAA+PROBE: NOT DETECTED
SODIUM SERPL-SCNC: 135 MMOL/L (ref 136–145)
SP GR UR STRIP: 1
TROPONIN HIGH SENSITIVE (SMH): 8 PG/ML
TSH SERPL-ACNC: 4.72 UIU/ML (ref 0.34–5.6)
UROBILINOGEN UR STRIP-ACNC: NEGATIVE EU/DL
WBC # BLD AUTO: 12.15 K/UL (ref 3.9–12.7)
WBC #/AREA URNS AUTO: 1 /HPF

## 2025-08-20 PROCEDURE — 80307 DRUG TEST PRSMV CHEM ANLYZR: CPT | Performed by: NURSE PRACTITIONER

## 2025-08-20 PROCEDURE — 84484 ASSAY OF TROPONIN QUANT: CPT | Performed by: EMERGENCY MEDICINE

## 2025-08-20 PROCEDURE — 96365 THER/PROPH/DIAG IV INF INIT: CPT

## 2025-08-20 PROCEDURE — 25000003 PHARM REV CODE 250: Performed by: INTERNAL MEDICINE

## 2025-08-20 PROCEDURE — 21400001 HC TELEMETRY ROOM

## 2025-08-20 PROCEDURE — 63600175 PHARM REV CODE 636 W HCPCS: Performed by: EMERGENCY MEDICINE

## 2025-08-20 PROCEDURE — 80307 DRUG TEST PRSMV CHEM ANLYZR: CPT | Performed by: EMERGENCY MEDICINE

## 2025-08-20 PROCEDURE — 94799 UNLISTED PULMONARY SVC/PX: CPT

## 2025-08-20 PROCEDURE — 87205 SMEAR GRAM STAIN: CPT | Performed by: NURSE PRACTITIONER

## 2025-08-20 PROCEDURE — 85025 COMPLETE CBC W/AUTO DIFF WBC: CPT | Performed by: EMERGENCY MEDICINE

## 2025-08-20 PROCEDURE — 63600175 PHARM REV CODE 636 W HCPCS: Performed by: INTERNAL MEDICINE

## 2025-08-20 PROCEDURE — 81001 URINALYSIS AUTO W/SCOPE: CPT | Performed by: EMERGENCY MEDICINE

## 2025-08-20 PROCEDURE — 0202U NFCT DS 22 TRGT SARS-COV-2: CPT | Performed by: NURSE PRACTITIONER

## 2025-08-20 PROCEDURE — 83880 ASSAY OF NATRIURETIC PEPTIDE: CPT | Performed by: EMERGENCY MEDICINE

## 2025-08-20 PROCEDURE — 87641 MR-STAPH DNA AMP PROBE: CPT | Performed by: NURSE PRACTITIONER

## 2025-08-20 PROCEDURE — 99900031 HC PATIENT EDUCATION (STAT)

## 2025-08-20 PROCEDURE — 83735 ASSAY OF MAGNESIUM: CPT | Performed by: EMERGENCY MEDICINE

## 2025-08-20 PROCEDURE — 25000003 PHARM REV CODE 250: Performed by: NURSE PRACTITIONER

## 2025-08-20 PROCEDURE — 93005 ELECTROCARDIOGRAM TRACING: CPT | Performed by: INTERNAL MEDICINE

## 2025-08-20 PROCEDURE — 82947 ASSAY GLUCOSE BLOOD QUANT: CPT | Performed by: EMERGENCY MEDICINE

## 2025-08-20 PROCEDURE — 84443 ASSAY THYROID STIM HORMONE: CPT | Performed by: EMERGENCY MEDICINE

## 2025-08-20 PROCEDURE — 99900035 HC TECH TIME PER 15 MIN (STAT)

## 2025-08-20 PROCEDURE — 99285 EMERGENCY DEPT VISIT HI MDM: CPT | Mod: 25

## 2025-08-20 PROCEDURE — 93010 ELECTROCARDIOGRAM REPORT: CPT | Mod: ,,, | Performed by: INTERNAL MEDICINE

## 2025-08-20 PROCEDURE — 11000001 HC ACUTE MED/SURG PRIVATE ROOM

## 2025-08-20 PROCEDURE — 99406 BEHAV CHNG SMOKING 3-10 MIN: CPT

## 2025-08-20 PROCEDURE — 82550 ASSAY OF CK (CPK): CPT | Performed by: EMERGENCY MEDICINE

## 2025-08-20 PROCEDURE — 63600175 PHARM REV CODE 636 W HCPCS: Performed by: NURSE PRACTITIONER

## 2025-08-20 PROCEDURE — 25000003 PHARM REV CODE 250: Performed by: EMERGENCY MEDICINE

## 2025-08-20 PROCEDURE — 94761 N-INVAS EAR/PLS OXIMETRY MLT: CPT

## 2025-08-20 PROCEDURE — 96361 HYDRATE IV INFUSION ADD-ON: CPT

## 2025-08-20 PROCEDURE — 25500020 PHARM REV CODE 255: Performed by: EMERGENCY MEDICINE

## 2025-08-20 RX ORDER — HYDRALAZINE HYDROCHLORIDE 20 MG/ML
10 INJECTION INTRAMUSCULAR; INTRAVENOUS EVERY 4 HOURS PRN
Status: DISCONTINUED | OUTPATIENT
Start: 2025-08-20 | End: 2025-08-21 | Stop reason: HOSPADM

## 2025-08-20 RX ORDER — PANTOPRAZOLE SODIUM 40 MG/1
40 TABLET, DELAYED RELEASE ORAL DAILY
Status: DISCONTINUED | OUTPATIENT
Start: 2025-08-20 | End: 2025-08-21 | Stop reason: HOSPADM

## 2025-08-20 RX ORDER — OXYCODONE AND ACETAMINOPHEN 10; 325 MG/1; MG/1
1 TABLET ORAL EVERY 6 HOURS PRN
Refills: 0 | Status: DISCONTINUED | OUTPATIENT
Start: 2025-08-21 | End: 2025-08-21 | Stop reason: HOSPADM

## 2025-08-20 RX ORDER — ACETAMINOPHEN 325 MG/1
650 TABLET ORAL EVERY 4 HOURS PRN
Status: DISCONTINUED | OUTPATIENT
Start: 2025-08-20 | End: 2025-08-21 | Stop reason: HOSPADM

## 2025-08-20 RX ORDER — MUPIROCIN 20 MG/G
OINTMENT TOPICAL 2 TIMES DAILY
Status: DISCONTINUED | OUTPATIENT
Start: 2025-08-20 | End: 2025-08-21 | Stop reason: HOSPADM

## 2025-08-20 RX ORDER — TAMSULOSIN HYDROCHLORIDE 0.4 MG/1
1 CAPSULE ORAL DAILY
COMMUNITY
Start: 2025-04-16

## 2025-08-20 RX ORDER — IBUPROFEN 200 MG
24 TABLET ORAL
Status: DISCONTINUED | OUTPATIENT
Start: 2025-08-20 | End: 2025-08-21 | Stop reason: HOSPADM

## 2025-08-20 RX ORDER — TALC
6 POWDER (GRAM) TOPICAL NIGHTLY PRN
Status: DISCONTINUED | OUTPATIENT
Start: 2025-08-20 | End: 2025-08-21 | Stop reason: HOSPADM

## 2025-08-20 RX ORDER — SODIUM CHLORIDE 0.9 % (FLUSH) 0.9 %
10 SYRINGE (ML) INJECTION EVERY 12 HOURS PRN
Status: DISCONTINUED | OUTPATIENT
Start: 2025-08-20 | End: 2025-08-21 | Stop reason: HOSPADM

## 2025-08-20 RX ORDER — HYDROCODONE BITARTRATE AND ACETAMINOPHEN 5; 325 MG/1; MG/1
1 TABLET ORAL EVERY 6 HOURS PRN
Refills: 0 | Status: DISCONTINUED | OUTPATIENT
Start: 2025-08-20 | End: 2025-08-20

## 2025-08-20 RX ORDER — POLYETHYLENE GLYCOL 3350 17 G/17G
17 POWDER, FOR SOLUTION ORAL DAILY
Status: DISCONTINUED | OUTPATIENT
Start: 2025-08-20 | End: 2025-08-21 | Stop reason: HOSPADM

## 2025-08-20 RX ORDER — INSULIN ASPART 100 [IU]/ML
0-10 INJECTION, SOLUTION INTRAVENOUS; SUBCUTANEOUS
Status: DISCONTINUED | OUTPATIENT
Start: 2025-08-20 | End: 2025-08-21 | Stop reason: HOSPADM

## 2025-08-20 RX ORDER — THIAMINE HCL 100 MG
100 TABLET ORAL DAILY
Status: DISCONTINUED | OUTPATIENT
Start: 2025-08-21 | End: 2025-08-21 | Stop reason: HOSPADM

## 2025-08-20 RX ORDER — OXYCODONE AND ACETAMINOPHEN 10; 325 MG/1; MG/1
1 TABLET ORAL EVERY 6 HOURS PRN
Status: ON HOLD | COMMUNITY
Start: 2025-08-04 | End: 2025-08-21 | Stop reason: HOSPADM

## 2025-08-20 RX ORDER — IBUPROFEN 200 MG
1 TABLET ORAL DAILY
Status: DISCONTINUED | OUTPATIENT
Start: 2025-08-21 | End: 2025-08-21 | Stop reason: HOSPADM

## 2025-08-20 RX ORDER — LACOSAMIDE 50 MG/1
50 TABLET ORAL EVERY 12 HOURS
Status: DISCONTINUED | OUTPATIENT
Start: 2025-08-20 | End: 2025-08-21 | Stop reason: HOSPADM

## 2025-08-20 RX ORDER — AMLODIPINE BESYLATE 5 MG/1
10 TABLET ORAL DAILY
Status: DISCONTINUED | OUTPATIENT
Start: 2025-08-21 | End: 2025-08-21 | Stop reason: HOSPADM

## 2025-08-20 RX ORDER — FOLIC ACID 1 MG/1
1 TABLET ORAL DAILY
Status: DISCONTINUED | OUTPATIENT
Start: 2025-08-21 | End: 2025-08-21 | Stop reason: HOSPADM

## 2025-08-20 RX ORDER — TRAZODONE HYDROCHLORIDE 100 MG/1
100 TABLET ORAL NIGHTLY
COMMUNITY

## 2025-08-20 RX ORDER — CEFEPIME HYDROCHLORIDE 2 G/1
2 INJECTION, POWDER, FOR SOLUTION INTRAVENOUS
Status: DISCONTINUED | OUTPATIENT
Start: 2025-08-20 | End: 2025-08-20

## 2025-08-20 RX ORDER — IBUPROFEN 200 MG
16 TABLET ORAL
Status: DISCONTINUED | OUTPATIENT
Start: 2025-08-20 | End: 2025-08-21 | Stop reason: HOSPADM

## 2025-08-20 RX ORDER — HYDROCODONE BITARTRATE AND ACETAMINOPHEN 10; 325 MG/1; MG/1
1 TABLET ORAL EVERY 6 HOURS PRN
Refills: 0 | Status: DISCONTINUED | OUTPATIENT
Start: 2025-08-20 | End: 2025-08-20

## 2025-08-20 RX ORDER — GLUCAGON 1 MG
1 KIT INJECTION
Status: DISCONTINUED | OUTPATIENT
Start: 2025-08-20 | End: 2025-08-21 | Stop reason: HOSPADM

## 2025-08-20 RX ORDER — QUETIAPINE FUMARATE 100 MG/1
300 TABLET, FILM COATED ORAL NIGHTLY
Status: DISCONTINUED | OUTPATIENT
Start: 2025-08-20 | End: 2025-08-20

## 2025-08-20 RX ORDER — SODIUM CHLORIDE 9 MG/ML
INJECTION, SOLUTION INTRAVENOUS CONTINUOUS
Status: DISCONTINUED | OUTPATIENT
Start: 2025-08-20 | End: 2025-08-21

## 2025-08-20 RX ORDER — DIPHENHYDRAMINE HCL 25 MG
25 CAPSULE ORAL EVERY 6 HOURS PRN
Status: DISCONTINUED | OUTPATIENT
Start: 2025-08-20 | End: 2025-08-21 | Stop reason: HOSPADM

## 2025-08-20 RX ORDER — LORAZEPAM 2 MG/1
2 TABLET ORAL 3 TIMES DAILY
COMMUNITY
Start: 2025-07-21

## 2025-08-20 RX ORDER — IPRATROPIUM BROMIDE AND ALBUTEROL SULFATE 2.5; .5 MG/3ML; MG/3ML
3 SOLUTION RESPIRATORY (INHALATION) EVERY 6 HOURS PRN
Status: DISCONTINUED | OUTPATIENT
Start: 2025-08-20 | End: 2025-08-21 | Stop reason: HOSPADM

## 2025-08-20 RX ORDER — ONDANSETRON HYDROCHLORIDE 2 MG/ML
4 INJECTION, SOLUTION INTRAVENOUS EVERY 8 HOURS PRN
Status: DISCONTINUED | OUTPATIENT
Start: 2025-08-20 | End: 2025-08-21 | Stop reason: HOSPADM

## 2025-08-20 RX ORDER — MAGNESIUM SULFATE 1 G/100ML
1 INJECTION INTRAVENOUS ONCE
Status: COMPLETED | OUTPATIENT
Start: 2025-08-20 | End: 2025-08-20

## 2025-08-20 RX ORDER — LISINOPRIL 20 MG/1
20 TABLET ORAL DAILY
Status: DISCONTINUED | OUTPATIENT
Start: 2025-08-21 | End: 2025-08-20

## 2025-08-20 RX ORDER — ENOXAPARIN SODIUM 100 MG/ML
1 INJECTION SUBCUTANEOUS EVERY 12 HOURS
Status: DISCONTINUED | OUTPATIENT
Start: 2025-08-20 | End: 2025-08-21 | Stop reason: HOSPADM

## 2025-08-20 RX ADMIN — MUPIROCIN 1 G: 20 OINTMENT TOPICAL at 09:08

## 2025-08-20 RX ADMIN — ENOXAPARIN SODIUM 70 MG: 80 INJECTION SUBCUTANEOUS at 03:08

## 2025-08-20 RX ADMIN — SODIUM CHLORIDE 1000 ML: 9 INJECTION, SOLUTION INTRAVENOUS at 07:08

## 2025-08-20 RX ADMIN — PIPERACILLIN AND TAZOBACTAM 4.5 G: 4; .5 INJECTION, POWDER, LYOPHILIZED, FOR SOLUTION INTRAVENOUS at 09:08

## 2025-08-20 RX ADMIN — MAGNESIUM SULFATE IN DEXTROSE 1 G: 10 INJECTION, SOLUTION INTRAVENOUS at 10:08

## 2025-08-20 RX ADMIN — HYDROCODONE BITARTRATE AND ACETAMINOPHEN 1 TABLET: 10; 325 TABLET ORAL at 06:08

## 2025-08-20 RX ADMIN — SODIUM CHLORIDE: 9 INJECTION, SOLUTION INTRAVENOUS at 09:08

## 2025-08-20 RX ADMIN — MAGNESIUM SULFATE IN DEXTROSE 1 G: 10 INJECTION, SOLUTION INTRAVENOUS at 03:08

## 2025-08-20 RX ADMIN — LACOSAMIDE 50 MG: 50 TABLET, FILM COATED ORAL at 10:08

## 2025-08-20 RX ADMIN — VANCOMYCIN HYDROCHLORIDE 1500 MG: 1.5 INJECTION, POWDER, LYOPHILIZED, FOR SOLUTION INTRAVENOUS at 03:08

## 2025-08-20 RX ADMIN — CEFEPIME 2 G: 2 INJECTION, POWDER, FOR SOLUTION INTRAVENOUS at 03:08

## 2025-08-20 RX ADMIN — IOHEXOL 100 ML: 350 INJECTION, SOLUTION INTRAVENOUS at 01:08

## 2025-08-21 VITALS
BODY MASS INDEX: 25.15 KG/M2 | RESPIRATION RATE: 18 BRPM | WEIGHT: 160.25 LBS | HEART RATE: 77 BPM | DIASTOLIC BLOOD PRESSURE: 95 MMHG | TEMPERATURE: 98 F | OXYGEN SATURATION: 99 % | SYSTOLIC BLOOD PRESSURE: 150 MMHG | HEIGHT: 67 IN

## 2025-08-21 LAB
ABSOLUTE EOSINOPHIL (SMH): 0.33 K/UL
ABSOLUTE MONOCYTE (SMH): 0.31 K/UL (ref 0.3–1)
ABSOLUTE NEUTROPHIL COUNT (SMH): 3.5 K/UL (ref 1.8–7.7)
ANION GAP (SMH): 5 MMOL/L (ref 8–16)
BASOPHILS # BLD AUTO: 0.02 K/UL
BASOPHILS NFR BLD AUTO: 0.4 %
BUN SERPL-MCNC: 6 MG/DL (ref 6–20)
CALCIUM SERPL-MCNC: 8.7 MG/DL (ref 8.7–10.5)
CHLORIDE SERPL-SCNC: 106 MMOL/L (ref 95–110)
CO2 SERPL-SCNC: 28 MMOL/L (ref 23–29)
CREAT SERPL-MCNC: 0.9 MG/DL (ref 0.5–1.4)
ERYTHROCYTE [DISTWIDTH] IN BLOOD BY AUTOMATED COUNT: 18.4 % (ref 11.5–14.5)
GFR SERPLBLD CREATININE-BSD FMLA CKD-EPI: >60 ML/MIN/1.73/M2
GLUCOSE SERPL-MCNC: 120 MG/DL (ref 70–110)
HCT VFR BLD AUTO: 29 % (ref 40–54)
HGB BLD-MCNC: 9.2 GM/DL (ref 14–18)
IMM GRANULOCYTES # BLD AUTO: 0.02 K/UL (ref 0–0.04)
IMM GRANULOCYTES NFR BLD AUTO: 0.4 % (ref 0–0.5)
LACTATE SERPL-SCNC: 1.1 MMOL/L (ref 0.5–1.9)
LYMPHOCYTES # BLD AUTO: 1.4 K/UL (ref 1–4.8)
MAGNESIUM SERPL-MCNC: 1.9 MG/DL (ref 1.6–2.6)
MCH RBC QN AUTO: 25.6 PG (ref 27–31)
MCHC RBC AUTO-ENTMCNC: 31.7 G/DL (ref 32–36)
MCV RBC AUTO: 81 FL (ref 82–98)
NUCLEATED RBC (/100WBC) (SMH): 0 /100 WBC
OHS QRS DURATION: 68 MS
OHS QTC CALCULATION: 426 MS
PHOSPHATE SERPL-MCNC: 3 MG/DL (ref 2.7–4.5)
PLATELET # BLD AUTO: 288 K/UL (ref 150–450)
PMV BLD AUTO: 10 FL (ref 9.2–12.9)
POCT GLUCOSE: 139 MG/DL (ref 70–110)
POCT GLUCOSE: 173 MG/DL (ref 70–110)
POTASSIUM SERPL-SCNC: 3.9 MMOL/L (ref 3.5–5.1)
RBC # BLD AUTO: 3.6 M/UL (ref 4.6–6.2)
RELATIVE EOSINOPHIL (SMH): 5.9 % (ref 0–8)
RELATIVE LYMPHOCYTE (SMH): 24.9 % (ref 18–48)
RELATIVE MONOCYTE (SMH): 5.5 % (ref 4–15)
RELATIVE NEUTROPHIL (SMH): 62.9 % (ref 38–73)
SODIUM SERPL-SCNC: 139 MMOL/L (ref 136–145)
WBC # BLD AUTO: 5.62 K/UL (ref 3.9–12.7)

## 2025-08-21 PROCEDURE — 36415 COLL VENOUS BLD VENIPUNCTURE: CPT | Performed by: INTERNAL MEDICINE

## 2025-08-21 PROCEDURE — 83605 ASSAY OF LACTIC ACID: CPT | Performed by: INTERNAL MEDICINE

## 2025-08-21 PROCEDURE — 99900035 HC TECH TIME PER 15 MIN (STAT)

## 2025-08-21 PROCEDURE — 25000003 PHARM REV CODE 250: Performed by: STUDENT IN AN ORGANIZED HEALTH CARE EDUCATION/TRAINING PROGRAM

## 2025-08-21 PROCEDURE — 85025 COMPLETE CBC W/AUTO DIFF WBC: CPT | Performed by: NURSE PRACTITIONER

## 2025-08-21 PROCEDURE — 25000003 PHARM REV CODE 250: Performed by: NURSE PRACTITIONER

## 2025-08-21 PROCEDURE — 25000003 PHARM REV CODE 250: Performed by: INTERNAL MEDICINE

## 2025-08-21 PROCEDURE — 63600175 PHARM REV CODE 636 W HCPCS: Performed by: NURSE PRACTITIONER

## 2025-08-21 PROCEDURE — 83735 ASSAY OF MAGNESIUM: CPT | Performed by: NURSE PRACTITIONER

## 2025-08-21 PROCEDURE — 80048 BASIC METABOLIC PNL TOTAL CA: CPT | Performed by: NURSE PRACTITIONER

## 2025-08-21 PROCEDURE — 63600175 PHARM REV CODE 636 W HCPCS: Performed by: INTERNAL MEDICINE

## 2025-08-21 PROCEDURE — 94761 N-INVAS EAR/PLS OXIMETRY MLT: CPT

## 2025-08-21 PROCEDURE — 84100 ASSAY OF PHOSPHORUS: CPT | Performed by: NURSE PRACTITIONER

## 2025-08-21 PROCEDURE — 99900031 HC PATIENT EDUCATION (STAT)

## 2025-08-21 RX ORDER — LORAZEPAM 1 MG/1
2 TABLET ORAL 3 TIMES DAILY
Status: DISCONTINUED | OUTPATIENT
Start: 2025-08-21 | End: 2025-08-21 | Stop reason: HOSPADM

## 2025-08-21 RX ORDER — QUETIAPINE FUMARATE 100 MG/1
300 TABLET, FILM COATED ORAL NIGHTLY
Status: DISCONTINUED | OUTPATIENT
Start: 2025-08-21 | End: 2025-08-21 | Stop reason: HOSPADM

## 2025-08-21 RX ORDER — TRAZODONE HYDROCHLORIDE 50 MG/1
100 TABLET ORAL NIGHTLY
Status: DISCONTINUED | OUTPATIENT
Start: 2025-08-21 | End: 2025-08-21 | Stop reason: HOSPADM

## 2025-08-21 RX ORDER — CEFDINIR 300 MG/1
300 CAPSULE ORAL 2 TIMES DAILY
Qty: 10 CAPSULE | Refills: 0 | Status: SHIPPED | OUTPATIENT
Start: 2025-08-22 | End: 2025-08-27

## 2025-08-21 RX ORDER — CEFTRIAXONE 2 G/1
2 INJECTION, POWDER, FOR SOLUTION INTRAMUSCULAR; INTRAVENOUS
Status: DISCONTINUED | OUTPATIENT
Start: 2025-08-21 | End: 2025-08-21 | Stop reason: HOSPADM

## 2025-08-21 RX ORDER — TAMSULOSIN HYDROCHLORIDE 0.4 MG/1
1 CAPSULE ORAL DAILY
Status: DISCONTINUED | OUTPATIENT
Start: 2025-08-21 | End: 2025-08-21 | Stop reason: HOSPADM

## 2025-08-21 RX ORDER — AZITHROMYCIN 500 MG/1
500 TABLET, FILM COATED ORAL DAILY
Qty: 2 TABLET | Refills: 0 | Status: SHIPPED | OUTPATIENT
Start: 2025-08-22 | End: 2025-08-24

## 2025-08-21 RX ADMIN — OXYCODONE HYDROCHLORIDE AND ACETAMINOPHEN 1 TABLET: 10; 325 TABLET ORAL at 10:08

## 2025-08-21 RX ADMIN — LORAZEPAM 2 MG: 1 TABLET ORAL at 10:08

## 2025-08-21 RX ADMIN — OXYCODONE HYDROCHLORIDE AND ACETAMINOPHEN 1 TABLET: 10; 325 TABLET ORAL at 12:08

## 2025-08-21 RX ADMIN — VANCOMYCIN HYDROCHLORIDE 1000 MG: 1 INJECTION, POWDER, LYOPHILIZED, FOR SOLUTION INTRAVENOUS at 03:08

## 2025-08-21 RX ADMIN — Medication 100 MG: at 08:08

## 2025-08-21 RX ADMIN — AMLODIPINE BESYLATE 10 MG: 5 TABLET ORAL at 08:08

## 2025-08-21 RX ADMIN — ENOXAPARIN SODIUM 70 MG: 80 INJECTION SUBCUTANEOUS at 03:08

## 2025-08-21 RX ADMIN — TAMSULOSIN HYDROCHLORIDE 0.4 MG: 0.4 CAPSULE ORAL at 10:08

## 2025-08-21 RX ADMIN — PIPERACILLIN AND TAZOBACTAM 4.5 G: 4; .5 INJECTION, POWDER, LYOPHILIZED, FOR SOLUTION INTRAVENOUS at 05:08

## 2025-08-21 RX ADMIN — MUPIROCIN 1 G: 20 OINTMENT TOPICAL at 08:08

## 2025-08-21 RX ADMIN — PANTOPRAZOLE SODIUM 40 MG: 40 TABLET, DELAYED RELEASE ORAL at 08:08

## 2025-08-21 RX ADMIN — LACOSAMIDE 50 MG: 50 TABLET, FILM COATED ORAL at 08:08

## 2025-08-21 RX ADMIN — FOLIC ACID 1 MG: 1 TABLET ORAL at 08:08

## 2025-08-22 ENCOUNTER — PATIENT OUTREACH (OUTPATIENT)
Dept: ADMINISTRATIVE | Facility: CLINIC | Age: 45
End: 2025-08-22
Payer: MEDICAID

## 2025-08-23 LAB
BACTERIA SPEC CULT: ABNORMAL
GRAM STAIN (RESPIRATORY) (SMH): ABNORMAL

## (undated) DEVICE — LINER SUCTION 3000CC

## (undated) DEVICE — ADHESIVE DERMABOND ADVANCED

## (undated) DEVICE — TROCAR ENDOPATH XCEL 5X100MM

## (undated) DEVICE — PACK CUSTOM ENDO CHOLO SLI

## (undated) DEVICE — SET TUBE PNEUMOCLEAR SE HI FLO

## (undated) DEVICE — BAG TISS RETRV MONARCH 10MM

## (undated) DEVICE — TROCAR ENDOPATH XCEL 11MM 10CM

## (undated) DEVICE — CLOSURE SKIN STERI STRIP 1/2X4

## (undated) DEVICE — SOL WATER STRL IRR 1000ML

## (undated) DEVICE — SUT MONOCRYL 4-0 PS-2

## (undated) DEVICE — SEE MEDLINE ITEM 152622

## (undated) DEVICE — ELECTRODE REM PLYHSV RETURN 9

## (undated) DEVICE — SYR 10CC LUER LOCK

## (undated) DEVICE — TROCAR KII FIOS 5MM X 100MM

## (undated) DEVICE — IRRIGATOR SUCTION W/TIP

## (undated) DEVICE — APPLICATOR CHLORAPREP ORN 26ML

## (undated) DEVICE — BLADE SURG CARBON STEEL SZ11

## (undated) DEVICE — SUT 0 VICRYL / UR6 (J603)

## (undated) DEVICE — CANNULA ENDOPATH XCEL 5X100MM

## (undated) DEVICE — NDL INSUF ULTRA VERESS 120MM

## (undated) DEVICE — GLOVE SURG ULTRA TOUCH 7.5

## (undated) DEVICE — SCISSOR 5MMX35CM DIRECT DRIVE

## (undated) DEVICE — KIT ANTIFOG

## (undated) DEVICE — SEE MEDLINE ITEM 157117

## (undated) DEVICE — APPLIER CLIP EPIX UNIV 5X34

## (undated) DEVICE — SLEEVE SCD EXPRESS CALF MEDIUM